# Patient Record
Sex: MALE | Race: BLACK OR AFRICAN AMERICAN | NOT HISPANIC OR LATINO | Employment: OTHER | URBAN - METROPOLITAN AREA
[De-identification: names, ages, dates, MRNs, and addresses within clinical notes are randomized per-mention and may not be internally consistent; named-entity substitution may affect disease eponyms.]

---

## 2017-01-07 ENCOUNTER — HOSPITAL ENCOUNTER (OUTPATIENT)
Facility: HOSPITAL | Age: 67
Discharge: HOME HEALTH | End: 2017-01-09
Attending: EMERGENCY MEDICINE | Admitting: HOSPITALIST
Payer: COMMERCIAL
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
## 2017-01-07 DIAGNOSIS — I48.92 ATRIAL FIBRILLATION AND FLUTTER: ICD-10-CM

## 2017-01-07 DIAGNOSIS — M48.061 SPINAL STENOSIS OF LUMBAR REGION: ICD-10-CM

## 2017-01-07 DIAGNOSIS — R53.1 WEAKNESS: ICD-10-CM

## 2017-01-07 DIAGNOSIS — I48.91 ATRIAL FIBRILLATION AND FLUTTER: ICD-10-CM

## 2017-01-07 DIAGNOSIS — E87.5 HYPERKALEMIA, DIMINISHED RENAL EXCRETION: Primary | ICD-10-CM

## 2017-01-07 PROBLEM — M25.512 ACUTE PAIN OF LEFT SHOULDER: Status: ACTIVE | Noted: 2017-01-07

## 2017-01-07 LAB
ALBUMIN SERPL BCP-MCNC: 3.2 G/DL
ALP SERPL-CCNC: 128 U/L
ALT SERPL W/O P-5'-P-CCNC: 12 U/L
ANION GAP SERPL CALC-SCNC: 13 MMOL/L
AST SERPL-CCNC: 21 U/L
BASOPHILS # BLD AUTO: 0.03 K/UL
BASOPHILS NFR BLD: 0.6 %
BILIRUB DIRECT SERPL-MCNC: 0.5 MG/DL
BILIRUB SERPL-MCNC: 1 MG/DL
BUN SERPL-MCNC: 32 MG/DL
BUN SERPL-MCNC: 47 MG/DL (ref 6–30)
CALCIUM SERPL-MCNC: 9.9 MG/DL
CHLORIDE SERPL-SCNC: 100 MMOL/L
CHLORIDE SERPL-SCNC: 100 MMOL/L (ref 95–110)
CO2 SERPL-SCNC: 28 MMOL/L
CREAT SERPL-MCNC: 6.2 MG/DL (ref 0.5–1.4)
CREAT SERPL-MCNC: 7.1 MG/DL
DIFFERENTIAL METHOD: ABNORMAL
EOSINOPHIL # BLD AUTO: 0.2 K/UL
EOSINOPHIL NFR BLD: 3.3 %
ERYTHROCYTE [DISTWIDTH] IN BLOOD BY AUTOMATED COUNT: 19.8 %
EST. GFR  (AFRICAN AMERICAN): 8.4 ML/MIN/1.73 M^2
EST. GFR  (NON AFRICAN AMERICAN): 7.2 ML/MIN/1.73 M^2
FOLATE SERPL-MCNC: 3.9 NG/ML
GLUCOSE SERPL-MCNC: 109 MG/DL
GLUCOSE SERPL-MCNC: 79 MG/DL (ref 70–110)
HCT VFR BLD AUTO: 34 %
HCT VFR BLD CALC: 39 %PCV (ref 36–54)
HGB BLD-MCNC: 10.9 G/DL
LYMPHOCYTES # BLD AUTO: 1.1 K/UL
LYMPHOCYTES NFR BLD: 21.5 %
MCH RBC QN AUTO: 28.9 PG
MCHC RBC AUTO-ENTMCNC: 32.1 %
MCV RBC AUTO: 90 FL
MONOCYTES # BLD AUTO: 0.7 K/UL
MONOCYTES NFR BLD: 13 %
NEUTROPHILS # BLD AUTO: 3.2 K/UL
NEUTROPHILS NFR BLD: 61.4 %
PLATELET # BLD AUTO: 91 K/UL
PMV BLD AUTO: ABNORMAL FL
POC IONIZED CALCIUM: 1.04 MMOL/L (ref 1.06–1.42)
POC TCO2 (MEASURED): 32 MMOL/L (ref 23–29)
POCT GLUCOSE: 104 MG/DL (ref 70–110)
POCT GLUCOSE: 72 MG/DL (ref 70–110)
POTASSIUM BLD-SCNC: 6.9 MMOL/L (ref 3.5–5.1)
POTASSIUM SERPL-SCNC: 5 MMOL/L
PROT SERPL-MCNC: 7.9 G/DL
RBC # BLD AUTO: 3.77 M/UL
SAMPLE: ABNORMAL
SODIUM BLD-SCNC: 138 MMOL/L (ref 136–145)
SODIUM SERPL-SCNC: 141 MMOL/L
T4 FREE SERPL-MCNC: 0.84 NG/DL
TSH SERPL DL<=0.005 MIU/L-ACNC: 4.84 UIU/ML
VIT B12 SERPL-MCNC: 760 PG/ML
WBC # BLD AUTO: 5.16 K/UL

## 2017-01-07 PROCEDURE — 83036 HEMOGLOBIN GLYCOSYLATED A1C: CPT

## 2017-01-07 PROCEDURE — 25000003 PHARM REV CODE 250: Performed by: EMERGENCY MEDICINE

## 2017-01-07 PROCEDURE — 93010 ELECTROCARDIOGRAM REPORT: CPT | Mod: ,,, | Performed by: INTERNAL MEDICINE

## 2017-01-07 PROCEDURE — 99213 OFFICE O/P EST LOW 20 MIN: CPT | Mod: GC,,, | Performed by: INTERNAL MEDICINE

## 2017-01-07 PROCEDURE — 99291 CRITICAL CARE FIRST HOUR: CPT | Mod: 25

## 2017-01-07 PROCEDURE — 85025 COMPLETE CBC W/AUTO DIFF WBC: CPT

## 2017-01-07 PROCEDURE — 84439 ASSAY OF FREE THYROXINE: CPT

## 2017-01-07 PROCEDURE — 96365 THER/PROPH/DIAG IV INF INIT: CPT

## 2017-01-07 PROCEDURE — G0257 UNSCHED DIALYSIS ESRD PT HOS: HCPCS

## 2017-01-07 PROCEDURE — 63600175 PHARM REV CODE 636 W HCPCS: Performed by: EMERGENCY MEDICINE

## 2017-01-07 PROCEDURE — 80076 HEPATIC FUNCTION PANEL: CPT

## 2017-01-07 PROCEDURE — 25000003 PHARM REV CODE 250: Performed by: PHYSICIAN ASSISTANT

## 2017-01-07 PROCEDURE — 99291 CRITICAL CARE FIRST HOUR: CPT | Mod: ,,, | Performed by: EMERGENCY MEDICINE

## 2017-01-07 PROCEDURE — 82746 ASSAY OF FOLIC ACID SERUM: CPT

## 2017-01-07 PROCEDURE — 93010 ELECTROCARDIOGRAM REPORT: CPT | Mod: 76,,, | Performed by: INTERNAL MEDICINE

## 2017-01-07 PROCEDURE — G0378 HOSPITAL OBSERVATION PER HR: HCPCS

## 2017-01-07 PROCEDURE — 80048 BASIC METABOLIC PNL TOTAL CA: CPT

## 2017-01-07 PROCEDURE — 99220 PR INITIAL OBSERVATION CARE,LEVL III: CPT | Mod: ,,, | Performed by: PHYSICIAN ASSISTANT

## 2017-01-07 PROCEDURE — 96375 TX/PRO/DX INJ NEW DRUG ADDON: CPT

## 2017-01-07 PROCEDURE — 82607 VITAMIN B-12: CPT

## 2017-01-07 PROCEDURE — 36415 COLL VENOUS BLD VENIPUNCTURE: CPT

## 2017-01-07 PROCEDURE — 82962 GLUCOSE BLOOD TEST: CPT

## 2017-01-07 PROCEDURE — 84443 ASSAY THYROID STIM HORMONE: CPT

## 2017-01-07 RX ORDER — CYCLOBENZAPRINE HCL 5 MG
5 TABLET ORAL 3 TIMES DAILY PRN
Status: DISCONTINUED | OUTPATIENT
Start: 2017-01-07 | End: 2017-01-09 | Stop reason: HOSPADM

## 2017-01-07 RX ORDER — SODIUM CHLORIDE 9 MG/ML
INJECTION, SOLUTION INTRAVENOUS
Status: DISCONTINUED | OUTPATIENT
Start: 2017-01-07 | End: 2017-01-09 | Stop reason: HOSPADM

## 2017-01-07 RX ORDER — HYDRALAZINE HYDROCHLORIDE 25 MG/1
100 TABLET, FILM COATED ORAL EVERY 8 HOURS
Status: DISCONTINUED | OUTPATIENT
Start: 2017-01-07 | End: 2017-01-09 | Stop reason: HOSPADM

## 2017-01-07 RX ORDER — CLONIDINE HYDROCHLORIDE 0.1 MG/1
0.1 TABLET ORAL 3 TIMES DAILY PRN
Status: DISCONTINUED | OUTPATIENT
Start: 2017-01-07 | End: 2017-01-09 | Stop reason: HOSPADM

## 2017-01-07 RX ORDER — SODIUM CHLORIDE 0.9 % (FLUSH) 0.9 %
3 SYRINGE (ML) INJECTION EVERY 8 HOURS
Status: DISCONTINUED | OUTPATIENT
Start: 2017-01-07 | End: 2017-01-09 | Stop reason: HOSPADM

## 2017-01-07 RX ORDER — LIDOCAINE 50 MG/G
2 PATCH TOPICAL
Status: DISCONTINUED | OUTPATIENT
Start: 2017-01-07 | End: 2017-01-09 | Stop reason: HOSPADM

## 2017-01-07 RX ORDER — OXYCODONE HYDROCHLORIDE 5 MG/1
5 TABLET ORAL EVERY 4 HOURS PRN
Status: DISCONTINUED | OUTPATIENT
Start: 2017-01-07 | End: 2017-01-09 | Stop reason: HOSPADM

## 2017-01-07 RX ORDER — IBUPROFEN 200 MG
16 TABLET ORAL
Status: DISCONTINUED | OUTPATIENT
Start: 2017-01-07 | End: 2017-01-09 | Stop reason: HOSPADM

## 2017-01-07 RX ORDER — RAMELTEON 8 MG/1
8 TABLET ORAL NIGHTLY PRN
Status: DISCONTINUED | OUTPATIENT
Start: 2017-01-07 | End: 2017-01-09 | Stop reason: HOSPADM

## 2017-01-07 RX ORDER — GLUCAGON 1 MG
1 KIT INJECTION
Status: DISCONTINUED | OUTPATIENT
Start: 2017-01-07 | End: 2017-01-09 | Stop reason: HOSPADM

## 2017-01-07 RX ORDER — INSULIN ASPART 100 [IU]/ML
0-5 INJECTION, SOLUTION INTRAVENOUS; SUBCUTANEOUS
Status: DISCONTINUED | OUTPATIENT
Start: 2017-01-07 | End: 2017-01-09 | Stop reason: HOSPADM

## 2017-01-07 RX ORDER — ACETAMINOPHEN 325 MG/1
650 TABLET ORAL EVERY 4 HOURS PRN
Status: DISCONTINUED | OUTPATIENT
Start: 2017-01-07 | End: 2017-01-09 | Stop reason: HOSPADM

## 2017-01-07 RX ORDER — GABAPENTIN 300 MG/1
300 CAPSULE ORAL 3 TIMES DAILY
Status: DISCONTINUED | OUTPATIENT
Start: 2017-01-07 | End: 2017-01-09 | Stop reason: HOSPADM

## 2017-01-07 RX ORDER — CLOPIDOGREL BISULFATE 75 MG/1
75 TABLET ORAL DAILY
Status: DISCONTINUED | OUTPATIENT
Start: 2017-01-07 | End: 2017-01-09 | Stop reason: HOSPADM

## 2017-01-07 RX ORDER — ACETAMINOPHEN 325 MG/1
650 TABLET ORAL EVERY 8 HOURS PRN
Status: DISCONTINUED | OUTPATIENT
Start: 2017-01-07 | End: 2017-01-09 | Stop reason: HOSPADM

## 2017-01-07 RX ORDER — FUROSEMIDE 40 MG/1
80 TABLET ORAL DAILY
Status: DISCONTINUED | OUTPATIENT
Start: 2017-01-08 | End: 2017-01-09 | Stop reason: HOSPADM

## 2017-01-07 RX ORDER — ATORVASTATIN CALCIUM 20 MG/1
40 TABLET, FILM COATED ORAL NIGHTLY
Status: DISCONTINUED | OUTPATIENT
Start: 2017-01-07 | End: 2017-01-09 | Stop reason: HOSPADM

## 2017-01-07 RX ORDER — OXYCODONE HYDROCHLORIDE 5 MG/1
10 TABLET ORAL EVERY 4 HOURS PRN
Status: DISCONTINUED | OUTPATIENT
Start: 2017-01-07 | End: 2017-01-09 | Stop reason: HOSPADM

## 2017-01-07 RX ORDER — IBUPROFEN 200 MG
24 TABLET ORAL
Status: DISCONTINUED | OUTPATIENT
Start: 2017-01-07 | End: 2017-01-09 | Stop reason: HOSPADM

## 2017-01-07 RX ORDER — ONDANSETRON 2 MG/ML
4 INJECTION INTRAMUSCULAR; INTRAVENOUS EVERY 12 HOURS PRN
Status: DISCONTINUED | OUTPATIENT
Start: 2017-01-07 | End: 2017-01-09 | Stop reason: HOSPADM

## 2017-01-07 RX ORDER — ASPIRIN 81 MG/1
81 TABLET ORAL DAILY
Status: DISCONTINUED | OUTPATIENT
Start: 2017-01-07 | End: 2017-01-09 | Stop reason: HOSPADM

## 2017-01-07 RX ORDER — LISINOPRIL 20 MG/1
40 TABLET ORAL DAILY
Status: DISCONTINUED | OUTPATIENT
Start: 2017-01-07 | End: 2017-01-09 | Stop reason: HOSPADM

## 2017-01-07 RX ORDER — AMLODIPINE BESYLATE 10 MG/1
10 TABLET ORAL DAILY
Status: DISCONTINUED | OUTPATIENT
Start: 2017-01-07 | End: 2017-01-09 | Stop reason: HOSPADM

## 2017-01-07 RX ORDER — AMOXICILLIN 250 MG
1 CAPSULE ORAL 2 TIMES DAILY
Status: DISCONTINUED | OUTPATIENT
Start: 2017-01-07 | End: 2017-01-09 | Stop reason: HOSPADM

## 2017-01-07 RX ORDER — SODIUM CHLORIDE 9 MG/ML
INJECTION, SOLUTION INTRAVENOUS ONCE
Status: DISCONTINUED | OUTPATIENT
Start: 2017-01-07 | End: 2017-01-09 | Stop reason: HOSPADM

## 2017-01-07 RX ORDER — ONDANSETRON 8 MG/1
8 TABLET, ORALLY DISINTEGRATING ORAL EVERY 8 HOURS PRN
Status: DISCONTINUED | OUTPATIENT
Start: 2017-01-07 | End: 2017-01-09 | Stop reason: HOSPADM

## 2017-01-07 RX ORDER — SEVELAMER CARBONATE 800 MG/1
800 TABLET, FILM COATED ORAL
Status: DISCONTINUED | OUTPATIENT
Start: 2017-01-07 | End: 2017-01-09 | Stop reason: HOSPADM

## 2017-01-07 RX ORDER — PANTOPRAZOLE SODIUM 40 MG/1
40 TABLET, DELAYED RELEASE ORAL DAILY
Status: DISCONTINUED | OUTPATIENT
Start: 2017-01-08 | End: 2017-01-09 | Stop reason: HOSPADM

## 2017-01-07 RX ADMIN — ASPIRIN 81 MG: 81 TABLET, COATED ORAL at 06:01

## 2017-01-07 RX ADMIN — DEXTROSE MONOHYDRATE 25 G: 25 INJECTION, SOLUTION INTRAVENOUS at 03:01

## 2017-01-07 RX ADMIN — INSULIN HUMAN 10 UNITS: 100 INJECTION, SOLUTION PARENTERAL at 03:01

## 2017-01-07 RX ADMIN — CLOPIDOGREL 75 MG: 75 TABLET, FILM COATED ORAL at 06:01

## 2017-01-07 RX ADMIN — AMLODIPINE BESYLATE 10 MG: 10 TABLET ORAL at 06:01

## 2017-01-07 RX ADMIN — LIDOCAINE 2 PATCH: 50 PATCH TOPICAL at 06:01

## 2017-01-07 RX ADMIN — OXYCODONE HYDROCHLORIDE 10 MG: 5 TABLET ORAL at 06:01

## 2017-01-07 RX ADMIN — CALCIUM GLUCONATE 1 G: 94 INJECTION, SOLUTION INTRAVENOUS at 03:01

## 2017-01-07 RX ADMIN — SEVELAMER CARBONATE 800 MG: 800 TABLET, FILM COATED ORAL at 06:01

## 2017-01-07 RX ADMIN — GABAPENTIN 300 MG: 300 CAPSULE ORAL at 09:01

## 2017-01-07 RX ADMIN — LISINOPRIL 40 MG: 20 TABLET ORAL at 06:01

## 2017-01-07 RX ADMIN — STANDARDIZED SENNA CONCENTRATE AND DOCUSATE SODIUM 1 TABLET: 8.6; 5 TABLET, FILM COATED ORAL at 09:01

## 2017-01-07 RX ADMIN — ATORVASTATIN CALCIUM 40 MG: 20 TABLET, FILM COATED ORAL at 09:01

## 2017-01-07 NOTE — H&P
"[facility] Medicine  History & Physical    Patient Name: Wilder Carbajal  MRN: [id number]  Admission Date: 1/7/2017  Attending Physician: Bahman Matthews MD   Primary Care Provider: Akhil Adorno MD    [facility] Medicine Team: [facility] Waqas Aviles PA-C     Patient information was obtained from patient, past medical records and ER records.     Subjective:     Principal Problem:<principal problem not specified>    Chief Complaint:  Left shoulder pain, right hip and knee pain after a fall    HPI: 66M with PMHx of lumbar spinal stenosis, ESRD on HD TThS, DM, HTN presents for evaluation for left shoulder pain and increased back pain s/p a mechanical fall yesterday and today. Patient fell yesterday from a seated on his walker, he bent over to pick something up and did not lock his walker and it slipped away from him and he fell "straight on my ass" and hit his left shoulder on the way down. He did not hit his head or have LOC. After this fall he was increasingly sore and last night was unable to move out of the bed and had to slide out of the bed and on to the ground to use the bathroom. (He did not want to mess the bed, he again did not hit his head or have LOC). He was unfortunately unable to get back up after this embarrassing event and presented to the ED for evaluation. He subsequently missed his HD appointment for today and was found to have elevated potassium at 6.9, but re-draw showed it at 5.0. Mr. Carbajal endorses increasing weakness since last year. He denies any significant changes to his symptoms. His main concern today is pain control of his left shoulder and back.    In ED, CBC showing stable and chronic anemia, renal function panel consistent with ESRD w/o significant electrolyte abnormalities (possibility that intially elevated POC potassium of 6.9 was falsely elevated as repeat potassium of 5.0 was noted on laboratory BMP). Radiologic exams of R femur, R knee, " pelvis, L shoulder only showing DJD on all films. Patient initiated on hyperkalemia protocol and given insulin, calcium gluconate. Nephrology consulted for HD.      Past Medical History   Diagnosis Date    Arthritis     Asthma     Blood clotting tendency     Blood transfusion     Cataract     Chronic kidney disease requiring chronic dialysis     Diabetes mellitus     Diabetic retinopathy     Fever blister     Hyperlipidemia     Hypertension     Infertility     Joint pain     Retinal detachment     Thyroid disease     Weakness 1/7/2017       Past Surgical History   Procedure Laterality Date    Cataract extraction      Retinal detachment surgery         Review of patient's allergies indicates:  No Known Allergies    No current facility-administered medications on file prior to encounter.      Current Outpatient Prescriptions on File Prior to Encounter   Medication Sig    atorvastatin (LIPITOR) 40 MG tablet Take 40 mg by mouth every evening.    clopidogrel (PLAVIX) 75 mg tablet Take 75 mg by mouth once daily.    amlodipine (NORVASC) 10 MG tablet Take 1 tablet (10 mg total) by mouth once daily.    aspirin (ECOTRIN) 81 MG EC tablet Take 81 mg by mouth once daily.     carbamide peroxide (DEBROX) 6.5 % otic solution Place 5 drops in ear(s) 3 (three) times daily.    cyclobenzaprine (FLEXERIL) 5 MG tablet Take 5 mg by mouth nightly as needed (for jaw pain).    diclofenac sodium (VOLTAREN) 1 % Gel Apply 2 g topically 4 (four) times daily as needed (for pain).    ergocalciferol (ERGOCALCIFEROL) 50,000 unit Cap Take 50,000 Units by mouth every 14 (fourteen) days. 1 cap PO weekly x12 wks then twice per month    famotidine-calcium carbonate-magnesium hydroxide (PEPCID COMPLETE) chewable tablet Take 1 tablet by mouth every 12 (twelve) hours as needed (for acid reflux or gastric pain).    furosemide (LASIX) 80 MG tablet Take 80 mg by mouth once daily.    gabapentin (NEURONTIN) 300 mg tablet Take 300 mg  by mouth 3 (three) times daily.      hydrALAZINE (APRESOLINE) 100 MG tablet Take 1 tablet (100 mg total) by mouth every 8 (eight) hours.    hydrocodone-acetaminophen 5-325mg (NORCO) 5-325 mg per tablet Take 1 tablet by mouth every 8 (eight) hours as needed (for severe pain).     ketotifen (ZADITOR) 0.025 % (0.035 %) ophthalmic solution Apply 1 drop to eye every 8 (eight) hours.    lisinopril (PRINIVIL,ZESTRIL) 40 MG tablet Take 40 mg by mouth once daily.    podofilox (CONDYLOX) 0.5 % external solution Apply a small amount to lesions twice daily for three days, then hold for four days and repeat regimen    sevelamer carbonate (RENVELA) 800 mg Tab Take two tablets by mouth four times daily with meals and snacks     Family History     Problem Relation (Age of Onset)    Heart attack Mother        Social History Main Topics    Smoking status: Never Smoker    Smokeless tobacco: Never Used    Alcohol use Yes      Comment: 1 pint bourbon    Drug use: No    Sexual activity: Not on file     Review of Systems   Constitutional: Negative for activity change, appetite change, diaphoresis, fatigue and fever.   HENT: Negative for congestion, sore throat and trouble swallowing.    Eyes: Negative for pain and visual disturbance.   Respiratory: Negative for apnea, cough, chest tightness, shortness of breath and wheezing.    Cardiovascular: Negative for chest pain, palpitations and leg swelling.   Gastrointestinal: Negative for abdominal pain, blood in stool, constipation, diarrhea, nausea and vomiting.   Endocrine: Negative for cold intolerance and heat intolerance.   Genitourinary: Positive for decreased urine volume (ESRD). Negative for dysuria and urgency.   Musculoskeletal: Positive for arthralgias, back pain and gait problem (walker at baseline). Negative for myalgias.   Skin: Negative for pallor and rash.   Neurological: Positive for weakness (chronic, increasing) and numbness (BL hands, legs R>L). Negative for  dizziness, seizures, facial asymmetry, speech difficulty and headaches.   Hematological: Negative for adenopathy. Does not bruise/bleed easily.   Psychiatric/Behavioral: Negative for agitation, confusion, hallucinations and self-injury. The patient is not nervous/anxious.      Objective:     Vital Signs (Most Recent):  Temp: 98.3 °F (36.8 °C) (01/07/17 1010)  Pulse: 60 (01/07/17 1548)  Resp: 20 (01/07/17 1548)  BP: (!) 202/101 (01/07/17 1535)  SpO2: 95 % (01/07/17 1548) Vital Signs (24h Range):  Temp:  [98.3 °F (36.8 °C)] 98.3 °F (36.8 °C)  Pulse:  [50-60] 60  Resp:  [10-20] 20  BP: (190-220)/() 202/101     Weight: 117 kg (258 lb)  Body mass index is 37.02 kg/(m^2).    Physical Exam   Constitutional: He is oriented to person, place, and time. He appears well-developed and well-nourished.  Non-toxic appearance. No distress.   HENT:   Head: Normocephalic and atraumatic.   Right Ear: External ear normal.   Left Ear: External ear normal.   Nose: Nose normal.   Mouth/Throat: Uvula is midline and oropharynx is clear and moist. Abnormal dentition.   Eyes: Conjunctivae and lids are normal. Pupils are equal, round, and reactive to light.   Neck: Normal range of motion, full passive range of motion without pain and phonation normal.   Cardiovascular: Normal rate, regular rhythm and intact distal pulses.    Murmur heard.  Pulmonary/Chest: Effort normal and breath sounds normal. He has no wheezes.   Abdominal: Soft. Normal appearance and bowel sounds are normal. He exhibits no distension and no mass. There is no tenderness. There is no guarding.   Musculoskeletal: He exhibits edema (2+ pitting to above knee). He exhibits no deformity.        Right shoulder: Normal.        Left shoulder: He exhibits decreased range of motion and pain.        Right elbow: Normal.       Right wrist: Normal.        Cervical back: He exhibits pain. He exhibits no bony tenderness.        Lumbar back: He exhibits tenderness.   Some TTP to left  rhomboid/posterior L chest wall. TTP of the Right hip, femur and knee with no gross deformity.  Pt does also have chronic pain    Lymphadenopathy:     He has no cervical adenopathy.   Neurological: He is alert and oriented to person, place, and time. No cranial nerve deficit.   Skin: Skin is warm and dry.   Psychiatric: He has a normal mood and affect. His speech is normal and behavior is normal. Judgment and thought content normal. Cognition and memory are normal.   Nursing note and vitals reviewed.       Significant Labs:   CBC:   Recent Labs  Lab 01/07/17  1318 01/07/17  1511   WBC  --  5.16   HGB  --  10.9*   HCT 39 34.0*   PLT  --  91*     CMP:   Recent Labs  Lab 01/07/17  1556      K 5.0      CO2 28      BUN 32*   CREATININE 7.1*   CALCIUM 9.9   ANIONGAP 13   EGFRNONAA 7.2*     All pertinent labs within the past 24 hours have been reviewed.    Significant Imaging: I have reviewed all pertinent imaging results/findings within the past 24 hours.    Assessment/Plan:     Weakness  - chronic in nature, progressing per patient. Pending TSH, B12, folate  - PT/OT consult  - likely 2/2 deconditioning in setting of lumbar stenosis. See below for details    ESRD (end stage renal disease) on dialysis  - nephology consulted, to provide HD today as patient missed session while being in ED  - while in ED potassium shift initiated, repeat K+ at 5.0  - monitor on tele    HTN (hypertension)  - continue norvasc, hydralazine, lisinopril  - clonidine PRN    DM (diabetes mellitus) type II controlled with renal manifestation  - diet controlled, hyper/hypo glycemic protocols in place    Spinal stenosis of lumbar region  - followed with pain management, last note from 06/2015, pain well controlled with percocet, recommended outpatient PT  - MRI done at that time  - PT/OT  - Pain control with tylenol, percocet 5, 10, lidoderm  - historically symptoms worse R>L, complains of R>L pain today, radiologic exam negative  for fractures, showing only DJD  - pending lumbar films    Acute pain of left shoulder  - PT/OT  - radiologic exam with no fractures, showing only DJD    VTE Risk Mitigation         Ordered     Medium Risk of VTE  Once      01/07/17 1702     Place sequential compression device  Until discontinued      01/07/17 1406     Place RODRI hose  Until discontinued      01/07/17 1406        Waqas Aviles PA-C  Department of Hospital Medicine   [facility]

## 2017-01-07 NOTE — ASSESSMENT & PLAN NOTE
- chronic in nature, progressing per patient. Pending TSH, B12, folate  - PT/OT consult  - likely 2/2 deconditioning in setting of lumbar stenosis. See below for details

## 2017-01-07 NOTE — ED PROVIDER NOTES
"Encounter Date: 1/7/2017    SCRIBE #1 NOTE: I, Juana Schultz, am scribing for, and in the presence of,  Dr. Soriano. I have scribed the following portions of the note - the Resident attestation.       History     Chief Complaint   Patient presents with    Fall     Slid out of bed this morning, with left shoulder and right hip pain.  Reports right hip pain as chronic.  Left should pain new.     Review of patient's allergies indicates:  No Known Allergies  HPI Comments: 67 yo male presents for eval left shoulder pain and right hip pain.  Yesterday pt suffered mechanical fall from standing.  He was leaning over when he fell forward striking the left shoulder.  Denies any head injury, LOC or pre-syncopal symptoms.  This morning as pt was "scooting off the bed" when he fell off the bed striking his right hip.  Denying any midline back pain or C-spine pain.  Again no LOC or head trauma.      The history is provided by the patient.     Past Medical History   Diagnosis Date    Arthritis     Asthma     Blood clotting tendency     Blood transfusion     Cataract     Chronic kidney disease requiring chronic dialysis     Diabetes mellitus     Diabetic retinopathy     Fever blister     Hyperlipidemia     Hypertension     Infertility     Joint pain     Retinal detachment     Thyroid disease      Past Medical History Pertinent Negatives   Diagnosis Date Noted    Amblyopia 9/4/2012    Glaucoma 9/4/2012    Macular degeneration 9/4/2012    Strabismus 9/4/2012    Transfusion reaction 12/11/2013    Uveitis 9/4/2012     Past Surgical History   Procedure Laterality Date    Cataract extraction      Retinal detachment surgery       Family History   Problem Relation Age of Onset    Heart attack Mother     Cancer Neg Hx      Social History   Substance Use Topics    Smoking status: Never Smoker    Smokeless tobacco: Never Used    Alcohol use Yes      Comment: 1 mary berger     Review of Systems   Constitutional: " Negative for chills and fever.   HENT: Negative for sore throat and voice change.    Eyes: Negative for pain and visual disturbance.   Respiratory: Negative for cough, shortness of breath and wheezing.    Cardiovascular: Positive for leg swelling (chronic; unchanged). Negative for chest pain and palpitations.   Gastrointestinal: Negative for abdominal pain, nausea and vomiting.   Genitourinary: Negative for discharge and flank pain.   Musculoskeletal: Positive for arthralgias. Negative for back pain, joint swelling, myalgias and neck pain.   Skin: Negative for rash and wound.   Neurological: Negative for dizziness, syncope, facial asymmetry, speech difficulty, weakness, light-headedness and headaches.       Physical Exam   Initial Vitals   BP Pulse Resp Temp SpO2   01/07/17 1005 01/07/17 1005 01/07/17 1005 01/07/17 1010 01/07/17 1005   190/106 56 16 98.3 °F (36.8 °C) 99 %     Physical Exam    Nursing note and vitals reviewed.  Constitutional: He appears well-developed and well-nourished. He is not diaphoretic. No distress.   HENT:   Head: Normocephalic and atraumatic.   Right Ear: External ear normal.   Left Ear: External ear normal.   Nose: Nose normal.   Eyes: Conjunctivae and EOM are normal. Pupils are equal, round, and reactive to light. No scleral icterus.   Neck: Normal range of motion. Neck supple.   Cardiovascular: Normal rate, regular rhythm and normal heart sounds.   No murmur heard.  Pulmonary/Chest: Breath sounds normal. No respiratory distress. He has no wheezes. He has no rhonchi.   Abdominal: Soft. Bowel sounds are normal. There is no tenderness.   Musculoskeletal: He exhibits edema (bilateral 3+ pitting edema that is unchanged).   Left shoulder- No gross deformity.  +decreased ROM and TTP posteriorly.  No decreased sensation and 2+radial pulse.    TTP of the Right hip, femur and knee with no gross deformity.  Pt does also have chronic pain   Lymphadenopathy:     He has no cervical adenopathy.    Neurological: He is alert and oriented to person, place, and time.         ED Course   Procedures  Labs Reviewed   CBC W/ AUTO DIFFERENTIAL - Abnormal; Notable for the following:        Result Value    RBC 3.77 (*)     Hemoglobin 10.9 (*)     Hematocrit 34.0 (*)     RDW 19.8 (*)     All other components within normal limits   ISTAT PROCEDURE - Abnormal; Notable for the following:     POC BUN 47 (*)     POC Creatinine 6.2 (*)     POC Potassium 6.9 (*)     POC TCO2 (MEASURED) 32 (*)     POC Ionized Calcium 1.04 (*)     All other components within normal limits   BASIC METABOLIC PANEL   POCT GLUCOSE   ISTAT CHEM8   POCT GLUCOSE MONITORING CONTINUOUS             Medical Decision Making:   History:   Old Medical Records: I decided to obtain old medical records.       APC / Resident Notes:   [facility]    DDx includes but is not limited to: fx, dislocation, strain/sprain, syncope, acs, hyperkalemia, dysrhythmia   A/P:  Pt is a 67 yo male with extremity pain after fall.  Signs and symptoms consistent with possible fx.  Will perform imaging.  Pt's denies any symptoms of possible cardiac etiology and demonstrates no focal deficits to suggest central etiology.  Dispo pending  labs/imaging and reassessment.  Case Discussed with Dr. ADELITA RAINES  01/07/2017 11:22 AM    UPDATE  XR's of the shoulder, pelvis, femur and knee are negative for acute fx/dislocation.   Pt has missed dialysis today and will not be able to go until Tuesday. will order labs and EKG    UPDATE  Elevated K with slow flutter on EKG  Will consult renal for dialysis.    Yahaira RAINES  01/07/2017 1:33 PM    UPDATE   Pt admitted for dialysis  Yahaira RAINES  01/07/2017 2:07 PM             Scribe Attestation:   Scribe #1: I performed the above scribed service and the documentation accurately describes the services I performed. I attest to the accuracy of the note.    Attending Attestation:   Physician Attestation Statement for  Resident:  As the supervising MD   Physician Attestation Statement: I have personally seen and examined this patient.   I agree with the above history. -: Pt presents after a fall yesterday and concerned about left arm. On exam pt is resting comfortably but some pain on ROM of left arm. X-Ray shows no fracture or dislocation. Pt was due for dialysis today but missed it. Will check potassium and EKG. Does not appear to be fluid overloaded.    As the supervising MD I agree with the above PE.    As the supervising MD I agree with the above treatment, course, plan, and disposition.        Attending Critical Care:   Critical Care Times:   Direct Patient Care (initial evaluation, reassessments, and time considering the case)................................................................24 minutes.   Additional History from reviewing old medical records or taking additional history from the family, EMS, PCP, etc.......................3 minutes.   Ordering, Reviewing, and Interpreting Diagnostic Studies...............................................................................................................3 minutes.   Documentation..................................................................................................................................................................................3 minutes.   Consultation with other Physicians. .................................................................................................................................................3 minutes.   ==============================================================  · Total Critical Care Time - exclusive of procedural time: 36 minutes.  ==============================================================    Physician Attestation for Scribe:  Physician Attestation Statement for Scribe #1: I, Dr. Soriano, reviewed documentation, as scribed by Juana Schultz in my presence, and it is both accurate and complete.                  ED Course     Clinical Impression:   The primary encounter diagnosis was Hyperkalemia, diminished renal excretion. Diagnoses of Fall and Atrial fibrillation and flutter were also pertinent to this visit.          Yahaira Correia MD  Resident  01/07/17 4957       Johnathan Soriano MD  01/07/17 2065

## 2017-01-07 NOTE — NURSING
Patient arrived to OBS 02 from the ED. Patient is AAOX4. Respirations are even and unlabored. Blood pressure elevated. Patient placed on cardiac monitoring. Patient oriented to the room and call light. Bed locked and lowest position. Call light within reach.

## 2017-01-07 NOTE — ASSESSMENT & PLAN NOTE
- nephology consulted, to provide HD today as patient missed session while being in ED  - while in ED potassium shift initiated, repeat K+ at 5.0  - monitor on tele

## 2017-01-07 NOTE — ASSESSMENT & PLAN NOTE
- followed with pain management, last note from 06/2015, pain well controlled with percocet, recommended outpatient PT  - MRI done at that time  - PT/OT  - Pain control with tylenol, percocet 5, 10, lidoderm  - historically symptoms worse R>L, complains of R>L pain today, radiologic exam negative for fractures, showing only DJD  - pending lumbar films

## 2017-01-07 NOTE — ED TRIAGE NOTES
"Patient received with complaint of falls.l  States in the last two days he fell twice.  Patient denies any new neck or back pain.  Today from his bed, sliding off the side and landing on his left side.  States chronic back pain and right hip pain increased since fall.  Patient does report new onset left arm pain from fall he states occurred yesterday, and then repeat injury this morning with second fall.     No LDA's in place on arrival to department.    Family not present.    Pain:  Rated     Psychosocial:  Patient is calm and cooperative.  Patients insight and judgement are appropriate to situation.  Appears clean, well maintained, with clothing appropriate to environment.  No evidence of delusions, hallucinations, or psychosis.    Neuro:  Eyes open spontaneously.  Awake, alert, oriented x 4.  Speech clear and appropriate.  Tolerating saliva secretions well.  Able to follow commands, demonstrating ability to actively and appropriately communicate within context of current conversation.  Symmetrical facial muscles.  Moving all extremities well with no noted weakness.  Adequate muscle tone present.    Movement is purposeful.       Airway:  Bilateral chest rise and fall.  RR regular and non-labored.  No crepitus or subcutaneous emphysema noted on palpation.      Circulatory:  Skin warm, dry, and pink.  Radial pulses strong and regular.  Capillary refill/skin blanching less than 3 seconds to distal of 4 extremities.    Abdomen:  Abdomen soft and non-distended.       Urinary:  Intermittent void "a little bit every now and then."    Extremities:  No redness, heat, deformity.  See above.  Skin scaled and dry to lower legs.    Skin:  Intact with no bruising/discolorations noted.        "

## 2017-01-07 NOTE — SUBJECTIVE & OBJECTIVE
Past Medical History   Diagnosis Date    Arthritis     Asthma     Blood clotting tendency     Blood transfusion     Cataract     Chronic kidney disease requiring chronic dialysis     Diabetes mellitus     Diabetic retinopathy     Fever blister     Hyperlipidemia     Hypertension     Infertility     Joint pain     Retinal detachment     Thyroid disease     Weakness 1/7/2017       Past Surgical History   Procedure Laterality Date    Cataract extraction      Retinal detachment surgery         Review of patient's allergies indicates:  No Known Allergies    No current facility-administered medications on file prior to encounter.      Current Outpatient Prescriptions on File Prior to Encounter   Medication Sig    atorvastatin (LIPITOR) 40 MG tablet Take 40 mg by mouth every evening.    clopidogrel (PLAVIX) 75 mg tablet Take 75 mg by mouth once daily.    amlodipine (NORVASC) 10 MG tablet Take 1 tablet (10 mg total) by mouth once daily.    aspirin (ECOTRIN) 81 MG EC tablet Take 81 mg by mouth once daily.     carbamide peroxide (DEBROX) 6.5 % otic solution Place 5 drops in ear(s) 3 (three) times daily.    cyclobenzaprine (FLEXERIL) 5 MG tablet Take 5 mg by mouth nightly as needed (for jaw pain).    diclofenac sodium (VOLTAREN) 1 % Gel Apply 2 g topically 4 (four) times daily as needed (for pain).    ergocalciferol (ERGOCALCIFEROL) 50,000 unit Cap Take 50,000 Units by mouth every 14 (fourteen) days. 1 cap PO weekly x12 wks then twice per month    famotidine-calcium carbonate-magnesium hydroxide (PEPCID COMPLETE) chewable tablet Take 1 tablet by mouth every 12 (twelve) hours as needed (for acid reflux or gastric pain).    furosemide (LASIX) 80 MG tablet Take 80 mg by mouth once daily.    gabapentin (NEURONTIN) 300 mg tablet Take 300 mg by mouth 3 (three) times daily.      hydrALAZINE (APRESOLINE) 100 MG tablet Take 1 tablet (100 mg total) by mouth every 8 (eight) hours.     hydrocodone-acetaminophen 5-325mg (NORCO) 5-325 mg per tablet Take 1 tablet by mouth every 8 (eight) hours as needed (for severe pain).     ketotifen (ZADITOR) 0.025 % (0.035 %) ophthalmic solution Apply 1 drop to eye every 8 (eight) hours.    lisinopril (PRINIVIL,ZESTRIL) 40 MG tablet Take 40 mg by mouth once daily.    podofilox (CONDYLOX) 0.5 % external solution Apply a small amount to lesions twice daily for three days, then hold for four days and repeat regimen    sevelamer carbonate (RENVELA) 800 mg Tab Take two tablets by mouth four times daily with meals and snacks     Family History     Problem Relation (Age of Onset)    Heart attack Mother        Social History Main Topics    Smoking status: Never Smoker    Smokeless tobacco: Never Used    Alcohol use Yes      Comment: 1 pint bourbon    Drug use: No    Sexual activity: Not on file     Review of Systems   Constitutional: Negative for activity change, appetite change, diaphoresis, fatigue and fever.   HENT: Negative for congestion, sore throat and trouble swallowing.    Eyes: Negative for pain and visual disturbance.   Respiratory: Negative for apnea, cough, chest tightness, shortness of breath and wheezing.    Cardiovascular: Negative for chest pain, palpitations and leg swelling.   Gastrointestinal: Negative for abdominal pain, blood in stool, constipation, diarrhea, nausea and vomiting.   Endocrine: Negative for cold intolerance and heat intolerance.   Genitourinary: Positive for decreased urine volume (ESRD). Negative for dysuria and urgency.   Musculoskeletal: Positive for arthralgias, back pain and gait problem (walker at baseline). Negative for myalgias.   Skin: Negative for pallor and rash.   Neurological: Positive for weakness (chronic, increasing) and numbness (BL hands, legs R>L). Negative for dizziness, seizures, facial asymmetry, speech difficulty and headaches.   Hematological: Negative for adenopathy. Does not bruise/bleed easily.    Psychiatric/Behavioral: Negative for agitation, confusion, hallucinations and self-injury. The patient is not nervous/anxious.      Objective:     Vital Signs (Most Recent):  Temp: 98.3 °F (36.8 °C) (01/07/17 1010)  Pulse: 60 (01/07/17 1548)  Resp: 20 (01/07/17 1548)  BP: (!) 202/101 (01/07/17 1535)  SpO2: 95 % (01/07/17 1548) Vital Signs (24h Range):  Temp:  [98.3 °F (36.8 °C)] 98.3 °F (36.8 °C)  Pulse:  [50-60] 60  Resp:  [10-20] 20  BP: (190-220)/() 202/101     Weight: 117 kg (258 lb)  Body mass index is 37.02 kg/(m^2).    Physical Exam   Constitutional: He is oriented to person, place, and time. He appears well-developed and well-nourished.  Non-toxic appearance. No distress.   HENT:   Head: Normocephalic and atraumatic.   Right Ear: External ear normal.   Left Ear: External ear normal.   Nose: Nose normal.   Mouth/Throat: Uvula is midline and oropharynx is clear and moist. Abnormal dentition.   Eyes: Conjunctivae and lids are normal. Pupils are equal, round, and reactive to light.   Neck: Normal range of motion, full passive range of motion without pain and phonation normal.   Cardiovascular: Normal rate, regular rhythm and intact distal pulses.    Murmur heard.  Pulmonary/Chest: Effort normal and breath sounds normal. He has no wheezes.   Abdominal: Soft. Normal appearance and bowel sounds are normal. He exhibits no distension and no mass. There is no tenderness. There is no guarding.   Musculoskeletal: He exhibits edema (2+ pitting to above knee). He exhibits no deformity.        Right shoulder: Normal.        Left shoulder: He exhibits decreased range of motion and pain.        Right elbow: Normal.       Right wrist: Normal.        Cervical back: He exhibits pain. He exhibits no bony tenderness.        Lumbar back: He exhibits tenderness.   Some TTP to left rhomboid/posterior L chest wall. TTP of the Right hip, femur and knee with no gross deformity.  Pt does also have chronic pain     Lymphadenopathy:     He has no cervical adenopathy.   Neurological: He is alert and oriented to person, place, and time. No cranial nerve deficit.   Skin: Skin is warm and dry.   Psychiatric: He has a normal mood and affect. His speech is normal and behavior is normal. Judgment and thought content normal. Cognition and memory are normal.   Nursing note and vitals reviewed.       Significant Labs:   CBC:   Recent Labs  Lab 01/07/17  1318 01/07/17  1511   WBC  --  5.16   HGB  --  10.9*   HCT 39 34.0*   PLT  --  91*     CMP:   Recent Labs  Lab 01/07/17  1556      K 5.0      CO2 28      BUN 32*   CREATININE 7.1*   CALCIUM 9.9   ANIONGAP 13   EGFRNONAA 7.2*     All pertinent labs within the past 24 hours have been reviewed.    Significant Imaging: I have reviewed all pertinent imaging results/findings within the past 24 hours.

## 2017-01-07 NOTE — IP AVS SNAPSHOT
[address]  [place] [address]  Phone: [phone]           I have received a copy of my After Visit Summary and discharge instructions from [facility].    INSTRUCTIONS RECEIVED AND UNDERSTOOD BY:                     Patient/Patient Representative: ________________________________________________________________     Date/Time: ________________________________________________________________                     Instructions Given By: ________________________________________________________________     Date/Time: ________________________________________________________________

## 2017-01-07 NOTE — IP AVS SNAPSHOT
[facility]  [address]  [facility] [address]  Phone: [phone]           Patient Discharge Instructions     Our goal is to set you up for success. This packet includes information on your condition, medications, and your home care. It will help you to care for yourself so you don't get sicker and need to go back to the hospital.     Please ask your nurse if you have any questions.        There are many details to remember when preparing to leave the hospital. Here is what you will need to do:    1. Take your medicine. If you are prescribed medications, review your Medication List in the following pages. You may have new medications to  at the pharmacy and others that you'll need to stop taking. Review the instructions for how and when to take your medications. Talk with your doctor or nurses if you are unsure of what to do.     2. Go to your follow-up appointments. Specific follow-up information is listed in the following pages. Your may be contacted by a transition nurse or clinical provider about future appointments. Be sure we have all of the phone numbers to reach you, if needed. Please contact your provider's office if you are unable to make an appointment.     3. Watch for warning signs. Your doctor or nurse will give you detailed warning signs to watch for and when to call for assistance. These instructions may also include educational information about your condition. If you experience any of warning signs to your health, call your doctor.               [facility]  Unless otherwise directed by your provider, please contact [facility], our nurse care line that is available for 24/7 assistance.     [phone] (toll-free)    Registered nurses in the [facility] provide clinical advisement, health education, appointment booking, and other advisory services.                    ** Verify the list of medication(s) below is accurate and up  to date. Carry this with you in case of emergency. If your medications have changed, please notify your healthcare provider.             Medication List      START taking these medications        Additional Info                      oxycodone-acetaminophen 5-325 mg per tablet   Commonly known as:  PERCOCET   Quantity:  45 tablet   Refills:  0   Dose:  1 tablet    Instructions:  Take 1 tablet by mouth every 4 (four) hours as needed for Pain.     Begin Date    AM    Noon    PM    Bedtime         CONTINUE taking these medications        Additional Info                      amlodipine 10 MG tablet   Commonly known as:  NORVASC   Quantity:  30 tablet   Refills:  3   Dose:  10 mg    Last time this was given:  10 mg on 1/9/2017  8:19 AM   Instructions:  Take 1 tablet (10 mg total) by mouth once daily.     Begin Date    AM    Noon    PM    Bedtime       aspirin 81 MG EC tablet   Commonly known as:  ECOTRIN   Refills:  0   Dose:  81 mg    Last time this was given:  81 mg on 1/9/2017  8:19 AM   Instructions:  Take 81 mg by mouth once daily.     Begin Date    AM    Noon    PM    Bedtime       atorvastatin 40 MG tablet   Commonly known as:  LIPITOR   Refills:  0   Dose:  40 mg    Last time this was given:  40 mg on 1/8/2017  9:38 PM   Instructions:  Take 40 mg by mouth every evening.     Begin Date    AM    Noon    PM    Bedtime       carbamide peroxide 6.5 % otic solution   Commonly known as:  DEBROX   Refills:  0   Dose:  5 drop    Instructions:  Place 5 drops in ear(s) 3 (three) times daily.     Begin Date    AM    Noon    PM    Bedtime       clopidogrel 75 mg tablet   Commonly known as:  PLAVIX   Refills:  0   Dose:  75 mg    Last time this was given:  75 mg on 1/9/2017  8:19 AM   Instructions:  Take 75 mg by mouth once daily.     Begin Date    AM    Noon    PM    Bedtime       cyclobenzaprine 5 MG tablet   Commonly known as:  FLEXERIL   Refills:  0   Dose:  5 mg    Instructions:  Take 5 mg by mouth nightly as needed (for  jaw pain).     Begin Date    AM    Noon    PM    Bedtime       ergocalciferol 50,000 unit Cap   Commonly known as:  ERGOCALCIFEROL   Refills:  0   Dose:  28493 Units    Instructions:  Take 50,000 Units by mouth every 14 (fourteen) days. 1 cap PO weekly x12 wks then twice per month     Begin Date    AM    Noon    PM    Bedtime       furosemide 80 MG tablet   Commonly known as:  LASIX   Refills:  0   Dose:  80 mg    Last time this was given:  80 mg on 1/9/2017  8:18 AM   Instructions:  Take 80 mg by mouth once daily.     Begin Date    AM    Noon    PM    Bedtime       gabapentin 300 mg tablet   Commonly known as:  NEURONTIN   Refills:  0   Dose:  300 mg    Instructions:  Take 300 mg by mouth 3 (three) times daily.     Begin Date    AM    Noon    PM    Bedtime       hydrALAZINE 100 MG tablet   Commonly known as:  APRESOLINE   Quantity:  90 tablet   Refills:  3   Dose:  100 mg    Last time this was given:  100 mg on 1/9/2017  5:18 AM   Instructions:  Take 1 tablet (100 mg total) by mouth every 8 (eight) hours.     Begin Date    AM    Noon    PM    Bedtime       ketotifen 0.025 % (0.035 %) ophthalmic solution   Commonly known as:  ZADITOR   Refills:  0   Dose:  1 drop    Instructions:  Apply 1 drop to eye every 8 (eight) hours.     Begin Date    AM    Noon    PM    Bedtime       lisinopril 40 MG tablet   Commonly known as:  PRINIVIL,ZESTRIL   Refills:  0   Dose:  40 mg    Last time this was given:  40 mg on 1/9/2017  8:18 AM   Instructions:  Take 40 mg by mouth once daily.     Begin Date    AM    Noon    PM    Bedtime       PEPCID COMPLETE -165 mg   Refills:  0   Dose:  1 tablet   Generic drug:  famotidine-calcium carbonate-magnesium hydroxide    Instructions:  Take 1 tablet by mouth every 12 (twelve) hours as needed (for acid reflux or gastric pain).     Begin Date    AM    Noon    PM    Bedtime       podofilox 0.5 % external solution   Commonly known as:  CONDYLOX   Refills:  0    Instructions:  Apply a small  amount to lesions twice daily for three days, then hold for four days and repeat regimen     Begin Date    AM    Noon    PM    Bedtime       sevelamer carbonate 800 mg Tab   Commonly known as:  RENVELA   Refills:  0    Last time this was given:  800 mg on 1/9/2017  8:18 AM   Instructions:  Take two tablets by mouth four times daily with meals and snacks     Begin Date    AM    Noon    PM    Bedtime       VOLTAREN 1 % Gel   Refills:  0   Dose:  2 g   Generic drug:  diclofenac sodium    Instructions:  Apply 2 g topically 4 (four) times daily as needed (for pain).     Begin Date    AM    Noon    PM    Bedtime         STOP taking these medications     hydrocodone-acetaminophen 5-325mg 5-325 mg per tablet   Commonly known as:  NORCO            Where to Get Your Medications      You can get these medications from any pharmacy     Bring a paper prescription for each of these medications     oxycodone-acetaminophen 5-325 mg per tablet                  Please bring to all follow up appointments:    1. A copy of your discharge instructions.  2. All medicines you are currently taking in their original bottles.  3. Identification and insurance card.    Please arrive 15 minutes ahead of scheduled appointment time.    Please call 24 hours in advance if you must reschedule your appointment and/or time.        Your Scheduled Appointments     Jan 11, 2017  1:00 PM CST   Color Flow Hemodialysis AC with VASCULAR, LAB   [facility] ([facility] )    [address]  [place] [address]   [phone]            Jan 11, 2017  1:45 PM CST   Established Patient Visit with MD Truman Patel jayashree - Vascular Surgery ([facility] )    [address]  [place] [address]   [phone]            Jan 30, 2017 10:30 AM CST   Established Patient Visit with JESSICA Ortega - Optometry ([facility] )    [address]  [place] [address]   [phone]            Mar 06,  "2017 10:40 AM CST   New Patient with Anselmo Terrazas MD   [facility]    [address]  [facility] [address] [phone]              Follow-up Information     Follow up with Akhil Adorno MD In 2 weeks.    Specialty:  Family Medicine    Contact information:    [address]  [facility] [address]  [phone]          Follow up with [facility].    Specialty:  Physical Medicine and Rehabilitation    Why:  They will call you to schedule follow up    Contact information:    [address]  [facility] [address] [phone]    Additional information:    [facility] - 5th Floor      Referrals     Future Orders    Ambulatory Referral to Physical Medicine Rehab         Discharge Instructions     Future Orders    Activity as tolerated     Call MD for:  difficulty breathing or increased cough     Diet Diabetic 2000 Calories     Diet renal         Primary Diagnosis     Your primary diagnosis was:  Weakness      Admission Information     Date & Time Provider Department CSN    1/7/2017 10:51 AM Bahman Matthews MD [facility] [id number]      Care Providers     Provider Role Specialty Primary office phone    Bahmna Matthews MD Attending Provider Hospitalist [phone]    Gulshan Carvajal MD Team Attending  Hospitalist [phone]    Bahman Matthews MD Team Attending  Hospitalist [phone]    Travis Souza MD Consulting Physician  Nephrology [phone]      Your Vitals Were     BP Pulse Temp Resp Height Weight    142/82 (BP Location: Right arm, Patient Position: Lying, BP Method: Automatic) 69 97.4 °F (36.3 °C) (Oral) 18 5' 9" (1.753 m) 120.1 kg (264 lb 12.4 oz)    SpO2 BMI             92% 39.1 kg/m2         Recent Lab Values        5/8/2012 5/18/2012 12/11/2013 11/6/2014 8/14/2015 8/4/2016 1/7/2017         5:07 AM  5:25 AM 11:11 AM  4:03 PM  3:58 PM  4:39 AM  8:51 PM     A1C 5.1 5.1 6.9 (H) 5.7 5.5 5.6 5.7     Comment for " A1C at  4:39 AM on 8/4/2016:  According to ADA guidelines, hemoglobin A1C <7.0% represents  optimal control in non-pregnant diabetic patients.  Different  metrics may apply to specific populations.   Standards of Medical Care in Diabetes - 2016.  For the purpose of screening for the presence of diabetes:  <5.7%     Consistent with the absence of diabetes  5.7-6.4%  Consistent with increasing risk for diabetes   (prediabetes)  >or=6.5%  Consistent with diabetes  Currently no consensus exists for use of hemoglobin A1C  for diagnosis of diabetes for children.      Comment for A1C at  8:51 PM on 1/7/2017:  According to ADA guidelines, hemoglobin A1C <7.0% represents  optimal control in non-pregnant diabetic patients.  Different  metrics may apply to specific populations.   Standards of Medical Care in Diabetes - 2016.  For the purpose of screening for the presence of diabetes:  <5.7%     Consistent with the absence of diabetes  5.7-6.4%  Consistent with increasing risk for diabetes   (prediabetes)  >or=6.5%  Consistent with diabetes  Currently no consensus exists for use of hemoglobin A1C  for diagnosis of diabetes for children.        Allergies as of 1/9/2017     No Known Allergies      Advance Directives     An advance directive is a document which, in the event you are no longer able to make decisions for yourself, tells your healthcare team what kind of treatment you do or do not want to receive, or who you would like to make those decisions for you.  If you do not currently have an advance directive, [facility] encourages you to create one.  For more information call:  [phone]), [phone] ([phone]),  or log on to [email or web address].        Language Assistance Services     ATTENTION: Language assistance services are available, free of charge. Please call [phone].      ATENCIÓN: Si habla español, tiene a diaz disposición servicios gratuitos de asistencia lingüística. Llame al  [phone].     [facility] Ý: N?u b?n nói Ti?ng Vi?t, có các d?ch v? h? tr? ngôn ng? mi?n phí dành cho b?n. G?i s? [phone].        Chronic Kindey Disease Education             Diabetes Discharge Instructions                                   [facility] Sign-Up     Activating your [facility] account is as easy as 1-2-3!     1) Visit [email or web address], select Sign Up Now, enter this activation code and your date of birth, then select Next.  [email or web address]  Expires: 2/23/2017 10:26 AM      2) Create a username and password to use when you visit [facility] in the future and select a security question in case you lose your password and select Next.    3) Enter your e-mail address and click Sign Up!    Additional Information  If you have questions, please e-mail [email or web address] or call [phone] to talk to our [facility] staff. Remember, [facility] is NOT to be used for urgent needs. For medical emergencies, dial 911.          [facility] complies with applicable Federal civil rights laws and does not discriminate on the basis of race, color, national origin, age, disability, or sex.

## 2017-01-07 NOTE — ED NOTES
OBS refused pt, states he needs to be dialyzed. Dialysis not done in ER. Charge nurse, Estephania, and house supervisor notified.

## 2017-01-07 NOTE — ED NOTES
EMS states that there was bowel incontinence after incident, though patient lay on the ground for three hours prior to calling for help.

## 2017-01-08 LAB
ALBUMIN SERPL BCP-MCNC: 2.8 G/DL
ANION GAP SERPL CALC-SCNC: 8 MMOL/L
BUN SERPL-MCNC: 19 MG/DL
CALCIUM SERPL-MCNC: 9.2 MG/DL
CHLORIDE SERPL-SCNC: 100 MMOL/L
CO2 SERPL-SCNC: 31 MMOL/L
CREAT SERPL-MCNC: 4.8 MG/DL
EST. GFR  (AFRICAN AMERICAN): 13.5 ML/MIN/1.73 M^2
EST. GFR  (NON AFRICAN AMERICAN): 11.6 ML/MIN/1.73 M^2
GLUCOSE SERPL-MCNC: 102 MG/DL
PHOSPHATE SERPL-MCNC: 5 MG/DL
POCT GLUCOSE: 102 MG/DL (ref 70–110)
POCT GLUCOSE: 105 MG/DL (ref 70–110)
POCT GLUCOSE: 123 MG/DL (ref 70–110)
POCT GLUCOSE: 86 MG/DL (ref 70–110)
POCT GLUCOSE: 87 MG/DL (ref 70–110)
POTASSIUM SERPL-SCNC: 4.3 MMOL/L
SODIUM SERPL-SCNC: 139 MMOL/L

## 2017-01-08 PROCEDURE — 82962 GLUCOSE BLOOD TEST: CPT

## 2017-01-08 PROCEDURE — G0378 HOSPITAL OBSERVATION PER HR: HCPCS

## 2017-01-08 PROCEDURE — 36415 COLL VENOUS BLD VENIPUNCTURE: CPT

## 2017-01-08 PROCEDURE — 80069 RENAL FUNCTION PANEL: CPT

## 2017-01-08 PROCEDURE — 25000003 PHARM REV CODE 250: Performed by: PHYSICIAN ASSISTANT

## 2017-01-08 PROCEDURE — 97162 PT EVAL MOD COMPLEX 30 MIN: CPT

## 2017-01-08 PROCEDURE — 97161 PT EVAL LOW COMPLEX 20 MIN: CPT

## 2017-01-08 PROCEDURE — G8978 MOBILITY CURRENT STATUS: HCPCS | Mod: CK

## 2017-01-08 PROCEDURE — 99225 PR SUBSEQUENT OBSERVATION CARE,LEVEL II: CPT | Mod: ,,, | Performed by: PHYSICIAN ASSISTANT

## 2017-01-08 PROCEDURE — G8979 MOBILITY GOAL STATUS: HCPCS | Mod: CI

## 2017-01-08 RX ORDER — OXYCODONE AND ACETAMINOPHEN 5; 325 MG/1; MG/1
1 TABLET ORAL EVERY 4 HOURS PRN
Qty: 45 TABLET | Refills: 0 | Status: ON HOLD | OUTPATIENT
Start: 2017-01-08 | End: 2017-03-31

## 2017-01-08 RX ADMIN — SEVELAMER CARBONATE 800 MG: 800 TABLET, FILM COATED ORAL at 05:01

## 2017-01-08 RX ADMIN — STANDARDIZED SENNA CONCENTRATE AND DOCUSATE SODIUM 1 TABLET: 8.6; 5 TABLET, FILM COATED ORAL at 09:01

## 2017-01-08 RX ADMIN — GABAPENTIN 300 MG: 300 CAPSULE ORAL at 06:01

## 2017-01-08 RX ADMIN — SEVELAMER CARBONATE 800 MG: 800 TABLET, FILM COATED ORAL at 12:01

## 2017-01-08 RX ADMIN — GABAPENTIN 300 MG: 300 CAPSULE ORAL at 02:01

## 2017-01-08 RX ADMIN — HYDRALAZINE HYDROCHLORIDE 100 MG: 25 TABLET, FILM COATED ORAL at 02:01

## 2017-01-08 RX ADMIN — OXYCODONE HYDROCHLORIDE 10 MG: 5 TABLET ORAL at 05:01

## 2017-01-08 RX ADMIN — LISINOPRIL 40 MG: 20 TABLET ORAL at 08:01

## 2017-01-08 RX ADMIN — FUROSEMIDE 80 MG: 40 TABLET ORAL at 08:01

## 2017-01-08 RX ADMIN — OXYCODONE HYDROCHLORIDE 10 MG: 5 TABLET ORAL at 06:01

## 2017-01-08 RX ADMIN — LIDOCAINE 2 PATCH: 50 PATCH TOPICAL at 05:01

## 2017-01-08 RX ADMIN — SEVELAMER CARBONATE 800 MG: 800 TABLET, FILM COATED ORAL at 07:01

## 2017-01-08 RX ADMIN — AMLODIPINE BESYLATE 10 MG: 10 TABLET ORAL at 08:01

## 2017-01-08 RX ADMIN — GABAPENTIN 300 MG: 300 CAPSULE ORAL at 09:01

## 2017-01-08 RX ADMIN — ATORVASTATIN CALCIUM 40 MG: 20 TABLET, FILM COATED ORAL at 09:01

## 2017-01-08 RX ADMIN — PANTOPRAZOLE SODIUM 40 MG: 40 TABLET, DELAYED RELEASE ORAL at 08:01

## 2017-01-08 RX ADMIN — Medication 3 ML: at 10:01

## 2017-01-08 RX ADMIN — OXYCODONE HYDROCHLORIDE 10 MG: 5 TABLET ORAL at 01:01

## 2017-01-08 RX ADMIN — HYDRALAZINE HYDROCHLORIDE 100 MG: 25 TABLET, FILM COATED ORAL at 06:01

## 2017-01-08 RX ADMIN — HYDRALAZINE HYDROCHLORIDE 100 MG: 25 TABLET, FILM COATED ORAL at 09:01

## 2017-01-08 RX ADMIN — CLOPIDOGREL 75 MG: 75 TABLET, FILM COATED ORAL at 08:01

## 2017-01-08 RX ADMIN — Medication 3 ML: at 02:01

## 2017-01-08 RX ADMIN — ASPIRIN 81 MG: 81 TABLET, COATED ORAL at 08:01

## 2017-01-08 NOTE — CONSULTS
"Consult received for "renal."    Pt ordered appropriate diet order; can remove Cardiac restriction as Cardiac, ADA, Renal diet redundant and will limit diet choices beyond need while in house.  Order Novasource Renal BID if po intake declines. Pt currently consuming 75% of meals.   "

## 2017-01-08 NOTE — PLAN OF CARE
Problem: Physical Therapy Goal  Goal: Physical Therapy Goal  Goals to be met by: 1/15/17     Patient will increase functional independence with mobility by performin. Sit to stand transfer with Stand-by Assistance using RW  2. Bed to chair transfer with Stand-by Assistance using Rolling Walker  3. Gait x 100 feet with Stand-by Assistance using Rolling Walker.   4. Ascend/descend 4 stair with bilateral Handrails Contact Guard Assistance.   5. Pt will be (I) with HEP x20-30 reps to improve muscular endurance  Outcome: Ongoing (interventions implemented as appropriate)  PT eval complete; POC initiated. Pt tolerated session well and ambulated 65' x1 trial using RW. Pt with significant pain in (L) shoulder with decreased ROM 2/2 fall. Pt appropriate for 1-2 more sessions of PT to ensure safety with ambulation and stairs as patient does not have good support system at home. Pt will benefit from HHPT upon d/c.

## 2017-01-08 NOTE — PROGRESS NOTES
Tolerated 3.5 hrs hemodialysis well. Removed 3 liters during tx . Post tx pt with no complaints. Needles at graft site removed and slight pressure applied to sites. Bleeding stopped and bandages applied x 2. Good thrill and bruit at site. Left in care of floor nurse post giving Yolanda rn report per handoff guidelines

## 2017-01-08 NOTE — ASSESSMENT & PLAN NOTE
- chronic in nature, progressing per patient. TSH mildly elevated at 4.838 with Free T4 WNL at 0.84, B12 WNL, folate WNL  - PT/OT: home with home health  - likely 2/2 deconditioning in setting of lumbar stenosis. See below for details  - PMR referral at discharge

## 2017-01-08 NOTE — PLAN OF CARE
Problem: Patient Care Overview  Goal: Plan of Care Review  Outcome: Ongoing (interventions implemented as appropriate)  Plan of care reviewed with patient. VSS. Accuchecks monitored q4h. Plan for patient is discharge tomorrow after working more with physical therapy. See chart for complete assessments and I/Os. All questions answered. Will continue to monitor.

## 2017-01-08 NOTE — PLAN OF CARE
[facility]    HOME HEALTH ORDERS  FACE TO FACE ENCOUNTER    Patient Name: Wilder Carbajal  YOB: 1950    PCP: Akhil Adorno MD   PCP Address: [address] [place] [id number]  PCP Phone Number: [phone]  PCP Fax: [phone]    Encounter Date: 01/08/2017    Admit to Home Health    Diagnoses:  Active Hospital Problems    Diagnosis  POA    *Weakness [R53.1]  Yes     Priority: 1 - High    ESRD (end stage renal disease) on dialysis [N18.6, Z99.2]  Not Applicable     Priority: 2     Acute pain of left shoulder [M25.512]  Yes    Spinal stenosis of lumbar region [M48.06]  Yes    DM (diabetes mellitus) type II controlled with renal manifestation [E11.29]  Yes    HTN (hypertension) [I10]  Yes      Resolved Hospital Problems    Diagnosis Date Resolved POA   No resolved problems to display.       Future Appointments  Date Time Provider Department Center   1/11/2017 1:00 PM VASCULAR, LAB [facility] VASCLAB [facility]   1/11/2017 1:45 PM Liz Zhou MD [facility] VASCSUR [facility]   1/30/2017 10:30 AM Shea Munoz OD [facility] OPTOMTY [facility]   3/6/2017 10:40 AM Anselmo Terrazas MD [facility] SLEEP [facility]     Follow-up Information     Follow up with Akhil Adorno MD In 2 weeks.    Specialty:  Family Medicine    Contact information:    [address] [place] [id number]  [phone]              I have seen and examined this patient face to face today. My clinical findings that support the need for the home health skilled services and home bound status are the following:  Weakness/numbness causing balance and gait disturbance due to Weakness/Debility and Anemia making it taxing to leave home.  Requiring assistive device to leave home due to unsteady gait caused by  Weakness/Debility and Anemia.  Medical restrictions requiring assistance of another human to leave home due to  Dyspnea on exertion (SOB), Fluid/volume overload, Unstable ambulation and Decreased range of motions in  extremities.    Allergies:Review of patient's allergies indicates:  No Known Allergies    Diet: diabetic diet: 2000 calorie and renal diet    Activities: activity as tolerated    Nursing:   SN to complete comprehensive assessment including routine vital signs. Instruct on disease process and s/s of complications to report to MD. Review/verify medication list sent home with the patient at time of discharge  and instruct patient/caregiver as needed. Frequency may be adjusted depending on start of care date.    Notify MD if SBP > 160 or < 90; DBP > 90 or < 50; HR > 120 or < 50; Temp > 101; Other:         CONSULTS:    Physical Therapy to evaluate and treat. Evaluate for home safety and equipment needs; Establish/upgrade home exercise program. Perform / instruct on therapeutic exercises, gait training, transfer training, and Range of Motion.  Occupational Therapy to evaluate and treat. Evaluate home environment for safety and equipment needs. Perform/Instruct on transfers, ADL training, ROM, and therapeutic exercises.  Aide to provide assistance with personal care, ADLs, and vital signs.    MISCELLANEOUS CARE:  Diabetic Care:   SN to perform and educate Diabetic management with blood glucose monitoring:, Fingerstick blood sugar a.m. and p.m. and Report CBG < 60 or > 350 to physician.    WOUND CARE ORDERS  n/a      Medications: Review discharge medications with patient and family and provide education.      Current Discharge Medication List      START taking these medications    Details   oxycodone-acetaminophen (PERCOCET) 5-325 mg per tablet Take 1 tablet by mouth every 4 (four) hours as needed for Pain.  Qty: 45 tablet, Refills: 0         CONTINUE these medications which have NOT CHANGED    Details   atorvastatin (LIPITOR) 40 MG tablet Take 40 mg by mouth every evening.      clopidogrel (PLAVIX) 75 mg tablet Take 75 mg by mouth once daily.      amlodipine (NORVASC) 10 MG tablet Take 1 tablet (10 mg total) by mouth once  daily.  Qty: 30 tablet, Refills: 3      aspirin (ECOTRIN) 81 MG EC tablet Take 81 mg by mouth once daily.       carbamide peroxide (DEBROX) 6.5 % otic solution Place 5 drops in ear(s) 3 (three) times daily.      cyclobenzaprine (FLEXERIL) 5 MG tablet Take 5 mg by mouth nightly as needed (for jaw pain).      diclofenac sodium (VOLTAREN) 1 % Gel Apply 2 g topically 4 (four) times daily as needed (for pain).      ergocalciferol (ERGOCALCIFEROL) 50,000 unit Cap Take 50,000 Units by mouth every 14 (fourteen) days. 1 cap PO weekly x12 wks then twice per month      famotidine-calcium carbonate-magnesium hydroxide (PEPCID COMPLETE) chewable tablet Take 1 tablet by mouth every 12 (twelve) hours as needed (for acid reflux or gastric pain).      furosemide (LASIX) 80 MG tablet Take 80 mg by mouth once daily.      gabapentin (NEURONTIN) 300 mg tablet Take 300 mg by mouth 3 (three) times daily.        hydrALAZINE (APRESOLINE) 100 MG tablet Take 1 tablet (100 mg total) by mouth every 8 (eight) hours.  Qty: 90 tablet, Refills: 3      ketotifen (ZADITOR) 0.025 % (0.035 %) ophthalmic solution Apply 1 drop to eye every 8 (eight) hours.      lisinopril (PRINIVIL,ZESTRIL) 40 MG tablet Take 40 mg by mouth once daily.      podofilox (CONDYLOX) 0.5 % external solution Apply a small amount to lesions twice daily for three days, then hold for four days and repeat regimen      sevelamer carbonate (RENVELA) 800 mg Tab Take two tablets by mouth four times daily with meals and snacks         STOP taking these medications       hydrocodone-acetaminophen 5-325mg (NORCO) 5-325 mg per tablet Comments:   Reason for Stopping:               I certify that this patient is confined to his home and needs intermittent skilled nursing care, physical therapy and occupational therapy.

## 2017-01-08 NOTE — SUBJECTIVE & OBJECTIVE
Interval History: Pain improved with PRN, worked with PT/OT, they felt patient would benefit from additional in-house therapy sessions 2/2 decreased ROM and no support at home. Patient was ready for discharge this morning, will re-assess tomorrow morning. Home with home health tomorrow. Will discuss with patient that he will need to arrange for family to help him home tomorrow.    Review of Systems   Constitutional: Negative for chills and fever.   Respiratory: Negative for cough and shortness of breath.    Cardiovascular: Negative for chest pain, palpitations and leg swelling.   Gastrointestinal: Negative for abdominal pain and constipation.   Musculoskeletal: Positive for arthralgias (improving shoulder) and back pain.   Neurological: Positive for weakness.     Objective:     Vital Signs (Most Recent):  Temp: 98.2 °F (36.8 °C) (01/08/17 1200)  Pulse: (!) 56 (01/08/17 1300)  Resp: 18 (01/08/17 1200)  BP: (!) 161/78 (01/08/17 1240)  SpO2: (!) 94 % (01/08/17 1200) Vital Signs (24h Range):  Temp:  [97.8 °F (36.6 °C)-98.4 °F (36.9 °C)] 98.2 °F (36.8 °C)  Pulse:  [47-71] 56  Resp:  [15-20] 18  BP: (126-212)/() 161/78     Weight: 120.1 kg (264 lb 12.4 oz)  Body mass index is 39.1 kg/(m^2).    Intake/Output Summary (Last 24 hours) at 01/08/17 1407  Last data filed at 01/08/17 1200   Gross per 24 hour   Intake              860 ml   Output             3500 ml   Net            -2640 ml      Physical Exam   Constitutional: He is oriented to person, place, and time.   Cardiovascular: Normal rate and regular rhythm.    Murmur heard.  Pulmonary/Chest: Effort normal and breath sounds normal. No respiratory distress. He has no wheezes.   Abdominal: Soft. Bowel sounds are normal. He exhibits no distension. There is no tenderness.   Musculoskeletal: He exhibits no edema, tenderness or deformity.   L shoulder moving better, mild pain with AROM   Neurological: He is alert and oriented to person, place, and time. No cranial nerve  deficit.   Nursing note and vitals reviewed.      Significant Labs:   CBC:   Recent Labs  Lab 01/07/17  1318 01/07/17  1511   WBC  --  5.16   HGB  --  10.9*   HCT 39 34.0*   PLT  --  91*     CMP:   Recent Labs  Lab 01/07/17  1556 01/08/17  0603    139   K 5.0 4.3    100   CO2 28 31*    102   BUN 32* 19   CREATININE 7.1* 4.8*   CALCIUM 9.9 9.2   PROT 7.9  --    ALBUMIN 3.2* 2.8*   BILITOT 1.0  --    ALKPHOS 128  --    AST 21  --    ALT 12  --    ANIONGAP 13 8   EGFRNONAA 7.2* 11.6*     All pertinent labs within the past 24 hours have been reviewed.    Significant Imaging: I have reviewed all pertinent imaging results/findings within the past 24 hours.

## 2017-01-08 NOTE — CONSULTS
[facility]  Consult Note Nephrology    Consult Requested By: Bahman Matthews MD  Reason for Consult: ESRD on iHD    SUBJECTIVE:     History of Present Illness:  Wilder Carbajal is a 66 y.o. male, who presents with a a PMHx relevant for lumbar spinal stenosis, ESRD on HD TTS, DMT2, and HTN presents for evaluation for left shoulder pain and increased back pain s/p a mechanical fall yesterday and today.He denies LOC.  He missed his HD appointment for today and was found to have elevated potassium at 6.9, but re-draw showed it at 5.0. Mr. Carbajal endorses increasing weakness specially in last few days.     Past Medical History   Diagnosis Date    Acute pain of left shoulder 1/7/2017    Arthritis     Asthma     Blood clotting tendency     Blood transfusion     Cataract     Chronic kidney disease requiring chronic dialysis     Diabetes mellitus     Diabetic retinopathy     Fever blister     Hyperlipidemia     Hypertension     Infertility     Joint pain     Retinal detachment     Thyroid disease     Weakness 1/7/2017     Past Surgical History   Procedure Laterality Date    Cataract extraction      Retinal detachment surgery       Family History   Problem Relation Age of Onset    Heart attack Mother     Cancer Neg Hx      Social History   Substance Use Topics    Smoking status: Never Smoker    Smokeless tobacco: Never Used    Alcohol use Yes      Comment: 1 mary berger     Review of patient's allergies indicates:  No Known Allergies    Current Facility-Administered Medications   Medication Dose Route Frequency Provider Last Rate Last Dose    0.9%  NaCl infusion   Intravenous PRN Ken Branham MD        0.9%  NaCl infusion   Intravenous Once Ken Branham MD        acetaminophen tablet 650 mg  650 mg Oral Q4H PRN Waqas Aviles PA-C        acetaminophen tablet 650 mg  650 mg Oral Q8H PRN Waqas Aviles PA-C        amlodipine tablet 10 mg  10 mg Oral Daily Waqas  BRENDEN Aviles   10 mg at 01/07/17 1804    aspirin EC tablet 81 mg  81 mg Oral Daily Waqas Aviles PA-C   81 mg at 01/07/17 1803    atorvastatin tablet 40 mg  40 mg Oral QHS Waqas Aviles PA-C   40 mg at 01/07/17 2109    calcium gluconate 1g in dextrose 5% 100mL (ready to mix system)  1 g Intravenous Q10 Min PRN Yahaira Correia MD        cloNIDine tablet 0.1 mg  0.1 mg Oral TID PRN Waqas Aviles PA-C        clopidogrel tablet 75 mg  75 mg Oral Daily Waqas Aviles PA-C   75 mg at 01/07/17 1803    cyclobenzaprine tablet 5 mg  5 mg Oral TID PRN Waqas Aviles PA-C        dextrose 50% injection 12.5 g  12.5 g Intravenous PRN Waqas Aviles PA-C        dextrose 50% injection 25 g  25 g Intravenous PRN Yahaira Correia MD        dextrose 50% injection 25 g  25 g Intravenous PRN Waqas Aviles PA-C        [START ON 1/8/2017] furosemide tablet 80 mg  80 mg Oral Daily Waqas Aviles PA-C        gabapentin capsule 300 mg  300 mg Oral TID Waqas Aviles PA-C   300 mg at 01/07/17 2109    glucagon (human recombinant) injection 1 mg  1 mg Intramuscular PRN Waqas Aviles PA-C        glucose chewable tablet 16 g  16 g Oral PRN Waqas Aviles PA-C        glucose chewable tablet 24 g  24 g Oral PRN Waqas Aviles PA-C        hydrALAZINE tablet 100 mg  100 mg Oral Q8H Waqas Aviles PA-C   Stopped at 01/07/17 2110    insulin aspart pen 0-5 Units  0-5 Units Subcutaneous QID (AC + HS) PRN Waqas Aviles PA-C        lidocaine 5 % patch 2 patch  2 patch Transdermal Q24H Waqas Aviles PA-C   2 patch at 01/07/17 1804    lisinopril tablet 40 mg  40 mg Oral Daily Waqas Aviles PA-C   40 mg at 01/07/17 1803    ondansetron disintegrating tablet 8 mg  8 mg Oral Q8H PRN Waqas Aviles PA-C        ondansetron injection 4 mg  4 mg Intravenous Q12H PRN Waqas Aviles PA-C        oxycodone immediate release tablet 10 mg  10 mg Oral Q4H PRN Waqas Aviles PA-C   10 mg at 01/07/17 1804    oxycodone immediate release tablet  5 mg  5 mg Oral Q4H PRN Waqas Aviles PA-C        [START ON 1/8/2017] pantoprazole EC tablet 40 mg  40 mg Oral Daily Waqas Aviles PA-C        ramelteon tablet 8 mg  8 mg Oral Nightly PRN Waqas Aviles PA-C        senna-docusate 8.6-50 mg per tablet 1 tablet  1 tablet Oral BID Waqas Aviles PA-C   1 tablet at 01/07/17 2109    sevelamer carbonate tablet 800 mg  800 mg Oral TID WM Waqas Aviles PA-C   800 mg at 01/07/17 1803    sodium chloride 0.9% flush 3 mL  3 mL Intravenous Q8H Waqas Aviles PA-C           No Known Allergies     Review of Systems:  Constitutional: Negative for chills, diaphoresis, fever and weight loss.   HENT: Negative for nosebleeds and tinnitus.    Eyes: Negative for blurred vision, double vision and photophobia.   Respiratory: Negative for cough and shortness of breath.    Cardiovascular: . Negative for chest pain, palpitations, swelling orthopnea and PND.   Gastrointestinal: Negative for abdominal pain, diarrhea, constipation nausea and vomiting.   Genitourinary: Negative for dysuria, flank pain, frequency, hematuria and urgency.   Musculoskeletal: Negative for back pain, falls, joint pain, myalgias and neck pain.   Skin: Negative.    Neurological: Negative for dizziness, tingling, tremors, sensory change, speech change, focal weakness, seizures, loss of consciousness, weakness and headaches.   Endo/Heme/Allergies: Negative for environmental allergies and polydipsia. Does not bruise/bleed easily.   Psychiatric/Behavioral: Negative for depression, hallucinations, memory loss, substance abuse and suicidal ideas. The patient is not nervous/anxious and does not have insomnia.        OBJECTIVE:     Vital Signs (Most Recent)  Temp: 98 °F (36.7 °C) (01/07/17 2050)  Pulse: (!) 56 (01/07/17 2100)  Resp: 18 (01/07/17 2050)  BP: (!) 180/82 (01/07/17 2050)  SpO2: 96 % (01/07/17 2050)    Vital Signs Range (Last 24H):  Temp:  [98 °F (36.7 °C)-98.4 °F (36.9 °C)]   Pulse:  [50-71]   Resp:  [10-20]    BP: (176-220)/()   SpO2:  [94 %-99 %]     No intake or output data in the 24 hours ending 01/07/17 2203    Physical Exam:  Constitutional:  well-developed and well-nourished. No distress. On mech ventilation  HENT:   Head: Normocephalic and atraumatic.   Neck: Normal range of motion. Neck supple.   Cardiovascular: Normal rate, regular rhythm, normal heart sounds and intact distal pulses.  Exam reveals no gallop and no friction rub.    No murmur heard.  Pulmonary/Chest: Effort normal and breath sounds normal. No respiratory distress. no wheezes, no rales, no tenderness.   Abdominal: Soft. Bowel sounds are normal, no distension. There is no tenderness. There is no rebound and no guarding.   Musculoskeletal: Normal range of motion. No edema or deformity.   Neurological: awake alert and O x3 no FND noted.   Skin: Skin is warm and dry. No rash noted. She is not diaphoretic. No erythema. No pallor.       Laboratory:  CBC:   Recent Labs  Lab 01/07/17  1511   WBC 5.16   RBC 3.77*   HGB 10.9*   HCT 34.0*   PLT 91*   MCV 90   MCH 28.9   MCHC 32.1     BMP:   Recent Labs  Lab 01/07/17  1556         CO2 28   BUN 32*   CREATININE 7.1*   CALCIUM 9.9     CMP:   Recent Labs  Lab 01/07/17  1556      CALCIUM 9.9   ALBUMIN 3.2*   PROT 7.9      K 5.0   CO2 28      BUN 32*   CREATININE 7.1*   ALKPHOS 128   ALT 12   AST 21   BILITOT 1.0     Coagulation: No results for input(s): INR, APTT in the last 168 hours.    Invalid input(s): PT  Cardiac Markers: No results for input(s): CKMB, TROPONINT, MYOGLOBIN in the last 168 hours.    Diagnostic Results:  Labs: Reviewed  ECG: Reviewed  CT: Reviewed    ASSESSMENT/PLAN:     Active Hospital Problems    Diagnosis  POA    Weakness [R53.1]  Yes    Acute pain of left shoulder [M25.512]  Yes    Spinal stenosis of lumbar region [M48.06]  Yes    ESRD (end stage renal disease) on dialysis [N18.6, Z99.2]  Not Applicable    DM (diabetes mellitus) type II  controlled with renal manifestation [E11.29]  Yes    HTN (hypertension) [I10]  Yes      Resolved Hospital Problems    Diagnosis Date Resolved POA   No resolved problems to display.     Wilder Carbajal is a 66 y.o. male, who presents with ESRD on HD TTS, DMT2, HTN and musculoskeletal pain after fall missing his HD treatment today for which Nephrology is consulted    ESRD on IHD TTS   Out patient HD Center - [facility]  - Will provide dialysis x 3 hrs for metabolic clearance and volume in setting of today is his HD day.  - Seen and examined today. Denied headaches, chest pain, abdominal pain, or muscle cramps   - Target ultrafiltration 2-3 lts as tolerated  - Check orthostatic VS post HD treatment  - Dialysate adjusted to current labs      Access: LFA AVF     Anemia of Chronic Kidney Disease   - Will not recommend LISSETTE at this time but will review care plan from chronic HD if he is on LISSETTE will start EPO while here  - FE deficient per last Fe panel in 8/2016     Mineral Bone Disease in CKD   - Already on renal diet   - Monitor Phos daily  - Already on binders as outpatient, will restart Renvela 1600 mg PO QID.  - Resume Ergocalciferol [id number] ut weekly    Ken Branham MD  Nephrology Fellow PGY4  [phone]

## 2017-01-08 NOTE — ASSESSMENT & PLAN NOTE
- followed with pain management, last note from 06/2015, pain well controlled with percocet, recommended outpatient PT, MRI done at that time showing stenosis  - PT/OT: home with home health  - Pain control with tylenol, percocet 5  - historically symptoms worse R>L, complains of R>L pain today, radiologic exam negative for fractures, showing only DJD  - Radiologic lumbar films not showing any acute fractures or changes

## 2017-01-08 NOTE — PROGRESS NOTES
Acute hemodialysis initiated in OBS 2 per md orders via left forearm  graft using 15 gauge needles x 2 without difficulty. Pt with no complaints

## 2017-01-08 NOTE — PROGRESS NOTES
"[facility] Hospital Medicine  Progress Note    Patient Name: Wilder Carbajal  MRN: [id number]  Patient Class: OP- Observation   Admission Date: 1/7/2017  Length of Stay: 0 days  Expected Discharge Date: 1/9/2017  Attending Physician: Bahman Matthews MD  Primary Care Provider: Akhil Adorno MD    [facility] Medicine Team: [facility] [place] Waqas Aviles PA-C    Subjective:     Principal Problem:Weakness    HPI:  66M with PMHx of lumbar spinal stenosis, ESRD on HD TThS, DM, HTN presents for evaluation for left shoulder pain and increased back pain s/p a mechanical fall yesterday and today. Patient fell yesterday from a seated on his walker, he bent over to pick something up and did not lock his walker and it slipped away from him and he fell "straight on my ass" and hit his left shoulder on the way down. He did not hit his head or have LOC. After this fall he was increasingly sore and last night was unable to move out of the bed and had to slide out of the bed and on to the ground to use the bathroom. (He did not want to mess the bed, he again did not hit his head or have LOC). He was unfortunately unable to get back up after this embarrassing event and presented to the ED for evaluation. He subsequently missed his HD appointment for today and was found to have elevated potassium at 6.9, but re-draw showed it at 5.0. Mr. Carbajal endorses increasing weakness since last year. He denies any significant changes to his symptoms. His main concern today is pain control of his left shoulder and back.    In ED, CBC showing stable and chronic anemia, renal function panel consistent with ESRD w/o significant electrolyte abnormalities (possibility that intially elevated POC potassium of 6.9 was falsely elevated as repeat potassium of 5.0 was noted on laboratory BMP). Radiologic exams of R femur, R knee, pelvis, L shoulder only showing DJD on all films. Patient initiated on hyperkalemia protocol and given insulin, " calcium gluconate. Nephrology consulted for HD.      Hospital Course:  Patient placed into observation for evaluation of left hip pain and HD. Nephrology consulted and provided dialysis overnight without complication. Patient feeling much better next morning, with left shoulder pain improved and increased ROM. Will send home with home health and PRN pain medications. Additionally will send patient to have follow up with PMR as patient has not had follow up since 06/2015. Patient to resume [facility] HD schedule.     Interval History: Pain improved with PRN, worked with PT/OT, they felt patient would benefit from additional in-house therapy sessions 2/2 decreased ROM and no support at home. Patient was ready for discharge this morning, will re-assess tomorrow morning. Home with home health tomorrow. Will discuss with patient that he will need to arrange for family to help him home tomorrow.    Review of Systems   Constitutional: Negative for chills and fever.   Respiratory: Negative for cough and shortness of breath.    Cardiovascular: Negative for chest pain, palpitations and leg swelling.   Gastrointestinal: Negative for abdominal pain and constipation.   Musculoskeletal: Positive for arthralgias (improving shoulder) and back pain.   Neurological: Positive for weakness.     Objective:     Vital Signs (Most Recent):  Temp: 98.2 °F (36.8 °C) (01/08/17 1200)  Pulse: (!) 56 (01/08/17 1300)  Resp: 18 (01/08/17 1200)  BP: (!) 161/78 (01/08/17 1240)  SpO2: (!) 94 % (01/08/17 1200) Vital Signs (24h Range):  Temp:  [97.8 °F (36.6 °C)-98.4 °F (36.9 °C)] 98.2 °F (36.8 °C)  Pulse:  [47-71] 56  Resp:  [15-20] 18  BP: (126-212)/() 161/78     Weight: 120.1 kg (264 lb 12.4 oz)  Body mass index is 39.1 kg/(m^2).    Intake/Output Summary (Last 24 hours) at 01/08/17 1407  Last data filed at 01/08/17 1200   Gross per 24 hour   Intake              860 ml   Output             3500 ml   Net            -2640 ml      Physical Exam    Constitutional: He is oriented to person, place, and time.   Cardiovascular: Normal rate and regular rhythm.    Murmur heard.  Pulmonary/Chest: Effort normal and breath sounds normal. No respiratory distress. He has no wheezes.   Abdominal: Soft. Bowel sounds are normal. He exhibits no distension. There is no tenderness.   Musculoskeletal: He exhibits no edema, tenderness or deformity.   L shoulder moving better, mild pain with AROM   Neurological: He is alert and oriented to person, place, and time. No cranial nerve deficit.   Nursing note and vitals reviewed.      Significant Labs:   CBC:   Recent Labs  Lab 01/07/17  1318 01/07/17  1511   WBC  --  5.16   HGB  --  10.9*   HCT 39 34.0*   PLT  --  91*     CMP:   Recent Labs  Lab 01/07/17  1556 01/08/17  0603    139   K 5.0 4.3    100   CO2 28 31*    102   BUN 32* 19   CREATININE 7.1* 4.8*   CALCIUM 9.9 9.2   PROT 7.9  --    ALBUMIN 3.2* 2.8*   BILITOT 1.0  --    ALKPHOS 128  --    AST 21  --    ALT 12  --    ANIONGAP 13 8   EGFRNONAA 7.2* 11.6*     All pertinent labs within the past 24 hours have been reviewed.    Significant Imaging: I have reviewed all pertinent imaging results/findings within the past 24 hours.    Assessment/Plan:      * Weakness  - chronic in nature, progressing per patient. TSH mildly elevated at 4.838 with Free T4 WNL at 0.84, B12 WNL, folate WNL  - PT/OT: home with home health  - likely 2/2 deconditioning in setting of lumbar stenosis. See below for details  - PMR referral at discharge    ESRD (end stage renal disease) on dialysis  - nephology consulted and provided HD as necessary  - while in ED potassium shift initiated, repeat K+ at 5.0, K+ on discharge of 4.3  - no events on tele    HTN (hypertension)  - continue norvasc, hydralazine, lisinopril  - BP overall controlled    DM (diabetes mellitus) type II controlled with renal manifestation  - diet controlled, hyper/hypo glycemic protocols in place    Spinal stenosis of  lumbar region  - followed with pain management, last note from 06/2015, pain well controlled with percocet, recommended outpatient PT, MRI done at that time showing stenosis  - PT/OT: home with home health  - Pain control with tylenol, percocet 5  - historically symptoms worse R>L, complains of R>L pain today, radiologic exam negative for fractures, showing only DJD  - Radiologic lumbar films not showing any acute fractures or changes    Acute pain of left shoulder  - PT/OT: home with home health  - radiologic exam with no fractures, showing only DJD  - improved ROM and pain adequately controlled with PRN    VTE Risk Mitigation         Ordered     Medium Risk of VTE  Once      01/07/17 1702     Place sequential compression device  Until discontinued      01/07/17 1406     Place RODRI hose  Until discontinued      01/07/17 1406          Waqas Aviles PA-C  Department of Hospital Medicine   [facility]

## 2017-01-08 NOTE — ASSESSMENT & PLAN NOTE
- nephology consulted and provided HD as necessary  - while in ED potassium shift initiated, repeat K+ at 5.0, K+ on discharge of 4.3  - no events on tele

## 2017-01-08 NOTE — PT/OT/SLP EVAL
Physical Therapy  Evaluation    Wilder Carbajal   MRN: [id number]   Admitting Diagnosis: Weakness    PT Received On: 17  PT Start Time: 1315     PT Stop Time: 1338    PT Total Time (min): 23 min       Billable Minutes:  Evaluation 23    Diagnosis: Weakness      Past Medical History   Diagnosis Date    Acute pain of left shoulder 2017    Arthritis     Asthma     Blood clotting tendency     Blood transfusion     Cataract     Chronic kidney disease requiring chronic dialysis     Diabetes mellitus     Diabetic retinopathy     Fever blister     Hyperlipidemia     Hypertension     Infertility     Joint pain     Retinal detachment     Thyroid disease     Weakness 2017      Past Surgical History   Procedure Laterality Date    Cataract extraction      Retinal detachment surgery         Referring physician: Byron  Date referred to PT: 2017      General Precautions: Standard, fall  Orthopedic Precautions: N/A   Braces: N/A       Do you have any cultural, spiritual, [employer] conflicts, given your current situation?: none stated    Patient History:  Lives With: child(robert), adult (pt's son can provide limited support)  Living Arrangements: house  Home Accessibility: stairs to enter home  Number of Stairs to Enter Home: 4  Stair Railings at Home: outside, present at both sides  Transportation Available: family or friend will provide  Living Environment Comment: Pt reports he lives with his son but his son works nights and sleeps most of the day.  Equipment Currently Used at Home: rollator  DME owned (not currently used):     Previous Level of Function:  Ambulation Skills: needs device  Transfer Skills: needs device  ADL Skills: independent  Work/Leisure Activity: independent    Subjective:  Communicated with nsg prior to session.    Chief Complaint: decreased functional mobility, pain in (L) UE and (R) hip  Patient goals: return home to OF    Pain Ratin/10   Location - Side: Left      Location: shoulder ((R) hip)  Pain Addressed: Pre-medicate for activity, Reposition, Cessation of Activity  Pain Rating Post-Intervention: 8/10    Objective:         Cognitive Exam:  Oriented to: Person, Place, Time and Situation    Follows Commands/attention: Follows multistep  commands  Communication: clear/fluent  Safety awareness/insight to disability: intact    Physical Exam:  Postural examination/scapula alignment: Rounded shoulder and Head forward    Skin integrity: Visible skin intact  Edema: None noted     Sensation:   Intact    Lower Extremity Range of Motion:  Right Lower Extremity: WNL  Left Lower Extremity: WNL    Lower Extremity Strength:  Right Lower Extremity: Deficits: 4/5 grossly  Left Lower Extremity: Deficits: 4/5 grossly    Functional Mobility:  Bed Mobility:  Scooting/Bridging: Stand by Assistance  Supine to Sit: Stand by Assistance    Transfers:  Sit <> Stand Assistance: Contact Guard Assistance x2 trials from EOB  Sit <> Stand Assistive Device: Rolling Walker  Bed <> Chair Technique: Stand Pivot  Bed <> Chair Assistance: Contact Guard Assistance  Bed <> Chair Assistive Device: Rolling Walker    Gait:   Gait Distance: 65' x1 trial  Assistance 1: Contact Guard Assistance using RW    -Pt given cueing to maintain upright position during ambulation.     Stairs:  Pt ascended/descend 2 stair(s) with No Assistive Device with bilateral rails with Minimal Assistance.     -Pt with increased time to perform stairs 2/2 pain. Steps limited 2/2 fatigue     Balance:   Static Sit: FAIR+: Able to take MINIMAL challenges from all directions  Dynamic Sit: FAIR+: Maintains balance through MINIMAL excursions of active trunk motion  Static Stand: FAIR+: Takes MINIMAL challenges from all directions  Dynamic stand: FAIR: Needs CONTACT GUARD during gait    Therapeutic Activities and Exercises:  -Pt educated on:  A. PT POC and importance of OOB activity to improve functional outcomes  B. Safety when performing  ambulation using DME  C. Proper stair negotiation technique    -Pt white board updated    AM-PAC 6 CLICK MOBILITY  How much help from another person does this patient currently need?   1 = Unable, Total/Dependent Assistance  2 = A lot, Maximum/Moderate Assistance  3 = A little, Minimum/Contact Guard/Supervision  4 = None, Modified [place]/Independent    Turning over in bed (including adjusting bedclothes, sheets and blankets)?: 4  Sitting down on and standing up from a chair with arms (e.g., wheelchair, bedside commode, etc.): 3  Moving from lying on back to sitting on the side of the bed?: 4  Moving to and from a bed to a chair (including a wheelchair)?: 3  Need to walk in hospital room?: 3  Climbing 3-5 steps with a railing?: 2  Total Score: 19     AM-PAC Raw Score CMS G-Code Modifier Level of Impairment Assistance   6 % Total / Unable   7 - 9 CM 80 - 100% Maximal Assist   10 - 14 CL 60 - 80% Moderate Assist   15 - 19 CK 40 - 60% Moderate Assist   20 - 22 CJ 20 - 40% Minimal Assist   23 CI 1-20% SBA / CGA   24 CH 0% Independent/ Mod I     Patient left up in chair with all lines intact, call button in reach and nsg notified.    Assessment:   Wilder Carbajal is a 66 y.o. male with a medical diagnosis of Weakness and presents with impaired functional mobility, pain, and (B) LE weakness. Pt tolerated session well and ambulated 65' x1 trial using RW. Pt with significant pain in (L) shoulder with decreased ROM 2/2 fall. Pt appropriate for 1-2 more sessions of PT to ensure safety with ambulation and stairs as patient does not have good support system at home. Pt will benefit from HHPT upon d/c.    Rehab identified problem list/impairments: Rehab identified problem list/impairments: impaired endurance, impaired self care skills, impaired functional mobilty, impaired balance, decreased upper extremity function, decreased ROM, pain, impaired muscle length, impaired joint extensibility, decreased  coordination    Rehab potential is good.    Activity tolerance: Good    Discharge recommendations: Discharge Facility/Level Of Care Needs: home health PT     Barriers to discharge: Barriers to Discharge: Decreased caregiver support, Inaccessible home environment    Equipment recommendations: Equipment Needed After Discharge: walker, rolling     GOALS:   Physical Therapy Goals        Problem: Physical Therapy Goal    Goal Priority Disciplines Outcome Goal Variances Interventions   Physical Therapy Goal     PT/OT, PT Ongoing (interventions implemented as appropriate)     Description:  Goals to be met by: 1/15/17     Patient will increase functional independence with mobility by performin. Sit to stand transfer with Stand-by Assistance using RW  2. Bed to chair transfer with Stand-by Assistance using Rolling Walker  3. Gait  x 100 feet with Stand-by Assistance using Rolling Walker.   4. Ascend/descend 4 stair with bilateral Handrails Contact Guard Assistance.   5. Pt will be (I) with HEP x20-30 reps to improve muscular endurance                PLAN:    Patient to be seen 4 x/week to address the above listed problems via gait training, therapeutic exercises, therapeutic activities  Plan of Care expires: 17  Plan of Care reviewed with: patient          iRgoberto Arias, PT  2017

## 2017-01-08 NOTE — DISCHARGE SUMMARY
"[facility] Hospital Medicine  Discharge Summary      Patient Name: Wilder Carbajal  MRN: [id number]  Admission Date: 1/7/2017  Hospital Length of Stay: 0 days  Discharge Date and Time: 1/8/2017 12:29 PM  Attending Physician: Bahman Matthews MD   Discharging Provider: Waqas Aviles PA-C  Primary Care Provider: Akhil Adorno MD  [place] [facility] [place] [facility] [place] Waqas Aviles PA-C    HPI:   66M with PMHx of lumbar spinal stenosis, ESRD on HD TThS, DM, HTN presents for evaluation for left shoulder pain and increased back pain s/p a mechanical fall yesterday and today. Patient fell yesterday from a seated on his walker, he bent over to pick something up and did not lock his walker and it slipped away from him and he fell "straight on my ass" and hit his left shoulder on the way down. He did not hit his head or have LOC. After this fall he was increasingly sore and last night was unable to move out of the bed and had to slide out of the bed and on to the ground to use the bathroom. (He did not want to mess the bed, he again did not hit his head or have LOC). He was unfortunately unable to get back up after this embarrassing event and presented to the ED for evaluation. He subsequently missed his HD appointment for today and was found to have elevated potassium at 6.9, but re-draw showed it at 5.0. Mr. Carbajal endorses increasing weakness since last year. He denies any significant changes to his symptoms. His main concern today is pain control of his left shoulder and back.    In ED, CBC showing stable and chronic anemia, renal function panel consistent with ESRD w/o significant electrolyte abnormalities (possibility that intially elevated POC potassium of 6.9 was falsely elevated as repeat potassium of 5.0 was noted on laboratory BMP). Radiologic exams of R femur, R knee, pelvis, L shoulder only showing DJD on all films. Patient initiated on hyperkalemia protocol and given insulin, calcium " gluconate. Nephrology consulted for HD.      * No surgery found *      Indwelling Lines/Drains at time of discharge:   Lines/Drains/Airways     Drain                 Hemodialysis AV Graft 06/17/14 0915  936 days              Hospital Course:   Patient placed into observation for evaluation of left hip pain and HD. Nephrology consulted and provided dialysis overnight without complication. Patient feeling much better next morning, with left shoulder pain improved and increased ROM. Will send home with home health and PRN pain medications. Additionally will send patient to have follow up with PMR as patient has not had follow up since 06/2015. Patient to resume [facility] HD schedule.      Consults:   Consults         Status Ordering Provider     Inpatient consult to Nephrology  Once     Provider:  (Not yet assigned)    Completed HALIMA MURILLO consult to dietary  Once     Provider:  (Not yet assigned)    Completed AB LOPEZ          Significant Diagnostic Studies: Labs:   BMP:   Recent Labs  Lab 01/07/17  1556 01/08/17  0603    102    139   K 5.0 4.3    100   CO2 28 31*   BUN 32* 19   CREATININE 7.1* 4.8*   CALCIUM 9.9 9.2   , CBC   Recent Labs  Lab 01/07/17  1511   WBC 5.16   HGB 10.9*   HCT 34.0*   PLT 91*    and All labs within the past 24 hours have been reviewed  Radiology: X-Ray: CXR: X-Ray Chest 1 View (CXR): No results found for this visit on 01/07/17.    Pending Diagnostic Studies:     Procedure Component Value Units Date/Time    Hemoglobin A1c if not done in past 6 weeks [[id number]] Collected:  01/07/17 2051    Order Status:  Sent Lab Status:  In process Updated:  01/07/17 2053    Specimen:  Blood from Blood     Narrative:       Collection has been rescheduled by TAM2 at 1/7/2017 19:26 Reason: pt   in chest xray per ZENON Ocampo   Collection has been rescheduled by TAM2 at 1/7/2017 20:13 Reason:   Patient unavailable    Renal function panel [[id number]]     Order Status:  Sent Lab  Status:  No result     Specimen:  Blood from Blood         Final Active Diagnoses:    Diagnosis Date Noted POA    PRINCIPAL PROBLEM:  Weakness [R53.1] 01/07/2017 Yes    ESRD (end stage renal disease) on dialysis [N18.6, Z99.2] 08/15/2015 Not Applicable    Acute pain of left shoulder [M25.512] 01/07/2017 Yes    Spinal stenosis of lumbar region [M48.06] 08/26/2015 Yes    DM (diabetes mellitus) type II controlled with renal manifestation [E11.29] 08/15/2015 Yes    HTN (hypertension) [I10] 12/21/2013 Yes      Problems Resolved During this Admission:    Diagnosis Date Noted Date Resolved POA      * Weakness  - chronic in nature, progressing per patient. TSH mildly elevated at 4.838 with Free T4 WNL at 0.84, B12 WNL, folate WNL  - PT/OT: home with home health  - likely 2/2 deconditioning in setting of lumbar stenosis. See below for details  - PMR referral at discharge    ESRD (end stage renal disease) on dialysis  - nephology consulted and provided HD as necessary  - while in ED potassium shift initiated, repeat K+ at 5.0, K+ on discharge of 4.3  - no events on tele    HTN (hypertension)  - continue norvasc, hydralazine, lisinopril  - BP overall controlled    DM (diabetes mellitus) type II controlled with renal manifestation  - diet controlled, hyper/hypo glycemic protocols in place    Spinal stenosis of lumbar region  - followed with pain management, last note from 06/2015, pain well controlled with percocet, recommended outpatient PT, MRI done at that time showing stenosis  - PT/OT: home with home health  - Pain control with tylenol, percocet 5  - historically symptoms worse R>L, complains of R>L pain today, radiologic exam negative for fractures, showing only DJD  - Radiologic lumbar films not showing any acute fractures or changes    Acute pain of left shoulder  - PT/OT: home with home health  - radiologic exam with no fractures, showing only DJD  - improved ROM and pain adequately controlled with  PRN      Discharged Condition: fair    Disposition:     Follow Up:  Follow-up Information     Follow up with Akhil Adorno MD In 2 weeks.    Specialty:  Family Medicine    Contact information:    [address] [facility] [id number] [phone]          Patient Instructions:     Ambulatory Referral to Physical Medicine Rehab   Referral Priority: Routine Referral Type: Rehabilitation   Referral Reason: Specialty Services Required    Requested Specialty: Physical Medicine and Rehabilitation    Number of Visits Requested: 1      Diet renal     Diet Diabetic 2000 Calories     Activity as tolerated     Call MD for:  difficulty breathing or increased cough       Medications:  Reconciled Home Medications:   Current Discharge Medication List      START taking these medications    Details   oxycodone-acetaminophen (PERCOCET) 5-325 mg per tablet Take 1 tablet by mouth every 4 (four) hours as needed for Pain.  Qty: 45 tablet, Refills: 0         CONTINUE these medications which have NOT CHANGED    Details   atorvastatin (LIPITOR) 40 MG tablet Take 40 mg by mouth every evening.      clopidogrel (PLAVIX) 75 mg tablet Take 75 mg by mouth once daily.      amlodipine (NORVASC) 10 MG tablet Take 1 tablet (10 mg total) by mouth once daily.  Qty: 30 tablet, Refills: 3      aspirin (ECOTRIN) 81 MG EC tablet Take 81 mg by mouth once daily.       carbamide peroxide (DEBROX) 6.5 % otic solution Place 5 drops in ear(s) 3 (three) times daily.      cyclobenzaprine (FLEXERIL) 5 MG tablet Take 5 mg by mouth nightly as needed (for jaw pain).      diclofenac sodium (VOLTAREN) 1 % Gel Apply 2 g topically 4 (four) times daily as needed (for pain).      ergocalciferol (ERGOCALCIFEROL) 50,000 unit Cap Take 50,000 Units by mouth every 14 (fourteen) days. 1 cap PO weekly x12 wks then twice per month      famotidine-calcium carbonate-magnesium hydroxide (PEPCID COMPLETE) chewable tablet Take 1 tablet by mouth every 12 (twelve) hours as needed  (for acid reflux or gastric pain).      furosemide (LASIX) 80 MG tablet Take 80 mg by mouth once daily.      gabapentin (NEURONTIN) 300 mg tablet Take 300 mg by mouth 3 (three) times daily.        hydrALAZINE (APRESOLINE) 100 MG tablet Take 1 tablet (100 mg total) by mouth every 8 (eight) hours.  Qty: 90 tablet, Refills: 3      ketotifen (ZADITOR) 0.025 % (0.035 %) ophthalmic solution Apply 1 drop to eye every 8 (eight) hours.      lisinopril (PRINIVIL,ZESTRIL) 40 MG tablet Take 40 mg by mouth once daily.      podofilox (CONDYLOX) 0.5 % external solution Apply a small amount to lesions twice daily for three days, then hold for four days and repeat regimen      sevelamer carbonate (RENVELA) 800 mg Tab Take two tablets by mouth four times daily with meals and snacks         STOP taking these medications       hydrocodone-acetaminophen 5-325mg (NORCO) 5-325 mg per tablet Comments:   Reason for Stopping:             Time spent on the discharge of patient: 35 minutes    Waqas Aviles PA-C  Department of Hospital Medicine  [facility]

## 2017-01-08 NOTE — ASSESSMENT & PLAN NOTE
- PT/OT: home with home health  - radiologic exam with no fractures, showing only DJD  - improved ROM and pain adequately controlled with PRN

## 2017-01-09 VITALS
HEART RATE: 69 BPM | DIASTOLIC BLOOD PRESSURE: 82 MMHG | TEMPERATURE: 97 F | WEIGHT: 264.75 LBS | BODY MASS INDEX: 39.21 KG/M2 | RESPIRATION RATE: 18 BRPM | OXYGEN SATURATION: 92 % | HEIGHT: 69 IN | SYSTOLIC BLOOD PRESSURE: 142 MMHG

## 2017-01-09 LAB
ALBUMIN SERPL BCP-MCNC: 2.7 G/DL
ALBUMIN SERPL BCP-MCNC: 3.1 G/DL
ANION GAP SERPL CALC-SCNC: 10 MMOL/L
ANION GAP SERPL CALC-SCNC: 9 MMOL/L
BUN SERPL-MCNC: 28 MG/DL
BUN SERPL-MCNC: 28 MG/DL
CALCIUM SERPL-MCNC: 9.4 MG/DL
CALCIUM SERPL-MCNC: 9.6 MG/DL
CHLORIDE SERPL-SCNC: 98 MMOL/L
CHLORIDE SERPL-SCNC: 99 MMOL/L
CO2 SERPL-SCNC: 27 MMOL/L
CO2 SERPL-SCNC: 31 MMOL/L
CREAT SERPL-MCNC: 6 MG/DL
CREAT SERPL-MCNC: 6.4 MG/DL
EST. GFR  (AFRICAN AMERICAN): 10.3 ML/MIN/1.73 M^2
EST. GFR  (AFRICAN AMERICAN): 9.5 ML/MIN/1.73 M^2
EST. GFR  (NON AFRICAN AMERICAN): 8.2 ML/MIN/1.73 M^2
EST. GFR  (NON AFRICAN AMERICAN): 8.9 ML/MIN/1.73 M^2
ESTIMATED AVG GLUCOSE: 117 MG/DL
GLUCOSE SERPL-MCNC: 81 MG/DL
GLUCOSE SERPL-MCNC: 97 MG/DL
HBA1C MFR BLD HPLC: 5.7 %
PHOSPHATE SERPL-MCNC: 5.6 MG/DL
PHOSPHATE SERPL-MCNC: 5.8 MG/DL
POCT GLUCOSE: 118 MG/DL (ref 70–110)
POCT GLUCOSE: 125 MG/DL (ref 70–110)
POTASSIUM SERPL-SCNC: 4.8 MMOL/L
POTASSIUM SERPL-SCNC: 5.4 MMOL/L
SODIUM SERPL-SCNC: 136 MMOL/L
SODIUM SERPL-SCNC: 138 MMOL/L

## 2017-01-09 PROCEDURE — G8979 MOBILITY GOAL STATUS: HCPCS | Mod: CI

## 2017-01-09 PROCEDURE — G8980 MOBILITY D/C STATUS: HCPCS | Mod: CK

## 2017-01-09 PROCEDURE — 80069 RENAL FUNCTION PANEL: CPT

## 2017-01-09 PROCEDURE — 36415 COLL VENOUS BLD VENIPUNCTURE: CPT

## 2017-01-09 PROCEDURE — G0378 HOSPITAL OBSERVATION PER HR: HCPCS

## 2017-01-09 PROCEDURE — 97116 GAIT TRAINING THERAPY: CPT

## 2017-01-09 PROCEDURE — 99217 PR OBSERVATION CARE DISCHARGE: CPT | Mod: ,,, | Performed by: PHYSICIAN ASSISTANT

## 2017-01-09 PROCEDURE — 25000003 PHARM REV CODE 250: Performed by: PHYSICIAN ASSISTANT

## 2017-01-09 PROCEDURE — 80069 RENAL FUNCTION PANEL: CPT | Mod: 91

## 2017-01-09 RX ADMIN — PANTOPRAZOLE SODIUM 40 MG: 40 TABLET, DELAYED RELEASE ORAL at 08:01

## 2017-01-09 RX ADMIN — FUROSEMIDE 80 MG: 40 TABLET ORAL at 08:01

## 2017-01-09 RX ADMIN — CLOPIDOGREL 75 MG: 75 TABLET, FILM COATED ORAL at 08:01

## 2017-01-09 RX ADMIN — Medication 3 ML: at 06:01

## 2017-01-09 RX ADMIN — GABAPENTIN 300 MG: 300 CAPSULE ORAL at 05:01

## 2017-01-09 RX ADMIN — SEVELAMER CARBONATE 800 MG: 800 TABLET, FILM COATED ORAL at 08:01

## 2017-01-09 RX ADMIN — LISINOPRIL 40 MG: 20 TABLET ORAL at 08:01

## 2017-01-09 RX ADMIN — ASPIRIN 81 MG: 81 TABLET, COATED ORAL at 08:01

## 2017-01-09 RX ADMIN — OXYCODONE HYDROCHLORIDE 5 MG: 5 TABLET ORAL at 11:01

## 2017-01-09 RX ADMIN — HYDRALAZINE HYDROCHLORIDE 100 MG: 25 TABLET, FILM COATED ORAL at 05:01

## 2017-01-09 RX ADMIN — AMLODIPINE BESYLATE 10 MG: 10 TABLET ORAL at 08:01

## 2017-01-09 NOTE — PT/OT/SLP PROGRESS
Physical Therapy  Treatment    Wilder Carbajal   MRN: [id number]   Admitting Diagnosis: Weakness    PT Received On: 17  PT Start Time: 912     PT Stop Time: 935    PT Total Time (min): 23 min       Billable Minutes:  Gait [id number]    Treatment Type: Treatment  PT/PTA: PT     PTA Visit Number: 0       General Precautions: Standard, fall  Orthopedic Precautions: N/A     Do you have any cultural, spiritual, Advent conflicts, given your current situation?: none stated    Subjective:  Communicated with RN prior to session.  Pain Ratin/10        Location:  (Left shoulder and Right hip)     Pain Rating Post-Intervention: 10/10    Objective:   Patient found with: telemetry    Functional Mobility:  Bed Mobility: not performed    Transfers:  Sit <> Stand Assistance: Contact Guard Assistance  Sit <> Stand Assistive Device: Rolling Walker    Gait:   Gait Distance: ~125 ft, SOB reported on return to room  Assistance 1: Contact Guard Assistance, Stand by Assistance  Gait Assistive Device: Rolling walker  Gait Pattern: 3-point gait  Gait Deviation(s): decreased noel, decreased step length, forward lean    Stairs:  Pt ascended/descend 3 stair(s) with No Assistive Device with bilateral with Minimal Assistance.     Balance:     Dynamic stand: FAIR: Needs CONTACT GUARD during gait       AM-PAC 6 CLICK MOBILITY  How much help from another person does this patient currently need?   1 = Unable, Total/Dependent Assistance  2 = A lot, Maximum/Moderate Assistance  3 = A little, Minimum/Contact Guard/Supervision  4 = None, Modified [place]/Independent    Turning over in bed (including adjusting bedclothes, sheets and blankets)?: 4  Sitting down on and standing up from a chair with arms (e.g., wheelchair, bedside commode, etc.): 3  Moving from lying on back to sitting on the side of the bed?: 3  Moving to and from a bed to a chair (including a wheelchair)?: 3  Need to walk in hospital room?: 3  Climbing 3-5 steps with  a railing?: 3  Total Score: 19    AM-PAC Raw Score CMS G-Code Modifier Level of Impairment Assistance   6 % Total / Unable   7 - 9 CM 80 - 100% Maximal Assist   10 - 14 CL 60 - 80% Moderate Assist   15 - 19 CK 40 - 60% Moderate Assist   20 - 22 CJ 20 - 40% Minimal Assist   23 CI 1-20% SBA / CGA   24 CH 0% Independent/ Mod I     Patient left up in chair with all lines intact and call button in reach.    Assessment:  Wilder Carbajal is a 66 y.o. male with a medical diagnosis of Weakness and presents with below deficits. Pt tolerated treatment well this date and continues to benefit from skilled PT in order to maximize safety and independence.     Rehab identified problem list/impairments: Rehab identified problem list/impairments: weakness, gait instability, decreased upper extremity function, decreased lower extremity function, impaired balance, impaired endurance, pain, impaired functional mobilty, impaired joint extensibility    Rehab potential is good.    Activity tolerance: Good    Discharge recommendations: Discharge Facility/Level Of Care Needs: home health PT, home health OT     Barriers to discharge: Barriers to Discharge: Decreased caregiver support, Inaccessible home environment      GOALS:   Physical Therapy Goals        Problem: Physical Therapy Goal    Goal Priority Disciplines Outcome Goal Variances Interventions   Physical Therapy Goal     PT/OT, PT Ongoing (interventions implemented as appropriate)     Description:  Goals to be met by: 1/15/17     Patient will increase functional independence with mobility by performin. Sit to stand transfer with Stand-by Assistance using RW  2. Bed to chair transfer with Stand-by Assistance using Rolling Walker  3. Gait  x 100 feet with Stand-by Assistance using Rolling Walker.   4. Ascend/descend 4 stair with bilateral Handrails Contact Guard Assistance.   5. Pt will be (I) with HEP x20-30 reps to improve muscular endurance                PLAN:     Patient to be seen 4 x/week  to address the above listed problems via gait training, therapeutic activities, therapeutic exercises  Plan of Care expires: 02/08/17  Plan of Care reviewed with: patient         Clementina Carrasquillo, PT  01/09/2017

## 2017-01-09 NOTE — PLAN OF CARE
Problem: Physical Therapy Goal  Goal: Physical Therapy Goal  Goals to be met by: 1/15/17     Patient will increase functional independence with mobility by performin. Sit to stand transfer with Stand-by Assistance using RW  2. Bed to chair transfer with Stand-by Assistance using Rolling Walker  3. Gait x 100 feet with Stand-by Assistance using Rolling Walker.   4. Ascend/descend 4 stair with bilateral Handrails Contact Guard Assistance.   5. Pt will be (I) with HEP x20-30 reps to improve muscular endurance   Continue with POC

## 2017-01-09 NOTE — PLAN OF CARE
Problem: Patient Care Overview  Goal: Plan of Care Review  Outcome: Outcome(s) achieved Date Met:  01/09/17  POC reviewed with pt. Safety precautions maintained. Bed in low position, wheels locked. Side rails up x 2. Call light within reach. Pt free of falls. Bg monitored, achs.  No pressure ulcer noted. Pt ambulated in hallways with PT/OT.

## 2017-01-09 NOTE — PLAN OF CARE
DERIK faxed pt's HH orders and H&P to [facility] (f:[phone], p:[phone])     Home health services will be set up by [place]

## 2017-01-09 NOTE — PLAN OF CARE
Problem: Patient Care Overview  Goal: Plan of Care Review  Outcome: Ongoing (interventions implemented as appropriate)  poc updated. Diabetic teaching provided. Pt free from falls. Safety maintained. No distress noted.

## 2017-01-09 NOTE — NURSING
Pt dc per MD orders. Dc and prescription instructions given. Pt verbalizes understanding. PIV removed, catheter tip intact. VSS. No sign of distress noted. Pt waiting on  for transport.

## 2017-01-10 NOTE — PT/OT/SLP DISCHARGE
/Physical Therapy Discharge Summary    Wilder Carbajal  MRN: [id number]   Weakness   Patient Discharged from acute Physical Therapy on 2016.  Please refer to prior PT noted date on 2016 for functional status.     Assessment:   Patient has not met goals.  GOALS:   Physical Therapy Goals        Problem: Physical Therapy Goal    Goal Priority Disciplines Outcome Goal Variances Interventions   Physical Therapy Goal     PT/OT, PT Ongoing (interventions implemented as appropriate)     Description:  Goals to be met by: 1/15/17     Patient will increase functional independence with mobility by performin. Sit to stand transfer with Stand-by Assistance using RW  2. Bed to chair transfer with Stand-by Assistance using Rolling Walker  3. Gait  x 100 feet with Stand-by Assistance using Rolling Walker.   4. Ascend/descend 4 stair with bilateral Handrails Contact Guard Assistance.   5. Pt will be (I) with HEP x20-30 reps to improve muscular endurance              Reasons for Discontinuation of Therapy Services  Transfer to alternate level of care.      Plan:  Patient Discharged to: Home with Home Health Service.  Clementina Carrasquillo, PT  1/10/2017

## 2017-01-18 ENCOUNTER — OFFICE VISIT (OUTPATIENT)
Dept: VASCULAR SURGERY | Facility: CLINIC | Age: 67
End: 2017-01-18
Payer: COMMERCIAL

## 2017-01-18 ENCOUNTER — HOSPITAL ENCOUNTER (OUTPATIENT)
Dept: VASCULAR SURGERY | Facility: CLINIC | Age: 67
Discharge: HOME | End: 2017-01-18
Attending: SURGERY
Payer: COMMERCIAL

## 2017-01-18 VITALS
SYSTOLIC BLOOD PRESSURE: 177 MMHG | BODY MASS INDEX: 38.8 KG/M2 | HEART RATE: 72 BPM | WEIGHT: 262 LBS | HEIGHT: 69 IN | TEMPERATURE: 98 F | DIASTOLIC BLOOD PRESSURE: 84 MMHG

## 2017-01-18 DIAGNOSIS — I87.1 VEIN STENOSIS: Primary | ICD-10-CM

## 2017-01-18 DIAGNOSIS — I87.1 VEIN STENOSIS: ICD-10-CM

## 2017-01-18 DIAGNOSIS — Z99.2 ESRD (END STAGE RENAL DISEASE) ON DIALYSIS: ICD-10-CM

## 2017-01-18 DIAGNOSIS — N18.6 ESRD (END STAGE RENAL DISEASE) ON DIALYSIS: ICD-10-CM

## 2017-01-18 DIAGNOSIS — N18.6 END STAGE RENAL DISEASE: ICD-10-CM

## 2017-01-18 DIAGNOSIS — Z99.2 DEPENDENCE ON HEMODIALYSIS: ICD-10-CM

## 2017-01-18 PROCEDURE — 99999 PR PBB SHADOW E&M-EST. PATIENT-LVL IV: CPT | Mod: PBBFAC,,, | Performed by: SURGERY

## 2017-01-18 PROCEDURE — 99213 OFFICE O/P EST LOW 20 MIN: CPT | Mod: S$GLB,,, | Performed by: SURGERY

## 2017-01-18 PROCEDURE — 93990 DOPPLER FLOW TESTING: CPT | Mod: S$GLB,,, | Performed by: SURGERY

## 2017-01-18 NOTE — PROGRESS NOTES
Patient ID: Wilder Carbajal is a 66 y.o. male.    I. HISTORY     Chief Complaint: Follow-up      HPI Wilder Carbajal is a 66 y.o. male  with h/o left RC AVF placed in [place], [place] for ESRD 2/2 DM and HTN.  Pt has been on HD since 2007 and dialyzes onn M/W/F schedule.  He states that he continues to have significant bleeding after dialysis requiring pressure dressing to be in place for 24 hours. He is getting accessed without difficulty and has no other dialysis related complications (high venous pressures, low flow, hypotension etc).  Of note, pt does endorse some bilateral hand numbness and weakness, but nothing of major concern and no progression from baseline.  He also has complaints of b/l LE swelling with skin discoloration of the R>L lower leg.    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    Here for follow up [facility]    Review of Systems   Constitution: Negative.   HENT: Negative.    Eyes: Negative.    Cardiovascular: Negative.    Respiratory: Negative.    Endocrine: Negative.    Hematologic/Lymphatic: Negative.    Skin: Negative.    Musculoskeletal: Negative.    Gastrointestinal: Negative.    Genitourinary: Negative.    Neurological: Positive for numbness.        Bilateral hands   Psychiatric/Behavioral: Negative.    Allergic/Immunologic: Negative.        II. PHYSICAL EXAM     Physical Exam   Constitutional: He is oriented to person, place, and time. He appears well-developed and well-nourished. No distress.   HENT:   Head: Normocephalic and atraumatic.   Eyes: Conjunctivae and EOM are normal.   Neck: Neck supple. No JVD present.   Cardiovascular: Normal rate.    Pulmonary/Chest: Effort normal. No respiratory distress.   Abdominal: Soft.   Musculoskeletal: Normal range of motion. He exhibits no tenderness.   B/l LE 2+pitting edema and hyperpigmentation R>L   Neurological: He is alert and oriented to person, place, and time.   Skin: Skin is warm  and dry. There is erythema.   Psychiatric: He has a normal mood and affect.      Pulsatile thrill in left FA AVG.    III. ASSESSMENT & PLAN (MEDICAL DECISION MAKING)     1. Vein stenosis    2. ESRD (end stage renal disease) on dialysis        Imaging Results:   Dialysis access Duplex:   Distal graft  cm/s (previously 279cm/s) --> 451 cm/s (previously 556cm/s) at venous anastomosis.  Volume Flow 2063 ml/min (previously 1540 ml/min)    Assessment/Diagnosis and Plan:  Wilder Carbajal is a 66 y.o. male with a left RC AVF with PTFE interposition.   Continues to have excessive bleeding after needle access and graft remains pulsatile  Fistulogram on 12/21/16 revealed no significant areas of stenosis requiring intervention    -Knee high compression stockings for edema of b/l LE  -RTC 3-4 months with Duplex US of hemodialysis access      Vascular Surgery Staff  I have seen and examined the patient and reviewed the residents note. I agree with their assessment and plan.    Recommended needle access in upper arm abovee graft interposition but he refused.  Compression stockings for venous stasis   RTC 4months with exam and duplex as needed      Liz Zhou MD FACS [facility]  Vascular & Endovascular Surgery

## 2017-01-30 ENCOUNTER — OFFICE VISIT (OUTPATIENT)
Dept: OPTOMETRY | Facility: CLINIC | Age: 67
End: 2017-01-30
Payer: COMMERCIAL

## 2017-01-30 DIAGNOSIS — Z96.1 PSEUDOPHAKIA OF BOTH EYES: ICD-10-CM

## 2017-01-30 DIAGNOSIS — E11.3533 BOTH EYES AFFECTED BY PROLIFERATIVE DIABETIC RETINOPATHY WITH TRACTION RETINAL DETACHMENTS NOT INVOLVING MACULAE, ASSOCIATED WITH TYPE 2 DIABETES MELLITUS: Primary | ICD-10-CM

## 2017-01-30 PROCEDURE — 92014 COMPRE OPH EXAM EST PT 1/>: CPT | Mod: S$GLB,,, | Performed by: OPTOMETRIST

## 2017-01-30 PROCEDURE — 99999 PR PBB SHADOW E&M-EST. PATIENT-LVL III: CPT | Mod: PBBFAC,,, | Performed by: OPTOMETRIST

## 2017-01-30 NOTE — PROGRESS NOTES
HPI     Last eye exam was 11/3/15 with Dr. Seals.  PT STATES: BLURRED VISION OS FOR DISTANCE AND READING  PATIENT DENIES FLASHES, FLOATERS, PAIN OR DIPLOPIA   Hemoglobin A1C       Date                     Value               Ref Range             Status                01/07/2017               5.7                 4.5 - 6.2 %           Final                  08/04/2016               5.6                 4.5 - 6.2 %           Final                 08/14/2015               5.5                 4.5 - 6.2 %           Final            ----------         Last edited by Rachele Hanna, PCT on 1/30/2017 10:57 AM.     ROS     Negative for: Constitutional, Gastrointestinal, Neurological, Skin,   Genitourinary, Musculoskeletal, HENT, Endocrine, Cardiovascular, Eyes,   Respiratory, Psychiatric, Allergic/Imm, Heme/Lymph    Last edited by Shea Munoz, OD on 1/30/2017  1:22 PM. (History)        Assessment /Plan     For exam results, see Encounter Report.    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  PDR appears to be stable.  Will refer back to retina to reestablish care.  2.  Refer to Dr. Cavazos for low vision consult.

## 2017-02-03 DIAGNOSIS — R06.00 DYSPNEA, UNSPECIFIED TYPE: Primary | ICD-10-CM

## 2017-02-13 ENCOUNTER — OFFICE VISIT (OUTPATIENT)
Dept: OPHTHALMOLOGY | Facility: CLINIC | Age: 67
End: 2017-02-13
Payer: COMMERCIAL

## 2017-02-13 DIAGNOSIS — H35.033 HYPERTENSIVE RETINOPATHY OF BOTH EYES: ICD-10-CM

## 2017-02-13 DIAGNOSIS — E11.3593 PROLIFERATIVE DIABETIC RETINOPATHY OF BOTH EYES WITHOUT MACULAR EDEMA ASSOCIATED WITH TYPE 2 DIABETES MELLITUS: Primary | ICD-10-CM

## 2017-02-13 DIAGNOSIS — H33.43 TRACTION DETACHMENT OF BOTH RETINAS: ICD-10-CM

## 2017-02-13 PROCEDURE — 99999 PR PBB SHADOW E&M-EST. PATIENT-LVL III: CPT | Mod: PBBFAC,,, | Performed by: OPHTHALMOLOGY

## 2017-02-13 PROCEDURE — 92134 CPTRZ OPH DX IMG PST SGM RTA: CPT | Mod: S$GLB,,, | Performed by: OPHTHALMOLOGY

## 2017-02-13 PROCEDURE — 92014 COMPRE OPH EXAM EST PT 1/>: CPT | Mod: S$GLB,,, | Performed by: OPHTHALMOLOGY

## 2017-02-13 PROCEDURE — 92226 PR SPECIAL EYE EXAM, SUBSEQUENT: CPT | Mod: RT,S$GLB,, | Performed by: OPHTHALMOLOGY

## 2017-02-13 NOTE — PROGRESS NOTES
OCT - OD much improved  OS - stable    A/P    1. PDR OU p PRP     2. TRD OU OD>OS   H/o Table top RD OD with foveal extension s/p PPV/MP OD 3/13      3. Pseudophakia OU    4. HTN ret     5. H/o DME ou post focal OU         12  months OCT    Continue Low Vision/[facility]

## 2017-02-13 NOTE — MR AVS SNAPSHOT
[facility] - Ophthalmology  [address]  [facility] [address]  Phone: [phone]  Fax: [phone]                  Wilder Carbajal   2017 1:20 PM   Office Visit    Description:  Male : 1950   Provider:  MUKUL Gutierrez MD   Department:  [facility]           Reason for Visit     Diabetic Eye Exam           Diagnoses this Visit        Comments    Proliferative diabetic retinopathy of both eyes without macular edema associated with type 2 diabetes mellitus    -  Primary     Traction detachment of both retinas         Hypertensive retinopathy of both eyes                To Do List           Future Appointments        Provider Department Dept Phone    3/6/2017 10:40 AM Anselmo Terrazas MD [facility] - Sleep Clinic [phone]    3/13/2017 10:30 AM Bradley Cavazos OD [place] - Optometry [phone]    2017 1:00 PM VASCULAR, LAB [facility] - Vascular Laboratory [phone]    2017 1:30 PM Liz Zhou MD [facility] - Vascular Surgery [phone]      Goals (5 Years of Data)     None      Follow-Up and Disposition     Return in about 1 year (around 2018).      [facility]     [facility] On Call Nurse Care Line -  Assistance  Registered nurses in the [facility] On Call Center provide clinical advisement, health education, appointment booking, and other advisory services.  Call for this free service at [phone].             Medications           Message regarding Medications     Verify the changes and/or additions to your medication regime listed below are the same as discussed with your clinician today.  If any of these changes or additions are incorrect, please notify your healthcare provider.             Verify that the below list of medications is an accurate representation of the medications you are currently taking.  If none reported, the list may be blank. If incorrect, please contact your healthcare provider. Carry this list with you in  case of emergency.           Current Medications     amlodipine (NORVASC) 10 MG tablet Take 1 tablet (10 mg total) by mouth once daily.    aspirin (ECOTRIN) 81 MG EC tablet Take 81 mg by mouth once daily.     atorvastatin (LIPITOR) 40 MG tablet Take 40 mg by mouth every evening.    carbamide peroxide (DEBROX) 6.5 % otic solution Place 5 drops in ear(s) 3 (three) times daily.    clopidogrel (PLAVIX) 75 mg tablet Take 75 mg by mouth once daily.    cyclobenzaprine (FLEXERIL) 5 MG tablet Take 5 mg by mouth nightly as needed (for jaw pain).    diclofenac sodium (VOLTAREN) 1 % Gel Apply 2 g topically 4 (four) times daily as needed (for pain).    ergocalciferol (ERGOCALCIFEROL) 50,000 unit Cap Take 50,000 Units by mouth every 14 (fourteen) days. 1 cap PO weekly x12 wks then twice per month    famotidine-calcium carbonate-magnesium hydroxide (PEPCID COMPLETE) chewable tablet Take 1 tablet by mouth every 12 (twelve) hours as needed (for acid reflux or gastric pain).    furosemide (LASIX) 80 MG tablet Take 80 mg by mouth once daily.    gabapentin (NEURONTIN) 300 mg tablet Take 300 mg by mouth 3 (three) times daily.      hydrALAZINE (APRESOLINE) 100 MG tablet Take 1 tablet (100 mg total) by mouth every 8 (eight) hours.    ketotifen (ZADITOR) 0.025 % (0.035 %) ophthalmic solution Apply 1 drop to eye every 8 (eight) hours.    lisinopril (PRINIVIL,ZESTRIL) 40 MG tablet Take 40 mg by mouth once daily.    oxycodone-acetaminophen (PERCOCET) 5-325 mg per tablet Take 1 tablet by mouth every 4 (four) hours as needed for Pain.    podofilox (CONDYLOX) 0.5 % external solution Apply a small amount to lesions twice daily for three days, then hold for four days and repeat regimen    sevelamer carbonate (RENVELA) 800 mg Tab Take two tablets by mouth four times daily with meals and snacks           Clinical Reference Information           Allergies as of 2/13/2017     No Known Allergies      Immunizations Administered on Date of Encounter -  2/13/2017     None      Orders Placed During Today's Visit      Normal Orders This Visit    Posterior Segment OCT Retina-Both eyes     Future Labs/Procedures Expected by Expires    Posterior Segment OCT Retina-Both eyes  As directed 2/13/2018      Maintenance Dialysis History     Start End Type Comments Center    1[facility]8/1888  [facility]            Current Dialysis Center Information     [facility] [address] Phone #:  [phone]    Contact:  N/A MADELINE Aaron  [address] Fax #:  [phone]            [facility] Sign-Up     Activating your [facility] account is as easy as 1-2-3!     1) Visit [email or web address], select Sign Up Now, enter this activation code and your date of birth, then select Next.  [email or web address]  Expires: 2/23/2017 10:26 AM      2) Create a username and password to use when you visit [facility] in the future and select a security question in case you lose your password and select Next.    3) Enter your e-mail address and click Sign Up!    Additional Information  If you have questions, please e-mail [email or web address] or call [phone] to talk to our [facility] staff. Remember, [facility] is NOT to be used for urgent needs. For medical emergencies, dial 911.         Language Assistance Services     ATTENTION: Language assistance services are available, free of charge. Please call [phone].      ATENCIÓN: Si habla español, tiene a diaz disposición servicios gratuitos de asistencia lingüística. Llame al [phone].     CHÚ Ý: N?u b?n nói Ti?ng Vi?t, có các d?ch v? h? tr? ngôn ng? mi?n phí dành cho b?n. G?i s? [phone].         [facility] complies with applicable Federal civil rights laws and does not discriminate on the basis of race, color, national origin, age, disability, or sex.

## 2017-02-20 ENCOUNTER — OFFICE VISIT (OUTPATIENT)
Dept: CARDIOLOGY | Facility: CLINIC | Age: 67
End: 2017-02-20
Payer: COMMERCIAL

## 2017-02-20 VITALS
HEART RATE: 54 BPM | DIASTOLIC BLOOD PRESSURE: 81 MMHG | SYSTOLIC BLOOD PRESSURE: 169 MMHG | BODY MASS INDEX: 38.49 KG/M2 | OXYGEN SATURATION: 92 % | HEIGHT: 68 IN | WEIGHT: 254 LBS

## 2017-02-20 DIAGNOSIS — R07.9 CHEST PAIN, UNSPECIFIED TYPE: Primary | ICD-10-CM

## 2017-02-20 PROCEDURE — 99215 OFFICE O/P EST HI 40 MIN: CPT | Mod: S$GLB,,, | Performed by: INTERNAL MEDICINE

## 2017-02-20 PROCEDURE — 99999 PR PBB SHADOW E&M-EST. PATIENT-LVL III: CPT | Mod: PBBFAC,GC,, | Performed by: INTERNAL MEDICINE

## 2017-02-20 NOTE — PROGRESS NOTES
I have personally taken the history and examined this patient and agree with the fellow's note as stated above. States he has CHAMBERLAIN still associated with left parasternal chest discomfort. Concur with recommendations.

## 2017-02-20 NOTE — MR AVS SNAPSHOT
[facility]  [address]  [facility] [address]  Phone: [phone]                  Wilder Carbajal   2017 10:00 AM   Office Visit    Description:  Male : 1950   Provider:  Armand Lind MD   Department:  [facility]           Reason for Visit     Coronary Artery Disease     Shortness of Breath           Diagnoses this Visit        Comments    Chest pain, unspecified type    -  Primary            To Do List           Future Appointments        Provider Department Dept Phone    3/6/2017 10:40 AM Anselmo Terrazas MD [facility] - Sleep Clinic [phone]    3/8/2017 10:40 AM Cristel Orourke MD [facility]-Physical Med & Rehab [phone]    3/13/2017 10:30 AM Bradley Cavazos OD [place] - Optometry [phone]    3/15/2017 10:40 AM Paulina Ace MD [place] - General Surgery [phone]    2017 1:00 PM VASCULAR, LAB [facility] - Vascular Laboratory [phone]      Goals (5 Years of Data)     None      [facility]     [facility] On Call Nurse Care Line -  Assistance  Registered nurses in the [facility] Center provide clinical advisement, health education, appointment booking, and other advisory services.  Call for this free service at [phone].             Medications           Message regarding Medications     Verify the changes and/or additions to your medication regime listed below are the same as discussed with your clinician today.  If any of these changes or additions are incorrect, please notify your healthcare provider.        STOP taking these medications     clopidogrel (PLAVIX) 75 mg tablet Take 75 mg by mouth once daily.    famotidine-calcium carbonate-magnesium hydroxide (PEPCID COMPLETE) chewable tablet Take 1 tablet by mouth every 12 (twelve) hours as needed (for acid reflux or gastric pain).    furosemide (LASIX) 80 MG tablet Take 80 mg by mouth once daily.    cyclobenzaprine (FLEXERIL) 5 MG tablet Take 5 mg by mouth nightly  "as needed (for jaw pain).           Verify that the below list of medications is an accurate representation of the medications you are currently taking.  If none reported, the list may be blank. If incorrect, please contact your healthcare provider. Carry this list with you in case of emergency.           Current Medications     amlodipine (NORVASC) 10 MG tablet Take 1 tablet (10 mg total) by mouth once daily.    aspirin (ECOTRIN) 81 MG EC tablet Take 81 mg by mouth once daily.     atorvastatin (LIPITOR) 40 MG tablet Take 40 mg by mouth every evening.    carbamide peroxide (DEBROX) 6.5 % otic solution Place 5 drops in ear(s) 3 (three) times daily.    diclofenac sodium (VOLTAREN) 1 % Gel Apply 2 g topically 4 (four) times daily as needed (for pain).    gabapentin (NEURONTIN) 300 mg tablet Take 300 mg by mouth 3 (three) times daily.      ketotifen (ZADITOR) 0.025 % (0.035 %) ophthalmic solution Apply 1 drop to eye every 8 (eight) hours.    lisinopril (PRINIVIL,ZESTRIL) 40 MG tablet Take 40 mg by mouth once daily.    oxycodone-acetaminophen (PERCOCET) 5-325 mg per tablet Take 1 tablet by mouth every 4 (four) hours as needed for Pain.    podofilox (CONDYLOX) 0.5 % external solution Apply a small amount to lesions twice daily for three days, then hold for four days and repeat regimen    sevelamer carbonate (RENVELA) 800 mg Tab Take two tablets by mouth four times daily with meals and snacks    ergocalciferol (ERGOCALCIFEROL) 50,000 unit Cap Take 50,000 Units by mouth every 14 (fourteen) days. 1 cap PO weekly x12 wks then twice per month    hydrALAZINE (APRESOLINE) 100 MG tablet Take 1 tablet (100 mg total) by mouth every 8 (eight) hours.           Clinical Reference Information           Your Vitals Were     BP Pulse Height Weight SpO2 BMI    169/81 (BP Location: Right arm, Patient Position: Sitting, BP Method: Automatic) 54 5' 8" (1.727 m) 115.2 kg (254 lb) 92% 38.62 kg/m2      Blood Pressure          Most Recent Value "    Right Arm BP - Sitting  169/81    Reason for not completing BP on both arms  central line    BP  (!)  169/81      Allergies as of 2/20/2017     No Known Allergies      Immunizations Administered on Date of Encounter - 2/20/2017     None      Maintenance Dialysis History     Start End Type Comments Center    1/8/1888  [place]  [facility] [place]            Current Dialysis Center Information     [facility] [address] Phone #:  [phone]    Contact:  N/A MADELINE Aaron  [address] Fax #:  [phone]            [facility] Sign-Up     Activating your [facility] account is as easy as 1-2-3!     1) Visit [email or web address], select Sign Up Now, enter this activation code and your date of birth, then select Next.  [email or web address]  Expires: 2/23/2017 10:26 AM      2) Create a username and password to use when you visit [facility] in the future and select a security question in case you lose your password and select Next.    3) Enter your e-mail address and click Sign Up!    Additional Information  If you have questions, please e-mail [email or web address] or call [phone] to talk to our [facility] staff. Remember, [facility] is NOT to be used for urgent needs. For medical emergencies, dial 911.         Language Assistance Services     ATTENTION: Language assistance services are available, free of charge. Please call [phone].      ATENCIÓN: Si habla español, tiene a diaz disposición servicios gratuitos de asistencia lingüística. Llame al [phone].     [facility] Ý: N?u b?n nói Ti?ng Vi?t, có các d?ch v? h? tr? ngôn ng? mi?n phí dành cho b?n. G?i s? [phone].         [facility] complies with applicable Federal civil rights laws and does not discriminate on the basis of race, color, national origin, age, disability, or sex.

## 2017-02-20 NOTE — PROGRESS NOTES
Subjective:    Patient ID:  Wilder Carbajal is a 66 y.o. male who presents for evaluation of Coronary Artery Disease and Shortness of Breath      HPI    This is a 65 y/o man with PMHx of ESRD on TTS HD, DMII, paFib, and HTN who presents to clinic for [facility] clinic for evaluation for Heart Disease.     On assessment today, the patient states that last month he was having some chest pains, but more recently the pains have gone away and he is having issues with SOB. Patient is not active in general, but uses a walker. Can walk through the house and sometimes up to 50 feet. This is associated with some SOB, but not severe symptoms.     He states that he was referred here for episodes of shortness of breath that happened daily for a period of 2 weeks. These episodes came on with exerting himself while trying to clean his house. Got short of breath with some pacing heart rate. He stopped and the symptoms resolved. Since that time, he stopped pushing himself as hard while cleaning. 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 Now does one room, relaxes, then finishes the next two.     Over one month ago, he also endorsed left sided chest pains that came on with exertion. Pain never radiated, but would also resolve with rest.    Denies history of MI. No known family history of heart disease.    Patient has one prior admission to [facility] for pericarditis at which time he was treated with colchicine with some improvement.    Past Medical History   Diagnosis Date    Acute pain of left shoulder 1/7/2017    Arthritis     Asthma     Benign neoplasm of eyelid      RUL    Blood clotting tendency     Blood transfusion     Cataract     Chronic kidney disease requiring chronic dialysis     Diabetes mellitus     Diabetic retinopathy     Fever blister     Hyperlipidemia     Hypertension     Infertility     Joint pain     Retinal detachment     Thyroid disease     Weakness 1/7/2017       Past Surgical History   Procedure  "Laterality Date    Cataract extraction      Retinal detachment surgery         Family History   Problem Relation Age of Onset    Heart attack Mother     Cancer Neg Hx        Social History     Social History    Marital status:      Spouse name: N/A    Number of children: N/A    Years of education: N/A     Social History Main Topics    Smoking status: Never Smoker    Smokeless tobacco: Never Used    Alcohol use Yes      Comment: 1 pint bourbon    Drug use: No    Sexual activity: Not on file     Other Topics Concern    Not on file     Social History Narrative       Review of patient's allergies indicates:  No Known Allergies    Review of Systems   Constitution: Negative for decreased appetite, fever, weakness and malaise/fatigue.   Cardiovascular: Negative for chest pain, dyspnea on exertion, irregular heartbeat and leg swelling.   Respiratory: Negative for cough, hemoptysis, shortness of breath and wheezing.    Endocrine: Negative for cold intolerance and heat intolerance.   Musculoskeletal: Negative for muscle weakness and myalgias.   Gastrointestinal: Negative for abdominal pain, constipation and diarrhea.   Genitourinary: Negative for bladder incontinence.   Psychiatric/Behavioral: Negative for depression.        Objective:  Vitals:    02/20/17 1023   BP: (!) 169/81   BP Location: Right arm   Patient Position: Sitting   BP Method: Automatic   Pulse: (!) 54   SpO2: (!) 92%   Weight: 115.2 kg (254 lb)   Height: 5' 8" (1.727 m)       Physical Exam   Constitutional: He is oriented to person, place, and time. He appears well-developed and well-nourished.   HENT:   Head: Normocephalic and atraumatic.   Neck: Normal range of motion. Neck supple. No JVD present.   Cardiovascular: Normal rate and regular rhythm.  Exam reveals no gallop and no friction rub.    Murmur (AS murmur) heard.  Pulmonary/Chest: Effort normal. No respiratory distress. He has rales (bilateral bases).   Abdominal: Soft. Bowel " sounds are normal. There is no tenderness. There is no rebound and no guarding.   Musculoskeletal: Normal range of motion.   Neurological: He is alert and oriented to person, place, and time.     Current Outpatient Prescriptions on File Prior to Visit   Medication Sig    amlodipine (NORVASC) 10 MG tablet Take 1 tablet (10 mg total) by mouth once daily.    aspirin (ECOTRIN) 81 MG EC tablet Take 81 mg by mouth once daily.     atorvastatin (LIPITOR) 40 MG tablet Take 40 mg by mouth every evening.    carbamide peroxide (DEBROX) 6.5 % otic solution Place 5 drops in ear(s) 3 (three) times daily.    diclofenac sodium (VOLTAREN) 1 % Gel Apply 2 g topically 4 (four) times daily as needed (for pain).    gabapentin (NEURONTIN) 300 mg tablet Take 300 mg by mouth 3 (three) times daily.      ketotifen (ZADITOR) 0.025 % (0.035 %) ophthalmic solution Apply 1 drop to eye every 8 (eight) hours.    lisinopril (PRINIVIL,ZESTRIL) 40 MG tablet Take 40 mg by mouth once daily.    oxycodone-acetaminophen (PERCOCET) 5-325 mg per tablet Take 1 tablet by mouth every 4 (four) hours as needed for Pain.    podofilox (CONDYLOX) 0.5 % external solution Apply a small amount to lesions twice daily for three days, then hold for four days and repeat regimen    sevelamer carbonate (RENVELA) 800 mg Tab Take two tablets by mouth four times daily with meals and snacks    ergocalciferol (ERGOCALCIFEROL) 50,000 unit Cap Take 50,000 Units by mouth every 14 (fourteen) days. 1 cap PO weekly x12 wks then twice per month    hydrALAZINE (APRESOLINE) 100 MG tablet Take 1 tablet (100 mg total) by mouth every 8 (eight) hours.    [DISCONTINUED] clopidogrel (PLAVIX) 75 mg tablet Take 75 mg by mouth once daily.    [DISCONTINUED] cyclobenzaprine (FLEXERIL) 5 MG tablet Take 5 mg by mouth nightly as needed (for jaw pain).    [DISCONTINUED] famotidine-calcium carbonate-magnesium hydroxide (PEPCID COMPLETE) chewable tablet Take 1 tablet by mouth every 12  (twelve) hours as needed (for acid reflux or gastric pain).    [DISCONTINUED] furosemide (LASIX) 80 MG tablet Take 80 mg by mouth once daily.     No current facility-administered medications on file prior to visit.      Echo 8/15/15  CONCLUSIONS     1 - Mildly depressed left ventricular systolic function (EF 45-50%) with wall motion in the RCA distribution..     2 - Moderate left atrial enlargement.     3 - Left ventricular diastolic dysfunction.     4 - Normal RV size with mildly to moderately depressed right ventricular systolic function .     5 - Moderate aortic stenosis, MARANDA = 1.26 cm2, peak velocity = 2.7 m/s, mean gradient = 13.0 mmHg.     6 - 2+ MR with mild stenosis.     7 - Increased central venous pressure.       Assessment and Plan:   #CP and SOB - Symptoms with typical features. Echo in 2015 with RCA WMA, EF 40-45% with RCA distribution of hyopkinesis  - Repeat Transthoracic echocardiogram  - Recommend nuclear stress test, regadenoson  - CBC to ensure patient is not anemic    #HTN -  systolic today  - Recommend Nephrology assessment of hypertension    #HLD - No lipid panel available for review  - Lipid panel with consideration of increasing atorvastatin to 80mg PO Daily pending results    #paFib - aFib today, rate controlled. Previously on blood thinners, but discontinued based on bleeding issues during dialysis  - Patient with XKESG1Sond > 2, would recommend resuming blood thinner if amenable to Nephrology  - Coumadin would be best in the setting of ESRD    Patient discussed and examined with attending physician, Dr. Viveros.    Signed:    Armand Lind MD  Cardiology Fellow, PGY-5  Pager: [phone]  2/20/2017 10:49 AM

## 2017-03-06 ENCOUNTER — OFFICE VISIT (OUTPATIENT)
Dept: SLEEP MEDICINE | Facility: CLINIC | Age: 67
End: 2017-03-06
Payer: COMMERCIAL

## 2017-03-06 VITALS
SYSTOLIC BLOOD PRESSURE: 153 MMHG | WEIGHT: 252 LBS | DIASTOLIC BLOOD PRESSURE: 80 MMHG | BODY MASS INDEX: 38.19 KG/M2 | HEIGHT: 68 IN | HEART RATE: 63 BPM

## 2017-03-06 DIAGNOSIS — G47.30 SLEEP APNEA, UNSPECIFIED TYPE: Primary | ICD-10-CM

## 2017-03-06 PROCEDURE — 99999 PR PBB SHADOW E&M-EST. PATIENT-LVL III: CPT | Mod: PBBFAC,,, | Performed by: PSYCHIATRY & NEUROLOGY

## 2017-03-06 PROCEDURE — 99203 OFFICE O/P NEW LOW 30 MIN: CPT | Mod: S$GLB,,, | Performed by: PSYCHIATRY & NEUROLOGY

## 2017-03-06 NOTE — LETTER
March 6, 2017      Akhil Adorno MD  [address] [facility] [address]           [facility]  [address] [facility] [address] [place] [address]  Phone: [phone]          Patient: Wilder Carbajal   MR Number: [id number]   YOB: 1950   Date of Visit: 3/6/2017       Dear Dr. Akhil Adorno:    Thank you for referring Wilder Carbajal to me for evaluation. Attached you will find relevant portions of my assessment and plan of care.    If you have questions, please do not hesitate to call me. I look forward to following iWlder Carbajal along with you.    Sincerely,    Anselmo Terrazas MD    Enclosure  CC:  No Recipients    If you would like to receive this communication electronically, please contact [email or web address] or [phone] to request more information on [employer] Link access.    For providers and/or their staff who would like to refer a patient to [facility], please contact us through our one-stop-shop provider referral line, [facility] , at [phone].    If you feel you have received this communication in error or would no longer like to receive these types of communications, please e-mail [email or web address]

## 2017-03-06 NOTE — PATIENT INSTRUCTIONS
Your sleep study will be ordered through your PACE doctor    Ana Ngo will contact you to schedule your sleep study. Their number is [phone] (ext 1). The [facility] is located on 7th floor of the [facility].    We will call you when the sleep study results are ready - if you have not heard from us by 2 weeks from the date of the study, please call [phone] (ext 2).    You are advised to abstain from driving should you feel sleepy or drowsy.

## 2017-03-06 NOTE — MR AVS SNAPSHOT
[facility]  [address]  [facility] [address]  Phone: [phone]                  Wilder Carbajal   3/6/2017 10:40 AM   Office Visit    Description:  Male : 1950   Provider:  Anselmo Terrazas MD   Department:  [facility]           Reason for Visit     Sleep Apnea     Snoring     Insomnia           Diagnoses this Visit        Comments    Sleep apnea, unspecified type    -  Primary            To Do List           Future Appointments        Provider Department Dept Phone    3/8/2017 10:40 AM Cristel Orourke MD [facility]-Physical Med & Rehab [phone]    3/13/2017 10:30 AM Bradley Cavazos OD [place] - Optometry [phone]    3/15/2017 10:40 AM Paulina Ace MD [facility] General Surgery [phone]    2017 1:00 PM VASCULAR, LAB [facility] [phone]    2017 1:30 PM Liz Zhou MD [facility] Vascular Surgery [phone]      Goals (5 Years of Data)     None      Follow-Up and Disposition     Return after sleep study.      [facility]     [facility] On Call Nurse [facility] Line -  Assistance  Registered nurses in the [facility] Call Center provide clinical advisement, health education, appointment booking, and other advisory services.  Call for this free service at [phone].             Medications           Message regarding Medications     Verify the changes and/or additions to your medication regime listed below are the same as discussed with your clinician today.  If any of these changes or additions are incorrect, please notify your healthcare provider.             Verify that the below list of medications is an accurate representation of the medications you are currently taking.  If none reported, the list may be blank. If incorrect, please contact your healthcare provider. Carry this list with you in case of emergency.           Current Medications     aspirin (ECOTRIN) 81 MG EC tablet Take 81 mg by  "mouth once daily.     atorvastatin (LIPITOR) 40 MG tablet Take 40 mg by mouth every evening.    carbamide peroxide (DEBROX) 6.5 % otic solution Place 5 drops in ear(s) 3 (three) times daily.    diclofenac sodium (VOLTAREN) 1 % Gel Apply 2 g topically 4 (four) times daily as needed (for pain).    ergocalciferol (ERGOCALCIFEROL) 50,000 unit Cap Take 50,000 Units by mouth every 14 (fourteen) days. 1 cap PO weekly x12 wks then twice per month    gabapentin (NEURONTIN) 300 mg tablet Take 300 mg by mouth 3 (three) times daily.      ketotifen (ZADITOR) 0.025 % (0.035 %) ophthalmic solution Apply 1 drop to eye every 8 (eight) hours.    lisinopril (PRINIVIL,ZESTRIL) 40 MG tablet Take 40 mg by mouth once daily.    oxycodone-acetaminophen (PERCOCET) 5-325 mg per tablet Take 1 tablet by mouth every 4 (four) hours as needed for Pain.    podofilox (CONDYLOX) 0.5 % external solution Apply a small amount to lesions twice daily for three days, then hold for four days and repeat regimen    sevelamer carbonate (RENVELA) 800 mg Tab Take two tablets by mouth four times daily with meals and snacks    amlodipine (NORVASC) 10 MG tablet Take 1 tablet (10 mg total) by mouth once daily.    hydrALAZINE (APRESOLINE) 100 MG tablet Take 1 tablet (100 mg total) by mouth every 8 (eight) hours.           Clinical Reference Information           Your Vitals Were     BP Pulse Height Weight BMI    153/80 63 5' 8" (1.727 m) 114.3 kg (252 lb) 38.32 kg/m2      Blood Pressure          Most Recent Value    BP  (!)  153/80      Allergies as of 3/6/2017     No Known Allergies      Immunizations Administered on Date of Encounter - 3/6/2017     None      Maintenance Dialysis History     Start End Type Comments Center    1/8/1888  [facility] - [facility]            Current Dialysis Center Information     [facility] [address] Phone #:  [phone]    Contact:  N/A MADELINE Aaron  [address] Fax #:  [phone]            [facility] " Sign-Up     Activating your [facility] account is as easy as 1-2-3!     1) Visit [email or web address], select Sign Up Now, enter this activation code and your date of birth, then select Next.  [email or web address]  Expires: 4/20/2017 11:34 AM      2) Create a username and password to use when you visit [facility] in the future and select a security question in case you lose your password and select Next.    3) Enter your e-mail address and click Sign Up!    Additional Information  If you have questions, please e-mail [email or web address] or call [phone] to talk to our [facility] staff. Remember, [facility] is NOT to be used for urgent needs. For medical emergencies, dial 911.         Instructions    Your sleep study will be ordered through your PACE doctor    Ana Ngo will contact you to schedule your sleep study. Their number is [phone] (ext 1). The [facility] is located on 7th floor of the [facility].    We will call you when the sleep study results are ready - if you have not heard from us by 2 weeks from the date of the study, please call [phone] (ext 2).    You are advised to abstain from driving should you feel sleepy or drowsy.         Language Assistance Services     ATTENTION: Language assistance services are available, free of charge. Please call [phone].      ATENCIÓN: Si habla español, tiene a diaz disposición servicios gratuitos de asistencia lingüística. Llame al [phone].     [facility] Ý: N?u b?n nói Ti?ng Vi?t, có các d?ch v? h? tr? ngôn ng? mi?n phí dành cho b?n. G?i s? [phone].         [facility] complies with applicable Federal civil rights laws and does not discriminate on the basis of race, color, national origin, age, disability, or sex.

## 2017-03-06 NOTE — PROGRESS NOTES
"This 66 y.o. male patient presents for the evaluation of possible obstructive sleep apnea.    Snoring,   Gasping for air at night  Excessively tired, not refreshed, poor sleep  He reports ongoing problems for years.    ESS = 24    Difficulty breathing through nose at times.    Chronic dialysis.    Asthma, cardiac arrhythmias, heart block, pain medication use, generalized weakness, difficulty applying home sleep equipment    SLEEP ROUTINE:   Bed partner: none   Time to bed: 9-10 pm   Sleep onset latency: 30 mins   Disruptions or awakenings: 2 times   Wakeup time: 7 am   Perceived sleep quality: not refreshed, poor sleep   Daytime naps: one nap    Past Medical History:   Diagnosis Date    Acute pain of left shoulder 1/7/2017    Arthritis     Asthma     Benign neoplasm of eyelid     RUL    Blood clotting tendency     Blood transfusion     Cataract     Chronic kidney disease requiring chronic dialysis     Diabetes mellitus     Diabetic retinopathy     Fever blister     Hyperlipidemia     Hypertension     Infertility     Joint pain     Retinal detachment     Thyroid disease     Weakness 1/7/2017       Past Surgical History:   Procedure Laterality Date    CATARACT EXTRACTION      RETINAL DETACHMENT SURGERY         Family History   Problem Relation Age of Onset    Heart attack Mother     Cancer Neg Hx        Social History   Substance Use Topics    Smoking status: Never Smoker    Smokeless tobacco: Never Used    Alcohol use Yes      Comment: 1 mary berger       ALLERGIES: Reviewed in EPIC    CURRENT MEDICATIONS: Reviewed in EPIC    REVIEW OF SYSTEMS:  Sleep related symptoms as per HPI;    Denies weight gain;    Denies sinus problems;    Denies dyspnea;    Sometimes palpitations;    Denies acid reflux;    Denies polyuria;    Denies headaches;    Positivemood disturbance;    Denies anemia;    Otherwise, a balance of systems reviewed is negative.    PHYSICAL EXAM:  BP (!) 153/80  Pulse 63  Ht 5' 8" " (1.727 m)  Wt 114.3 kg (252 lb) Comment: self reported  BMI 38.32 kg/m2  GENERAL: Well groomed; Normally developed; Overweight, seated in wheelchair; Reports sinus illness; feels ill  SKIN: On face and neck: No abrasions, no rashes, no lesions.     No subcutaneous nodules are palpable.  RESPIRATORY: Chest is clear to auscultation.     Normal chest expansion and non-labored breathing at rest.  CARDIOVASCULAR: Normal S1, S2.  No murmurs, gallops or rubs.   No carotid bruits bilaterally.  EXTREMITIES: No clubbing. No cyanosis. Edema is absent.   NEURO: Oriented to time, place and person.    Normal attention span and concentration.  Station normal. Gait normal.  PSYCH: Affect is full. Mood is normal.    I spent greater than 15 minutes during this 30 minute visit in counseling the patient regarding sleep study results, MIRIAM etiology, ramifications, and treatment options.      ASSESSMENT:    1. Sleep Apnea, unspecified. The patient symptomatically has snoring, gasping for air at night, and excessive daytime sleepiness with exam findings of a crowded oral airway with medical co-morbidities of hypertension and obesity. This warrants further investigation for possible obstructive sleep apnea.    2. He is on chronic dialysis. High risk for periodic limb movements of sleep.         PLAN:    Recommended testing: Polysomnogram, in-lab due to medical complexity of (asthma, cardiac arrhythmias, heart block, pain medication use, generalized weakness, difficulty applying home sleep equipment). The nature of this procedure and its indication was discussed with the patient.    Testing to be order by PACE doctor    Education: During our discussion today, we talked about the etiology of obstructive sleep apnea as well as the potential ramifications of untreated sleep apnea, which could include daytime sleepiness, hypertension, heart disease and/or stroke.      Precautions: The patient was advised to abstain from driving should they  feel sleepy or drowsy. (Not driving, has transport)    Follow up: I will see the patient back after the sleep study has been completed. At this time, we can review the data and discuss potential treatment options. MD/NP

## 2017-03-13 ENCOUNTER — INITIAL CONSULT (OUTPATIENT)
Dept: OPTOMETRY | Facility: CLINIC | Age: 67
End: 2017-03-13
Payer: COMMERCIAL

## 2017-03-13 DIAGNOSIS — H54.3 LOW VISION, BOTH EYES: Primary | ICD-10-CM

## 2017-03-13 PROCEDURE — 99499 UNLISTED E&M SERVICE: CPT | Mod: S$GLB,,, | Performed by: OPTOMETRIST

## 2017-03-13 PROCEDURE — 99999 PR PBB SHADOW E&M-EST. PATIENT-LVL II: CPT | Mod: PBBFAC,,, | Performed by: OPTOMETRIST

## 2017-03-13 PROCEDURE — 92015 DETERMINE REFRACTIVE STATE: CPT | Mod: S$GLB,,, | Performed by: OPTOMETRIST

## 2017-03-13 NOTE — PROGRESS NOTES
HPI      Referral: MUKUL Gutierrez MD     Blur ou at dist, x mos, no assoc pain or red, no relief over time,   constant  Pt complaints of blurry at distance. Tearing OU. No pain    Gtts: Zaditor     1. PDR OU   -S/p PRP   2. 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 Pseudophakia OU   4. HTN ret   5. H/o DME ou   post focal OU            Last edited by Bradley Cavazos, OD on 3/13/2017 11:08 AM.     ROS     Negative for: Constitutional, Gastrointestinal, Neurological, Skin,   Genitourinary, Musculoskeletal, HENT, Endocrine, Cardiovascular, Eyes,   Respiratory, Psychiatric, Allergic/Imm, Heme/Lymph    Last edited by Bradley Cavazos, OD on 3/13/2017 11:08 AM. (History)        Assessment /Plan     For exam results, see Encounter Report.    Low vision, both eyes      1. Unable to improve dist vision, near Spec Rx given. Different lens options discussed with patient. RTC 1 year refraction,.

## 2017-03-13 NOTE — LETTER
March 13, 2017      Shea Munoz, OD  [address]  [facility] [address]           [place] [facility]  [address] [facility]  [place] [address]  Phone: [phone]  Fax: [phone]          Patient: Wilder Carbajal   MR Number: [id number]   YOB: 1950   Date of Visit: 3/13/2017       Dear Dr. Shea Munoz:    Thank you for referring Wilder Carbajal to me for evaluation. Attached you will find relevant portions of my assessment and plan of care.    If you have questions, please do not hesitate to call me. I look forward to following Wilder Carbajal along with you.    Sincerely,    Bradley Cavazos, OD    Enclosure  CC:  No Recipients    If you would like to receive this communication electronically, please contact [email or web address] or [phone] to request more information on [employer] Link access.    For providers and/or their staff who would like to refer a patient to [facility], please contact us through our one-stop-shop provider referral line, [facility] , at [phone].    If you feel you have received this communication in error or would no longer like to receive these types of communications, please e-mail [email or web address]

## 2017-03-14 ENCOUNTER — TELEPHONE (OUTPATIENT)
Dept: SURGERY | Facility: CLINIC | Age: 67
End: 2017-03-14

## 2017-03-14 NOTE — TELEPHONE ENCOUNTER
03/14/17 1718  Contacted the pt regarding appointment on 03/15/17. No answer. Called both numbers. Unable to leave voicemail due to no answering service set up, and phone ringing until it went to a busy signal. Will attempt to contact again at a later time.

## 2017-03-15 ENCOUNTER — TELEPHONE (OUTPATIENT)
Dept: SURGERY | Facility: CLINIC | Age: 67
End: 2017-03-15

## 2017-03-15 NOTE — TELEPHONE ENCOUNTER
03/15/17 0851  Offered pt appt time with Dr. Holm on Friday due to Dr. Ace being out on maternity leave. Pt requested that I call [facility], and make him an appt with his insurance company. I informed pt that we don't handle insurance, but that he would need to get in touch with [facility]. Pt states he was pulling up to [facility]ACE, and would talk to them this morning. Unable to schedule the appt due to system block. Contacted Kamilla Adkins to assist in the matter.

## 2017-03-27 ENCOUNTER — OFFICE VISIT (OUTPATIENT)
Dept: SURGERY | Facility: CLINIC | Age: 67
End: 2017-03-27
Payer: COMMERCIAL

## 2017-03-27 VITALS
DIASTOLIC BLOOD PRESSURE: 81 MMHG | TEMPERATURE: 98 F | SYSTOLIC BLOOD PRESSURE: 163 MMHG | HEART RATE: 64 BPM | HEIGHT: 68 IN

## 2017-03-27 DIAGNOSIS — N18.6 ESRD (END STAGE RENAL DISEASE) ON DIALYSIS: ICD-10-CM

## 2017-03-27 DIAGNOSIS — E66.01 SEVERE OBESITY (BMI 35.0-35.9 WITH COMORBIDITY): ICD-10-CM

## 2017-03-27 DIAGNOSIS — L98.9 SKIN LESION: Primary | ICD-10-CM

## 2017-03-27 DIAGNOSIS — Z99.2 ESRD (END STAGE RENAL DISEASE) ON DIALYSIS: ICD-10-CM

## 2017-03-27 PROCEDURE — 99214 OFFICE O/P EST MOD 30 MIN: CPT | Mod: 57,S$GLB,, | Performed by: SURGERY

## 2017-03-27 PROCEDURE — 99999 PR PBB SHADOW E&M-EST. PATIENT-LVL III: CPT | Mod: PBBFAC,,, | Performed by: SURGERY

## 2017-03-27 NOTE — PROGRESS NOTES
Subjective:      Patient ID: Wilder Carbajal is a 66 y.o. male.    Chief Complaint: No chief complaint on file.  Patient presents for evaluation and treatment of a skin lesion/cyst that he noted in the superior left buttock region sometime around September 2016.  He states that the lesion increases and decreases in size.  It is uncomfortable when he sits.  It has drained in the past, but not presently.  He is currently nontender.  He has no other complaints.  He is a dialysis patient, taking dialysis Tuesday, Thursday and Saturday.  Patient participates through the [facility] program.  He presents today with a caregiver.    Past Medical History:   Diagnosis Date    Acute pain of left shoulder 1/7/2017    Arthritis     Asthma     Benign neoplasm of eyelid     RUL    Blood clotting tendency     Blood transfusion     Cataract     Chronic kidney disease requiring chronic dialysis     Diabetes mellitus     Diabetic retinopathy     Fever blister     Hyperlipidemia     Hypertension     Infertility     Joint pain     Retinal detachment     Thyroid disease     Weakness 1/7/2017     Past Surgical History:   Procedure Laterality Date    CATARACT EXTRACTION      RETINAL DETACHMENT SURGERY       Family History   Problem Relation Age of Onset    Heart attack Mother     No Known Problems Father     No Known Problems Sister     No Known Problems Brother     No Known Problems Maternal Aunt     No Known Problems Maternal Uncle     No Known Problems Paternal Aunt     No Known Problems Paternal Uncle     No Known Problems Maternal Grandmother     No Known Problems Maternal Grandfather     No Known Problems Paternal Grandmother     No Known Problems Paternal Grandfather     Cancer Neg Hx     Amblyopia Neg Hx     Blindness Neg Hx     Cataracts Neg Hx     Diabetes Neg Hx     Glaucoma Neg Hx     Hypertension Neg Hx     Macular degeneration Neg Hx     Retinal detachment Neg Hx     Strabismus Neg Hx      Stroke Neg Hx     Thyroid disease Neg Hx      Social History     Social History    Marital status:      Spouse name: N/A    Number of children: N/A    Years of education: N/A     Social History Main Topics    Smoking status: Never Smoker    Smokeless tobacco: Never Used    Alcohol use Yes      Comment: 1 pint bourbon    Drug use: No    Sexual activity: Not Asked     Other Topics Concern    None     Social History Narrative       Current Outpatient Prescriptions   Medication Sig Dispense Refill    aspirin (ECOTRIN) 81 MG EC tablet Take 81 mg by mouth once daily.       atorvastatin (LIPITOR) 40 MG tablet Take 40 mg by mouth every evening.      carbamide peroxide (DEBROX) 6.5 % otic solution Place 5 drops in ear(s) 3 (three) times daily.      diclofenac sodium (VOLTAREN) 1 % Gel Apply 2 g topically 4 (four) times daily as needed (for pain).      ergocalciferol (ERGOCALCIFEROL) 50,000 unit Cap Take 50,000 Units by mouth every 14 (fourteen) days. 1 cap PO weekly x12 wks then twice per month      gabapentin (NEURONTIN) 300 mg tablet Take 300 mg by mouth 3 (three) times daily.        ketotifen (ZADITOR) 0.025 % (0.035 %) ophthalmic solution Apply 1 drop to eye every 8 (eight) hours.      lisinopril (PRINIVIL,ZESTRIL) 40 MG tablet Take 40 mg by mouth once daily.      oxycodone-acetaminophen (PERCOCET) 5-325 mg per tablet Take 1 tablet by mouth every 4 (four) hours as needed for Pain. 45 tablet 0    podofilox (CONDYLOX) 0.5 % external solution Apply a small amount to lesions twice daily for three days, then hold for four days and repeat regimen      sevelamer carbonate (RENVELA) 800 mg Tab Take two tablets by mouth four times daily with meals and snacks      amlodipine (NORVASC) 10 MG tablet Take 1 tablet (10 mg total) by mouth once daily. 30 tablet 3    hydrALAZINE (APRESOLINE) 100 MG tablet Take 1 tablet (100 mg total) by mouth every 8 (eight) hours. 90 tablet 3     No current  "facility-administered medications for this visit.      Review of patient's allergies indicates:  No Known Allergies    ROS:  All systems were reviewed and are negative, except that mentioned in the HPI.    Objective:     Vitals:    03/27/17 1005   BP: (!) 163/81   BP Location: Right arm   Patient Position: Sitting   Pulse: 64   Temp: 97.8 °F (36.6 °C)   TempSrc: Oral   Height: 5' 8" (1.727 m)     Physical Exam   Constitutional: He is oriented to person, place, and time. He appears well-developed and well-nourished. No distress.   HENT:   Head: Normocephalic and atraumatic.   Eyes: Conjunctivae and EOM are normal. Pupils are equal, round, and reactive to light. No scleral icterus.   Neck: Normal range of motion. Neck supple. No JVD present.   Cardiovascular: Normal rate and regular rhythm.    Pulmonary/Chest: Effort normal and breath sounds normal. No respiratory distress.   Abdominal: Soft. He exhibits no distension.   Musculoskeletal: Normal range of motion. He exhibits no edema or tenderness.   Left upper extremity aVF with good thrill and bruit   Neurological: He is alert and oriented to person, place, and time. No cranial nerve deficit.   Skin: Skin is warm and dry. He is not diaphoretic.   3 cm x 3 cm firm semimobile soft tissue mass, appears to be a sebaceous cyst, chronic overlying skin changes noted (hyperpigmented), nonfluctuant, no erythema, nontender, nondraining   Psychiatric: He has a normal mood and affect.       Lab Review: CBC:   Lab Results   Component Value Date    WBC 5.16 01/07/2017    RBC 3.77 (L) 01/07/2017    HGB 10.9 (L) 01/07/2017    HCT 34.0 (L) 01/07/2017    HCT 39 01/07/2017    PLT 91 (L) 01/07/2017     BMP:   Lab Results   Component Value Date    GLU 97 01/09/2017     01/09/2017    K 4.8 01/09/2017    CL 98 01/09/2017    CO2 31 (H) 01/09/2017    BUN 28 (H) 01/09/2017    CREATININE 6.4 (H) 01/09/2017    CALCIUM 9.6 01/09/2017     Lab Results   Component Value Date    ALT 12 " 01/07/2017    AST 21 01/07/2017    ALKPHOS 128 01/07/2017    BILITOT 1.0 01/07/2017       Diagnostics Review:  Vascular ultrasound January 2017 reveal a patent left forearm radiocephalic AV graft    Echo August 2015:  EF 55 - 65 45 55R     Mitral Valve Regurgitation  MILD TO MODERATE     Diastolic Dysfunction  Yes (A)     Aortic Valve Stenosis  MODERATE (A)     Mitral Valve Mobility  MILDLY RESTRICTED     Mitral Valve Stenosis  MILD (A)          Assessment:     1. Skin lesion    2. ESRD (end stage renal disease) on dialysis    3. Severe obesity (BMI 35.0-35.9 with comorbidity)      Plan:   The pathology of the patient's disease was discussed.  Elective excision was recommended. The surgical procedure, risks, postop recovery and consent were discussed. All questions were answered and the consent was signed. Signs and symptoms of worsening disease was discussed.  The patient will call or go to ER should those symptoms develop.  The procedure is planned for this Friday, March 31, 2017.  Patient will be requested to hold his aspirin if ok with his other physicians.

## 2017-03-27 NOTE — LETTER
March 27, 2017      Akhil Adorno MD  [address] [facility] [address]           [address]  4th Floor [facility] [address]  Phone: [phone]          Patient: Wilder Carbajal   MR Number: [id number]   YOB: 1950   Date of Visit: 3/27/2017       Dear Dr. Akhil Adorno:    Thank you for referring Wilder Carbajal to me for evaluation. Attached you will find relevant portions of my assessment and plan of care.    If you have questions, please do not hesitate to call me. I look forward to following Wilder Carbajal along with you.    Sincerely,    Laure Holm, DO    Enclosure  CC:  No Recipients    If you would like to receive this communication electronically, please contact [email or web address] or [phone] to request more information on [employer] Link access.    For providers and/or their staff who would like to refer a patient to [facility], please contact us through our one-stop-shop provider referral line, Isela Alford, at [phone].    If you feel you have received this communication in error or would no longer like to receive these types of communications, please e-mail [email or web address]

## 2017-03-27 NOTE — MR AVS SNAPSHOT
[facility] Surgery  [address] [place] [address]  4th Floor [address]  [place] [address]  Phone: [phone]                  Wilder Carbajal   3/27/2017 9:40 AM   Office Visit    Description:  Male : 1950   Provider:  Laure Holm DO   Department:  [facility] Surgery                To Do List           Future Appointments        Provider Department Dept Phone    2017 1:00 PM VASCULAR, LAB [facility] Laboratory [phone]    2017 1:30 PM Liz Zhou MD [facility] - Vascular Surgery [phone]    2017 10:00 AM Ambar Schmidt MD [facility] - Neurology [phone]      Goals (5 Years of Data)     None      [facility]     [facility] On Call Nurse Care Line -  Assistance  Registered nurses in the [facility] On Call Center provide clinical advisement, health education, appointment booking, and other advisory services.  Call for this free service at [phone].             Medications           Message regarding Medications     Verify the changes and/or additions to your medication regime listed below are the same as discussed with your clinician today.  If any of these changes or additions are incorrect, please notify your healthcare provider.             Verify that the below list of medications is an accurate representation of the medications you are currently taking.  If none reported, the list may be blank. If incorrect, please contact your healthcare provider. Carry this list with you in case of emergency.           Current Medications     amlodipine (NORVASC) 10 MG tablet Take 1 tablet (10 mg total) by mouth once daily.    aspirin (ECOTRIN) 81 MG EC tablet Take 81 mg by mouth once daily.     atorvastatin (LIPITOR) 40 MG tablet Take 40 mg by mouth every evening.    carbamide peroxide (DEBROX) 6.5 % otic solution Place 5 drops in ear(s) 3 (three) times daily.    diclofenac sodium (VOLTAREN) 1 % Gel Apply 2 g topically 4 (four) times daily as needed (for  "pain).    ergocalciferol (ERGOCALCIFEROL) 50,000 unit Cap Take 50,000 Units by mouth every 14 (fourteen) days. 1 cap PO weekly x12 wks then twice per month    gabapentin (NEURONTIN) 300 mg tablet Take 300 mg by mouth 3 (three) times daily.      hydrALAZINE (APRESOLINE) 100 MG tablet Take 1 tablet (100 mg total) by mouth every 8 (eight) hours.    ketotifen (ZADITOR) 0.025 % (0.035 %) ophthalmic solution Apply 1 drop to eye every 8 (eight) hours.    lisinopril (PRINIVIL,ZESTRIL) 40 MG tablet Take 40 mg by mouth once daily.    oxycodone-acetaminophen (PERCOCET) 5-325 mg per tablet Take 1 tablet by mouth every 4 (four) hours as needed for Pain.    podofilox (CONDYLOX) 0.5 % external solution Apply a small amount to lesions twice daily for three days, then hold for four days and repeat regimen    sevelamer carbonate (RENVELA) 800 mg Tab Take two tablets by mouth four times daily with meals and snacks           Clinical Reference Information           Your Vitals Were     BP Pulse Temp Height          163/81 (BP Location: Right arm, Patient Position: Sitting) 64 97.8 °F (36.6 °C) (Oral) 5' 8" (1.727 m)        Blood Pressure          Most Recent Value    BP  (!)  163/81      Allergies as of 3/27/2017     No Known Allergies      Immunizations Administered on Date of Encounter - 3/27/2017     None      Maintenance Dialysis History     Start End Type Comments Center    1/8/1888  [place]  [facility]            Current Dialysis Center Information     [facility] [address] Phone #:  [phone]    Contact:  N/A MADELINE [place]  [address] Fax #:  [phone]            [facility] Sign-Up     Activating your [facility] account is as easy as 1-2-3!     1) Visit [email or web address], select Sign Up Now, enter this activation code and your date of birth, then select Next.  [email or web address]  Expires: 4/20/2017 12:34 PM      2) Create a username and password to use when you visit [facility] in the future and " select a security question in case you lose your password and select Next.    3) Enter your e-mail address and click Sign Up!    Additional Information  If you have questions, please e-mail [email or web address] or call [phone] to talk to our [facility] staff. Remember, [facility] is NOT to be used for urgent needs. For medical emergencies, dial 911.         Language Assistance Services     ATTENTION: Language assistance services are available, free of charge. Please call [phone].      ATENCIÓN: Si habla español, tiene a diaz disposición servicios gratuitos de asistencia lingüística. Llame al [phone].     CHÚ Ý: N?u b?n nói Ti?ng Vi?t, có các d?ch v? h? tr? ngôn ng? mi?n phí dành cho b?n. G?i s? [phone].         [facility] complies with applicable Federal civil rights laws and does not discriminate on the basis of race, color, national origin, age, disability, or sex.

## 2017-03-28 ENCOUNTER — TELEPHONE (OUTPATIENT)
Dept: SURGERY | Facility: CLINIC | Age: 67
End: 2017-03-28

## 2017-03-28 NOTE — TELEPHONE ENCOUNTER
----- Message from Laure Holm DO sent at 3/27/2017  5:04 PM CDT -----  Please ask patient/[facility] to hold aspirin prior to procedure.  Surgery planned for this Friday.  Thanks  3-28-17 Per Brigette Lee with [facility] notified to hold patients ASA. She voiced understanding.

## 2017-03-29 ENCOUNTER — TELEPHONE (OUTPATIENT)
Dept: PHYSICAL MEDICINE AND REHAB | Facility: CLINIC | Age: 67
End: 2017-03-29

## 2017-03-29 ENCOUNTER — TELEPHONE (OUTPATIENT)
Dept: SURGERY | Facility: CLINIC | Age: 67
End: 2017-03-29

## 2017-03-29 NOTE — TELEPHONE ENCOUNTER
----- Message from Tania Chan sent at 3/29/2017 12:34 PM CDT -----  Jessenia Brown, Nurse with [facility], called.    No. [phone]   Jessenia has questions about the procedure on Friday, 3/31/17.     3-30-17   1300- returned call, no answer, left message to call clinic.

## 2017-03-31 ENCOUNTER — ANESTHESIA (OUTPATIENT)
Dept: SURGERY | Facility: HOSPITAL | Age: 67
End: 2017-03-31
Payer: COMMERCIAL

## 2017-03-31 ENCOUNTER — HOSPITAL ENCOUNTER (OUTPATIENT)
Facility: HOSPITAL | Age: 67
Discharge: HOME | End: 2017-03-31
Attending: SURGERY | Admitting: SURGERY
Payer: COMMERCIAL

## 2017-03-31 ENCOUNTER — SURGERY (OUTPATIENT)
Age: 67
End: 2017-03-31

## 2017-03-31 ENCOUNTER — ANESTHESIA EVENT (OUTPATIENT)
Dept: SURGERY | Facility: HOSPITAL | Age: 67
End: 2017-03-31
Payer: COMMERCIAL

## 2017-03-31 VITALS
RESPIRATION RATE: 18 BRPM | HEART RATE: 68 BPM | HEIGHT: 68 IN | SYSTOLIC BLOOD PRESSURE: 155 MMHG | BODY MASS INDEX: 38.19 KG/M2 | DIASTOLIC BLOOD PRESSURE: 69 MMHG | WEIGHT: 252 LBS | TEMPERATURE: 98 F | OXYGEN SATURATION: 94 %

## 2017-03-31 DIAGNOSIS — L98.9 SKIN LESION: ICD-10-CM

## 2017-03-31 LAB
ANION GAP SERPL CALC-SCNC: 12 MMOL/L
BUN SERPL-MCNC: 16 MG/DL
CALCIUM SERPL-MCNC: 10.1 MG/DL
CHLORIDE SERPL-SCNC: 97 MMOL/L
CO2 SERPL-SCNC: 32 MMOL/L
CREAT SERPL-MCNC: 4.7 MG/DL
EST. GFR  (AFRICAN AMERICAN): 14 ML/MIN/1.73 M^2
EST. GFR  (NON AFRICAN AMERICAN): 12 ML/MIN/1.73 M^2
GLUCOSE SERPL-MCNC: 74 MG/DL
POTASSIUM SERPL-SCNC: 4.8 MMOL/L
SODIUM SERPL-SCNC: 141 MMOL/L

## 2017-03-31 PROCEDURE — 36000707: Performed by: SURGERY

## 2017-03-31 PROCEDURE — 11406 EXC TR-EXT B9+MARG >4.0 CM: CPT | Mod: ,,, | Performed by: SURGERY

## 2017-03-31 PROCEDURE — 80048 BASIC METABOLIC PNL TOTAL CA: CPT

## 2017-03-31 PROCEDURE — 37000008 HC ANESTHESIA 1ST 15 MINUTES: Performed by: SURGERY

## 2017-03-31 PROCEDURE — 36415 COLL VENOUS BLD VENIPUNCTURE: CPT

## 2017-03-31 PROCEDURE — 71000015 HC POSTOP RECOV 1ST HR: Performed by: SURGERY

## 2017-03-31 PROCEDURE — 12032 INTMD RPR S/A/T/EXT 2.6-7.5: CPT | Mod: 51,,, | Performed by: SURGERY

## 2017-03-31 PROCEDURE — 37000009 HC ANESTHESIA EA ADD 15 MINS: Performed by: SURGERY

## 2017-03-31 PROCEDURE — 88305 TISSUE EXAM BY PATHOLOGIST: CPT | Mod: 26,,, | Performed by: PATHOLOGY

## 2017-03-31 PROCEDURE — 71000016 HC POSTOP RECOV ADDL HR: Performed by: SURGERY

## 2017-03-31 PROCEDURE — 63600175 PHARM REV CODE 636 W HCPCS: Performed by: NURSE ANESTHETIST, CERTIFIED REGISTERED

## 2017-03-31 PROCEDURE — 25000003 PHARM REV CODE 250: Performed by: NURSE ANESTHETIST, CERTIFIED REGISTERED

## 2017-03-31 PROCEDURE — 36000706: Performed by: SURGERY

## 2017-03-31 PROCEDURE — 63600175 PHARM REV CODE 636 W HCPCS: Performed by: SURGERY

## 2017-03-31 PROCEDURE — 25000003 PHARM REV CODE 250: Performed by: SURGERY

## 2017-03-31 PROCEDURE — 93005 ELECTROCARDIOGRAM TRACING: CPT

## 2017-03-31 PROCEDURE — 93010 ELECTROCARDIOGRAM REPORT: CPT | Mod: ,,, | Performed by: INTERNAL MEDICINE

## 2017-03-31 PROCEDURE — 88305 TISSUE EXAM BY PATHOLOGIST: CPT | Performed by: PATHOLOGY

## 2017-03-31 RX ORDER — HYDROCODONE BITARTRATE AND ACETAMINOPHEN 5; 325 MG/1; MG/1
TABLET ORAL
Qty: 30 TABLET | Refills: 0 | Status: SHIPPED | OUTPATIENT
Start: 2017-03-31 | End: 2017-03-31

## 2017-03-31 RX ORDER — OXYCODONE AND ACETAMINOPHEN 5; 325 MG/1; MG/1
1 TABLET ORAL EVERY 4 HOURS PRN
Qty: 40 TABLET | Refills: 0 | Status: SHIPPED | OUTPATIENT
Start: 2017-03-31 | End: 2017-03-31 | Stop reason: HOSPADM

## 2017-03-31 RX ORDER — CEFAZOLIN SODIUM 2 G/50ML
2 SOLUTION INTRAVENOUS
Status: COMPLETED | OUTPATIENT
Start: 2017-03-31 | End: 2017-03-31

## 2017-03-31 RX ORDER — PROPOFOL 10 MG/ML
VIAL (ML) INTRAVENOUS CONTINUOUS PRN
Status: DISCONTINUED | OUTPATIENT
Start: 2017-03-31 | End: 2017-03-31

## 2017-03-31 RX ORDER — SODIUM CHLORIDE 9 MG/ML
INJECTION, SOLUTION INTRAVENOUS CONTINUOUS PRN
Status: DISCONTINUED | OUTPATIENT
Start: 2017-03-31 | End: 2017-03-31

## 2017-03-31 RX ORDER — LIDOCAINE HCL/PF 100 MG/5ML
SYRINGE (ML) INTRAVENOUS
Status: DISCONTINUED | OUTPATIENT
Start: 2017-03-31 | End: 2017-03-31

## 2017-03-31 RX ORDER — LIDOCAINE HYDROCHLORIDE 10 MG/ML
INJECTION, SOLUTION EPIDURAL; INFILTRATION; INTRACAUDAL; PERINEURAL
Status: DISCONTINUED | OUTPATIENT
Start: 2017-03-31 | End: 2017-03-31 | Stop reason: HOSPADM

## 2017-03-31 RX ORDER — FENTANYL CITRATE 50 UG/ML
INJECTION, SOLUTION INTRAMUSCULAR; INTRAVENOUS
Status: DISCONTINUED | OUTPATIENT
Start: 2017-03-31 | End: 2017-03-31

## 2017-03-31 RX ORDER — MIDAZOLAM HYDROCHLORIDE 1 MG/ML
INJECTION, SOLUTION INTRAMUSCULAR; INTRAVENOUS
Status: DISCONTINUED | OUTPATIENT
Start: 2017-03-31 | End: 2017-03-31

## 2017-03-31 RX ORDER — HYDROCODONE BITARTRATE AND ACETAMINOPHEN 5; 325 MG/1; MG/1
TABLET ORAL
Qty: 40 TABLET | Refills: 0 | Status: ON HOLD | OUTPATIENT
Start: 2017-03-31 | End: 2017-04-24 | Stop reason: HOSPADM

## 2017-03-31 RX ADMIN — FENTANYL CITRATE 25 MCG: 50 INJECTION, SOLUTION INTRAMUSCULAR; INTRAVENOUS at 11:03

## 2017-03-31 RX ADMIN — SODIUM CHLORIDE: 0.9 INJECTION, SOLUTION INTRAVENOUS at 10:03

## 2017-03-31 RX ADMIN — LIDOCAINE HYDROCHLORIDE 50 MG: 20 INJECTION, SOLUTION INTRAVENOUS at 11:03

## 2017-03-31 RX ADMIN — PROPOFOL 50 MCG/KG/MIN: 10 INJECTION, EMULSION INTRAVENOUS at 11:03

## 2017-03-31 RX ADMIN — CEFAZOLIN SODIUM 2 G: 2 SOLUTION INTRAVENOUS at 11:03

## 2017-03-31 RX ADMIN — LIDOCAINE HYDROCHLORIDE 20 ML: 10 INJECTION, SOLUTION EPIDURAL; INFILTRATION; INTRACAUDAL; PERINEURAL at 11:03

## 2017-03-31 RX ADMIN — MIDAZOLAM 2 MG: 1 INJECTION INTRAMUSCULAR; INTRAVENOUS at 11:03

## 2017-03-31 NOTE — OP NOTE
[facility]  Surgery Department  Operative Note    SUMMARY     Date of Procedure: 3/31/2017     Procedure: Procedure(s) (LRB):  EXCISION-LESION LEFT SUPERIOR BUTTOCKS (Left)     Surgeon(s) and Role:     * Laure Holm DO - Primary    Assisting Surgeon: None    Pre-Operative Diagnosis: Skin lesion [L98.9]    Post-Operative Diagnosis: Post-Op Diagnosis Codes:     * Skin lesion [L98.9]    Anesthesia: Local MAC    Technical Procedures Used:   The patient was seen in the Holding Room. The risks, benefits, complications, treatment options, and expected outcomes were discussed with the patient. The patient complained of a painful cyst of the left superior buttock that has increased in size and requested excision of this lesion. The possibilities of reaction to medication, persistent pain, numbness, pulmonary aspiration, bleeding, infection, the need for additional procedures, failure to diagnose a condition, and creating a complication requiring transfusion or operation were discussed with the patient. The patient concurred with the proposed plan, giving informed consent.  The site of surgery properly noted/marked. The patient was taken to Operating Room, identified and the procedure verified. A Time Out was held and the above information confirmed.    The patient was placed in the left lateral decubitus position and all pressure points were padded.  Bilateral sequential compression devices were placed on the lower extremities. The region was prepped and draped in the standard fashion. Lidocaine 1% with epinephrine was used to anesthetize the skin overlying  the previously marked lesion.  A total of 15mL was used throughout the case.      A transverse elliptical incision was created over the marked region.  Dissection was carried down through the subcutaneous fat. The dissection was carried down to the subcutaneous fascia and the lesion was completely excised and submitted fresh to pathology. The  lesion measured 5.3cm wide x 2.6cm long x 1.9cm deep. Subcutaneous tissue flaps were created with electrocautery, each flap with 1-2cm undermining. Hemostasis was achieved with cautery.  The wound was copiously irrigated. Closure was performed in layers, using 3-0 vicryl to close the subcutaneous and deep dermal tissues.  A running 3-0 Nylon was used to close the skin.  The wound was cleaned and dried and Dermabond was applied.  At the end of the operation all sponge, instrument and needle counts were correct.    Description of the Findings of the Procedure:  The lesion appeared to encompass several cysts and were excised en bloc    Significant Surgical Tasks Conducted by the Assistant(s), if Applicable: none    Complications: No    Estimated Blood Loss (EBL): * <25mL           Implants: * No implants in log *    Specimens:   Specimen (12h ago through future)    Start     Ordered    03/31/17 1143  Specimen to Pathology - Surgery  Once     Comments:  Left buttocks skin mass-perm    03/31/17 1142                  Condition: Good    Disposition: Same Day Surgery Suite - hemodynamically stable    Attestation: I was present and scrubbed for the entire procedure.

## 2017-03-31 NOTE — ANESTHESIA POSTPROCEDURE EVALUATION
"Anesthesia Post Evaluation    Patient: Wilder Carbajal    Procedure(s) Performed: Procedure(s) (LRB):  EXCISION-LESION LEFT SUPERIOR BUTTOCKS (Left)    Final Anesthesia Type: MAC  Patient location during evaluation: OPS  Patient participation: Yes- Able to Participate  Level of consciousness: awake and alert  Post-procedure vital signs: reviewed and stable  Pain management: adequate  Airway patency: patent  PONV status at discharge: No PONV  Anesthetic complications: no      Cardiovascular status: blood pressure returned to baseline and hemodynamically stable  Respiratory status: unassisted, spontaneous ventilation and room air  Hydration status: euvolemic  Follow-up not needed.        Visit Vitals    BP (!) 164/81 (BP Location: Right arm, Patient Position: Lying, BP Method: Automatic)    Pulse (!) 58    Temp 36.5 °C (97.7 °F) (Oral)    Resp 18    Ht 5' 8" (1.727 m)    Wt 114.3 kg (252 lb)    SpO2 (!) 94%    BMI 38.32 kg/m2       Pain/Ritesh Score: Pain Assessment Performed: Yes (3/31/2017 12:10 PM)  Presence of Pain: denies (3/31/2017 12:10 PM)  Ritesh Score: 9 (3/31/2017 12:10 PM)      "

## 2017-03-31 NOTE — DISCHARGE SUMMARY
[facility]  Discharge Summary  General Surgery      Admit Date: 3/31/2017    Discharge Date and Time: 3/31/2017  1:35 PM    Attending Physician: Laure Holm     Discharge Provider: Laure Holm    Reason for Admission: outpatient surgery    Procedures Performed: Procedure(s) (LRB):  EXCISION-LESION LEFT SUPERIOR BUTTOCKS (Left)    Hospital Course (synopsis of major diagnoses, care, treatment, and services provided during the course of the hospital stay): Patient presented for outpatient surgery, tolerated it well and was discharged home.     Consults: none    Significant Diagnostic Studies:   Specimen (12h ago through future)    Start     Ordered    03/31/17 1143  Specimen to Pathology - Surgery  Once     Comments:  Left buttocks skin mass-perm    03/31/17 1142          Final Diagnoses:   Principal Problem: Skin lesion   Secondary Diagnoses:   Active Hospital Problems    Diagnosis  POA   No active problems to display.      Resolved Hospital Problems    Diagnosis Date Resolved POA    *Skin lesion [L98.9] 03/31/2017 Yes       Discharged Condition: good    Disposition: Home or Self Care    Follow Up/Patient Instructions: see discharge instructions    Medications:  Reconciled Home Medications:   Discharge Medication List as of 3/31/2017 12:25 PM      START taking these medications    Details   hydrocodone-acetaminophen 5-325mg (NORCO) 5-325 mg per tablet Take 1-2 tablets PO q4-6hours PRN pain, Normal         CONTINUE these medications which have CHANGED    Details   oxycodone-acetaminophen (PERCOCET) 5-325 mg per tablet Take 1 tablet by mouth every 4 (four) hours as needed for Pain., Starting 3/31/2017, Until Discontinued, Print         CONTINUE these medications which have NOT CHANGED    Details   aspirin (ECOTRIN) 81 MG EC tablet Take 81 mg by mouth once daily. , Until Discontinued, Historical Med      atorvastatin (LIPITOR) 40 MG tablet Take 40 mg by mouth every evening., Until  Discontinued, Historical Med      carbamide peroxide (DEBROX) 6.5 % otic solution Place 5 drops in ear(s) 3 (three) times daily., Until Discontinued, Historical Med      diclofenac sodium (VOLTAREN) 1 % Gel Apply 2 g topically 4 (four) times daily as needed (for pain)., Until Discontinued, Historical Med      ergocalciferol (ERGOCALCIFEROL) 50,000 unit Cap Take 50,000 Units by mouth every 14 (fourteen) days. 1 cap PO weekly x12 wks then twice per month, Until Discontinued, Historical Med      gabapentin (NEURONTIN) 300 mg tablet Take 300 mg by mouth 3 (three) times daily.  , Until Discontinued, Historical Med      ketotifen (ZADITOR) 0.025 % (0.035 %) ophthalmic solution Apply 1 drop to eye every 8 (eight) hours., Until Discontinued, Historical Med      lisinopril (PRINIVIL,ZESTRIL) 40 MG tablet Take 40 mg by mouth once daily., Until Discontinued, Historical Med      podofilox (CONDYLOX) 0.5 % external solution Apply a small amount to lesions twice daily for three days, then hold for four days and repeat regimen, Until Discontinued, Historical Med      sevelamer carbonate (RENVELA) 800 mg Tab Take two tablets by mouth four times daily with meals and snacks, Until Discontinued, Historical Med      amlodipine (NORVASC) 10 MG tablet Take 1 tablet (10 mg total) by mouth once daily., Starting 8/17/2015, Until Mon 2/20/17, Print      hydrALAZINE (APRESOLINE) 100 MG tablet Take 1 tablet (100 mg total) by mouth every 8 (eight) hours., Starting 8/17/2015, Until Tue 8/16/16, Print           No discharge procedures on file.  Follow-up Information     Follow up with Laure Holm DO.    Specialties:  General Surgery, Surgery    Contact information:    [address]  [address]  [place] [address] [phone]

## 2017-03-31 NOTE — TRANSFER OF CARE
"Anesthesia Transfer of Care Note    Patient: Wilder Carbajal    Procedure(s) Performed: Procedure(s) (LRB):  EXCISION-LESION LEFT SUPERIOR BUTTOCKS (Left)    Patient location: PACU    Anesthesia Type: general    Transport from OR: Transported from OR on 6-10 L/min O2 by face mask with adequate spontaneous ventilation    Post pain: adequate analgesia    Post assessment: no apparent anesthetic complications and tolerated procedure well    Post vital signs: stable    Level of consciousness: awake    Nausea/Vomiting: no nausea/vomiting    Complications: none          Last vitals:   Visit Vitals    BP (!) 164/81 (BP Location: Right arm, Patient Position: Lying, BP Method: Automatic)    Pulse (!) 58    Temp 36.5 °C (97.7 °F) (Oral)    Resp 18    Ht 5' 8" (1.727 m)    Wt 114.3 kg (252 lb)    SpO2 (!) 94%    BMI 38.32 kg/m2     "

## 2017-03-31 NOTE — ANESTHESIA PREPROCEDURE EVALUATION
"                                                                                                             03/31/2017  Wilder Carbajal is a 66 y.o., male with ESRD, mod AS, HTN, heart block and asthma for removal of left buttock mass.     Last HD on 3/30/2017.     [facility] Anesthesia Evaluation    I have reviewed the Patient Summary Reports.    I have reviewed the Nursing Notes.   I have reviewed the Medications.     Review of Systems  Anesthesia Hx:  No problems with previous Anesthesia  History of prior surgery of interest to airway management or planning:  Denies Personal Hx of Anesthesia complications.   Cardiovascular:   Hypertension, well controlled Dysrhythmias atrial fibrillation Angina hyperlipidemia "complete heart block" noted in problem list  Mod AS, bicuspid valve noted   Pulmonary:   Asthma    Renal/:   Chronic Renal Disease, ESRD    Hepatic/GI:   GERD    Musculoskeletal:   Arthritis   Spine Disorders: lumbar    Endocrine:   Diabetes Hypothyroidism        Physical Exam  General:  Obesity    Airway/Jaw/Neck:  Airway Findings: Mouth Opening: Normal Tongue: Normal  General Airway Assessment: Adult  Mallampati: III  Improves to II with phonation.  TM Distance: Normal, at least 6 cm  Jaw/Neck Findings:  Neck ROM: Normal ROM      Dental:  Dental Findings: Periodontal disease, Severe    Chest/Lungs:  Chest/Lungs Findings: Normal Respiratory Rate, Clear to auscultation     Heart/Vascular:  Heart Findings: Rate: Bradycardia  Rhythm: Regular Rhythm  Heart Murmur  Systolic  Systolic Heart Murmur Description: R Upper Sternal Border  Systolic Heart Murmur Grade: Grade II  Vascular Findings:  Dialysis Access: Left Forearm Graft        Mental Status:  Mental Status Findings:  Alert and Oriented       EKG 3/31/2017:  Junctional rhythm (complete heart block)  HR 61    TTE 8/15/2015:  CONCLUSIONS     1 - Mildly depressed left ventricular systolic function (EF 45-50%) with wall motion in the RCA distribution..     2 - " Moderate left atrial enlargement.     3 - Left ventricular diastolic dysfunction.     4 - Normal RV size with mildly to moderately depressed right ventricular systolic function .     5 - Moderate aortic stenosis, MARANDA = 1.26 cm2, peak velocity = 2.7 m/s, mean gradient = 13.0 mmHg.     6 - 2+ MR with mild stenosis.     7 - Increased central venous pressure.       Chemistry        Component Value Date/Time     03/31/2017 1005    K 4.8 03/31/2017 1005    CL 97 03/31/2017 1005    CO2 32 (H) 03/31/2017 1005    BUN 16 03/31/2017 1005    CREATININE 4.7 (H) 03/31/2017 1005    GLU 74 03/31/2017 1005        Component Value Date/Time    CALCIUM 10.1 03/31/2017 1005    ALKPHOS 128 01/07/2017 1556    AST 21 01/07/2017 1556    ALT 12 01/07/2017 1556    BILITOT 1.0 01/07/2017 1556              Anesthesia Plan  Type of Anesthesia, risks & benefits discussed:  Anesthesia Type:  MAC  Patient's Preference:   Intra-op Monitoring Plan:   Intra-op Monitoring Plan Comments:   Post Op Pain Control Plan:   Post Op Pain Control Plan Comments:   Induction:   IV  Beta Blocker:  Patient is not currently on a Beta-Blocker (No further documentation required).       Informed Consent: Patient understands risks and agrees with Anesthesia plan.  Questions answered. Anesthesia consent signed with patient.  ASA Score: 4     Day of Surgery Review of History & Physical:    H&P update referred to the surgeon.     Anesthesia Plan Notes: NPO confirmed.   No history of anesthesia problems.         Ready For Surgery From Anesthesia Perspective.

## 2017-03-31 NOTE — PLAN OF CARE
Criteria met for discharge,IV removed, instructions explained,copy given, RX delivered to bedside prior to discharge. Denies c/o pain,tolerating po well. Discharged home with [facility] services per w/c in good condition.

## 2017-03-31 NOTE — INTERVAL H&P NOTE
The patient has been examined and the H&P has been reviewed:    I concur with the findings and no changes have occurred since H&P was written.    Anesthesia/Surgery risks, benefits and alternative options discussed and understood by patient/family.          Active Hospital Problems    Diagnosis  POA    Skin lesion [L98.9]  Yes      Resolved Hospital Problems    Diagnosis Date Resolved POA   No resolved problems to display.

## 2017-03-31 NOTE — IP AVS SNAPSHOT
\[facility]\""  [address]  [place] [address]  Phone: [phone]           Patient Discharge Instructions   Our goal is to set you up for success. This packet includes information on your condition, medications, and your home care.  It will help you care for yourself to prevent having to return to the hospital.     Please ask your nurse if you have any questions.      There are many details to remember when preparing to leave the hospital. Here is what you will need to do:    1. Take your medicine. If you are prescribed medications, review your Medication List on the following pages. You may have new medications to  at the pharmacy and others that you'll need to stop taking. Review the instructions for how and when to take your medications. Talk with your doctor or nurses if you are unsure of what to do.     2. Go to your follow-up appointments. Specific follow-up information is listed in the following pages. Your may be contacted by a nurse or clinical provider about future appointments. Be sure we have all of the phone numbers to reach you. Please contact your provider's office if you are unable to make an appointment.     3. Watch for warning signs. Your doctor or nurse will give you detailed warning signs to watch for and when to call for assistance. These instructions may also include educational information about your condition. If you experience any of warning signs to your health, call your doctor.               ** Verify the list of medication(s) below is accurate and up to date. Carry this with you in case of emergency. If your medications have changed, please notify your healthcare provider.             Medication List      START taking these medications        Additional Info                      hydrocodone-acetaminophen 5-325mg 5-325 mg per tablet   Commonly known as:  NORCO   Quantity:  30 tablet   Refills:  0    Instructions:  Take 1-2 tablets PO q4-6hours PRN pain      Begin Date    AM    Noon    PM    Bedtime         CONTINUE taking these medications        Additional Info                      oxycodone-acetaminophen 5-325 mg per tablet   Commonly known as:  PERCOCET   Quantity:  40 tablet   Refills:  0   Dose:  1 tablet    Instructions:  Take 1 tablet by mouth every 4 (four) hours as needed for Pain.     Begin Date    AM    Noon    PM    Bedtime         ASK your doctor about these medications        Additional Info                      amlodipine 10 MG tablet   Commonly known as:  NORVASC   Quantity:  30 tablet   Refills:  3   Dose:  10 mg    Instructions:  Take 1 tablet (10 mg total) by mouth once daily.     Begin Date    AM    Noon    PM    Bedtime       aspirin 81 MG EC tablet   Commonly known as:  ECOTRIN   Refills:  0   Dose:  81 mg    Instructions:  Take 81 mg by mouth once daily.     Begin Date    AM    Noon    PM    Bedtime       atorvastatin 40 MG tablet   Commonly known as:  LIPITOR   Refills:  0   Dose:  40 mg    Instructions:  Take 40 mg by mouth every evening.     Begin Date    AM    Noon    PM    Bedtime       carbamide peroxide 6.5 % otic solution   Commonly known as:  DEBROX   Refills:  0   Dose:  5 drop    Instructions:  Place 5 drops in ear(s) 3 (three) times daily.     Begin Date    AM    Noon    PM    Bedtime       ergocalciferol 50,000 unit Cap   Commonly known as:  ERGOCALCIFEROL   Refills:  0   Dose:  59625 Units    Instructions:  Take 50,000 Units by mouth every 14 (fourteen) days. 1 cap PO weekly x12 wks then twice per month     Begin Date    AM    Noon    PM    Bedtime       gabapentin 300 mg tablet   Commonly known as:  NEURONTIN   Refills:  0   Dose:  300 mg    Instructions:  Take 300 mg by mouth 3 (three) times daily.     Begin Date    AM    Noon    PM    Bedtime       hydrALAZINE 100 MG tablet   Commonly known as:  APRESOLINE   Quantity:  90 tablet   Refills:  3   Dose:  100 mg    Instructions:  Take 1 tablet (100 mg total) by mouth every 8 (eight)  hours.     Begin Date    AM    Noon    PM    Bedtime       ketotifen 0.025 % (0.035 %) ophthalmic solution   Commonly known as:  ZADITOR   Refills:  0   Dose:  1 drop    Instructions:  Apply 1 drop to eye every 8 (eight) hours.     Begin Date    AM    Noon    PM    Bedtime       lisinopril 40 MG tablet   Commonly known as:  PRINIVIL,ZESTRIL   Refills:  0   Dose:  40 mg    Instructions:  Take 40 mg by mouth once daily.     Begin Date    AM    Noon    PM    Bedtime       podofilox 0.5 % external solution   Commonly known as:  CONDYLOX   Refills:  0    Instructions:  Apply a small amount to lesions twice daily for three days, then hold for four days and repeat regimen     Begin Date    AM    Noon    PM    Bedtime       sevelamer carbonate 800 mg Tab   Commonly known as:  RENVELA   Refills:  0    Instructions:  Take two tablets by mouth four times daily with meals and snacks     Begin Date    AM    Noon    PM    Bedtime       VOLTAREN 1 % Gel   Refills:  0   Dose:  2 g   Generic drug:  diclofenac sodium    Instructions:  Apply 2 g topically 4 (four) times daily as needed (for pain).     Begin Date    AM    Noon    PM    Bedtime            Where to Get Your Medications      These medications were sent to [facility] - [address]  [address], [place] [address]     Phone:  [phone]     hydrocodone-acetaminophen 5-325mg 5-325 mg per tablet         You can get these medications from any pharmacy     Bring a paper prescription for each of these medications     oxycodone-acetaminophen 5-325 mg per tablet                  Please bring to all follow up appointments:    1. A copy of your discharge instructions.  2. All medicines you are currently taking in their original bottles.  3. Identification and insurance card.    Please arrive 15 minutes ahead of scheduled appointment time.    Please call 24 hours in advance if you must reschedule your appointment  and/or time.        Your Scheduled Appointments     Apr 17, 2017 10:00 AM CDT   [facility] OP with DO [place] [facility]    [address]  [address]  [place] [id number] [phone]            May 17, 2017  1:00 PM CDT   [facility] with VASCULAR, LAB   Truman jayashree - Vascular Laboratory ([facility] )    [address]  [place] [address] [phone]            May 17, 2017  1:30 PM CDT   Established Patient Visit with MD Truman Patel - Vascular Surgery ([facility] )    [address]  [place] [address] [phone]            May 31, 2017 10:00 AM CDT   New Patient with MD Truman Lott - Neurology ([facility] )    [address]  [place] [address] [phone]              Follow-up Information     Follow up with Laure Holm DO.    Specialties:  General Surgery, Surgery    Contact information:    [address]  [address]  [place] [address]  [phone]            Discharge Instructions       Incision Care  Remember: Follow-up visits allow your doctor to make sure your incision is healing well. Be sure to keep your appointments.   Sutures (stitches), surgical staples, adhesive tapes,or surgical glue may be used to close incisions. They also help stop bleeding and speed healing. Instead of sutures, your wound may have been closed with special strips of tape called Steri-Strips. Treat these the same way you would sutures. To help your incision heal, follow the tips on this handout.    Keeping Your Incision Clean and Dry  · Avoid doing things that could cause dirt or sweat to get on your incision.  · Dont pick at scabs. They help protect the wound.  · To keep the wound dry when around water, cover it with a plastic bag or plastic wrap. You could also use rubber gloves to protect sutures on a hand.  · If sutures get damp, pat them  dry.    Changing Your Dressing  Leave the dressing (bandage) in place until 48 hours (2 days) after surgery. Change it only as directed, using clean hands.  · After the first 48 hours the incision wound will have closed. At this point, you may leave the incision uncovered and open to the air. However, if your incision is in the axilla or groin (or another area that gets damp/moist), apply gauze to this region to keep your incision dry.  Change the gauze daily and as needed.   · Cover your incision if your clothing is rubbing it or causing irritation.  · Change your dressing if it gets wet or soiled.  · Leave the Steri-Strips (surgical tape) in place, these will fall off on their own.    Call Your Health Care Provider If You Notice Any of These Signs:  · The wound opens spontaneously  · Increased soreness, pain, or tenderness after 24 hours  · A red streak, increased redness, or puffiness near the wound  · White, yellowish, or bad-smelling discharge from the wound  · Bleeding that cant be stopped by applying pressure  · Steri-Strips fall off or stitches dissolve before the wound heals  · Fever above 101.0°F (38.3°C)   © 3900-0817 [employer]. [address] [place] [address], [place] [address]. All rights reserved. This information is not intended as a substitute for professional medical care. Always follow your healthcare professional's instructions.      CONSTIPATION  Narcotic pain medication may cause constipation. Take over-the-counter stool softeners as directed (Colace, Senna, Dulcolax, etc.)    Pain Control  You make alternate ibuprofen with your narcotic pain medication for additional pain relief. Do not take extra Tylenol with your narcotic presciption.              Primary Diagnosis     Your primary diagnosis was:  Skin Lesion      Admission Information     Date & Time Provider Department CSN    3/31/2017  9:40 AM Laure Holm DO [facility] [id number]      Care Providers      "Provider Role Specialty Primary office phone    Laure Holm DO Attending Provider General Surgery [phone]    Laure Holm DO Surgeon  General Surgery [phone]      Your Vitals Were     BP Pulse Temp Resp Height Weight    164/81 (BP Location: Right arm, Patient Position: Lying, BP Method: Automatic) 58 97.7 °F (36.5 °C) (Oral) 18 5' 8" (1.727 m) 114.3 kg (252 lb)    SpO2 BMI             94% 38.32 kg/m2         Recent Lab Values        5/8/2012 5/18/2012 12/11/2013 11/6/2014 8/14/2015 8/4/2016 1/7/2017         5:07 AM  5:25 AM 11:11 AM  4:03 PM  3:58 PM  4:39 AM  8:51 PM     A1C 5.1 5.1 6.9 (H) 5.7 5.5 5.6 5.7     Comment for A1C at  4:39 AM on 8/4/2016:  According to ADA guidelines, hemoglobin A1C <7.0% represents  optimal control in non-pregnant diabetic patients.  Different  metrics may apply to specific populations.   Standards of Medical Care in Diabetes - 2016.  For the purpose of screening for the presence of diabetes:  <5.7%     Consistent with the absence of diabetes  5.7-6.4%  Consistent with increasing risk for diabetes   (prediabetes)  >or=6.5%  Consistent with diabetes  Currently no consensus exists for use of hemoglobin A1C  for diagnosis of diabetes for children.      Comment for A1C at  8:51 PM on 1/7/2017:  According to ADA guidelines, hemoglobin A1C <7.0% represents  optimal control in non-pregnant diabetic patients.  Different  metrics may apply to specific populations.   Standards of Medical Care in Diabetes - 2016.  For the purpose of screening for the presence of diabetes:  <5.7%     Consistent with the absence of diabetes  5.7-6.4%  Consistent with increasing risk for diabetes   (prediabetes)  >or=6.5%  Consistent with diabetes  Currently no consensus exists for use of hemoglobin A1C  for diagnosis of diabetes for children.        Pending Labs     Order Current Status    Specimen to Pathology - Surgery Collected (03/31/17 1142)      Allergies as of 3/31/2017     No " Known Allergies      [facility]     [facility] Nurse Care Line - 24/7 Assistance  Unless otherwise directed by your provider, please contact [facility], our nurse care line that is available for 24/7 assistance.     Registered nurses in the [facility] provide clinical advisement, health education, appointment booking, and other advisory services.  Call for this free service at [phone].        Advance Directives     An advance directive is a document which, in the event you are no longer able to make decisions for yourself, tells your healthcare team what kind of treatment you do or do not want to receive, or who you would like to make those decisions for you.  If you do not currently have an advance directive, [facility] encourages you to create one.  For more information call:  [phone]), [phone] ([phone]),  or log on to [email or web address].        Language Assistance Services     ATTENTION: Language assistance services are available, free of charge. Please call [phone].      ATENCIÓN: Si habla español, tiene a diaz disposición servicios gratuitos de asistencia lingüística. Llame al [phone].     CHÚ Ý: N?u b?n nói Ti?ng Vi?t, có các d?ch v? h? tr? ngôn ng? mi?n phí dành cho b?n. G?i s? [phone].        Chronic Kindey Disease Education             Diabetes Discharge Instructions                                   [facility] Sign-Up     Activating your [facility] account is as easy as 1-2-3!     1) Visit [email or web address], select Sign Up Now, enter this activation code and your date of birth, then select Next.  [email or web address]  Expires: 4/20/2017 12:34 PM      2) Create a username and password to use when you visit [facility] in the future and select a security question in case you lose your password and select Next.    3) Enter your e-mail address and click Sign Up!    Additional Information  If you have questions, please e-mail  [email or web address] or call [phone] to talk to our [facility] staff. Remember, [facility] is NOT to be used for urgent needs. For medical emergencies, dial 911.          [facility] complies with applicable Federal civil rights laws and does not discriminate on the basis of race, color, national origin, age, disability, or sex.

## 2017-03-31 NOTE — H&P (VIEW-ONLY)
Subjective:      Patient ID: Wilder Carbajal is a 66 y.o. male.    Chief Complaint: No chief complaint on file.  Patient presents for evaluation and treatment of a skin lesion/cyst that he noted in the superior left buttock region sometime around September 2016.  He states that the lesion increases and decreases in size.  It is uncomfortable when he sits.  It has drained in the past, but not presently.  He is currently nontender.  He has no other complaints.  He is a dialysis patient, taking dialysis Tuesday, Thursday and Saturday.  Patient participates through the [employer] program.  He presents today with a caregiver.    Past Medical History:   Diagnosis Date    Acute pain of left shoulder 1/7/2017    Arthritis     Asthma     Benign neoplasm of eyelid     RUL    Blood clotting tendency     Blood transfusion     Cataract     Chronic kidney disease requiring chronic dialysis     Diabetes mellitus     Diabetic retinopathy     Fever blister     Hyperlipidemia     Hypertension     Infertility     Joint pain     Retinal detachment     Thyroid disease     Weakness 1/7/2017     Past Surgical History:   Procedure Laterality Date    CATARACT EXTRACTION      RETINAL DETACHMENT SURGERY       Family History   Problem Relation Age of Onset    Heart attack Mother     No Known Problems Father     No Known Problems Sister     No Known Problems Brother     No Known Problems Maternal Aunt     No Known Problems Maternal Uncle     No Known Problems Paternal Aunt     No Known Problems Paternal Uncle     No Known Problems Maternal Grandmother     No Known Problems Maternal Grandfather     No Known Problems Paternal Grandmother     No Known Problems Paternal Grandfather     Cancer Neg Hx     Amblyopia Neg Hx     Blindness Neg Hx     Cataracts Neg Hx     Diabetes Neg Hx     Glaucoma Neg Hx     Hypertension Neg Hx     Macular degeneration Neg Hx     Retinal detachment Neg Hx     Strabismus Neg Hx      Stroke Neg Hx     Thyroid disease Neg Hx      Social History     Social History    Marital status:      Spouse name: N/A    Number of children: N/A    Years of education: N/A     Social History Main Topics    Smoking status: Never Smoker    Smokeless tobacco: Never Used    Alcohol use Yes      Comment: 1 pint bourbon    Drug use: No    Sexual activity: Not Asked     Other Topics Concern    None     Social History Narrative       Current Outpatient Prescriptions   Medication Sig Dispense Refill    aspirin (ECOTRIN) 81 MG EC tablet Take 81 mg by mouth once daily.       atorvastatin (LIPITOR) 40 MG tablet Take 40 mg by mouth every evening.      carbamide peroxide (DEBROX) 6.5 % otic solution Place 5 drops in ear(s) 3 (three) times daily.      diclofenac sodium (VOLTAREN) 1 % Gel Apply 2 g topically 4 (four) times daily as needed (for pain).      ergocalciferol (ERGOCALCIFEROL) 50,000 unit Cap Take 50,000 Units by mouth every 14 (fourteen) days. 1 cap PO weekly x12 wks then twice per month      gabapentin (NEURONTIN) 300 mg tablet Take 300 mg by mouth 3 (three) times daily.        ketotifen (ZADITOR) 0.025 % (0.035 %) ophthalmic solution Apply 1 drop to eye every 8 (eight) hours.      lisinopril (PRINIVIL,ZESTRIL) 40 MG tablet Take 40 mg by mouth once daily.      oxycodone-acetaminophen (PERCOCET) 5-325 mg per tablet Take 1 tablet by mouth every 4 (four) hours as needed for Pain. 45 tablet 0    podofilox (CONDYLOX) 0.5 % external solution Apply a small amount to lesions twice daily for three days, then hold for four days and repeat regimen      sevelamer carbonate (RENVELA) 800 mg Tab Take two tablets by mouth four times daily with meals and snacks      amlodipine (NORVASC) 10 MG tablet Take 1 tablet (10 mg total) by mouth once daily. 30 tablet 3    hydrALAZINE (APRESOLINE) 100 MG tablet Take 1 tablet (100 mg total) by mouth every 8 (eight) hours. 90 tablet 3     No current  "facility-administered medications for this visit.      Review of patient's allergies indicates:  No Known Allergies    ROS:  All systems were reviewed and are negative, except that mentioned in the HPI.    Objective:     Vitals:    03/27/17 1005   BP: (!) 163/81   BP Location: Right arm   Patient Position: Sitting   Pulse: 64   Temp: 97.8 °F (36.6 °C)   TempSrc: Oral   Height: 5' 8" (1.727 m)     Physical Exam   Constitutional: He is oriented to person, place, and time. He appears well-developed and well-nourished. No distress.   HENT:   Head: Normocephalic and atraumatic.   Eyes: Conjunctivae and EOM are normal. Pupils are equal, round, and reactive to light. No scleral icterus.   Neck: Normal range of motion. Neck supple. No JVD present.   Cardiovascular: Normal rate and regular rhythm.    Pulmonary/Chest: Effort normal and breath sounds normal. No respiratory distress.   Abdominal: Soft. He exhibits no distension.   Musculoskeletal: Normal range of motion. He exhibits no edema or tenderness.   Left upper extremity aVF with good thrill and bruit   Neurological: He is alert and oriented to person, place, and time. No cranial nerve deficit.   Skin: Skin is warm and dry. He is not diaphoretic.   3 cm x 3 cm firm semimobile soft tissue mass, appears to be a sebaceous cyst, chronic overlying skin changes noted (hyperpigmented), nonfluctuant, no erythema, nontender, nondraining   Psychiatric: He has a normal mood and affect.       Lab Review: CBC:   Lab Results   Component Value Date    WBC 5.16 01/07/2017    RBC 3.77 (L) 01/07/2017    HGB 10.9 (L) 01/07/2017    HCT 34.0 (L) 01/07/2017    HCT 39 01/07/2017    PLT 91 (L) 01/07/2017     BMP:   Lab Results   Component Value Date    GLU 97 01/09/2017     01/09/2017    K 4.8 01/09/2017    CL 98 01/09/2017    CO2 31 (H) 01/09/2017    BUN 28 (H) 01/09/2017    CREATININE 6.4 (H) 01/09/2017    CALCIUM 9.6 01/09/2017     Lab Results   Component Value Date    ALT 12 " 01/07/2017    AST 21 01/07/2017    ALKPHOS 128 01/07/2017    BILITOT 1.0 01/07/2017       Diagnostics Review:  Vascular ultrasound January 2017 reveal a patent left forearm radiocephalic AV graft    Echo August 2015:  EF 55 - 65 45 55R     Mitral Valve Regurgitation  MILD TO MODERATE     Diastolic Dysfunction  Yes (A)     Aortic Valve Stenosis  MODERATE (A)     Mitral Valve Mobility  MILDLY RESTRICTED     Mitral Valve Stenosis  MILD (A)          Assessment:     1. Skin lesion    2. ESRD (end stage renal disease) on dialysis    3. Severe obesity (BMI 35.0-35.9 with comorbidity)      Plan:   The pathology of the patient's disease was discussed.  Elective excision was recommended. The surgical procedure, risks, postop recovery and consent were discussed. All questions were answered and the consent was signed. Signs and symptoms of worsening disease was discussed.  The patient will call or go to ER should those symptoms develop.  The procedure is planned for this Friday, March 31, 2017.  Patient will be requested to hold his aspirin if ok with his other physicians.

## 2017-03-31 NOTE — DISCHARGE INSTRUCTIONS
Incision Care  Remember: Follow-up visits allow your doctor to make sure your incision is healing well. Be sure to keep your appointments.   Sutures (stitches), surgical staples, adhesive tapes,or surgical glue may be used to close incisions. They also help stop bleeding and speed healing. Instead of sutures, your wound may have been closed with special strips of tape called Steri-Strips. Treat these the same way you would sutures. To help your incision heal, follow the tips on this handout.    Keeping Your Incision Clean and Dry  · Avoid doing things that could cause dirt or sweat to get on your incision.  · Dont pick at scabs. They help protect the wound.  · To keep the wound dry when around water, cover it with a plastic bag or plastic wrap. You could also use rubber gloves to protect sutures on a hand.  · If sutures get damp, pat them dry.    Changing Your Dressing  Leave the dressing (bandage) in place until 48 hours (2 days) after surgery. Change it only as directed, using clean hands.  · After the first 48 hours the incision wound will have closed. At this point, you may leave the incision uncovered and open to the air. However, if your incision is in the axilla or groin (or another area that gets damp/moist), apply gauze to this region to keep your incision dry.  Change the gauze daily and as needed.   · Cover your incision if your clothing is rubbing it or causing irritation.  · Change your dressing if it gets wet or soiled.  · Leave the Steri-Strips (surgical tape) in place, these will fall off on their own.    Call Your Health Care Provider If You Notice Any of These Signs:  · The wound opens spontaneously  · Increased soreness, pain, or tenderness after 24 hours  · A red streak, increased redness, or puffiness near the wound  · White, yellowish, or bad-smelling discharge from the wound  · Bleeding that cant be stopped by applying pressure  · Steri-Strips fall off or stitches dissolve before the wound  heals  · Fever above 101.0°F (38.3°C)   © 1437-4233 [employer]. [address] [place] [address], [place] [address]. All rights reserved. This information is not intended as a substitute for professional medical care. Always follow your healthcare professional's instructions.      CONSTIPATION  Narcotic pain medication may cause constipation. Take over-the-counter stool softeners as directed (Colace, Senna, Dulcolax, etc.)    Pain Control  You make alternate ibuprofen with your narcotic pain medication for additional pain relief. Do not take extra Tylenol with your narcotic presciption.

## 2017-04-10 ENCOUNTER — HOSPITAL ENCOUNTER (INPATIENT)
Facility: HOSPITAL | Age: 67
LOS: 2 days | Discharge: HOME | DRG: 291 | End: 2017-04-12
Attending: EMERGENCY MEDICINE | Admitting: EMERGENCY MEDICINE
Payer: COMMERCIAL

## 2017-04-10 DIAGNOSIS — N18.6 ESRD (END STAGE RENAL DISEASE) ON DIALYSIS: ICD-10-CM

## 2017-04-10 DIAGNOSIS — Z99.2 ESRD (END STAGE RENAL DISEASE) ON DIALYSIS: ICD-10-CM

## 2017-04-10 DIAGNOSIS — I48.0 PAROXYSMAL ATRIAL FIBRILLATION: ICD-10-CM

## 2017-04-10 DIAGNOSIS — L05.91 SACROCOCCYGEAL PILONIDAL CYST: ICD-10-CM

## 2017-04-10 DIAGNOSIS — I10 ESSENTIAL HYPERTENSION: ICD-10-CM

## 2017-04-10 DIAGNOSIS — N18.9 CHRONIC KIDNEY DISEASE-MINERAL AND BONE DISORDER: ICD-10-CM

## 2017-04-10 DIAGNOSIS — I50.33 ACUTE ON CHRONIC DIASTOLIC HEART FAILURE: ICD-10-CM

## 2017-04-10 DIAGNOSIS — E83.9 CHRONIC KIDNEY DISEASE-MINERAL AND BONE DISORDER: ICD-10-CM

## 2017-04-10 DIAGNOSIS — E87.5 HYPERKALEMIA: Primary | ICD-10-CM

## 2017-04-10 DIAGNOSIS — R06.02 SHORTNESS OF BREATH: ICD-10-CM

## 2017-04-10 DIAGNOSIS — E11.22 DIABETES MELLITUS WITH ESRD (END-STAGE RENAL DISEASE): ICD-10-CM

## 2017-04-10 DIAGNOSIS — M89.9 CHRONIC KIDNEY DISEASE-MINERAL AND BONE DISORDER: ICD-10-CM

## 2017-04-10 DIAGNOSIS — N18.6 DIABETES MELLITUS WITH ESRD (END-STAGE RENAL DISEASE): ICD-10-CM

## 2017-04-10 DIAGNOSIS — E87.5 HYPERKALEMIA, DIMINISHED RENAL EXCRETION: ICD-10-CM

## 2017-04-10 DIAGNOSIS — R60.0 BILATERAL EDEMA OF LOWER EXTREMITY: ICD-10-CM

## 2017-04-10 LAB
ALBUMIN SERPL BCP-MCNC: 3.1 G/DL
ALBUMIN SERPL BCP-MCNC: 3.3 G/DL
ALP SERPL-CCNC: 118 U/L
ALP SERPL-CCNC: 133 U/L
ALT SERPL W/O P-5'-P-CCNC: <5 U/L
ALT SERPL W/O P-5'-P-CCNC: <5 U/L
ANION GAP SERPL CALC-SCNC: 16 MMOL/L
ANION GAP SERPL CALC-SCNC: 17 MMOL/L
AST SERPL-CCNC: 19 U/L
AST SERPL-CCNC: 23 U/L
BASOPHILS # BLD AUTO: 0.02 K/UL
BASOPHILS NFR BLD: 0.4 %
BILIRUB SERPL-MCNC: 0.9 MG/DL
BILIRUB SERPL-MCNC: 1 MG/DL
BNP SERPL-MCNC: 4684 PG/ML
BUN SERPL-MCNC: 50 MG/DL
BUN SERPL-MCNC: 52 MG/DL
CALCIUM SERPL-MCNC: 10.6 MG/DL
CALCIUM SERPL-MCNC: 9.6 MG/DL
CHLORIDE SERPL-SCNC: 97 MMOL/L
CHLORIDE SERPL-SCNC: 97 MMOL/L
CO2 SERPL-SCNC: 22 MMOL/L
CO2 SERPL-SCNC: 24 MMOL/L
CREAT SERPL-MCNC: 9.1 MG/DL
CREAT SERPL-MCNC: 9.2 MG/DL
CRP SERPL-MCNC: 11.7 MG/L
DIFFERENTIAL METHOD: ABNORMAL
EOSINOPHIL # BLD AUTO: 0.1 K/UL
EOSINOPHIL NFR BLD: 2.4 %
ERYTHROCYTE [DISTWIDTH] IN BLOOD BY AUTOMATED COUNT: 18.1 %
ERYTHROCYTE [SEDIMENTATION RATE] IN BLOOD BY WESTERGREN METHOD: 30 MM/HR
EST. GFR  (AFRICAN AMERICAN): 6.1 ML/MIN/1.73 M^2
EST. GFR  (AFRICAN AMERICAN): 6.2 ML/MIN/1.73 M^2
EST. GFR  (NON AFRICAN AMERICAN): 5.3 ML/MIN/1.73 M^2
EST. GFR  (NON AFRICAN AMERICAN): 5.4 ML/MIN/1.73 M^2
GLUCOSE SERPL-MCNC: 40 MG/DL
GLUCOSE SERPL-MCNC: 75 MG/DL
HCT VFR BLD AUTO: 32.2 %
HGB BLD-MCNC: 10.7 G/DL
INR PPP: 1.2
LACTATE SERPL-SCNC: 1.6 MMOL/L
LIPASE SERPL-CCNC: 16 U/L
LYMPHOCYTES # BLD AUTO: 1.1 K/UL
LYMPHOCYTES NFR BLD: 20.2 %
MAGNESIUM SERPL-MCNC: 3.1 MG/DL
MCH RBC QN AUTO: 30.5 PG
MCHC RBC AUTO-ENTMCNC: 33.2 %
MCV RBC AUTO: 92 FL
MONOCYTES # BLD AUTO: 0.7 K/UL
MONOCYTES NFR BLD: 12.9 %
NEUTROPHILS # BLD AUTO: 3.5 K/UL
NEUTROPHILS NFR BLD: 64.1 %
PLATELET # BLD AUTO: 78 K/UL
PMV BLD AUTO: 12 FL
POCT GLUCOSE: 107 MG/DL (ref 70–110)
POCT GLUCOSE: 143 MG/DL (ref 70–110)
POCT GLUCOSE: 82 MG/DL (ref 70–110)
POCT GLUCOSE: 87 MG/DL (ref 70–110)
POTASSIUM SERPL-SCNC: 5.9 MMOL/L
POTASSIUM SERPL-SCNC: 7.3 MMOL/L
PROT SERPL-MCNC: 8 G/DL
PROT SERPL-MCNC: 8.4 G/DL
PROTHROMBIN TIME: 12.7 SEC
RBC # BLD AUTO: 3.51 M/UL
SODIUM SERPL-SCNC: 135 MMOL/L
SODIUM SERPL-SCNC: 138 MMOL/L
TROPONIN I SERPL DL<=0.01 NG/ML-MCNC: 0.12 NG/ML
WBC # BLD AUTO: 5.5 K/UL

## 2017-04-10 PROCEDURE — 63600175 PHARM REV CODE 636 W HCPCS: Performed by: EMERGENCY MEDICINE

## 2017-04-10 PROCEDURE — 96374 THER/PROPH/DIAG INJ IV PUSH: CPT

## 2017-04-10 PROCEDURE — 93005 ELECTROCARDIOGRAM TRACING: CPT

## 2017-04-10 PROCEDURE — 83735 ASSAY OF MAGNESIUM: CPT

## 2017-04-10 PROCEDURE — 86140 C-REACTIVE PROTEIN: CPT

## 2017-04-10 PROCEDURE — 83605 ASSAY OF LACTIC ACID: CPT

## 2017-04-10 PROCEDURE — 84484 ASSAY OF TROPONIN QUANT: CPT

## 2017-04-10 PROCEDURE — 25000242 PHARM REV CODE 250 ALT 637 W/ HCPCS: Performed by: EMERGENCY MEDICINE

## 2017-04-10 PROCEDURE — 83690 ASSAY OF LIPASE: CPT

## 2017-04-10 PROCEDURE — 83880 ASSAY OF NATRIURETIC PEPTIDE: CPT

## 2017-04-10 PROCEDURE — 94640 AIRWAY INHALATION TREATMENT: CPT

## 2017-04-10 PROCEDURE — 85651 RBC SED RATE NONAUTOMATED: CPT

## 2017-04-10 PROCEDURE — 80100014 HC HEMODIALYSIS 1:1

## 2017-04-10 PROCEDURE — 96375 TX/PRO/DX INJ NEW DRUG ADDON: CPT

## 2017-04-10 PROCEDURE — 94761 N-INVAS EAR/PLS OXIMETRY MLT: CPT

## 2017-04-10 PROCEDURE — 20600001 HC STEP DOWN PRIVATE ROOM

## 2017-04-10 PROCEDURE — 99291 CRITICAL CARE FIRST HOUR: CPT | Mod: 25

## 2017-04-10 PROCEDURE — 99291 CRITICAL CARE FIRST HOUR: CPT | Mod: ,,, | Performed by: EMERGENCY MEDICINE

## 2017-04-10 PROCEDURE — 25000003 PHARM REV CODE 250: Performed by: HOSPITALIST

## 2017-04-10 PROCEDURE — 5A1D60Z PERFORMANCE OF URINARY FILTRATION, MULTIPLE: ICD-10-PCS | Performed by: INTERNAL MEDICINE

## 2017-04-10 PROCEDURE — 27000221 HC OXYGEN, UP TO 24 HOURS

## 2017-04-10 PROCEDURE — 85610 PROTHROMBIN TIME: CPT

## 2017-04-10 PROCEDURE — 87040 BLOOD CULTURE FOR BACTERIA: CPT

## 2017-04-10 PROCEDURE — 93010 ELECTROCARDIOGRAM REPORT: CPT | Mod: ,,, | Performed by: INTERNAL MEDICINE

## 2017-04-10 PROCEDURE — 85025 COMPLETE CBC W/AUTO DIFF WBC: CPT

## 2017-04-10 PROCEDURE — 80053 COMPREHEN METABOLIC PANEL: CPT

## 2017-04-10 PROCEDURE — 25000003 PHARM REV CODE 250: Performed by: EMERGENCY MEDICINE

## 2017-04-10 RX ORDER — GABAPENTIN 300 MG/1
300 CAPSULE ORAL 3 TIMES DAILY
Status: DISCONTINUED | OUTPATIENT
Start: 2017-04-10 | End: 2017-04-12 | Stop reason: HOSPADM

## 2017-04-10 RX ORDER — HYDRALAZINE HYDROCHLORIDE 25 MG/1
100 TABLET, FILM COATED ORAL EVERY 8 HOURS
Status: DISCONTINUED | OUTPATIENT
Start: 2017-04-10 | End: 2017-04-12 | Stop reason: HOSPADM

## 2017-04-10 RX ORDER — INDOMETHACIN 25 MG/1
50 CAPSULE ORAL
Status: COMPLETED | OUTPATIENT
Start: 2017-04-10 | End: 2017-04-10

## 2017-04-10 RX ORDER — SEVELAMER CARBONATE 800 MG/1
800 TABLET, FILM COATED ORAL
Status: DISCONTINUED | OUTPATIENT
Start: 2017-04-11 | End: 2017-04-12 | Stop reason: HOSPADM

## 2017-04-10 RX ORDER — AMLODIPINE BESYLATE 10 MG/1
10 TABLET ORAL DAILY
Status: DISCONTINUED | OUTPATIENT
Start: 2017-04-11 | End: 2017-04-12 | Stop reason: HOSPADM

## 2017-04-10 RX ORDER — ALBUTEROL SULFATE 0.83 MG/ML
15 SOLUTION RESPIRATORY (INHALATION)
Status: COMPLETED | OUTPATIENT
Start: 2017-04-10 | End: 2017-04-10

## 2017-04-10 RX ORDER — LISINOPRIL 20 MG/1
40 TABLET ORAL DAILY
Status: DISCONTINUED | OUTPATIENT
Start: 2017-04-11 | End: 2017-04-11

## 2017-04-10 RX ORDER — ATORVASTATIN CALCIUM 20 MG/1
40 TABLET, FILM COATED ORAL NIGHTLY
Status: DISCONTINUED | OUTPATIENT
Start: 2017-04-10 | End: 2017-04-12 | Stop reason: HOSPADM

## 2017-04-10 RX ORDER — ACETAMINOPHEN 325 MG/1
650 TABLET ORAL EVERY 8 HOURS PRN
Status: DISCONTINUED | OUTPATIENT
Start: 2017-04-10 | End: 2017-04-12 | Stop reason: HOSPADM

## 2017-04-10 RX ORDER — ASPIRIN 81 MG/1
81 TABLET ORAL DAILY
Status: DISCONTINUED | OUTPATIENT
Start: 2017-04-11 | End: 2017-04-12 | Stop reason: HOSPADM

## 2017-04-10 RX ORDER — SODIUM CHLORIDE 0.9 % (FLUSH) 0.9 %
3 SYRINGE (ML) INJECTION EVERY 8 HOURS
Status: DISCONTINUED | OUTPATIENT
Start: 2017-04-10 | End: 2017-04-12 | Stop reason: HOSPADM

## 2017-04-10 RX ORDER — SODIUM CHLORIDE 9 MG/ML
INJECTION, SOLUTION INTRAVENOUS
Status: DISCONTINUED | OUTPATIENT
Start: 2017-04-10 | End: 2017-04-12

## 2017-04-10 RX ORDER — SODIUM CHLORIDE 9 MG/ML
INJECTION, SOLUTION INTRAVENOUS ONCE
Status: COMPLETED | OUTPATIENT
Start: 2017-04-10 | End: 2017-04-11

## 2017-04-10 RX ORDER — CALCIUM CHLORIDE INJECTION 100 MG/ML
1 INJECTION, SOLUTION INTRAVENOUS
Status: COMPLETED | OUTPATIENT
Start: 2017-04-10 | End: 2017-04-10

## 2017-04-10 RX ORDER — DEXTROSE 50 % IN WATER (D50W) INTRAVENOUS SYRINGE
25
Status: COMPLETED | OUTPATIENT
Start: 2017-04-10 | End: 2017-04-10

## 2017-04-10 RX ORDER — HEPARIN SODIUM 5000 [USP'U]/ML
5000 INJECTION, SOLUTION INTRAVENOUS; SUBCUTANEOUS EVERY 8 HOURS
Status: DISCONTINUED | OUTPATIENT
Start: 2017-04-10 | End: 2017-04-12 | Stop reason: HOSPADM

## 2017-04-10 RX ADMIN — INSULIN HUMAN 10 UNITS: 100 INJECTION, SOLUTION PARENTERAL at 05:04

## 2017-04-10 RX ADMIN — CALCIUM CHLORIDE 1 G: 100 INJECTION, SOLUTION INTRAVENOUS; INTRAVENTRICULAR at 05:04

## 2017-04-10 RX ADMIN — HEPARIN SODIUM 5000 UNITS: 5000 INJECTION, SOLUTION INTRAVENOUS; SUBCUTANEOUS at 09:04

## 2017-04-10 RX ADMIN — ALBUTEROL SULFATE 15 MG: 2.5 SOLUTION RESPIRATORY (INHALATION) at 04:04

## 2017-04-10 RX ADMIN — SODIUM BICARBONATE 50 MEQ: 84 INJECTION, SOLUTION INTRAVENOUS at 05:04

## 2017-04-10 RX ADMIN — SODIUM POLYSTYRENE SULFONATE 30 G: 15 SUSPENSION ORAL; RECTAL at 05:04

## 2017-04-10 RX ADMIN — ATORVASTATIN CALCIUM 40 MG: 20 TABLET, FILM COATED ORAL at 09:04

## 2017-04-10 RX ADMIN — DEXTROSE MONOHYDRATE 25 G: 25 INJECTION, SOLUTION INTRAVENOUS at 05:04

## 2017-04-10 RX ADMIN — Medication 3 ML: at 09:04

## 2017-04-10 NOTE — MEDICAL/APP STUDENT
"[facility] Hospital Medicine  History & Physical    Patient Name: Wilder Carbajal  MRN: [id number]  Admission Date: 04/10/2017      PCP:     Akhil Adorno MD    CC:     Chief Complaint   Patient presents with    Missed dialysis     patient states that his last treatment was on Tuesday.        HISTORY OF PRESENT ILLNESS:     Wilder Carbajal is a 66 y.o. male that has a past medical history significant of DM, Diabetic retinopathy, Asthma, ESRD TTS, thyroid disease, HTN, HLD. Patient was seen for a boil on the superior buttocks by ID on 3/31/17. Additionally, patient was last dialyzed on Tuesday but only completed two of the four hours of dialysis as he could not tolerate sitting for the whole duration of the treatment. Patient reports that at 1 am Sunday morning  he was awakened by sharp 8/10 lower back pain that was constant, that was not aggravated or alleviated by anything. He reports that he tried to "slide off" the end of the bed where he fell to his knees then onto his back, where he reported to call thefire department to assist him in getting up and bringing him into hospital. Patient denies any head trauma or LOC. Patient denies fevers/chills/vomiting/changes in vision. Patient endorses chills//SOB/nausea/worsening lower abdominal pain/lower extremity edema/shaking.    All other review of systems were unremarkable.      Hospital medicine has been asked to admit for further evaluation and treatment.       REVIEW OF SYSTEMS:     -- Constitutional: No fever. +chills  -- Eyes: No visual changes, diplopia, pain, tearing, blind spots, or discharge.   -- Ears, nose, mouth, throat, and face: No congestion, sore throat, epistaxis, d/c, bleeding gums, neck stiffness masses, or dental issues.  -- Respiratory: No  hemoptysis, stridor, wheezing, or night sweats. +non-productive cough/SOB  -- Cardiovascular: No chest pain, CHAMBERLAIN, syncope, PND, edema, cyanosis, or palpitations.   -- Gastrointestinal: No " vomiting, abdominal pain, dysphagia, hematemesis, melena, dyspepsia, or change in bowel habits. +lower abdominal pain/nausea  -- Genitourinary: No hematuria, frequency, urgency, nocturia, polyuria, stones, or incontinence. +dysuria  -- Integument/breast: No rash, pruritis, pigmentation changes, dryness, or changes in hair  -- Hematologic/lymphatic: No easy bruising or lymphadenopathy.   -- Musculoskeletal: No acute arthralgias, acute myalgias, joint swelling, acute limitations of ROM, or acute muscular weakness.  -- Neurological: No seizures, headaches, incoordination, paraesthesias, ataxia, vertigo, or tremors. +numbness and tingling in hands/feet  -- Behavioral/Psych: No auditory or visual hallucinations, depression, or suicidal/homicidal ideations.  -- Endocrine: No heat or cold intolerance, polydipsia, or unintentional weight gain / loss. +polydipsia  -- Allergy/Immunologic: No recurrent infections or adverse reaction to food, insects, or difficulty breathing.    Pain Scale  7/10 on admission  Currently comfortable    PAST MEDICAL / SURGICAL HISTORY:     Past Medical History:   Diagnosis Date    Acute pain of left shoulder 1/7/2017    Arthritis     Asthma     Benign neoplasm of eyelid     RUL    Blood clotting tendency     Blood transfusion     Cataract     Chronic kidney disease requiring chronic dialysis     Diabetes mellitus     Diabetic retinopathy     Fever blister     Hyperlipidemia     Hypertension     Infertility     Joint pain     Retinal detachment     Thyroid disease     Weakness 1/7/2017     Past Surgical History:   Procedure Laterality Date    CATARACT EXTRACTION      RETINAL DETACHMENT SURGERY     *Boil Drainage on 3.31.17      FAMILY HISTORY:     Family History   Problem Relation Age of Onset    Heart attack Mother     No Known Problems Father     No Known Problems Sister     No Known Problems Brother     No Known Problems Maternal Aunt     No Known Problems Maternal  Uncle     No Known Problems Paternal Aunt     No Known Problems Paternal Uncle     No Known Problems Maternal Grandmother     No Known Problems Maternal Grandfather     No Known Problems Paternal Grandmother     No Known Problems Paternal Grandfather     Cancer Neg Hx     Amblyopia Neg Hx     Blindness Neg Hx     Cataracts Neg Hx     Diabetes Neg Hx     Glaucoma Neg Hx     Hypertension Neg Hx     Macular degeneration Neg Hx     Retinal detachment Neg Hx     Strabismus Neg Hx     Stroke Neg Hx     Thyroid disease Neg Hx          SOCIAL HISTORY:     Social History   Substance Use Topics    Smoking status: Never Smoker    Smokeless tobacco: Never Used    Alcohol use Yes      Comment: 1 pint bourbon     Social History     Social History    Marital status:      Spouse name: N/A    Number of children: N/A    Years of education: N/A     Social History Main Topics    Smoking status: Never Smoker    Smokeless tobacco: Never Used    Alcohol use Yes      Comment: 1 pint bourbon    Drug use: No    Sexual activity: Not on file     Other Topics Concern    Not on file     Social History Narrative         ALLERGIES:       Review of patient's allergies indicates:  No Known Allergies      HEALTH SCREENING:     -- Prevnar 13 pneumonia vaccine =no evidence of previous vaccination found in the medical record  -- Influenza vaccine =no evidence of current flu vaccination found in the medical record for this flu season      HOME MEDICATIONS:     Prior to Admission medications    Medication Sig Start Date End Date Taking? Authorizing Provider   amlodipine (NORVASC) 10 MG tablet Take 1 tablet (10 mg total) by mouth once daily. 8/17/15 2/20/17  Asif Tirado MD   aspirin (ECOTRIN) 81 MG EC tablet Take 81 mg by mouth once daily.     [facility]   atorvastatin (LIPITOR) 40 MG tablet Take 40 mg by mouth every evening.    Historical Provider, MD   carbamide peroxide (DEBROX) 6.5 % otic  "solution Place 5 drops in ear(s) 3 (three) times daily.    Historical Provider, MD   diclofenac sodium (VOLTAREN) 1 % Gel Apply 2 g topically 4 (four) times daily as needed (for pain).    Historical Provider, MD   ergocalciferol (ERGOCALCIFEROL) 50,000 unit Cap Take 50,000 Units by mouth every 14 (fourteen) days. 1 cap PO weekly x12 wks then twice per month    [facility]   gabapentin (NEURONTIN) 300 mg tablet Take 300 mg by mouth 3 (three) times daily.      Historical Provider, MD   hydrALAZINE (APRESOLINE) 100 MG tablet Take 1 tablet (100 mg total) by mouth every 8 (eight) hours. 8/17/15 8/16/16  Asif Tirado MD   hydrocodone-acetaminophen 5-325mg (NORCO) 5-325 mg per tablet Take 1-2 tablets PO q4-6hours PRN pain 3/31/17   Laure Holm DO   ketotifen (ZADITOR) 0.025 % (0.035 %) ophthalmic solution Apply 1 drop to eye every 8 (eight) hours.    Historical Provider, MD   lisinopril (PRINIVIL,ZESTRIL) 40 MG tablet Take 40 mg by mouth once daily.    Historical Provider, MD   podofilox (CONDYLOX) 0.5 % external solution Apply a small amount to lesions twice daily for three days, then hold for four days and repeat regimen    Historical Provider, MD   sevelamer carbonate (RENVELA) 800 mg Tab Take two tablets by mouth four times daily with meals and snacks    Historical Provider, MD         HOSPITAL MEDICATIONS:     Scheduled Meds:  Continuous Infusions:  PRN Meds:.      PHYSICAL EXAM:     Wt Readings from Last 1 Encounters:   04/10/17 1339 114.8 kg (253 lb)     Body mass index is 37.36 kg/(m^2).  Vitals:    04/10/17 1339 04/10/17 1658 04/10/17 1758   BP: (!) 185/82  (!) 207/93   Pulse: 64 68 71   Resp: 18 (!) 21 16   Temp: 98.4 °F (36.9 °C)     TempSrc: Oral     SpO2: 95% 98% (!) 90%   Weight: 114.8 kg (253 lb)     Height: 5' 9" (1.753 m)        VITALS ON ADMISSION:  T- 98.4  P-64  RR-18  BP- 185/82  SPO2-95% on room air    VITALS TODAY (4/11/17)  T  P  RR  BP  SP02    -- General " appearance: well developed. appears anxious, obese  -- Head: normocephalic, atraumatic   -- Eyes: conjunctivae clear. Extraocular muscles intact. Right eye shows increased opacity  -- Nose: Nares normal. Septum midline.   -- Mouth/Throat: lips, mucosa, and tongue normal. no throat erythema.   -- Neck: supple,symmetrical, trachea midline,  and thyroid not grossly enlarged, appears symmetric  -- Lungs: no chest tenderness, bilateral chest rise and fall, diffuse bibasilar crackles. normal respiratory effort. No use of accessory muscles.   -- Heart: regular rate and irregularly irregular rhythm rhythm. S1, S2 normal. S3 heard, no additional sounds. JVD  -- Abdomen: soft, non-tender, lower abdominal distension, bowel sounds normal; no masses  -- Extremities: 3+ pitting edema on LE. ecchymoses over the UE. LUE has an AV fistula with palpable thrill.   -- Pulses: 1+ and symmetric LE, 2+ UE  -- Skin: Left gluteal fold wound packaged with dressing, no purulent discharge or erythema around the area. color normal, texture normal, turgor normal. No rashes or lesions.   -- Neurologic: 5/5 strength throughout. Sensation intact throughout sparing the dorsal and pedal aspects of his feet. CN grossly intact.     LABORATORY STUDIES:                                     Recent Results (from the past 36 hour(s))   Comprehensive metabolic panel    Collection Time: 04/10/17  2:47 PM   Result Value Ref Range    Sodium 135 (L) 136 - 145 mmol/L    Potassium 7.3 (HH) 3.5 - 5.1 mmol/L    Chloride 97 95 - 110 mmol/L    CO2 22 (L) 23 - 29 mmol/L    Glucose 75 70 - 110 mg/dL    BUN, Bld 50 (H) 8 - 23 mg/dL    Creatinine 9.1 (H) 0.5 - 1.4 mg/dL    Calcium 9.6 8.7 - 10.5 mg/dL    Total Protein 8.0 6.0 - 8.4 g/dL    Albumin 3.1 (L) 3.5 - 5.2 g/dL    Total Bilirubin 0.9 0.1 - 1.0 mg/dL    Alkaline Phosphatase 118 55 - 135 U/L    AST 23 10 - 40 U/L    ALT <5 (L) 10 - 44 U/L    Anion Gap 16 8 - 16 mmol/L    eGFR if African American 6.2 (A) >60  mL/min/1.73 m^2    eGFR if non African American 5.4 (A) >60 mL/min/1.73 m^2   CBC auto differential    Collection Time: 04/10/17  2:47 PM   Result Value Ref Range    WBC 5.50 3.90 - 12.70 K/uL    RBC 3.51 (L) 4.60 - 6.20 M/uL    Hemoglobin 10.7 (L) 14.0 - 18.0 g/dL    Hematocrit 32.2 (L) 40.0 - 54.0 %    MCV 92 82 - 98 fL    MCH 30.5 27.0 - 31.0 pg    MCHC 33.2 32.0 - 36.0 %    RDW 18.1 (H) 11.5 - 14.5 %    Platelets 78 (L) 150 - 350 K/uL    MPV 12.0 9.2 - 12.9 fL    Gran # 3.5 1.8 - 7.7 K/uL    Lymph # 1.1 1.0 - 4.8 K/uL    Mono # 0.7 0.3 - 1.0 K/uL    Eos # 0.1 0.0 - 0.5 K/uL    Baso # 0.02 0.00 - 0.20 K/uL    Gran% 64.1 38.0 - 73.0 %    Lymph% 20.2 18.0 - 48.0 %    Mono% 12.9 4.0 - 15.0 %    Eosinophil% 2.4 0.0 - 8.0 %    Basophil% 0.4 0.0 - 1.9 %    Differential Method Automated    Brain natriuretic peptide    Collection Time: 04/10/17  2:47 PM   Result Value Ref Range    BNP 4684 (H) 0 - 99 pg/mL   Magnesium    Collection Time: 04/10/17  2:47 PM   Result Value Ref Range    Magnesium 3.1 (H) 1.6 - 2.6 mg/dL   Protime-INR    Collection Time: 04/10/17  2:47 PM   Result Value Ref Range    Prothrombin Time 12.7 (H) 9.0 - 12.5 sec    INR 1.2 0.8 - 1.2   Troponin I    Collection Time: 04/10/17  2:47 PM   Result Value Ref Range    Troponin I 0.116 (H) 0.000 - 0.026 ng/mL   Lipase    Collection Time: 04/10/17  2:47 PM   Result Value Ref Range    Lipase 16 4 - 60 U/L   Lactic acid, plasma    Collection Time: 04/10/17  2:47 PM   Result Value Ref Range    Lactate (Lactic Acid) 1.6 0.5 - 2.2 mmol/L   Sedimentation rate, manual    Collection Time: 04/10/17  2:47 PM   Result Value Ref Range    Sed Rate 30 (H) 0 - 10 mm/Hr   C-reactive protein    Collection Time: 04/10/17  2:47 PM   Result Value Ref Range    CRP 11.7 (H) 0.0 - 8.2 mg/L       Lab Results   Component Value Date    INR 1.2 04/10/2017    INR 1.1 08/03/2016    INR 1.2 03/21/2016     Lab Results   Component Value Date    HGBA1C 5.7 01/07/2017     No results for  input(s): POCTGLUCOSE in the last 72 hours.        MICROBIOLOGY DATA:     Urine Culture, Routine   Date Value Ref Range Status   08/03/2016 No growth  Final       Microbiology x 7d:   Microbiology Results (last 7 days)     Procedure Component Value Units Date/Time    Blood culture #1 **CANNOT BE ORDERED STAT** [[id number]] Collected:  04/10/17 1446    Order Status:  Sent Specimen:  Blood from Peripheral, Hand, Right Updated:  04/10/17 1527            IMAGING:   CXR: The mediastinal structures are midline.  The cardiac silhouette is enlarged and stable.  There is atherosclerotic calcification of the aortic arch.  Opacity at the right lung base consistent with pleural fluid with atelectasis versus consolidation.  No osseous abnormalities are identified.          Imaging Results         X-Ray Chest 1 View (Final result) Result time:  04/10/17 17:28:27    Final result by Sabi Villalta MD (04/10/17 17:28:27)    Impression:      1.  Right pleural fluid with adjacent atelectasis versus consolidation.  2.  Stable enlarged cardiac silhouette.      Electronically signed by: SABI VILLALTA MD  Date:     04/10/17  Time:    17:28     Narrative:    EXAM:  AP CHEST RADIOGRAPH.     CLINICAL INDICATION:  Shortness of breath.    TECHNIQUE: Single AP view of the chest was obtained.     COMPARISON: Prior from 8/3/16    FINDINGS: The mediastinal structures are midline.  The cardiac silhouette is enlarged and stable.  There is atherosclerotic calcification of the aortic arch.  Opacity at the right lung base consistent with pleural fluid with atelectasis versus consolidation.  No osseous abnormalities are identified.                CONSULTS:     IP CONSULT TO NEPHROLOGY       ASSESSMENT & PLAN:     1) Hyperkalemia,  -Patient missed Thursday and Saturday HD appoints this week  - (7.3) Elevated potassium in the ED  -clinical presentation of muscle weakness and abdominal discomfort    Plan:  Calcium gluconate/chloride - reduce risk  of ventricular fibrillation   Insulin w/ glucose- allows for the uptake of glucose into the cell which brings potassium intracellularly as well  -Albuterol- ( b2 agonist)- promotes reuptake of potassium  -Binding resins (kayexelate, sodium polystyrene): promote exchange of potassium for sodium in GI system  -follow-up with nephrology  -monitor potassium levels  -EKG to monitor for abnormal cardiac dysrhythmias    HD noncompliance:  -ESRD, HD TTS  -last appointment he attended was on Tuesday 4th, where he only reports that he completed 2 hrs of dialysis before leaving    A.fib  *follow-up with cardiology about anti-coagulation  *start patient on anticoagulation while in [place]    HTN  *continue home meds  Amlodipine  Hydralazine  Lisinopril      DM2  Well-controlled on diet    Left Gluteal fold pain  * abscess on 3/31/17  * no current intervention as area is stable and no signs of complication or infection      Active Hospital Problems    Diagnosis  POA    Hyperkalemia [E87.5]  Yes      Resolved Hospital Problems    Diagnosis Date Resolved POA   No resolved problems to display.                  VTE Risk Mitigation     None            Adult PRN medications available   DVT prophylaxis given       DISPOSITION:     Will admit to the Hospital Medicine service for further evaluation and treatment.    Chart reviewed and updated where applicable.          ===============================================================

## 2017-04-10 NOTE — ED PROVIDER NOTES
Encounter Date: 4/10/2017    SCRIBE #1 NOTE: I, Jason Crockett, am scribing for, and in the presence of,  Dr. Omalley. I have scribed the entire note.       History     Chief Complaint   Patient presents with    Missed dialysis     patient states that his last treatment was on Tuesday.      Review of patient's allergies indicates:  No Known Allergies  HPI Comments: Time patient was seen by the provider: 2:13 PM      The patient is a 66 y.o. male with hx of: DM, HTN, CKD, HLD, asthma, and thyroid disease that presents to the ED with a complaint of worsening chronic leg swelling, which has persisted for the last several days. Pt states he has not been to dialysis for 1 week secondary to gluteal pain status post abscess I&D. He notes an associated shortness of breath and abdominal distension. Shortness of breath is constant, worse with exertion. Not associated with chest pain. Lower extremity swelling is severe, he's noted that there is now redness and blistering.     The history is provided by the patient.     Past Medical History:   Diagnosis Date    Acute pain of left shoulder 1/7/2017    Arthritis     Asthma     Benign neoplasm of eyelid     RUL    Blood clotting tendency     Blood transfusion     Cataract     Chronic kidney disease requiring chronic dialysis     Diabetes mellitus     Diabetic retinopathy     Fever blister     Hyperlipidemia     Hypertension     Infertility     Joint pain     Retinal detachment     Thyroid disease     Weakness 1/7/2017     Past Surgical History:   Procedure Laterality Date    CATARACT EXTRACTION      RETINAL DETACHMENT SURGERY       Family History   Problem Relation Age of Onset    Heart attack Mother     No Known Problems Father     No Known Problems Sister     No Known Problems Brother     No Known Problems Maternal Aunt     No Known Problems Maternal Uncle     No Known Problems Paternal Aunt     No Known Problems Paternal Uncle     No Known Problems  Maternal Grandmother     No Known Problems Maternal Grandfather     No Known Problems Paternal Grandmother     No Known Problems Paternal Grandfather     Cancer Neg Hx     Amblyopia Neg Hx     Blindness Neg Hx     Cataracts Neg Hx     Diabetes Neg Hx     Glaucoma Neg Hx     Hypertension Neg Hx     Macular degeneration Neg Hx     Retinal detachment Neg Hx     Strabismus Neg Hx     Stroke Neg Hx     Thyroid disease Neg Hx      Social History   Substance Use Topics    Smoking status: Never Smoker    Smokeless tobacco: Never Used    Alcohol use Yes      Comment: 1 pint bourbon     Review of Systems   Constitutional: Negative for fever.   HENT: Negative for sore throat.    Respiratory: Positive for shortness of breath.    Cardiovascular: Positive for leg swelling. Negative for chest pain.   Gastrointestinal: Positive for abdominal distention. Negative for nausea.   Genitourinary: Negative for dysuria.   Musculoskeletal: Negative for back pain.   Skin: Positive for color change. Negative for rash.   Neurological: Negative for weakness.   Hematological: Does not bruise/bleed easily.       Physical Exam   Initial Vitals   BP Pulse Resp Temp SpO2   04/10/17 1339 04/10/17 1339 04/10/17 1339 04/10/17 1339 04/10/17 1339   185/82 64 18 98.4 °F (36.9 °C) 95 %     Physical Exam    Nursing note and vitals reviewed.  Constitutional: He appears well-developed and well-nourished. No distress.   HENT:   Head: Normocephalic and atraumatic.   Mouth/Throat: Oropharynx is clear and moist.   Eyes: Conjunctivae and EOM are normal. Pupils are equal, round, and reactive to light.   Neck: Trachea normal and normal range of motion. Neck supple.   Cardiovascular: Normal rate, regular rhythm, normal heart sounds and normal pulses.   Pulmonary/Chest:   Crackles in bilateral bases   Abdominal: Soft. Normal appearance and bowel sounds are normal. There is no tenderness.   Obese. Slightly distended     Genitourinary:   Genitourinary  Comments: Left gluteal fold with packed abscess, no erythema, tenderness, or drainage.    Musculoskeletal: Normal range of motion.   3+ edema up to the bilateral thighs, knees to toes with tense edema skin erythema and blistering. Clear fluid fields. LUE with AV fistula with palpable thrill    Neurological: He is alert and oriented to person, place, and time.   Skin: Skin is warm and dry.         ED Course   Procedures  Labs Reviewed   COMPREHENSIVE METABOLIC PANEL - Abnormal; Notable for the following:        Result Value    Sodium 135 (*)     Potassium 7.3 (*)     CO2 22 (*)     BUN, Bld 50 (*)     Creatinine 9.1 (*)     Albumin 3.1 (*)     ALT <5 (*)     eGFR if  6.2 (*)     eGFR if non  5.4 (*)     All other components within normal limits   CBC W/ AUTO DIFFERENTIAL - Abnormal; Notable for the following:     RBC 3.51 (*)     Hemoglobin 10.7 (*)     Hematocrit 32.2 (*)     RDW 18.1 (*)     Platelets 78 (*)     All other components within normal limits   B-TYPE NATRIURETIC PEPTIDE - Abnormal; Notable for the following:     BNP 4684 (*)     All other components within normal limits   MAGNESIUM - Abnormal; Notable for the following:     Magnesium 3.1 (*)     All other components within normal limits   PROTIME-INR - Abnormal; Notable for the following:     Prothrombin Time 12.7 (*)     All other components within normal limits   TROPONIN I - Abnormal; Notable for the following:     Troponin I 0.116 (*)     All other components within normal limits   SEDIMENTATION RATE, MANUAL - Abnormal; Notable for the following:     Sed Rate 30 (*)     All other components within normal limits   C-REACTIVE PROTEIN - Abnormal; Notable for the following:     CRP 11.7 (*)     All other components within normal limits   CULTURE, BLOOD   LIPASE   LACTIC ACID, PLASMA   ISTAT CHEM8   POCT GLUCOSE MONITORING CONTINUOUS     EKG Readings: (Independently Interpreted)   Bradycardia rate of 57, non specific ST  changes, no peaked T waves       X-Rays:   Independently Interpreted Readings:   Chest X-Ray: Cardiomegaly with bilateral pulmonary edema. Consolidation vs effusion right lower lobe     Medical Decision Making:   History:   Old Medical Records: I decided to obtain old medical records.  Initial Assessment:   Emergent evaluation of shortness of breath and lower extremity swelling. Pt missed dialysis for a week. Initial concerns include volume overload, electrolyte abnormality, lower extremity cellulitis, and heart failure. Initial plan is for labs, chest imaging. Will evaluate for need for emergent dialysis. Lower extremity appearance likely secondary to volume overload, less likely cellulitis.   Independently Interpreted Test(s):   I have ordered and independently interpreted X-rays - see prior notes.  I have ordered and independently interpreted EKG Reading(s) - see prior notes  Clinical Tests:   Lab Tests: Ordered and Reviewed  Radiological Study: Ordered and Reviewed  Medical Tests: Ordered and Reviewed  Other:   I have discussed this case with another health care provider.       <> Summary of the Discussion: Nephrology             Scribe Attestation:   Scribe #1: I performed the above scribed service and the documentation accurately describes the services I performed. I attest to the accuracy of the note.    Attending Attestation:         Attending Critical Care:   Critical Care Times:   Direct Patient Care (initial evaluation, reassessments, and time considering the case)................................................................25 minutes.   Additional History from reviewing old medical records or taking additional history from the family, EMS, PCP, etc.......................5 minutes.   Ordering, Reviewing, and Interpreting Diagnostic Studies...............................................................................................................5 minutes.    Documentation..................................................................................................................................................................................5 minutes.   Consultation with other Physicians. .................................................................................................................................................5 minutes.   ==============================================================  · Total Critical Care Time - exclusive of procedural time: 45 minutes.  ==============================================================  Critical care was necessary to treat or prevent imminent or life-threatening deterioration of the following conditions: renal failure, metabolic crisis and congestive heart failure.     Physician Attestation for Scribe:  Physician Attestation Statement for Scribe #1: I, Dr. Omalley, reviewed documentation, as scribed by Jason Crockett in my presence, and it is both accurate and complete.         Attending ED Notes:   4:21 PM  Pt's potassium is 7.3. Hyperkalemia therapy has been ordered. He also has a BNP greater than 4000    I discussed with nephrology who concurs with need for emergent dialysis. Pt will be admitted to medicine           ED Course     Clinical Impression:   The primary encounter diagnosis was Hyperkalemia. Diagnoses of Shortness of breath, ESRD (end stage renal disease) on dialysis, and Bilateral edema of lower extremity were also pertinent to this visit.    Disposition:   Disposition: Admitted  Condition: Serious       Hilari Ale Omalley MD  04/10/17 9974

## 2017-04-10 NOTE — ED NOTES
"State's " he hasn't had dialysis since Tuesday.( 6 days ago) " When asked why 'state's " he had surgery ( boil at buttocks" The patient arrived by [employer].AAO .   "

## 2017-04-10 NOTE — CONSULTS
[facility]  Consult Note Nephrology    Consult Requested By: Trish Omalley MD  Reason for Consult: ESRD on iHD    SUBJECTIVE:     History of Present Illness:  Wilder Carbajal is a 66 y.o. male with PMHx relevant for DMT2, HTN, HLD, asthma, ESRD on iHD TThS and thyroid disease presenting with weakness, dizziness and worsening lower extremity edema after having missed HD for lat 7 days. Pt reports he had a boil to superior buttocks s/p I&D on 3/31/17 with consequent pain, especially when sitting. He states that sitting upright while on HD was to painful, so he missed HD. He went to [place] for wound care and was feeling well so he asked to be brought to ED. He denies cp, palpitations, fever or chills, nausea, vomiting, diarrhea, abdominal pain, HA, changes in vision. Reports SOB on exertion, worsening abdominal distention and lower extremity edema, shaking and sweats. He dialyzes at [facility] under the care of Dr. Solares. On TThS schedule. He has HD x 4.25 hrs via LFA AVG. His kidney failure is secondary to DMT2.    Past Medical History:   Diagnosis Date    Acute pain of left shoulder 1/7/2017    Arthritis     Asthma     Benign neoplasm of eyelid     RUL    Blood clotting tendency     Blood transfusion     Cataract     Chronic kidney disease requiring chronic dialysis     Diabetes mellitus     Diabetic retinopathy     Fever blister     Hyperlipidemia     Hypertension     Infertility     Joint pain     Retinal detachment     Thyroid disease     Weakness 1/7/2017     Past Surgical History:   Procedure Laterality Date    CATARACT EXTRACTION      RETINAL DETACHMENT SURGERY       Family History   Problem Relation Age of Onset    Heart attack Mother     No Known Problems Father     No Known Problems Sister     No Known Problems Brother     No Known Problems Maternal Aunt     No Known Problems Maternal Uncle     No Known Problems Paternal Aunt     No Known Problems  Paternal Uncle     No Known Problems Maternal Grandmother     No Known Problems Maternal Grandfather     No Known Problems Paternal Grandmother     No Known Problems Paternal Grandfather     Cancer Neg Hx     Amblyopia Neg Hx     Blindness Neg Hx     Cataracts Neg Hx     Diabetes Neg Hx     Glaucoma Neg Hx     Hypertension Neg Hx     Macular degeneration Neg Hx     Retinal detachment Neg Hx     Strabismus Neg Hx     Stroke Neg Hx     Thyroid disease Neg Hx      Social History   Substance Use Topics    Smoking status: Never Smoker    Smokeless tobacco: Never Used    Alcohol use Yes      Comment: 1 pint jeremyon     Review of patient's allergies indicates:  No Known Allergies    Current Facility-Administered Medications   Medication Dose Route Frequency Provider Last Rate Last Dose    calcium chloride 100 mg/mL (10 %) injection 1 g  1 g Intravenous ED 1 Time Trish Omalley MD        sodium polystyrene 15 gram/60 mL suspension 30 g  30 g Oral ED 1 Time Trish Omalley MD         Current Outpatient Prescriptions   Medication Sig Dispense Refill    amlodipine (NORVASC) 10 MG tablet Take 1 tablet (10 mg total) by mouth once daily. 30 tablet 3    aspirin (ECOTRIN) 81 MG EC tablet Take 81 mg by mouth once daily.       atorvastatin (LIPITOR) 40 MG tablet Take 40 mg by mouth every evening.      carbamide peroxide (DEBROX) 6.5 % otic solution Place 5 drops in ear(s) 3 (three) times daily.      diclofenac sodium (VOLTAREN) 1 % Gel Apply 2 g topically 4 (four) times daily as needed (for pain).      ergocalciferol (ERGOCALCIFEROL) 50,000 unit Cap Take 50,000 Units by mouth every 14 (fourteen) days. 1 cap PO weekly x12 wks then twice per month      gabapentin (NEURONTIN) 300 mg tablet Take 300 mg by mouth 3 (three) times daily.        hydrALAZINE (APRESOLINE) 100 MG tablet Take 1 tablet (100 mg total) by mouth every 8 (eight) hours. 90 tablet 3    hydrocodone-acetaminophen 5-325mg (NORCO)  5-325 mg per tablet Take 1-2 tablets PO q4-6hours PRN pain 40 tablet 0    ketotifen (ZADITOR) 0.025 % (0.035 %) ophthalmic solution Apply 1 drop to eye every 8 (eight) hours.      lisinopril (PRINIVIL,ZESTRIL) 40 MG tablet Take 40 mg by mouth once daily.      podofilox (CONDYLOX) 0.5 % external solution Apply a small amount to lesions twice daily for three days, then hold for four days and repeat regimen      sevelamer carbonate (RENVELA) 800 mg Tab Take two tablets by mouth four times daily with meals and snacks         No Known Allergies     Review of Systems:  Constitutional: no fever or chills  Respiratory: no cough positive shortness of breath, positive edema.  Cardiovascular: no chest pain or palpitations  Gastrointestinal: no nausea or vomiting, no abdominal pain or change in bowel habits  Hematologic/Lymphatic: no easy bruising or lymphadenopathy  Musculoskeletal: no arthralgias or myalgias  Neurological: no seizures or tremors          OBJECTIVE:     Vital Signs (Most Recent)  Temp: 98.4 °F (36.9 °C) (04/10/17 1339)  Pulse: 68 (04/10/17 1658)  Resp: (!) 21 (04/10/17 1658)  BP: (!) 185/82 (04/10/17 1339)  SpO2: 98 % (04/10/17 1658)    Vital Signs Range (Last 24H):  Temp:  [98.4 °F (36.9 °C)]   Pulse:  [64-68]   Resp:  [18-21]   BP: (185)/(82)   SpO2:  [95 %-98 %]     No intake or output data in the 24 hours ending 04/10/17 1726    Physical Exam:  General: Well developed, well nourished in NAD  HEENT: Conjunctiva clear; Oropharynx clear  Neck: No JVD noted, Supple  CV- Normal S1, S2 with no murmurs,gallops,rubs  Resp- Lungs decreased BS Bilaterally, Rales bilaterally at bases no wheezing. Unlabored  Abdomen- NTND, BS normoactive x4 quads, soft  Extrem- No cyanosis, clubbing, edema.  Skin- No rashes, lesions, ulcers  Neuro: awake, Oriented x3, no FND      Laboratory:  CBC:   Recent Labs  Lab 04/10/17  1447   WBC 5.50   RBC 3.51*   HGB 10.7*   HCT 32.2*   PLT 78*   MCV 92   MCH 30.5   MCHC 33.2     BMP:    Recent Labs  Lab 04/10/17  1447 04/10/17  1822   GLU 75 40*   CL 97 97   CO2 22* 24   BUN 50* 52*   CREATININE 9.1* 9.2*   CALCIUM 9.6 10.6*   MG 3.1*  --      CMP:   Recent Labs  Lab 04/10/17  1822   GLU 40*   CALCIUM 10.6*   ALBUMIN 3.3*   PROT 8.4      K 5.9*   CO2 24   CL 97   BUN 52*   CREATININE 9.2*   ALKPHOS 133   ALT <5*   AST 19   BILITOT 1.0     PTH: No results for input(s): PTH in the last 168 hours.  Coagulation:   Recent Labs  Lab 04/10/17  1447   INR 1.2     Cardiac Markers: No results for input(s): CKMB, TROPONINT, MYOGLOBIN in the last 168 hours.    Diagnostic Results:  Labs: Reviewed  ECG: Reviewed  X-Ray: Reviewed    ASSESSMENT/PLAN:     Active Hospital Problems    Diagnosis  POA    Hyperkalemia [E87.5]  Yes      Resolved Hospital Problems    Diagnosis Date Resolved POA   No resolved problems to display.       Wilder Carbajal is a 66 y.o. male with PMHx relevant for DMT2, HTN, HLD, asthma, ESRD on iHD TThS and thyroid disease, hypervolemia, hyperkalemia and missing HD, Nephrology consulted for ESRD management.    ESRD on IHD TTS   [facility] Dr. Chavarria  On HD for: ~ 10 years  Duration of outpatient dialysis session -  4.25 hrs  EDW - 253 lbs.  Residual Petty Function -  minimal  - Will provide dialysis for metabolic clearance and volume management x 3 hrs tonight.   -   - Target ultrafiltration 3 lts as tolerated keep MAP > 65  - Dialysate adjusted to current labs   Aceess:LFA-AVG good thrill and bruit    Active problem in hospital  - Hypervolemia  - Hyperkalemia   - Malignant HTN.    Anemia of Chronic Kidney Disease   - Will resume LISSETTE with HD   - Will request iron studies to assess further needs of supplementation     Mineral Bone Disease in CKD   - Renal Function Panel Daily for electrolytes monitoring  - Phos will get labs  - Already on binders as outpatient Please continue Sevelamer 1600 mg AC and with snacks.  - CoCa 11.2 low Ca bath    Nutrition    - Renal Diet    HTN   - Uncontrolled BP, will continue to monitor. Goal for BP is <130 mmHg SBP and BDP <80 mmHg.   - Resume home regiment.    Case discuss with Staff further recs with attestation.    Ken Branham MD  Nephrology Fellow PGY4  [phone]

## 2017-04-10 NOTE — CONSULTS
Consult received. Patient seen examined and chart reviewed. Please see full consult note with recs to follow. Case discussed with primary team and Staff.   Ken Branham MD  Nephrology Fellow PGY4  [phone]

## 2017-04-10 NOTE — ED NOTES
O2 per nasal cannula at 3l/min. Sitting on the side of bed.Preferres to sit up than lye down.Incision and drainage done to coccyx area on Friday( 4 days ago).Wound cleaned with cloraprep and drsg applied,.

## 2017-04-10 NOTE — IP AVS SNAPSHOT
[facility]  [address]  [facility] [address]  Phone: [phone]           Patient Discharge Instructions   Our goal is to set you up for success. This packet includes information on your condition, medications, and your home care.  It will help you care for yourself to prevent having to return to the hospital.     Please ask your nurse if you have any questions.      There are many details to remember when preparing to leave the hospital. Here is what you will need to do:    1. Take your medicine. If you are prescribed medications, review your Medication List on the following pages. You may have new medications to  at the pharmacy and others that you'll need to stop taking. Review the instructions for how and when to take your medications. Talk with your doctor or nurses if you are unsure of what to do.     2. Go to your follow-up appointments. Specific follow-up information is listed in the following pages. Your may be contacted by a nurse or clinical provider about future appointments. Be sure we have all of the phone numbers to reach you. Please contact your provider's office if you are unable to make an appointment.     3. Watch for warning signs. Your doctor or nurse will give you detailed warning signs to watch for and when to call for assistance. These instructions may also include educational information about your condition. If you experience any of warning signs to your health, call your doctor.           [facility]  Unless otherwise directed by your provider, please   contact [facility], our nurse care line   that is available for 24/7 assistance.     [phone] (toll-free)     Registered nurses in the [facility]   provide: appointment scheduling, clinical advisement, health education, and other advisory services.                  ** Verify the list of medication(s) below is accurate and up to date. Carry this with you in case of  emergency. If your medications have changed, please notify your healthcare provider.             Medication List      START taking these medications        Additional Info                      losartan 50 MG tablet   Commonly known as:  COZAAR   Quantity:  30 tablet   Refills:  3   Dose:  50 mg    Last time this was given:  50 mg on 4/12/2017  8:34 AM   Instructions:  Take 1 tablet (50 mg total) by mouth once daily.     Begin Date    AM    Noon    PM    Bedtime         CHANGE how you take these medications        Additional Info                      hydrocodone-acetaminophen 5-325mg 5-325 mg per tablet   Commonly known as:  NORCO   Quantity:  40 tablet   Refills:  0   What changed:    - how much to take  - how to take this  - when to take this  - reasons to take this  - additional instructions    Instructions:  Take 1-2 tablets PO q4-6hours PRN pain     Begin Date    AM    Noon    PM    Bedtime         CONTINUE taking these medications        Additional Info                      aspirin 81 MG EC tablet   Commonly known as:  ECOTRIN   Refills:  0   Dose:  81 mg    Last time this was given:  81 mg on 4/12/2017  8:34 AM   Instructions:  Take 81 mg by mouth once daily.     Begin Date    AM    Noon    PM    Bedtime       atorvastatin 20 MG tablet   Commonly known as:  LIPITOR   Refills:  0   Dose:  20 mg    Last time this was given:  40 mg on 4/11/2017  9:12 PM   Instructions:  Take 20 mg by mouth once daily.     Begin Date    AM    Noon    PM    Bedtime       ATROVENT HFA 17 mcg/actuation inhaler   Refills:  0   Dose:  2 puff   Generic drug:  ipratropium    Instructions:  Inhale 2 puffs into the lungs 4 (four) times daily. Do not exceed 12 puffs per day     Begin Date    AM    Noon    PM    Bedtime       cetirizine 10 MG tablet   Commonly known as:  ZYRTEC   Refills:  0   Dose:  10 mg    Instructions:  Take 10 mg by mouth once daily.     Begin Date    AM    Noon    PM    Bedtime       cinacalcet 30 MG Tab   Commonly  known as:  SENSIPAR   Refills:  0   Dose:  30 mg    Instructions:  Take 30 mg by mouth every evening.     Begin Date    AM    Noon    PM    Bedtime       ergocalciferol 50,000 unit Cap   Commonly known as:  ERGOCALCIFEROL   Refills:  0   Dose:  22745 Units    Instructions:  Take 50,000 Units by mouth every 14 (fourteen) days. 1 cap PO weekly x12 wks then twice per month     Begin Date    AM    Noon    PM    Bedtime       ketotifen 0.025 % (0.035 %) ophthalmic solution   Commonly known as:  ZADITOR   Refills:  0   Dose:  1 drop    Instructions:  Apply 1 drop to eye every 8 (eight) hours.     Begin Date    AM    Noon    PM    Bedtime       loperamide 2 mg capsule   Commonly known as:  IMODIUM   Refills:  0    Instructions:  Take 1 capsule by mouth before and after each stool     Begin Date    AM    Noon    PM    Bedtime       mometasone 50 mcg/actuation nasal spray   Commonly known as:  NASONEX   Refills:  0   Dose:  2 spray    Instructions:  2 sprays by Nasal route once daily.     Begin Date    AM    Noon    PM    Bedtime       podofilox 0.5 % external solution   Commonly known as:  CONDYLOX   Refills:  0    Instructions:  Apply a small amount to lesions twice daily for three days, then hold for four days and repeat regimen     Begin Date    AM    Noon    PM    Bedtime       sevelamer carbonate 800 mg Tab   Commonly known as:  RENVELA   Refills:  0    Last time this was given:  800 mg on 4/12/2017  8:34 AM   Instructions:  Take two tablets by mouth four times daily with meals and snacks     Begin Date    AM    Noon    PM    Bedtime       TUSSIN CF ORAL   Refills:  0   Dose:  10 mL    Instructions:  Take 10 mLs by mouth every 6 (six) hours as needed (for cough).     Begin Date    AM    Noon    PM    Bedtime       VOLTAREN 1 % Gel   Refills:  0   Dose:  2 g   Generic drug:  diclofenac sodium    Instructions:  Apply 2 g topically 4 (four) times daily as needed (for pain).     Begin Date    AM    Noon    PM    Bedtime          STOP taking these medications     furosemide 80 MG tablet   Commonly known as:  LASIX       hydrALAZINE 50 MG tablet   Commonly known as:  APRESOLINE            Where to Get Your Medications      These medications were sent to [facility]  [address] [address]     Phone:  [phone]     losartan 50 MG tablet                  Please bring to all follow up appointments:    1. A copy of your discharge instructions.  2. All medicines you are currently taking in their original bottles.  3. Identification and insurance card.    Please arrive 15 minutes ahead of scheduled appointment time.    Please call 24 hours in advance if you must reschedule your appointment and/or time.        Your Scheduled Appointments     Apr 17, 2017 10:00 AM CDT   Post OP with DO Ashley aPulino - General Surgery ([facility])    [address] [place] [address]  4th Floor [facility] [address] [phone]            May 17, 2017  1:00 PM CDT   [facility] with VASCULAR, LAB   Truman jayashree - Vascular Laboratory ([facility] )    [address]  [place] [address] [phone]            May 17, 2017  1:30 PM CDT   Established Patient Visit with MD Truman Patel jayashree - Vascular Surgery ([facility] )    [address]  [place] [address] [phone]            May 31, 2017 10:00 AM CDT   New Patient with MD Truman Lott [facility] - Neurology ([facility] )    [address]  [place] [address] [phone]              Follow-up Information     Follow up with [facility].    Specialty:  Physical Therapy    Why:  home health    Contact information:    [address] [facility] [address] [phone]          Discharge Instructions     Future Orders    Activity as tolerated     Call MD for:  difficulty breathing or increased cough   "   Call MD for:  increased confusion or weakness     Call MD for:  persistent dizziness, light-headedness, or visual disturbances     Call MD for:  persistent nausea and vomiting or diarrhea     Call MD for:  redness, tenderness, or signs of infection (pain, swelling, redness, odor or green/yellow discharge around incision site)     Call MD for:  severe persistent headache     Call MD for:  severe uncontrolled pain     Call MD for:  temperature >100.4     Diet Cardiac     Diet renal         Primary Diagnosis     Your primary diagnosis was:  High Potassium Levels      Admission Information     Date & Time Provider Department CSN    4/10/2017  1:57 PM Ashlee Jang MD [facility] [id number]      Care Providers     Provider Role Specialty Primary office phone    Ashlee Jang MD Attending Provider Hospitalist [phone]    Travis Souza MD Consulting Physician  Nephrology [phone]    Akhil Vazquez MD Team Attending  Hospitalist [phone]      Your Vitals Were     BP Pulse Temp Resp Height Weight    142/71 (BP Location: Right arm, Patient Position: Sitting, BP Method: Automatic) 69 97.7 °F (36.5 °C) (Oral) 18 5' 9" (1.753 m) 112.9 kg (248 lb 14.4 oz)    SpO2 BMI             99% 36.76 kg/m2         Recent Lab Values        5/8/2012 5/18/2012 12/11/2013 11/6/2014 8/14/2015 8/4/2016 1/7/2017         5:07 AM  5:25 AM 11:11 AM  4:03 PM  3:58 PM  4:39 AM  8:51 PM     A1C 5.1 5.1 6.9 (H) 5.7 5.5 5.6 5.7     Comment for A1C at  4:39 AM on 8/4/2016:  According to ADA guidelines, hemoglobin A1C <7.0% represents  optimal control in non-pregnant diabetic patients.  Different  metrics may apply to specific populations.   Standards of Medical Care in Diabetes - 2016.  For the purpose of screening for the presence of diabetes:  <5.7%     Consistent with the absence of diabetes  5.7-6.4%  Consistent with increasing risk for diabetes   (prediabetes)  >or=6.5%  Consistent with " diabetes  Currently no consensus exists for use of hemoglobin A1C  for diagnosis of diabetes for children.      Comment for A1C at  8:51 PM on 1/7/2017:  According to ADA guidelines, hemoglobin A1C <7.0% represents  optimal control in non-pregnant diabetic patients.  Different  metrics may apply to specific populations.   Standards of Medical Care in Diabetes - 2016.  For the purpose of screening for the presence of diabetes:  <5.7%     Consistent with the absence of diabetes  5.7-6.4%  Consistent with increasing risk for diabetes   (prediabetes)  >or=6.5%  Consistent with diabetes  Currently no consensus exists for use of hemoglobin A1C  for diagnosis of diabetes for children.        Pending Labs     Order Current Status    Blood culture #1 **CANNOT BE ORDERED STAT** Preliminary result      Allergies as of 4/12/2017     No Known Allergies      Advance Directives     An advance directive is a document which, in the event you are no longer able to make decisions for yourself, tells your healthcare team what kind of treatment you do or do not want to receive, or who you would like to make those decisions for you.  If you do not currently have an advance directive, [facility] encourages you to create one.  For more information call:  [phone]), [phone] ([phone]),  or log on to [email or web address].        Language Assistance Services     ATTENTION: Language assistance services are available, free of charge. Please call [phone].      ATENCIÓN: Si habla español, tiene a diaz disposición servicios gratuitos de asistencia lingüística. Llame al [phone].     CHÚ Ý: N?u b?n nói Ti?ng Vi?t, có các d?ch v? h? tr? ngôn ng? mi?n phí dành cho b?n. G?i s? [phone].        Heart Failure Education       Heart Failure: Being Active  You have a condition called heart failure. Being active doesnt mean that you have to wear yourself out. Even a little movement each day helps to strengthen  your heart. If you cant get out to exercise, you can do simple stretching and strengthening exercises at home. These are good ways to keep you well-conditioned and prevent you and your heart from becoming excessively weak.    Ideas to get you started  · Add a little movement to things you do now. Walk to mail letters. Park your car at the far end of the parking lot and walk to the store. Walk up a flight of stairs instead of taking the elevator.  · Choose activities you enjoy. You might walk, swim, or ride an exercise bike. Things like gardening and washing the car count, too. Other possibilities include: washing dishes, walking the dog, walking around the mall, and doing aerobic activities with friends.  · Join a group exercise program at a [facility] or [facility], a senior center, or a community center. Or look into a hospital cardiac rehabilitation program. Ask your doctor if you qualify.  Tips to keep you going  · Get up and get dressed each day. Go to a coffee shop and read a newspaper or go somewhere that you'll be in the presence of other active people. Youll feel more like being active.  · Make a plan. Choose one or more activities that you enjoy and that you can easily do. Then plan to do at least one each day. You might write your plan on a calendar.  · Go with a friend or a group if you like company. This can help you feel supported and stay motivated, too.  · Plan social events that you enjoy. This will keep you mentally engaged as well as physically motivated to do things you find pleasure in.  For your safety  · Talk with your healthcare provider before starting an exercise program.  · Exercise indoors when its too hot or too cold outside, or when the air quality is poor. Try walking at a shopping mall.  · Wear socks and sturdy shoes to maintain your balance and prevent falls.  · Start slowly. Do a few minutes several times a day at first. Increase your time and speed little by little.  · Stop and rest  whenever you feel tired or get short of breath.  · Dont push yourself on days when you dont feel well.  Date Last Reviewed: 3/20/2016  © 1517-7228 [employer]. [address] [place] [address], [place] [address]. All rights reserved. This information is not intended as a substitute for professional medical care. Always follow your healthcare professional's instructions.              Heart Failure: Evaluating Your Heart  You have a condition called heart failure. To evaluate your condition, your doctor will examine you, ask questions, and do some tests. Along with looking for signs of heart failure, the doctor looks for any other health problems that may have led to heart failure. The results of your evaluation will help your doctor form a treatment plan.  Health history and physical exam  Your visit will start with a health history. Tell the doctor about any symptoms youve noticed and about all medicines you take. Then youll have a physical exam. This includes listening to your heartbeat and breathing. Youll also be checked for swelling (edema) in your legs and neck. When you have fluid buildup or fluid in the lungs, it may be called congestive heart failure.  Diagnosing heart failure     During an echocardiogram, sound waves bounce off the heart. These are converted into a picture on the screen.   The following may be done to help your doctor form a diagnosis:  · X-rays show the size and shape of your heart. These pictures can also show fluid in your lungs.  · An electrocardiogram (ECG or EKG) shows the pattern of your heartbeat. Small pads (electrodes) are placed on your chest, arms, and legs. Wires connect the pads to the ECG machine, which records your hearts electrical signals. This can give the doctor information about heart function.  · An echocardiogram uses ultrasound waves to show the structure and movement of your heart muscle. This shows how well the heart pumps. It also shows the thickness of  the heart walls, and if the heart is enlarged. It is one of the most useful, non-invasive tests as it provides information about the heart's general function. This helps your doctor make treatment decisions.  · Lab tests evaluate small amounts of blood or urine for signs of problems. A BNP lab test can help diagnose and evaluate heart failure. BNP stands for B-type natriuretic peptide. The ventricles secrete more BNP when heart failure worsens. Lab tests can also provide information about metabolic dysfunction or heart dysfunction.  Your treatment plan  Based on the results of your evaluation and tests, your doctor will develop a treatment plan. This plan is designed to relieve some of your heart failure symptoms and help make you more comfortable. Your treatment plan may include:  · Medicine to help your heart work better and improve your quality of life  · Changes in what you eat and drink to help prevent fluid from backing up in your body  · Daily monitoring of your weight and heart failure symptoms to see how well your treatment plan is working  · Exercise to help you stay healthy  · Help with quitting smoking  · Emotional and psychological support to help adjust to the changes  · Referrals to other specialists to make sure you are being treated comprehensively  Date Last Reviewed: 3/21/2016  © 8939-5240 [employer]. [address] [place] [address], [place] [address]. All rights reserved. This information is not intended as a substitute for professional medical care. Always follow your healthcare professional's instructions.              Heart Failure: Making Changes to Your Diet  You have a condition called heart failure. When you have heart failure, excess fluid is more likely to build up in your body because your heart isn't working well. This makes the heart work harder to pump blood. Fluid buildup causes symptoms such as shortness of breath and swelling (edema). This is often referred to as congestive  heart failure or CHF. Controlling the amount of salt (sodium) you eat may help stop fluid from building up. Your doctor may also tell you to reduce the amount of fluid you drink.  Reading food labels    Your healthcare provider will tell you how much sodium you can eat each day. Read food labels to keep track. Keep in mind that certain foods are high in salt. These include canned, frozen, and processed foods. Check the amount of sodium in each serving. Watch out for high-sodium ingredients. These include MSG (monosodium glutamate), baking soda, and sodium phosphate.   Eating less salt  Give yourself time to get used to eating less salt. It may take a little while. Here are some tips to help:  · Take the saltshaker off the table. Replace it with salt-free herb mixes and spices.  · Eat fresh or plain frozen vegetables. These have much less salt than canned vegetables.  · Choose low-sodium snacks like sodium-free pretzels, crackers, or air-popped popcorn.  · Dont add salt to your food when youre cooking. Instead, season your foods with pepper, lemon, garlic, or onion.  · When you eat out, ask that your food be cooked without added salt.  · Avoid eating fried foods as these often have a great deal of salt.  If youre told to limit fluids  You may need to limit how much fluid you have to help prevent swelling. This includes anything that is liquid at room temperature, such as ice cream and soup. If your doctor tells you to limit fluid, try these tips:  · Measure drinks in a measuring cup before you drink them. This will help you meet daily goals.  · Chill drinks to make them more refreshing.  · Suck on frozen lemon wedges to quench thirst.  · Only drink when youre thirsty.  · Chew sugarless gum or suck on hard candy to keep your mouth moist.  · Weigh yourself daily to know if your body's fluid content is rising.  My sodium goal  Your healthcare provider may give you a sodium goal to meet each day. This includes sodium  found in food as well as salt that you add. My goal is to eat no more than ___________ mg of sodium per day.     When to call your doctor  Call your doctor right away if you have any symptoms of worsening heart failure. These can include:  · Sudden weight gain  · Increased swelling of your legs or ankles  · Trouble breathing when youre resting or at night  · Increase in the number of pillows you have to sleep on  · Chest pain, pressure, discomfort, or pain in the jaw, neck, or back   Date Last Reviewed: 3/21/2016  © 5237-5889 [employer]. [address] [place] [address]. All rights reserved. This information is not intended as a substitute for professional medical care. Always follow your healthcare professional's instructions.              Heart Failure: Medicines to Help Your Heart    You have a condition called heart failure (also known as congestive heart failure, or CHF). Your doctor will likely prescribe medicines for heart failure and any underlying health problems you have. Most heart failure patients take one or more types of medicinen. Your healthcare provider will work to find the combination of medicines that works best for you.  Heart failure medicines  Here are the most common heart failure medicines:  · ACE inhibitors lower blood pressure and decrease strain on the heart. This makes it easier for the heart to pump. Angiotensin receptor blockers have similar effects. These are prescribed for some patients instead of ACE inhibitors.  · Beta-blockers relieve stress on the heart. They also improve symptoms. They may also improve the heart's pumping action over time.  · Diuretics (also called water pills) help rid your body of excess water. This can help rid your body of swelling (edema). Having less fluid to pump means your heart doesnt have to work as hard. Some diuretics make your body lose a mineral called potassium. Your doctor will tell you if you need to take supplements  or eat more foods high in potassium.  · Digoxin helps your heart pump with more strength. This helps your heart pump more blood with each beat. So, more oxygen-rich blood travels to the rest of the body.  · Aldosterone antagonists help alter hormones and decrease strain on the heart.  · Hydralazine and nitrates are two separate medicines used together to treat heart failure. They may come in one combination pill. They lower blood pressure and decrease how hard the heart has to pump.  Medicines for related conditions  Controlling other heart problems helps keep heart failure under control, too. Depending on other heart problems you have, medicines may be prescribed to:  · Lower blood pressure (antihypertensives).  · Lower cholesterol levels (statins).  · Prevent blood clots (anticoagulants or aspirin).  · Keep the heartbeat steady (antiarrhythmics).  Date Last Reviewed: 3/5/2016  © 4485-8924 [employer]. [address] [place] [address]. All rights reserved. This information is not intended as a substitute for professional medical care. Always follow your healthcare professional's instructions.              Heart Failure: Procedures That May Help    The heart is a muscle that pumps oxygen-rich blood to all parts of the body. When you have heart failure, the heart is not able to pump as well as it should. Blood and fluid may back up into the lungs (congestive heart failure), and some parts of the body dont get enough oxygen-rich blood to work normally. These problems lead to the symptoms of heart failure.     Certain procedures may help the heart pump better in some cases of heart failure. Some procedures are done to treat health problems that may have caused the heart failure such as coronary artery disease or heart rhythm problems. For more serious heart failure, other options are available.  Treating artery and valve problems  If you have coronary artery disease or valve disease,  procedures may be done to improve blood flow. This helps the heart pump better, which can improve heart failure symptoms. First, your doctor may do a cardiac catheterization to help detect clogged blood vessels or valve damage. During this procedure, a  thin tube (catheter) in inserted into a blood vessel and guided to the heart. There a dye is injected and a special type of X-ray (angiogram) is taken of the blood vessels. Procedures to open a blocked artery or fix damaged valves can also be done using catheterization.  · Angioplasty uses a balloon-tipped instrument at the end of the catheter. The balloon is inflated to widen the narrowed artery. In many cases, a stent is expanded to further support the narrowed artery. A stent is a metal mesh tube.  · Valve surgery repairs or replacement of faulty valves can also be done during catheterization so blood can flow properly through the chambers of the heart.  Bypass surgery is another option to help treat blocked arteries. It uses a healthy blood vessel from elsewhere in the body. The healthy blood vessel is attached above and below the blocked area so that blood can flow around the blocked artery.  Treating heart rhythm problems  A device may be placed in the chest to help a weak heart maintain a healthy, heartbeat so the heart can pump more effectively:  · Pacemaker. A pacemaker is an implanted device that regulates your heartbeat electronically. It monitors your heart's rhythm and generates a painless electric impulse that helps the heart beat in a regular rhythm. A pacemaker is programmed to meet your specific heart rhythm needs.  · Biventricular pacing/cardiac resynchronization therapy. A type of pacemaker that paces both pumping chambers of the heart at the same time to coordinate contractions and to improve the heart's function. Some people with heart failure are candidates for this therapy.  · Implantable cardioverter defibrillator. A device similar to a  pacemaker that senses when the heart is beating too fast and delivers an electrical shock to convert the fast rhythm to a normal rhythm. This can be a life saving device.  In severe cases  In more serious cases of heart failure when other treatments no longer work, other options may include:  · Ventricular assist devices (VADs). These are mechanical devices used to take over the pumping function for one or both of the heart's ventricles, or pumping chambers. A VAD may be necessary when heart failure progresses to the point that medicines and other treatments no longer help. In some cases, a VAD may be used as a bridge to transplant.  · Heart transplant. This is replacing the diseased heart with a healthy one from a donor. This is an option for a few people who are very sick. A heart transplant is very serious and not an option for all patients. Your doctor can tell you more.  Date Last Reviewed: 3/20/2016  © 0481-1285 [employer]. [address] [place] [address]. All rights reserved. This information is not intended as a substitute for professional medical care. Always follow your healthcare professional's instructions.              Heart Failure: Tracking Your Weight  You have a condition called heart failure. When you have heart failure, a sudden weight gain or a steady rise in weight is a warning sign that your body is retaining too much water and salt. This could mean your heart failure is getting worse. If left untreated, it can cause problems for your lungs and result in shortness of breath. Weighing yourself each day is the best way to know if youre retaining water. If your weight goes up quickly, call your doctor. You will be given instructions on how to get rid of the excess water. You will likely need medicines and to avoid salt. This will help your heart work better.  Call your doctor if you gain more than 2 pounds in 1 day, more than 5 pounds in 1 week, or whatever weight gain  you were told to report by your doctor. This is often a sign of worsening heart failure and needs to be evaluated and treated. Your doctor will tell you what to do next.   Tips for weighing yourself    · Weigh yourself at the same time each morning, wearing the same clothes. Weigh yourself after urinating and before eating.  · Use the same scale each day. Make sure the numbers are easy to read. Put the scale on a flat, hard surface -- not on a rug or carpet.  · Do not stop weighing yourself. If you forget one day, weigh again the next morning.  How to use your weight chart  · Keep your weight chart near the scale. Write your weight on the chart as soon as you get off the scale.  · Fill in the month and the start date on the chart. Then write down your weight each day. Your chart will look like this:    · If you miss a day, leave the space blank. Weigh yourself the next day and write your weight in the next space.  · Take your weight chart with you when you go to see your doctor.  Date Last Reviewed: 3/20/2016  © 0564-3669 [employer]. [address] [place] [address]. All rights reserved. This information is not intended as a substitute for professional medical care. Always follow your healthcare professional's instructions.              Heart Failure: Warning Signs of a Flare-Up  You have a condition called heart failure. Once you have heart failure, flare-ups can happen. Below are signs that can mean your heart failure is getting worse. If you notice any of these warning signs, call your healthcare provider.  Swelling    · Your feet, ankles, or lower legs get puffier.  · You notice skin changes on your lower legs.  · Your shoes feel too tight.  · Your clothes are tighter in the waist.  · You have trouble getting rings on or off your fingers.  Shortness of breath  · You have to breathe harder even when youre doing your normal activities or when youre resting.  · You are short of breath  walking up stairs or even short distances.  · You wake up at night short of breath or coughing.  · You need to use more pillows or sit up to sleep.  · You wake up tired or restless.  Other warning signs  · You feel weaker, dizzy, or more tired.  · You have chest pain or changes in your heartbeat.  · You have a cough that wont go away.  · You cant remember things or dont feel like eating.  Tracking your weight  Gaining weight is often the first warning sign that heart failure is getting worse. Gaining even a few pounds can be a sign that your body is retaining excess water and salt. Weighing yourself each day in the morning after you urinate and before you eat, is the best way to know if you're retaining water. Get a scale that is easy to read and make sure you wear the same clothes and use the same scale every time you weigh. Your healthcare provider will show you how to track your weight. Call your doctor if you gain more than 2 pounds in 1 day, 5 pounds in 1 week, or whatever weight gain you were told to report by your doctor. This is often a sign of worsening heart failure and needs to be evaluated and treated before it compromises your breathing. Your doctor will tell you what to do next.    Date Last Reviewed: 3/15/2016  © 6338-0015 [employer]. [address] [place] [address], [place] [address]. All rights reserved. This information is not intended as a substitute for professional medical care. Always follow your healthcare professional's instructions.              Chronic Kindey Disease Education             Diabetes Discharge Instructions                                   [facility] Sign-Up     Activating your [facility] account is as easy as 1-2-3!     1) Visit [email or web address], select Sign Up Now, enter this activation code and your date of birth, then select Next.  [email or web address]  Expires: 4/20/2017 12:34 PM      2) Create a username and password to use when you visit [facility] in the future and  select a security question in case you lose your password and select Next.    3) Enter your e-mail address and click Sign Up!    Additional Information  If you have questions, please e-mail [email or web address] or call [phone] to talk to our [facility] staff. Remember, [facility] is NOT to be used for urgent needs. For medical emergencies, dial 911.          [facility] complies with applicable Federal civil rights laws and does not discriminate on the basis of race, color, national origin, age, disability, or sex.

## 2017-04-11 PROBLEM — I48.0 PAROXYSMAL ATRIAL FIBRILLATION: Status: ACTIVE | Noted: 2017-04-11

## 2017-04-11 PROBLEM — I50.33 ACUTE ON CHRONIC DIASTOLIC HEART FAILURE: Status: ACTIVE | Noted: 2017-04-11

## 2017-04-11 LAB
ALBUMIN SERPL BCP-MCNC: 3 G/DL
ALP SERPL-CCNC: 122 U/L
ALT SERPL W/O P-5'-P-CCNC: <5 U/L
ANION GAP SERPL CALC-SCNC: 14 MMOL/L
AST SERPL-CCNC: 19 U/L
BASOPHILS # BLD AUTO: 0.02 K/UL
BASOPHILS NFR BLD: 0.4 %
BILIRUB SERPL-MCNC: 1.1 MG/DL
BUN SERPL-MCNC: 31 MG/DL
BUN SERPL-MCNC: 68 MG/DL (ref 6–30)
CALCIUM SERPL-MCNC: 9.7 MG/DL
CHLORIDE SERPL-SCNC: 107 MMOL/L (ref 95–110)
CHLORIDE SERPL-SCNC: 98 MMOL/L
CO2 SERPL-SCNC: 27 MMOL/L
CREAT SERPL-MCNC: 6.7 MG/DL
CREAT SERPL-MCNC: 8.2 MG/DL (ref 0.5–1.4)
DIFFERENTIAL METHOD: ABNORMAL
EOSINOPHIL # BLD AUTO: 0.1 K/UL
EOSINOPHIL NFR BLD: 1.7 %
ERYTHROCYTE [DISTWIDTH] IN BLOOD BY AUTOMATED COUNT: 18.1 %
EST. GFR  (AFRICAN AMERICAN): 9 ML/MIN/1.73 M^2
EST. GFR  (NON AFRICAN AMERICAN): 7.8 ML/MIN/1.73 M^2
GLUCOSE SERPL-MCNC: 62 MG/DL (ref 70–110)
GLUCOSE SERPL-MCNC: 84 MG/DL
HCT VFR BLD AUTO: 31.9 %
HCT VFR BLD CALC: 34 %PCV (ref 36–54)
HGB BLD-MCNC: 10.8 G/DL
INR PPP: 1.2
LYMPHOCYTES # BLD AUTO: 1 K/UL
LYMPHOCYTES NFR BLD: 19.8 %
MAGNESIUM SERPL-MCNC: 2.4 MG/DL
MCH RBC QN AUTO: 30 PG
MCHC RBC AUTO-ENTMCNC: 33.9 %
MCV RBC AUTO: 89 FL
MONOCYTES # BLD AUTO: 0.7 K/UL
MONOCYTES NFR BLD: 13.6 %
NEUTROPHILS # BLD AUTO: 3.3 K/UL
NEUTROPHILS NFR BLD: 64.3 %
PHOSPHATE SERPL-MCNC: 5.3 MG/DL
PLATELET # BLD AUTO: 69 K/UL
PMV BLD AUTO: 10.2 FL
POC IONIZED CALCIUM: 0.79 MMOL/L (ref 1.06–1.42)
POC TCO2 (MEASURED): 23 MMOL/L (ref 23–29)
POCT GLUCOSE: 104 MG/DL (ref 70–110)
POCT GLUCOSE: 108 MG/DL (ref 70–110)
POCT GLUCOSE: 137 MG/DL (ref 70–110)
POCT GLUCOSE: 142 MG/DL (ref 70–110)
POCT GLUCOSE: 90 MG/DL (ref 70–110)
POCT GLUCOSE: 91 MG/DL (ref 70–110)
POCT GLUCOSE: 97 MG/DL (ref 70–110)
POTASSIUM BLD-SCNC: >9 MMOL/L (ref 3.5–5.1)
POTASSIUM SERPL-SCNC: 5.2 MMOL/L
PROT SERPL-MCNC: 7.8 G/DL
PROTHROMBIN TIME: 12.4 SEC
RBC # BLD AUTO: 3.6 M/UL
SAMPLE: ABNORMAL
SODIUM BLD-SCNC: 130 MMOL/L (ref 136–145)
SODIUM SERPL-SCNC: 139 MMOL/L
WBC # BLD AUTO: 5.15 K/UL

## 2017-04-11 PROCEDURE — 83735 ASSAY OF MAGNESIUM: CPT

## 2017-04-11 PROCEDURE — 99223 1ST HOSP IP/OBS HIGH 75: CPT | Mod: GC,,, | Performed by: HOSPITALIST

## 2017-04-11 PROCEDURE — 36415 COLL VENOUS BLD VENIPUNCTURE: CPT

## 2017-04-11 PROCEDURE — 85610 PROTHROMBIN TIME: CPT

## 2017-04-11 PROCEDURE — 84100 ASSAY OF PHOSPHORUS: CPT

## 2017-04-11 PROCEDURE — 80100016 HC MAINTENANCE HEMODIALYSIS

## 2017-04-11 PROCEDURE — 20600001 HC STEP DOWN PRIVATE ROOM

## 2017-04-11 PROCEDURE — 80053 COMPREHEN METABOLIC PANEL: CPT

## 2017-04-11 PROCEDURE — 85025 COMPLETE CBC W/AUTO DIFF WBC: CPT

## 2017-04-11 PROCEDURE — 99223 1ST HOSP IP/OBS HIGH 75: CPT | Mod: 25,,, | Performed by: INTERNAL MEDICINE

## 2017-04-11 PROCEDURE — 25000003 PHARM REV CODE 250: Performed by: HOSPITALIST

## 2017-04-11 PROCEDURE — 90935 HEMODIALYSIS ONE EVALUATION: CPT | Mod: ,,, | Performed by: INTERNAL MEDICINE

## 2017-04-11 RX ORDER — FUROSEMIDE 80 MG/1
80 TABLET ORAL DAILY
Status: ON HOLD | COMMUNITY
End: 2017-04-12 | Stop reason: HOSPADM

## 2017-04-11 RX ORDER — LOPERAMIDE HYDROCHLORIDE 2 MG/1
2 CAPSULE ORAL EVERY 4 HOURS PRN
Status: ON HOLD | COMMUNITY
End: 2017-04-24 | Stop reason: HOSPADM

## 2017-04-11 RX ORDER — HYDRALAZINE HYDROCHLORIDE 50 MG/1
50 TABLET, FILM COATED ORAL 3 TIMES DAILY
Status: ON HOLD | COMMUNITY
End: 2017-04-12 | Stop reason: HOSPADM

## 2017-04-11 RX ORDER — ATORVASTATIN CALCIUM 20 MG/1
20 TABLET, FILM COATED ORAL DAILY
COMMUNITY

## 2017-04-11 RX ORDER — CINACALCET 30 MG/1
30 TABLET, FILM COATED ORAL NIGHTLY
COMMUNITY

## 2017-04-11 RX ORDER — MOMETASONE FUROATE 50 UG/1
2 SPRAY, METERED NASAL DAILY
Status: ON HOLD | COMMUNITY
End: 2017-04-24 | Stop reason: HOSPADM

## 2017-04-11 RX ORDER — LOSARTAN POTASSIUM 50 MG/1
50 TABLET ORAL DAILY
Status: DISCONTINUED | OUTPATIENT
Start: 2017-04-11 | End: 2017-04-12 | Stop reason: HOSPADM

## 2017-04-11 RX ORDER — CETIRIZINE HYDROCHLORIDE 10 MG/1
10 TABLET ORAL DAILY
Status: ON HOLD | COMMUNITY
End: 2018-03-05 | Stop reason: HOSPADM

## 2017-04-11 RX ADMIN — HEPARIN SODIUM 5000 UNITS: 5000 INJECTION, SOLUTION INTRAVENOUS; SUBCUTANEOUS at 02:04

## 2017-04-11 RX ADMIN — HYDRALAZINE HYDROCHLORIDE 100 MG: 25 TABLET, FILM COATED ORAL at 09:04

## 2017-04-11 RX ADMIN — SEVELAMER CARBONATE 800 MG: 800 TABLET, FILM COATED ORAL at 05:04

## 2017-04-11 RX ADMIN — Medication 3 ML: at 06:04

## 2017-04-11 RX ADMIN — HEPARIN SODIUM 5000 UNITS: 5000 INJECTION, SOLUTION INTRAVENOUS; SUBCUTANEOUS at 06:04

## 2017-04-11 RX ADMIN — SODIUM CHLORIDE 300 ML: 0.9 INJECTION, SOLUTION INTRAVENOUS at 10:04

## 2017-04-11 RX ADMIN — Medication 3 ML: at 09:04

## 2017-04-11 RX ADMIN — GABAPENTIN 300 MG: 300 CAPSULE ORAL at 02:04

## 2017-04-11 RX ADMIN — HEPARIN SODIUM 5000 UNITS: 5000 INJECTION, SOLUTION INTRAVENOUS; SUBCUTANEOUS at 09:04

## 2017-04-11 RX ADMIN — HYDRALAZINE HYDROCHLORIDE 100 MG: 25 TABLET, FILM COATED ORAL at 02:04

## 2017-04-11 RX ADMIN — HYDRALAZINE HYDROCHLORIDE 100 MG: 25 TABLET, FILM COATED ORAL at 06:04

## 2017-04-11 RX ADMIN — AMLODIPINE BESYLATE 10 MG: 10 TABLET ORAL at 02:04

## 2017-04-11 RX ADMIN — SEVELAMER CARBONATE 800 MG: 800 TABLET, FILM COATED ORAL at 02:04

## 2017-04-11 RX ADMIN — GABAPENTIN 300 MG: 300 CAPSULE ORAL at 06:04

## 2017-04-11 RX ADMIN — GABAPENTIN 300 MG: 300 CAPSULE ORAL at 09:04

## 2017-04-11 RX ADMIN — Medication 3 ML: at 02:04

## 2017-04-11 RX ADMIN — ASPIRIN 81 MG: 81 TABLET, COATED ORAL at 02:04

## 2017-04-11 RX ADMIN — LOSARTAN POTASSIUM 50 MG: 50 TABLET, FILM COATED ORAL at 05:04

## 2017-04-11 RX ADMIN — ACETAMINOPHEN 650 MG: 325 TABLET ORAL at 03:04

## 2017-04-11 RX ADMIN — ATORVASTATIN CALCIUM 40 MG: 20 TABLET, FILM COATED ORAL at 09:04

## 2017-04-11 NOTE — PLAN OF CARE
Problem: Patient Care Overview  Goal: Plan of Care Review  Outcome: Ongoing (interventions implemented as appropriate)  Pt aaox4. Pt VSS at start of shift. Pt received bedside dialysis, with 3L removed. Pt BP was elevated through out treatment. Bp meds was given after treatment.  Pt on bed rest but requested to sit on the side of the bed. Pt then moved to chair. Accu checks maintained and checked as ordered and were WNL.  Wheels locked. Pt remain free from injuries/falls.

## 2017-04-11 NOTE — H&P
"[facility] Hospital Medicine  History & Physical    Patient Name: Wilder Carbajal  MRN: [id number]  Admission Date: 4/10/2017  Attending Physician: Alberto Roblero MD   Primary Care Provider: Akhil Adorno MD    [facility] Medicine Team: [facility] HOSP MED 5 Sasha Martinez MD     Patient information was obtained from patient and ER records.     Subjective:     Principal Problem:<principal problem not specified>    Chief Complaint:   Chief Complaint   Patient presents with    Missed dialysis     patient states that his last treatment was on Tuesday.         HPI: Mr. Carbajal is 66 y.o. male with hx of DM, HTN, CKD, HLD, asthma, ESR on HD TTS and thyroid disease presenting with weakness, dizziness and worsening lower extremity edema after having missed HD for a week. Pt reports he had a boil to superior buttocks s/p I&D on 3/31/17 with consequent pain, especially when sitting. He says he skipped HD because he could not tolerate sitting through dialysis. This morning he fell on his knees when trying to "slide off" the bed. Denies head trauma. He [facility] to [facility] and had dressing to wound changed and then requested to be brought to ED as he wasn't feeling "right" and knew it was probably due to missing HD. He denies cp, palpitations, fever or chills, nausea, vomiting, diarrhea, abdominal pain, HA, changes in vision. Reports SOB on exertion, worsening abdominal distention and lower extremity edema, shaking and sweats.     Pt lives alone and ambulates with a walker. States he has difficulty with carrying out ADLs.       Past Medical History:   Diagnosis Date    Acute pain of left shoulder 1/7/2017    Arthritis     Asthma     Benign neoplasm of eyelid     RUL    Blood clotting tendency     Blood transfusion     Cataract     Chronic kidney disease requiring chronic dialysis     Diabetes mellitus     Diabetic retinopathy     Fever blister     Hyperlipidemia     Hypertension     " Infertility     Joint pain     Retinal detachment     Thyroid disease     Weakness 1/7/2017       Past Surgical History:   Procedure Laterality Date    CATARACT EXTRACTION      RETINAL DETACHMENT SURGERY         Review of patient's allergies indicates:  No Known Allergies    No current facility-administered medications on file prior to encounter.      Current Outpatient Prescriptions on File Prior to Encounter   Medication Sig    amlodipine (NORVASC) 10 MG tablet Take 1 tablet (10 mg total) by mouth once daily.    aspirin (ECOTRIN) 81 MG EC tablet Take 81 mg by mouth once daily.     atorvastatin (LIPITOR) 40 MG tablet Take 40 mg by mouth every evening.    carbamide peroxide (DEBROX) 6.5 % otic solution Place 5 drops in ear(s) 3 (three) times daily.    diclofenac sodium (VOLTAREN) 1 % Gel Apply 2 g topically 4 (four) times daily as needed (for pain).    ergocalciferol (ERGOCALCIFEROL) 50,000 unit Cap Take 50,000 Units by mouth every 14 (fourteen) days. 1 cap PO weekly x12 wks then twice per month    gabapentin (NEURONTIN) 300 mg tablet Take 300 mg by mouth 3 (three) times daily.      hydrALAZINE (APRESOLINE) 100 MG tablet Take 1 tablet (100 mg total) by mouth every 8 (eight) hours.    hydrocodone-acetaminophen 5-325mg (NORCO) 5-325 mg per tablet Take 1-2 tablets PO q4-6hours PRN pain    ketotifen (ZADITOR) 0.025 % (0.035 %) ophthalmic solution Apply 1 drop to eye every 8 (eight) hours.    lisinopril (PRINIVIL,ZESTRIL) 40 MG tablet Take 40 mg by mouth once daily.    podofilox (CONDYLOX) 0.5 % external solution Apply a small amount to lesions twice daily for three days, then hold for four days and repeat regimen    sevelamer carbonate (RENVELA) 800 mg Tab Take two tablets by mouth four times daily with meals and snacks     Family History     Problem Relation (Age of Onset)    Heart attack Mother    No Known Problems Father, Sister, Brother, Maternal Aunt, Maternal Uncle, Paternal Aunt, Paternal  Uncle, Maternal Grandmother, Maternal Grandfather, Paternal Grandmother, Paternal Grandfather        Social History Main Topics    Smoking status: Never Smoker    Smokeless tobacco: Never Used    Alcohol use Yes      Comment: 1 pint bourbon    Drug use: No    Sexual activity: Not on file     Review of Systems   Constitutional: Positive for activity change, diaphoresis and fatigue. Negative for appetite change, chills and fever.   HENT: Negative for congestion, rhinorrhea, sneezing and sore throat.    Eyes: Negative for visual disturbance.   Cardiovascular: Positive for leg swelling. Negative for chest pain and palpitations.   Gastrointestinal: Positive for abdominal distention and constipation. Negative for abdominal pain, anal bleeding, blood in stool, diarrhea, nausea and vomiting.   Endocrine: Negative for polyuria.   Genitourinary: Positive for decreased urine volume and dysuria. Negative for hematuria.   Musculoskeletal: Positive for back pain (2/2 to recent I&D) and gait problem. Negative for neck pain and neck stiffness.   Skin: Positive for wound. Negative for pallor and rash.   Neurological: Positive for dizziness, weakness and light-headedness. Negative for tremors, syncope, speech difficulty and headaches.   Psychiatric/Behavioral: Negative for agitation and confusion.     Objective:     Vital Signs (Most Recent):  Temp: 98 °F (36.7 °C) (04/10/17 1850)  Pulse: 72 (04/10/17 1900)  Resp: 18 (04/10/17 1850)  BP: (!) 178/86 (04/10/17 1850)  SpO2: 100 % (04/10/17 1850) Vital Signs (24h Range):  Temp:  [98 °F (36.7 °C)-98.4 °F (36.9 °C)] 98 °F (36.7 °C)  Pulse:  [64-76] 72  Resp:  [16-21] 18  SpO2:  [90 %-100 %] 100 %  BP: (178-207)/(82-93) 178/86     Weight: 114.8 kg (253 lb)  Body mass index is 37.36 kg/(m^2).    Physical Exam   Constitutional: He is oriented to person, place, and time. He appears well-developed and well-nourished.   HENT:   Head: Normocephalic and atraumatic.   Right Ear: External ear  normal.   Left Ear: External ear normal.   Nose: Nose normal.   Mouth/Throat: Oropharynx is clear and moist.   Eyes: Conjunctivae and EOM are normal. Pupils are equal, round, and reactive to light.   Neck: Normal range of motion. Neck supple.   Cardiovascular: Normal rate.    Irregularly irregular rhythm   Pulmonary/Chest: Effort normal and breath sounds normal. No respiratory distress. He has no wheezes.   Abdominal: Soft. Bowel sounds are normal. He exhibits no distension. There is no tenderness.   Genitourinary:   Genitourinary Comments: Lt gluteal fold wound packed w dressing, no secretion or erythema   Musculoskeletal: Normal range of motion. Edema: 3+ pitting edema lower extremities bilaterally extending up to thighs. skin is tense and ulcerated.   LUE: AV fistula with bruit and palpable thrill present   Neurological: He is alert and oriented to person, place, and time. No cranial nerve deficit. He exhibits normal muscle tone. Coordination normal.   Skin: Skin is warm.   Psychiatric: He has a normal mood and affect. His behavior is normal. Judgment and thought content normal.        Significant Labs: All pertinent labs within the past 24 hours have been reviewed.    Significant Imaging: I have reviewed and interpreted all pertinent imaging results/findings within the past 24 hours.    Assessment/Plan:     DM II (diabetes mellitus, type II), controlled  - well controlled on diet        HTN (hypertension)  -resume home meds  - amlodipine 10mg PO daily  - hydralazine 100mg PO q 8 h  - lisinopril 40mg PO daily      ESRD (end stage renal disease) on dialysis  - ESRD on HD TTS  - has not been to HD since surgery on 3/31/17  - nephrology consulted for HD  - continue sevelamer carbonate 800mg PO TID    Diabetes mellitus with ESRD (end-stage renal disease)  - well controlled on diet      Coccygeal pain, acute  -s/p I&D for lt gluteal fold abscess on 3/31/17  - not in pain currently      Sacrococcygeal pilonidal  cyst  See coccygeal pain      Hyperkalemia, diminished renal excretion  -K 7.3 in ED  - due to missing HD this past week  - EKG: atrial fibrillation. No spiked T waves.  - shifted with albuterol 15mg x 1, dextrose 25g IV, insulin 10 U  - received calcium gluconate 1g IV  - sodium bicarbonate 50mEq  - kayexalate   - nephrology consulted for emergent HD  - await nephro recs  - f/up cmp       Dependence on hemodialysis  - ESRD on HD TTS  - has not been to HD since surgery on 3/31/17  - nephrology consulted for HD      Paroxysmal atrial fibrillation  - EKG- atrial fibrillation with normal rate  - seen in cardiology clinic 2/2/17 by Dr. Viveros  - rate controlled. Previously on blood thinners, but discontinued based on bleeding issues during dialysis  - Patient with [id number] > 2, cards recommended resuming blood thinner if amenable to Nephrology. Coumadin would be best in the setting of ESRD      Acute on chronic diastolic heart failure  -EF 45% 1 yr ago  -BNP >4000  - worsening SOB per pt  - likely 2/2 to hypervolemia in setting of missed HD sessions in ESRD pt  - nephrology consulted for emergent HD      VTE Risk Mitigation         Ordered     heparin (porcine) injection 5,000 Units  Every 8 hours     Route:  Subcutaneous        04/10/17 1822     Medium Risk of VTE  Once      04/10/17 1916     Place RODRI hose  Until discontinued      04/10/17 1916      Case discussed with Dr. Brower. Staff attestation to follow.  Sasha Martinez MD  Department of Hospital Medicine   [facility]

## 2017-04-11 NOTE — ASSESSMENT & PLAN NOTE
-K 7.3 in ED  - due to missing HD this past week  - EKG: atrial fibrillation. No spiked T waves.  - shifted with albuterol 15mg x 1, dextrose 25g IV, insulin 10 U  - received calcium gluconate 1g IV  - sodium bicarbonate 50mEq  - kayexalate   -on telemetry  -s/p HD yesterday  - still hyperkalemic per morning labs  - HD today

## 2017-04-11 NOTE — PROGRESS NOTES
"[facility] Hospital Medicine  Progress Note    Patient Name: Wilder Carbajal  MRN: [id number]  Patient Class: IP- Inpatient   Admission Date: 4/10/2017  Length of Stay: 1 days  Attending Physician: Ashlee Jang MD  Primary Care Provider: Akhil Adorno MD    [facility] Medicine Team: [facility] HOSP MED 5 Sasha Martinez MD    Subjective:     Principal Problem:Hyperkalemia, diminished renal excretion    HPI:  Mr. Carbajal is 66 y.o. male with hx of DM, HTN, CKD, HLD, asthma, ESR on HD TTS and thyroid disease presenting with weakness, dizziness and worsening lower extremity edema after having missed HD for a week. Pt reports he had a boil to superior buttocks s/p I&D on 3/31/17 with consequent pain, especially when sitting. He says he skipped HD because he could not tolerate sitting through dialysis. This morning he fell on his knees when trying to "slide off" the bed. Denies head trauma. He went to [facility] and had dressing to wound changed and then requested to be brought to ED as he wasn't feeling "right" and knew it was probably due to missing HD. He denies cp, palpitations, fever or chills, nausea, vomiting, diarrhea, abdominal pain, HA, changes in vision. Reports SOB on exertion, worsening abdominal distention and lower extremity edema, shaking and sweats.     Pt lives alone and ambulates with a walker. States he has difficulty with carrying out ADLs.       Hospital Course:  4/11/17 Had HD overnight. Repeat HD today.     Interval History: Underwent HD overnight. Still hyperkalemic this morning. Scheduled for HD again today. Denies cp, palpitations, SOB, HA abdominal pain, nausea or vomiting. No further complaints at this time. PT/OT consulted. No further complaints at this time.    Review of Systemsas above  Objective:     Vital Signs (Most Recent):  Temp: 98.1 °F (36.7 °C) (04/11/17 1400)  Pulse: 82 (04/11/17 1500)  Resp: 18 (04/11/17 0745)  BP: (!) 174/81 (04/11/17 1400)  SpO2: 95 % " (04/11/17 1400) Vital Signs (24h Range):  Temp:  [97.5 °F (36.4 °C)-98.6 °F (37 °C)] 98.1 °F (36.7 °C)  Pulse:  [58-82] 82  Resp:  [16-21] 18  SpO2:  [90 %-100 %] 95 %  BP: (136-207)/() 174/81     Weight: 114.8 kg (253 lb)  Body mass index is 37.36 kg/(m^2).    Intake/Output Summary (Last 24 hours) at 04/11/17 1520  Last data filed at 04/11/17 1215   Gross per 24 hour   Intake             1700 ml   Output             8150 ml   Net            -6450 ml      Physical Exam   Constitutional: He is oriented to person, place, and time. He appears well-developed and well-nourished.   HENT:   Head: Normocephalic and atraumatic.   Nose: Nose normal.   Eyes: Conjunctivae and EOM are normal. Pupils are equal, round, and reactive to light.   Neck: Normal range of motion. Neck supple.   Cardiovascular: Normal rate and regular rhythm.    Pulmonary/Chest: Effort normal and breath sounds normal. No respiratory distress. He has no wheezes.   Abdominal: Soft. Bowel sounds are normal.   Genitourinary:   Genitourinary Comments: Lt gluteal fold wound packed w dressing, no secretion or erythema   Musculoskeletal: Normal range of motion. Edema: 3+ pitting edema lower extremities bilaterally extending up to thighs. skin is tense and ulcerated.   LUE: AV fistula with bruit and palpable thrill present   Neurological: He is alert and oriented to person, place, and time. No cranial nerve deficit.   Skin: Skin is warm.   Psychiatric: He has a normal mood and affect. His behavior is normal. Judgment and thought content normal.       Significant Labs: All pertinent labs within the past 24 hours have been reviewed.    Significant Imaging: I have reviewed and interpreted all pertinent imaging results/findings within the past 24 hours.    Assessment/Plan:      * Hyperkalemia, diminished renal excretion  -K 7.3 in ED  - due to missing HD this past week  - EKG: atrial fibrillation. No spiked T waves.  - shifted with albuterol 15mg x 1, dextrose  25g IV, insulin 10 U  - received calcium gluconate 1g IV  - sodium bicarbonate 50mEq  - kayexalate   -on telemetry  -s/p HD yesterday  - still hyperkalemic per morning labs  - HD today      DM II (diabetes mellitus, type II), controlled  - well controlled on diet        HTN (hypertension)  -resume home meds  - amlodipine 10mg PO daily  - hydralazine 100mg PO q 8 h  - lisinopril 40mg PO daily      ESRD (end stage renal disease) on dialysis  - ESRD on HD TTS  - has not been to HD since surgery on 3/31/17  - nephrology consulted for HD  - continue sevelamer carbonate 800mg PO TID    Diabetes mellitus with ESRD (end-stage renal disease)  - well controlled on diet      Coccygeal pain, acute  -s/p I&D for lt gluteal fold abscess on 3/31/17  - not in pain currently  - [facility] yesterday      Sacrococcygeal pilonidal cyst  See coccygeal pain      Paroxysmal atrial fibrillation  - EKG- atrial fibrillation with normal rate  - seen in cardiology clinic 2/2/17 by Dr. Viveros  - rate controlled. Previously on blood thinners, but discontinued based on bleeding issues during dialysis  - Patient with [id number] > 2, cards recommended resuming blood thinner if amenable to Nephrology. Coumadin would be best in the setting of ESRD      Acute on chronic diastolic heart failure  -EF 45% 1 yr ago  -BNP >4000  - worsening SOB per pt  - likely 2/2 to hypervolemia in setting of missed HD sessions in ESRD pt  - nephrology consulted for emergent HD  - s/p 3.5L fluid removal at HD yesterday  - HD scheduled for today      VTE Risk Mitigation         Ordered     heparin (porcine) injection 5,000 Units  Every 8 hours     Route:  Subcutaneous        04/10/17 1822     Medium Risk of VTE  Once      04/10/17 1916     Place RODRI hose  Until discontinued      04/10/17 1916          Sasha Martinez MD  Department of Hospital Medicine   [facility]

## 2017-04-11 NOTE — ASSESSMENT & PLAN NOTE
-EF 45% 1 yr ago  -BNP >4000  - worsening SOB per pt  - likely 2/2 to hypervolemia in setting of missed HD sessions in ESRD pt  - nephrology consulted for emergent HD

## 2017-04-11 NOTE — PLAN OF CARE
CM received call from Sasha CARLISLE, stating possible discharge of pt today with needs of HH for wound care.  CM forwarded needs to SW with pt current with [facility]  CM will continue to follow with discharge needs.

## 2017-04-11 NOTE — PLAN OF CARE
CM called PT dept with order placed for PT/OT this morning.  CM asking for therapy to possibly see pt today with recommendations for discharge.  INOCENTE spoke with Sindy in PT dept stating she would look into it and call me back.  CM received call back from Candie stating department would try their best to see pt today however consult just put in few hours ago.  If pt not able to be seen today, will be on PT schedule in am.  CM will continue to follow.

## 2017-04-11 NOTE — ASSESSMENT & PLAN NOTE
-resume home meds  - amlodipine 10mg PO daily  - hydralazine 100mg PO q 8 h  - lisinopril 40mg PO daily

## 2017-04-11 NOTE — PLAN OF CARE
Problem: Patient Care Overview  Goal: Plan of Care Review  Outcome: Ongoing (interventions implemented as appropriate)  3 hr hd completed, net fluid [id number], target goal achieved, pt tolerated well. Report given to Thelma YARBROUGH.

## 2017-04-11 NOTE — NURSING
Pt received emergency HD, and 2100 hydralazine and gapapentin was held and given at 0200. Next dose is due at 0600. Notified MD and he verbalized that it was ok to admin 0600 dose. Will continue to monitor.

## 2017-04-11 NOTE — PT/OT/SLP PROGRESS
Physical Therapy      Wilder Carbajal  MRN: [id number]    Patient not seen today. Pt refused PT eval despite encouragement. Will follow-up on next scheduled visit.    Flores Boston, SPT     I certify that I was present in the room directing the student in service delivery and guiding them using my skilled judgment. As the co-signing therapist I have reviewed the students documentation and am responsible for the treatment, assessment, and plan.     Danyel Yee, PT  4/11/2017

## 2017-04-11 NOTE — PROGRESS NOTES
3 hr dialysis tx complete. Tolerated tx well. B/P elevated throughout tx. No other problems during tx. Left arm graft worked well, maintaining a 400 BFR without difficulty. Ultrafiltrated 3 liters of fluid. Hemostasis achieved within 5 mins for each stick. Light pr dsg to needle sticks. LAG with good thrill and bruit.

## 2017-04-11 NOTE — NURSING TRANSFER
Nursing Transfer Note      4/10/2017     Transfer From: ED    Transfer via wheelchair    Transfer with O2 and cardiac monitoring    Transported by transport    Medicines sent: no    Chart send with patient: No    Notified: no one    Patient reassessed at: 1850     Upon arrival to floor: cardiac monitor applied, patient oriented to room, call bell in reach and bed in lowest position    No signs of skin breakdown. Patient has mepore dressing to left buttock where I and D done on boil.

## 2017-04-11 NOTE — PLAN OF CARE
04/11/17 1440   Discharge Assessment   Assessment Type Discharge Planning Assessment   Confirmed/corrected address and phone number on facesheet? Yes   Assessment information obtained from? Patient   Expected Length of Stay (days) 2   Communicated expected length of stay with patient/caregiver yes   Prior to hospitilization cognitive status: Alert/Oriented   Prior to hospitalization functional status: Assistive Equipment   Current cognitive status: Alert/Oriented   Current Functional Status: Assistive Equipment;Needs Assistance   Arrived From home or self-care   Lives With child(robert), adult   Able to Return to Prior Arrangements unable to determine at this time (comments)   Is patient able to care for self after discharge? Unable to determine at this time (comments)   How many people do you have in your home that can help with your care after discharge? 1   Who are your caregiver(s) and their phone number(s)? (lives with son, pt unsure of phone number)   Patient's perception of discharge disposition home health   Readmission Within The Last 30 Days current reason for admission unrelated to previous admission  (had surgery 2 weeks ago with abscess)   Patient currently being followed by outpatient case management? No   Patient currently receives home health services? No   Does the patient currently use HME? Yes   Patient currently receives private duty nursing? N/A   Patient currently receives any other outside agency services? No   Equipment Currently Used at Home shower chair;rollator   Do you have any problems affording any of your prescribed medications? No   Is the patient taking medications as prescribed? yes   Do you have any financial concerns preventing you from receiving the healthcare you need? No   Does the patient have transportation to healthcare appointments? Yes   Transportation Available family or friend will provide   On Dialysis? Yes   If yes, what is the name of the dialysis unit? ([facility] on  [address] [address] [facility])   Does the patient receive outpatient dialysis? Yes   Does the patient receive services at the Coumadin Clinic? No   Are there any open cases? No   Discharge Plan A Home;Home with family;Home Health   Discharge Plan B Home;Home with family   Patient/Family In Agreement With Plan yes     Akhil Adorno MD  [address] [facility] / [place] [address]      [facility]  [address]  [address]  [place] [address]  Phone: [phone] Fax: [phone]      Payor: [facility] / Plan: [facility] / Product Type: Medicare Advantage /

## 2017-04-11 NOTE — ASSESSMENT & PLAN NOTE
- EKG- atrial fibrillation with normal rate  - seen in cardiology clinic 2/2/17 by Dr. Viveros  - rate controlled. Previously on blood thinners, but discontinued based on bleeding issues during dialysis  - Patient with [id number] > 2, cards recommended resuming blood thinner if amenable to Nephrology. Coumadin would be best in the setting of ESRD

## 2017-04-11 NOTE — ASSESSMENT & PLAN NOTE
-K 7.3 in ED  - due to missing HD this past week  - EKG: atrial fibrillation. No spiked T waves.  - shifted with albuterol 15mg x 1, dextrose 25g IV, insulin 10 U  - received calcium gluconate 1g IV  - sodium bicarbonate 50mEq  - kayexalate   - nephrology consulted for emergent HD  - await nephro recs  - f/up cmp

## 2017-04-11 NOTE — MEDICAL/APP STUDENT
"[facility] Hospital Medicine  Progress Note    Patient Name: Wilder Carbajal  MRN: [id number]  Patient Class: IP- Inpatient   Admission Date: 4/10/2017  Length of Stay: 1 days  Attending Physician: Ashlee Jang MD  Primary Care Provider: Akhil Adorno MD    [facility] Medicine Team: [facility] HOSP MED 5 Natalia Hermosillo    Subjective:     Principal Problem:Hyperkalemia, diminished renal excretion    HPI: Wilder Carbajal is a 66 y.o. male that has a past medical history significant of DM, Diabetic retinopathy, Asthma, ESRD TTS, thyroid disease, HTN, HLD. Patient was seen for a boil on the superior buttocks by ID on 3/31/17. Additionally, patient was last dialyzed on Tuesday but only completed two of the four hours of dialysis as he could not tolerate sitting for the whole duration of the treatment. Patient reports that at 1 am Sunday morning he was awakened by sharp 8/10 lower back pain that was constant, that was not aggravated or alleviated by anything. He reports that he tried to "slide off" the end of the bed where he fell to his knees then onto his back, where he reported to call thefire department to assist him in getting up and bringing him into hospital. Patient denies any head trauma or LOC. Patient denies fevers/chills/vomiting/changes in vision. Patient endorses chills//SOB/nausea/worsening lower abdominal pain/lower extremity edema/shaking.     All other review of systems were unremarkable.      Interval History: NAEON. Denies chest pain/SOB/Abdominal pain/nausea/vomiting/fever/chills/sweats/ Endorses anxiety/lightheadedness. Patient mentions he's feeling much better and back pain has resolved. Tolerated dialysis well last night.    Review of Systems   Constitutional: Negative for chills and fever.   HENT: Negative.    Respiratory: Negative for cough, chest tightness, shortness of breath and wheezing.    Cardiovascular: Positive for leg swelling (bilateral). Negative for chest pain and " palpitations.   Gastrointestinal: Negative for abdominal distention, abdominal pain, nausea and vomiting.   Endocrine: Positive for polydipsia.   Genitourinary: Negative.    Musculoskeletal: Negative for back pain.   Skin: Negative.    Neurological: Positive for numbness (hands and feet). Negative for dizziness, weakness, light-headedness and headaches.   Hematological: Negative.    Psychiatric/Behavioral: Negative.      Objective:     Vital Signs (Most Recent):  Temp: 98.1 °F (36.7 °C) (04/11/17 1400)  Pulse: 73 (04/11/17 1400)  Resp: 18 (04/11/17 0745)  BP: (!) 174/81 (04/11/17 1400)  SpO2: 95 % (04/11/17 1400) Vital Signs (24h Range):  Temp:  [97.5 °F (36.4 °C)-98.6 °F (37 °C)] 98.1 °F (36.7 °C)  Pulse:  [58-76] 73  Resp:  [16-21] 18  SpO2:  [90 %-100 %] 95 %  BP: (136-207)/() 174/81     Weight: 114.8 kg (253 lb)  Body mass index is 37.36 kg/(m^2).    Intake/Output Summary (Last 24 hours) at 04/11/17 1440  Last data filed at 04/11/17 1215   Gross per 24 hour   Intake             1700 ml   Output             8150 ml   Net            -6450 ml      Physical Exam   Constitutional: He is oriented to person, place, and time. He appears well-developed and well-nourished. No distress.   HENT:   Head: Normocephalic and atraumatic.   Eyes: EOM are normal. Scleral icterus is present.   Neck: Normal range of motion.   Cardiovascular: Normal rate and regular rhythm.    Murmur (2/6 systolic murmur) heard.  Pulmonary/Chest: Effort normal. No respiratory distress. He has no wheezes. He has rales (bibasilar). He exhibits no tenderness.   Abdominal: Soft. Bowel sounds are normal. He exhibits no distension. There is no tenderness.   Musculoskeletal: He exhibits no tenderness.   Neurological: He is alert and oriented to person, place, and time. No cranial nerve deficit.   Skin: He is not diaphoretic.       Significant Labs:   A1C:   Recent Labs  Lab 01/07/17 2051   HGBA1C 5.7     Blood Culture:   Recent Labs  Lab  04/10/17  1446   LABBLOO No Growth to date     BMP:   Recent Labs  Lab 04/11/17  0457   GLU 84      K 5.2*   CL 98   CO2 27   BUN 31*   CREATININE 6.7*   CALCIUM 9.7   MG 2.4     CBC:   Recent Labs  Lab 04/10/17  1447 04/10/17  1645 04/11/17  0457   WBC 5.50  --  5.15   HGB 10.7*  --  10.8*   HCT 32.2* 34* 31.9*   PLT 78*  --  69*     CMP:   Recent Labs  Lab 04/10/17  1447 04/10/17  1822 04/11/17  0457   * 138 139   K 7.3* 5.9* 5.2*   CL 97 97 98   CO2 22* 24 27   GLU 75 40* 84   BUN 50* 52* 31*   CREATININE 9.1* 9.2* 6.7*   CALCIUM 9.6 10.6* 9.7   PROT 8.0 8.4 7.8   ALBUMIN 3.1* 3.3* 3.0*   BILITOT 0.9 1.0 1.1*   ALKPHOS 118 133 122   AST 23 19 19   ALT <5* <5* <5*   ANIONGAP 16 17* 14   EGFRNONAA 5.4* 5.3* 7.8*       Significant Imaging: I have reviewed all pertinent imaging results/findings within the past 24 hours.    Assessment/Plan:     1) Hyperkalemia  -patient missed Thursday and Saturday HD appts. this week  -(7.3) elevated potassium in the ED  -clinical presentation of muscle weakness and abdominal discomfort    Plan  (Standard)  * Calcium chloride- reduce risks of v/fib  * Insulin w/ glucose- allows for uptake of glucose into the cell which brings potassium intracellularly as well  * Albuterol- promotes reuptake of potassium  * Binding resins (kayexelate, sodium polystyrene)- promote exchange of potassium for sodium in GI  * follow up with nephrology  * monitor potassium levels  * EKG to monitor for abnormal cardiac dysrhythmias    2) HD non-compliance  * ESRD, HD TThS  Speak with  to see what the boundaries are to getting to HD ( if it's a transport issue)    3) A fib  * anticoagulated with heparin  *rate control with amilodipine 10mg    4) Left Gluteal fold pain  *abcess drainage on 3/31/17  * no current intervention indicated, as area is stable and no signs of complication or infection    5) DM2  * well controlled, on diet    Disposition: Home health, as patient is unable to  ambulate by himself.      Active Diagnoses:    Diagnosis Date Noted POA    PRINCIPAL PROBLEM:  Hyperkalemia, diminished renal excretion [E87.5] 01/07/2017 Yes    Paroxysmal atrial fibrillation [I48.0] 04/11/2017 Yes    Acute on chronic diastolic heart failure [I50.33] 04/11/2017 Yes    Dependence on hemodialysis [Z99.2]  Not Applicable    Coccygeal pain, acute [M53.3] 08/04/2016 Yes    Sacrococcygeal pilonidal cyst [L05.91] 08/04/2016 Yes    Diabetes mellitus with ESRD (end-stage renal disease) [E11.22, N18.6] 08/15/2015 Yes    ESRD (end stage renal disease) on dialysis [N18.6, Z99.2] 08/15/2015 Not Applicable    HTN (hypertension) [I10] 12/21/2013 Yes      Problems Resolved During this Admission:    Diagnosis Date Noted Date Resolved POA     VTE Risk Mitigation         Ordered     heparin (porcine) injection 5,000 Units  Every 8 hours     Route:  Subcutaneous        04/10/17 1822     Medium Risk of VTE  Once      04/10/17 1916     Place RODRI hose  Until discontinued      04/10/17 1916          Natalia Hermosillo  Department of Hospital Medicine   [facility]

## 2017-04-11 NOTE — ASSESSMENT & PLAN NOTE
-EF 45% 1 yr ago  -BNP >4000  - worsening SOB per pt  - likely 2/2 to hypervolemia in setting of missed HD sessions in ESRD pt  - nephrology consulted for emergent HD  - s/p 3.5L fluid removal at HD yesterday  - HD scheduled for today

## 2017-04-11 NOTE — PLAN OF CARE
[facility]    HOME HEALTH ORDERS  FACE TO FACE ENCOUNTER    Patient Name: Wilder Carbajal  YOB: 1950    PCP: Akhil Adorno MD   PCP Address: [address] [place] [address]  PCP Phone Number: [phone]  PCP Fax: [phone]    Encounter Date: 04/11/2017    Admit to Home Health    Diagnoses:  Active Hospital Problems    Diagnosis  POA    *Hyperkalemia, diminished renal excretion [E87.5]  Yes    Paroxysmal atrial fibrillation [I48.0]  Yes    Acute on chronic diastolic heart failure [I50.33]  Yes    Dependence on hemodialysis [Z99.2]  Not Applicable    Coccygeal pain, acute [M53.3]  Yes    Sacrococcygeal pilonidal cyst [L05.91]  Yes    Diabetes mellitus with ESRD (end-stage renal disease) [E11.22, N18.6]  Yes    ESRD (end stage renal disease) on dialysis [N18.6, Z99.2]  Not Applicable    HTN (hypertension) [I10]  Yes      Resolved Hospital Problems    Diagnosis Date Resolved POA   No resolved problems to display.       Future Appointments  Date Time Provider Department Center   4/17/2017 10:00 AM Laure Holm DO [facility] GENSUR [facility]   5/17/2017 1:00 PM VASCULAR, LAB [facility]   5/17/2017 1:30 PM Liz Zhou MD [facility] VASCSUR [facility]   5/31/2017 10:00 AM Ambar Schmitd MD [facility] NEURO [facility]           I have seen and examined this patient face to face today. My clinical findings that support the need for the home health skilled services and home bound status are the following:  Weakness/numbness causing balance and gait disturbance due to Heart Failure, Weakness/Debility and end stage renal disease making it taxing to leave home.    Allergies:Review of patient's allergies indicates:  No Known Allergies    Diet: cardiac diet and renal diet    Activities: activity as tolerated    Nursing:   SN to complete comprehensive assessment including routine vital signs. Instruct on disease process and s/s of complications to report to MD.  Review/verify medication list sent home with the patient at time of discharge  and instruct patient/caregiver as needed. Frequency may be adjusted depending on start of care date.    Notify MD if SBP > 160 or < 90; DBP > 90 or < 50; HR > 120 or < 50; Temp > 101; Other:         CONSULTS:    Physical Therapy to evaluate and treat. Evaluate for home safety and equipment needs; Establish/upgrade home exercise program. Perform / instruct on therapeutic exercises, gait training, transfer training, and Range of Motion.  Occupational Therapy to evaluate and treat. Evaluate home environment for safety and equipment needs. Perform/Instruct on transfers, ADL training, ROM, and therapeutic exercises.    MISCELLANEOUS CARE:  N/A          Medications: Review discharge medications with patient and family and provide education.      Current Discharge Medication List      CONTINUE these medications which have NOT CHANGED    Details   amlodipine (NORVASC) 10 MG tablet Take 1 tablet (10 mg total) by mouth once daily.  Qty: 30 tablet, Refills: 3      aspirin (ECOTRIN) 81 MG EC tablet Take 81 mg by mouth once daily.       atorvastatin (LIPITOR) 40 MG tablet Take 40 mg by mouth every evening.      carbamide peroxide (DEBROX) 6.5 % otic solution Place 5 drops in ear(s) 3 (three) times daily.      diclofenac sodium (VOLTAREN) 1 % Gel Apply 2 g topically 4 (four) times daily as needed (for pain).      ergocalciferol (ERGOCALCIFEROL) 50,000 unit Cap Take 50,000 Units by mouth every 14 (fourteen) days. 1 cap PO weekly x12 wks then twice per month      gabapentin (NEURONTIN) 300 mg tablet Take 300 mg by mouth 3 (three) times daily.        hydrALAZINE (APRESOLINE) 100 MG tablet Take 1 tablet (100 mg total) by mouth every 8 (eight) hours.  Qty: 90 tablet, Refills: 3      hydrocodone-acetaminophen 5-325mg (NORCO) 5-325 mg per tablet Take 1-2 tablets PO q4-6hours PRN pain  Qty: 40 tablet, Refills: 0      ketotifen (ZADITOR) 0.025 % (0.035 %)  ophthalmic solution Apply 1 drop to eye every 8 (eight) hours.      lisinopril (PRINIVIL,ZESTRIL) 40 MG tablet Take 40 mg by mouth once daily.      podofilox (CONDYLOX) 0.5 % external solution Apply a small amount to lesions twice daily for three days, then hold for four days and repeat regimen      sevelamer carbonate (RENVELA) 800 mg Tab Take two tablets by mouth four times daily with meals and snacks             I certify that this patient is confined to his home and needs physical therapy and occupational therapy.

## 2017-04-11 NOTE — PLAN OF CARE
Patient expected to discharge home today with home health, will fax home health orders to [facility] once received, will follow.

## 2017-04-11 NOTE — SUBJECTIVE & OBJECTIVE
Past Medical History:   Diagnosis Date    Acute pain of left shoulder 1/7/2017    Arthritis     Asthma     Benign neoplasm of eyelid     RUL    Blood clotting tendency     Blood transfusion     Cataract     Chronic kidney disease requiring chronic dialysis     Diabetes mellitus     Diabetic retinopathy     Fever blister     Hyperlipidemia     Hypertension     Infertility     Joint pain     Retinal detachment     Thyroid disease     Weakness 1/7/2017       Past Surgical History:   Procedure Laterality Date    CATARACT EXTRACTION      RETINAL DETACHMENT SURGERY         Review of patient's allergies indicates:  No Known Allergies    No current facility-administered medications on file prior to encounter.      Current Outpatient Prescriptions on File Prior to Encounter   Medication Sig    amlodipine (NORVASC) 10 MG tablet Take 1 tablet (10 mg total) by mouth once daily.    aspirin (ECOTRIN) 81 MG EC tablet Take 81 mg by mouth once daily.     atorvastatin (LIPITOR) 40 MG tablet Take 40 mg by mouth every evening.    carbamide peroxide (DEBROX) 6.5 % otic solution Place 5 drops in ear(s) 3 (three) times daily.    diclofenac sodium (VOLTAREN) 1 % Gel Apply 2 g topically 4 (four) times daily as needed (for pain).    ergocalciferol (ERGOCALCIFEROL) 50,000 unit Cap Take 50,000 Units by mouth every 14 (fourteen) days. 1 cap PO weekly x12 wks then twice per month    gabapentin (NEURONTIN) 300 mg tablet Take 300 mg by mouth 3 (three) times daily.      hydrALAZINE (APRESOLINE) 100 MG tablet Take 1 tablet (100 mg total) by mouth every 8 (eight) hours.    hydrocodone-acetaminophen 5-325mg (NORCO) 5-325 mg per tablet Take 1-2 tablets PO q4-6hours PRN pain    ketotifen (ZADITOR) 0.025 % (0.035 %) ophthalmic solution Apply 1 drop to eye every 8 (eight) hours.    lisinopril (PRINIVIL,ZESTRIL) 40 MG tablet Take 40 mg by mouth once daily.    podofilox (CONDYLOX) 0.5 % external solution Apply a small amount  to lesions twice daily for three days, then hold for four days and repeat regimen    sevelamer carbonate (RENVELA) 800 mg Tab Take two tablets by mouth four times daily with meals and snacks     Family History     Problem Relation (Age of Onset)    Heart attack Mother    No Known Problems Father, Sister, Brother, Maternal Aunt, Maternal Uncle, Paternal Aunt, Paternal Uncle, Maternal Grandmother, Maternal Grandfather, Paternal Grandmother, Paternal Grandfather        Social History Main Topics    Smoking status: Never Smoker    Smokeless tobacco: Never Used    Alcohol use Yes      Comment: 1 pint bourbon    Drug use: No    Sexual activity: Not on file     Review of Systems   Constitutional: Positive for activity change, diaphoresis and fatigue. Negative for appetite change, chills and fever.   HENT: Negative for congestion, rhinorrhea, sneezing and sore throat.    Eyes: Negative for visual disturbance.   Cardiovascular: Positive for leg swelling. Negative for chest pain and palpitations.   Gastrointestinal: Positive for abdominal distention and constipation. Negative for abdominal pain, anal bleeding, blood in stool, diarrhea, nausea and vomiting.   Endocrine: Negative for polyuria.   Genitourinary: Positive for decreased urine volume and dysuria. Negative for hematuria.   Musculoskeletal: Positive for back pain (2/2 to recent I&D) and gait problem. Negative for neck pain and neck stiffness.   Skin: Positive for wound. Negative for pallor and rash.   Neurological: Positive for dizziness, weakness and light-headedness. Negative for tremors, syncope, speech difficulty and headaches.   Psychiatric/Behavioral: Negative for agitation and confusion.     Objective:     Vital Signs (Most Recent):  Temp: 98 °F (36.7 °C) (04/10/17 1850)  Pulse: 72 (04/10/17 1900)  Resp: 18 (04/10/17 1850)  BP: (!) 178/86 (04/10/17 1850)  SpO2: 100 % (04/10/17 1850) Vital Signs (24h Range):  Temp:  [98 °F (36.7 °C)-98.4 °F (36.9 °C)] 98  °F (36.7 °C)  Pulse:  [64-76] 72  Resp:  [16-21] 18  SpO2:  [90 %-100 %] 100 %  BP: (178-207)/(82-93) 178/86     Weight: 114.8 kg (253 lb)  Body mass index is 37.36 kg/(m^2).    Physical Exam   Constitutional: He is oriented to person, place, and time. He appears well-developed and well-nourished.   HENT:   Head: Normocephalic and atraumatic.   Right Ear: External ear normal.   Left Ear: External ear normal.   Nose: Nose normal.   Mouth/Throat: Oropharynx is clear and moist.   Eyes: Conjunctivae and EOM are normal. Pupils are equal, round, and reactive to light.   Neck: Normal range of motion. Neck supple.   Cardiovascular: Normal rate.    Irregularly irregular rhythm   Pulmonary/Chest: Effort normal and breath sounds normal. No respiratory distress. He has no wheezes.   Abdominal: Soft. Bowel sounds are normal. He exhibits no distension. There is no tenderness.   Genitourinary:   Genitourinary Comments: Lt gluteal fold wound packed w dressing, no secretion or erythema   Musculoskeletal: Normal range of motion. Edema: 3+ pitting edema lower extremities bilaterally extending up to thighs. skin is tense and ulcerated.   LUE: AV fistula with bruit and palpable thrill present   Neurological: He is alert and oriented to person, place, and time. No cranial nerve deficit. He exhibits normal muscle tone. Coordination normal.   Skin: Skin is warm.   Psychiatric: He has a normal mood and affect. His behavior is normal. Judgment and thought content normal.        Significant Labs: All pertinent labs within the past 24 hours have been reviewed.    Significant Imaging: I have reviewed and interpreted all pertinent imaging results/findings within the past 24 hours.

## 2017-04-11 NOTE — ASSESSMENT & PLAN NOTE
- ESRD on HD TTS  - has not been to HD since surgery on 3/31/17  - nephrology consulted for HD  - continue sevelamer carbonate 800mg PO TID

## 2017-04-11 NOTE — PLAN OF CARE
Problem: Patient Care Overview  Goal: Plan of Care Review  Outcome: Ongoing (interventions implemented as appropriate)  Patient acknowledges understanding with pain scale. Appropriate pain medications can be dispensed. Non slip footwear in place. Bed lowered. Rails up x 2. Call bell in easy reach. Pt is afebrile. Hand washing per nursing guidelines. Pt tolerated 3 hours of HD and 4 liters removed. Pt denies distress. Pt sitting up in chair majority of day. Adequate appetite noted. Blood glucose monitored and controlled. D/C planning for tomorrow.

## 2017-04-11 NOTE — PLAN OF CARE
CM received call from PTDanyel, stating went to see pt for therapy with pt refusing stating he is too drained from dialysis.  PT will try again tomorrow, 4/12, with pt for therapy.  CM will continue to follow.

## 2017-04-11 NOTE — SUBJECTIVE & OBJECTIVE
Interval History: Underwent HD overnight. Still hyperkalemic this morning. Scheduled for HD again today. Denies cp, palpitations, SOB, HA abdominal pain, nausea or vomiting. No further complaints at this time. PT/OT consulted. No further complaints at this time.    Review of Systemsas above  Objective:     Vital Signs (Most Recent):  Temp: 98.1 °F (36.7 °C) (04/11/17 1400)  Pulse: 82 (04/11/17 1500)  Resp: 18 (04/11/17 0745)  BP: (!) 174/81 (04/11/17 1400)  SpO2: 95 % (04/11/17 1400) Vital Signs (24h Range):  Temp:  [97.5 °F (36.4 °C)-98.6 °F (37 °C)] 98.1 °F (36.7 °C)  Pulse:  [58-82] 82  Resp:  [16-21] 18  SpO2:  [90 %-100 %] 95 %  BP: (136-207)/() 174/81     Weight: 114.8 kg (253 lb)  Body mass index is 37.36 kg/(m^2).    Intake/Output Summary (Last 24 hours) at 04/11/17 1520  Last data filed at 04/11/17 1215   Gross per 24 hour   Intake             1700 ml   Output             8150 ml   Net            -6450 ml      Physical Exam   Constitutional: He is oriented to person, place, and time. He appears well-developed and well-nourished.   HENT:   Head: Normocephalic and atraumatic.   Nose: Nose normal.   Eyes: Conjunctivae and EOM are normal. Pupils are equal, round, and reactive to light.   Neck: Normal range of motion. Neck supple.   Cardiovascular: Normal rate and regular rhythm.    Pulmonary/Chest: Effort normal and breath sounds normal. No respiratory distress. He has no wheezes.   Abdominal: Soft. Bowel sounds are normal.   Genitourinary:   Genitourinary Comments: Lt gluteal fold wound packed w dressing, no secretion or erythema   Musculoskeletal: Normal range of motion. Edema: 3+ pitting edema lower extremities bilaterally extending up to thighs. skin is tense and ulcerated.   LUE: AV fistula with bruit and palpable thrill present   Neurological: He is alert and oriented to person, place, and time. No cranial nerve deficit.   Skin: Skin is warm.   Psychiatric: He has a normal mood and affect. His  behavior is normal. Judgment and thought content normal.       Significant Labs: All pertinent labs within the past 24 hours have been reviewed.    Significant Imaging: I have reviewed and interpreted all pertinent imaging results/findings within the past 24 hours.

## 2017-04-11 NOTE — PROGRESS NOTES
[facility]  Consult Note Nephrology    Admit Date: 4/10/2017  Consult Requested By: Ashlee Jang MD   LOS: 1 day     SUBJECTIVE:     Follow-up For:  ESRD on iHD    Interval History:  NAEON, SOB much improved, oxygenation better this am. Seen in [place] while on HD. Tolerated well eith out complications or complains.    Review of Systems:  Constitutional: no fever or chills  Respiratory: no cough positive shortness of breath  Cardiovascular: no chest pain or palpitations  Gastrointestinal: no nausea or vomiting, no abdominal pain or change in bowel habits  Hematologic/Lymphatic: no easy bruising or lymphadenopathy  Musculoskeletal: no arthralgias or myalgias  Neurological: no seizures or tremors      OBJECTIVE:     Vital Signs (Most Recent)  Temp: 99 °F (37.2 °C) (04/11/17 1645)  Pulse: 69 (04/11/17 1645)  Resp: 18 (04/11/17 1645)  BP: (!) 155/69 (04/11/17 1645)  SpO2: 96 % (04/11/17 1645)    Vital Signs Range (Last 24H):  Temp:  [97.5 °F (36.4 °C)-99 °F (37.2 °C)]   Pulse:  [58-82]   Resp:  [16-20]   BP: (136-207)/()   SpO2:  [90 %-100 %]       Intake/Output Summary (Last 24 hours) at 04/11/17 1721  Last data filed at 04/11/17 1215   Gross per 24 hour   Intake             2200 ml   Output             8150 ml   Net            -5950 ml         Physical Exam:   General: Well developed, well nourished in NAD  HEENT: Conjunctiva clear; Oropharynx clear  Neck: No JVD noted, Supple  CV- Normal S1, S2 with no murmurs,gallops,rubs  Resp- Lungs CTA Bilaterally, Unlabored  Abdomen- NTND, BS normoactive x4 quads, soft  Extrem- No cyanosis, clubbing, edema.  Skin- No rashes, lesions, ulcers  Neuro: awake, Oriented x3, no FND      Laboratory:  CBC:   Recent Labs  Lab 04/11/17  0457   WBC 5.15   RBC 3.60*   HGB 10.8*   HCT 31.9*   PLT 69*   MCV 89   MCH 30.0   MCHC 33.9     BMP:   Recent Labs  Lab 04/11/17  0457   GLU 84   CL 98   CO2 27   BUN 31*   CREATININE 6.7*   CALCIUM 9.7   MG 2.4     CMP:    Recent Labs  Lab 04/11/17  0457   GLU 84   CALCIUM 9.7   ALBUMIN 3.0*   PROT 7.8      K 5.2*   CO2 27   CL 98   BUN 31*   CREATININE 6.7*   ALKPHOS 122   ALT <5*   AST 19   BILITOT 1.1*     PTH: No results for input(s): PTH in the last 168 hours.  Coagulation:   Recent Labs  Lab 04/11/17  0457   INR 1.2     Cardiac Markers: No results for input(s): CKMB, TROPONINT, MYOGLOBIN in the last 168 hours.  ABGs: No results for input(s): PH, PCO2, HCO3, POCSATURATED, BE in the last 168 hours.    Labs reviewed  Diagnostic Results:  X-Ray: Reviewed  US: Reviewed  Echo: Reviewed    ASSESSMENT/PLAN:     Active Hospital Problems    Diagnosis  POA    *Hyperkalemia, diminished renal excretion [E87.5]  Yes    Paroxysmal atrial fibrillation [I48.0]  Yes    Acute on chronic diastolic heart failure [I50.33]  Yes    Dependence on hemodialysis [Z99.2]  Not Applicable    Coccygeal pain, acute [M53.3]  Yes    Sacrococcygeal pilonidal cyst [L05.91]  Yes    Diabetes mellitus with ESRD (end-stage renal disease) [E11.22, N18.6]  Yes    ESRD (end stage renal disease) on dialysis [N18.6, Z99.2]  Not Applicable    HTN (hypertension) [I10]  Yes      Resolved Hospital Problems    Diagnosis Date Resolved POA   No resolved problems to display.     Wilder Carbajal is a 66 y.o. male with PMHx relevant for DMT2, HTN, HLD, asthma, ESRD on iHD TThS and thyroid disease, hypervolemia, hyperkalemia and missing HD, Nephrology consulted for ESRD management.     ESRD on IHD TTS   Out patient HD Center - [facility]/ Dr. Chavarria  On HD for: ~ 10 years  Duration of outpatient dialysis session - 4.25 hrs  EDW - 253 lbs.  Residual Petty Function - minimal  - Will provide dialysis for metabolic clearance and volume management x 3 hrs  - Seen and examined today during hemodialysis; tolerating treatment well without issues. Denied headaches, chest pain, abdominal pain, or muscle cramps    -   - Target ultrafiltration 4 lts as tolerated keep  MAP > 65  - Dialysate adjusted to current labs   Aceess: LFA-AVG good thrill and bruit     Active problem in hospital  - Hypervolemia   - Hyperkalemia   - Malignant HTN.  -      Anemia of Chronic Kidney Disease   - Will resume LISSETTE with HD   - Hg 10.8 at goal  - Will request iron studies to assess further needs of supplementation      Mineral Bone Disease in CKD   - Renal Function Panel Daily for electrolytes monitoring  - Phos will get labs  - Already on binders, Please continue Sevelamer 1600 mg AC and with snacks.  - CoCa 10.5 low Ca bath  - Will check Vit D and PTHi   - Hold Ergocalciferol   - Cont Sensipar for now     Nutrition   - Renal Diet     HTN   - Uncontrolled BP, will continue to monitor. Goal for BP is <130 mmHg SBP and BDP <80 mmHg.   - Hydralazine 50 mg TID  - Start Losartan 50 mg daily  - Stop Lasix  - consider BB next Labetalol or Coreg for HFrEF    Case discuss with Staff further recs with attestation.     Ken Branham MD  Nephrology Fellow PGY4  [phone]

## 2017-04-11 NOTE — PROGRESS NOTES
3 HR maintenance HD treatment initiated via Lt Fore Arm AVG without difficulty, good a/v flows obtained.

## 2017-04-11 NOTE — ASSESSMENT & PLAN NOTE
- EKG- atrial fibrillation with normal rate  - seen in cardiology clinic 2/2/17 by Dr. Viveros  - rate controlled. Previously on blood thinners, but discontinued based on bleeding issues during dialysis  - Patient with [id number] > 2, cards recommended resuming blood thinner if amenable to Nephrology. Coumadin would be best in the setting of ESRD

## 2017-04-11 NOTE — ASSESSMENT & PLAN NOTE
-s/p I&D for lt gluteal fold abscess on 3/31/17  - not in pain currently  - dressing changed at [facility] yesterday

## 2017-04-12 VITALS
RESPIRATION RATE: 18 BRPM | WEIGHT: 248.88 LBS | BODY MASS INDEX: 36.86 KG/M2 | DIASTOLIC BLOOD PRESSURE: 71 MMHG | HEART RATE: 69 BPM | TEMPERATURE: 98 F | SYSTOLIC BLOOD PRESSURE: 142 MMHG | HEIGHT: 69 IN | OXYGEN SATURATION: 99 %

## 2017-04-12 LAB
25(OH)D3+25(OH)D2 SERPL-MCNC: 39 NG/ML
ALBUMIN SERPL BCP-MCNC: 3 G/DL
ALP SERPL-CCNC: 125 U/L
ALT SERPL W/O P-5'-P-CCNC: 5 U/L
ANION GAP SERPL CALC-SCNC: 11 MMOL/L
AST SERPL-CCNC: 22 U/L
BASOPHILS # BLD AUTO: 0.03 K/UL
BASOPHILS NFR BLD: 0.6 %
BILIRUB SERPL-MCNC: 0.9 MG/DL
BUN SERPL-MCNC: 25 MG/DL
CALCIUM SERPL-MCNC: 9.6 MG/DL
CHLORIDE SERPL-SCNC: 102 MMOL/L
CO2 SERPL-SCNC: 24 MMOL/L
CREAT SERPL-MCNC: 5.7 MG/DL
DIFFERENTIAL METHOD: ABNORMAL
EOSINOPHIL # BLD AUTO: 0.3 K/UL
EOSINOPHIL NFR BLD: 5.1 %
ERYTHROCYTE [DISTWIDTH] IN BLOOD BY AUTOMATED COUNT: 18.2 %
EST. GFR  (AFRICAN AMERICAN): 10.9 ML/MIN/1.73 M^2
EST. GFR  (NON AFRICAN AMERICAN): 9.5 ML/MIN/1.73 M^2
GLUCOSE SERPL-MCNC: 70 MG/DL
HCT VFR BLD AUTO: 34.2 %
HGB BLD-MCNC: 10.9 G/DL
LYMPHOCYTES # BLD AUTO: 1.3 K/UL
LYMPHOCYTES NFR BLD: 22.9 %
MAGNESIUM SERPL-MCNC: 2.5 MG/DL
MCH RBC QN AUTO: 29.3 PG
MCHC RBC AUTO-ENTMCNC: 31.9 %
MCV RBC AUTO: 92 FL
MONOCYTES # BLD AUTO: 0.8 K/UL
MONOCYTES NFR BLD: 13.9 %
NEUTROPHILS # BLD AUTO: 3.1 K/UL
NEUTROPHILS NFR BLD: 57.5 %
PHOSPHATE SERPL-MCNC: 4.8 MG/DL
PLATELET # BLD AUTO: 87 K/UL
PLATELET BLD QL SMEAR: ABNORMAL
PMV BLD AUTO: 10.9 FL
POTASSIUM SERPL-SCNC: 5.1 MMOL/L
PROT SERPL-MCNC: 7.5 G/DL
PTH-INTACT SERPL-MCNC: 865 PG/ML
RBC # BLD AUTO: 3.72 M/UL
SODIUM SERPL-SCNC: 137 MMOL/L
WBC # BLD AUTO: 5.45 K/UL

## 2017-04-12 PROCEDURE — 99238 HOSP IP/OBS DSCHRG MGMT 30/<: CPT | Mod: GC,,, | Performed by: HOSPITALIST

## 2017-04-12 PROCEDURE — 25000003 PHARM REV CODE 250: Performed by: HOSPITALIST

## 2017-04-12 PROCEDURE — 82306 VITAMIN D 25 HYDROXY: CPT

## 2017-04-12 PROCEDURE — 83970 ASSAY OF PARATHORMONE: CPT

## 2017-04-12 PROCEDURE — 84100 ASSAY OF PHOSPHORUS: CPT

## 2017-04-12 PROCEDURE — 97162 PT EVAL MOD COMPLEX 30 MIN: CPT

## 2017-04-12 PROCEDURE — 85025 COMPLETE CBC W/AUTO DIFF WBC: CPT

## 2017-04-12 PROCEDURE — 97530 THERAPEUTIC ACTIVITIES: CPT

## 2017-04-12 PROCEDURE — 36415 COLL VENOUS BLD VENIPUNCTURE: CPT

## 2017-04-12 PROCEDURE — 83735 ASSAY OF MAGNESIUM: CPT

## 2017-04-12 PROCEDURE — 80053 COMPREHEN METABOLIC PANEL: CPT

## 2017-04-12 PROCEDURE — 97165 OT EVAL LOW COMPLEX 30 MIN: CPT

## 2017-04-12 RX ORDER — LOSARTAN POTASSIUM 50 MG/1
50 TABLET ORAL DAILY
Qty: 30 TABLET | Refills: 3 | Status: ON HOLD | OUTPATIENT
Start: 2017-04-12 | End: 2018-03-05 | Stop reason: HOSPADM

## 2017-04-12 RX ADMIN — ACETAMINOPHEN 650 MG: 325 TABLET ORAL at 08:04

## 2017-04-12 RX ADMIN — LOSARTAN POTASSIUM 50 MG: 50 TABLET, FILM COATED ORAL at 08:04

## 2017-04-12 RX ADMIN — ASPIRIN 81 MG: 81 TABLET, COATED ORAL at 08:04

## 2017-04-12 RX ADMIN — GABAPENTIN 300 MG: 300 CAPSULE ORAL at 06:04

## 2017-04-12 RX ADMIN — Medication 3 ML: at 06:04

## 2017-04-12 RX ADMIN — HYDRALAZINE HYDROCHLORIDE 100 MG: 25 TABLET, FILM COATED ORAL at 06:04

## 2017-04-12 RX ADMIN — HEPARIN SODIUM 5000 UNITS: 5000 INJECTION, SOLUTION INTRAVENOUS; SUBCUTANEOUS at 06:04

## 2017-04-12 RX ADMIN — AMLODIPINE BESYLATE 10 MG: 10 TABLET ORAL at 08:04

## 2017-04-12 RX ADMIN — SEVELAMER CARBONATE 800 MG: 800 TABLET, FILM COATED ORAL at 08:04

## 2017-04-12 NOTE — PT/OT/SLP EVAL
Physical Therapy  Evaluation    Wilder Carbajal   MRN: [id number]   Admitting Diagnosis: Hyperkalemia, diminished renal excretion    PT Received On: 04/12/17  PT Start Time: 0901   PT returned at 0916  PT Stop Time: 0909   PT stopped at 0937  PT Total Time (min): 8 min   + 21 minutes =29 minutes     Billable Minutes:  Evaluation 20 minutes  and Therapeutic Activity 9 minutes    Diagnosis: Hyperkalemia, diminished renal excretion    Past Medical History:   Diagnosis Date    Acute pain of left shoulder 1/7/2017    Arthritis     Asthma     Benign neoplasm of eyelid     RUL    Blood clotting tendency     Blood transfusion     Cataract     Chronic kidney disease requiring chronic dialysis     Diabetes mellitus     Diabetic retinopathy     Fever blister     Hyperlipidemia     Hypertension     Infertility     Joint pain     Retinal detachment     Thyroid disease     Weakness 1/7/2017      Past Surgical History:   Procedure Laterality Date    CATARACT EXTRACTION      RETINAL DETACHMENT SURGERY         Referring physician: Sasha Martinez MD  Date referred to PT: 4/11/2017    General Precautions: Standard, fall  Orthopedic Precautions: N/A   Braces: N/A       Do you have any cultural, spiritual, Advent conflicts, given your current situation?: None stated     Patient History:  Lives With: child(robert), adult  Living Arrangements: house  Home Accessibility: stairs to enter home  Number of Stairs to Enter Home: 6  Stair Railings at Home: outside, present on right side  Living Environment Comment: Pt reports living with son in [facility] with 4 NEREYDA; no handrail. Bathroom has tub/shower combo with TTB. Pt's son works at night and sleeps mostly during the day.  PTA, pt was mod (I) for functional mobility using rollator and (I) with ADLs (with increased time to complete). Pt attends [employer], [employer], [employer], [employer]. Pt reports 1 recent fall out his bed  Equipment Currently Used at Home: bath bench,  "rollator    Previous Level of Function:  Ambulation Skills: needs device  Transfer Skills: needs device  ADL Skills: independent  Work/Leisure Activity: independent    Subjective:  Communicated with RN prior to session.  Pt agreeable to PT/OT evaluation. Pt states "Can I finish my breakfast?"   Chief Complaint: weakness   Patient goals: return home     Pain Ratin/10   Pain Rating Post-Intervention: 0/10    Objective:   Patient found with: telemetry     Cognitive Exam:  Oriented to: Person, Place, Time and Situation    Follows Commands/attention: Follows two-step commands  Communication: clear/fluent  Safety awareness/insight to disability: impaired    Physical Exam:  Postural examination/scapula alignment: Rounded shoulder and Head forward    Skin integrity: Thin and Dry  Edema: Mild  In BLE    Sensation:   Intact     Lower Extremity Range of Motion:  Right Lower Extremity: WFL  Left Lower Extremity: WFL    Lower Extremity Strength:  Right Lower Extremity: Deficits: grossly 3+/5  Left Lower Extremity: Deficits: grossly 3+/5     Fine motor coordination:  Intact    Gross motor coordination: WFL    Functional Mobility:  Bed Mobility:       Transfers:  Sit <> Stand Assistance: Stand By Assistance  Sit <> Stand Assistive Device: 4 wheeled walker  Bed <> Chair Technique: Stand Pivot  Bed <> Chair Assistance: Stand By Assistance  Bed <> Chair Assistive Device: 4 wheeled walker    Gait:   Gait Distance: 200'   Assistance 1: Stand by Assistance  Gait Assistive Device: Rollator  Gait Pattern: swing-through gait  Gait Deviation(s): decreased noel, increased time in double stance, decreased velocity of limb motion, decreased stride length, decreased step length    Stairs: did not occur secondary to fatigue     Balance:   Static Sit: GOOD+: Takes MAXIMAL challenges from all directions.    Dynamic Sit: GOOD: Maintains balance through MODERATE excursions of active trunk movement  Static Stand: GOOD-: Takes MODERATE " challenges from all directions inconsistently  Dynamic stand: FAIR+: Needs CLOSE SUPERVISION during gait and is able to right self with minor LOB    Therapeutic Activities and Exercises:  Therapeutic activities aimed to increase pt's independence, safety, and efficiency with bed mobility and functional transfers. See above for assistance levels.   · Pt educated on role of PT and POC/goals for therapy as well as safety with mobility. Pt verbalized understanding. Pt expressed no further concerns/questions.   · Stand balance at sink x 6 minutes to complete self tasks skills. Pt required 1 short seated rest break in bathroom before ambulating to BS chair.   · RN notified patient in BS chair   · CM notified of therapy DC recommendations    AM-PAC 6 CLICK MOBILITY  How much help from another person does this patient currently need?   1 = Unable, Total/Dependent Assistance  2 = A lot, Maximum/Moderate Assistance  3 = A little, Minimum/Contact Guard/Supervision  4 = None, Modified [place]/Independent    Turning over in bed (including adjusting bedclothes, sheets and blankets)?: 3  Sitting down on and standing up from a chair with arms (e.g., wheelchair, bedside commode, etc.): 3  Moving from lying on back to sitting on the side of the bed?: 3  Moving to and from a bed to a chair (including a wheelchair)?: 3  Need to walk in hospital room?: 3  Climbing 3-5 steps with a railing?: 3  Total Score: 18     AM-PAC Raw Score CMS G-Code Modifier Level of Impairment Assistance   6 % Total / Unable   7 - 9 CM 80 - 100% Maximal Assist   10 - 14 CL 60 - 80% Moderate Assist   15 - 19 CK 40 - 60% Moderate Assist   20 - 22 CJ 20 - 40% Minimal Assist   23 CI 1-20% SBA / CGA   24 CH 0% Independent/ Mod I     Patient left up in chair with all lines intact, call button in reach and RN notified.    Assessment:   Wilder Carbajal is a 66 y.o. male with a medical diagnosis of Hyperkalemia, diminished renal excretion.Upon initial PT  evaluation, pt presents with decreased endurance, gait instability, weakness, decreased endurance, and impaired functional mobility. Pt completed all functional mobility with SBA. Pt would benefit from skilled PT services to address these deficits and improve return to PLOF. Anticipate d/c to HHPT with supervision from family.     Rehab identified problem list/impairments: Rehab identified problem list/impairments: weakness, impaired endurance, impaired self care skills, impaired functional mobilty, gait instability, impaired balance, decreased lower extremity function    Rehab potential is good.    Activity tolerance: Good    Discharge recommendations: Discharge Facility/Level Of Care Needs: home health PT, home health OT     Barriers to discharge: Barriers to Discharge: Inaccessible home environment    Equipment recommendations: Equipment Needed After Discharge: none     GOALS:   Physical Therapy Goals        Problem: Physical Therapy Goal    Goal Priority Disciplines Outcome Goal Variances Interventions   Physical Therapy Goal     PT/OT, PT Ongoing (interventions implemented as appropriate)     Description:  Goals to be met by: 2017     Patient will increase functional independence with mobility by performin. Supine to sit with Modified [place]  2. Sit to supine with Modified [place]  3. Sit to stand transfer with Modified [place]  4. Bed to chair transfer with Modified [place] using rollator  5. Gait  x 200 feet with Supervision using rollaotr  6. Ascend/descend 4 stair with no Handrails with Minimal Assistance   7. Lower extremity exercise program x 10 reps per handout, with independence                 PLAN:    Patient to be seen 4 x/week to address the above listed problems via gait training, therapeutic activities, therapeutic exercises, neuromuscular re-education  Plan of Care expires: 17  Plan of Care reviewed with: patient    Magdalena Diaz, PT  2017

## 2017-04-12 NOTE — PLAN OF CARE
Patient ready for discharge, sent home health orders to [facility] at [facility], will follow for additional needs.

## 2017-04-12 NOTE — PLAN OF CARE
04/12/17 1232   Final Note   Assessment Type Final Discharge Note   Discharge Disposition Home-Health   Discharge planning education complete? Yes   Hospital Follow Up  Appt(s) scheduled? Yes   Discharge plans and expectations educations in teach back method with documentation complete? Yes   Offered [facility] Bedside Delivery? n/a   Discharge/Hospital Encounter Summary to ([facility]) PCP n/a   Referral to Outpatient Case Management complete? n/a   Referral to / orders for Home Health Complete? Yes   30 day supply of medicines given at discharge, if documented non-compliance / non-adherence? n/a   Any social issues identified prior to discharge? No   Did you assess the readiness or willingness of the family or caregiver to support self management of care? Yes     Future Appointments  Date Time Provider Department Center   4/17/2017 10:00 AM Laure Holm DO [facility]   5/17/2017 1:00 PM VASCULAR, LAB [facility]   5/17/2017 1:30 PM Liz Zhou MD [facility]   5/31/2017 10:00 AM Ambar Schmidt MD [facility] NE[facility]O [facility]

## 2017-04-12 NOTE — PLAN OF CARE
[facility] called [employer] arranging transportation home for pt.  CM dept will pay $31 for transportation.  Fiorella, floor discharge RN, notified of cab will pickup pt in front of hospital main entrance 15-20minutes.

## 2017-04-12 NOTE — PLAN OF CARE
Problem: Occupational Therapy Goal  Goal: Occupational Therapy Goal  Goals to be met by: 4/22/17    Patient will increase functional independence with ADLs by performing:    UE Dressing with Set-up Assistance.  LE Dressing with Stand-by Assistance and Assistive Devices as needed.  Grooming while standing at sink with Modified [place].  Toileting from toilet with Modified [place] for hygiene and clothing management.   Supine to sit with [place].  Toilet transfer to toilet with Modified [place].    MARIA ESTHER Reyes  Outcome: Ongoing (interventions implemented as appropriate)  Eval completed and POC established.     MARIA ESTHER Reyes

## 2017-04-12 NOTE — PLAN OF CARE
CM in to see pt with pt doing exercises in bed stating PT just left and he wants to get stronger.  Pt AAO pleasant informed of possibility of discharge today with HH and in agreement.  CM will continue to follow through discharge with SW notified of need for HH with [facility].

## 2017-04-12 NOTE — PLAN OF CARE
Problem: Fall Risk (Adult)  Intervention: Safety Promotion/Fall Prevention  Pt educated and re instructed to call when needing assistance. Skid socks on, call bell in reach, personal items in reach. Walker at bedside.  Provided pt with bsc and urinal to prevent long travels to . Pt remained free from injury/falls, will continue to monitor.     04/12/17 0552   Safety Interventions   Safety Promotion/Fall Prevention assistive device/personal item within reach;bed alarm set;Fall Risk reviewed with patient/family;in recliner, wheels locked;nonskid shoes/socks when out of bed;side rails raised x 2           Problem: Patient Care Overview  Goal: Plan of Care Review  Outcome: Ongoing (interventions implemented as appropriate)  Pt VSS, AAox4. No acute changes over night. Pt blood sugar checks maintained, and remained WNL. Pt prefer to sit in chair or side of the bed. Pt remained free from injury/falls. Wheels locked, bed in lowest position, chair locked, call bell in reach, frequent checks maintained for pt safety. Will continue to monitor.

## 2017-04-12 NOTE — SUBJECTIVE & OBJECTIVE
Interval History: Had HD yesterday with 4L fluid removal. NAEON. Pt tolerated PT/OT today. Reports feeling well. Denies cp, palpitations, SOB, HA, nausea, vomiting or abdominal pain. Nephrology started pt on losartan and discontinued lasix. Will arrange for discharge. No further complaints at this time.    Review of SystemsAs above  Objective:     Vital Signs (Most Recent):  Temp: 97.7 °F (36.5 °C) (04/12/17 0834)  Pulse: 69 (04/12/17 0834)  Resp: 18 (04/12/17 0834)  BP: (!) 142/71 (04/12/17 0834)  SpO2: 99 % (04/12/17 0834) Vital Signs (24h Range):  Temp:  [97.7 °F (36.5 °C)-99 °F (37.2 °C)] 97.7 °F (36.5 °C)  Pulse:  [63-82] 69  Resp:  [16-18] 18  SpO2:  [92 %-99 %] 99 %  BP: (142-165)/(69-85) 142/71     Weight: 112.9 kg (248 lb 14.4 oz)  Body mass index is 36.76 kg/(m^2).    Intake/Output Summary (Last 24 hours) at 04/12/17 1436  Last data filed at 04/12/17 0900   Gross per 24 hour   Intake              440 ml   Output              100 ml   Net              340 ml      Physical Exam   Constitutional: He is oriented to person, place, and time. He appears well-developed and well-nourished.   HENT:   Head: Normocephalic and atraumatic.   Nose: Nose normal.   Eyes: Conjunctivae and EOM are normal. Pupils are equal, round, and reactive to light.   Neck: Normal range of motion. Neck supple.   Cardiovascular: Normal rate and regular rhythm.    Pulmonary/Chest: Effort normal and breath sounds normal. No respiratory distress. He has no wheezes.   Abdominal: Soft. Bowel sounds are normal.   Genitourinary:   Genitourinary Comments: Lt gluteal fold wound packed w dressing, no secretion or erythema   Musculoskeletal: Normal range of motion. Edema: 3+ pitting edema lower extremities bilaterally extending up to thighs. skin is tense and ulcerated.   LUE: AV fistula with bruit and palpable thrill present   Neurological: He is alert and oriented to person, place, and time. No cranial nerve deficit.   Skin: Skin is warm.    Psychiatric: He has a normal mood and affect. His behavior is normal. Judgment and thought content normal.       Significant Labs: All pertinent labs within the past 24 hours have been reviewed.    Significant Imaging: I have reviewed and interpreted all pertinent imaging results/findings within the past 24 hours.

## 2017-04-12 NOTE — ASSESSMENT & PLAN NOTE
-hypervolemic and hypoxic on initial presentation 2/2 to missing HD  -EF 45% 1 yr ago  -BNP >4000  - nephrology consulted for emergent HD  - s/p approximately 7.5L removal after 2 sessions of HD.  - clinically improved, not requiring oxygen supplementation

## 2017-04-12 NOTE — ASSESSMENT & PLAN NOTE
-s/p I&D for lt gluteal fold abscess on 3/31/17  - not in pain currently  - [facility] on day of [facility]  - will arrange for home health and wound care

## 2017-04-12 NOTE — ASSESSMENT & PLAN NOTE
-K 7.3 in ED  - due to missing HD this past week  - EKG: atrial fibrillation. No spiked T waves.  - shifted with albuterol 15mg x 1, dextrose 25g IV, insulin 10 U  - received calcium gluconate 1g IV  - sodium bicarbonate 50mEq  - kayexalate   -on telemetry  -s/p HD 4/10, 4/11  - Resolved today K=5.1

## 2017-04-12 NOTE — PLAN OF CARE
Problem: Physical Therapy Goal  Goal: Physical Therapy Goal  Goals to be met by: 2017     Patient will increase functional independence with mobility by performin. Supine to sit with Modified [place]  2. Sit to supine with Modified [place]  3. Sit to stand transfer with Modified [place]  4. Bed to chair transfer with Modified [place] using rollator  5. Gait x 200 feet with Supervision using rollaotr  6. Ascend/descend 6 stair with right Handrails Minimal Assistance   7. Lower extremity exercise program x 10 reps per handout, with independence  Outcome: Ongoing (interventions implemented as appropriate)  Upon initial PT evaluation, pt presents with decreased endurance, gait instability, weakness, decreased endurance, and impaired functional mobility. Pt completed all functional mobility with SBA. Pt would benefit from skilled PT services to address these deficits and improve return to PLOF. Anticipate d/c to HHPT with supervision from family.      Magdalena Diaz DPT, PT  2017        ]

## 2017-04-12 NOTE — ASSESSMENT & PLAN NOTE
- EKG- atrial fibrillation with normal rate  - seen in cardiology clinic 2/2/17 by Dr. Viveros  - rate controlled. Previously on blood thinners, but discontinued based on bleeding issues during dialysis  - Patient with [id number] > 2, cards recommended resuming blood thinner if amenable to Nephrology. Coumadin would be best in the setting of ESRD

## 2017-04-12 NOTE — PT/OT/SLP EVAL
Occupational Therapy  Evaluation / Discharge    Wilder Carbajal   MRN: [id number]   Admitting Diagnosis: Hyperkalemia, diminished renal excretion    OT Date of Treatment: 04/12/17   OT Start Time: 0916  OT Stop Time: 0940  OT Total Time (min): 24 min    Billable Minutes:  Evaluation 24    Diagnosis: Hyperkalemia, diminished renal excretion       Past Medical History:   Diagnosis Date    Acute pain of left shoulder 1/7/2017    Arthritis     Asthma     Benign neoplasm of eyelid     RUL    Blood clotting tendency     Blood transfusion     Cataract     Chronic kidney disease requiring chronic dialysis     Diabetes mellitus     Diabetic retinopathy     Fever blister     Hyperlipidemia     Hypertension     Infertility     Joint pain     Retinal detachment     Thyroid disease     Weakness 1/7/2017      Past Surgical History:   Procedure Laterality Date    CATARACT EXTRACTION      RETINAL DETACHMENT SURGERY         Referring physician: Michelle  Date referred to OT: 4/11/17    General Precautions: Standard, fall  Orthopedic Precautions:    Braces:      Do you have any cultural, spiritual, [place]ybeterian conflicts, given your current situation?: no     Patient History:  Living Environment  Lives With: child(robert), adult  Living Arrangements: house  Home Accessibility: stairs to enter home  Home Layout: Able to live on 1st floor  Number of Stairs to Enter Home: 4  Stair Railings at Home: none  Transportation Available: family or friend will provide  Living Environment Comment: Pt lives with adult son who works at night as a . Attends [employer] and has dialysis on T TH Sat. Pt states that he is afraid at night with no one to help him.. Reported 1 fall OOB recently.  Equipment Currently Used at Home: bath bench, rollator    Prior level of function:   Bed Mobility/Transfers: needs device  Grooming: independent  Bathing: independent  Upper Body Dressing: independent  Lower Body Dressing: needs  assist (With increased time.)  Toileting: independent  Home Management Skills: needs assist        Dominant hand: right    Subjective:  Communicated with nurse prior to session.  Pt agreeable to OT.  Chief Complaint: Weakness, dizzy, LE edema  Patient/Family stated goals: To get back to prior level.    Pain Ratin/10     Objective:  Patient found with: peripheral IV, telemetry   Pt sitting EOB eating breakfast.    Cognitive Exam:  Oriented to: Person, Place, Time and Situation  Follows Commands/attention: Follows multistep  commands  Communication: clear/fluent  Memory:  No Deficits noted  Safety awareness/insight to disability: intact  Coping skills/emotional control: Appropriate to situation    Visual/perceptual:  Intact    Physical Exam:  Postural examination/scapula alignment: No postural abnormalities identified  Skin integrity: Visible skin intact  Edema: Moderate RLE    Sensation:   Intact    Upper Extremity Range of Motion:  Right Upper Extremity: WFL  Left Upper Extremity: WFL    Upper Extremity Strength:  Right Upper Extremity: WFL  Left Upper Extremity: WFL   Strength: WFL    Fine motor coordination:   Intact    Gross motor coordination: WFL    Functional Mobility:  Bed Mobility:  Rolling/Turning to Left: Minimum assistance  Supine to Sit: Minimum Assistance    Transfers:  Sit <> Stand Assistance: Stand By Assistance  Sit <> Stand Assistive Device: 4 wheeled walker  Bed <> Chair Technique: Stand Pivot  Bed <> Chair Transfer Assistance: Stand By Assistance  Bed <> Chair Assistive Device: 4 wheeled walker    Functional Ambulation: SBA with rollator around 200 ft.; no LOB    Activities of Daily Living:  Feeding Level of Assistance: Independent  UE Dressing Level of Assistance: Minimum assistance  LE Dressing Level of Assistance: Maximum assistance  Grooming Position: Standing at sink  Grooming Level of Assistance: Stand by assistance         Balance:   Static Sit: NORMAL: No deviations seen in posture  "held statically  Dynamic Sit: NORMAL: No deviations seen in posture held dynamically  Static Stand: GOOD+: Takes MAXIMAL challenges from all directions  Dynamic stand: GOOD: Needs SUPERVISION only during gait and able to self right with moderate     Therapeutic Activities and Exercises:  BUE ROM/MMT; Functional mobility & t/fs    AM-PAC 6 CLICK ADL  How much help from another person does this patient currently need?  1 = Unable, Total/Dependent Assistance  2 = A lot, Maximum/Moderate Assistance  3 = A little, Minimum/Contact Guard/Supervision  4 = None, Modified [place]/Independent    Putting on and taking off regular lower body clothing? : 2  Bathing (including washing, rinsing, drying)?: 3  Toileting, which includes using toilet, bedpan, or urinal? : 3  Putting on and taking off regular upper body clothing?: 3  Taking care of personal grooming such as brushing teeth?: 4  Eating meals?: 4  Total Score: 19    AM-PAC Raw Score CMS "G-Code Modifier Level of Impairment Assistance   6 % Total / Unable   7 - 9 CM 80 - 100% Maximal Assist   10 - 14 CL 60 - 80% Moderate Assist   15 - 19 CK 40 - 60% Moderate Assist   20 - 22 CJ 20 - 40% Minimal Assist   23 CI 1-20% SBA / CGA   24 CH 0% Independent/ Mod I       Patient left up in chair with call button in reach    Assessment:  Wilder Carbajal is a 66 y.o. male with a medical diagnosis of Hyperkalemia, diminished renal excretion and presents with decreased (I) in multiple ADL areas, functional mobility & t/fs as well as decreased overall strength, ROM endurance and balance. Pt would benefit from IP OT to address these deficits and to facilitate improving (I) with daily tasks.    Rehab identified problem list/impairments: Rehab identified problem list/impairments: weakness, impaired self care skills, impaired balance, decreased coordination, impaired endurance, impaired functional mobilty, decreased upper extremity function, gait instability, decreased lower " extremity function    Rehab potential is good.    Activity tolerance: Good    Discharge recommendations: Discharge Facility/Level Of Care Needs: home health PT, home health OT     Barriers to discharge: Barriers to Discharge: Inaccessible home environment, Decreased caregiver support    Equipment recommendations:       GOALS:   Occupational Therapy Goals        Problem: Occupational Therapy Goal    Goal Priority Disciplines Outcome Interventions   Occupational Therapy Goal     OT, PT/OT Ongoing (interventions implemented as appropriate)    Description:  Goals to be met by: 4/22/17    Patient will increase functional independence with ADLs by performing:    UE Dressing with Set-up Assistance.  LE Dressing with Stand-by Assistance and Assistive Devices as needed.  Grooming while standing at sink with Modified [place].  Toileting from toilet with Modified [place] for hygiene and clothing management.   Supine to sit with [place].  Toilet transfer to toilet with Modified [place].    MARIA ESTHER Reyes                  PLAN:  Patient to be seen 4 x/week to address the above listed problems via self-care/home management, therapeutic activities, therapeutic exercises  Plan of Care expires: 05/12/17  Plan of Care reviewed with: patient         MARIA ESTHER Reyes  04/12/2017

## 2017-04-12 NOTE — ASSESSMENT & PLAN NOTE
- ESRD on HD TTS  - has not been to HD since surgery on 3/31/17  - nephrology consulted for HD  - s/p 2 sessions HD with approx. 7.5L fluid removal  - electrolyte derangements resolved  - continue sevelamer carbonate 800mg PO TID

## 2017-04-12 NOTE — PROGRESS NOTES
"[facility] Hospital Medicine  Progress Note    Patient Name: Wilder Carbajal  MRN: [id number]  Patient Class: IP- Inpatient   Admission Date: 4/10/2017  Length of Stay: 2 days  Attending Physician: No att. providers found  Primary Care Provider: Akhil Adorno MD    [facility] Medicine Team: [facility] 5 Sasha Martinez MD    Subjective:     Principal Problem:Hyperkalemia, diminished renal excretion    HPI:  Mr. Carbajal is 66 y.o. male with hx of DM, HTN, CKD, HLD, asthma, ESR on HD TTS and thyroid disease presenting with weakness, dizziness and worsening lower extremity edema after having missed HD for a week. Pt reports he had a boil to superior buttocks s/p I&D on 3/31/17 with consequent pain, especially when sitting. He says he skipped HD because he could not tolerate sitting through dialysis. This morning he fell on his knees when trying to "slide off" the bed. Denies head trauma. [facility] to [facility] and had dressing to wound changed and then requested to be brought to ED as he wasn't feeling "right" and knew it was probably due to missing HD. He denies cp, palpitations, fever or chills, nausea, vomiting, diarrhea, abdominal pain, HA, changes in vision. Reports SOB on exertion, worsening abdominal distention and lower extremity edema, shaking and sweats.     Pt lives alone and ambulates with a walker. States he has difficulty with carrying out ADLs.       Hospital Course:  4/11/17 Had HD overnight. Repeat HD today.   4/12/17 PT/OT rec home health PT/OT. No electrolyte derangements present today. Will plan to discharge home today with Home Health.      Interval History: Had HD yesterday with 4L fluid removal. NAEON. Pt tolerated PT/OT today. Reports feeling well. Denies cp, palpitations, SOB, HA, nausea, vomiting or abdominal pain. Nephrology started pt on losartan and discontinued lasix. Will arrange for discharge. No further complaints at this time.    Review of SystemsAs " above  Objective:     Vital Signs (Most Recent):  Temp: 97.7 °F (36.5 °C) (04/12/17 0834)  Pulse: 69 (04/12/17 0834)  Resp: 18 (04/12/17 0834)  BP: (!) 142/71 (04/12/17 0834)  SpO2: 99 % (04/12/17 0834) Vital Signs (24h Range):  Temp:  [97.7 °F (36.5 °C)-99 °F (37.2 °C)] 97.7 °F (36.5 °C)  Pulse:  [63-82] 69  Resp:  [16-18] 18  SpO2:  [92 %-99 %] 99 %  BP: (142-165)/(69-85) 142/71     Weight: 112.9 kg (248 lb 14.4 oz)  Body mass index is 36.76 kg/(m^2).    Intake/Output Summary (Last 24 hours) at 04/12/17 1436  Last data filed at 04/12/17 0900   Gross per 24 hour   Intake              440 ml   Output              100 ml   Net              340 ml      Physical Exam   Constitutional: He is oriented to person, place, and time. He appears well-developed and well-nourished.   HENT:   Head: Normocephalic and atraumatic.   Nose: Nose normal.   Eyes: Conjunctivae and EOM are normal. Pupils are equal, round, and reactive to light.   Neck: Normal range of motion. Neck supple.   Cardiovascular: Normal rate and regular rhythm.    Pulmonary/Chest: Effort normal and breath sounds normal. No respiratory distress. He has no wheezes.   Abdominal: Soft. Bowel sounds are normal.   Genitourinary:   Genitourinary Comments: Lt gluteal fold wound packed w dressing, no secretion or erythema   Musculoskeletal: Normal range of motion. Edema: 3+ pitting edema lower extremities bilaterally extending up to thighs. skin is tense and ulcerated.   LUE: AV fistula with bruit and palpable thrill present   Neurological: He is alert and oriented to person, place, and time. No cranial nerve deficit.   Skin: Skin is warm.   Psychiatric: He has a normal mood and affect. His behavior is normal. Judgment and thought content normal.       Significant Labs: All pertinent labs within the past 24 hours have been reviewed.    Significant Imaging: I have reviewed and interpreted all pertinent imaging results/findings within the past 24 hours.    Assessment/Plan:       * Hyperkalemia, diminished renal excretion  -K 7.3 in ED  - due to missing HD this past week  - EKG: atrial fibrillation. No spiked T waves.  - shifted with albuterol 15mg x 1, dextrose 25g IV, insulin 10 U  - received calcium gluconate 1g IV  - sodium bicarbonate 50mEq  - kayexalate   -on telemetry  -s/p HD 4/10, 4/11  - Resolved today K=5.1      DM II (diabetes mellitus, type II), controlled  - well controlled on diet        ESRD (end stage renal disease) on dialysis  - ESRD on HD TTS  - has not been to HD since surgery on 3/31/17  - nephrology consulted for HD  - s/p 2 sessions HD with approx. 7.5L fluid removal  - electrolyte derangements resolved  - continue sevelamer carbonate 800mg PO TID    Diabetes mellitus with ESRD (end-stage renal disease)  - well controlled on diet      Coccygeal pain, acute  -s/p I&D for lt gluteal fold abscess on 3/31/17  - not in pain currently  - [facility] on day of arrival  - will arrange for home health and wound care      Sacrococcygeal pilonidal cyst  See coccygeal pain      Paroxysmal atrial fibrillation  - EKG- atrial fibrillation with normal rate  - seen in cardiology clinic 2/2/17 by Dr. Viveros  - rate controlled. Previously on blood thinners, but discontinued based on bleeding issues during dialysis  - Patient with [id number] > 2, cards recommended resuming blood thinner if amenable to Nephrology. Coumadin would be best in the setting of ESRD      Acute on chronic diastolic heart failure  -hypervolemic and hypoxic on initial presentation 2/2 to missing HD  -EF 45% 1 yr ago  -BNP >4000  - nephrology consulted for emergent HD  - s/p approximately 7.5L removal after 2 sessions of HD.  - clinically improved, not requiring oxygen supplementation      VTE Risk Mitigation         Ordered     heparin (porcine) injection 5,000 Units  Every 8 hours     Route:  Subcutaneous        04/10/17 1822     Medium Risk of VTE  Once      04/10/17 1916     Place RODRI hose   Until discontinued      04/10/17 1916          Sasha Martinez MD  Department of Hospital Medicine   [facility]

## 2017-04-13 NOTE — DISCHARGE SUMMARY
"[facility] Hospital Medicine  Discharge Summary      Patient Name: Wilder Carbajal  MRN: [id number]  Admission Date: 4/10/2017  Hospital Length of Stay: 2 days  Discharge Date and Time:  04/12/2017 8:47 PM  Attending Physician: No att. providers found   Discharging Provider: Sasha Martinez MD  Primary Care Provider: Akhil Adorno MD  Hospital Medicine Team: [facility] HOSP MED 5 Sasha Martinez MD    HPI:   Mr. Carbajal is 66 y.o. male with hx of DM, HTN, CKD, HLD, asthma, ESR on HD TTS and thyroid disease presenting with weakness, dizziness and worsening lower extremity edema after having missed HD for a week. Pt reports he had a boil to superior buttocks s/p I&D on 3/31/17 with consequent pain, especially when sitting. He says he skipped HD because he could not tolerate sitting through dialysis. This morning he fell on his knees when trying to "slide off" the bed. Denies head trauma. He went to [facility] and had dressing to wound changed and then requested to be brought to ED as he wasn't feeling "right" and knew it was probably due to missing HD. He denies cp, palpitations, fever or chills, nausea, vomiting, diarrhea, abdominal pain, HA, changes in vision. Reports SOB on exertion, worsening abdominal distention and lower extremity edema, shaking and sweats.     Pt lives alone and ambulates with a walker. States he has difficulty with carrying out ADLs.       * No surgery found *      Indwelling Lines/Drains at time of discharge:   Lines/Drains/Airways     Drain                 Hemodialysis AV Graft 06/17/14 0915  1030 days              Hospital Course:   4/11/17 Had HD overnight. Repeat HD today.   4/12/17 PT/OT rec home health PT/OT. No electrolyte derangements present today. Will plan to discharge home today with Home Health.       Consults:   Consults         Status Ordering Provider     Inpatient consult to Nephrology  Once     Provider:  (Not yet assigned)    Jesus GRACE" JOSE RAUL CARTER          Significant Diagnostic Studies: Labs: All labs within the past 24 hours have been reviewed    Pending Diagnostic Studies:     None        Final Active Diagnoses:    Diagnosis Date Noted POA    PRINCIPAL PROBLEM:  Hyperkalemia, diminished renal excretion [E87.5] 01/07/2017 Yes    Paroxysmal atrial fibrillation [I48.0] 04/11/2017 Yes    Acute on chronic diastolic heart failure [I50.33] 04/11/2017 Yes    Dependence on hemodialysis [Z99.2]  Not Applicable    Coccygeal pain, acute [M53.3] 08/04/2016 Yes    Sacrococcygeal pilonidal cyst [L05.91] 08/04/2016 Yes    Diabetes mellitus with ESRD (end-stage renal disease) [E11.22, N18.6] 08/15/2015 Yes    ESRD (end stage renal disease) on dialysis [N18.6, Z99.2] 08/15/2015 Not Applicable    HTN (hypertension) [I10] 12/21/2013 Yes      Problems Resolved During this Admission:    Diagnosis Date Noted Date Resolved POA      * Hyperkalemia, diminished renal excretion  -K 7.3 in ED  - due to missing HD this past week  - EKG: atrial fibrillation. No spiked T waves.  - shifted with albuterol 15mg x 1, dextrose 25g IV, insulin 10 U  - received calcium gluconate 1g IV  - sodium bicarbonate 50mEq  - kayexalate   -on telemetry  -s/p HD 4/10, 4/11  - Resolved today K=5.1      DM II (diabetes mellitus, type II), controlled  - well controlled on diet        HTN (hypertension)  -losartan 50mg PO daily      ESRD (end stage renal disease) on dialysis  - ESRD on HD TTS  - has not been to HD since surgery on 3/31/17  - nephrology consulted for HD  - s/p 2 sessions HD with approx. 7.5L fluid removal  - electrolyte derangements resolved  - continue sevelamer carbonate 800mg PO TID    Diabetes mellitus with ESRD (end-stage renal disease)  - well controlled on diet      Coccygeal pain, acute  -s/p I&D for lt gluteal fold abscess on 3/31/17  - not in pain currently  - dressing changed at [facility] on day of arrival  - will arrange for home health and wound  care      Sacrococcygeal pilonidal cyst  See coccygeal pain      Paroxysmal atrial fibrillation  - EKG- atrial fibrillation with normal rate  - seen in cardiology clinic 2/2/17 by Dr. Viveros  - rate controlled. Previously on blood thinners, but discontinued based on bleeding issues during dialysis  - Patient with [id number] > 2, cards recommended resuming blood thinner if amenable to Nephrology. Coumadin would be best in the setting of ESRD      Acute on chronic diastolic heart failure  -hypervolemic and hypoxic on initial presentation 2/2 to missing HD  -EF 45% 1 yr ago  -BNP >4000  - nephrology consulted for emergent HD  - s/p approximately 7.5L removal after 2 sessions of HD.  - clinically improved, not requiring oxygen supplementation        Discharged Condition: stable    Disposition: Home or Self Care    Follow Up:  Follow-up Information     Follow up with [facility].    Specialty:  Physical Therapy    Why:  home health    Contact information:    [address] [facility] [address]  [phone]          Patient Instructions:     Diet renal     Diet Cardiac     Activity as tolerated     Call MD for:  temperature >100.4     Call MD for:  difficulty breathing or increased cough     Call MD for:  severe persistent headache     Call MD for:  persistent dizziness, light-headedness, or visual disturbances     Call MD for:  increased confusion or weakness     Call MD for:  persistent nausea and vomiting or diarrhea     Call MD for:  severe uncontrolled pain     Call MD for:  redness, tenderness, or signs of infection (pain, swelling, redness, odor or green/yellow discharge around incision site)       Medications:  Reconciled Home Medications:   Discharge Medication List as of 4/12/2017  1:27 PM      START taking these medications    Details   losartan (COZAAR) 50 MG tablet Take 1 tablet (50 mg total) by mouth once daily., Starting 4/12/2017, Until u 4/12/18, Normal         CONTINUE these  medications which have NOT CHANGED    Details   aspirin (ECOTRIN) 81 MG EC tablet Take 81 mg by mouth once daily. , Until Discontinued, Historical Med      atorvastatin (LIPITOR) 20 MG tablet Take 20 mg by mouth once daily., Until Discontinued, Historical Med      cetirizine (ZYRTEC) 10 MG tablet Take 10 mg by mouth once daily., Until Discontinued, Historical Med      cinacalcet (SENSIPAR) 30 MG Tab Take 30 mg by mouth every evening., Until Discontinued, Historical Med      diclofenac sodium (VOLTAREN) 1 % Gel Apply 2 g topically 4 (four) times daily as needed (for pain)., Until Discontinued, Historical Med      ergocalciferol (ERGOCALCIFEROL) 50,000 unit Cap Take 50,000 Units by mouth every 14 (fourteen) days. 1 cap PO weekly x12 wks then twice per month, Until Discontinued, Historical Med      GUAIFENESIN/DM/PSEUDOEPHEDRINE (TUSSIN CF ORAL) Take 10 mLs by mouth every 6 (six) hours as needed (for cough)., Until Discontinued, Historical Med      hydrocodone-acetaminophen 5-325mg (NORCO) 5-325 mg per tablet Take 1-2 tablets PO q4-6hours PRN pain, Print      ipratropium (ATROVENT HFA) 17 mcg/actuation inhaler Inhale 2 puffs into the lungs 4 (four) times daily. Do not exceed 12 puffs per day, Until Discontinued, Historical Med      ketotifen (ZADITOR) 0.025 % (0.035 %) ophthalmic solution Apply 1 drop to eye every 8 (eight) hours., Until Discontinued, Historical Med      loperamide (IMODIUM) 2 mg capsule Take 1 capsule by mouth before and after each stool, Until Discontinued, Historical Med      mometasone (NASONEX) 50 mcg/actuation nasal spray 2 sprays by Nasal route once daily., Until Discontinued, Historical Med      podofilox (CONDYLOX) 0.5 % external solution Apply a small amount to lesions twice daily for three days, then hold for four days and repeat regimen, Until Discontinued, Historical Med      sevelamer carbonate (RENVELA) 800 mg Tab Take two tablets by mouth four times daily with meals and snacks, Until  Discontinued, Historical Med         STOP taking these medications       furosemide (LASIX) 80 MG tablet Comments:   Reason for Stopping:         hydrALAZINE (APRESOLINE) 50 MG tablet Comments:   Reason for Stopping:         amlodipine (NORVASC) 10 MG tablet Comments:   Reason for Stopping:         gabapentin (NEURONTIN) 300 mg tablet Comments:   Reason for Stopping:         hydrALAZINE (APRESOLINE) 100 MG tablet Comments:   Reason for Stopping:                 Sasha Martinez MD  Department of Hospital Medicine  [facility]

## 2017-04-13 NOTE — PT/OT/SLP DISCHARGE
Physical Therapy Discharge Summary    Wilder Carbajal  MRN: [id number]   Hyperkalemia, diminished renal excretion   Patient Discharged from acute Physical Therapy on 17.  Please refer to prior PT noted date on 17 for functional status.     Assessment:   Patient appropriate for care in another setting.  GOALS:   Physical Therapy Goals        Problem: Physical Therapy Goal    Goal Priority Disciplines Outcome Goal Variances Interventions   Physical Therapy Goal     PT/OT, PT Ongoing (interventions implemented as appropriate)     Description:  Goals to be met by: 2017     Patient will increase functional independence with mobility by performin. Supine to sit with Modified [place]  2. Sit to supine with Modified [place]  3. Sit to stand transfer with Modified [place]  4. Bed to chair transfer with Modified [place] using rollator  5. Gait  x 200 feet with Supervision using rollaotr  6. Ascend/descend 4 stair with no Handrails with Minimal Assistance   7. Lower extremity exercise program x 10 reps per handout, with independence               Reasons for Discontinuation of Therapy Services  Transfer to alternate level of care.      Plan:  Patient Discharged to: Home with Home Health Service.    Magdalena Diaz DPT, PT  2017

## 2017-04-13 NOTE — ASSESSMENT & PLAN NOTE
- EKG- atrial fibrillation with normal rate  - seen in cardiology clinic 2/2/17 by Dr. Viveros  - rate controlled. Previously on blood thinners, but discontinued based on bleeding issues during dialysis  - Patient with [id number] > 2, cards recommended resuming blood thinner if amenable to Nephrology. Coumadin would be best in the setting of ESRD

## 2017-04-15 LAB — BACTERIA BLD CULT: NORMAL

## 2017-04-17 ENCOUNTER — OFFICE VISIT (OUTPATIENT)
Dept: SURGERY | Facility: CLINIC | Age: 67
End: 2017-04-17
Payer: COMMERCIAL

## 2017-04-17 VITALS
SYSTOLIC BLOOD PRESSURE: 163 MMHG | HEART RATE: 59 BPM | HEIGHT: 69 IN | TEMPERATURE: 98 F | DIASTOLIC BLOOD PRESSURE: 82 MMHG

## 2017-04-17 DIAGNOSIS — L98.9 SKIN LESION: Primary | ICD-10-CM

## 2017-04-17 PROCEDURE — 99999 PR PBB SHADOW E&M-EST. PATIENT-LVL III: CPT | Mod: PBBFAC,,, | Performed by: SURGERY

## 2017-04-17 PROCEDURE — 99024 POSTOP FOLLOW-UP VISIT: CPT | Mod: S$GLB,,, | Performed by: SURGERY

## 2017-04-17 NOTE — MR AVS SNAPSHOT
[facility] Surgery  [address] [place] [address]  4th [address] [place] [address]  Phone: [phone]                  Wilder Carbajal   2017 10:00 AM   Office Visit    Description:  Male : 1950   Provider:  Laure Holm DO   Department:  [facility] Surgery                To Do List           Future Appointments        Provider Department Dept Phone    2017 10:40 AM Laure Holm DO [facility] Surgery [phone]    2017 1:00 PM VASCULAR, LAB [facility] Vascular Laboratory [phone]    2017 1:30 PM Liz Zhou MD [facility] Vascular Surgery [phone]    2017 10:00 AM Ambar Schmidt MD [facility] Neurology [phone]      Goals (5 Years of Data)     None      [facility]     [facility] Care Line -  Assistance  Unless otherwise directed by your provider, please contact [facility], our nurse care line that is available for  assistance.     Registered nurses in the [facility] provide: appointment scheduling, clinical advisement, health education, and other advisory services.  Call: [phone] (toll free)               Medications           Message regarding Medications     Verify the changes and/or additions to your medication regime listed below are the same as discussed with your clinician today.  If any of these changes or additions are incorrect, please notify your healthcare provider.             Verify that the below list of medications is an accurate representation of the medications you are currently taking.  If none reported, the list may be blank. If incorrect, please contact your healthcare provider. Carry this list with you in case of emergency.           Current Medications     aspirin (ECOTRIN) 81 MG EC tablet Take 81 mg by mouth once daily.     atorvastatin (LIPITOR) 20 MG tablet Take 20 mg by mouth once daily.    cetirizine (ZYRTEC) 10 MG tablet Take 10 mg by mouth once daily.  "   cinacalcet (SENSIPAR) 30 MG Tab Take 30 mg by mouth every evening.    diclofenac sodium (VOLTAREN) 1 % Gel Apply 2 g topically 4 (four) times daily as needed (for pain).    ergocalciferol (ERGOCALCIFEROL) 50,000 unit Cap Take 50,000 Units by mouth every 14 (fourteen) days. 1 cap PO weekly x12 wks then twice per month    GUAIFENESIN/DM/PSEUDOEPHEDRINE (TUSSIN CF ORAL) Take 10 mLs by mouth every 6 (six) hours as needed (for cough).    hydrocodone-acetaminophen 5-325mg (NORCO) 5-325 mg per tablet Take 1-2 tablets PO q4-6hours PRN pain    ipratropium (ATROVENT HFA) 17 mcg/actuation inhaler Inhale 2 puffs into the lungs 4 (four) times daily. Do not exceed 12 puffs per day    ketotifen (ZADITOR) 0.025 % (0.035 %) ophthalmic solution Apply 1 drop to eye every 8 (eight) hours.    loperamide (IMODIUM) 2 mg capsule Take 1 capsule by mouth before and after each stool    losartan (COZAAR) 50 MG tablet Take 1 tablet (50 mg total) by mouth once daily.    mometasone (NASONEX) 50 mcg/actuation nasal spray 2 sprays by Nasal route once daily.    podofilox (CONDYLOX) 0.5 % external solution Apply a small amount to lesions twice daily for three days, then hold for four days and repeat regimen    sevelamer carbonate (RENVELA) 800 mg Tab Take two tablets by mouth four times daily with meals and snacks           Clinical Reference Information           Your Vitals Were     BP Pulse Temp Height          163/82 (BP Location: Right arm, Patient Position: Sitting) 59 97.9 °F (36.6 °C) (Oral) 5' 9" (1.753 m)        Blood Pressure          Most Recent Value    BP  (!)  163/82      Allergies as of 4/17/2017     No Known Allergies      Immunizations Administered on Date of Encounter - 4/17/2017     None      Maintenance Dialysis History     Start End Type Comments Center    1/8/1888  [place]  [facility]            Current Dialysis Center Information     [facility] [address] Phone #:  [phone]    " Contact:  N/A MADELINE Aaron  [address] Fax #:  [phone]            [facility] Sign-Up     Activating your [facility] account is as easy as 1-2-3!     1) Visit [email or web address], select Sign Up Now, enter this activation code and your date of birth, then select Next.  [email or web address]  Expires: 4/20/2017 12:34 PM      2) Create a username and password to use when you visit [facility] in the future and select a security question in case you lose your password and select Next.    3) Enter your e-mail address and click Sign Up!    Additional Information  If you have questions, please e-mail [email or web address] or call [phone] to talk to our [facility] staff. Remember, [facility] is NOT to be used for urgent needs. For medical emergencies, dial 911.         Language Assistance Services     ATTENTION: Language assistance services are available, free of charge. Please call [phone].      ATENCIÓN: Si habla angelinaañol, tiene a diaz disposición servicios gratuitos de asistencia lingüística. Llame al [phone].     CHÚ Ý: N?u b?n nói Ti?ng Vi?t, có các d?ch v? h? tr? ngôn ng? mi?n phí dành cho b?n. G?i s? [phone].         [facility] complies with applicable Federal civil rights laws and does not discriminate on the basis of race, color, national origin, age, disability, or sex.

## 2017-04-20 NOTE — PROGRESS NOTES
"Wilder Carbajal is a 66 y.o. male patient.   No diagnosis found.  Past Medical History:   Diagnosis Date    Acute pain of left shoulder 1/7/2017    Arthritis     Asthma     Benign neoplasm of eyelid     RUL    Blood clotting tendency     Blood transfusion     Cataract     Chronic kidney disease requiring chronic dialysis     Diabetes mellitus     Diabetic retinopathy     Fever blister     Hyperlipidemia     Hypertension     Infertility     Joint pain     Retinal detachment     Thyroid disease     Weakness 1/7/2017       Review of patient's allergies indicates:  No Known Allergies  There are no hospital problems to display for this patient.    Blood pressure (!) 163/82, pulse (!) 59, temperature 97.9 °F (36.6 °C), temperature source Oral, height 5' 9" (1.753 m).    Subjective   Patient complains of soreness at the region of his wound.  No drainage.  Firmness noted at the incision site.    Objective wound.  NAD  Buttock wound with running nylon suture intact with some residual areas of dermabond overlying the wound.  Residual Dermabond removed.  Running suture removed.  Incision remained intact.  Periwound inflammation versus edema noted.  No erythema or fluctuance.  Mild tenderness to palpation.    Path:   FINAL PATHOLOGIC DIAGNOSIS  LEFT BUTTOCK SKIN MASS:  -Inflamed skin and dermis with changes suggestive of epidermal inclusion cyst  -Patchy stromal hemorrhage, hemosiderin laden macrophages, and microabscess formation  -Negative for dysplasia or malignancy     Assessment & Plan    66-year-old male 2 weeks status post excision of buttock lesion which appears consistent with an epidermal inclusion cyst:  Continue to keep the region clean and dry  Continue current medications  Follow-up in 3-4 weeks for wound reevaluation.  All questions were answered.    Laure Holm, DO  4/19/2017  "

## 2017-04-21 ENCOUNTER — HOSPITAL ENCOUNTER (INPATIENT)
Facility: HOSPITAL | Age: 67
LOS: 3 days | Discharge: LEFT BEFORE DISCHARGE | DRG: 291 | End: 2017-04-24
Attending: EMERGENCY MEDICINE | Admitting: INTERNAL MEDICINE
Payer: COMMERCIAL

## 2017-04-21 DIAGNOSIS — A41.9 SEPSIS, DUE TO UNSPECIFIED ORGANISM: Primary | ICD-10-CM

## 2017-04-21 DIAGNOSIS — N18.6 DIABETES MELLITUS WITH ESRD (END-STAGE RENAL DISEASE): ICD-10-CM

## 2017-04-21 DIAGNOSIS — R07.9 CHEST PAIN: ICD-10-CM

## 2017-04-21 DIAGNOSIS — I50.33 ACUTE ON CHRONIC DIASTOLIC HEART FAILURE: ICD-10-CM

## 2017-04-21 DIAGNOSIS — Z99.2 DEPENDENCE ON HEMODIALYSIS: ICD-10-CM

## 2017-04-21 DIAGNOSIS — R09.02 HYPOXIA: ICD-10-CM

## 2017-04-21 DIAGNOSIS — E11.22 DIABETES MELLITUS WITH ESRD (END-STAGE RENAL DISEASE): ICD-10-CM

## 2017-04-21 DIAGNOSIS — Z99.2 ESRD (END STAGE RENAL DISEASE) ON DIALYSIS: ICD-10-CM

## 2017-04-21 DIAGNOSIS — I48.91 A-FIB: ICD-10-CM

## 2017-04-21 DIAGNOSIS — N18.6 ESRD (END STAGE RENAL DISEASE) ON DIALYSIS: ICD-10-CM

## 2017-04-21 PROBLEM — R10.31 RIGHT LOWER QUADRANT ABDOMINAL PAIN: Status: ACTIVE | Noted: 2017-04-21

## 2017-04-21 PROBLEM — E87.5 HYPERKALEMIA, DIMINISHED RENAL EXCRETION: Status: RESOLVED | Noted: 2017-01-07 | Resolved: 2017-04-21

## 2017-04-21 LAB
ABO + RH BLD: NORMAL
ALBUMIN SERPL BCP-MCNC: 3.2 G/DL
ALP SERPL-CCNC: 138 U/L
ALT SERPL W/O P-5'-P-CCNC: 12 U/L
ANION GAP SERPL CALC-SCNC: 14 MMOL/L
APTT BLDCRRT: 24.1 SEC
AST SERPL-CCNC: 21 U/L
BASOPHILS # BLD AUTO: 0.01 K/UL
BASOPHILS NFR BLD: 0.1 %
BILIRUB SERPL-MCNC: 1.2 MG/DL
BLD GP AB SCN CELLS X3 SERPL QL: NORMAL
BNP SERPL-MCNC: 4230 PG/ML
BUN SERPL-MCNC: 19 MG/DL
CALCIUM SERPL-MCNC: 9.2 MG/DL
CHLORIDE SERPL-SCNC: 98 MMOL/L
CO2 SERPL-SCNC: 30 MMOL/L
CORTIS SERPL-MCNC: 21.5 UG/DL
CREAT SERPL-MCNC: 4 MG/DL
DIFFERENTIAL METHOD: ABNORMAL
EOSINOPHIL # BLD AUTO: 0.1 K/UL
EOSINOPHIL NFR BLD: 0.8 %
ERYTHROCYTE [DISTWIDTH] IN BLOOD BY AUTOMATED COUNT: 18.4 %
EST. GFR  (AFRICAN AMERICAN): 16.8 ML/MIN/1.73 M^2
EST. GFR  (NON AFRICAN AMERICAN): 14.5 ML/MIN/1.73 M^2
GLUCOSE SERPL-MCNC: 77 MG/DL
GRAM STN SPEC: NORMAL
GRAM STN SPEC: NORMAL
HCT VFR BLD AUTO: 34 %
HGB BLD-MCNC: 10.9 G/DL
INR PPP: 1.2
LACTATE SERPL-SCNC: 1 MMOL/L
LACTATE SERPL-SCNC: 1.3 MMOL/L
LIPASE SERPL-CCNC: 19 U/L
LYMPHOCYTES # BLD AUTO: 1 K/UL
LYMPHOCYTES NFR BLD: 9.8 %
MAGNESIUM SERPL-MCNC: 2.3 MG/DL
MAGNESIUM SERPL-MCNC: 2.4 MG/DL
MCH RBC QN AUTO: 29.9 PG
MCHC RBC AUTO-ENTMCNC: 32.1 %
MCV RBC AUTO: 93 FL
MONOCYTES # BLD AUTO: 1 K/UL
MONOCYTES NFR BLD: 9.7 %
NEUTROPHILS # BLD AUTO: 7.9 K/UL
NEUTROPHILS NFR BLD: 79.1 %
PHOSPHATE SERPL-MCNC: 4.4 MG/DL
PHOSPHATE SERPL-MCNC: 5 MG/DL
PLATELET # BLD AUTO: 93 K/UL
PMV BLD AUTO: 11.4 FL
POCT GLUCOSE: 136 MG/DL (ref 70–110)
POTASSIUM SERPL-SCNC: 4.7 MMOL/L
PROCALCITONIN SERPL IA-MCNC: 0.25 NG/ML
PROT SERPL-MCNC: 8.2 G/DL
PROTHROMBIN TIME: 12.3 SEC
RBC # BLD AUTO: 3.64 M/UL
SODIUM SERPL-SCNC: 142 MMOL/L
TROPONIN I SERPL DL<=0.01 NG/ML-MCNC: 0.1 NG/ML
TSH SERPL DL<=0.005 MIU/L-ACNC: 2.88 UIU/ML
WBC # BLD AUTO: 9.99 K/UL

## 2017-04-21 PROCEDURE — 86900 BLOOD TYPING SEROLOGIC ABO: CPT

## 2017-04-21 PROCEDURE — 97162 PT EVAL MOD COMPLEX 30 MIN: CPT

## 2017-04-21 PROCEDURE — 83735 ASSAY OF MAGNESIUM: CPT

## 2017-04-21 PROCEDURE — 86901 BLOOD TYPING SEROLOGIC RH(D): CPT

## 2017-04-21 PROCEDURE — 84100 ASSAY OF PHOSPHORUS: CPT

## 2017-04-21 PROCEDURE — 99285 EMERGENCY DEPT VISIT HI MDM: CPT | Mod: ,,, | Performed by: EMERGENCY MEDICINE

## 2017-04-21 PROCEDURE — 84100 ASSAY OF PHOSPHORUS: CPT | Mod: 91

## 2017-04-21 PROCEDURE — 94761 N-INVAS EAR/PLS OXIMETRY MLT: CPT

## 2017-04-21 PROCEDURE — 80053 COMPREHEN METABOLIC PANEL: CPT

## 2017-04-21 PROCEDURE — 36415 COLL VENOUS BLD VENIPUNCTURE: CPT

## 2017-04-21 PROCEDURE — 85730 THROMBOPLASTIN TIME PARTIAL: CPT

## 2017-04-21 PROCEDURE — 83735 ASSAY OF MAGNESIUM: CPT | Mod: 91

## 2017-04-21 PROCEDURE — 63600175 PHARM REV CODE 636 W HCPCS: Performed by: EMERGENCY MEDICINE

## 2017-04-21 PROCEDURE — 87086 URINE CULTURE/COLONY COUNT: CPT

## 2017-04-21 PROCEDURE — 93010 ELECTROCARDIOGRAM REPORT: CPT | Mod: ,,, | Performed by: INTERNAL MEDICINE

## 2017-04-21 PROCEDURE — 84145 PROCALCITONIN (PCT): CPT

## 2017-04-21 PROCEDURE — 11000001 HC ACUTE MED/SURG PRIVATE ROOM

## 2017-04-21 PROCEDURE — 85025 COMPLETE CBC W/AUTO DIFF WBC: CPT

## 2017-04-21 PROCEDURE — 84484 ASSAY OF TROPONIN QUANT: CPT

## 2017-04-21 PROCEDURE — 25000003 PHARM REV CODE 250: Performed by: EMERGENCY MEDICINE

## 2017-04-21 PROCEDURE — 82533 TOTAL CORTISOL: CPT

## 2017-04-21 PROCEDURE — 63600175 PHARM REV CODE 636 W HCPCS

## 2017-04-21 PROCEDURE — 99285 EMERGENCY DEPT VISIT HI MDM: CPT | Mod: 25

## 2017-04-21 PROCEDURE — 85610 PROTHROMBIN TIME: CPT

## 2017-04-21 PROCEDURE — 83880 ASSAY OF NATRIURETIC PEPTIDE: CPT

## 2017-04-21 PROCEDURE — 87040 BLOOD CULTURE FOR BACTERIA: CPT | Mod: 59

## 2017-04-21 PROCEDURE — 93005 ELECTROCARDIOGRAM TRACING: CPT

## 2017-04-21 PROCEDURE — 87205 SMEAR GRAM STAIN: CPT

## 2017-04-21 PROCEDURE — 83690 ASSAY OF LIPASE: CPT

## 2017-04-21 PROCEDURE — 99223 1ST HOSP IP/OBS HIGH 75: CPT | Mod: GC,,, | Performed by: INTERNAL MEDICINE

## 2017-04-21 PROCEDURE — 25000003 PHARM REV CODE 250

## 2017-04-21 PROCEDURE — 83605 ASSAY OF LACTIC ACID: CPT | Mod: 91

## 2017-04-21 PROCEDURE — 97165 OT EVAL LOW COMPLEX 30 MIN: CPT

## 2017-04-21 PROCEDURE — 96366 THER/PROPH/DIAG IV INF ADDON: CPT

## 2017-04-21 PROCEDURE — 84443 ASSAY THYROID STIM HORMONE: CPT

## 2017-04-21 PROCEDURE — 27000221 HC OXYGEN, UP TO 24 HOURS

## 2017-04-21 PROCEDURE — 96365 THER/PROPH/DIAG IV INF INIT: CPT

## 2017-04-21 RX ORDER — CETIRIZINE HYDROCHLORIDE 10 MG/1
10 TABLET ORAL DAILY
Status: DISCONTINUED | OUTPATIENT
Start: 2017-04-21 | End: 2017-04-24 | Stop reason: HOSPADM

## 2017-04-21 RX ORDER — SODIUM CHLORIDE 9 MG/ML
INJECTION, SOLUTION INTRAVENOUS
Status: DISCONTINUED | OUTPATIENT
Start: 2017-04-21 | End: 2017-04-24

## 2017-04-21 RX ORDER — HYDROXYZINE PAMOATE 25 MG/1
25 CAPSULE ORAL NIGHTLY PRN
Status: DISCONTINUED | OUTPATIENT
Start: 2017-04-21 | End: 2017-04-24 | Stop reason: HOSPADM

## 2017-04-21 RX ORDER — METRONIDAZOLE 500 MG/1
500 TABLET ORAL EVERY 8 HOURS
Status: DISCONTINUED | OUTPATIENT
Start: 2017-04-21 | End: 2017-04-22

## 2017-04-21 RX ORDER — HYDRALAZINE HYDROCHLORIDE 50 MG/1
50 TABLET, FILM COATED ORAL 3 TIMES DAILY
Status: ON HOLD | COMMUNITY
End: 2018-03-05 | Stop reason: HOSPADM

## 2017-04-21 RX ORDER — SEVELAMER CARBONATE 800 MG/1
1600 TABLET, FILM COATED ORAL
Status: DISCONTINUED | OUTPATIENT
Start: 2017-04-21 | End: 2017-04-24 | Stop reason: HOSPADM

## 2017-04-21 RX ORDER — AMMONIUM LACTATE 12 G/100G
LOTION TOPICAL
COMMUNITY

## 2017-04-21 RX ORDER — ATORVASTATIN CALCIUM 20 MG/1
20 TABLET, FILM COATED ORAL DAILY
Status: DISCONTINUED | OUTPATIENT
Start: 2017-04-21 | End: 2017-04-24 | Stop reason: HOSPADM

## 2017-04-21 RX ORDER — CINACALCET 30 MG/1
30 TABLET, FILM COATED ORAL NIGHTLY
Status: DISCONTINUED | OUTPATIENT
Start: 2017-04-21 | End: 2017-04-24 | Stop reason: HOSPADM

## 2017-04-21 RX ORDER — HYDROCODONE BITARTRATE AND ACETAMINOPHEN 5; 325 MG/1; MG/1
1 TABLET ORAL EVERY 6 HOURS PRN
Status: DISCONTINUED | OUTPATIENT
Start: 2017-04-21 | End: 2017-04-24 | Stop reason: HOSPADM

## 2017-04-21 RX ORDER — ASPIRIN 81 MG/1
81 TABLET ORAL DAILY
Status: DISCONTINUED | OUTPATIENT
Start: 2017-04-21 | End: 2017-04-24 | Stop reason: HOSPADM

## 2017-04-21 RX ORDER — FUROSEMIDE 40 MG/1
80 TABLET ORAL DAILY
Status: DISCONTINUED | OUTPATIENT
Start: 2017-04-21 | End: 2017-04-24 | Stop reason: HOSPADM

## 2017-04-21 RX ORDER — CEFEPIME HYDROCHLORIDE 2 G/50ML
2 INJECTION, SOLUTION INTRAVENOUS
Status: COMPLETED | OUTPATIENT
Start: 2017-04-21 | End: 2017-04-21

## 2017-04-21 RX ORDER — SODIUM CHLORIDE 9 MG/ML
INJECTION, SOLUTION INTRAVENOUS ONCE
Status: COMPLETED | OUTPATIENT
Start: 2017-04-21 | End: 2017-04-22

## 2017-04-21 RX ORDER — HEPARIN SODIUM 5000 [USP'U]/ML
5000 INJECTION, SOLUTION INTRAVENOUS; SUBCUTANEOUS EVERY 8 HOURS
Status: DISCONTINUED | OUTPATIENT
Start: 2017-04-21 | End: 2017-04-24

## 2017-04-21 RX ORDER — ACETAMINOPHEN 500 MG
1000 TABLET ORAL
Status: COMPLETED | OUTPATIENT
Start: 2017-04-21 | End: 2017-04-21

## 2017-04-21 RX ORDER — ONDANSETRON 4 MG/1
4 TABLET, ORALLY DISINTEGRATING ORAL EVERY 8 HOURS PRN
Status: DISCONTINUED | OUTPATIENT
Start: 2017-04-22 | End: 2017-04-24 | Stop reason: HOSPADM

## 2017-04-21 RX ORDER — FUROSEMIDE 80 MG/1
80 TABLET ORAL DAILY
Status: ON HOLD | COMMUNITY
End: 2018-03-05 | Stop reason: HOSPADM

## 2017-04-21 RX ORDER — ERGOCALCIFEROL 1.25 MG/1
50000 CAPSULE ORAL
Status: DISCONTINUED | OUTPATIENT
Start: 2017-04-21 | End: 2017-04-24 | Stop reason: HOSPADM

## 2017-04-21 RX ORDER — HYDRALAZINE HYDROCHLORIDE 50 MG/1
50 TABLET, FILM COATED ORAL EVERY 8 HOURS
Status: DISCONTINUED | OUTPATIENT
Start: 2017-04-21 | End: 2017-04-24 | Stop reason: HOSPADM

## 2017-04-21 RX ORDER — SODIUM CHLORIDE 0.9 % (FLUSH) 0.9 %
3 SYRINGE (ML) INJECTION EVERY 8 HOURS
Status: DISCONTINUED | OUTPATIENT
Start: 2017-04-21 | End: 2017-04-24 | Stop reason: HOSPADM

## 2017-04-21 RX ADMIN — Medication 3 ML: at 10:04

## 2017-04-21 RX ADMIN — FUROSEMIDE 80 MG: 40 TABLET ORAL at 09:04

## 2017-04-21 RX ADMIN — HEPARIN SODIUM 5000 UNITS: 5000 INJECTION, SOLUTION INTRAVENOUS; SUBCUTANEOUS at 10:04

## 2017-04-21 RX ADMIN — ACETAMINOPHEN 1000 MG: 500 TABLET ORAL at 06:04

## 2017-04-21 RX ADMIN — ASPIRIN 81 MG: 81 TABLET, COATED ORAL at 09:04

## 2017-04-21 RX ADMIN — CEFEPIME HYDROCHLORIDE 2 G: 2 INJECTION, SOLUTION INTRAVENOUS at 06:04

## 2017-04-21 RX ADMIN — HYDRALAZINE HYDROCHLORIDE 50 MG: 50 TABLET ORAL at 10:04

## 2017-04-21 RX ADMIN — SEVELAMER CARBONATE 1600 MG: 800 TABLET, FILM COATED ORAL at 04:04

## 2017-04-21 RX ADMIN — SEVELAMER CARBONATE 1600 MG: 800 TABLET, FILM COATED ORAL at 11:04

## 2017-04-21 RX ADMIN — ATORVASTATIN CALCIUM 20 MG: 20 TABLET, FILM COATED ORAL at 09:04

## 2017-04-21 RX ADMIN — CETIRIZINE HYDROCHLORIDE 10 MG: 10 TABLET, FILM COATED ORAL at 09:04

## 2017-04-21 RX ADMIN — CINACALCET HYDROCHLORIDE 30 MG: 30 TABLET, COATED ORAL at 10:04

## 2017-04-21 RX ADMIN — HYDRALAZINE HYDROCHLORIDE 50 MG: 50 TABLET ORAL at 03:04

## 2017-04-21 RX ADMIN — HYDROCODONE BITARTRATE AND ACETAMINOPHEN 1 TABLET: 5; 325 TABLET ORAL at 04:04

## 2017-04-21 RX ADMIN — ERGOCALCIFEROL 50000 UNITS: 1.25 CAPSULE ORAL at 09:04

## 2017-04-21 RX ADMIN — METRONIDAZOLE 500 MG: 500 TABLET ORAL at 10:04

## 2017-04-21 RX ADMIN — CEFTRIAXONE 1 G: 1 INJECTION, SOLUTION INTRAVENOUS at 11:04

## 2017-04-21 RX ADMIN — VANCOMYCIN HYDROCHLORIDE 1750 MG: 1 INJECTION, POWDER, LYOPHILIZED, FOR SOLUTION INTRAVENOUS at 06:04

## 2017-04-21 RX ADMIN — HEPARIN SODIUM 5000 UNITS: 5000 INJECTION, SOLUTION INTRAVENOUS; SUBCUTANEOUS at 03:04

## 2017-04-21 RX ADMIN — HYDROXYZINE PAMOATE 25 MG: 25 CAPSULE ORAL at 10:04

## 2017-04-21 NOTE — PT/OT/SLP EVAL
Occupational Therapy  Evaluation    Wilder Carbajal   MRN: [id number]   Admitting Diagnosis: <principal problem not specified>    OT Date of Treatment: 04/21/17   OT Start Time: 1617  OT Stop Time: 1628  OT Total Time (min): 11 min    Billable Minutes:  Evaluation 11 minutes    Diagnosis: <principal problem not specified>       Past Medical History:   Diagnosis Date    Acute pain of left shoulder 1/7/2017    Arthritis     Asthma     Benign neoplasm of eyelid     RUL    Blood clotting tendency     Blood transfusion     Cataract     Chronic kidney disease requiring chronic dialysis     Diabetes mellitus     Diabetic retinopathy     Fever blister     Hyperlipidemia     Hypertension     Infertility     Joint pain     Retinal detachment     Thyroid disease     Weakness 1/7/2017      Past Surgical History:   Procedure Laterality Date    CATARACT EXTRACTION      RETINAL DETACHMENT SURGERY         Referring physician: Stout E  Date referred to OT: 4/21/2017    General Precautions: Standard,        Patient History:  Pt lives in a [facility] w/ son who is indep w/ 4 steps to enter w/ B/L rails. PLOF indep except mobility a rollator  and assist into tub w/ tub bench. Pt's son works at night. Pt has a bath bench, rollator, and walker available to him at home.    Prior level of function:   Bed Mobility/Transfers: needs device and assist  Grooming: independent  Bathing: needs device  Upper Body Dressing: independent  Lower Body Dressing: needs assist  Toileting: independent  Home Management Skills: independent        Dominant hand: right    Subjective:  Communicated with nurse prior to session.  Pt agreeable to skilled OT services.  Chief Complaint: fatigue  Patient/Family stated goals: return home  Pt had no c/o pain.     Objective:  Pt received sitting EOB.     Cognitive Exam:  Oriented to: Person, Place, Time and Situation  Follows Commands/attention: Follows multistep  commands  Communication:  "clear/fluent  Memory:  No Deficits noted  Safety awareness/insight to disability: intact  Coping skills/emotional control: Appropriate to situation    Visual/perceptual:  Intact    Physical Exam:  Postural examination/scapula alignment: Rounded shoulder and Head forward  Skin integrity: Dry  Edema: None noted     Sensation:   Impaired  light/touch BLE    Upper Extremity Range of Motion:  Right Upper Extremity: WFL  Left Upper Extremity: WFL    Upper Extremity Strength:  Right Upper Extremity: WFL  Left Upper Extremity: WFL   Strength: 3+/5    Fine motor coordination:   Intact    Gross motor coordination: WFL    Functional Mobility:    Transfers:  Sit <> Stand Assistance: Stand By Assistance  Sit <> Stand Assistive Device: Rolling Walker    Functional Ambulation: Pt ambulated 60 ft at cga w/ rollator.     Activities of Daily Living:  LE Dressing Level of Assistance: Total assistance (TA to don.doff socks)    Balance:   Static Sit: GOOD-: Takes MODERATE challenges from all directions but inconsistently  Dynamic Sit: FAIR+: Maintains balance through MINIMAL excursions of active trunk motion  Static Stand: FAIR+: Takes MINIMAL challenges from all directions  Dynamic stand: FAIR: Needs CONTACT GUARD during gait    Therapeutic Activities and Exercises:  Pt educated on POC.    AM-PAC 6 CLICK ADL  How much help from another person does this patient currently need?  1 = Unable, Total/Dependent Assistance  2 = A lot, Maximum/Moderate Assistance  3 = A little, Minimum/Contact Guard/Supervision  4 = None, Modified [place]/Independent    Putting on and taking off regular lower body clothing? : 2  Bathing (including washing, rinsing, drying)?: 3  Toileting, which includes using toilet, bedpan, or urinal? : 3  Putting on and taking off regular upper body clothing?: 4  Taking care of personal grooming such as brushing teeth?: 4  Eating meals?: 4  Total Score: 20    AM-PAC Raw Score CMS "G-Code Modifier Level of Impairment " Assistance   6 % Total / Unable   7 - 9 CM 80 - 100% Maximal Assist   10 - 14 CL 60 - 80% Moderate Assist   15 - 19 CK 40 - 60% Moderate Assist   20 - 22 CJ 20 - 40% Minimal Assist   23 CI 1-20% SBA / CGA   24 CH 0% Independent/ Mod I       Patient left seated EOB at pt request with call button in reach    Assessment:  Wilder Carbajal is a 66 y.o. male with a medical diagnosis of <principal problem not specified> and presents with impairments listed below. Pt would benefit from skilled OT services to improve independence and overall occupational functioning.    Rehab identified problem list/impairments: Rehab identified problem list/impairments: weakness, impaired endurance, impaired self care skills, impaired functional mobilty, gait instability, impaired balance, decreased lower extremity function, decreased upper extremity function    Rehab potential is good.    Activity tolerance: Good    Discharge recommendations: Discharge Facility/Level Of Care Needs: home with home health     Barriers to discharge: decreased caregiver support; inaccessible home environment.     Equipment recommendations: none     GOALS:   Occupational Therapy Goals        Problem: Occupational Therapy Goal    Goal Priority Disciplines Outcome Interventions   Occupational Therapy Goal     OT, PT/OT     Description:  Goals to be met by: 4/28/2017     Patient will increase functional independence with ADLs by performing:    UE Dressing with Supervision.  LE Dressing with Maximum Assistance.  Grooming while standing with Stand-by Assistance.  Toileting from toilet with Minimal Assistance for hygiene and clothing management.   Toilet transfer to toilet with Stand-by Assistance.                PLAN:  Patient to be seen 4 x/week to address the above listed problems via self-care/home management, therapeutic activities, therapeutic exercises  Plan of Care expires: 05/21/17  Plan of Care reviewed with: patient    Linden Douglas,  OT  04/21/2017

## 2017-04-21 NOTE — CONSULTS
[facility]  Consult Note Nephrology    Consult Requested By: Veronica Stout MD  Reason for Consult: ESRD on iHD    SUBJECTIVE:     History of Present Illness:  Wilder Carbajal is a 66 y.o. male, who presents with a  PMHx relevant for DMT2, HTN, HLD, and ESRD on iHD who presents with complaint of arthralgia x 3 days. Patient c/o chest pain that started around 8:00 PM the night before with no alleviating sx. He endorses  that he became febrile amd around the same time. He also c/o nausea but no vomiting yet. He reports that he was in the hospital a week ago for dialysis and states that he has a wound in the middle of his buttocks from a surgery that he had about a week ago. Patient c/o abdominal pain when it is pushed on. He dialyzes at [facility] under he care of Dr. Solares, [facility]    Past Medical History:   Diagnosis Date    Acute pain of left shoulder 1/7/2017    Arthritis     Asthma     Benign neoplasm of eyelid     RUL    Blood clotting tendency     Blood transfusion     Cataract     Chronic kidney disease requiring chronic dialysis     Diabetes mellitus     Diabetic retinopathy     Fever blister     Hyperlipidemia     Hypertension     Infertility     Joint pain     Retinal detachment     Thyroid disease     Weakness 1/7/2017     Past Surgical History:   Procedure Laterality Date    CATARACT EXTRACTION      RETINAL DETACHMENT SURGERY       Family History   Problem Relation Age of Onset    Heart attack Mother     No Known Problems Father     No Known Problems Sister     No Known Problems Brother     No Known Problems Maternal Aunt     No Known Problems Maternal Uncle     No Known Problems Paternal Aunt     No Known Problems Paternal Uncle     No Known Problems Maternal Grandmother     No Known Problems Maternal Grandfather     No Known Problems Paternal Grandmother     No Known Problems Paternal Grandfather     Cancer Neg Hx     Amblyopia Neg Hx      Blindness Neg Hx     Cataracts Neg Hx     Diabetes Neg Hx     Glaucoma Neg Hx     Hypertension Neg Hx     Macular degeneration Neg Hx     Retinal detachment Neg Hx     Strabismus Neg Hx     Stroke Neg Hx     Thyroid disease Neg Hx      Social History   Substance Use Topics    Smoking status: Never Smoker    Smokeless tobacco: Never Used    Alcohol use Yes      Comment: 1 mary berger     Review of patient's allergies indicates:  No Known Allergies    Current Facility-Administered Medications   Medication Dose Route Frequency Provider Last Rate Last Dose    aspirin EC tablet 81 mg  81 mg Oral Daily Laure Huerta MD   81 mg at 04/21/17 0926    atorvastatin tablet 20 mg  20 mg Oral Daily Laure Huerta MD   20 mg at 04/21/17 0926    cetirizine tablet 10 mg  10 mg Oral Daily Laure Huerta MD   10 mg at 04/21/17 0926    cinacalcet tablet 30 mg  30 mg Oral QHS Laure Huerta MD        ergocalciferol capsule 50,000 Units  50,000 Units Oral Q14 Days Laure Huerta MD   50,000 Units at 04/21/17 0941    furosemide tablet 80 mg  80 mg Oral Daily Laure Huerta MD   80 mg at 04/21/17 0926    heparin (porcine) injection 5,000 Units  5,000 Units Subcutaneous Q8H Laure Huerta MD        hydrALAZINE tablet 50 mg  50 mg Oral Q8H Laure Huerta MD        hydrocodone-acetaminophen 5-325mg per tablet 1 tablet  1 tablet Oral Q6H PRN Laure Huerta MD        sevelamer carbonate tablet 1,600 mg  1,600 mg Oral TID WM Laure Huerta MD   1,600 mg at 04/21/17 1148       No Known Allergies     Review of Systems:  Constitutional: no fever or chills  Respiratory: no cough or shortness of breath  Cardiovascular: positve chest pain and positive palpitations  Gastrointestinal: no nausea or vomiting, no abdominal pain or change in bowel habits  Hematologic/Lymphatic: no easy bruising or lymphadenopathy  Musculoskeletal: no arthralgias or  myalgias  Neurological: no seizures or tremors          OBJECTIVE:     Vital Signs (Most Recent)  Temp: 97.5 °F (36.4 °C) (04/21/17 1407)  Pulse: 65 (04/21/17 1407)  Resp: 19 (04/21/17 1407)  BP: (!) 169/83 (04/21/17 1407)  SpO2: 96 % (04/21/17 1101)    Vital Signs Range (Last 24H):  Temp:  [97.5 °F (36.4 °C)-101.9 °F (38.8 °C)]   Pulse:  [64-90]   Resp:  [18-19]   BP: (155-180)/(72-83)   SpO2:  [94 %-99 %]     No intake or output data in the 24 hours ending 04/21/17 1418    Physical Exam:  General: Well developed, well nourished in NAD  HEENT: Conjunctiva clear; Oropharynx clear  Neck: No JVD noted, Supple  CV- Normal S1, S2 with no murmurs,gallops,rubs  Resp- Lungs CTA Bilaterally, Unlabored  Abdomen- NTND, BS normoactive x4 quads, soft  Extrem- No cyanosis, clubbing, edema.  Skin- No rashes, lesions, ulcers  Neuro: awake, Oriented x3, no FND      Laboratory:  CBC:   Recent Labs  Lab 04/21/17  0549   WBC 9.99   RBC 3.64*   HGB 10.9*   HCT 34.0*   PLT 93*   MCV 93   MCH 29.9   MCHC 32.1     BMP:   Recent Labs  Lab 04/21/17  0549   GLU 77   CL 98   CO2 30*   BUN 19   CREATININE 4.0*   CALCIUM 9.2   MG 2.3     CMP:   Recent Labs  Lab 04/21/17  0549   GLU 77   CALCIUM 9.2   ALBUMIN 3.2*   PROT 8.2      K 4.7   CO2 30*   CL 98   BUN 19   CREATININE 4.0*   ALKPHOS 138*   ALT 12   AST 21   BILITOT 1.2*       Diagnostic Results:  Labs: Reviewed  ECG: Reviewed    ASSESSMENT/PLAN:     Active Hospital Problems    Diagnosis  POA    Blood poisoning [A41.9]  Yes      Resolved Hospital Problems    Diagnosis Date Resolved POA   No resolved problems to display.       Wilder Carbajal is a 66 y.o. male with PMHx relevant for DMT2, HTN, HLD, asthma, ESRD on iHD TThS and thyroid disease, hypervolemia, hyperkalemia and chest pain, Nephrology consulted for ESRD management.    ESRD on IHD TTS   Out patient HD Center - [facility]/ Dr. Chavarria  On HD for: ~ 10 years  Duration of outpatient dialysis session - 4.25 hrs  EDW - 253  lbs.  Residual Petty Function - minimal  - Will provide dialysis for metabolic clearance and volume management x 4 in am as its his day and he had his Last HD yesterday complete tx.   -   - Target ultrafiltration 2-3 lts as tolerated keep MAP > 65  - Dialysate will be adjusted to current labs   Aceess:LFA-AVG good thrill and bruit     Active problem in hospital  - Chest pain  - Panic attack      Anemia of Chronic Kidney Disease   - Will resume LISSETTE with HD   - Hg 10.9  - Will request iron studies to assess further needs of supplementation      Mineral Bone Disease in CKD   - Renal Function Panel Daily for electrolytes monitoring  - Phos will 4.4  - Already on binders as outpatient Please continue Sevelamer 1600 mg AC and with snacks.  - CoCa 9.8     Nutrition   - Renal Diet     HTN   - Uncontrolled BP, will continue to monitor. Goal for BP is <130 mmHg SBP and BDP <80 mmHg.   - Resume home regiment.     Case discuss with Staff further recs with attestation.     Ken Branham MD  Nephrology Fellow PGY4  [phone]

## 2017-04-21 NOTE — PLAN OF CARE
Problem: Physical Therapy Goal  Goal: Physical Therapy Goal  Goals to be met by: 2017     Patient will increase functional independence with mobility by performin. Supine to sit with Stand-by Assistance  2. Sit to supine with Stand-by Assistance  3. Sit to stand transfer with Supervision  4. Bed to chair transfer with Supervision using rollator  5. Gait x 150 feet with Supervision using rollator  6. Ascend/descend 4 stair with bilateral Handrails Contact Guard Assistance using LRAD.   Outcome: Ongoing (interventions implemented as appropriate)  PT evaluation complete. POC initiated.

## 2017-04-21 NOTE — PT/OT/SLP EVAL
Physical Therapy  Evaluation    Wilder Carbajal   MRN: [id number]   Admitting Diagnosis: blood poisoning    PT Received On: 04/21/17  PT Start Time: 1618     PT Stop Time: 1628    PT Total Time (min): 10 min       Billable Minutes:  Evaluation 10    Diagnosis: blood poisoning       Past Medical History:   Diagnosis Date    Acute pain of left shoulder 1/7/2017    Arthritis     Asthma     Benign neoplasm of eyelid     RUL    Blood clotting tendency     Blood transfusion     Cataract     Chronic kidney disease requiring chronic dialysis     Diabetes mellitus     Diabetic retinopathy     Fever blister     Hyperlipidemia     Hypertension     Infertility     Joint pain     Retinal detachment     Thyroid disease     Weakness 1/7/2017      Past Surgical History:   Procedure Laterality Date    CATARACT EXTRACTION      RETINAL DETACHMENT SURGERY         Referring physician: Girish  Date referred to PT: 4/21/2017    General Precautions: Standard, fall  Orthopedic Precautions: N/A  Braces: N/A    Patient History:  Lives With: child(robert), adult   Living Arrangements: house  Home Accessibility: stairs to enter home  Number of Stairs to Enter Home: 4   Stair Railings at Home: outside, present at both sides   Transportation Available: family or friend will provide   Living Environment Comment: Pt lives w/ his adult son in a [facility] w/ 4 N[address] and B railings. Pt reports needs assistance getting in/out of the shower and bed. Pt states that he uses a rollator for ambulation. Pt reports that he attends [facility] and has dialysis T TH Sat. Pt states that son works nights so he is home alone all night.   Equipment Currently Used at Home: bath bench, walker, rolling, rollator     Previous Level of Function:  Ambulation Skills: needs device  Transfer Skills: needs device  ADL Skills: needs assist  Work/Leisure Activity: needs assist    Subjective:  Communicated with RN prior to session.  Pt agreeable to evaluation.     Chief  Complaint: weakness  Patient goals: return home    Pain Ratin/10  Pain Rating Post-Intervention: 0/10    Objective:   Patient found with: telemetry  Pt found sitting EOB.     Cognitive Exam:  Oriented to: Person, Place, Time and Situation    Follows Commands/attention: Follows multistep  commands  Communication: clear/fluent  Safety awareness/insight to disability: intact    Physical Exam:  Postural examination/scapula alignment: Rounded shoulder and Head forward    Skin integrity: Visible skin intact  Edema: Mild BLE    Sensation:   Impaired  light/touch BLE    Lower Extremity Range of Motion:  Right Lower Extremity: WFL  Left Lower Extremity: WFL    Lower Extremity Strength:  Right Lower Extremity: WFL except hip flexion 4-/5, knee flexion 4/5  Left Lower Extremity: WFL except hip flexion 4-/5, knee flexion 4/5     Gross motor coordination: WFL    Functional Mobility:  Bed Mobility:  Not performed 2/2 pt found sitting EOB and returned EOB at conclusion of evaluation.     Transfers:  Sit <> Stand Assistance: Stand By Assistance  Sit <> Stand Assistive Device:  (rollator)    Gait:   Gait Distance: Pt ambulated 60ft w/ rollator and CGA. Pt cued on upright posture and rollator management.   Assistance 1: Contact Guard Assistance  Gait Assistive Device: Rollator  Gait Pattern: swing-to gait  Gait Deviation(s): decreased noel, decreased step length, decreased stride length, increased time in double stance, decreased velocity of limb motion    Stairs:  Not performed.     Balance:   Static Sit: GOOD-: Takes MODERATE challenges from all directions but inconsistently  Dynamic Sit: GOOD-: Maintains balance through MODERATE excursions of active trunk movement,     Static Stand: FAIR: Maintains without assist but unable to take challenges  Dynamic stand: FAIR: Needs CONTACT GUARD during gait    Therapeutic Activities and Exercises:  PT evaluation performed.   White board updated.   Pt educated on role of PT and POC.      AM-PAC 6 CLICK MOBILITY  How much help from another person does this patient currently need?   1 = Unable, Total/Dependent Assistance  2 = A lot, Maximum/Moderate Assistance  3 = A little, Minimum/Contact Guard/Supervision  4 = None, Modified Ashe/Independent    Turning over in bed (including adjusting bedclothes, sheets and blankets)?: 4  Sitting down on and standing up from a chair with arms (e.g., wheelchair, bedside commode, etc.): 4  Moving from lying on back to sitting on the side of the bed?: 3  Moving to and from a bed to a chair (including a wheelchair)?: 3  Need to walk in hospital room?: 3  Climbing 3-5 steps with a railing?: 3  Total Score: 20     AM-PAC Raw Score CMS G-Code Modifier Level of Impairment Assistance   6 % Total / Unable   7 - 9 CM 80 - 100% Maximal Assist   10 - 14 CL 60 - 80% Moderate Assist   15 - 19 CK 40 - 60% Moderate Assist   20 - 22 CJ 20 - 40% Minimal Assist   23 CI 1-20% SBA / CGA   24 CH 0% Independent/ Mod I     Patient left sitting EOB with all lines intact and call button in reach.    Assessment:   Wilder Carbajal is a 66 y.o. male with a medical diagnosis of blood poisoning and presents with the deficits listed below. Pt tolerated evaluation well today. Pt has good overall mobility but is currently limited 2/2 weakness and fatigue. Pt would benefit from HHPT w/ supervision upon discharge. Pt is a good candidate for skilled therapy services to address the deficits listed below and increase independence with functional mobility.    Rehab identified problem list/impairments: Rehab identified problem list/impairments: weakness, impaired endurance, impaired sensation, impaired self care skills, impaired functional mobilty, gait instability, impaired balance, decreased lower extremity function    Rehab potential is good.    Activity tolerance: Good    Discharge recommendations: Discharge Facility/Level Of Care Needs: home health PT w/ supervision     Barriers  to discharge: Barriers to Discharge: Decreased caregiver support, Inaccessible home environment    Equipment recommendations: Equipment Needed After Discharge: none     GOALS:   Physical Therapy Goals        Problem: Physical Therapy Goal    Goal Priority Disciplines Outcome Goal Variances Interventions   Physical Therapy Goal     PT/OT, PT Ongoing (interventions implemented as appropriate)     Description:  Goals to be met by: 2017     Patient will increase functional independence with mobility by performin. Supine to sit with Stand-by Assistance  2. Sit to supine with Stand-by Assistance  3. Sit to stand transfer with Supervision  4. Bed to chair transfer with Supervision using rollator  5. Gait  x 150 feet with Supervision using rollator  6. Ascend/descend 4 stair with bilateral Handrails Contact Guard Assistance using LRAD.                 PLAN:    Patient to be seen 4 x/week to address the above listed problems via gait training, therapeutic activities, therapeutic exercises  Plan of Care expires: 17  Plan of Care reviewed with: patient    Stepan Reece Ruby, SPT  2017

## 2017-04-21 NOTE — PLAN OF CARE
Problem: Occupational Therapy Goal  Goal: Occupational Therapy Goal  Goals to be met by: 4/28/2017     Patient will increase functional independence with ADLs by performing:    UE Dressing with Supervision.  LE Dressing with Maximum Assistance.  Grooming while standing with Stand-by Assistance.  Toileting from toilet with Minimal Assistance for hygiene and clothing management.   Toilet transfer to toilet with Stand-by Assistance.  Initiate OT POC    Comments:   Linden Douglas OTR/L  4/21/2017

## 2017-04-21 NOTE — ED NOTES
Nurse unable to receive report at this time. Charge nurse informed this nurse that receiving nurse would call to get report.

## 2017-04-21 NOTE — ASSESSMENT & PLAN NOTE
-Fluid overload on exam  -Troponin 0.095  -BNP 4230  -Consulted nephrology for dialysis to remove fluid

## 2017-04-21 NOTE — ASSESSMENT & PLAN NOTE
"-Sacral cyst removed on 3/30  -Tenderness to area, possible infectious source  -Ultrasound with "Nonspecific, heterogeneously hypoechoic, subcutaneous focus at the region of recent excision on the left buttocks measuring 1.5 x 5.1 x 3.7 cm."  -General surgery consult, appreciate recs  -Received 1 dose vanc and cefepime in ED  -Will continue vanc and start ceftriaxone and flagyl    "

## 2017-04-21 NOTE — ED NOTES
Two patient identifiers have been checked and are correct.      Appearance: Pt awake, alert & oriented to person, place & time. Pt in no acute distress at present time. Pt is clean and well groomed with clothes appropriately fastened.   Skin: Skin warm, dry & intact. Color consistent with ethnicity. Mucous membranes moist. No breakdown or brusing noted.   Musculoskeletal: Patient moving all extremities well, swelling noted to BLE's. Generalized weakness noted.  Respiratory: Respirations spontaneous, even, and non-labored. Visible chest rise noted. Airway is open and patent. No accessory muscle use noted.   Neurologic: Sensation is intact. Speech is clear and appropriate. Eyes open spontaneously, behavior appropriate to situation, follows commands, facial expression symmetrical, bilateral hand grasp equal and even, purposeful motor response noted.  Cardiac: All peripheral pulses present. No Bilateral lower extremity edema. Cap refill is <3 seconds. Reports chest pain.   Abdomen: Abdomen firm which pt states is baseline and reports tenderness.   : Pt reports no dysuria or hematuria.

## 2017-04-21 NOTE — ED NOTES
Called dietary and ordered patient's tray. Per dietary, should be delivered to patient's room in one hour.

## 2017-04-21 NOTE — ED TRIAGE NOTES
"Reports generalized weakness, chest pain, "getting short winded," with constipation since 8pm. Pt Tuesday, Thursday, Saturday HD pt.   "

## 2017-04-21 NOTE — ED PROVIDER NOTES
Encounter Date: 4/21/2017    SCRIBE #1 NOTE: I, Paty Carrillo , am scribing for, and in the presence of,  Dr. Montero . I have scribed the entire note.       History     Chief Complaint   Patient presents with    Generalized Body Aches     generalized body pain x3 days     Review of patient's allergies indicates:  No Known Allergies  HPI Comments: Time seen by provider: 5:32 AM    This is a 66 y.o. male with PMHx of DM, HTN, HLD, and CKD who presents with complaint of generalized body pain x 3 days. Patient c/o chest pain that started around 8:00 PM yesterday. He states that he became febrile around the same time. He also c/o nausea but no vomiting yet. He reports that he was in the hospital a week ago for dialysis and states that he has a wound in the middle of his buttocks from a surgery that he had about a week ago. Patient c/o abdominal pain when it is pushed on.     The history is provided by the patient and medical records.     Past Medical History:   Diagnosis Date    Acute pain of left shoulder 1/7/2017    Arthritis     Asthma     Benign neoplasm of eyelid     RUL    Blood clotting tendency     Blood transfusion     Cataract     Chronic kidney disease requiring chronic dialysis     Diabetes mellitus     Diabetic retinopathy     Fever blister     Hyperlipidemia     Hypertension     Infertility     Joint pain     Retinal detachment     Thyroid disease     Weakness 1/7/2017     Past Surgical History:   Procedure Laterality Date    CATARACT EXTRACTION      RETINAL DETACHMENT SURGERY       Family History   Problem Relation Age of Onset    Heart attack Mother     No Known Problems Father     No Known Problems Sister     No Known Problems Brother     No Known Problems Maternal Aunt     No Known Problems Maternal Uncle     No Known Problems Paternal Aunt     No Known Problems Paternal Uncle     No Known Problems Maternal Grandmother     No Known Problems Maternal Grandfather     No  Known Problems Paternal Grandmother     No Known Problems Paternal Grandfather     Cancer Neg Hx     Amblyopia Neg Hx     Blindness Neg Hx     Cataracts Neg Hx     Diabetes Neg Hx     Glaucoma Neg Hx     Hypertension Neg Hx     Macular degeneration Neg Hx     Retinal detachment Neg Hx     Strabismus Neg Hx     Stroke Neg Hx     Thyroid disease Neg Hx      Social History   Substance Use Topics    Smoking status: Never Smoker    Smokeless tobacco: Never Used    Alcohol use Yes      Comment: 1 pint bourbon     Review of Systems   Constitutional: Positive for fever.   HENT: Negative for ear pain.    Eyes: Negative for pain.   Respiratory: Negative for shortness of breath.    Cardiovascular: Positive for chest pain.   Gastrointestinal: Positive for nausea. Negative for vomiting.   Genitourinary: Negative for dysuria.   Musculoskeletal: Negative for neck pain.   Skin: Positive for wound.   Neurological: Negative for headaches.       Physical Exam   Initial Vitals   BP Pulse Resp Temp SpO2   04/21/17 0324 04/21/17 0324 04/21/17 0324 04/21/17 0324 04/21/17 0324   180/76 90 18 101.9 °F (38.8 °C) 99 %     Physical Exam    Nursing note and vitals reviewed.  Constitutional: He appears well-developed and well-nourished. He is not diaphoretic. No distress.   HENT:   Head: Normocephalic and atraumatic.   Eyes: EOM are normal. Pupils are equal, round, and reactive to light.   Neck: Normal range of motion. Neck supple.   Cardiovascular: Normal rate and regular rhythm.   Murmur heard.  2/6 systolic murmur.    Pulmonary/Chest:   Bibasilar crackles   Abdominal: Soft. He exhibits no distension. There is tenderness. There is no rebound and no guarding.   Mild diffuse abdominal tenderness    Musculoskeletal: Normal range of motion. He exhibits no edema or tenderness.   Left arm dialysis fistula. Bilateral lower extremities have 4+ pitting edema and dependent changes.    Neurological: He is alert and oriented to person,  place, and time. He has normal strength. No cranial nerve deficit or sensory deficit.   Skin: Skin is warm and dry. No rash noted. No erythema.   Healed incision to his gluteal cleft that has some mild induration but no obvious signs of infection.      Psychiatric: He has a normal mood and affect. His behavior is normal. Judgment and thought content normal.         ED Course   Procedures  Labs Reviewed   B-TYPE NATRIURETIC PEPTIDE - Abnormal; Notable for the following:        Result Value    BNP 4230 (*)     All other components within normal limits   CBC W/ AUTO DIFFERENTIAL - Abnormal; Notable for the following:     RBC 3.64 (*)     Hemoglobin 10.9 (*)     Hematocrit 34.0 (*)     RDW 18.4 (*)     Platelets 93 (*)     Gran # 7.9 (*)     Gran% 79.1 (*)     Lymph% 9.8 (*)     All other components within normal limits   COMPREHENSIVE METABOLIC PANEL - Abnormal; Notable for the following:     CO2 30 (*)     Creatinine 4.0 (*)     Albumin 3.2 (*)     Total Bilirubin 1.2 (*)     Alkaline Phosphatase 138 (*)     eGFR if  16.8 (*)     eGFR if non  14.5 (*)     All other components within normal limits   PROCALCITONIN - Abnormal; Notable for the following:     Procalcitonin 0.25 (*)     All other components within normal limits   TROPONIN I - Abnormal; Notable for the following:     Troponin I 0.095 (*)     All other components within normal limits   APTT   CORTISOL, RANDOM   LACTIC ACID, PLASMA   LIPASE   MAGNESIUM   PHOSPHORUS   PROTIME-INR   TSH   LACTIC ACID, PLASMA   URINALYSIS   LACTIC ACID, PLASMA   BASIC METABOLIC PANEL   TYPE & SCREEN   POCT GLUCOSE MONITORING CONTINUOUS             Medical Decision Making:   History:   Old Medical Records: I decided to obtain old medical records.  Initial Assessment:   Differential diagnosis includes sepsis, pneumonia, UTI, pyelonephritis. Patient with fever of unclear etiology. Pan culture and admit.   Independently Interpreted Test(s):   I have  ordered and independently interpreted X-rays - see summary below.       <> Summary of X-Ray Reading(s): CXR: nad  I have ordered and independently interpreted EKG Reading(s) - see summary below       <> Summary of EKG Reading(s): EKG: afib, no st elevation or depression, no change from priors  Clinical Tests:   Lab Tests: Ordered and Reviewed  Radiological Study: Ordered  Medical Tests: Ordered  Other:   I have discussed this case with another health care provider.       <> Summary of the Discussion: IM  Pt with fever of unclear etiology.  Will begin sepsis protocol and admit to IM.            Scribe Attestation:   Scribe #1: I performed the above scribed service and the documentation accurately describes the services I performed. I attest to the accuracy of the note.    Attending Attestation:           Physician Attestation for Scribe:  Physician Attestation Statement for Scribe #1: I, Dr. Montero , reviewed documentation, as scribed by Paty Carrillo  in my presence, and it is both accurate and complete.                 ED Course     Clinical Impression:   The primary encounter diagnosis was Sepsis, due to unspecified organism. Diagnoses of Chest pain, ESRD (end stage renal disease) on dialysis, and Acute on chronic diastolic heart failure were also pertinent to this visit.          Jewel Montero III, MD  04/23/17 1007

## 2017-04-22 LAB
ANION GAP SERPL CALC-SCNC: 12 MMOL/L
ANION GAP SERPL CALC-SCNC: 9 MMOL/L
BACTERIA #/AREA URNS AUTO: ABNORMAL /HPF
BACTERIA UR CULT: NORMAL
BACTERIA UR CULT: NORMAL
BASOPHILS # BLD AUTO: 0.01 K/UL
BASOPHILS NFR BLD: 0.1 %
BILIRUB UR QL STRIP: NEGATIVE
BUN SERPL-MCNC: 18 MG/DL
BUN SERPL-MCNC: 26 MG/DL
CALCIUM SERPL-MCNC: 9.2 MG/DL
CALCIUM SERPL-MCNC: 9.7 MG/DL
CHLORIDE SERPL-SCNC: 102 MMOL/L
CHLORIDE SERPL-SCNC: 93 MMOL/L
CLARITY UR REFRACT.AUTO: CLEAR
CO2 SERPL-SCNC: 26 MMOL/L
CO2 SERPL-SCNC: 27 MMOL/L
COLOR UR AUTO: YELLOW
CREAT SERPL-MCNC: 3.7 MG/DL
CREAT SERPL-MCNC: 5.1 MG/DL
DIFFERENTIAL METHOD: ABNORMAL
EOSINOPHIL # BLD AUTO: 0.1 K/UL
EOSINOPHIL NFR BLD: 0.8 %
ERYTHROCYTE [DISTWIDTH] IN BLOOD BY AUTOMATED COUNT: 18.4 %
EST. GFR  (AFRICAN AMERICAN): 12.5 ML/MIN/1.73 M^2
EST. GFR  (AFRICAN AMERICAN): 18.4 ML/MIN/1.73 M^2
EST. GFR  (NON AFRICAN AMERICAN): 10.8 ML/MIN/1.73 M^2
EST. GFR  (NON AFRICAN AMERICAN): 15.9 ML/MIN/1.73 M^2
ESTIMATED AVG GLUCOSE: 105 MG/DL
FERRITIN SERPL-MCNC: 2028 NG/ML
GLUCOSE SERPL-MCNC: 83 MG/DL
GLUCOSE SERPL-MCNC: 84 MG/DL
GLUCOSE UR QL STRIP: NEGATIVE
HBA1C MFR BLD HPLC: 5.3 %
HCT VFR BLD AUTO: 33.9 %
HGB BLD-MCNC: 10.6 G/DL
HGB UR QL STRIP: NEGATIVE
HYALINE CASTS UR QL AUTO: 0 /LPF
IRON SERPL-MCNC: 31 UG/DL
KETONES UR QL STRIP: NEGATIVE
LEUKOCYTE ESTERASE UR QL STRIP: NEGATIVE
LYMPHOCYTES # BLD AUTO: 1.4 K/UL
LYMPHOCYTES NFR BLD: 18.1 %
MAGNESIUM SERPL-MCNC: 2.9 MG/DL
MCH RBC QN AUTO: 29.5 PG
MCHC RBC AUTO-ENTMCNC: 31.3 %
MCV RBC AUTO: 94 FL
MICROSCOPIC COMMENT: ABNORMAL
MONOCYTES # BLD AUTO: 0.7 K/UL
MONOCYTES NFR BLD: 8.8 %
NEUTROPHILS # BLD AUTO: 5.7 K/UL
NEUTROPHILS NFR BLD: 72.2 %
NITRITE UR QL STRIP: NEGATIVE
NON-SQ EPI CELLS #/AREA URNS AUTO: 0 /HPF
PH UR STRIP: >8 [PH] (ref 5–8)
PHOSPHATE SERPL-MCNC: 5.6 MG/DL
PLATELET # BLD AUTO: 102 K/UL
PLATELET BLD QL SMEAR: ABNORMAL
PMV BLD AUTO: 11.7 FL
POCT GLUCOSE: 114 MG/DL (ref 70–110)
POCT GLUCOSE: 146 MG/DL (ref 70–110)
POCT GLUCOSE: 79 MG/DL (ref 70–110)
POCT GLUCOSE: 98 MG/DL (ref 70–110)
POTASSIUM SERPL-SCNC: 4.9 MMOL/L
POTASSIUM SERPL-SCNC: 5.3 MMOL/L
PROCALCITONIN SERPL IA-MCNC: 1.17 NG/ML
PROT UR QL STRIP: ABNORMAL
RBC # BLD AUTO: 3.59 M/UL
RBC #/AREA URNS AUTO: 2 /HPF (ref 0–4)
SATURATED IRON: 19 %
SODIUM SERPL-SCNC: 131 MMOL/L
SODIUM SERPL-SCNC: 138 MMOL/L
SP GR UR STRIP: 1.01 (ref 1–1.03)
SQUAMOUS #/AREA URNS AUTO: 1 /HPF
TOTAL IRON BINDING CAPACITY: 163 UG/DL
TRANSFERRIN SERPL-MCNC: 110 MG/DL
URN SPEC COLLECT METH UR: ABNORMAL
UROBILINOGEN UR STRIP-ACNC: NEGATIVE EU/DL
VANCOMYCIN SERPL-MCNC: 8 UG/ML
WBC # BLD AUTO: 7.94 K/UL
WBC #/AREA URNS AUTO: 8 /HPF (ref 0–5)

## 2017-04-22 PROCEDURE — 80100016 HC MAINTENANCE HEMODIALYSIS

## 2017-04-22 PROCEDURE — 11000001 HC ACUTE MED/SURG PRIVATE ROOM

## 2017-04-22 PROCEDURE — 84145 PROCALCITONIN (PCT): CPT

## 2017-04-22 PROCEDURE — 83036 HEMOGLOBIN GLYCOSYLATED A1C: CPT

## 2017-04-22 PROCEDURE — 85025 COMPLETE CBC W/AUTO DIFF WBC: CPT

## 2017-04-22 PROCEDURE — 90935 HEMODIALYSIS ONE EVALUATION: CPT | Mod: ,,, | Performed by: INTERNAL MEDICINE

## 2017-04-22 PROCEDURE — 93306 TTE W/DOPPLER COMPLETE: CPT

## 2017-04-22 PROCEDURE — 25000003 PHARM REV CODE 250: Performed by: STUDENT IN AN ORGANIZED HEALTH CARE EDUCATION/TRAINING PROGRAM

## 2017-04-22 PROCEDURE — 84100 ASSAY OF PHOSPHORUS: CPT

## 2017-04-22 PROCEDURE — 25000003 PHARM REV CODE 250

## 2017-04-22 PROCEDURE — 81001 URINALYSIS AUTO W/SCOPE: CPT

## 2017-04-22 PROCEDURE — 83735 ASSAY OF MAGNESIUM: CPT

## 2017-04-22 PROCEDURE — 80048 BASIC METABOLIC PNL TOTAL CA: CPT | Mod: 91

## 2017-04-22 PROCEDURE — 82728 ASSAY OF FERRITIN: CPT

## 2017-04-22 PROCEDURE — 93306 TTE W/DOPPLER COMPLETE: CPT | Mod: 26,,, | Performed by: INTERNAL MEDICINE

## 2017-04-22 PROCEDURE — 25000003 PHARM REV CODE 250: Performed by: HOSPITALIST

## 2017-04-22 PROCEDURE — 99232 SBSQ HOSP IP/OBS MODERATE 35: CPT | Mod: GC,,, | Performed by: INTERNAL MEDICINE

## 2017-04-22 PROCEDURE — 36415 COLL VENOUS BLD VENIPUNCTURE: CPT

## 2017-04-22 PROCEDURE — 83540 ASSAY OF IRON: CPT

## 2017-04-22 PROCEDURE — 80202 ASSAY OF VANCOMYCIN: CPT

## 2017-04-22 RX ADMIN — Medication 10 ML: at 09:04

## 2017-04-22 RX ADMIN — CINACALCET HYDROCHLORIDE 30 MG: 30 TABLET, COATED ORAL at 09:04

## 2017-04-22 RX ADMIN — HEPARIN SODIUM 5000 UNITS: 5000 INJECTION, SOLUTION INTRAVENOUS; SUBCUTANEOUS at 05:04

## 2017-04-22 RX ADMIN — METRONIDAZOLE 500 MG: 500 TABLET ORAL at 05:04

## 2017-04-22 RX ADMIN — HYDROCODONE BITARTRATE AND ACETAMINOPHEN 1 TABLET: 5; 325 TABLET ORAL at 09:04

## 2017-04-22 RX ADMIN — ONDANSETRON 4 MG: 4 TABLET, ORALLY DISINTEGRATING ORAL at 12:04

## 2017-04-22 RX ADMIN — ATORVASTATIN CALCIUM 20 MG: 20 TABLET, FILM COATED ORAL at 09:04

## 2017-04-22 RX ADMIN — ASPIRIN 81 MG: 81 TABLET, COATED ORAL at 09:04

## 2017-04-22 RX ADMIN — HYDRALAZINE HYDROCHLORIDE 50 MG: 50 TABLET ORAL at 09:04

## 2017-04-22 RX ADMIN — SODIUM CHLORIDE: 0.9 INJECTION, SOLUTION INTRAVENOUS at 01:04

## 2017-04-22 RX ADMIN — HEPARIN SODIUM 5000 UNITS: 5000 INJECTION, SOLUTION INTRAVENOUS; SUBCUTANEOUS at 09:04

## 2017-04-22 RX ADMIN — Medication 3 ML: at 06:04

## 2017-04-22 RX ADMIN — FUROSEMIDE 80 MG: 40 TABLET ORAL at 09:04

## 2017-04-22 RX ADMIN — HYDRALAZINE HYDROCHLORIDE 50 MG: 50 TABLET ORAL at 05:04

## 2017-04-22 RX ADMIN — CETIRIZINE HYDROCHLORIDE 10 MG: 10 TABLET, FILM COATED ORAL at 09:04

## 2017-04-22 RX ADMIN — SEVELAMER CARBONATE 1600 MG: 800 TABLET, FILM COATED ORAL at 12:04

## 2017-04-22 RX ADMIN — SEVELAMER CARBONATE 1600 MG: 800 TABLET, FILM COATED ORAL at 08:04

## 2017-04-22 RX ADMIN — SEVELAMER CARBONATE 1600 MG: 800 TABLET, FILM COATED ORAL at 06:04

## 2017-04-22 NOTE — PROGRESS NOTES
Pt left the floor for dialysis on stretcher with transporter . Pt is awake , alert and oriented x 4 . Stable V/S . Pt on O2 N/C 3 L . Pt on tele .

## 2017-04-22 NOTE — PROGRESS NOTES
[facility] Hospital Medicine  Progress Note    Patient Name: Wilder Carbajal  MRN: [id number]  Patient Class: IP- Inpatient   Admission Date: 4/21/2017  Length of Stay: 0 days  Attending Physician: Veronica Stout MD  Primary Care Provider: Akhil Adorno MD    Hospital Medicine Team: [facility] HOSP MED 1 Laure Huerta MD    Subjective:     Principal Problem:Acute on chronic diastolic heart failure    HPI:  Mr. Wilder Carbajal is a 65 yo male with a PMH significant for T2DM, HTN, HLD, CKD on TTS HD (for past 11 years). He presented to [facility] ED in the night of 4/20 with subjective fevers, anxiety, and chest pain. His symptoms started after dialysis earlier in the day.  4.5L of fluid were removed on HD, however per facility protocol, that is max fluid removal allowable.  Following dialysis, he reported some difficulty with bleeding at fistula that is now resolved.  He states he became anxious due to the bleeding and started having chest palpitations associated with sharp, non-radiating pain which is what caused him to present to the ED.    Of note, he had recent epidermal inclusion cyst (5.3cm x 2.6cm x 1.9cm) removal done on 3/31 at [facility] and complains of tenderness to palpation in that area.  In the ED the patient was found to be febrile with a T of 101.9.  He was given vanc and cefepime and an infectious work-up was initiated.        Past Medical History:   Diagnosis Date    Acute pain of left shoulder 1/7/2017    Arthritis     Asthma     Benign neoplasm of eyelid     RUL    Blood clotting tendency     Blood transfusion     Cataract     Chronic kidney disease requiring chronic dialysis     Diabetes mellitus     Diabetic retinopathy     Fever blister     Hyperlipidemia     Hypertension     Infertility     Joint pain     Retinal detachment     Thyroid disease     Weakness 1/7/2017       Past Surgical History:   Procedure Laterality Date    CATARACT EXTRACTION       RETINAL DETACHMENT SURGERY         Review of patient's allergies indicates:  No Known Allergies    No current facility-administered medications on file prior to encounter.      Current Outpatient Prescriptions on File Prior to Encounter   Medication Sig    aspirin (ECOTRIN) 81 MG EC tablet Take 81 mg by mouth once daily.     atorvastatin (LIPITOR) 20 MG tablet Take 20 mg by mouth once daily.    cetirizine (ZYRTEC) 10 MG tablet Take 10 mg by mouth once daily.    cinacalcet (SENSIPAR) 30 MG Tab Take 30 mg by mouth every evening.    diclofenac sodium (VOLTAREN) 1 % Gel Apply 2 g topically 4 (four) times daily as needed (for pain).    ergocalciferol (ERGOCALCIFEROL) 50,000 unit Cap Take 50,000 Units by mouth every 14 (fourteen) days.     hydrocodone-acetaminophen 5-325mg (NORCO) 5-325 mg per tablet Take 1-2 tablets PO q4-6hours PRN pain (Patient taking differently: Take 1 tablet by mouth every 6 to 8 hours as needed for Pain. )    ipratropium (ATROVENT HFA) 17 mcg/actuation inhaler Inhale 2 puffs into the lungs 4 (four) times daily. Do not exceed 12 puffs per day    ketotifen (ZADITOR) 0.025 % (0.035 %) ophthalmic solution Apply 1 drop to eye every 8 (eight) hours.    loperamide (IMODIUM) 2 mg capsule Take 2 mg by mouth every 4 (four) hours as needed for Diarrhea.     mometasone (NASONEX) 50 mcg/actuation nasal spray 2 sprays by Nasal route once daily.    sevelamer carbonate (RENVELA) 800 mg Tab Take two tablets by mouth four times daily with meals and snacks    losartan (COZAAR) 50 MG tablet Take 1 tablet (50 mg total) by mouth once daily.    podofilox (CONDYLOX) 0.5 % external solution Apply a small amount to lesions twice daily for three days, then hold for four days and repeat regimen    [DISCONTINUED] GUAIFENESIN/DM/PSEUDOEPHEDRINE (TUSSIN CF ORAL) Take 10 mLs by mouth every 6 (six) hours as needed (for cough).     Family History     Problem Relation (Age of Onset)    Heart attack Mother    No Known  "Problems Father, Sister, Brother, Maternal Aunt, Maternal Uncle, Paternal Aunt, Paternal Uncle, Maternal Grandmother, Maternal Grandfather, Paternal Grandmother, Paternal Grandfather        Social History Main Topics    Smoking status: Never Smoker    Smokeless tobacco: Never Used    Alcohol use Yes      Comment: 1 pint bourbon    Drug use: No    Sexual activity: Not on file     Review of Systems   Constitutional: Positive for appetite change (decreased), chills and fever.        Drenching night sweats "for many years"   HENT: Positive for dental problem (gingival bleeding), hearing loss (chronic), sore throat (for many months) and trouble swallowing (chronic).    Eyes: Positive for visual disturbance (blind in right eye, "flashing lights, chronic"). Negative for photophobia.   Respiratory: Positive for cough and shortness of breath (assoc with anxiety).    Cardiovascular: Positive for chest pain (assoc with anxiety), palpitations and leg swelling.   Gastrointestinal: Positive for abdominal pain (for many years). Negative for blood in stool, constipation (last BM on 4/20), diarrhea, nausea and vomiting.   Genitourinary: Positive for difficulty urinating (occassional incontinence, chronic) and dysuria (for many years). Negative for hematuria.   Musculoskeletal: Positive for neck pain.        Shooting leg pains, chronic     Skin: Positive for rash (lower legs and abdomen) and wound (blisters on lower legs).   Neurological: Positive for dizziness (assoc with anxiety), weakness, light-headedness (assoc with anxiety), numbness (legs, chronic) and headaches (chronic).   Hematological: Negative for adenopathy. Does not bruise/bleed easily.   Psychiatric/Behavioral: Negative for confusion. The patient is nervous/anxious.      Objective:     Vital Signs (Most Recent):  Temp: 98.3 °F (36.8 °C) (04/21/17 1636)  Pulse: 65 (04/21/17 1636)  Resp: 18 (04/21/17 1636)  BP: (!) 165/77 (04/21/17 1636)  SpO2: (!) 90 % (04/21/17 " 1636) Vital Signs (24h Range):  Temp:  [97.5 °F (36.4 °C)-101.9 °F (38.8 °C)] 98.3 °F (36.8 °C)  Pulse:  [62-90] 65  Resp:  [18-19] 18  SpO2:  [90 %-99 %] 90 %  BP: (155-180)/(72-83) 165/77     Weight: 112.9 kg (249 lb)  Body mass index is 36.77 kg/(m^2).    Physical Exam   Constitutional: He is oriented to person, place, and time. He appears well-developed and well-nourished.   Restless, morbidly obese male   HENT:   Head: Normocephalic and atraumatic.   Eyes: Conjunctivae and EOM are normal.   Neck: Normal range of motion. Neck supple.   Cardiovascular: Normal rate and regular rhythm.    Murmur (systolic) heard.  Pulmonary/Chest: Effort normal. No respiratory distress. He has rales.   Abdominal: Soft. Bowel sounds are normal. There is tenderness.   Peau d'orange appearance of RLQ with TTP   Musculoskeletal: He exhibits edema (tense edema to lower extremities bilat).   Neurological: He is alert and oriented to person, place, and time.   Skin: Skin is warm. Rash (blistering stasis dermatitis) noted. He is not diaphoretic.   Psychiatric: His speech is normal. His mood appears anxious. He is agitated. Cognition and memory are normal.   Very restless, requesting frequent position changes in bed from lying to sitting to lying   Nursing note and vitals reviewed.       Significant Labs:   CBC:   Recent Labs  Lab 04/21/17  0549   WBC 9.99   HGB 10.9*   HCT 34.0*   PLT 93*     CMP:   Recent Labs  Lab 04/21/17  0549      K 4.7   CL 98   CO2 30*   GLU 77   BUN 19   CREATININE 4.0*   CALCIUM 9.2   PROT 8.2   ALBUMIN 3.2*   BILITOT 1.2*   ALKPHOS 138*   AST 21   ALT 12   ANIONGAP 14   EGFRNONAA 14.5*       Significant Imaging: I have reviewed all pertinent imaging results/findings within the past 24 hours.    Assessment/Plan:      * Acute on chronic diastolic heart failure  -Fluid overload on exam  -Troponin 0.095  -BNP 4230  -Consulted nephrology for dialysis to remove fluid    ESRD (end stage renal disease) on  "dialysis  -Nephrology consulted, appreciate assistance  -Will continue sevelamer 1600 mg tidwm    Fever  -Febrile on presentation, now resolved s/p vanc, cefepime and tylenol  -No leukocytosis, lactate 1.3, procal 0.25  -Infectious work up with UA, CXR, blood cultures  -Ultrasound sacrum with fluid collection, possible infectious source  -Obtain CT abdo due to abdo TTP with manipulation of pannus   -Will continue with vanc and add metronidazole and ceftriaxone    History of excision of epidermal inclusion cyst  -Sacral cyst removed on 3/30  -Tenderness to area, possible infectious source  -Ultrasound with "Nonspecific, heterogeneously hypoechoic, subcutaneous focus at the region of recent excision on the left buttocks measuring 1.5 x 5.1 x 3.7 cm."  -General surgery consult, appreciate recs  -Received 1 dose vanc and cefepime in ED  -Will continue vanc and start ceftriaxone and flagyl    Right lower quadrant abdominal pain  -Chronic skin changes to area of tenderness, possible cellulitis though not erythematous or warm  -CT abdo/pelvis    HTN (hypertension)  -Continue home hydralazine 50 mg tid  -Expect improve with volume removal    HLD (hyperlipidemia)  -Continue home atorvastatin 20 mg    DM II (diabetes mellitus, type II), controlled  -Not on home medications or insulin  -HbA1c in 1/2017 was 5.7%    VTE Risk Mitigation         Ordered     heparin (porcine) injection 5,000 Units  Every 8 hours     Route:  Subcutaneous        04/21/17 1112          Laure Huerta MD  Department of Hospital Medicine   [facility]  "

## 2017-04-22 NOTE — ASSESSMENT & PLAN NOTE
-Chronic skin changes to area of tenderness, possible cellulitis though not erythematous or warm  -CT abdo/pelvis

## 2017-04-22 NOTE — ASSESSMENT & PLAN NOTE
No sign of infection, not likely the cause of fevers  Will discuss with staff if drainage of seroma/hematoma is necessary. Likely no further intervention.

## 2017-04-22 NOTE — ASSESSMENT & PLAN NOTE
-Febrile on presentation, now resolved s/p vanc, cefepime and tylenol  -No leukocytosis, lactate 1.3, procal 0.25  -Infectious work up with UA, CXR, blood cultures  -Ultrasound sacrum with fluid collection, possible infectious source  -Obtain CT abdo due to abdo TTP with manipulation of pannus   -Will continue with vanc and add metronidazole and ceftriaxone

## 2017-04-22 NOTE — H&P
[facility] Hospital Medicine  History & Physical    Patient Name: Wilder Carbajal  MRN: [id number]  Admission Date: 4/21/2017  Attending Physician: Veronica Stout MD   Primary Care Provider: Akhil Adorno MD    Hospital Medicine Team: [facility] HOSP MED 1 Laure Huerta MD     Patient information was obtained from patient, past medical records and ER records.     Subjective:     Principal Problem:Acute on chronic diastolic heart failure    Chief Complaint:   Chief Complaint   Patient presents with    Generalized Body Aches     generalized body pain x3 days        HPI: Mr. Wilder Carbajal is a 67 yo male with a PMH significant for T2DM, HTN, HLD, CKD on TTS HD (for past 11 years). He presented to [facility] ED in the night of 4/20 with subjective fevers, anxiety, and chest pain. His symptoms started after dialysis earlier in the day.  4.5L of fluid were removed on HD, however per facility protocol, that is max fluid removal allowable.  Following dialysis, he reported some difficulty with bleeding at fistula that is now resolved.  He states he became anxious due to the bleeding and started having chest palpitations associated with sharp, non-radiating pain which is what caused him to present to the ED.    Of note, he had recent epidermal inclusion cyst (5.3cm x 2.6cm x 1.9cm) removal done on 3/31 at [facility] and complains of tenderness to palpation in that area.  In the ED the patient was found to be febrile with a T of 101.9.  He was given vanc and cefepime and an infectious work-up was initiated.        Past Medical History:   Diagnosis Date    Acute pain of left shoulder 1/7/2017    Arthritis     Asthma     Benign neoplasm of eyelid     RUL    Blood clotting tendency     Blood transfusion     Cataract     Chronic kidney disease requiring chronic dialysis     Diabetes mellitus     Diabetic retinopathy     Fever blister     Hyperlipidemia     Hypertension     Infertility     Joint  pain     Retinal detachment     Thyroid disease     Weakness 1/7/2017       Past Surgical History:   Procedure Laterality Date    CATARACT EXTRACTION      RETINAL DETACHMENT SURGERY         Review of patient's allergies indicates:  No Known Allergies    No current facility-administered medications on file prior to encounter.      Current Outpatient Prescriptions on File Prior to Encounter   Medication Sig    aspirin (ECOTRIN) 81 MG EC tablet Take 81 mg by mouth once daily.     atorvastatin (LIPITOR) 20 MG tablet Take 20 mg by mouth once daily.    cetirizine (ZYRTEC) 10 MG tablet Take 10 mg by mouth once daily.    cinacalcet (SENSIPAR) 30 MG Tab Take 30 mg by mouth every evening.    diclofenac sodium (VOLTAREN) 1 % Gel Apply 2 g topically 4 (four) times daily as needed (for pain).    ergocalciferol (ERGOCALCIFEROL) 50,000 unit Cap Take 50,000 Units by mouth every 14 (fourteen) days.     hydrocodone-acetaminophen 5-325mg (NORCO) 5-325 mg per tablet Take 1-2 tablets PO q4-6hours PRN pain (Patient taking differently: Take 1 tablet by mouth every 6 to 8 hours as needed for Pain. )    ipratropium (ATROVENT HFA) 17 mcg/actuation inhaler Inhale 2 puffs into the lungs 4 (four) times daily. Do not exceed 12 puffs per day    ketotifen (ZADITOR) 0.025 % (0.035 %) ophthalmic solution Apply 1 drop to eye every 8 (eight) hours.    loperamide (IMODIUM) 2 mg capsule Take 2 mg by mouth every 4 (four) hours as needed for Diarrhea.     mometasone (NASONEX) 50 mcg/actuation nasal spray 2 sprays by Nasal route once daily.    sevelamer carbonate (RENVELA) 800 mg Tab Take two tablets by mouth four times daily with meals and snacks    losartan (COZAAR) 50 MG tablet Take 1 tablet (50 mg total) by mouth once daily.    podofilox (CONDYLOX) 0.5 % external solution Apply a small amount to lesions twice daily for three days, then hold for four days and repeat regimen     Family History     Problem Relation (Age of Onset)     "Heart attack Mother    No Known Problems Father, Sister, Brother, Maternal Aunt, Maternal Uncle, Paternal Aunt, Paternal Uncle, Maternal Grandmother, Maternal Grandfather, Paternal Grandmother, Paternal Grandfather        Social History Main Topics    Smoking status: Never Smoker    Smokeless tobacco: Never Used    Alcohol use Yes      Comment: 1 pint bourbon    Drug use: No    Sexual activity: Not on file     Review of Systems   Constitutional: Positive for appetite change (decreased), chills and fever.        Drenching night sweats "for many years"   HENT: Positive for dental problem (gingival bleeding), hearing loss (chronic), sore throat (for many months) and trouble swallowing (chronic).    Eyes: Positive for visual disturbance (blind in right eye, "flashing lights, chronic"). Negative for photophobia.   Respiratory: Positive for cough and shortness of breath (assoc with anxiety).    Cardiovascular: Positive for chest pain (assoc with anxiety), palpitations and leg swelling.   Gastrointestinal: Positive for abdominal pain (for many years). Negative for blood in stool, constipation (last BM on 4/20), diarrhea, nausea and vomiting.   Genitourinary: Positive for difficulty urinating (occassional incontinence, chronic) and dysuria (for many years). Negative for hematuria.   Musculoskeletal: Positive for neck pain.        Shooting leg pains, chronic     Skin: Positive for rash (lower legs and abdomen) and wound (blisters on lower legs).   Neurological: Positive for dizziness (assoc with anxiety), weakness, light-headedness (assoc with anxiety), numbness (legs, chronic) and headaches (chronic).   Hematological: Negative for adenopathy. Does not bruise/bleed easily.   Psychiatric/Behavioral: Negative for confusion. The patient is nervous/anxious.      Objective:     Vital Signs (Most Recent):  Temp: 98.6 °F (37 °C) (04/22/17 0850)  Pulse: 68 (04/22/17 0850)  Resp: 19 (04/22/17 0850)  BP: 138/65 (04/22/17 " 0850)  SpO2: 95 % (04/22/17 0850) Vital Signs (24h Range):  Temp:  [97.5 °F (36.4 °C)-99.2 °F (37.3 °C)] 98.6 °F (37 °C)  Pulse:  [60-83] 68  Resp:  [16-19] 19  SpO2:  [90 %-98 %] 95 %  BP: (138-169)/(65-83) 138/65     Weight: 112.9 kg (249 lb)  Body mass index is 36.77 kg/(m^2).    Physical Exam   Constitutional: He is oriented to person, place, and time. He appears well-developed and well-nourished.   Restless, morbidly obese male   HENT:   Head: Normocephalic and atraumatic.   Eyes: Conjunctivae and EOM are normal.   Neck: Normal range of motion. Neck supple.   Cardiovascular: Normal rate and regular rhythm.    Murmur (systolic) heard.  Pulmonary/Chest: Effort normal. No respiratory distress. He has rales.   Abdominal: Soft. Bowel sounds are normal. There is tenderness.   Peau d'orange appearance of RLQ with TTP   Musculoskeletal: He exhibits edema (tense edema to lower extremities bilat).   Neurological: He is alert and oriented to person, place, and time.   Skin: Skin is warm. Rash (blistering stasis dermatitis) noted. He is not diaphoretic.   Psychiatric: His speech is normal. His mood appears anxious. He is agitated. Cognition and memory are normal.   Very restless, requesting frequent position changes in bed from lying to sitting to lying   Nursing note and vitals reviewed.    Significant Labs:   CBC:   Recent Labs  Lab 04/21/17  0549   WBC 9.99   HGB 10.9*   HCT 34.0*   PLT 93*      CMP:   Recent Labs  Lab 04/21/17  0549      K 4.7   CL 98   CO2 30*   GLU 77   BUN 19   CREATININE 4.0*   CALCIUM 9.2   PROT 8.2   ALBUMIN 3.2*   BILITOT 1.2*   ALKPHOS 138*   AST 21   ALT 12   ANIONGAP 14   EGFRNONAA 14.5*       Significant Imaging: I have reviewed all pertinent imaging results/findings within the past 24 hours.    Assessment/Plan:     * Acute on chronic diastolic heart failure  -Fluid overload on exam  -Troponin 0.095  -BNP 4230  -Consulted nephrology for dialysis to remove fluid    ESRD (end stage renal  "disease) on dialysis  -Nephrology consulted, appreciate assistance  -Will continue sevelamer 1600 mg tidwm    Fever  -Febrile on presentation, now resolved s/p vanc, cefepime and tylenol  -No leukocytosis, lactate 1.3, procal 0.25  -Infectious work up with UA, CXR, blood cultures  -Ultrasound sacrum with fluid collection, possible infectious source  -Obtain CT abdo due to abdo TTP with manipulation of pannus   -Will continue with vanc and add metronidazole and ceftriaxone    History of excision of epidermal inclusion cyst  -Sacral cyst removed on 3/30  -Tenderness to area, possible infectious source  -Ultrasound with "Nonspecific, heterogeneously hypoechoic, subcutaneous focus at the region of recent excision on the left buttocks measuring 1.5 x 5.1 x 3.7 cm."  -General surgery consult, appreciate recs  -Received 1 dose vanc and cefepime in ED  -Will continue vanc and start ceftriaxone and flagyl    Right lower quadrant abdominal pain  -Chronic skin changes to area of tenderness, possible cellulitis though not erythematous or warm  -CT abdo/pelvis    HTN (hypertension)  -Continue home hydralazine 50 mg tid  -Expect improve with volume removal    HLD (hyperlipidemia)  -Continue home atorvastatin 20 mg    DM II (diabetes mellitus, type II), controlled  -Not on home medications or insulin  -HbA1c in 1/2017 was 5.7%    VTE Risk Mitigation         Ordered     heparin (porcine) injection 5,000 Units  Every 8 hours     Route:  Subcutaneous        04/21/17 1112        Laure Huerta MD  Department of Hospital Medicine   [facility]      Hypoxia/chest pain/fever: concerning for PE vs volume overload vs pneumonia however pt is refusing CTA. Also, pt with very rapid improvement with HD and BIPAP. Will likely de-escalate abx ASAP as pt with only one fever, negative procalcitonin, and non toxic appearing. Other potential cause of fever is hematoma vs abscess from recent excision of epidermal inclusion cyst. " Will consult gen surg for their recommendations.     Veronica Stout MD

## 2017-04-22 NOTE — SUBJECTIVE & OBJECTIVE
Interval History:   Afebrile overnight, vitals stable. No complaints of pain around area of previous excision.    Medications:  Continuous Infusions:   Scheduled Meds:   sodium chloride 0.9%   Intravenous Once    aspirin  81 mg Oral Daily    atorvastatin  20 mg Oral Daily    cefTRIAXone (ROCEPHIN) IVPB  1 g Intravenous Q24H    cetirizine  10 mg Oral Daily    cinacalcet  30 mg Oral QHS    ergocalciferol  50,000 Units Oral Q14 Days    furosemide  80 mg Oral Daily    heparin (porcine)  5,000 Units Subcutaneous Q8H    hydrALAZINE  50 mg Oral Q8H    metronidazole  500 mg Oral Q8H    sevelamer carbonate  1,600 mg Oral TID WM    sodium chloride 0.9%  3 mL Intravenous Q8H    vancomycin (VANCOCIN) IVPB  15 mg/kg Intravenous Once     PRN Meds:sodium chloride 0.9%, hydrocodone-acetaminophen 5-325mg, hydrOXYzine pamoate, omnipaque, ondansetron     Review of patient's allergies indicates:  No Known Allergies  Objective:     Vital Signs (Most Recent):  Temp: 99.2 °F (37.3 °C) (04/22/17 0400)  Pulse: 60 (04/22/17 0700)  Resp: 18 (04/22/17 0400)  BP: (!) 164/79 (04/22/17 0400)  SpO2: 98 % (04/22/17 0400) Vital Signs (24h Range):  Temp:  [97.5 °F (36.4 °C)-99.2 °F (37.3 °C)] 99.2 °F (37.3 °C)  Pulse:  [60-83] 60  Resp:  [16-19] 18  SpO2:  [90 %-98 %] 98 %  BP: (146-169)/(71-83) 164/79     Weight: 112.9 kg (249 lb)  Body mass index is 36.77 kg/(m^2).    Intake/Output - Last 3 Shifts       04/20 0700 - 04/21 0659 04/21 0700 - 04/22 0659 04/22 0700 - 04/23 0659           Stool Occurrence  1 x           Physical Exam   Constitutional: He is oriented to person, place, and time. He appears well-developed and well-nourished.   Eyes: Pupils are equal, round, and reactive to light.   Cardiovascular: Normal rate.    Pulmonary/Chest: Effort normal. No respiratory distress.   Abdominal: Soft. He exhibits no distension. There is no tenderness.   Genitourinary:   Genitourinary Comments: Incision on left buttock with seroma/hematoma.  No erythema, minimal tenderness. No drainage. No signs of infection.   Neurological: He is alert and oriented to person, place, and time.   Psychiatric: He has a normal mood and affect. His behavior is normal.       Significant Labs:  CBC:   Recent Labs  Lab 04/21/17 0549 04/22/17 0512   WBC 9.99 7.94   RBC 3.64* 3.59*   HGB 10.9* 10.6*   HCT 34.0* 33.9*   PLT 93*  --    MCV 93 94   MCH 29.9 29.5   MCHC 32.1 31.3*     BMP:   Recent Labs  Lab 04/22/17 0512   GLU 83   *   K 5.3*   CL 93*   CO2 26   BUN 26*   CREATININE 5.1*   CALCIUM 9.7   MG 2.9*

## 2017-04-22 NOTE — PLAN OF CARE
Problem: Patient Care Overview  Goal: Plan of Care Review  Outcome: Ongoing (interventions implemented as appropriate)  HD 3 hours, 2 Liters UF removed and tolerated well. Left upper arm de-accessed and pressure held until hemostasis achieved

## 2017-04-22 NOTE — PROGRESS NOTES
[facility]  General Surgery  Progress Note    Subjective:     History of Present Illness:  65 yo male with hx of CKD on HD, DM, HLD, HTN, and recent epidermal inclusion cyst (5.3cm x 2.6cm x 1.9cm) removal done on 3/31 at [facility]. Saw Dr. Holm in clinic 2 days prior . Presents to the hospital with fever (Tmax 101.9) and general body aches and CP with  Nausea and vomiting.         Post-Op Info:  * No surgery found *         Interval History:   Afebrile overnight, vitals stable. No complaints of pain around area of previous excision.    Medications:  Continuous Infusions:   Scheduled Meds:   sodium chloride 0.9%   Intravenous Once    aspirin  81 mg Oral Daily    atorvastatin  20 mg Oral Daily    cefTRIAXone (ROCEPHIN) IVPB  1 g Intravenous Q24H    cetirizine  10 mg Oral Daily    cinacalcet  30 mg Oral QHS    ergocalciferol  50,000 Units Oral Q14 Days    furosemide  80 mg Oral Daily    heparin (porcine)  5,000 Units Subcutaneous Q8H    hydrALAZINE  50 mg Oral Q8H    metronidazole  500 mg Oral Q8H    sevelamer carbonate  1,600 mg Oral TID WM    sodium chloride 0.9%  3 mL Intravenous Q8H    vancomycin (VANCOCIN) IVPB  15 mg/kg Intravenous Once     PRN Meds:sodium chloride 0.9%, hydrocodone-acetaminophen 5-325mg, hydrOXYzine pamoate, omnipaque, ondansetron     Review of patient's allergies indicates:  No Known Allergies  Objective:     Vital Signs (Most Recent):  Temp: 99.2 °F (37.3 °C) (04/22/17 0400)  Pulse: 60 (04/22/17 0700)  Resp: 18 (04/22/17 0400)  BP: (!) 164/79 (04/22/17 0400)  SpO2: 98 % (04/22/17 0400) Vital Signs (24h Range):  Temp:  [97.5 °F (36.4 °C)-99.2 °F (37.3 °C)] 99.2 °F (37.3 °C)  Pulse:  [60-83] 60  Resp:  [16-19] 18  SpO2:  [90 %-98 %] 98 %  BP: (146-169)/(71-83) 164/79     Weight: 112.9 kg (249 lb)  Body mass index is 36.77 kg/(m^2).    Intake/Output - Last 3 Shifts       04/20 0700 - 04/21 0659 04/21 0700 - 04/22 0659 04/22 0700 - 04/23 0659            Stool Occurrence  1 x           Physical Exam   Constitutional: He is oriented to person, place, and time. He appears well-developed and well-nourished.   Eyes: Pupils are equal, round, and reactive to light.   Cardiovascular: Normal rate.    Pulmonary/Chest: Effort normal. No respiratory distress.   Abdominal: Soft. He exhibits no distension. There is no tenderness.   Genitourinary:   Genitourinary Comments: Incision on left buttock with seroma/hematoma. No erythema, minimal tenderness. No drainage. No signs of infection.   Neurological: He is alert and oriented to person, place, and time.   Psychiatric: He has a normal mood and affect. His behavior is normal.       Significant Labs:  CBC:   Recent Labs  Lab 04/21/17  0549 04/22/17  0512   WBC 9.99 7.94   RBC 3.64* 3.59*   HGB 10.9* 10.6*   HCT 34.0* 33.9*   PLT 93*  --    MCV 93 94   MCH 29.9 29.5   MCHC 32.1 31.3*     BMP:   Recent Labs  Lab 04/22/17  0512   GLU 83   *   K 5.3*   CL 93*   CO2 26   BUN 26*   CREATININE 5.1*   CALCIUM 9.7   MG 2.9*         Assessment/Plan:     History of excision of epidermal inclusion cyst  No sign of infection, not likely the cause of fevers  Will discuss with staff if drainage of seroma/hematoma is necessary. Likely no further intervention.      Kamilla Traore MD  General Surgery  [facility]        Addendum: Spoke with staff, Dr. Goldberg.  No need for surgical intervention.  Will sign off.  Please call with questions.

## 2017-04-22 NOTE — SUBJECTIVE & OBJECTIVE
Past Medical History:   Diagnosis Date    Acute pain of left shoulder 1/7/2017    Arthritis     Asthma     Benign neoplasm of eyelid     RUL    Blood clotting tendency     Blood transfusion     Cataract     Chronic kidney disease requiring chronic dialysis     Diabetes mellitus     Diabetic retinopathy     Fever blister     Hyperlipidemia     Hypertension     Infertility     Joint pain     Retinal detachment     Thyroid disease     Weakness 1/7/2017       Past Surgical History:   Procedure Laterality Date    CATARACT EXTRACTION      RETINAL DETACHMENT SURGERY         Review of patient's allergies indicates:  No Known Allergies    No current facility-administered medications on file prior to encounter.      Current Outpatient Prescriptions on File Prior to Encounter   Medication Sig    aspirin (ECOTRIN) 81 MG EC tablet Take 81 mg by mouth once daily.     atorvastatin (LIPITOR) 20 MG tablet Take 20 mg by mouth once daily.    cetirizine (ZYRTEC) 10 MG tablet Take 10 mg by mouth once daily.    cinacalcet (SENSIPAR) 30 MG Tab Take 30 mg by mouth every evening.    diclofenac sodium (VOLTAREN) 1 % Gel Apply 2 g topically 4 (four) times daily as needed (for pain).    ergocalciferol (ERGOCALCIFEROL) 50,000 unit Cap Take 50,000 Units by mouth every 14 (fourteen) days.     hydrocodone-acetaminophen 5-325mg (NORCO) 5-325 mg per tablet Take 1-2 tablets PO q4-6hours PRN pain (Patient taking differently: Take 1 tablet by mouth every 6 to 8 hours as needed for Pain. )    ipratropium (ATROVENT HFA) 17 mcg/actuation inhaler Inhale 2 puffs into the lungs 4 (four) times daily. Do not exceed 12 puffs per day    ketotifen (ZADITOR) 0.025 % (0.035 %) ophthalmic solution Apply 1 drop to eye every 8 (eight) hours.    loperamide (IMODIUM) 2 mg capsule Take 2 mg by mouth every 4 (four) hours as needed for Diarrhea.     mometasone (NASONEX) 50 mcg/actuation nasal spray 2 sprays by Nasal route once daily.     "sevelamer carbonate (RENVELA) 800 mg Tab Take two tablets by mouth four times daily with meals and snacks    losartan (COZAAR) 50 MG tablet Take 1 tablet (50 mg total) by mouth once daily.    podofilox (CONDYLOX) 0.5 % external solution Apply a small amount to lesions twice daily for three days, then hold for four days and repeat regimen     Family History     Problem Relation (Age of Onset)    Heart attack Mother    No Known Problems Father, Sister, Brother, Maternal Aunt, Maternal Uncle, Paternal Aunt, Paternal Uncle, Maternal Grandmother, Maternal Grandfather, Paternal Grandmother, Paternal Grandfather        Social History Main Topics    Smoking status: Never Smoker    Smokeless tobacco: Never Used    Alcohol use Yes      Comment: 1 pint bourbon    Drug use: No    Sexual activity: Not on file     Review of Systems   Constitutional: Positive for appetite change (decreased), chills and fever.        Drenching night sweats "for many years"   HENT: Positive for dental problem (gingival bleeding), hearing loss (chronic), sore throat (for many months) and trouble swallowing (chronic).    Eyes: Positive for visual disturbance (blind in right eye, "flashing lights, chronic"). Negative for photophobia.   Respiratory: Positive for cough and shortness of breath (assoc with anxiety).    Cardiovascular: Positive for chest pain (assoc with anxiety), palpitations and leg swelling.   Gastrointestinal: Positive for abdominal pain (for many years). Negative for blood in stool, constipation (last BM on 4/20), diarrhea, nausea and vomiting.   Genitourinary: Positive for difficulty urinating (occassional incontinence, chronic) and dysuria (for many years). Negative for hematuria.   Musculoskeletal: Positive for neck pain.        Shooting leg pains, chronic     Skin: Positive for rash (lower legs and abdomen) and wound (blisters on lower legs).   Neurological: Positive for dizziness (assoc with anxiety), weakness, " light-headedness (assoc with anxiety), numbness (legs, chronic) and headaches (chronic).   Hematological: Negative for adenopathy. Does not bruise/bleed easily.   Psychiatric/Behavioral: Negative for confusion. The patient is nervous/anxious.      Objective:     Vital Signs (Most Recent):  Temp: 98.6 °F (37 °C) (04/22/17 0850)  Pulse: 68 (04/22/17 0850)  Resp: 19 (04/22/17 0850)  BP: 138/65 (04/22/17 0850)  SpO2: 95 % (04/22/17 0850) Vital Signs (24h Range):  Temp:  [97.5 °F (36.4 °C)-99.2 °F (37.3 °C)] 98.6 °F (37 °C)  Pulse:  [60-83] 68  Resp:  [16-19] 19  SpO2:  [90 %-98 %] 95 %  BP: (138-169)/(65-83) 138/65     Weight: 112.9 kg (249 lb)  Body mass index is 36.77 kg/(m^2).    Physical Exam   Constitutional: He is oriented to person, place, and time. He appears well-developed and well-nourished.   Restless, morbidly obese male   HENT:   Head: Normocephalic and atraumatic.   Eyes: Conjunctivae and EOM are normal.   Neck: Normal range of motion. Neck supple.   Cardiovascular: Normal rate and regular rhythm.    Murmur (systolic) heard.  Pulmonary/Chest: Effort normal. No respiratory distress. He has rales.   Abdominal: Soft. Bowel sounds are normal. There is tenderness.   Peau d'orange appearance of RLQ with TTP   Musculoskeletal: He exhibits edema (tense edema to lower extremities bilat).   Neurological: He is alert and oriented to person, place, and time.   Skin: Skin is warm. Rash (blistering stasis dermatitis) noted. He is not diaphoretic.   Psychiatric: His speech is normal. His mood appears anxious. He is agitated. Cognition and memory are normal.   Very restless, requesting frequent position changes in bed from lying to sitting to lying   Nursing note and vitals reviewed.    Significant Labs:   CBC:   Recent Labs  Lab 04/21/17  0549   WBC 9.99   HGB 10.9*   HCT 34.0*   PLT 93*      CMP:   Recent Labs  Lab 04/21/17  0549      K 4.7   CL 98   CO2 30*   GLU 77   BUN 19   CREATININE 4.0*   CALCIUM 9.2   PROT  8.2   ALBUMIN 3.2*   BILITOT 1.2*   ALKPHOS 138*   AST 21   ALT 12   ANIONGAP 14   EGFRNONAA 14.5*       Significant Imaging: I have reviewed all pertinent imaging results/findings within the past 24 hours.

## 2017-04-23 PROBLEM — R09.02 HYPOXIA: Status: ACTIVE | Noted: 2017-04-23

## 2017-04-23 LAB
ANION GAP SERPL CALC-SCNC: 10 MMOL/L
ANION GAP SERPL CALC-SCNC: 13 MMOL/L
AORTIC VALVE REGURGITATION: ABNORMAL
AORTIC VALVE STENOSIS: ABNORMAL
BASOPHILS # BLD AUTO: 0.01 K/UL
BASOPHILS NFR BLD: 0.2 %
BUN SERPL-MCNC: 21 MG/DL
BUN SERPL-MCNC: 26 MG/DL
CALCIUM SERPL-MCNC: 8.9 MG/DL
CALCIUM SERPL-MCNC: 9 MG/DL
CHLORIDE SERPL-SCNC: 101 MMOL/L
CHLORIDE SERPL-SCNC: 103 MMOL/L
CO2 SERPL-SCNC: 24 MMOL/L
CO2 SERPL-SCNC: 27 MMOL/L
CREAT SERPL-MCNC: 4.2 MG/DL
CREAT SERPL-MCNC: 4.6 MG/DL
DIFFERENTIAL METHOD: ABNORMAL
EOSINOPHIL # BLD AUTO: 0.2 K/UL
EOSINOPHIL NFR BLD: 3.6 %
ERYTHROCYTE [DISTWIDTH] IN BLOOD BY AUTOMATED COUNT: 18.3 %
EST. GFR  (AFRICAN AMERICAN): 14.2 ML/MIN/1.73 M^2
EST. GFR  (AFRICAN AMERICAN): 15.8 ML/MIN/1.73 M^2
EST. GFR  (NON AFRICAN AMERICAN): 12.3 ML/MIN/1.73 M^2
EST. GFR  (NON AFRICAN AMERICAN): 13.7 ML/MIN/1.73 M^2
ESTIMATED PA SYSTOLIC PRESSURE: 54.69
GLUCOSE SERPL-MCNC: 118 MG/DL
GLUCOSE SERPL-MCNC: 77 MG/DL
HCT VFR BLD AUTO: 29 %
HGB BLD-MCNC: 9.3 G/DL
LYMPHOCYTES # BLD AUTO: 1 K/UL
LYMPHOCYTES NFR BLD: 16.5 %
MAGNESIUM SERPL-MCNC: 2.3 MG/DL
MCH RBC QN AUTO: 30.2 PG
MCHC RBC AUTO-ENTMCNC: 32.1 %
MCV RBC AUTO: 94 FL
MITRAL VALVE MOBILITY: NORMAL
MONOCYTES # BLD AUTO: 0.9 K/UL
MONOCYTES NFR BLD: 13.9 %
NEUTROPHILS # BLD AUTO: 4 K/UL
NEUTROPHILS NFR BLD: 65.5 %
PHOSPHATE SERPL-MCNC: 4.3 MG/DL
PLATELET # BLD AUTO: 91 K/UL
PMV BLD AUTO: 10.9 FL
POCT GLUCOSE: 103 MG/DL (ref 70–110)
POCT GLUCOSE: 116 MG/DL (ref 70–110)
POCT GLUCOSE: 75 MG/DL (ref 70–110)
POCT GLUCOSE: 78 MG/DL (ref 70–110)
POTASSIUM SERPL-SCNC: 4.6 MMOL/L
POTASSIUM SERPL-SCNC: 4.8 MMOL/L
RBC # BLD AUTO: 3.08 M/UL
RETIRED EF AND QEF - SEE NOTES: 45 (ref 55–65)
SODIUM SERPL-SCNC: 138 MMOL/L
SODIUM SERPL-SCNC: 140 MMOL/L
TRICUSPID VALVE REGURGITATION: ABNORMAL
WBC # BLD AUTO: 6.11 K/UL

## 2017-04-23 PROCEDURE — 36415 COLL VENOUS BLD VENIPUNCTURE: CPT

## 2017-04-23 PROCEDURE — 80048 BASIC METABOLIC PNL TOTAL CA: CPT

## 2017-04-23 PROCEDURE — 99900035 HC TECH TIME PER 15 MIN (STAT)

## 2017-04-23 PROCEDURE — 25000003 PHARM REV CODE 250: Performed by: INTERNAL MEDICINE

## 2017-04-23 PROCEDURE — 85025 COMPLETE CBC W/AUTO DIFF WBC: CPT

## 2017-04-23 PROCEDURE — 83735 ASSAY OF MAGNESIUM: CPT

## 2017-04-23 PROCEDURE — 11000001 HC ACUTE MED/SURG PRIVATE ROOM

## 2017-04-23 PROCEDURE — 63600175 PHARM REV CODE 636 W HCPCS: Performed by: STUDENT IN AN ORGANIZED HEALTH CARE EDUCATION/TRAINING PROGRAM

## 2017-04-23 PROCEDURE — 93010 ELECTROCARDIOGRAM REPORT: CPT | Mod: ,,, | Performed by: INTERNAL MEDICINE

## 2017-04-23 PROCEDURE — 84100 ASSAY OF PHOSPHORUS: CPT

## 2017-04-23 PROCEDURE — 99233 SBSQ HOSP IP/OBS HIGH 50: CPT | Mod: GC,,, | Performed by: INTERNAL MEDICINE

## 2017-04-23 PROCEDURE — 25000003 PHARM REV CODE 250

## 2017-04-23 PROCEDURE — 93005 ELECTROCARDIOGRAM TRACING: CPT

## 2017-04-23 RX ORDER — AMOXICILLIN AND CLAVULANATE POTASSIUM 500; 125 MG/1; MG/1
1 TABLET, FILM COATED ORAL DAILY
Status: DISCONTINUED | OUTPATIENT
Start: 2017-04-23 | End: 2017-04-24 | Stop reason: HOSPADM

## 2017-04-23 RX ORDER — SODIUM CHLORIDE 9 MG/ML
INJECTION, SOLUTION INTRAVENOUS ONCE
Status: DISCONTINUED | OUTPATIENT
Start: 2017-04-23 | End: 2017-04-24 | Stop reason: HOSPADM

## 2017-04-23 RX ORDER — SODIUM CHLORIDE 9 MG/ML
INJECTION, SOLUTION INTRAVENOUS
Status: DISCONTINUED | OUTPATIENT
Start: 2017-04-23 | End: 2017-04-24

## 2017-04-23 RX ORDER — FUROSEMIDE 10 MG/ML
80 INJECTION INTRAMUSCULAR; INTRAVENOUS ONCE
Status: COMPLETED | OUTPATIENT
Start: 2017-04-23 | End: 2017-04-23

## 2017-04-23 RX ADMIN — FUROSEMIDE 80 MG: 10 INJECTION, SOLUTION INTRAMUSCULAR; INTRAVENOUS at 06:04

## 2017-04-23 RX ADMIN — CINACALCET HYDROCHLORIDE 30 MG: 30 TABLET, COATED ORAL at 09:04

## 2017-04-23 RX ADMIN — Medication 10 ML: at 05:04

## 2017-04-23 RX ADMIN — HYDROCODONE BITARTRATE AND ACETAMINOPHEN 1 TABLET: 5; 325 TABLET ORAL at 09:04

## 2017-04-23 RX ADMIN — ATORVASTATIN CALCIUM 20 MG: 20 TABLET, FILM COATED ORAL at 08:04

## 2017-04-23 RX ADMIN — Medication 10 ML: at 09:04

## 2017-04-23 RX ADMIN — HYDROCODONE BITARTRATE AND ACETAMINOPHEN 1 TABLET: 5; 325 TABLET ORAL at 02:04

## 2017-04-23 RX ADMIN — HEPARIN SODIUM 5000 UNITS: 5000 INJECTION, SOLUTION INTRAVENOUS; SUBCUTANEOUS at 05:04

## 2017-04-23 RX ADMIN — HEPARIN SODIUM 5000 UNITS: 5000 INJECTION, SOLUTION INTRAVENOUS; SUBCUTANEOUS at 09:04

## 2017-04-23 RX ADMIN — HYDRALAZINE HYDROCHLORIDE 50 MG: 50 TABLET ORAL at 03:04

## 2017-04-23 RX ADMIN — HEPARIN SODIUM 5000 UNITS: 5000 INJECTION, SOLUTION INTRAVENOUS; SUBCUTANEOUS at 03:04

## 2017-04-23 RX ADMIN — FUROSEMIDE 80 MG: 40 TABLET ORAL at 08:04

## 2017-04-23 RX ADMIN — AMOXICILLIN AND CLAVULANATE POTASSIUM 500 MG: 500; 125 TABLET, FILM COATED ORAL at 05:04

## 2017-04-23 RX ADMIN — CETIRIZINE HYDROCHLORIDE 10 MG: 10 TABLET, FILM COATED ORAL at 08:04

## 2017-04-23 RX ADMIN — SEVELAMER CARBONATE 1600 MG: 800 TABLET, FILM COATED ORAL at 12:04

## 2017-04-23 RX ADMIN — SEVELAMER CARBONATE 1600 MG: 800 TABLET, FILM COATED ORAL at 05:04

## 2017-04-23 RX ADMIN — HYDRALAZINE HYDROCHLORIDE 50 MG: 50 TABLET ORAL at 09:04

## 2017-04-23 RX ADMIN — Medication 3 ML: at 03:04

## 2017-04-23 RX ADMIN — ASPIRIN 81 MG: 81 TABLET, COATED ORAL at 08:04

## 2017-04-23 RX ADMIN — HYDRALAZINE HYDROCHLORIDE 50 MG: 50 TABLET ORAL at 05:04

## 2017-04-23 RX ADMIN — SEVELAMER CARBONATE 1600 MG: 800 TABLET, FILM COATED ORAL at 08:04

## 2017-04-23 NOTE — PROGRESS NOTES
Pt is back to the floor from dialysis on stretcher with transporter . Pt is awake ,alert and oriented x 4 . V/S checked .

## 2017-04-23 NOTE — PLAN OF CARE
Problem: Diabetes, Type 2 (Adult)  Goal: Signs and Symptoms of Listed Potential Problems Will be Absent, Minimized or Managed (Diabetes, Type 2)  Signs and symptoms of listed potential problems will be absent, minimized or managed by discharge/transition of care (reference Diabetes, Type 2 (Adult) CPG).   Outcome: Ongoing (interventions implemented as appropriate)  Pt is free of S/S of hypo/hyperglycemia during the day . BS monitored before meals .     Problem: Fall Risk (Adult)  Goal: Absence of Falls  Patient will demonstrate the desired outcomes by discharge/transition of care.   Outcome: Ongoing (interventions implemented as appropriate)  Pt is free of falls and injuries per shift , fall precautions maintained .

## 2017-04-23 NOTE — CONSULTS
"Admission Medication Reconciliation - Pharmacy Consult Note    The home medication history was taken by Monika Garcia, Pharmacy Technician.  Based on information gathered and subsequent review by the clinical pharmacist, the items below may need attention.     You may go to "Admission" then "Reconcile Home Medications" tabs to review and/or act upon these items. Based on information gathered and subsequent review by the clinical pharmacist, the items below may need attention.    Potentially problematic discrepancies with current MAR  o Patient IS taking the following which was not ordered upon admit  o Ipratropium (ATROVENT) 17mcg/actuation inhaler: inhale 2 puffs four times daily   o Losartan 50mg tablet: 1 tablet daily       Please address this information as you see fit.  Feel free to contact us if you have any questions or require assistance.    Jyothi Rubalcava, PharmD  PGY-1 Pharmacy Resident     "

## 2017-04-23 NOTE — PROGRESS NOTES
[facility] HEMODIALYSIS NOTE     Patient currently on hemodialysis for removal of uremic toxins and volume.    Patient seen and evaluated on hemodialysis, tolerating treatment, see HD flowsheet for vitals and assessments.      Ultrafiltration goal is 1-2L     Labs have been reviewed and the dialysate bath has been adjusted.     Assessment/Plan:     ESRD, HD today for removal of uremic toxins and volume, continue TIW.

## 2017-04-23 NOTE — PROGRESS NOTES
Home Oxygen Evaluation    Date Performed: 2017    1) Patient's Home O2 Sat on room air, while at rest: 96%        If O2 sats on room air at rest are 88% or below, patient qualifies. No additional testing needed. Document N/A in steps 2 and 3. If 89% or above, complete steps 2.      2) Patient's O2 Sat on room air while exercisin %        If O2 sats on room air while exercising remain 89% or above patient does not qualify, no further testing needed Document N/A in step 3. If O2 sats on room air while exercising are 88% or below, continue to step 3.      3) Patient's O2 Sat while exercising on O2: 95% at 3  LPM         (Must show improvement from #2 for patients to qualify)    If O2 sats improve on oxygen, patient qualifies for portable oxygen. If not, the patient does not qualify.

## 2017-04-23 NOTE — SUBJECTIVE & OBJECTIVE
Interval History: NAEON.  Pt was unable to obtain CT abdomen due to inability to lie flat comfortably.  Went for HD today.  Evaluated by general surgery for fluid collection in post-operative sacral wound.  No indication for intervention.      Review of Systems   Constitutional: Negative for chills and fever.   HENT: Negative for trouble swallowing.    Respiratory: Negative for cough and shortness of breath.    Cardiovascular: Positive for leg swelling. Negative for chest pain.   Gastrointestinal: Positive for abdominal pain. Negative for constipation and diarrhea.     Objective:     Vital Signs (Most Recent):  Temp: 97.9 °F (36.6 °C) (04/22/17 1826)  Pulse: 62 (04/22/17 1826)  Resp: 18 (04/22/17 1826)  BP: (!) 150/70 (04/22/17 1826)  SpO2: 99 % (04/22/17 1826) Vital Signs (24h Range):  Temp:  [97.7 °F (36.5 °C)-99.2 °F (37.3 °C)] 97.9 °F (36.6 °C)  Pulse:  [51-83] 62  Resp:  [16-20] 18  SpO2:  [92 %-100 %] 99 %  BP: (138-165)/(65-85) 150/70     Weight: 112.9 kg (249 lb)  Body mass index is 36.77 kg/(m^2).    Intake/Output Summary (Last 24 hours) at 04/22/17 1921  Last data filed at 04/22/17 1645   Gross per 24 hour   Intake              920 ml   Output             2700 ml   Net            -1780 ml      Physical Exam   Constitutional: He is oriented to person, place, and time. He appears well-developed and well-nourished.   Restless, morbidly obese male   HENT:   Head: Normocephalic and atraumatic.   Eyes: Conjunctivae and EOM are normal.   Neck: Normal range of motion. Neck supple.   Cardiovascular: Normal rate and regular rhythm.    Murmur (systolic) heard.  Pulmonary/Chest: Effort normal. No respiratory distress. He has rales.   Abdominal: Soft. Bowel sounds are normal. There is tenderness.   Peau d'orange appearance of RLQ with TTP   Musculoskeletal: He exhibits edema (tense edema to lower extremities bilat).   Neurological: He is alert and oriented to person, place, and time.   Skin: Skin is warm. Rash  (blistering stasis dermatitis) noted. He is not diaphoretic.   Psychiatric: His speech is normal. His mood appears anxious. He is agitated. Cognition and memory are normal.   Very restless, requesting frequent position changes in bed from lying to sitting to lying   Nursing note and vitals reviewed.      Significant Labs:   BMP:   Recent Labs  Lab 04/22/17  0512 04/22/17  1817   GLU 83 84   * 138   K 5.3* 4.9   CL 93* 102   CO2 26 27   BUN 26* 18   CREATININE 5.1* 3.7*   CALCIUM 9.7 9.2   MG 2.9*  --      CBC:   Recent Labs  Lab 04/21/17  0549 04/22/17  0512   WBC 9.99 7.94   HGB 10.9* 10.6*   HCT 34.0* 33.9*   PLT 93* 102*       Significant Imaging: I have reviewed all pertinent imaging results/findings within the past 24 hours.

## 2017-04-23 NOTE — ASSESSMENT & PLAN NOTE
"-Sacral cyst removed on 3/30  -Tenderness to area  -Ultrasound with "Nonspecific, heterogeneously hypoechoic, subcutaneous focus at the region of recent excision on the left buttocks measuring 1.5 x 5.1 x 3.7 cm."  -General surgery consult, no planned intervention    "

## 2017-04-23 NOTE — PROGRESS NOTES
Spoke with IM Carmel Rincon And he said pt is scheduled for Abd CT when he finish dialysis and he order to give the pt the contrast when he is back to the floor and call CT to arrange time for the image . Pt is still in dialysis , will follow order .

## 2017-04-23 NOTE — PLAN OF CARE
Problem: Diabetes, Type 2 (Adult)  Goal: Signs and Symptoms of Listed Potential Problems Will be Absent, Minimized or Managed (Diabetes, Type 2)  Signs and symptoms of listed potential problems will be absent, minimized or managed by discharge/transition of care (reference Diabetes, Type 2 (Adult) CPG).   Outcome: Ongoing (interventions implemented as appropriate)  BS monitored before meals during the day , no insulin given , BS remains within normal  range .     Problem: Fall Risk (Adult)  Goal: Absence of Falls  Patient will demonstrate the desired outcomes by discharge/transition of care.   Outcome: Ongoing (interventions implemented as appropriate)  Pt is free of falls and injuries per shift , fall precautions maintained . Pt preferred to sit on chair all day .     Problem: Pressure Ulcer Risk (Jasmeet Scale) (Adult,Obstetrics,Pediatric)  Goal: Skin Integrity  Patient will demonstrate the desired outcomes by discharge/transition of care.   Outcome: Ongoing (interventions implemented as appropriate)  Pt is able to ambulate using the walker , skin is intact .

## 2017-04-23 NOTE — ASSESSMENT & PLAN NOTE
-Febrile on presentation, now resolved s/p vanc, cefepime and tylenol  -No leukocytosis, lactate 1.3, procal 0.25  -Infectious work up with UA, CXR, blood cultures, negative to date  -Ultrasound sacrum with fluid collection, evaluated by general surgery, do not feel this is source  -Obtain CT abdo due to abdo TTP with manipulation of pannus   -Holding abx, will resume if spikes new fever

## 2017-04-23 NOTE — PROGRESS NOTES
Pt refused to drink the contrast and he refused to do the CT scan , pt reported he feels weak and he wants to eat now . IM 1 Dr notified and he said it is ok , may try again tomorrow . CT tech notified that CT cancelled for tonight .

## 2017-04-23 NOTE — NURSING
Patient sleeping sitting on sofa in room.  Recliner provided so that his legs may be elevated.  Observed patient with difficulty ambulating just 3 steps/unsteady.  Patient instructed to not get up unassisted.  Voices good understanding.  Call light in reach.  Telephone and urinal also.  Telemetry reapplied and shows RSR.

## 2017-04-23 NOTE — ASSESSMENT & PLAN NOTE
-Chronic skin changes to area of tenderness, possible cellulitis though not erythematous or warm  -CT abdo/pelvis pending fluid removal and ability to lie flat

## 2017-04-23 NOTE — PROGRESS NOTES
[facility] Hospital Medicine  Progress Note    Patient Name: Wilder Carbajal  MRN: [id number]  Patient Class: IP- Inpatient   Admission Date: 4/21/2017  Length of Stay: 1 days  Attending Physician: Veronica Stout MD  Primary Care Provider: Akhil Adorno MD    [facility] Medicine Team: [facility] HOSP MED 1 Laure Huerta MD    Subjective:     Principal Problem:Acute on chronic diastolic heart failure    Interval History: NAEON.  Pt was unable to obtain CT abdomen due to inability to lie flat comfortably.  Went for HD today.  Evaluated by general surgery for fluid collection in post-operative sacral wound.  No indication for intervention.      Review of Systems   Constitutional: Negative for chills and fever.   HENT: Negative for trouble swallowing.    Respiratory: Negative for cough and shortness of breath.    Cardiovascular: Positive for leg swelling. Negative for chest pain.   Gastrointestinal: Positive for abdominal pain. Negative for constipation and diarrhea.     Objective:     Vital Signs (Most Recent):  Temp: 97.9 °F (36.6 °C) (04/22/17 1826)  Pulse: 62 (04/22/17 1826)  Resp: 18 (04/22/17 1826)  BP: (!) 150/70 (04/22/17 1826)  SpO2: 99 % (04/22/17 1826) Vital Signs (24h Range):  Temp:  [97.7 °F (36.5 °C)-99.2 °F (37.3 °C)] 97.9 °F (36.6 °C)  Pulse:  [51-83] 62  Resp:  [16-20] 18  SpO2:  [92 %-100 %] 99 %  BP: (138-165)/(65-85) 150/70     Weight: 112.9 kg (249 lb)  Body mass index is 36.77 kg/(m^2).    Intake/Output Summary (Last 24 hours) at 04/22/17 1921  Last data filed at 04/22/17 1645   Gross per 24 hour   Intake              920 ml   Output             2700 ml   Net            -1780 ml      Physical Exam   Constitutional: He is oriented to person, place, and time. He appears well-developed and well-nourished.   Restless, morbidly obese male   HENT:   Head: Normocephalic and atraumatic.   Eyes: Conjunctivae and EOM are normal.   Neck: Normal range of motion. Neck supple.    Cardiovascular: Normal rate and regular rhythm.    Murmur (systolic) heard.  Pulmonary/Chest: Effort normal. No respiratory distress. He has rales.   Abdominal: Soft. Bowel sounds are normal. There is tenderness.   Peau d'orange appearance of RLQ with TTP   Musculoskeletal: He exhibits edema (tense edema to lower extremities bilat).   Neurological: He is alert and oriented to person, place, and time.   Skin: Skin is warm. Rash (blistering stasis dermatitis) noted. He is not diaphoretic.   Psychiatric: His speech is normal. His mood appears anxious. He is agitated. Cognition and memory are normal.   Very restless, requesting frequent position changes in bed from lying to sitting to lying   Nursing note and vitals reviewed.      Significant Labs:   BMP:   Recent Labs  Lab 04/22/17  0512 04/22/17  1817   GLU 83 84   * 138   K 5.3* 4.9   CL 93* 102   CO2 26 27   BUN 26* 18   CREATININE 5.1* 3.7*   CALCIUM 9.7 9.2   MG 2.9*  --      CBC:   Recent Labs  Lab 04/21/17  0549 04/22/17  0512   WBC 9.99 7.94   HGB 10.9* 10.6*   HCT 34.0* 33.9*   PLT 93* 102*       Significant Imaging: I have reviewed all pertinent imaging results/findings within the past 24 hours.    Assessment/Plan:      * Acute on chronic diastolic heart failure  -Fluid overload on exam  -Troponin 0.095  -BNP 4230  -Dialysis today with 2L off    ESRD (end stage renal disease) on dialysis  -Nephrology consulted, appreciate assistance  -Will continue sevelamer 1600 mg tidwm  -HD today with 2L off    Fever  -Febrile on presentation, now resolved s/p vanc, cefepime and tylenol  -No leukocytosis, lactate 1.3, procal 0.25  -Infectious work up with UA, CXR, blood cultures, negative to date  -Ultrasound sacrum with fluid collection, evaluated by general surgery, do not feel this is source  -Obtain CT abdo due to abdo TTP with manipulation of pannus   -Holding abx, will resume if spikes new fever    History of excision of epidermal inclusion cyst  -Sacral cyst  "removed on 3/30  -Tenderness to area  -Ultrasound with "Nonspecific, heterogeneously hypoechoic, subcutaneous focus at the region of recent excision on the left buttocks measuring 1.5 x 5.1 x 3.7 cm."  -General surgery consult, no planned intervention    Right lower quadrant abdominal pain  -Chronic skin changes to area of tenderness, possible cellulitis though not erythematous or warm  -CT abdo/pelvis pending fluid removal and ability to lie flat    HTN (hypertension)  -Continue home hydralazine 50 mg tid  -Expect improvement with volume removal    HLD (hyperlipidemia)  -Continue home atorvastatin 20 mg    DM II (diabetes mellitus, type II), controlled  -Not on home medications or insulin  -HbA1c in 1/2017 was 5.7%      VTE Risk Mitigation         Ordered     heparin (porcine) injection 5,000 Units  Every 8 hours     Route:  Subcutaneous        04/21/17 1112        Dispo:  Potentially home tomorrow with home health PT/OT    Laure Huerta MD  Department of Hospital Medicine   [facility]  "

## 2017-04-23 NOTE — PROGRESS NOTES
Pt left the floor for X ray on stretcher with transporter . Pt is awake ,alert and oriented x 4 . Stable V/S . Pt on O2 3 L N/C . Pt on tele .

## 2017-04-24 VITALS
SYSTOLIC BLOOD PRESSURE: 174 MMHG | TEMPERATURE: 99 F | OXYGEN SATURATION: 93 % | DIASTOLIC BLOOD PRESSURE: 74 MMHG | WEIGHT: 249 LBS | BODY MASS INDEX: 36.88 KG/M2 | HEART RATE: 74 BPM | RESPIRATION RATE: 18 BRPM | HEIGHT: 69 IN

## 2017-04-24 PROBLEM — I82.411 RIGHT FEMORAL VEIN DVT: Status: ACTIVE | Noted: 2017-04-24

## 2017-04-24 LAB
ANION GAP SERPL CALC-SCNC: 13 MMOL/L
APTT BLDCRRT: 24.9 SEC
BASOPHILS # BLD AUTO: 0.02 K/UL
BASOPHILS # BLD AUTO: 0.03 K/UL
BASOPHILS NFR BLD: 0.4 %
BASOPHILS NFR BLD: 0.5 %
BUN SERPL-MCNC: 28 MG/DL
CALCIUM SERPL-MCNC: 9.4 MG/DL
CHLORIDE SERPL-SCNC: 102 MMOL/L
CO2 SERPL-SCNC: 24 MMOL/L
CREAT SERPL-MCNC: 5.3 MG/DL
DIFFERENTIAL METHOD: ABNORMAL
DIFFERENTIAL METHOD: ABNORMAL
EOSINOPHIL # BLD AUTO: 0.2 K/UL
EOSINOPHIL # BLD AUTO: 0.2 K/UL
EOSINOPHIL NFR BLD: 3.2 %
EOSINOPHIL NFR BLD: 4 %
ERYTHROCYTE [DISTWIDTH] IN BLOOD BY AUTOMATED COUNT: 18.3 %
ERYTHROCYTE [DISTWIDTH] IN BLOOD BY AUTOMATED COUNT: 18.4 %
EST. GFR  (AFRICAN AMERICAN): 11.9 ML/MIN/1.73 M^2
EST. GFR  (NON AFRICAN AMERICAN): 10.3 ML/MIN/1.73 M^2
GLUCOSE SERPL-MCNC: 65 MG/DL
HCT VFR BLD AUTO: 31.4 %
HCT VFR BLD AUTO: 32.9 %
HGB BLD-MCNC: 10.3 G/DL
HGB BLD-MCNC: 10.7 G/DL
LYMPHOCYTES # BLD AUTO: 1.2 K/UL
LYMPHOCYTES # BLD AUTO: 1.2 K/UL
LYMPHOCYTES NFR BLD: 18.7 %
LYMPHOCYTES NFR BLD: 21.6 %
MAGNESIUM SERPL-MCNC: 2.6 MG/DL
MCH RBC QN AUTO: 29.9 PG
MCH RBC QN AUTO: 30.2 PG
MCHC RBC AUTO-ENTMCNC: 32.5 %
MCHC RBC AUTO-ENTMCNC: 32.8 %
MCV RBC AUTO: 92 FL
MCV RBC AUTO: 92 FL
MONOCYTES # BLD AUTO: 0.6 K/UL
MONOCYTES # BLD AUTO: 0.7 K/UL
MONOCYTES NFR BLD: 10.4 %
MONOCYTES NFR BLD: 13.4 %
NEUTROPHILS # BLD AUTO: 3.3 K/UL
NEUTROPHILS # BLD AUTO: 4.1 K/UL
NEUTROPHILS NFR BLD: 60.2 %
NEUTROPHILS NFR BLD: 67 %
PHOSPHATE SERPL-MCNC: 4.2 MG/DL
PLATELET # BLD AUTO: 116 K/UL
PLATELET # BLD AUTO: 121 K/UL
PMV BLD AUTO: 10.3 FL
PMV BLD AUTO: 10.8 FL
POTASSIUM SERPL-SCNC: 4.8 MMOL/L
RBC # BLD AUTO: 3.41 M/UL
RBC # BLD AUTO: 3.58 M/UL
SODIUM SERPL-SCNC: 139 MMOL/L
WBC # BLD AUTO: 5.52 K/UL
WBC # BLD AUTO: 6.16 K/UL

## 2017-04-24 PROCEDURE — 83735 ASSAY OF MAGNESIUM: CPT

## 2017-04-24 PROCEDURE — 99233 SBSQ HOSP IP/OBS HIGH 50: CPT | Mod: GC,,, | Performed by: INTERNAL MEDICINE

## 2017-04-24 PROCEDURE — 25000003 PHARM REV CODE 250: Performed by: INTERNAL MEDICINE

## 2017-04-24 PROCEDURE — 25000003 PHARM REV CODE 250: Performed by: STUDENT IN AN ORGANIZED HEALTH CARE EDUCATION/TRAINING PROGRAM

## 2017-04-24 PROCEDURE — 84100 ASSAY OF PHOSPHORUS: CPT

## 2017-04-24 PROCEDURE — 80048 BASIC METABOLIC PNL TOTAL CA: CPT

## 2017-04-24 PROCEDURE — 36415 COLL VENOUS BLD VENIPUNCTURE: CPT

## 2017-04-24 PROCEDURE — 25000003 PHARM REV CODE 250

## 2017-04-24 PROCEDURE — 85730 THROMBOPLASTIN TIME PARTIAL: CPT

## 2017-04-24 PROCEDURE — 85025 COMPLETE CBC W/AUTO DIFF WBC: CPT

## 2017-04-24 RX ORDER — AMOXICILLIN AND CLAVULANATE POTASSIUM 500; 125 MG/1; MG/1
1 TABLET, FILM COATED ORAL DAILY
Qty: 3 TABLET | Refills: 0 | Status: SHIPPED | OUTPATIENT
Start: 2017-04-24 | End: 2017-04-27

## 2017-04-24 RX ORDER — WARFARIN 2.5 MG/1
2.5 TABLET ORAL DAILY
Status: DISCONTINUED | OUTPATIENT
Start: 2017-04-24 | End: 2017-04-24

## 2017-04-24 RX ORDER — HEPARIN SODIUM,PORCINE/D5W 25000/250
17 INTRAVENOUS SOLUTION INTRAVENOUS CONTINUOUS
Status: DISCONTINUED | OUTPATIENT
Start: 2017-04-24 | End: 2017-04-24

## 2017-04-24 RX ADMIN — HEPARIN SODIUM 5000 UNITS: 5000 INJECTION, SOLUTION INTRAVENOUS; SUBCUTANEOUS at 06:04

## 2017-04-24 RX ADMIN — ATORVASTATIN CALCIUM 20 MG: 20 TABLET, FILM COATED ORAL at 08:04

## 2017-04-24 RX ADMIN — CETIRIZINE HYDROCHLORIDE 10 MG: 10 TABLET, FILM COATED ORAL at 08:04

## 2017-04-24 RX ADMIN — AMOXICILLIN AND CLAVULANATE POTASSIUM 500 MG: 500; 125 TABLET, FILM COATED ORAL at 08:04

## 2017-04-24 RX ADMIN — FUROSEMIDE 80 MG: 40 TABLET ORAL at 08:04

## 2017-04-24 RX ADMIN — APIXABAN 10 MG: 5 TABLET, FILM COATED ORAL at 03:04

## 2017-04-24 RX ADMIN — SEVELAMER CARBONATE 1600 MG: 800 TABLET, FILM COATED ORAL at 08:04

## 2017-04-24 RX ADMIN — ASPIRIN 81 MG: 81 TABLET, COATED ORAL at 08:04

## 2017-04-24 RX ADMIN — HYDRALAZINE HYDROCHLORIDE 50 MG: 50 TABLET ORAL at 06:04

## 2017-04-24 NOTE — SUBJECTIVE & OBJECTIVE
Interval History: 2DE w/ RV strain (2.2 cm increased in diameter in 1yr period), Afib on EKG, O2 desat to 79% on RA-> CTA chest pending.     Review of Systems   Constitutional: Negative for chills and fever.   HENT: Negative for trouble swallowing.    Respiratory: Positive for shortness of breath. Negative for cough.    Cardiovascular: Positive for leg swelling. Negative for chest pain.   Gastrointestinal: Negative for constipation and diarrhea.     Objective:     Vital Signs (Most Recent):  Temp: 98.3 °F (36.8 °C) (04/23/17 1551)  Pulse: 66 (04/23/17 1800)  Resp: 17 (04/23/17 1551)  BP: (!) 159/72 (04/23/17 1551)  SpO2: 95 % (04/23/17 1741) Vital Signs (24h Range):  Temp:  [97.9 °F (36.6 °C)-99.2 °F (37.3 °C)] 98.3 °F (36.8 °C)  Pulse:  [51-74] 66  Resp:  [16-18] 17  SpO2:  [92 %-99 %] 95 %  BP: (145-159)/(67-84) 159/72     Weight: 112.9 kg (249 lb)  Body mass index is 36.77 kg/(m^2).    Intake/Output Summary (Last 24 hours) at 04/23/17 1903  Last data filed at 04/23/17 1800   Gross per 24 hour   Intake              790 ml   Output                0 ml   Net              790 ml      Physical Exam   Constitutional: He is oriented to person, place, and time. He appears well-developed and well-nourished.   Restless, morbidly obese male   HENT:   Head: Normocephalic and atraumatic.   Eyes: Conjunctivae and EOM are normal.   Neck: Normal range of motion. Neck supple.   Cardiovascular: Normal rate and regular rhythm.    Murmur (systolic) heard.  Pulmonary/Chest: Effort normal. No respiratory distress. He has rales.   Abdominal: Soft. Bowel sounds are normal. There is tenderness.   Peau d'orange appearance of RLQ with TTP   Musculoskeletal: He exhibits edema (tense edema to lower extremities bilat).   Neurological: He is alert and oriented to person, place, and time.   Skin: Skin is warm. Rash (blistering stasis dermatitis) noted. He is not diaphoretic.   Psychiatric: His speech is normal. His mood appears anxious. He is  agitated. Cognition and memory are normal.   Very restless, requesting frequent position changes in bed from lying to sitting to lying   Nursing note and vitals reviewed.      Significant Labs:   CBC:   Recent Labs  Lab 04/22/17  0512 04/23/17  0500   WBC 7.94 6.11   HGB 10.6* 9.3*   HCT 33.9* 29.0*   * 91*     CMP:   Recent Labs  Lab 04/22/17  1817 04/23/17  0500 04/23/17  1557    140 138   K 4.9 4.6 4.8    103 101   CO2 27 27 24   GLU 84 77 118*   BUN 18 21 26*   CREATININE 3.7* 4.2* 4.6*   CALCIUM 9.2 8.9 9.0   ANIONGAP 9 10 13   EGFRNONAA 15.9* 13.7* 12.3*     Coagulation: No results for input(s): INR, APTT in the last 48 hours.    Invalid input(s): PT    Significant Imaging: CT PE pending

## 2017-04-24 NOTE — ASSESSMENT & PLAN NOTE
-CHF vs PE  -2D echo w/ RV strain (2.2 cm increased in diameter in 1yr period), Afib not on anticoagulation 2/2 to bleeding during HD, O2 desat to 79% on RA, and febrile all lead to increased concern for PE  -LE ultrasound with right femoral DVT   -CTA chest pending  -Ordered oxygen for home use

## 2017-04-24 NOTE — ASSESSMENT & PLAN NOTE
-Nephrology consulted, appreciate assistance  -Will continue sevelamer 1600 mg tidwm    -discussed w/ nephrology, Pt w/ emergent CTA chest tonight, Will aim for HD tomorrow morning

## 2017-04-24 NOTE — ASSESSMENT & PLAN NOTE
-Febrile on presentation, now resolved s/p vanc, cefepime and tylenol  -No leukocytosis, lactate 1.3, procal 0.25  -Infectious work up with UA, CXR, blood cultures, negative to date  -Ultrasound sacrum with fluid collection, evaluated by general surgery, do not feel this is source  -Obtain CT abdo due to abdo TTP with manipulation of pannus   -Holding abx, will resume if spikes new fever  -CTA chest pending to r/o PE

## 2017-04-24 NOTE — PLAN OF CARE
SW received call from pt's [facility] , [facility]serrat [place] [phone] stating that pt would like nursing home placement at discharge because he does not want to go home alone. [facility]errat provided that nursing homes that [facility] is contacted with, [facility], [facility], [facility] and [facility].

## 2017-04-24 NOTE — DISCHARGE SUMMARY
[facility] Hospital Medicine  Discharge Summary      Patient Name: Wilder Carbajal  MRN: [id number]  Admission Date: 4/21/2017  Hospital Length of Stay: 3 days  Discharge Date and Time:  04/24/2017 5:24 PM  Attending Physician: No att. providers found   Discharging Provider: Laure Huerta MD  Primary Care Provider: Akhil Adorno MD  Hospital Medicine Team: [facility] [place] HOSP MED 1 Laure Huerta MD    HPI:   Mr. Wilder Carbajal is a 67 yo male with a PMH significant for T2DM, HTN, HLD, CKD on TTS HD (for past 11 years). He presented to [facility] ED in the night of 4/20 with subjective fevers, anxiety, and chest pain. His symptoms started after dialysis earlier in the day.  4.5L of fluid were removed on HD, however per facility protocol, that is max fluid removal allowable.  Following dialysis, he reported some difficulty with bleeding at fistula that is now resolved.  He states he became anxious due to the bleeding and started having chest palpitations associated with sharp, non-radiating pain which is what caused him to present to the ED.    Of note, he had recent epidermal inclusion cyst (5.3cm x 2.6cm x 1.9cm) removal done on 3/31 at [facility] and complains of tenderness to palpation in that area.  In the ED the patient was found to be febrile with a T of 101.9.  He was given vanc and cefepime and an infectious work-up was initiated.        Indwelling Lines/Drains at time of discharge:   Lines/Drains/Airways     Drain                 Hemodialysis AV Graft 06/17/14 0915  1042 days              Hospital Course:   4/22 - Received HD with 2L fluid off  4/23 - S/P HD w/ some improvement in SOB but not back to baseline. Pt desaturated to 79% on RA walking test,CT PE ordered & pending   4/24 - LE ultrasound revealed right femoral DVT.  Pt refused HD and left AMA       Consults:   Consults         Status Ordering Provider     Inpatient consult to Nephrology  Once     Provider:  (Not yet assigned)     Completed UMESH DAVID III          Significant Diagnostic Studies: Labs:   CMP   Recent Labs  Lab 04/23/17  0500 04/23/17  1557 04/24/17  0607    138 139   K 4.6 4.8 4.8    101 102   CO2 27 24 24   GLU 77 118* 65*   BUN 21 26* 28*   CREATININE 4.2* 4.6* 5.3*   CALCIUM 8.9 9.0 9.4   ANIONGAP 10 13 13   ESTGFRAFRICA 15.8* 14.2* 11.9*   EGFRNONAA 13.7* 12.3* 10.3*   , CBC   Recent Labs  Lab 04/23/17  0500 04/24/17  0607 04/24/17  1358   WBC 6.11 5.52 6.16   HGB 9.3* 10.3* 10.7*   HCT 29.0* 31.4* 32.9*   PLT 91* 116* 121*    and A1C:   Recent Labs  Lab 01/07/17  2051 04/22/17  0512   HGBA1C 5.7 5.3       Pending Diagnostic Studies:     Procedure Component Value Units Date/Time    Basic metabolic panel [[id number]]     Order Status:  Sent Lab Status:  No result     Specimen:  Blood from Blood         Final Active Diagnoses:    Diagnosis Date Noted POA    PRINCIPAL PROBLEM:  Acute on chronic diastolic heart failure [I50.33] 04/11/2017 Yes    Right femoral vein DVT [I82.411] 04/24/2017 Clinically Undetermined    Hypoxia [R09.02] 04/23/2017 Yes    ESRD (end stage renal disease) on dialysis [N18.6, Z99.2] 08/15/2015 Not Applicable    Fever [R50.9] 08/04/2016 Yes    History of excision of epidermal inclusion cyst [Z98.890, Z87.2] 11/07/2012 Not Applicable    Right lower quadrant abdominal pain [R10.31] 04/21/2017 Yes    HTN (hypertension) [I10] 12/21/2013 Yes    HLD (hyperlipidemia) [E78.5] 12/21/2013 Yes    DM II (diabetes mellitus, type II), controlled [E11.9] 12/21/2013 Yes      Problems Resolved During this Admission:    Diagnosis Date Noted Date Resolved POA      Hypoxia  -CHF vs PE  -2D echo w/ RV strain (2.2 cm increased in diameter in 1yr period)  -Concern for PE due to Afib with anticoagulation temporarily held 2/2 complaint of minor bleeding from fistula site after HD (that resolved with pressure), O2 desat to 79% on RA, and persistent fever  -LE ultrasound with right femoral DVT   -Unable to  "obtain CTA due to pt refusal  -Ordered oxygen for home use    Right femoral vein DVT  -As evidenced on lower extremity ultrasound on 4/24  -Heparin gtt while inpatient  -The risk of  treating suspected PE with anticoagulation felt to outweigh the benefit of preventing further bleeding (minor and now stopped)  -Discussed the risks/benefits of starting eliquis and pt was amenable to start.  He expressed understanding that not treating DVT and suspected PE could lead to death.  First dose eliquis administered before pt left AMA.      ESRD (end stage renal disease) on dialysis  -Continue sevelamer 1600 mg tidwm  -Pt refused HD today    * Acute on chronic diastolic heart failure  -Remains fluid overloaded on exam  -Troponin 0.095  -BNP 4230  -Continue furosemide 80 mg daily  -Dialysis 4/23 with 2L off   -Pt refused further HD today     Fever  -Febrile on presentation, now resolved s/p vanc, cefepime and tylenol  -No leukocytosis, lactate 1.3, procal 0.25  -Infectious work up with UA, CXR, blood cultures, negative to date  -Ultrasound sacrum with fluid collection, evaluated by general surgery, do not feel this is source  -Pt refused CT abdo and CTA  -Suspect PE as possible source of fever given femoral DVT    History of excision of epidermal inclusion cyst  -Sacral cyst removed on 3/30  -Tenderness to area  -Ultrasound with "Nonspecific, heterogeneously hypoechoic, subcutaneous focus at the region of recent excision on the left buttocks measuring 1.5 x 5.1 x 3.7 cm."  -General surgery consulted, no planned intervention    Right lower quadrant abdominal pain  -Chronic skin changes to area of tenderness, possible cellulitis though not erythematous or warm  -Unable to obtain CT abdo due to pt refusal     HTN (hypertension)  -Continue home hydralazine 50 mg tid    HLD (hyperlipidemia)  -Continue home atorvastatin 20 mg    DM II (diabetes mellitus, type II), controlled  -Not on home medications or insulin  -HbA1c in 1/2017 was " "5.7%    Discharged Condition: against medical advice    Disposition: Left Against Medical Adv*    Follow Up:  Nephrology and dialysis    Patient Instructions:     OXYGEN FOR HOME USE   Order Specific Question Answer Comments   Liter Flow 2    Duration With activity    Qualifying SpO2: 79    Testing done at: Exercise/Activity    Route nasal cannula    Device home concentrator with portable unit    Patient condition with qualifying saturation CHF    Height: 5' 9" (1.753 m)    Weight: 112.9 kg (249 lb)    Does patient have medical equipment at home? bath bench Simultaneous filing. User may not have seen previous data. / Simultaneous filing. User may not have seen previous data. / Simultaneous filing. User may not have seen previous data.   Does patient have medical equipment at home? walker, rolling    Does patient have medical equipment at home? rollator    Alternative treatment measures have been tried or considered and deemed clinically ineffective. Yes        Medications:  Reconciled Home Medications:   Discharge Medication List as of 4/24/2017  4:59 PM      START taking these medications    Details   amoxicillin-clavulanate 500-125mg (AUGMENTIN) 500-125 mg Tab Take 1 tablet (500 mg total) by mouth once daily., Starting 4/24/2017, Until Thu 4/27/17, Normal      !! apixaban 5 mg Tab Take 2 tablets (10 mg total) by mouth 2 (two) times daily., Starting 4/25/2017, Until Tue 5/2/17, Normal      !! apixaban 5 mg Tab Take 1 tablet (5 mg total) by mouth 2 (two) times daily., Starting 5/2/2017, Until Discontinued, Normal       !! - Potential duplicate medications found. Please discuss with provider.      CONTINUE these medications which have NOT CHANGED    Details   ammonium lactate (LAC-HYDRIN) 12 % lotion Apply topically as needed for Dry Skin., Until Discontinued, Historical Med      aspirin (ECOTRIN) 81 MG EC tablet Take 81 mg by mouth once daily. , Until Discontinued, Historical Med      atorvastatin (LIPITOR) 20 MG " tablet Take 20 mg by mouth once daily., Until Discontinued, Historical Med      cetirizine (ZYRTEC) 10 MG tablet Take 10 mg by mouth once daily., Until Discontinued, Historical Med      cinacalcet (SENSIPAR) 30 MG Tab Take 30 mg by mouth every evening., Until Discontinued, Historical Med      diclofenac sodium (VOLTAREN) 1 % Gel Apply 2 g topically 4 (four) times daily as needed (for pain)., Until Discontinued, Historical Med      ergocalciferol (ERGOCALCIFEROL) 50,000 unit Cap Take 50,000 Units by mouth every 14 (fourteen) days. , Until Discontinued, Historical Med      furosemide (LASIX) 80 MG tablet Take 80 mg by mouth once daily., Until Discontinued, Historical Med      hydrALAZINE (APRESOLINE) 50 MG tablet Take 50 mg by mouth 3 (three) times daily., Until Discontinued, Historical Med      ipratropium (ATROVENT HFA) 17 mcg/actuation inhaler Inhale 2 puffs into the lungs 4 (four) times daily. Do not exceed 12 puffs per day, Until Discontinued, Historical Med      ketotifen (ZADITOR) 0.025 % (0.035 %) ophthalmic solution Apply 1 drop to eye every 8 (eight) hours., Until Discontinued, Historical Med      sevelamer carbonate (RENVELA) 800 mg Tab Take two tablets by mouth four times daily with meals and snacks, Until Discontinued, Historical Med      losartan (COZAAR) 50 MG tablet Take 1 tablet (50 mg total) by mouth once daily., Starting 4/12/2017, Until Thu 4/12/18, Normal      podofilox (CONDYLOX) 0.5 % external solution Apply a small amount to lesions twice daily for three days, then hold for four days and repeat regimen, Until Discontinued, Historical Med         STOP taking these medications       hydrocodone-acetaminophen 5-325mg (NORCO) 5-325 mg per tablet Comments:   Reason for Stopping:         loperamide (IMODIUM) 2 mg capsule Comments:   Reason for Stopping:         mometasone (NASONEX) 50 mcg/actuation nasal spray Comments:   Reason for Stopping:             Time spent on the discharge of patient: 35  minutes    Laure Huerta MD  Department of Hospital Medicine  [facility]

## 2017-04-24 NOTE — PROGRESS NOTES
[facility] Hospital Medicine  Progress Note    Patient Name: Wilder Carbajal  MRN: [id number]  Patient Class: IP- Inpatient   Admission Date: 4/21/2017  Length of Stay: 3 days  Attending Physician: Veronica Stout MD  Primary Care Provider: Akhil Adorno MD    [facility] Medicine Team: [facility] MED 1 Laure Huerta MD    Subjective:     Principal Problem:Acute on chronic diastolic heart failure    Interval History: NAEON.  Pt refused to go to CT for CTA to evaluate for PE due to fear of lying flat and claustrophobia.  Pt would be willing to go for scan if under conscious sedation.  Otherwise pt has no complaints.  Pt sitting up reading Bible on exam.      Review of Systems   Constitutional: Negative for chills and fever.   HENT: Negative for trouble swallowing.    Respiratory: Negative for cough and shortness of breath.    Cardiovascular: Positive for leg swelling. Negative for chest pain.   Gastrointestinal: Positive for abdominal pain. Negative for constipation and diarrhea.     Objective:     Vital Signs (Most Recent):  Temp: 98.9 °F (37.2 °C) (04/24/17 0824)  Pulse: 74 (04/24/17 0824)  Resp: 18 (04/24/17 0824)  BP: (!) 174/74 (04/24/17 0824)  SpO2: (!) 93 % (04/24/17 0824) Vital Signs (24h Range):  Temp:  [98.3 °F (36.8 °C)-98.9 °F (37.2 °C)] 98.9 °F (37.2 °C)  Pulse:  [54-76] 74  Resp:  [17-18] 18  SpO2:  [91 %-98 %] 93 %  BP: (137-174)/(65-77) 174/74     Weight: 112.9 kg (249 lb)  Body mass index is 36.77 kg/(m^2).    Intake/Output Summary (Last 24 hours) at 04/24/17 1216  Last data filed at 04/24/17 0500   Gross per 24 hour   Intake              640 ml   Output                0 ml   Net              640 ml      Physical Exam   Constitutional: He is oriented to person, place, and time. He appears well-developed and well-nourished.   Restless, morbidly obese male   HENT:   Head: Normocephalic and atraumatic.   Eyes: Conjunctivae and EOM are normal.   Neck: Normal range of motion. Neck  supple.   Cardiovascular: Normal rate and regular rhythm.    Murmur (systolic) heard.  Pulmonary/Chest: Effort normal. No respiratory distress. He has rales.   Abdominal: Soft. Bowel sounds are normal. There is tenderness.   Peau d'orange appearance of RLQ with TTP   Musculoskeletal: He exhibits edema (tense edema to lower extremities bilat).   Neurological: He is alert and oriented to person, place, and time.   Skin: Skin is warm. Rash (blistering stasis dermatitis) noted. He is not diaphoretic.   Psychiatric: His speech is normal. Cognition and memory are normal.   Nursing note and vitals reviewed.      Significant Labs:   CBC:   Recent Labs  Lab 04/23/17  0500 04/24/17  0607   WBC 6.11 5.52   HGB 9.3* 10.3*   HCT 29.0* 31.4*   PLT 91* 116*     CMP:   Recent Labs  Lab 04/23/17  0500 04/23/17  1557 04/24/17  0607    138 139   K 4.6 4.8 4.8    101 102   CO2 27 24 24   GLU 77 118* 65*   BUN 21 26* 28*   CREATININE 4.2* 4.6* 5.3*   CALCIUM 8.9 9.0 9.4   ANIONGAP 10 13 13   EGFRNONAA 13.7* 12.3* 10.3*       Significant Imaging: I have reviewed all pertinent imaging results/findings within the past 24 hours.    Assessment/Plan:      Hypoxia  -CHF vs PE  -2D echo w/ RV strain (2.2 cm increased in diameter in 1yr period), Afib not on anticoagulation 2/2 to bleeding during HD, O2 desat to 79% on RA, and febrile all lead to increased concern for PE  -LE ultrasound with right femoral DVT   -CTA chest pending  -Ordered oxygen for home use    Right femoral vein DVT  -As evidenced on lower extremity ultrasound on 4/24  -Heparin gtt  -Bridge to warfarin    ESRD (end stage renal disease) on dialysis  -Nephrology consulted, appreciate assistance  -Continue sevelamer 1600 mg tidwm    * Acute on chronic diastolic heart failure  -Fluid overload on exam  -Troponin 0.095  -BNP 4230  -Continue furosemide 80 mg daily  -Dialysis 4/23 with 2L off   -Plan for HD this afternoon for more volume removal    Fever  -Febrile on  "presentation, now resolved s/p vanc, cefepime and tylenol  -No leukocytosis, lactate 1.3, procal 0.25  -Infectious work up with UA, CXR, blood cultures, negative to date  -Ultrasound sacrum with fluid collection, evaluated by general surgery, do not feel this is source  -Obtain CT abdo due to abdo TTP with manipulation of pannus   -Holding abx, will resume if spikes new fever  -CTA chest pending to r/o PE     History of excision of epidermal inclusion cyst  -Sacral cyst removed on 3/30  -Tenderness to area  -Ultrasound with "Nonspecific, heterogeneously hypoechoic, subcutaneous focus at the region of recent excision on the left buttocks measuring 1.5 x 5.1 x 3.7 cm."  -General surgery consulted, no planned intervention    Right lower quadrant abdominal pain  -Chronic skin changes to area of tenderness, possible cellulitis though not erythematous or warm  -CT abdo/pelvis pending    HTN (hypertension)  -Continue home hydralazine 50 mg tid  -Expect improvement with additional volume removal    HLD (hyperlipidemia)  -Continue home atorvastatin 20 mg    DM II (diabetes mellitus, type II), controlled  -Not on home medications or insulin  -HbA1c in 1/2017 was 5.7%  -Hypoglycemia protocol     VTE Risk Mitigation         Ordered     warfarin tablet 3.5 mg  Daily     Route:  Oral        04/24/17 0335          Laure Huerta MD  Department of Hospital Medicine   [facility]  "

## 2017-04-24 NOTE — PROGRESS NOTES
"Pt in room refusing dialysis. This nurse explained the reasons for dialysis and the complications that can occur if he miss this treatment. Pt stated that he is "not going to dialysis" and would like to speak to his doctor to further discuss his discharge. Call placed to on call for Medicine team 1 to notify of patient request. MD stated that he will come up to speak to patient. Will continue to monitor.  "

## 2017-04-24 NOTE — NURSING
Refusing to go to CT scan.  Complains of claustrophobia  Medication to reduce anxiety refused.  IM 1 informed.  States CT necessary.  IM 1 will come speak with patient.

## 2017-04-24 NOTE — ASSESSMENT & PLAN NOTE
-Continue home hydralazine 50 mg tid  -Expect improvement with additional volume removal  - 80iv lasix x1

## 2017-04-24 NOTE — PROGRESS NOTES
[facility] Hospital Medicine  Progress Note    Patient Name: Wilder Carbajal  MRN: [id number]  Patient Class: IP- Inpatient   Admission Date: 4/21/2017  Length of Stay: 2 days  Attending Physician: Veronica Stout MD  Primary Care Provider: Akhil Adorno MD    Hospital Medicine [place] [facility] 1 Jose Enrique Paiz MD    Subjective:     Principal Problem:Acute on chronic diastolic heart failure    HPI:  Mr. Wilder Carbajal is a 65 yo male with a PMH significant for T2DM, HTN, HLD, CKD on TTS HD (for past 11 years). He [place] [facility] [place] in the night of 4/20 with subjective fevers, anxiety, and chest pain. His symptoms started after dialysis earlier in the day.  4.5L of fluid were removed on HD, however per facility protocol, that is max fluid removal allowable.  Following dialysis, he reported some difficulty with bleeding at fistula that is now resolved.  He states he became anxious due to the bleeding and started having chest palpitations associated with sharp, non-radiating pain which is what caused him to present to the ED.    Of note, he had recent epidermal inclusion cyst (5.3cm x 2.6cm x 1.9cm) removal done on 3/31 at [facility] and complains of tenderness to palpation in that area.  In the ED the patient was found to be febrile with a T of 101.9.  He was given vanc and cefepime and an infectious work-up was initiated.        Hospital Course:  4/22 - Received HD with 2L fluid off  4/23: S/P HD w/ some improvement in SOB but not back to baseline. Pt desaturated to 79% on RA walking test,CT PE ordered & pending       Interval History: 2DE w/ RV strain (2.2 cm increased in diameter in 1yr period), Afib on EKG, O2 desat to 79% on RA-> CTA chest pending.     Review of Systems   Constitutional: Negative for chills and fever.   HENT: Negative for trouble swallowing.    Respiratory: Positive for shortness of breath. Negative for cough.    Cardiovascular: Positive for leg swelling.  Negative for chest pain.   Gastrointestinal: Negative for constipation and diarrhea.     Objective:     Vital Signs (Most Recent):  Temp: 98.3 °F (36.8 °C) (04/23/17 1551)  Pulse: 66 (04/23/17 1800)  Resp: 17 (04/23/17 1551)  BP: (!) 159/72 (04/23/17 1551)  SpO2: 95 % (04/23/17 1741) Vital Signs (24h Range):  Temp:  [97.9 °F (36.6 °C)-99.2 °F (37.3 °C)] 98.3 °F (36.8 °C)  Pulse:  [51-74] 66  Resp:  [16-18] 17  SpO2:  [92 %-99 %] 95 %  BP: (145-159)/(67-84) 159/72     Weight: 112.9 kg (249 lb)  Body mass index is 36.77 kg/(m^2).    Intake/Output Summary (Last 24 hours) at 04/23/17 1903  Last data filed at 04/23/17 1800   Gross per 24 hour   Intake              790 ml   Output                0 ml   Net              790 ml      Physical Exam   Constitutional: He is oriented to person, place, and time. He appears well-developed and well-nourished.   Restless, morbidly obese male   HENT:   Head: Normocephalic and atraumatic.   Eyes: Conjunctivae and EOM are normal.   Neck: Normal range of motion. Neck supple.   Cardiovascular: Normal rate and regular rhythm.    Murmur (systolic) heard.  Pulmonary/Chest: Effort normal. No respiratory distress. He has rales.   Abdominal: Soft. Bowel sounds are normal. There is tenderness.   Peau d'orange appearance of RLQ with TTP   Musculoskeletal: He exhibits edema (tense edema to lower extremities bilat).   Neurological: He is alert and oriented to person, place, and time.   Skin: Skin is warm. Rash (blistering stasis dermatitis) noted. He is not diaphoretic.   Psychiatric: His speech is normal. His mood appears anxious. He is agitated. Cognition and memory are normal.   Very restless, requesting frequent position changes in bed from lying to sitting to lying   Nursing note and vitals reviewed.      Significant Labs:   CBC:   Recent Labs  Lab 04/22/17  0512 04/23/17  0500   WBC 7.94 6.11   HGB 10.6* 9.3*   HCT 33.9* 29.0*   * 91*     CMP:   Recent Labs  Lab 04/22/17  5621  "04/23/17  0500 04/23/17  1557    140 138   K 4.9 4.6 4.8    103 101   CO2 27 27 24   GLU 84 77 118*   BUN 18 21 26*   CREATININE 3.7* 4.2* 4.6*   CALCIUM 9.2 8.9 9.0   ANIONGAP 9 10 13   EGFRNONAA 15.9* 13.7* 12.3*     Coagulation: No results for input(s): INR, APTT in the last 48 hours.    Invalid input(s): PT    Significant Imaging: CT PE pending     Assessment/Plan:      * Acute on chronic diastolic heart failure  -Fluid overload on exam  -Troponin 0.095  -BNP 4230  -Dialysis 4/23 with 2L off  -80IV lasix given this PM for diuresis   - plan for HD tomorrow following CTA chest tonight       History of excision of epidermal inclusion cyst  -Sacral cyst removed on 3/30  -Tenderness to area  -Ultrasound with "Nonspecific, heterogeneously hypoechoic, subcutaneous focus at the region of recent excision on the left buttocks measuring 1.5 x 5.1 x 3.7 cm."  -General surgery consult, no planned intervention      HLD (hyperlipidemia)  -Continue home atorvastatin 20 mg      HTN (hypertension)  -Continue home hydralazine 50 mg tid  -Expect improvement with additional volume removal  - 80iv lasix x1      ESRD (end stage renal disease) on dialysis  -Nephrology consulted, appreciate assistance  -Will continue sevelamer 1600 mg tidwm    -discussed w/ nephrology, Pt w/ emergent CTA chest tonight, Will aim for HD tomorrow morning         Fever  -Febrile on presentation, now resolved s/p vanc, cefepime and tylenol  -No leukocytosis, lactate 1.3, procal 0.25  -Infectious work up with UA, CXR, blood cultures, negative to date  -Ultrasound sacrum with fluid collection, evaluated by general surgery, do not feel this is source  -Obtain CT abdo due to abdo TTP with manipulation of pannus   -Holding abx, will resume if spikes new fever    -CTA chest pending to r/o PE     Right lower quadrant abdominal pain  -Chronic skin changes to area of tenderness, possible cellulitis though not erythematous or warm  -CT abdo/pelvis pending " fluid removal and ability to lie flat         Hypoxia    - CHF vs PE  - 2DE w/ RV strain (2.2 cm increased in diameter in 1yr period), Afib not on anticoagulation 2/2 to bleeding during HD, O2 desat to 79% on RA, and febrile all lead to increased concern for PE   -- CTA chest pending.     VTE Risk Mitigation         Ordered     heparin (porcine) injection 5,000 Units  Every 8 hours     Route:  Subcutaneous        04/21/17 1112          Jose Enrique Paiz MD  Department of Hospital Medicine   [facility]

## 2017-04-24 NOTE — PLAN OF CARE
REFERRAL SENT TO     [facility]  [facility]

## 2017-04-24 NOTE — ASSESSMENT & PLAN NOTE
-CHF vs PE  -2D echo w/ RV strain (2.2 cm increased in diameter in 1yr period), Afib not on anticoagulation 2/2 to bleeding during HD, O2 desat to 79% on RA, and febrile all lead to increased concern for PE  -LE ultrasound with right femoral DVT   -Unable to obtain CTA due to pt refusal  -Ordered oxygen for home use

## 2017-04-24 NOTE — ASSESSMENT & PLAN NOTE
-Fluid overload on exam  -Troponin 0.095  -BNP 4230  -Dialysis 4/23 with 2L off  -80IV lasix given this PM for diuresis   - plan for HD tomorrow following CTA chest tonight

## 2017-04-24 NOTE — ASSESSMENT & PLAN NOTE
-Chronic skin changes to area of tenderness, possible cellulitis though not erythematous or warm  -CT abdo/pelvis pending

## 2017-04-24 NOTE — ASSESSMENT & PLAN NOTE
-Febrile on presentation, now resolved s/p vanc, cefepime and tylenol  -No leukocytosis, lactate 1.3, procal 0.25  -Infectious work up with UA, CXR, blood cultures, negative to date  -Ultrasound sacrum with fluid collection, evaluated by general surgery, do not feel this is source  -Pt refused CT abdo and CTA  -Suspect PE as possible source of fever given femoral DVT

## 2017-04-24 NOTE — ASSESSMENT & PLAN NOTE
"-Sacral cyst removed on 3/30  -Tenderness to area  -Ultrasound with "Nonspecific, heterogeneously hypoechoic, subcutaneous focus at the region of recent excision on the left buttocks measuring 1.5 x 5.1 x 3.7 cm."  -General surgery consulted, no planned intervention    "

## 2017-04-24 NOTE — PLAN OF CARE
According to attending MD, pt wants to leave AMA, however needs a ride home and requesting transportation assistance, DERIK informed MD that this SW will not be able to honor this request for transportation assistance because of pt's AMA status, however can place a call to [facility] to see if they will arrange to transport patient. DERIK called Montserrat Sotelo at [phone] and she stated that she will arrange this transport and will call when they are their way to pick him up, pt will need to waiting in front of the hospital. DERIK provided Montserrat with the 9th floor nurses stations contact information.   DERIK informed attending nurse Erlinda Kelley via phone of transportation arrangements.

## 2017-04-24 NOTE — ASSESSMENT & PLAN NOTE
-Chronic skin changes to area of tenderness, possible cellulitis though not erythematous or warm  -Unable to obtain CT abdo due to pt refusal

## 2017-04-24 NOTE — ASSESSMENT & PLAN NOTE
-Fluid overload on exam  -Troponin 0.095  -BNP 4230  -Continue furosemide 80 mg daily  -Dialysis 4/23 with 2L off   -Plan for HD this afternoon for more volume removal

## 2017-04-24 NOTE — SUBJECTIVE & OBJECTIVE
Interval History: NAEON.  Pt refused to go to CT for CTA to evaluate for PE due to fear of lying flat and claustrophobia.  Pt would be willing to go for scan if under conscious sedation.  Otherwise pt has no complaints.  Pt sitting up reading Bible on exam.      Review of Systems   Constitutional: Negative for chills and fever.   HENT: Negative for trouble swallowing.    Respiratory: Negative for cough and shortness of breath.    Cardiovascular: Positive for leg swelling. Negative for chest pain.   Gastrointestinal: Positive for abdominal pain. Negative for constipation and diarrhea.     Objective:     Vital Signs (Most Recent):  Temp: 98.9 °F (37.2 °C) (04/24/17 0824)  Pulse: 74 (04/24/17 0824)  Resp: 18 (04/24/17 0824)  BP: (!) 174/74 (04/24/17 0824)  SpO2: (!) 93 % (04/24/17 0824) Vital Signs (24h Range):  Temp:  [98.3 °F (36.8 °C)-98.9 °F (37.2 °C)] 98.9 °F (37.2 °C)  Pulse:  [54-76] 74  Resp:  [17-18] 18  SpO2:  [91 %-98 %] 93 %  BP: (137-174)/(65-77) 174/74     Weight: 112.9 kg (249 lb)  Body mass index is 36.77 kg/(m^2).    Intake/Output Summary (Last 24 hours) at 04/24/17 1216  Last data filed at 04/24/17 0500   Gross per 24 hour   Intake              640 ml   Output                0 ml   Net              640 ml      Physical Exam   Constitutional: He is oriented to person, place, and time. He appears well-developed and well-nourished.   Restless, morbidly obese male   HENT:   Head: Normocephalic and atraumatic.   Eyes: Conjunctivae and EOM are normal.   Neck: Normal range of motion. Neck supple.   Cardiovascular: Normal rate and regular rhythm.    Murmur (systolic) heard.  Pulmonary/Chest: Effort normal. No respiratory distress. He has rales.   Abdominal: Soft. Bowel sounds are normal. There is tenderness.   Peau d'orange appearance of RLQ with TTP   Musculoskeletal: He exhibits edema (tense edema to lower extremities bilat).   Neurological: He is alert and oriented to person, place, and time.   Skin: Skin  is warm. Rash (blistering stasis dermatitis) noted. He is not diaphoretic.   Psychiatric: His speech is normal. Cognition and memory are normal.   Nursing note and vitals reviewed.      Significant Labs:   CBC:   Recent Labs  Lab 04/23/17  0500 04/24/17  0607   WBC 6.11 5.52   HGB 9.3* 10.3*   HCT 29.0* 31.4*   PLT 91* 116*     CMP:   Recent Labs  Lab 04/23/17  0500 04/23/17  1557 04/24/17  0607    138 139   K 4.6 4.8 4.8    101 102   CO2 27 24 24   GLU 77 118* 65*   BUN 21 26* 28*   CREATININE 4.2* 4.6* 5.3*   CALCIUM 8.9 9.0 9.4   ANIONGAP 10 13 13   EGFRNONAA 13.7* 12.3* 10.3*       Significant Imaging: I have reviewed all pertinent imaging results/findings within the past 24 hours.

## 2017-04-24 NOTE — NURSING
Dr. Samuel here and explains to patient CT and reason ordered.  Patient again refused to go to CT.

## 2017-04-24 NOTE — ASSESSMENT & PLAN NOTE
-Remains fluid overloaded on exam  -Troponin 0.095  -BNP 4230  -Continue furosemide 80 mg daily  -Dialysis 4/23 with 2L off   -Pt refused further HD today

## 2017-04-24 NOTE — ASSESSMENT & PLAN NOTE
- CHF vs PE  - 2DE w/ RV strain (2.2 cm increased in diameter in 1yr period), Afib not on anticoagulation 2/2 to bleeding during HD, O2 desat to 79% on RA, and febrile all lead to increased concern for PE   -- CTA chest pending.

## 2017-04-24 NOTE — PLAN OF CARE
Extended Emergency Contact Information  Primary Emergency Contact: Jimmy Carbajal  Address: [address] [place] [address] [place]  Home Phone: [phone]  Mobile Phone: [phone]  Relation: Brother    Akhil Adorno MD  [address] [facility] / [place] [address]     Future Appointments  Date Time Provider Department Center   5/8/2017 10:40 AM Laure Holm DO [facility]   5/17/2017 1:00 PM VASCULAR, LAB [facility] VASCLAB [facility] beds at the [facility]   5/17/2017 1:30 PM Liz Zhou MD [facility] VASCSUR [facility] at the [facility]   5/31/2017 10:00 AM Ambar Schmidt MD [facility] NEURO [facility]     Payor: [facility] / Plan: [employer] [facility] / Product Type: Medicare Advantage /       [facility] - [address]  [address]  [place] [address]  Phone: [phone] Fax: [phone]       04/24/17 1401   Discharge Assessment   Assessment Type Discharge Planning Assessment   Confirmed/corrected address and phone number on facesheet? Yes   Assessment information obtained from? Patient;Medical Record   Expected Length of Stay (days) 5   Communicated expected length of stay with patient/caregiver yes   Prior to hospitilization cognitive status: Alert/Oriented   Prior to hospitalization functional status: Assistive Equipment   Current cognitive status: Alert/Oriented   Current Functional Status: Assistive Equipment   Arrived From home or self-care   Lives With child(robert), adult   Able to Return to Prior Arrangements yes   Is patient able to care for self after discharge? No   How many people do you have in your home that can help with your care after discharge? 1   Who are your caregiver(s) and their phone number(s)? Jimmy Carbajal Brothkrissy  [phone]    Patient's perception of discharge disposition nursing home   Readmission Within The Last 30 Days no previous admission in last 30 days   Patient currently being followed by  outpatient case management? Yes   If yes, name of outpatient case management following: other (comments)  ([facility])   Patient currently receives home health services? Yes   Patient previously received home health services and would like to resume services if necessary? Yes   If yes, name of home health provider: [facility]   Does the patient currently use HME? Yes   Patient currently receives private duty nursing? N/A   Patient currently receives any other outside agency services? No   Equipment Currently Used at Home bath bench;walker, rolling;rollator   Do you have any problems affording any of your prescribed medications? No   Is the patient taking medications as prescribed? yes   Do you have any financial concerns preventing you from receiving the healthcare you need? No   Does the patient have transportation to healthcare appointments? Yes   Transportation Available family or friend will provide   Does the patient receive services at the [facility]? No   Are there any open cases? No   Discharge Plan A New Nursing Home placement - halfway care facility   Discharge Plan B Home;Home Health   Patient/Family In Agreement With Plan yes     .

## 2017-04-25 NOTE — SIGNIFICANT EVENT
"Contacted by RN, pt requesting to leave AMA.     Pt was sitting in chair in home clothes with his packed bag and shoes on. At bedside we had extended conversation with patient (documented in paper chart) regarding his hospitalization and multiple comorbidities currently being treated including: DVT and very high likelihood for PE, HF w/ ESRD & HD dependence, and infectious workup. Pleaded with patient on expedited hospitalization and initiation of the fastest method of treatment, but he informed me "there was no way he was going to even think about it" and again verbalized that he would "rather die than have to stay in a hospital any longer", Nephrology offered short HD session but patient again refused. Pt was insistent on leaving within the hour, but well tempered . Was not willing to wait any longer in order to secure anticoagulation treatment for his DVT and likely PE. Insisted he was going to be ok. Was willing to wait for stat order of first dose apixaban. He was able to verbalize to me the risks associated with leaving AMA including debility, organ failure, coma, and death.  Pt instructed to present to ED w/ any concerning signs or symptoms. Given contact information for any further questions or concerns.     It was a pleasure caring for this patient,    Jose Enrique Paiz MD  [facility]   PGY2     "

## 2017-04-26 LAB
BACTERIA BLD CULT: NORMAL
BACTERIA BLD CULT: NORMAL

## 2017-04-27 NOTE — PT/OT/SLP DISCHARGE
Occupational Therapy Discharge Summary    Wilder Carbajal  MRN: [id number]   Acute on chronic diastolic heart failure   Patient Discharged from acute Occupational Therapy on 4/24/2017.  Please refer to prior OT note dated on 4/21/2017 for functional status.     Assessment:   Patient was discharge unexpectedly.  Information required to complete and accurate discharge summary is unknown.  Refer to therapy initial evaluation and last progress note for initial and most recent functional status and goal achievement.  Recommendations made may be found in medical record.  GOALS:   Occupational Therapy Goals        Problem: Occupational Therapy Goal    Goal Priority Disciplines Outcome Interventions   Occupational Therapy Goal     OT, PT/OT     Description:  Goals to be met by: 4/28/2017     Patient will increase functional independence with ADLs by performing:    UE Dressing with Supervision.  LE Dressing with Maximum Assistance.  Grooming while standing with Stand-by Assistance.  Toileting from toilet with Minimal Assistance for hygiene and clothing management.   Toilet transfer to toilet with Stand-by Assistance.              Reasons for Discontinuation of Therapy Services  Transfer to alternate level of care.      Plan:  Patient Discharged to: Home.    Linden Douglas OTR/L  4/27/2017

## 2017-05-24 ENCOUNTER — OFFICE VISIT (OUTPATIENT)
Dept: VASCULAR SURGERY | Facility: CLINIC | Age: 67
End: 2017-05-24
Payer: COMMERCIAL

## 2017-05-24 ENCOUNTER — HOSPITAL ENCOUNTER (OUTPATIENT)
Dept: VASCULAR SURGERY | Facility: CLINIC | Age: 67
Discharge: HOME | End: 2017-05-24
Attending: SURGERY
Payer: COMMERCIAL

## 2017-05-24 VITALS
TEMPERATURE: 98 F | WEIGHT: 252 LBS | HEART RATE: 64 BPM | SYSTOLIC BLOOD PRESSURE: 145 MMHG | DIASTOLIC BLOOD PRESSURE: 70 MMHG | BODY MASS INDEX: 38.19 KG/M2 | HEIGHT: 68 IN

## 2017-05-24 DIAGNOSIS — Z99.2 ESRD (END STAGE RENAL DISEASE) ON DIALYSIS: Primary | ICD-10-CM

## 2017-05-24 DIAGNOSIS — T82.511D PSEUDOANEURYSM OF ARTERIOVENOUS GRAFT, SUBSEQUENT ENCOUNTER: ICD-10-CM

## 2017-05-24 DIAGNOSIS — I87.1 VEIN STENOSIS: ICD-10-CM

## 2017-05-24 DIAGNOSIS — N18.6 ESRD (END STAGE RENAL DISEASE) ON DIALYSIS: Primary | ICD-10-CM

## 2017-05-24 DIAGNOSIS — N18.6 ESRD (END STAGE RENAL DISEASE) ON DIALYSIS: ICD-10-CM

## 2017-05-24 DIAGNOSIS — Z99.2 ESRD (END STAGE RENAL DISEASE) ON DIALYSIS: ICD-10-CM

## 2017-05-24 PROCEDURE — 99999 PR PBB SHADOW E&M-EST. PATIENT-LVL III: CPT | Mod: PBBFAC,,, | Performed by: SURGERY

## 2017-05-24 PROCEDURE — 99214 OFFICE O/P EST MOD 30 MIN: CPT | Mod: S$GLB,,, | Performed by: SURGERY

## 2017-05-24 PROCEDURE — 93990 DOPPLER FLOW TESTING: CPT | Mod: S$GLB,,, | Performed by: SURGERY

## 2017-05-24 NOTE — PROGRESS NOTES
Patient ID: Wilder Carbajal is a 66 y.o. male.    I. HISTORY     Chief Complaint: Follow-up      HPI Wilder Carbajal is a 66 y.o. male  with h/o left RC AVF placed in [place], [place] for ESRD 2/2 DM and HTN.  Pt has been on HD since 2007 and dialyzes onn M/W/F schedule.  He states that he continues to have significant bleeding after dialysis requiring pressure dressing to be in place for 24 hours. He is getting accessed without difficulty and has no other dialysis related complications (high venous pressures, low flow, hypotension etc).  Of note, pt does endorse some bilateral hand numbness and weakness, but nothing of major concern and no progression from baseline.  He also has complaints of b/l LE swelling with skin discoloration of the R>L lower leg.    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    Interval history:  Patient states that he is having trouble with excessive bleeding from his graft after dialysis.  He is currently only wearing his compression stockings 2-3 times a week with no improvement.        Review of Systems   Constitution: Negative.   HENT: Negative.    Eyes: Negative.    Cardiovascular: Negative.    Respiratory: Negative.    Endocrine: Negative.    Hematologic/Lymphatic: Negative.    Skin: Negative.    Musculoskeletal: Negative.    Gastrointestinal: Negative.    Genitourinary: Negative.    Neurological: Positive for numbness.        Bilateral hands   Psychiatric/Behavioral: Negative.    Allergic/Immunologic: Negative.        II. PHYSICAL EXAM     Physical Exam   Constitutional: He is oriented to person, place, and time. He appears well-developed and well-nourished. No distress.   HENT:   Head: Normocephalic and atraumatic.   Eyes: Conjunctivae and EOM are normal.   Neck: Neck supple. No JVD present.   Cardiovascular: Normal rate.    Pulmonary/Chest: Effort normal. No respiratory distress.   Abdominal: Soft.   Musculoskeletal: Normal range of  motion. He exhibits no tenderness.   B/l LE 2+pitting edema and hyperpigmentation R>L   Neurological: He is alert and oriented to person, place, and time.   Skin: Skin is warm and dry. There is erythema.   Psychiatric: He has a normal mood and affect.      Pulsatile thrill in left FA AVG.  +2 radial pulses bilateral     III. ASSESSMENT & PLAN (MEDICAL DECISION MAKING)     1. ESRD (end stage renal disease) on dialysis    2. Pseudoaneurysm of arteriovenous graft, subsequent encounter    3. Vein stenosis        Imaging Results:   Dialysis access Duplex:   Distal graft  cm/s (previously 266cm/s) --> 499 cm/s (previously 451cm/s) at venous anastomosis.  Volume Flow 1019 ml/min (previously 2063 ml/min)       Assessment/Diagnosis and Plan:  Wilder Carbajal is a 66 y.o. male with a left RC AVF with PTFE interposition.   Continues to have excessive bleeding after needle access and graft remains pulsatile  Fistulogram on 12/21/16 revealed no significant areas of stenosis requiring intervention    -Recommend that the dialysis center stick upper arm cephalic vein to avoid prolonged bleeding - discussed with nurse manager at HD unit  -Continue to wear compression stockings for BLE edema daily     RTC 4 months for follow up    Liz Zhou MD FACS [facility]  Vascular & Endovascular Surgery

## 2017-05-27 PROCEDURE — 99282 EMERGENCY DEPT VISIT SF MDM: CPT | Mod: ,,, | Performed by: EMERGENCY MEDICINE

## 2017-05-27 PROCEDURE — 99284 EMERGENCY DEPT VISIT MOD MDM: CPT

## 2017-05-28 ENCOUNTER — HOSPITAL ENCOUNTER (EMERGENCY)
Facility: HOSPITAL | Age: 67
Discharge: HOME | End: 2017-05-28
Attending: EMERGENCY MEDICINE
Payer: COMMERCIAL

## 2017-05-28 VITALS
SYSTOLIC BLOOD PRESSURE: 161 MMHG | WEIGHT: 200 LBS | HEART RATE: 52 BPM | RESPIRATION RATE: 18 BRPM | TEMPERATURE: 98 F | DIASTOLIC BLOOD PRESSURE: 76 MMHG | OXYGEN SATURATION: 95 % | BODY MASS INDEX: 30.41 KG/M2

## 2017-05-28 DIAGNOSIS — S90.811A: ICD-10-CM

## 2017-05-28 DIAGNOSIS — T14.8XXA ABRASION: Primary | ICD-10-CM

## 2017-05-28 PROCEDURE — 25000003 PHARM REV CODE 250: Performed by: EMERGENCY MEDICINE

## 2017-05-28 RX ADMIN — BACITRACIN ZINC NEOMYCIN SULFATE POLYMYXIN B SULFATE 15 G: 400; 3.5; 5 OINTMENT TOPICAL at 01:05

## 2017-05-28 NOTE — ED PROVIDER NOTES
Encounter Date: 5/27/2017    SCRIBE #1 NOTE: IJamal, am scribing for, and in the presence of, Dr. Doe.       History     Chief Complaint   Patient presents with    Foot Injury     Review of patient's allergies indicates:  No Known Allergies  This is a 66 y.o. male who presents to the ED with a chief complaint of a small abrasion with bleeding to the lateral aspect of the right heel. Pt has no pain and denies trauma, he came for evaluation given the bleeding. Pt denies any chest pain, shortness of breath, lightheadedness, or any other significant complaints. There are no other associated symptoms or mitigating/aggravating factors reported at this time.           Past Medical History:   Diagnosis Date    Acute pain of left shoulder 1/7/2017    Arthritis     Asthma     Benign neoplasm of eyelid     RUL    Blood clotting tendency     Blood transfusion     Cataract     Chronic kidney disease requiring chronic dialysis     Diabetes mellitus     Diabetic retinopathy     Fever blister     Hyperlipidemia     Hypertension     Infertility     Joint pain     Retinal detachment     Thyroid disease     Weakness 1/7/2017     Past Surgical History:   Procedure Laterality Date    CATARACT EXTRACTION      RETINAL DETACHMENT SURGERY       Family History   Problem Relation Age of Onset    Heart attack Mother     No Known Problems Father     No Known Problems Sister     No Known Problems Brother     No Known Problems Maternal Aunt     No Known Problems Maternal Uncle     No Known Problems Paternal Aunt     No Known Problems Paternal Uncle     No Known Problems Maternal Grandmother     No Known Problems Maternal Grandfather     No Known Problems Paternal Grandmother     No Known Problems Paternal Grandfather     Cancer Neg Hx     Amblyopia Neg Hx     Blindness Neg Hx     Cataracts Neg Hx     Diabetes Neg Hx     Glaucoma Neg Hx     Hypertension Neg Hx     Macular degeneration Neg Hx      Retinal detachment Neg Hx     Strabismus Neg Hx     Stroke Neg Hx     Thyroid disease Neg Hx      Social History   Substance Use Topics    Smoking status: Never Smoker    Smokeless tobacco: Never Used    Alcohol use Yes      Comment: 1 pint bourbon     Review of Systems   Skin: Positive for wound (right heel mild abrasion). Negative for rash.       Physical Exam     Initial Vitals [05/27/17 2340]   BP Pulse Resp Temp SpO2   (!) 146/76 62 18 98.2 °F (36.8 °C) 100 %     Physical Exam    Nursing note and vitals reviewed.  Constitutional: He appears well-developed and well-nourished. No distress (no acute distress).   Skin: Skin is warm and dry.   Small superficial abrasion to the right lateral heel region which shows no evidence of deep laceration or arterial bleeding. There are nor other acute findings or deformities.          ED Course   Procedures  Labs Reviewed - No data to display          Medical Decision Making:   History:   Old Medical Records: I decided to obtain old medical records.  Initial Assessment:   This is a 66 y.o. male who presents with a superficial abrasion. Will place antibiotic ointment and dress. No signs of significant bleeding whatsoever, this is a very small abrasion. Pt has no other acute complaints or concerns at this time. Pt is stable for discharge.             Scribe Attestation:   Scribe #1: I performed the above scribed service and the documentation accurately describes the services I performed. I attest to the accuracy of the note.    Attending Attestation:           Physician Attestation for Scribe:  Physician Attestation Statement for Scribe #1: I, Dr. Doe, reviewed documentation, as scribed by Jamal Avila in my presence, and it is both accurate and complete.                 ED Course     Clinical Impression:   The encounter diagnosis was Abrasion.    Disposition:   Disposition: Discharged  Condition: Stable       Bradley Doe MD  05/29/17 9259

## 2017-05-28 NOTE — ED TRIAGE NOTES
Wilder Carbajal, a 66 y.o. male presents to the ED c/o bleeding from right heel since Friday. Pt denies injury/pain. Small skin tear noted to right heel with oozing bleeding. Pt denies any other complaints.      Chief Complaint   Patient presents with    Foot Injury     Review of patient's allergies indicates:  No Known Allergies  Past Medical History:   Diagnosis Date    Acute pain of left shoulder 1/7/2017    Arthritis     Asthma     Benign neoplasm of eyelid     RUL    Blood clotting tendency     Blood transfusion     Cataract     Chronic kidney disease requiring chronic dialysis     Diabetes mellitus     Diabetic retinopathy     Fever blister     Hyperlipidemia     Hypertension     Infertility     Joint pain     Retinal detachment     Thyroid disease     Weakness 1/7/2017        Adult Physical Assessment  LOC: Wilder Carbajal, 66 y.o. male verified via two identifiers.  The patient is awake, alert, oriented and speaking appropriately at this time.  APPEARANCE: Patient resting comfortably and appears to be in no acute distress at this time. Patient is clean and well groomed, patient's clothing is properly fastened.  SKIN:The skin is warm and dry, color consistent with ethnicity, patient has normal skin turgor and moist mucus membranes, no breakdown or brusing noted. Small tear to right heel with small amount of oozing.  MUSCULOSKELETAL: Patient moving all extremities well, no obvious swelling or deformities noted.  RESPIRATORY: Airway is open and patent, respirations are spontaneous, patient has a normal effort and rate, no accessory muscle use noted.  CARDIAC: Patient has a normal rate and rhythm, no periphreal edema noted in any extremity, capillary refill < 3 seconds in all extremities  ABDOMEN: Soft and non tender to palpation, no abdominal distention noted. Bowel sounds present in all four quadrants.  NEUROLOGIC: Eyes open spontaneously, behavior appropriate to situation, follows  commands, facial expression symmetrical, bilateral hand grasp equal and even, purposeful motor response noted, normal sensation in all extremities when touched with a finger.

## 2017-06-21 ENCOUNTER — HOSPITAL ENCOUNTER (INPATIENT)
Facility: HOSPITAL | Age: 67
LOS: 4 days | Discharge: LEFT BEFORE DISCHARGE | DRG: 356 | End: 2017-06-25
Attending: EMERGENCY MEDICINE | Admitting: EMERGENCY MEDICINE
Payer: COMMERCIAL

## 2017-06-21 DIAGNOSIS — R00.0 TACHYCARDIA: ICD-10-CM

## 2017-06-21 DIAGNOSIS — Z99.2 ESRD ON DIALYSIS: ICD-10-CM

## 2017-06-21 DIAGNOSIS — I82.411: ICD-10-CM

## 2017-06-21 DIAGNOSIS — Z99.2 ESRD (END STAGE RENAL DISEASE) ON DIALYSIS: ICD-10-CM

## 2017-06-21 DIAGNOSIS — I48.91 ATRIAL FIBRILLATION: ICD-10-CM

## 2017-06-21 DIAGNOSIS — M89.8X9 METABOLIC BONE DISEASE: ICD-10-CM

## 2017-06-21 DIAGNOSIS — N18.6 ESRD ON DIALYSIS: ICD-10-CM

## 2017-06-21 DIAGNOSIS — I10 ESSENTIAL HYPERTENSION: ICD-10-CM

## 2017-06-21 DIAGNOSIS — K92.2 GASTROINTESTINAL HEMORRHAGE, UNSPECIFIED GASTROINTESTINAL HEMORRHAGE TYPE: ICD-10-CM

## 2017-06-21 DIAGNOSIS — N18.6 ESRD (END STAGE RENAL DISEASE) ON DIALYSIS: ICD-10-CM

## 2017-06-21 DIAGNOSIS — D64.9 ANEMIA, UNSPECIFIED TYPE: Primary | ICD-10-CM

## 2017-06-21 LAB
A1 AG RBC QL: NORMAL
ABO + RH BLD: NORMAL
ALBUMIN SERPL BCP-MCNC: 2.8 G/DL
ALP SERPL-CCNC: 139 U/L
ALT SERPL W/O P-5'-P-CCNC: 17 U/L
ANION GAP SERPL CALC-SCNC: 12 MMOL/L
AST SERPL-CCNC: 20 U/L
BASOPHILS # BLD AUTO: 0.03 K/UL
BASOPHILS NFR BLD: 0.5 %
BILIRUB SERPL-MCNC: 0.8 MG/DL
BLD GP AB SCN CELLS X3 SERPL QL: NORMAL
BUN SERPL-MCNC: 42 MG/DL
CALCIUM SERPL-MCNC: 8.3 MG/DL
CHLORIDE SERPL-SCNC: 99 MMOL/L
CO2 SERPL-SCNC: 31 MMOL/L
CREAT SERPL-MCNC: 5.6 MG/DL
DIFFERENTIAL METHOD: ABNORMAL
EOSINOPHIL # BLD AUTO: 0.2 K/UL
EOSINOPHIL NFR BLD: 3.7 %
ERYTHROCYTE [DISTWIDTH] IN BLOOD BY AUTOMATED COUNT: 18.3 %
EST. GFR  (AFRICAN AMERICAN): 11.2 ML/MIN/1.73 M^2
EST. GFR  (NON AFRICAN AMERICAN): 9.7 ML/MIN/1.73 M^2
GLUCOSE SERPL-MCNC: 68 MG/DL
HCT VFR BLD AUTO: 21.2 %
HGB BLD-MCNC: 6.6 G/DL
INR PPP: 1.2
LYMPHOCYTES # BLD AUTO: 1.2 K/UL
LYMPHOCYTES NFR BLD: 18.9 %
MCH RBC QN AUTO: 30.3 PG
MCHC RBC AUTO-ENTMCNC: 31.1 %
MCV RBC AUTO: 97 FL
MONOCYTES # BLD AUTO: 0.8 K/UL
MONOCYTES NFR BLD: 12.6 %
NEUTROPHILS # BLD AUTO: 4.1 K/UL
NEUTROPHILS NFR BLD: 64 %
PLATELET # BLD AUTO: 126 K/UL
PMV BLD AUTO: 10.2 FL
POTASSIUM SERPL-SCNC: 4.9 MMOL/L
PROT SERPL-MCNC: 7.3 G/DL
PROTHROMBIN TIME: 12.5 SEC
RBC # BLD AUTO: 2.18 M/UL
SODIUM SERPL-SCNC: 142 MMOL/L
TROPONIN I SERPL DL<=0.01 NG/ML-MCNC: 0.05 NG/ML
WBC # BLD AUTO: 6.44 K/UL

## 2017-06-21 PROCEDURE — P9021 RED BLOOD CELLS UNIT: HCPCS

## 2017-06-21 PROCEDURE — 99285 EMERGENCY DEPT VISIT HI MDM: CPT | Mod: ,,, | Performed by: PHYSICIAN ASSISTANT

## 2017-06-21 PROCEDURE — 11000001 HC ACUTE MED/SURG PRIVATE ROOM

## 2017-06-21 PROCEDURE — 85610 PROTHROMBIN TIME: CPT

## 2017-06-21 PROCEDURE — 93010 ELECTROCARDIOGRAM REPORT: CPT | Mod: S$GLB,,, | Performed by: INTERNAL MEDICINE

## 2017-06-21 PROCEDURE — 86901 BLOOD TYPING SEROLOGIC RH(D): CPT

## 2017-06-21 PROCEDURE — 85025 COMPLETE CBC W/AUTO DIFF WBC: CPT

## 2017-06-21 PROCEDURE — 99223 1ST HOSP IP/OBS HIGH 75: CPT | Mod: ,,, | Performed by: INTERNAL MEDICINE

## 2017-06-21 PROCEDURE — 99285 EMERGENCY DEPT VISIT HI MDM: CPT

## 2017-06-21 PROCEDURE — 80053 COMPREHEN METABOLIC PANEL: CPT

## 2017-06-21 PROCEDURE — C9113 INJ PANTOPRAZOLE SODIUM, VIA: HCPCS | Performed by: INTERNAL MEDICINE

## 2017-06-21 PROCEDURE — 84484 ASSAY OF TROPONIN QUANT: CPT

## 2017-06-21 PROCEDURE — 86905 BLOOD TYPING RBC ANTIGENS: CPT

## 2017-06-21 PROCEDURE — 63600175 PHARM REV CODE 636 W HCPCS: Performed by: INTERNAL MEDICINE

## 2017-06-21 PROCEDURE — 86900 BLOOD TYPING SEROLOGIC ABO: CPT

## 2017-06-21 PROCEDURE — 86920 COMPATIBILITY TEST SPIN: CPT

## 2017-06-21 RX ORDER — HYDROCODONE BITARTRATE AND ACETAMINOPHEN 5; 325 MG/1; MG/1
1 TABLET ORAL EVERY 6 HOURS PRN
Status: ON HOLD | COMMUNITY
End: 2018-03-05 | Stop reason: HOSPADM

## 2017-06-21 RX ORDER — MOMETASONE FUROATE 50 UG/1
2 SPRAY, METERED NASAL DAILY
Status: ON HOLD | COMMUNITY
End: 2018-03-05 | Stop reason: HOSPADM

## 2017-06-21 RX ORDER — HYDROCODONE BITARTRATE AND ACETAMINOPHEN 500; 5 MG/1; MG/1
TABLET ORAL
Status: DISCONTINUED | OUTPATIENT
Start: 2017-06-21 | End: 2017-06-25 | Stop reason: HOSPADM

## 2017-06-21 RX ORDER — PANTOPRAZOLE SODIUM 40 MG/10ML
40 INJECTION, POWDER, LYOPHILIZED, FOR SOLUTION INTRAVENOUS 2 TIMES DAILY
Status: DISCONTINUED | OUTPATIENT
Start: 2017-06-21 | End: 2017-06-25 | Stop reason: HOSPADM

## 2017-06-21 RX ORDER — GABAPENTIN 100 MG/1
100 CAPSULE ORAL 3 TIMES DAILY
COMMUNITY

## 2017-06-21 RX ORDER — CITALOPRAM 10 MG/1
10 TABLET ORAL DAILY
COMMUNITY

## 2017-06-21 RX ADMIN — PANTOPRAZOLE SODIUM 40 MG: 40 INJECTION, POWDER, FOR SOLUTION INTRAVENOUS at 09:06

## 2017-06-21 NOTE — ED TRIAGE NOTES
"Pt reprots dark blood to stool x 1 week in large amounts "cups". Also reports chest pain starting yesterday 3/10 aching pain, intermittent, worse with exertion.   +CP  +Dark blood to stool  + SOB  +Diarrhea    On Eliquis  "

## 2017-06-21 NOTE — H&P
[facility] Hospital Medicine  History & Physical    Patient Name: Wilder Carbajal  MRN: [id number]  Admission Date: 6/21/2017  Attending Physician: Jl Dill MD  Primary Care Provider: Akhil Adorno MD    [facility] Medicine Team: [facility] G Jl Dill MD     Patient information was obtained from patient and ER records.     Subjective:     Principal Problem:<principal problem not specified>    Chief Complaint:   Chief Complaint   Patient presents with    Rectal Bleeding      x 1 week        HPI: 66-year-old -American male with past medical history of diabetes, hypertension, hyperlipidemia, asthma, paroxysmal A. fib on Eliquis, thyroid disease, ESRD on HD TTS presents to the ED complaining of rectal bleeding for the past week.  He has noted bright red blood mixed in with brown stool.  He denies any rectal bleeding between bowel movements.  He has some lower abdominal pain with BM.  He has been having intermittent chest pain for the past year and reports left-sided 3/10 chest pain that started yesterday.  He describes the chest pain as feeling as though he just ran for a few minutes.  He reports shortness of breath, cough, rhinorrhea, dysuria, subjective fever.  He denies constipation, nausea, vomiting, weakness.  He went to dialysis on Tuesday but skipped last Thursday and Saturday.  He drinks alcohol and denies tobacco or drug use.    Distant hx of PUD.  Denies NSAID use currently.      Last [place] was over 10 years ago.    Diagnosed with DVT and ?PE two months ago during most recent hospital stay.      No new subjective & objective note has been filed under this hospital service since the last note was generated.    Assessment/Plan:     Anemia    Sub acute (likely lower) GI losses  HD stable currently  Two units ordered  GI consulted by ED  Overdue for repeat colo  Can start ppi BID and monitor  Hold anticoagulation for afib  Patient with intermittent chest pain,  lightheadedness over the past week but currently none        Right femoral vein DVT    Diagnosed two months ago  Will assess response to transfusions  May need to consider IVC filter  Will repeat US of legs to assess for presence or resolution of clot  Hold anti-coagulation for now          ESRD (end stage renal disease) on dialysis    Lytes stable  Will consult Nephrology to let them know he is in house          HTN (hypertension)    Continue home regimen for now, monitor for signs of hypovolemia/hypotension            VTE Risk Mitigation     None        Jl Dill MD  Department of Hospital Medicine   [facility]

## 2017-06-21 NOTE — HPI
Wilder Carbajal is a 66 y.o. male paroxysmal A. Fib/DVT on Eliquis, thyroid disease, ESRD on HD TTS presents to the ED complaining of rectal bleeding for the past week.  He has noted bright red blood mixed in with brown stool.  He denies any rectal bleeding between bowel movements. He reports some constipation preceding this along with straining. Initially started as brown stools with mixed pink blood and progressed to dark blood. He denies having prior GIB. Denies NSAID use. He has no EGD/colon in our records. He does not recollect having colonoscopy. On admission, HD stable and Hb found to be 6.6 down from 10.4 (in April 2017). He takes Eliquis for afib.

## 2017-06-21 NOTE — ED NOTES
Report called to Amparo on the 9th floor -----------------------------------------------------------going to 090V

## 2017-06-21 NOTE — ED PROVIDER NOTES
Encounter Date: 6/21/2017       History     Chief Complaint   Patient presents with    Rectal Bleeding      x 1 week     66-year-old -American male with past medical history of diabetes, hypertension, hyperlipidemia, asthma, paroxysmal A. fib on Eliquis, thyroid disease, ESRD on HD TTS presents to the ED complaining of rectal bleeding for the past week.  He has noted bright red blood mixed in with brown stool.  He denies any rectal bleeding between bowel movements.  He has some lower abdominal pain with BM.  He has been having intermittent chest pain for the past year and reports left-sided 3/10 chest pain that started yesterday.  He describes the chest pain as feeling as though he just ran for a few minutes.  He reports shortness of breath, cough, rhinorrhea, dysuria, subjective fever.  He denies constipation, nausea, vomiting, weakness.  He went to dialysis on Tuesday but skipped last Thursday and Saturday.  He drinks alcohol and denies tobacco or drug use.      The history is provided by the patient.     Review of patient's allergies indicates:  No Known Allergies  Past Medical History:   Diagnosis Date    Acute pain of left shoulder 1/7/2017    Arthritis     Asthma     Benign neoplasm of eyelid     RUL    Blood clotting tendency     Blood transfusion     Cataract     Chronic kidney disease requiring chronic dialysis     Diabetes mellitus     Diabetic retinopathy     Fever blister     Hyperlipidemia     Hypertension     Infertility     Joint pain     Retinal detachment     Thyroid disease     Weakness 1/7/2017     Past Surgical History:   Procedure Laterality Date    CATARACT EXTRACTION      RETINAL DETACHMENT SURGERY       Family History   Problem Relation Age of Onset    Heart attack Mother     No Known Problems Father     No Known Problems Sister     No Known Problems Brother     No Known Problems Maternal Aunt     No Known Problems Maternal Uncle     No Known Problems  Paternal Aunt     No Known Problems Paternal Uncle     No Known Problems Maternal Grandmother     No Known Problems Maternal Grandfather     No Known Problems Paternal Grandmother     No Known Problems Paternal Grandfather     Cancer Neg Hx     Amblyopia Neg Hx     Blindness Neg Hx     Cataracts Neg Hx     Diabetes Neg Hx     Glaucoma Neg Hx     Hypertension Neg Hx     Macular degeneration Neg Hx     Retinal detachment Neg Hx     Strabismus Neg Hx     Stroke Neg Hx     Thyroid disease Neg Hx      Social History   Substance Use Topics    Smoking status: Never Smoker    Smokeless tobacco: Never Used    Alcohol use Yes      Comment: 1 pint bourbon     Review of Systems   Constitutional: Positive for fever (subjective). Negative for chills.   HENT: Positive for rhinorrhea and sore throat. Negative for congestion.    Eyes: Negative for photophobia and visual disturbance.   Respiratory: Positive for cough and shortness of breath.    Cardiovascular: Positive for chest pain and leg swelling (at baseline).   Gastrointestinal: Positive for abdominal pain and blood in stool. Negative for constipation, diarrhea, nausea and vomiting.   Genitourinary: Positive for dysuria.   Musculoskeletal: Negative for neck pain and neck stiffness.   Skin: Negative for rash and wound.   Neurological: Negative for dizziness, weakness, numbness and headaches.   Psychiatric/Behavioral: Negative for confusion.       Physical Exam     Initial Vitals [06/21/17 1448]   BP Pulse Resp Temp SpO2   (!) 147/71 62 16 98.2 °F (36.8 °C) 96 %     Physical Exam    Nursing note and vitals reviewed.  Constitutional: He appears well-developed and well-nourished. He is not diaphoretic. No distress.   HENT:   Head: Normocephalic and atraumatic.   Neck: Normal range of motion. Neck supple.   Cardiovascular: Normal rate and regular rhythm. Exam reveals no gallop and no friction rub.    Murmur heard.  Pulmonary/Chest: Breath sounds normal. He has no  wheezes. He has no rhonchi. He has no rales. He exhibits no tenderness.   Abdominal: Soft. Bowel sounds are normal. There is no tenderness. There is no rebound and no guarding.   Genitourinary: Rectal exam shows guaiac positive stool. Rectal exam shows no external hemorrhoid, no internal hemorrhoid, no fissure and anal tone normal. Guaiac positive stool. : Acceptable.  Genitourinary Comments: Light brown heme positive stool. No maroon stools or melena noted   Musculoskeletal: Normal range of motion.   Neurological: He is alert and oriented to person, place, and time.   Skin: Skin is warm and dry. No rash noted. No erythema.   Psychiatric: He has a normal mood and affect.         ED Course   Procedures  Labs Reviewed   CBC W/ AUTO DIFFERENTIAL - Abnormal; Notable for the following:        Result Value    RBC 2.18 (*)     Hemoglobin 6.6 (*)     Hematocrit 21.2 (*)     MCHC 31.1 (*)     RDW 18.3 (*)     Platelets 126 (*)     All other components within normal limits   COMPREHENSIVE METABOLIC PANEL - Abnormal; Notable for the following:     CO2 31 (*)     Glucose 68 (*)     BUN, Bld 42 (*)     Creatinine 5.6 (*)     Calcium 8.3 (*)     Albumin 2.8 (*)     Alkaline Phosphatase 139 (*)     eGFR if  11.2 (*)     eGFR if non  9.7 (*)     All other components within normal limits   PROTIME-INR   TROPONIN I   URINALYSIS, REFLEX TO URINE CULTURE   TYPE & SCREEN   A1 TYPING   PREPARE RBC SOFT        Imaging Results          X-Ray Chest PA And Lateral (Final result)  Result time 06/21/17 16:33:06    Final result by Blossom Dunbar MD (06/21/17 16:33:06)                 Impression:     As above      Electronically signed by: Blossom Dunbar MD  Date:     06/21/17  Time:    16:33              Narrative:    2 views are obtained.  Comparison is April 2017.  Image quality degraded by image to inspiration and overlying soft tissues.    Cardiac size is enlarged but similar to prior  exam.  Calcification is present along the wall of the aorta.  There are bilateral pleural effusions which are small.  There is associated atelectasis/consolidation at the lung bases.                                 Medical Decision Making:   History:   Old Medical Records: I decided to obtain old medical records.  Clinical Tests:   Lab Tests: Ordered and Reviewed  Radiological Study: Ordered and Reviewed  Medical Tests: Ordered and Reviewed  Other:   I have discussed this case with another health care provider.       APC / Resident Notes:   66-year-old -American male with past medical history of diabetes, hypertension, hyperlipidemia, asthma, paroxysmal A. fib on Eliquis, thyroid disease, ESRD on HD TTS presents to the ED complaining of rectal bleeding for the past week.  Vital signs stable.  Murmur noted.  Lungs are clear to auscultation.  Abdomen is soft and nontender.  Light brown heme-positive stool noted on rectal exam.  No melena noted.  No hemorrhoids noted.  Differential diagnosis includes but is not limited to lower GI bleed, anemia, ACS, electrolyte abnormality, UTI.  Will obtain labs and likely admit to internal medicine.    He is not orthostatic.    CBC shows anemia with H/H 6.6/21.2 (10.7/32.9 in April 2017). No leukocytosis with WBC 6.44. CMP shows creatinine of 5.6 (on HD). Alk Phos elevated at 139. PT/INR 12.5/1.2. Troponin pending.    CXR shows enlarged cardiac silhouette, small bilateral pleural effusions.    Blood consent obtained in the ED. 2 units PRBCs ordered.    Will admit to medicine for further management. GI was consulted and will evaluate tomorrow.         Attending Attestation:     Physician Attestation Statement for NP/PA:   I have conducted a face to face encounter with this patient in addition to the NP/PA, due to Medical Complexity    Other NP/PA Attestation Additions:    History of Present Illness: Intermittent bright red blood and dark stools  No hematemesis  No abd pain  today  No fevers  No other bruising/bleeding    Medical Decision Making: Intermittent rectal bleed - upper vs lower, diverticular, hemorrhoids, fissure.  R/o symptomatic anemia.  No active bleeding at present is evident.  Labs, piv x2, type/cross, consult gi, probable admission.                   ED Course     Clinical Impression:   The primary encounter diagnosis was Anemia, unspecified type. Diagnoses of Gastrointestinal hemorrhage, unspecified gastrointestinal hemorrhage type and ESRD on dialysis were also pertinent to this visit.    Disposition:   Disposition: Admitted  Condition: Fair  Internal Medicine                        Yady Woodson PA-C  06/21/17 1700       Arie Mulligan MD  06/21/17 3121

## 2017-06-21 NOTE — ASSESSMENT & PLAN NOTE
Sub acute (likely lower) GI losses  HD stable currently  Two units ordered  GI consulted by ED  Overdue for repeat colo  Can start ppi BID and monitor  Hold anticoagulation for afib  Patient with intermittent chest pain, lightheadedness over the past week but currently none

## 2017-06-21 NOTE — HPI
66-year-old -American male with past medical history of diabetes, hypertension, hyperlipidemia, asthma, paroxysmal A. fib on Eliquis, thyroid disease, ESRD on HD TTS presents to the ED complaining of rectal bleeding for the past week.  He has noted bright red blood mixed in with brown stool.  He denies any rectal bleeding between bowel movements.  He has some lower abdominal pain with BM.  He has been having intermittent chest pain for the past year and reports left-sided 3/10 chest pain that started yesterday.  He describes the chest pain as feeling as though he just ran for a few minutes.  He reports shortness of breath, cough, rhinorrhea, dysuria, subjective fever.  He denies constipation, nausea, vomiting, weakness.  He went to dialysis on Tuesday but skipped last Thursday and Saturday.  He drinks alcohol and denies tobacco or drug use.    Distant hx of PUD.  Denies NSAID use currently.      Last [facility] was over 10 years ago.

## 2017-06-21 NOTE — SUBJECTIVE & OBJECTIVE
Past Medical History:   Diagnosis Date    Acute pain of left shoulder 1/7/2017    Arthritis     Asthma     Benign neoplasm of eyelid     RUL    Blood clotting tendency     Blood transfusion     Cataract     Chronic kidney disease requiring chronic dialysis     Diabetes mellitus     Diabetic retinopathy     Fever blister     Hyperlipidemia     Hypertension     Infertility     Joint pain     Retinal detachment     Thyroid disease     Weakness 1/7/2017       Past Surgical History:   Procedure Laterality Date    CATARACT EXTRACTION      RETINAL DETACHMENT SURGERY         Review of patient's allergies indicates:  No Known Allergies    No current facility-administered medications on file prior to encounter.      Current Outpatient Prescriptions on File Prior to Encounter   Medication Sig    apixaban 5 mg Tab Take 1 tablet (5 mg total) by mouth 2 (two) times daily.    aspirin (ECOTRIN) 81 MG EC tablet Take 81 mg by mouth once daily.     cetirizine (ZYRTEC) 10 MG tablet Take 10 mg by mouth once daily.    cinacalcet (SENSIPAR) 30 MG Tab Take 30 mg by mouth every evening.    ergocalciferol (ERGOCALCIFEROL) 50,000 unit Cap Take 50,000 Units by mouth every 14 (fourteen) days.     furosemide (LASIX) 80 MG tablet Take 80 mg by mouth once daily.    hydrALAZINE (APRESOLINE) 50 MG tablet Take 50 mg by mouth 3 (three) times daily.    ipratropium (ATROVENT HFA) 17 mcg/actuation inhaler Inhale 2 puffs into the lungs 4 (four) times daily. Do not exceed 12 puffs per day    ketotifen (ZADITOR) 0.025 % (0.035 %) ophthalmic solution Apply 1 drop to eye every 8 (eight) hours.    sevelamer carbonate (RENVELA) 800 mg Tab Take two tablets by mouth four times daily with meals and snacks    ammonium lactate (LAC-HYDRIN) 12 % lotion Apply topically as needed for Dry Skin.    atorvastatin (LIPITOR) 20 MG tablet Take 20 mg by mouth once daily.    diclofenac sodium (VOLTAREN) 1 % Gel Apply 2 g topically 4 (four) times  daily as needed (for pain).    losartan (COZAAR) 50 MG tablet Take 1 tablet (50 mg total) by mouth once daily.    podofilox (CONDYLOX) 0.5 % external solution Apply a small amount to lesions twice daily for three days, then hold for four days and repeat regimen     Family History     Problem Relation (Age of Onset)    Heart attack Mother    No Known Problems Father, Sister, Brother, Maternal Aunt, Maternal Uncle, Paternal Aunt, Paternal Uncle, Maternal Grandmother, Maternal Grandfather, Paternal Grandmother, Paternal Grandfather        Social History Main Topics    Smoking status: Never Smoker    Smokeless tobacco: Never Used    Alcohol use Yes      Comment: 1 pint bourbon    Drug use: No    Sexual activity: Not on file     Review of Systems   Gastrointestinal: Positive for abdominal pain and blood in stool.   All other systems reviewed and are negative.    Objective:     Vital Signs (Most Recent):  Temp: 97.4 °F (36.3 °C) (06/21/17 1722)  Pulse: 66 (06/21/17 1722)  Resp: 18 (06/21/17 1722)  BP: (!) 188/77 (06/21/17 1722)  SpO2: 97 % (06/21/17 1532) Vital Signs (24h Range):  Temp:  [97.4 °F (36.3 °C)-98.2 °F (36.8 °C)] 97.4 °F (36.3 °C)  Pulse:  [62-75] 66  Resp:  [14-20] 18  SpO2:  [95 %-97 %] 97 %  BP: (103-188)/(50-77) 188/77     Weight: 99.8 kg (220 lb)  Body mass index is 32.49 kg/m².    Physical Exam   Constitutional: He appears well-nourished. No distress.   Cardiovascular: Normal rate.    Pulmonary/Chest: Effort normal.   Abdominal: Soft. He exhibits no distension. There is no tenderness.   Neurological: He is alert.   Psychiatric: He has a normal mood and affect.   Nursing note and vitals reviewed.       Significant Labs:   BMP:   Recent Labs  Lab 06/21/17  1531   GLU 68*      K 4.9   CL 99   CO2 31*   BUN 42*   CREATININE 5.6*   CALCIUM 8.3*     CBC:   Recent Labs  Lab 06/21/17  1531   WBC 6.44   HGB 6.6*   HCT 21.2*   *       Significant Imaging: None

## 2017-06-21 NOTE — ASSESSMENT & PLAN NOTE
Diagnosed two months ago  Still seen on US  Discussed with heme attending Dr. Augustine will proceed with IVC filter placement  Discussed with IR will place today

## 2017-06-22 ENCOUNTER — ANESTHESIA EVENT (OUTPATIENT)
Dept: ENDOSCOPY | Facility: HOSPITAL | Age: 67
DRG: 356 | End: 2017-06-22
Payer: COMMERCIAL

## 2017-06-22 PROBLEM — M89.8X9 METABOLIC BONE DISEASE: Status: ACTIVE | Noted: 2017-06-22

## 2017-06-22 LAB
ALBUMIN SERPL BCP-MCNC: 2.7 G/DL
ALP SERPL-CCNC: 118 U/L
ALT SERPL W/O P-5'-P-CCNC: 13 U/L
ANION GAP SERPL CALC-SCNC: 10 MMOL/L
AST SERPL-CCNC: 18 U/L
BASOPHILS # BLD AUTO: 0.02 K/UL
BASOPHILS # BLD AUTO: 0.03 K/UL
BASOPHILS NFR BLD: 0.3 %
BASOPHILS NFR BLD: 0.6 %
BILIRUB SERPL-MCNC: 0.9 MG/DL
BLD PROD TYP BPU: NORMAL
BLD PROD TYP BPU: NORMAL
BLOOD UNIT EXPIRATION DATE: NORMAL
BLOOD UNIT EXPIRATION DATE: NORMAL
BLOOD UNIT TYPE CODE: 5100
BLOOD UNIT TYPE CODE: 5100
BLOOD UNIT TYPE: NORMAL
BLOOD UNIT TYPE: NORMAL
BUN SERPL-MCNC: 47 MG/DL
CALCIUM SERPL-MCNC: 8.4 MG/DL
CHLORIDE SERPL-SCNC: 100 MMOL/L
CO2 SERPL-SCNC: 30 MMOL/L
CODING SYSTEM: NORMAL
CODING SYSTEM: NORMAL
CREAT SERPL-MCNC: 6.3 MG/DL
DIFFERENTIAL METHOD: ABNORMAL
DIFFERENTIAL METHOD: ABNORMAL
DISPENSE STATUS: NORMAL
DISPENSE STATUS: NORMAL
EOSINOPHIL # BLD AUTO: 0.2 K/UL
EOSINOPHIL # BLD AUTO: 0.3 K/UL
EOSINOPHIL NFR BLD: 4 %
EOSINOPHIL NFR BLD: 4.7 %
ERYTHROCYTE [DISTWIDTH] IN BLOOD BY AUTOMATED COUNT: 17.3 %
ERYTHROCYTE [DISTWIDTH] IN BLOOD BY AUTOMATED COUNT: 17.5 %
EST. GFR  (AFRICAN AMERICAN): 9.7 ML/MIN/1.73 M^2
EST. GFR  (NON AFRICAN AMERICAN): 8.4 ML/MIN/1.73 M^2
GLUCOSE SERPL-MCNC: 64 MG/DL
HCT VFR BLD AUTO: 21.8 %
HCT VFR BLD AUTO: 25.8 %
HGB BLD-MCNC: 6.8 G/DL
HGB BLD-MCNC: 8.4 G/DL
LYMPHOCYTES # BLD AUTO: 1.3 K/UL
LYMPHOCYTES # BLD AUTO: 1.8 K/UL
LYMPHOCYTES NFR BLD: 20.9 %
LYMPHOCYTES NFR BLD: 32.2 %
MCH RBC QN AUTO: 30 PG
MCH RBC QN AUTO: 31 PG
MCHC RBC AUTO-ENTMCNC: 31.2 %
MCHC RBC AUTO-ENTMCNC: 32.6 %
MCV RBC AUTO: 95 FL
MCV RBC AUTO: 96 FL
MONOCYTES # BLD AUTO: 0.6 K/UL
MONOCYTES # BLD AUTO: 0.7 K/UL
MONOCYTES NFR BLD: 11.2 %
MONOCYTES NFR BLD: 12.4 %
NEUTROPHILS # BLD AUTO: 2.8 K/UL
NEUTROPHILS # BLD AUTO: 3.7 K/UL
NEUTROPHILS NFR BLD: 51.6 %
NEUTROPHILS NFR BLD: 61.4 %
PLATELET # BLD AUTO: 116 K/UL
PLATELET # BLD AUTO: 126 K/UL
PMV BLD AUTO: 10.1 FL
PMV BLD AUTO: 10.5 FL
POTASSIUM SERPL-SCNC: 5.3 MMOL/L
PROT SERPL-MCNC: 6.7 G/DL
RBC # BLD AUTO: 2.27 M/UL
RBC # BLD AUTO: 2.71 M/UL
SODIUM SERPL-SCNC: 140 MMOL/L
TRANS ERYTHROCYTES VOL PATIENT: NORMAL ML
TRANS ERYTHROCYTES VOL PATIENT: NORMAL ML
TROPONIN I SERPL DL<=0.01 NG/ML-MCNC: 0.04 NG/ML
WBC # BLD AUTO: 5.44 K/UL
WBC # BLD AUTO: 5.97 K/UL

## 2017-06-22 PROCEDURE — 25000003 PHARM REV CODE 250: Performed by: PHYSICIAN ASSISTANT

## 2017-06-22 PROCEDURE — 36430 TRANSFUSION BLD/BLD COMPNT: CPT

## 2017-06-22 PROCEDURE — 85025 COMPLETE CBC W/AUTO DIFF WBC: CPT | Mod: 91

## 2017-06-22 PROCEDURE — 11000001 HC ACUTE MED/SURG PRIVATE ROOM

## 2017-06-22 PROCEDURE — 99232 SBSQ HOSP IP/OBS MODERATE 35: CPT | Mod: ,,, | Performed by: INTERNAL MEDICINE

## 2017-06-22 PROCEDURE — 06H03DZ INSERTION OF INTRALUMINAL DEVICE INTO INFERIOR VENA CAVA, PERCUTANEOUS APPROACH: ICD-10-PCS | Performed by: RADIOLOGY

## 2017-06-22 PROCEDURE — 80100016 HC MAINTENANCE HEMODIALYSIS

## 2017-06-22 PROCEDURE — C9113 INJ PANTOPRAZOLE SODIUM, VIA: HCPCS | Performed by: INTERNAL MEDICINE

## 2017-06-22 PROCEDURE — 25000003 PHARM REV CODE 250: Performed by: INTERNAL MEDICINE

## 2017-06-22 PROCEDURE — 25500020 PHARM REV CODE 255: Performed by: INTERNAL MEDICINE

## 2017-06-22 PROCEDURE — 84484 ASSAY OF TROPONIN QUANT: CPT

## 2017-06-22 PROCEDURE — 80053 COMPREHEN METABOLIC PANEL: CPT

## 2017-06-22 PROCEDURE — P9021 RED BLOOD CELLS UNIT: HCPCS

## 2017-06-22 PROCEDURE — 99223 1ST HOSP IP/OBS HIGH 75: CPT | Mod: GC,,, | Performed by: INTERNAL MEDICINE

## 2017-06-22 PROCEDURE — 63600175 PHARM REV CODE 636 W HCPCS: Performed by: RADIOLOGY

## 2017-06-22 PROCEDURE — 90935 HEMODIALYSIS ONE EVALUATION: CPT | Mod: GC,,, | Performed by: INTERNAL MEDICINE

## 2017-06-22 PROCEDURE — 63600175 PHARM REV CODE 636 W HCPCS: Performed by: INTERNAL MEDICINE

## 2017-06-22 PROCEDURE — 36415 COLL VENOUS BLD VENIPUNCTURE: CPT

## 2017-06-22 RX ORDER — HYDROCODONE BITARTRATE AND ACETAMINOPHEN 5; 325 MG/1; MG/1
1 TABLET ORAL EVERY 6 HOURS PRN
Status: DISCONTINUED | OUTPATIENT
Start: 2017-06-22 | End: 2017-06-25 | Stop reason: HOSPADM

## 2017-06-22 RX ORDER — MIDAZOLAM HYDROCHLORIDE 1 MG/ML
INJECTION, SOLUTION INTRAMUSCULAR; INTRAVENOUS CODE/TRAUMA/SEDATION MEDICATION
Status: COMPLETED | OUTPATIENT
Start: 2017-06-22 | End: 2017-06-22

## 2017-06-22 RX ORDER — POLYETHYLENE GLYCOL 3350, SODIUM SULFATE ANHYDROUS, SODIUM BICARBONATE, SODIUM CHLORIDE, POTASSIUM CHLORIDE 236; 22.74; 6.74; 5.86; 2.97 G/4L; G/4L; G/4L; G/4L; G/4L
2000 POWDER, FOR SOLUTION ORAL ONCE
Status: COMPLETED | OUTPATIENT
Start: 2017-06-23 | End: 2017-06-23

## 2017-06-22 RX ORDER — LOSARTAN POTASSIUM 50 MG/1
50 TABLET ORAL DAILY
Status: DISCONTINUED | OUTPATIENT
Start: 2017-06-23 | End: 2017-06-23

## 2017-06-22 RX ORDER — GABAPENTIN 100 MG/1
100 CAPSULE ORAL 3 TIMES DAILY
Status: DISCONTINUED | OUTPATIENT
Start: 2017-06-22 | End: 2017-06-25 | Stop reason: HOSPADM

## 2017-06-22 RX ORDER — FUROSEMIDE 40 MG/1
80 TABLET ORAL DAILY
Status: DISCONTINUED | OUTPATIENT
Start: 2017-06-22 | End: 2017-06-22

## 2017-06-22 RX ORDER — POLYETHYLENE GLYCOL 3350, SODIUM SULFATE ANHYDROUS, SODIUM BICARBONATE, SODIUM CHLORIDE, POTASSIUM CHLORIDE 236; 22.74; 6.74; 5.86; 2.97 G/4L; G/4L; G/4L; G/4L; G/4L
2000 POWDER, FOR SOLUTION ORAL ONCE
Status: COMPLETED | OUTPATIENT
Start: 2017-06-22 | End: 2017-06-22

## 2017-06-22 RX ORDER — SODIUM CHLORIDE 9 MG/ML
INJECTION, SOLUTION INTRAVENOUS ONCE
Status: COMPLETED | OUTPATIENT
Start: 2017-06-22 | End: 2017-06-22

## 2017-06-22 RX ORDER — CETIRIZINE HYDROCHLORIDE 10 MG/1
10 TABLET ORAL DAILY
Status: DISCONTINUED | OUTPATIENT
Start: 2017-06-22 | End: 2017-06-22 | Stop reason: DRUGHIGH

## 2017-06-22 RX ORDER — IODIXANOL 320 MG/ML
50 INJECTION, SOLUTION INTRAVASCULAR
Status: COMPLETED | OUTPATIENT
Start: 2017-06-22 | End: 2017-06-22

## 2017-06-22 RX ORDER — CETIRIZINE HYDROCHLORIDE 5 MG/1
5 TABLET ORAL DAILY
Status: DISCONTINUED | OUTPATIENT
Start: 2017-06-22 | End: 2017-06-25 | Stop reason: HOSPADM

## 2017-06-22 RX ORDER — LOSARTAN POTASSIUM 50 MG/1
50 TABLET ORAL DAILY
Status: DISCONTINUED | OUTPATIENT
Start: 2017-06-22 | End: 2017-06-22

## 2017-06-22 RX ORDER — HYDRALAZINE HYDROCHLORIDE 50 MG/1
50 TABLET, FILM COATED ORAL 3 TIMES DAILY
Status: DISCONTINUED | OUTPATIENT
Start: 2017-06-22 | End: 2017-06-25 | Stop reason: HOSPADM

## 2017-06-22 RX ORDER — GLUCAGON 1 MG
1 KIT INJECTION
Status: DISCONTINUED | OUTPATIENT
Start: 2017-06-22 | End: 2017-06-25 | Stop reason: HOSPADM

## 2017-06-22 RX ORDER — SODIUM CHLORIDE 9 MG/ML
INJECTION, SOLUTION INTRAVENOUS
Status: DISCONTINUED | OUTPATIENT
Start: 2017-06-22 | End: 2017-06-23

## 2017-06-22 RX ORDER — CITALOPRAM 10 MG/1
10 TABLET ORAL DAILY
Status: DISCONTINUED | OUTPATIENT
Start: 2017-06-22 | End: 2017-06-25 | Stop reason: HOSPADM

## 2017-06-22 RX ORDER — ATORVASTATIN CALCIUM 20 MG/1
20 TABLET, FILM COATED ORAL DAILY
Status: DISCONTINUED | OUTPATIENT
Start: 2017-06-22 | End: 2017-06-25 | Stop reason: HOSPADM

## 2017-06-22 RX ORDER — HYDROCODONE BITARTRATE AND ACETAMINOPHEN 500; 5 MG/1; MG/1
TABLET ORAL
Status: DISCONTINUED | OUTPATIENT
Start: 2017-06-22 | End: 2017-06-25 | Stop reason: HOSPADM

## 2017-06-22 RX ORDER — OXYCODONE AND ACETAMINOPHEN 5; 325 MG/1; MG/1
1 TABLET ORAL EVERY 4 HOURS PRN
Status: DISCONTINUED | OUTPATIENT
Start: 2017-06-22 | End: 2017-06-22

## 2017-06-22 RX ORDER — IBUPROFEN 200 MG
16 TABLET ORAL
Status: DISCONTINUED | OUTPATIENT
Start: 2017-06-22 | End: 2017-06-25 | Stop reason: HOSPADM

## 2017-06-22 RX ORDER — CINACALCET 30 MG/1
30 TABLET, FILM COATED ORAL NIGHTLY
Status: DISCONTINUED | OUTPATIENT
Start: 2017-06-22 | End: 2017-06-25 | Stop reason: HOSPADM

## 2017-06-22 RX ORDER — IBUPROFEN 200 MG
24 TABLET ORAL
Status: DISCONTINUED | OUTPATIENT
Start: 2017-06-22 | End: 2017-06-25 | Stop reason: HOSPADM

## 2017-06-22 RX ORDER — SEVELAMER CARBONATE 800 MG/1
1600 TABLET, FILM COATED ORAL
Status: DISCONTINUED | OUTPATIENT
Start: 2017-06-22 | End: 2017-06-25 | Stop reason: HOSPADM

## 2017-06-22 RX ADMIN — POLYETHYLENE GLYCOL 3350, SODIUM SULFATE ANHYDROUS, SODIUM BICARBONATE, SODIUM CHLORIDE, POTASSIUM CHLORIDE 4000 ML: 236; 22.74; 6.74; 5.86; 2.97 POWDER, FOR SOLUTION ORAL at 06:06

## 2017-06-22 RX ADMIN — IODIXANOL 16 ML: 320 INJECTION, SOLUTION INTRAVASCULAR at 03:06

## 2017-06-22 RX ADMIN — SEVELAMER CARBONATE 1600 MG: 800 TABLET, FILM COATED ORAL at 06:06

## 2017-06-22 RX ADMIN — CITALOPRAM HYDROBROMIDE 10 MG: 10 TABLET ORAL at 06:06

## 2017-06-22 RX ADMIN — HYDRALAZINE HYDROCHLORIDE 50 MG: 50 TABLET ORAL at 06:06

## 2017-06-22 RX ADMIN — MIDAZOLAM HYDROCHLORIDE 0.5 MG: 1 INJECTION, SOLUTION INTRAMUSCULAR; INTRAVENOUS at 03:06

## 2017-06-22 RX ADMIN — CETIRIZINE HYDROCHLORIDE 5 MG: 5 TABLET, FILM COATED ORAL at 06:06

## 2017-06-22 RX ADMIN — CINACALCET HYDROCHLORIDE 30 MG: 30 TABLET, COATED ORAL at 09:06

## 2017-06-22 RX ADMIN — SODIUM CHLORIDE: 0.9 INJECTION, SOLUTION INTRAVENOUS at 10:06

## 2017-06-22 RX ADMIN — HYDRALAZINE HYDROCHLORIDE 50 MG: 50 TABLET ORAL at 09:06

## 2017-06-22 RX ADMIN — PANTOPRAZOLE SODIUM 40 MG: 40 INJECTION, POWDER, FOR SOLUTION INTRAVENOUS at 09:06

## 2017-06-22 RX ADMIN — GABAPENTIN 100 MG: 100 CAPSULE ORAL at 06:06

## 2017-06-22 RX ADMIN — OXYCODONE HYDROCHLORIDE AND ACETAMINOPHEN 1 TABLET: 5; 325 TABLET ORAL at 03:06

## 2017-06-22 RX ADMIN — GABAPENTIN 100 MG: 100 CAPSULE ORAL at 09:06

## 2017-06-22 RX ADMIN — ATORVASTATIN CALCIUM 20 MG: 20 TABLET, FILM COATED ORAL at 06:06

## 2017-06-22 NOTE — PROGRESS NOTES
[facility]  Nephrology  Progress Note    Patient Name: Wilder Carbajal  MRN: [id number]  Admission Date: 6/21/2017  Hospital Length of Stay: 1 days  Attending Provider: Jl Dill MD   Primary Care Physician: Akhil Adorno MD  Principal Problem:<principal problem not specified>    Subjective:     HPI: Nephrology Consult       66-year-old -American male with past medical history of diabetes, hypertension, hyperlipidemia, asthma, paroxysmal A. fib on Eliquis, thyroid disease, ESRD on HD TTS admitted for GIB with melanotic stools for the last week. HBg at outpatient unit ahs dropped from 9.3 to 8.3 in las t several weeks. He has missed several treatments in the last 2 weeks. He was last dialyzed Tuesday. He has not been able to achieve Dry wt due to noncompliance with diet and treatments  Pateint is without any current complaint of abdominal pain, cramping,  He does admit to dyspnea at rest and with exertion in the last 2 weeks. No chest pain, pressure. He has noted increasing peripheral edema.      [facility], 4h 15 min, F180, 500 BF, , Left AVF      14 point review of systems completed and pertinent significant findings as in the HPI        Review of patient's allergies indicates:  No Known Allergies  Current Facility-Administered Medications   Medication Frequency    0.9%  NaCl infusion (for blood administration) Q24H PRN    0.9%  NaCl infusion (for blood administration) Q24H PRN    0.9%  NaCl infusion PRN    0.9%  NaCl infusion Once    atorvastatin tablet 20 mg Daily    cetirizine tablet 5 mg Daily    cinacalcet tablet 30 mg QHS    citalopram tablet 10 mg Daily    dextrose 50% injection 12.5 g PRN    dextrose 50% injection 25 g PRN    gabapentin capsule 100 mg TID    glucagon (human recombinant) injection 1 mg PRN    glucose chewable tablet 16 g PRN    glucose chewable tablet 24 g PRN    hydrALAZINE tablet 50 mg TID    hydrocodone-acetaminophen 5-325mg  per tablet 1 tablet Q6H PRN    pantoprazole injection 40 mg BID    polyethylene glycol (GoLYTELY) solution Once    [START ON 6/23/2017] polyethylene glycol (GoLYTELY) solution Once    sevelamer carbonate tablet 1,600 mg TID WM       Objective:     Vital Signs (Most Recent):  Temp: 97.6 °F (36.4 °C) (06/22/17 0949)  Pulse: 61 (Simultaneous filing. User may not have seen previous data.) (06/22/17 1115)  Resp: 18 (06/22/17 0949)  BP: (!) 166/73 (06/22/17 1115)  SpO2: 96 % (06/22/17 0700)  O2 Device (Oxygen Therapy): nasal cannula (06/22/17 0949) Vital Signs (24h Range):  Temp:  [97.4 °F (36.3 °C)-98.5 °F (36.9 °C)] 97.6 °F (36.4 °C)  Pulse:  [53-75] 61  Resp:  [14-20] 18  SpO2:  [93 %-97 %] 96 %  BP: (103-194)/(50-84) 166/73     Weight: 114.8 kg (253 lb) (06/21/17 2004)  Body mass index is 37.36 kg/m².  Body surface area is 2.36 meters squared.    I/O last 3 completed shifts:  In: -   Out: 1 [Stool:1]    Physical Exam   Constitutional: He is oriented to person, place, and time. He appears well-developed and well-nourished. No distress.   Morbidly obese, comfortable at rest with NC 02   HENT:   Head: Normocephalic and atraumatic.   Mouth/Throat: Oropharynx is clear and moist. No oropharyngeal exudate.   Eyes: Conjunctivae and EOM are normal. Pupils are equal, round, and reactive to light. Right eye exhibits no discharge. Left eye exhibits no discharge. No scleral icterus.   Neck: Normal range of motion. Neck supple. No JVD present. No tracheal deviation present. No thyromegaly present.   Cardiovascular: Normal rate, regular rhythm and normal heart sounds.  Exam reveals no gallop and no friction rub.    No murmur heard.  Distal pulses not well palpated, +4 bialteral lower extremity edema present   Pulmonary/Chest: No stridor. He has rales. He exhibits no tenderness.   bilateral posterior rales 2/3 up   Abdominal: Soft. Bowel sounds are normal. He exhibits no distension and no mass. There is no tenderness. There is no  rebound and no guarding. No hernia.   Musculoskeletal: Normal range of motion. He exhibits no edema or tenderness.   LUF warm, dry no erythema good thrill and bruit   Lymphadenopathy:     He has no cervical adenopathy.   Neurological: He is alert and oriented to person, place, and time. No cranial nerve deficit. He exhibits normal muscle tone. Coordination normal.   Skin: Skin is warm and dry. No rash noted. He is not diaphoretic. No erythema. No pallor.   Psychiatric: He has a normal mood and affect. His behavior is normal. Judgment and thought content normal.   Nursing note and vitals reviewed.      Significant Labs:  CBC:   Recent Labs  Lab 06/22/17  0741   WBC 5.44   RBC 2.27*   HGB 6.8*   HCT 21.8*   *   MCV 96   MCH 30.0   MCHC 31.2*     CMP:   Recent Labs  Lab 06/22/17  0741   GLU 64*   CALCIUM 8.4*   ALBUMIN 2.7*   PROT 6.7      K 5.3*   CO2 30*      BUN 47*   CREATININE 6.3*   ALKPHOS 118   ALT 13   AST 18   BILITOT 0.9     All labs within the past 24 hours have been reviewed.     Significant Imaging:  Labs: Reviewed  X-Ray: Reviewed  aa    Assessment/Plan:     Anemia    exacerbated by GIB  2 u PRBC today,  Will dose LISSETTE for weight based once BP improved  Will hold on orderingniron studies post transfusion        ESRD (end stage renal disease) on dialysis    67 [facility] 15 min F180, 500 BF L AVF   Noncompliant with treatment schedule and dietary restriction.now with GIB Bleed and volume overload   HBg 6.8 transfuse a second unit of PRBC at HD today  Dialysis 4 hr 350 BF attempt 3 Kg as bp tolerates  Next dialysis tomorrow for metabolic clearance and volume control        HTN (hypertension)    Large component is likely due to volume overload will dialyze daily Thurs, Fri Sat for volume removal   suggest BP parameters for BP meds                 Snehal Ballard MD  Nephrology  [facility]

## 2017-06-22 NOTE — SUBJECTIVE & OBJECTIVE
Interval History:     Currently comfortable with no shortness of breath or chest pain    One bloody BM overnight    H&H not responding appropriately to transfusion.    Review of Systems   Gastrointestinal: Positive for blood in stool.   All other systems reviewed and are negative.    Objective:     Vital Signs (Most Recent):  Temp: 97.8 °F (36.6 °C) (06/22/17 1416)  Pulse: (!) 50 (06/22/17 1416)  Resp: 18 (06/22/17 1416)  BP: (!) 162/75 (06/22/17 1416)  SpO2: 96 % (06/22/17 0700) Vital Signs (24h Range):  Temp:  [97.4 °F (36.3 °C)-98.5 °F (36.9 °C)] 97.8 °F (36.6 °C)  Pulse:  [49-75] 50  Resp:  [14-20] 18  SpO2:  [93 %-97 %] 96 %  BP: (103-194)/(50-84) 162/75     Weight: 114.8 kg (253 lb)  Body mass index is 37.36 kg/m².    Intake/Output Summary (Last 24 hours) at 06/22/17 1424  Last data filed at 06/22/17 1100   Gross per 24 hour   Intake              360 ml   Output                1 ml   Net              359 ml      Physical Exam   Constitutional: He appears well-nourished.   HENT:   Head: Atraumatic.   Cardiovascular: Normal rate.    Pulmonary/Chest: Effort normal.   Abdominal: Soft.   Neurological: He is alert.   Skin: Skin is warm.   Psychiatric: He has a normal mood and affect.   Nursing note and vitals reviewed.      Significant Labs:   BMP:   Recent Labs  Lab 06/22/17  0741   GLU 64*      K 5.3*      CO2 30*   BUN 47*   CREATININE 6.3*   CALCIUM 8.4*     CBC:   Recent Labs  Lab 06/21/17  1531 06/22/17  0741   WBC 6.44 5.44   HGB 6.6* 6.8*   HCT 21.2* 21.8*   * 116*       Significant Imaging: None

## 2017-06-22 NOTE — ANESTHESIA PREPROCEDURE EVALUATION
06/22/2017  Pre-operative evaluation for Procedure(s) (LRB):  ESOPHAGOGASTRODUODENOSCOPY (EGD) (N/A)  COLONOSCOPY (N/A)    Wilder Carbajal is a 66 y.o. male with PMHx of HTN, HLD, GERD, CHF (EF 45%), aortic stenosis (MARANDA = 1.4 cm2), pulmonary HTN, atrial fibrillation, DM II, hypothyroidism, ESRD (on dialysis), asthma, and anemia who is scheduled for above procedure.     LDA:   Hemodialysis AV Graft 06/17/14 0915    Hemodialysis AV Graft - Properties Group Placement Date/Time: 06/17/14 0915 Present Prior to Hospital Arrival?: No Hand Hygiene: Performed Barrier Precautions: Performed Skin Antisepsis: ChloraPrep Location: (c) /Lot Number: [id number] [facility] 6mm x 40 cm acuseal vascular ...          Peripheral IV - Single Lumen 04/21/17 0532 Right Antecubital   IV Properties Placement Date/Time: 04/21/17 0532 Present Prior to Hospital Arrival?: Yes Size/Length: 18 G Orientation: Right Location: Antecubital          Peripheral IV - Single Lumen 06/21/17 1525 Right Antecubital   IV Properties Placement Date/Time: 06/21/17 1525 Size/Length: 18 G Orientation: Right Location: Antecubital          Hemodialysis AV Fistula Left upper arm   Hemodialysis AV Fistula - Properties Group No Placement Date or Time found. Present Prior to Hospital Arrival?: Yes Location: Left upper arm         Prev airway: None documented    Patient Active Problem List   Diagnosis    Traction detachment of both retinas    Diabetic macular edema of both eyes    NS (nuclear sclerosis)    Hypertensive retinopathy of both eyes    History of excision of epidermal inclusion cyst    Proliferative diabetic retinopathy of both eyes without macular edema associated with type 2 diabetes mellitus    Proliferative diabetic retinopathy, both eyes    Elevated PSA    Malignant hypertension    HLD (hyperlipidemia)    DM II (diabetes  mellitus, type II), controlled    HTN (hypertension)    Hypothyroidism    GERD (gastroesophageal reflux disease)    Pseudoaneurysm of arteriovenous graft    Lumbar facet arthropathy    Renovascular hypertension, malignant    Lipoma    DM (diabetes mellitus) type II controlled with renal manifestation    ESRD (end stage renal disease) on dialysis    Diabetes mellitus with ESRD (end-stage renal disease)    Lumbar spondylosis    Spinal stenosis of lumbar region    Bilateral low back pain    Complete heart block    Bicuspid aortic valve    AV block, complete    Typical angina    Fever    Sepsis    Coccygeal pain, acute    Sacrococcygeal pilonidal cyst    Second degree heart block    Vein stenosis    Weakness    Acute pain of left shoulder    Dependence on hemodialysis    Paroxysmal atrial fibrillation    Acute on chronic diastolic heart failure    Right lower quadrant abdominal pain    Hypoxia    Right femoral vein DVT    Anemia    Metabolic bone disease       Review of patient's allergies indicates:  No Known Allergies     No current facility-administered medications on file prior to encounter.      Current Outpatient Prescriptions on File Prior to Encounter   Medication Sig Dispense Refill    apixaban 5 mg Tab Take 1 tablet (5 mg total) by mouth 2 (two) times daily. 60 tablet 1    aspirin (ECOTRIN) 81 MG EC tablet Take 81 mg by mouth once daily.       cetirizine (ZYRTEC) 10 MG tablet Take 10 mg by mouth once daily.      cinacalcet (SENSIPAR) 30 MG Tab Take 30 mg by mouth every evening.      ergocalciferol (ERGOCALCIFEROL) 50,000 unit Cap Take 50,000 Units by mouth every 14 (fourteen) days.       furosemide (LASIX) 80 MG tablet Take 80 mg by mouth once daily.      hydrALAZINE (APRESOLINE) 50 MG tablet Take 50 mg by mouth 3 (three) times daily.      ipratropium (ATROVENT HFA) 17 mcg/actuation inhaler Inhale 2 puffs into the lungs 4 (four) times daily. Do not exceed 12 puffs per  day      ketotifen (ZADITOR) 0.025 % (0.035 %) ophthalmic solution Apply 1 drop to eye every 8 (eight) hours.      sevelamer carbonate (RENVELA) 800 mg Tab Take two tablets by mouth four times daily with meals and snacks      ammonium lactate (LAC-HYDRIN) 12 % lotion Apply topically as needed for Dry Skin.      atorvastatin (LIPITOR) 20 MG tablet Take 20 mg by mouth once daily.      diclofenac sodium (VOLTAREN) 1 % Gel Apply 2 g topically 4 (four) times daily as needed (for pain).      losartan (COZAAR) 50 MG tablet Take 1 tablet (50 mg total) by mouth once daily. 30 tablet 3    podofilox (CONDYLOX) 0.5 % external solution Apply a small amount to lesions twice daily for three days, then hold for four days and repeat regimen         Past Surgical History:   Procedure Laterality Date    CATARACT EXTRACTION      RETINAL DETACHMENT SURGERY         Social History     Social History    Marital status:      Spouse name: N/A    Number of children: N/A    Years of education: N/A     Occupational History    Not on file.     Social History Main Topics    Smoking status: Never Smoker    Smokeless tobacco: Never Used    Alcohol use Yes      Comment: 1 pint bourbon    Drug use: No    Sexual activity: Not on file     Other Topics Concern    Not on file     Social History Narrative    No narrative on file         Vital Signs Range (Last 24H):  Temp:  [36.3 °C (97.4 °F)-36.9 °C (98.5 °F)]   Pulse:  [49-75]   Resp:  [14-20]   BP: (103-194)/(50-84)   SpO2:  [93 %-97 %]       CBC:   Recent Labs      06/21/17   1531  06/22/17   0741   WBC  6.44  5.44   RBC  2.18*  2.27*   HGB  6.6*  6.8*   HCT  21.2*  21.8*   PLT  126*  116*   MCV  97  96   MCH  30.3  30.0   MCHC  31.1*  31.2*       CMP:   Recent Labs      06/21/17   1531  06/22/17   0741   NA  142  140   K  4.9  5.3*   CL  99  100   CO2  31*  30*   BUN  42*  47*   CREATININE  5.6*  6.3*   GLU  68*  64*   CALCIUM  8.3*  8.4*   ALBUMIN  2.8*  2.7*   PROT  7.3   6.7   ALKPHOS  139*  118   ALT  17  13   AST  20  18   BILITOT  0.8  0.9       INR  Recent Labs      06/21/17   1531   INR  1.2           Diagnostic Studies:      EKG:  Atrial fibrillation  ST and T wave abnormality, consider lateral ischemia  Abnormal ECG  When compared with ECG of 21-APR-2017 05:55,  No significant change was found    2D Echo:  CONCLUSIONS     1 - Eccentric hypertrophy.     2 - Mildly depressed left ventricular systolic function (EF 45-50%).     3 - Right ventricular enlargement with mildly depressed systolic function.     4 - Biatrial enlargement.     5 - Mild tricuspid regurgitation.     6 - Mild aortic stenosis, MARANDA = 1.4 cm2, peak velocity = 2.7 m/s, mean gradient = 15 mmHg.     7 - Pulmonary hypertension. The estimated PA systolic pressure is 55 mmHg.     8 - Increased central venous pressure.       Pre-op Assessment    I have reviewed the Patient Summary Reports.      I have reviewed the Medications.     Review of Systems  Anesthesia Hx:  No problems with previous Anesthesia Denies Hx of Anesthetic complications  History of prior surgery of interest to airway management or planning: Denies Family Hx of Anesthesia complications.   Denies Personal Hx of Anesthesia complications.   Hematology/Oncology:  Hematology Normal   Oncology Normal     EENT/Dental:EENT/Dental Normal   Cardiovascular:   Hypertension Valvular problems/Murmurs (AR ) Dysrhythmias (heart block) atrial fibrillation Angina CHF Pulmonary HTN   Pulmonary:   Asthma    Renal/:   Chronic Renal Disease, ESRD, Dialysis    Hepatic/GI:   GERD    Musculoskeletal:   Arthritis   Spine Disorders: lumbar    Endocrine:   Diabetes, type 2 Hypothyroidism        Physical Exam         Musculoskeletal:  Musculoskeletal Findings: .ca1.

## 2017-06-22 NOTE — ASSESSMENT & PLAN NOTE
Large component is likely due to volume overload will dialyze daily Thurs, Fri Sat for volume removal   suggest BP parameters for BP meds

## 2017-06-22 NOTE — ASSESSMENT & PLAN NOTE
67 yo AAM HD [facility], TTS, 4 hr 15 min F180, 500 BF L AVF   Noncompliant with treatment schedule and dietary restriction.now with GIB Bleed and volume overload   HBg 6.8 transfuse a second unit of PRBC at HD today  Dialysis 4 hr 350 BF attempt 3 Kg as bp tolerates  Next dialysis tomorrow for metabolic clearance and volume control

## 2017-06-22 NOTE — PROCEDURES
Radiology Post-Procedure Note    Pre Op Diagnosis: Deep venous thrombosis    Post Op Diagnosis: : Deep venous thrombosis    Procedure: IVC filter placement    Procedure performed by: Tom Dao MD; Alberto Reddy MD (resident)    Written Informed Consent Obtained: Yes    Specimen Removed: NO    Estimated Blood Loss: Minimal    Findings: After written informed consent and sterile prep and drape, the left femoral vein was cannulated and a pigtail catheter was placed into the IVC.  Contrast injection under fluoroscopy revealed anatomy suitable for placement of IVC filter immediately inferior to the renal veins.  The filter was placed at this level under fluoroscopic surveillance and post placement venogram demonstrated adequate position and function.  Please see full procedural report in Imaging for further details.       Patient tolerated procedure well.    Alberto Reddy MD  Resident  Department of Radiology  Pager: [phone]

## 2017-06-22 NOTE — SUBJECTIVE & OBJECTIVE
Review of patient's allergies indicates:  No Known Allergies  Current Facility-Administered Medications   Medication Frequency    0.9%  NaCl infusion (for blood administration) Q24H PRN    0.9%  NaCl infusion (for blood administration) Q24H PRN    0.9%  NaCl infusion PRN    0.9%  NaCl infusion Once    atorvastatin tablet 20 mg Daily    cetirizine tablet 5 mg Daily    cinacalcet tablet 30 mg QHS    citalopram tablet 10 mg Daily    dextrose 50% injection 12.5 g PRN    dextrose 50% injection 25 g PRN    gabapentin capsule 100 mg TID    glucagon (human recombinant) injection 1 mg PRN    glucose chewable tablet 16 g PRN    glucose chewable tablet 24 g PRN    hydrALAZINE tablet 50 mg TID    hydrocodone-acetaminophen 5-325mg per tablet 1 tablet Q6H PRN    pantoprazole injection 40 mg BID    polyethylene glycol (GoLYTELY) solution Once    [START ON 6/23/2017] polyethylene glycol (GoLYTELY) solution Once    sevelamer carbonate tablet 1,600 mg TID WM       Objective:     Vital Signs (Most Recent):  Temp: 97.6 °F (36.4 °C) (06/22/17 0949)  Pulse: 61 (Simultaneous filing. User may not have seen previous data.) (06/22/17 1115)  Resp: 18 (06/22/17 0949)  BP: (!) 166/73 (06/22/17 1115)  SpO2: 96 % (06/22/17 0700)  O2 Device (Oxygen Therapy): nasal cannula (06/22/17 0949) Vital Signs (24h Range):  Temp:  [97.4 °F (36.3 °C)-98.5 °F (36.9 °C)] 97.6 °F (36.4 °C)  Pulse:  [53-75] 61  Resp:  [14-20] 18  SpO2:  [93 %-97 %] 96 %  BP: (103-194)/(50-84) 166/73     Weight: 114.8 kg (253 lb) (06/21/17 2004)  Body mass index is 37.36 kg/m².  Body surface area is 2.36 meters squared.    I/O last 3 completed shifts:  In: -   Out: 1 [Stool:1]    Physical Exam   Constitutional: He is oriented to person, place, and time. He appears well-developed and well-nourished. No distress.   Morbidly obese, comfortable at rest with NC 02   HENT:   Head: Normocephalic and atraumatic.   Mouth/Throat: Oropharynx is clear and moist. No  oropharyngeal exudate.   Eyes: Conjunctivae and EOM are normal. Pupils are equal, round, and reactive to light. Right eye exhibits no discharge. Left eye exhibits no discharge. No scleral icterus.   Neck: Normal range of motion. Neck supple. No JVD present. No tracheal deviation present. No thyromegaly present.   Cardiovascular: Normal rate, regular rhythm and normal heart sounds.  Exam reveals no gallop and no friction rub.    No murmur heard.  Distal pulses not well palpated, +4 bialteral lower extremity edema present   Pulmonary/Chest: No stridor. He has rales. He exhibits no tenderness.   bilateral posterior rales 2/3 up   Abdominal: Soft. Bowel sounds are normal. He exhibits no distension and no mass. There is no tenderness. There is no rebound and no guarding. No hernia.   Musculoskeletal: Normal range of motion. He exhibits no edema or tenderness.   LUF warm, dry no erythema good thrill and bruit   Lymphadenopathy:     He has no cervical adenopathy.   Neurological: He is alert and oriented to person, place, and time. No cranial nerve deficit. He exhibits normal muscle tone. Coordination normal.   Skin: Skin is warm and dry. No rash noted. He is not diaphoretic. No erythema. No pallor.   Psychiatric: He has a normal mood and affect. His behavior is normal. Judgment and thought content normal.   Nursing note and vitals reviewed.      Significant Labs:  CBC:   Recent Labs  Lab 06/22/17  0741   WBC 5.44   RBC 2.27*   HGB 6.8*   HCT 21.8*   *   MCV 96   MCH 30.0   MCHC 31.2*     CMP:   Recent Labs  Lab 06/22/17  0741   GLU 64*   CALCIUM 8.4*   ALBUMIN 2.7*   PROT 6.7      K 5.3*   CO2 30*      BUN 47*   CREATININE 6.3*   ALKPHOS 118   ALT 13   AST 18   BILITOT 0.9     All labs within the past 24 hours have been reviewed.     Significant Imaging:  Labs: Reviewed  X-Ray: Reviewed  aa

## 2017-06-22 NOTE — PROGRESS NOTES
[facility] Hospital Medicine  Progress Note    Patient Name: Wilder Carbajal  MRN: [id number]  Patient Class: IP- Inpatient   Admission Date: 6/21/2017  Length of Stay: 1 days  Attending Physician: Jl Dill MD  Primary Care Provider: Akhil Adorno MD    [facility] Medicine Team: [facility] [place] Jl Dill MD    Subjective:     Principal Problem:<principal problem not specified>    HPI:  66-year-old -American male with past medical history of diabetes, hypertension, hyperlipidemia, asthma, paroxysmal A. fib on Eliquis, thyroid disease, ESRD on HD TTS presents to the ED complaining of rectal bleeding for the past week.  He has noted bright red blood mixed in with brown stool.  He denies any rectal bleeding between bowel movements.  He has some lower abdominal pain with BM.  He has been having intermittent chest pain for the past year and reports left-sided 3/10 chest pain that started yesterday.  He describes the chest pain as feeling as though he just ran for a few minutes.  He reports shortness of breath, cough, rhinorrhea, dysuria, subjective fever.  He denies constipation, nausea, vomiting, weakness.  He went to dialysis on Tuesday but skipped last Thursday and Saturday.  He drinks alcohol and denies tobacco or drug use.    Distant hx of PUD.  Denies NSAID use currently.      Last [place] was over 10 years ago.    Hospital Course:  No notes on file    Interval History:     Currently comfortable with no shortness of breath or chest pain    One bloody BM overnight    H&H not responding appropriately to transfusion.    Review of Systems   Gastrointestinal: Positive for blood in stool.   All other systems reviewed and are negative.    Objective:     Vital Signs (Most Recent):  Temp: 97.8 °F (36.6 °C) (06/22/17 1416)  Pulse: (!) 50 (06/22/17 1416)  Resp: 18 (06/22/17 1416)  BP: (!) 162/75 (06/22/17 1416)  SpO2: 96 % (06/22/17 0700) Vital Signs (24h Range):  Temp:  [97.4 °F (36.3  °C)-98.5 °F (36.9 °C)] 97.8 °F (36.6 °C)  Pulse:  [49-75] 50  Resp:  [14-20] 18  SpO2:  [93 %-97 %] 96 %  BP: (103-194)/(50-84) 162/75     Weight: 114.8 kg (253 lb)  Body mass index is 37.36 kg/m².    Intake/Output Summary (Last 24 hours) at 06/22/17 1424  Last data filed at 06/22/17 1100   Gross per 24 hour   Intake              360 ml   Output                1 ml   Net              359 ml      Physical Exam   Constitutional: He appears well-nourished.   HENT:   Head: Atraumatic.   Cardiovascular: Normal rate.    Pulmonary/Chest: Effort normal.   Abdominal: Soft.   Neurological: He is alert.   Skin: Skin is warm.   Psychiatric: He has a normal mood and affect.   Nursing note and vitals reviewed.      Significant Labs:   BMP:   Recent Labs  Lab 06/22/17  0741   GLU 64*      K 5.3*      CO2 30*   BUN 47*   CREATININE 6.3*   CALCIUM 8.4*     CBC:   Recent Labs  Lab 06/21/17  1531 06/22/17  0741   WBC 6.44 5.44   HGB 6.6* 6.8*   HCT 21.2* 21.8*   * 116*       Significant Imaging: None    Assessment/Plan:      Anemia    Sub acute (likely lower) GI losses  HD stable currently  GI consulted by ED  Overdue for repeat colo  Can start ppi BID and monitor  Hold anticoagulation for dvt, place IVC filter  Gi will proceed with colo and possible EGD tomorrow        Right femoral vein DVT    Diagnosed two months ago  Still seen on US  Discussed with heme attending Dr. Augustine will proceed with IVC filter placement  Discussed with IR will place today            ESRD (end stage renal disease) on dialysis    Nephrology following          HTN (hypertension)    Continue home regimen for now, monitor for signs of hypovolemia/hypotension            VTE Risk Mitigation         Ordered     Medium Risk of VTE  Once      06/22/17 0910     Place sequential compression device  Until discontinued      06/22/17 0910     Place RODRI hose  Until discontinued      06/22/17 0910          Jl Dill MD  Department of Hospital  Medicine   [facility]

## 2017-06-22 NOTE — ASSESSMENT & PLAN NOTE
Likely colonic source. Reports pink/dark blood per rectum.   No recent EGD/colon.  No NSAID use, no UGI sx. Less likely upper source.     Plan:  Protonix IV BID  Intravascular resuscitation/support with IVFs   Serial H/H's and pRBCs transfusion as indicated  Discontinue all NSAIDs and Heparin products  Please correct any coagulopathy with platelets and FFP to a goal of platelets >50K and INR <2.0  Maintain IV access with 2 large bore IVs  Clear LD today, NPO PMN  Prep tonight  Plan for colonoscopy tomorrow, if negative, we will also do EGD at the same time.   Please notify GI team if there is significant change in patient's clinical status

## 2017-06-22 NOTE — ASSESSMENT & PLAN NOTE
exacerbated by GIB  2 u PRBC today,  Will dose LISSETTE for weight based once BP improved  Will hold on orderingniron studies post transfusion

## 2017-06-22 NOTE — CONSULTS
[facility]  Hematology/Oncology  Consult Note    Patient Name: Wilder Carbajal  MRN: [id number]  Admission Date: 6/21/2017  Hospital Length of Stay: 1 days  Code Status: Full Code   Attending Provider: Jl Dill MD  Consulting Provider: Kimbrely Morse MD  Primary Care Physician: Akhil Adorno MD  Principal Problem:<principal problem not specified>    Consults  Subjective:     HPI: Mr Carbajal is a 66 year old male with hx of  diabetes, hypertension, hyperlipidemia, asthma, paroxysmal A. fib on Eliquis, thyroid disease, ESRD on HD TTS presents to the ED complaining of rectal bleeding for the past week. He reports black stools as well as bright red blood. Patient was admitted 4/21/17 with fever. W/U included LE US which revealed a R femoral DVT. Patient had recently undergone left buttock abscess incision end of 3/2017.   Patient had initially been placed on heparin but left AMA and was given apixiban 5 mg BID to go home with. Patient has had hx of easy bleeding at fistula sight. He reports the possibility of prior DVT in this extremity but is not clear on hx. He has intentionally lost weight. He has multiple complaints including difficulty swallowing. Lower abdominal pain.   Repeat LE US shows persistent DVT in R fem vein. Deneis SOB or CP.         Oncology Treatment Plan:   [No treatment plan]    Medications:  Continuous Infusions:   Scheduled Meds:   atorvastatin  20 mg Oral Daily    cetirizine  5 mg Oral Daily    cinacalcet  30 mg Oral QHS    citalopram  10 mg Oral Daily    gabapentin  100 mg Oral TID    hydrALAZINE  50 mg Oral TID    pantoprazole  40 mg Intravenous BID    polyethylene glycol  2,000 mL Oral Once    [START ON 6/23/2017] polyethylene glycol  2,000 mL Oral Once    sevelamer carbonate  1,600 mg Oral TID WM     PRN Meds:sodium chloride, sodium chloride, sodium chloride 0.9%, dextrose 50%, dextrose 50%, glucagon (human recombinant), glucose, glucose,  hydrocodone-acetaminophen 5-325mg, midazolam     Review of patient's allergies indicates:  No Known Allergies     Past Medical History:   Diagnosis Date    Acute pain of left shoulder 1/7/2017    Arthritis     Asthma     Benign neoplasm of eyelid     RUL    Blood clotting tendency     Blood transfusion     Cataract     Chronic kidney disease requiring chronic dialysis     Diabetes mellitus     Diabetic retinopathy     Fever blister     Hyperlipidemia     Hypertension     Infertility     Joint pain     Retinal detachment     Thyroid disease     Weakness 1/7/2017     Past Surgical History:   Procedure Laterality Date    CATARACT EXTRACTION      RETINAL DETACHMENT SURGERY       Family History     Problem Relation (Age of Onset)    Heart attack Mother    No Known Problems Father, Sister, Brother, Maternal Aunt, Maternal Uncle, Paternal Aunt, Paternal Uncle, Maternal Grandmother, Maternal Grandfather, Paternal Grandmother, Paternal Grandfather        Social History Main Topics    Smoking status: Never Smoker    Smokeless tobacco: Never Used    Alcohol use Yes      Comment: 1 pint bourbon    Drug use: No    Sexual activity: Not on file       Review of Systems   Constitutional: Negative for chills and diaphoresis.   HENT: Negative for congestion and nosebleeds.    Eyes: Negative for photophobia.   Respiratory: Negative for cough and shortness of breath.    Gastrointestinal: Positive for abdominal pain and blood in stool. Negative for abdominal distention.   Genitourinary: Negative for dysuria and frequency.   Musculoskeletal: Negative for joint swelling.   Skin: Negative for color change and pallor.   Hematological: Negative for adenopathy. Does not bruise/bleed easily.   Psychiatric/Behavioral: Negative for agitation and behavioral problems.     Objective:     Vital Signs (Most Recent):  Temp: 97.8 °F (36.6 °C) (06/22/17 1416)  Pulse: (!) 56 (06/22/17 1601)  Resp: 10 (06/22/17 1601)  BP: (!)  186/87 (06/22/17 1601)  SpO2: 100 % (06/22/17 1601) Vital Signs (24h Range):  Temp:  [97.4 °F (36.3 °C)-98.5 °F (36.9 °C)] 97.8 °F (36.6 °C)  Pulse:  [49-73] 56  Resp:  [10-20] 10  SpO2:  [93 %-100 %] 100 %  BP: (128-194)/(57-90) 186/87     Weight: 114.8 kg (253 lb)  Body mass index is 37.36 kg/m².  Body surface area is 2.36 meters squared.      Intake/Output Summary (Last 24 hours) at 06/22/17 1601  Last data filed at 06/22/17 1420   Gross per 24 hour   Intake             1460 ml   Output             3051 ml   Net            -1591 ml       Physical Exam   Constitutional: He is oriented to person, place, and time. He appears well-developed and well-nourished.   HENT:   Mouth/Throat: Oropharynx is clear and moist.   Eyes: Pupils are equal, round, and reactive to light.   Neck: Normal range of motion.   Cardiovascular: Normal rate.    Irregular rhythm    Pulmonary/Chest: Effort normal and breath sounds normal.   Abdominal: Soft. Bowel sounds are normal.   Musculoskeletal: He exhibits edema.   Bilateral lower ext edema    Lymphadenopathy:     He has no cervical adenopathy.   Neurological: He is alert and oriented to person, place, and time.   Skin: Skin is warm and dry.   Psychiatric: He has a normal mood and affect. His behavior is normal.       Significant Labs:   CBC:   Recent Labs  Lab 06/21/17  1531 06/22/17  0741   WBC 6.44 5.44   HGB 6.6* 6.8*   HCT 21.2* 21.8*   * 116*   , CMP:   Recent Labs  Lab 06/21/17  1531 06/22/17  0741    140   K 4.9 5.3*   CL 99 100   CO2 31* 30*   GLU 68* 64*   BUN 42* 47*   CREATININE 5.6* 6.3*   CALCIUM 8.3* 8.4*   PROT 7.3 6.7   ALBUMIN 2.8* 2.7*   BILITOT 0.8 0.9   ALKPHOS 139* 118   AST 20 18   ALT 17 13   ANIONGAP 12 10   EGFRNONAA 9.7* 8.4*    and Coagulation:   Recent Labs  Lab 06/21/17  1531   INR 1.2       Diagnostic Results:  U/S: reviewed     Assessment/Plan:     Active Diagnoses:    Diagnosis Date Noted POA    Metabolic bone disease [E88.9, M90.80] 06/22/2017  Yes    Anemia [D64.9] 06/21/2017 Yes    Right femoral vein DVT [I82.411] 04/24/2017 Yes    ESRD (end stage renal disease) on dialysis [N18.6, Z99.2] 08/15/2015 Not Applicable    HTN (hypertension) [I10] 12/21/2013 Yes      Problems Resolved During this Admission:    Diagnosis Date Noted Date Resolved POA     A/P  65 y/o male with R fem vein DVT diagnosed 4/2017 has been on apixaban for anticoagulation. This medication has not been studied in dialysis patients and would recommend not restarting it. Patient is not very interested in being placed on any anticoagulation (ac).   Will need to evaluate source of bleed per GI  As discussed with Dr. Carrera, given risk of bleeding at this time, ok to proceed with temporary filter placement.  Should ac be pursued, would recommend coumadin with heparin bridge. Duration of ac in this unprovoked DVT would be as long as 6 months. He would need 4 more months of treatment  Follow up GI w/u    Kimberly Morse MD  Hematology/Oncology  [facility]    Attending Note  I have personally taken the history and examined this patient and agree with the fellow's note as stated above.  Discussed case with primary team earlier today  Agree with reversible IVC filter as benefit outweighs risk with bleeding and compliance concerns  Patient still should pursue anticoagulation again when bleeding resolves and we can assist in re-assessment for this

## 2017-06-22 NOTE — CONSULTS
65 yo male with melanotic stools,atrial fibrillation on eliquis, history of PE, and ESRD. IR consulted for IVC filter placement. Case discussed with Dr. Dill.  Labs reviewed, patient NPO, and anticoagulation held. Will discuss case with IR staff and plan for possible IVC filter placement this afternoon.     Alberto Reddy MD  Resident  Department of Radiology  Pager: [phone]

## 2017-06-22 NOTE — CONSULTS
[facility]  Gastroenterology  Consult Note    Patient Name: Wilder Carbajal  MRN: [id number]  Admission Date: 6/21/2017  Hospital Length of Stay: 1 days  Code Status: Full Code   Attending Provider: Jl Dill MD   Consulting Provider: Yamila Tai MD  Primary Care Physician: Akhil Adorno MD  Principal Problem:<principal problem not specified>    Inpatient consult to Gastroenterology  Consult performed by: YAMILA TAI  Consult ordered by: VALENTIN PIKE  Reason for consult: anemia        Subjective:     HPI:  Wilder Carbajal is a 66 y.o. male paroxysmal A. Fib/DVT on Eliquis, thyroid disease, ESRD on HD TTS presents to the ED complaining of rectal bleeding for the past week.  He has noted bright red blood mixed in with brown stool.  He denies any rectal bleeding between bowel movements. He reports some constipation preceding this along with straining. Initially started as brown stools with mixed pink blood and progressed to dark blood. He denies having prior GIB. Denies NSAID use. He has no EGD/colon in our records. He does not recollect having colonoscopy. On admission, HD stable and Hb found to be 6.6 down from 10.4 (in April 2017). He takes Eliquis for afib.     Past Medical History:   Diagnosis Date    Acute pain of left shoulder 1/7/2017    Arthritis     Asthma     Benign neoplasm of eyelid     RUL    Blood clotting tendency     Blood transfusion     Cataract     Chronic kidney disease requiring chronic dialysis     Diabetes mellitus     Diabetic retinopathy     Fever blister     Hyperlipidemia     Hypertension     Infertility     Joint pain     Retinal detachment     Thyroid disease     Weakness 1/7/2017       Past Surgical History:   Procedure Laterality Date    CATARACT EXTRACTION      RETINAL DETACHMENT SURGERY         Review of patient's allergies indicates:  No Known Allergies  Family History     Problem Relation (Age of Onset)     Heart attack Mother    No Known Problems Father, Sister, Brother, Maternal Aunt, Maternal Uncle, Paternal Aunt, Paternal Uncle, Maternal Grandmother, Maternal Grandfather, Paternal Grandmother, Paternal Grandfather        Social History Main Topics    Smoking status: Never Smoker    Smokeless tobacco: Never Used    Alcohol use Yes      Comment: 1 pint bourbon    Drug use: No    Sexual activity: Not on file     Review of Systems   Constitutional: Positive for activity change and appetite change.   HENT: Negative for trouble swallowing.    Eyes: Negative for photophobia and visual disturbance.   Respiratory: Positive for shortness of breath. Negative for chest tightness.    Cardiovascular: Positive for chest pain. Negative for leg swelling.   Gastrointestinal: Positive for anal bleeding, blood in stool and constipation. Negative for abdominal distention, abdominal pain, diarrhea, nausea, rectal pain and vomiting.   Musculoskeletal: Negative for arthralgias and back pain.   Skin: Negative for color change and pallor.   Neurological: Negative for tremors and weakness.   Hematological: Bruises/bleeds easily.   Psychiatric/Behavioral: Negative for agitation and behavioral problems.     Objective:     Vital Signs (Most Recent):  Temp: 97.6 °F (36.4 °C) (06/22/17 0949)  Pulse: 60 (06/22/17 1100)  Resp: 18 (06/22/17 0949)  BP: (!) 155/83 (06/22/17 1100)  SpO2: 96 % (06/22/17 0700) Vital Signs (24h Range):  Temp:  [97.4 °F (36.3 °C)-98.5 °F (36.9 °C)] 97.6 °F (36.4 °C)  Pulse:  [53-75] 60  Resp:  [14-20] 18  SpO2:  [93 %-97 %] 96 %  BP: (103-194)/(50-84) 155/83     Weight: 114.8 kg (253 lb) (06/21/17 2004)  Body mass index is 37.36 kg/m².      Intake/Output Summary (Last 24 hours) at 06/22/17 1106  Last data filed at 06/22/17 0600   Gross per 24 hour   Intake                0 ml   Output                1 ml   Net               -1 ml       Lines/Drains/Airways     Drain                 Hemodialysis AV Fistula Left  upper arm 54550 days         Hemodialysis AV Graft 06/17/14 0915  1101 days          Peripheral Intravenous Line                 Peripheral IV - Single Lumen 04/21/17 0532 Right Antecubital 62 days         Peripheral IV - Single Lumen 06/21/17 1525 Right Antecubital less than 1 day                Physical Exam   Constitutional: He is oriented to person, place, and time. He appears well-developed and well-nourished.   HENT:   Head: Normocephalic and atraumatic.   Eyes: Conjunctivae are normal. No scleral icterus.   Neck: Neck supple.   Cardiovascular: Normal rate and regular rhythm.    No murmur heard.  Pulmonary/Chest: Effort normal. No stridor. No respiratory distress. He has no wheezes. He has no rales.   Abdominal: Soft. Bowel sounds are normal. He exhibits no distension and no mass. There is no tenderness. There is no rebound and no guarding. No hernia.   Musculoskeletal: He exhibits no edema or tenderness.   Neurological: He is alert and oriented to person, place, and time.   Skin: Skin is warm and dry.   Psychiatric: He has a normal mood and affect. His behavior is normal.   Vitals reviewed.    Lab Results   Component Value Date    WBC 5.44 06/22/2017    HGB 6.8 (L) 06/22/2017    HCT 21.8 (L) 06/22/2017    MCV 96 06/22/2017     (L) 06/22/2017     Lab Results   Component Value Date     06/22/2017    K 5.3 (H) 06/22/2017     06/22/2017    CO2 30 (H) 06/22/2017    BUN 47 (H) 06/22/2017    CREATININE 6.3 (H) 06/22/2017     Lab Results   Component Value Date    ALBUMIN 2.7 (L) 06/22/2017    ALT 13 06/22/2017    AST 18 06/22/2017    ALKPHOS 118 06/22/2017    BILITOT 0.9 06/22/2017     Lab Results   Component Value Date    LIPASE 19 04/21/2017       Imaging:  Reviewed and as noted in HPI.           Assessment/Plan:     Wilder Carbajal is a 66 y.o. male with     Anemia    Likely colonic source. Reports pink/dark blood per rectum.   No recent EGD/colon.  No NSAID use, no UGI sx. Less likely upper  source.     Plan:  Protonix IV BID  Intravascular resuscitation/support with IVFs   Serial H/H's and pRBCs transfusion as indicated  Discontinue all NSAIDs and Heparin products  Please correct any coagulopathy with platelets and FFP to a goal of platelets >50K and INR <2.0  Maintain IV access with 2 large bore IVs  Clear LD today, NPO PMN  Prep tonight  Plan for colonoscopy tomorrow, if negative, we will also do EGD at the same time.   Please notify GI team if there is significant change in patient's clinical status              Thank you for your consult. I will follow-up with patient. Please contact us if you have any additional questions.    Johnna Lau MD  Gastroenterology  [facility]

## 2017-06-22 NOTE — H&P
Inpatient Radiology Pre-procedure Note    History of Present Illness:  Wilder Carbajal is a 66 y.o. male who presents for IVC filter placement.  Admission H&P reviewed.  Past Medical History:   Diagnosis Date    Acute pain of left shoulder 1/7/2017    Arthritis     Asthma     Benign neoplasm of eyelid     RUL    Blood clotting tendency     Blood transfusion     Cataract     Chronic kidney disease requiring chronic dialysis     Diabetes mellitus     Diabetic retinopathy     Fever blister     Hyperlipidemia     Hypertension     Infertility     Joint pain     Retinal detachment     Thyroid disease     Weakness 1/7/2017     Past Surgical History:   Procedure Laterality Date    CATARACT EXTRACTION      RETINAL DETACHMENT SURGERY         Review of Systems:   As documented in primary team H&P    Home Meds:   Prior to Admission medications    Medication Sig Start Date End Date Taking? Authorizing Provider   apixaban 5 mg Tab Take 1 tablet (5 mg total) by mouth 2 (two) times daily. 5/2/17  Yes Jose Enrique Paiz MD   aspirin (ECOTRIN) 81 MG EC tablet Take 81 mg by mouth once daily.    Yes [facility]   cetirizine (ZYRTEC) 10 MG tablet Take 10 mg by mouth once daily.   Yes Historical Provider, MD   cinacalcet (SENSIPAR) 30 MG Tab Take 30 mg by mouth every evening.   Yes Historical Provider, MD   citalopram (CELEXA) 10 MG tablet Take 10 mg by mouth once daily.   Yes Historical Provider, MD   ergocalciferol (ERGOCALCIFEROL) 50,000 unit Cap Take 50,000 Units by mouth every 14 (fourteen) days.    Yes [facility]   furosemide (LASIX) 80 MG tablet Take 80 mg by mouth once daily.   Yes Historical Provider, MD   gabapentin (NEURONTIN) 100 MG capsule Take 100 mg by mouth 3 (three) times daily.   Yes Historical Provider, MD   hydrALAZINE (APRESOLINE) 50 MG tablet Take 50 mg by mouth 3 (three) times daily.   Yes Historical Provider, MD   hydrocodone-acetaminophen 5-325mg (NORCO)  5-325 mg per tablet Take 1 tablet by mouth every 6 (six) hours as needed for Pain.   Yes Historical Provider, MD   ipratropium (ATROVENT HFA) 17 mcg/actuation inhaler Inhale 2 puffs into the lungs 4 (four) times daily. Do not exceed 12 puffs per day   Yes Historical Provider, MD   ketotifen (ZADITOR) 0.025 % (0.035 %) ophthalmic solution Apply 1 drop to eye every 8 (eight) hours.   Yes Historical Provider, MD   mometasone (NASONEX) 50 mcg/actuation nasal spray 2 sprays by Nasal route once daily.   Yes Historical Provider, MD   sevelamer carbonate (RENVELA) 800 mg Tab Take two tablets by mouth four times daily with meals and snacks   Yes Historical Provider, MD   ammonium lactate (LAC-HYDRIN) 12 % lotion Apply topically as needed for Dry Skin.    Historical Provider, MD   atorvastatin (LIPITOR) 20 MG tablet Take 20 mg by mouth once daily.    Historical Provider, MD   diclofenac sodium (VOLTAREN) 1 % Gel Apply 2 g topically 4 (four) times daily as needed (for pain).    Historical Provider, MD   losartan (COZAAR) 50 MG tablet Take 1 tablet (50 mg total) by mouth once daily. 4/12/17 4/12/18  Sasha Martinez MD   podofilox (CONDYLOX) 0.5 % external solution Apply a small amount to lesions twice daily for three days, then hold for four days and repeat regimen    Historical Provider, MD     Scheduled Meds:    atorvastatin  20 mg Oral Daily    cetirizine  5 mg Oral Daily    cinacalcet  30 mg Oral QHS    citalopram  10 mg Oral Daily    gabapentin  100 mg Oral TID    hydrALAZINE  50 mg Oral TID    pantoprazole  40 mg Intravenous BID    polyethylene glycol  2,000 mL Oral Once    [START ON 6/23/2017] polyethylene glycol  2,000 mL Oral Once    sevelamer carbonate  1,600 mg Oral TID WM     Continuous Infusions:    PRN Meds:sodium chloride, sodium chloride, sodium chloride 0.9%, dextrose 50%, dextrose 50%, glucagon (human recombinant), glucose, glucose, hydrocodone-acetaminophen  5-325mg  Anticoagulants/Antiplatelets: no anticoagulation    Allergies: Review of patient's allergies indicates:  No Known Allergies  Sedation Hx: have not been any systemic reactions    Labs:    Recent Labs  Lab 06/21/17  1531   INR 1.2       Recent Labs  Lab 06/22/17  0741   WBC 5.44   HGB 6.8*   HCT 21.8*   MCV 96   *      Recent Labs  Lab 06/22/17  0741   GLU 64*      K 5.3*      CO2 30*   BUN 47*   CREATININE 6.3*   CALCIUM 8.4*   ALT 13   AST 18   ALBUMIN 2.7*   BILITOT 0.9         Vitals:  Temp: 97.8 °F (36.6 °C) (06/22/17 1416)  Pulse: 62 (06/22/17 1500)  Resp: 18 (06/22/17 1430)  BP: (!) 177/61 (06/22/17 1430)  SpO2: 96 % (06/22/17 0700)     Physical Exam:  ASA: 3  Mallampati: 2    General: no acute distress  Mental Status: alert and oriented to person, place and time  HEENT: normocephalic, atraumatic  Chest: unlabored breathing  Heart: regular heart rate  Abdomen: nondistended  Extremity: moves all extremities    Plan: IVC filter placement  Sedation Plan: Moderate    MALDONADO Miller, MILAP  Interventional Radiology  [phone] [employer]

## 2017-06-22 NOTE — SUBJECTIVE & OBJECTIVE
Past Medical History:   Diagnosis Date    Acute pain of left shoulder 1/7/2017    Arthritis     Asthma     Benign neoplasm of eyelid     RUL    Blood clotting tendency     Blood transfusion     Cataract     Chronic kidney disease requiring chronic dialysis     Diabetes mellitus     Diabetic retinopathy     Fever blister     Hyperlipidemia     Hypertension     Infertility     Joint pain     Retinal detachment     Thyroid disease     Weakness 1/7/2017       Past Surgical History:   Procedure Laterality Date    CATARACT EXTRACTION      RETINAL DETACHMENT SURGERY         Review of patient's allergies indicates:  No Known Allergies  Family History     Problem Relation (Age of Onset)    Heart attack Mother    No Known Problems Father, Sister, Brother, Maternal Aunt, Maternal Uncle, Paternal Aunt, Paternal Uncle, Maternal Grandmother, Maternal Grandfather, Paternal Grandmother, Paternal Grandfather        Social History Main Topics    Smoking status: Never Smoker    Smokeless tobacco: Never Used    Alcohol use Yes      Comment: 1 pint bourbon    Drug use: No    Sexual activity: Not on file     Review of Systems   Constitutional: Positive for activity change and appetite change.   HENT: Negative for trouble swallowing.    Eyes: Negative for photophobia and visual disturbance.   Respiratory: Positive for shortness of breath. Negative for chest tightness.    Cardiovascular: Positive for chest pain. Negative for leg swelling.   Gastrointestinal: Positive for anal bleeding, blood in stool and constipation. Negative for abdominal distention, abdominal pain, diarrhea, nausea, rectal pain and vomiting.   Musculoskeletal: Negative for arthralgias and back pain.   Skin: Negative for color change and pallor.   Neurological: Negative for tremors and weakness.   Hematological: Bruises/bleeds easily.   Psychiatric/Behavioral: Negative for agitation and behavioral problems.     Objective:     Vital Signs (Most  Recent):  Temp: 97.6 °F (36.4 °C) (06/22/17 0949)  Pulse: 60 (06/22/17 1100)  Resp: 18 (06/22/17 0949)  BP: (!) 155/83 (06/22/17 1100)  SpO2: 96 % (06/22/17 0700) Vital Signs (24h Range):  Temp:  [97.4 °F (36.3 °C)-98.5 °F (36.9 °C)] 97.6 °F (36.4 °C)  Pulse:  [53-75] 60  Resp:  [14-20] 18  SpO2:  [93 %-97 %] 96 %  BP: (103-194)/(50-84) 155/83     Weight: 114.8 kg (253 lb) (06/21/17 2004)  Body mass index is 37.36 kg/m².      Intake/Output Summary (Last 24 hours) at 06/22/17 1106  Last data filed at 06/22/17 0600   Gross per 24 hour   Intake                0 ml   Output                1 ml   Net               -1 ml       Lines/Drains/Airways     Drain                 Hemodialysis AV Fistula Left upper arm 29629 days         Hemodialysis AV Graft 06/17/14 0915  1101 days          Peripheral Intravenous Line                 Peripheral IV - Single Lumen 04/21/17 0532 Right Antecubital 62 days         Peripheral IV - Single Lumen 06/21/17 1525 Right Antecubital less than 1 day                Physical Exam   Constitutional: He is oriented to person, place, and time. He appears well-developed and well-nourished.   HENT:   Head: Normocephalic and atraumatic.   Eyes: Conjunctivae are normal. No scleral icterus.   Neck: Neck supple.   Cardiovascular: Normal rate and regular rhythm.    No murmur heard.  Pulmonary/Chest: Effort normal. No stridor. No respiratory distress. He has no wheezes. He has no rales.   Abdominal: Soft. Bowel sounds are normal. He exhibits no distension and no mass. There is no tenderness. There is no rebound and no guarding. No hernia.   Musculoskeletal: He exhibits no edema or tenderness.   Neurological: He is alert and oriented to person, place, and time.   Skin: Skin is warm and dry.   Psychiatric: He has a normal mood and affect. His behavior is normal.   Vitals reviewed.    Lab Results   Component Value Date    WBC 5.44 06/22/2017    HGB 6.8 (L) 06/22/2017    HCT 21.8 (L) 06/22/2017    MCV 96  06/22/2017     (L) 06/22/2017     Lab Results   Component Value Date     06/22/2017    K 5.3 (H) 06/22/2017     06/22/2017    CO2 30 (H) 06/22/2017    BUN 47 (H) 06/22/2017    CREATININE 6.3 (H) 06/22/2017     Lab Results   Component Value Date    ALBUMIN 2.7 (L) 06/22/2017    ALT 13 06/22/2017    AST 18 06/22/2017    ALKPHOS 118 06/22/2017    BILITOT 0.9 06/22/2017     Lab Results   Component Value Date    LIPASE 19 04/21/2017       Imaging:  Reviewed and as noted in HPI.

## 2017-06-22 NOTE — HPI
Nephrology Consult       66-year-old -American male with past medical history of diabetes, hypertension, hyperlipidemia, asthma, paroxysmal A. fib on Eliquis, thyroid disease, ESRD on HD TTS admitted for GIB with melanotic stools for the last week. HBg at outpatient unit ahs dropped from 9.3 to 8.3 in las t several weeks. He has missed several treatments in the last 2 weeks. He was last dialyzed Tuesday. He has not been able to achieve Dry wt due to noncompliance with diet and treatments  Pateint is without any current complaint of abdominal pain, cramping,  He does admit to dyspnea at rest and with exertion in the last 2 weeks. No chest pain, pressure. He has noted increasing peripheral edema.      [facility], 4h 15 min, F180, 500 BF, , Left AVF      14 point review of systems completed and pertinent significant findings as in the HPI

## 2017-06-22 NOTE — ASSESSMENT & PLAN NOTE
Sub acute (likely lower) GI losses  HD stable currently  GI consulted by ED  Overdue for repeat colo  Can start ppi BID and monitor  Hold anticoagulation for dvt, place IVC filter  Gi will proceed with colo and possible EGD tomorrow

## 2017-06-22 NOTE — ANESTHESIA PREPROCEDURE EVALUATION
06/22/2017    Pre-operative evaluation for Procedure(s) (LRB):  ESOPHAGOGASTRODUODENOSCOPY (EGD) (N/A)  COLONOSCOPY (N/A)    Wilder Carbajal is a 66 y.o. male 66-year-old -American male with past medical history of mild AS ( 1.4 cm2)   , Pulm HTN (PA pressure 55mmHg) diabetes, hypertension, hyperlipidemia, asthma, paroxysmal A. fib on Eliquis, thyroid disease, ESRD on HD TTS admitted for GIB with melanotic stools for the last week, so pt is being evaluated for the above procedure.  HBg at outpatient unit dropped from 9.3 to 8.3 in las t several weeks. He has missed several treatments in the last 2 weeks. He was last dialyzed Tuesday. He has not been able to achieve Dry wt due to noncompliance with diet and treatments.  Pateint is without any current complaint of abdominal pain, cramping,  he does admit to dyspnea at rest and with exertion in the last 2 weeks. No chest pain, pressure. He has noted increasing peripheral edema.       LDAs   LUE AVF   PIV 18G R AC    INFUSIONS  None    PREVIOUS AIRWAY  None in epic       Patient Active Problem List   Diagnosis    Traction detachment of both retinas    Diabetic macular edema of both eyes    NS (nuclear sclerosis)    Hypertensive retinopathy of both eyes    History of excision of epidermal inclusion cyst    Proliferative diabetic retinopathy of both eyes without macular edema associated with type 2 diabetes mellitus    Proliferative diabetic retinopathy, both eyes    Elevated PSA    Malignant hypertension    HLD (hyperlipidemia)    DM II (diabetes mellitus, type II), controlled    HTN (hypertension)    Hypothyroidism    GERD (gastroesophageal reflux disease)    Pseudoaneurysm of arteriovenous graft    Lumbar facet arthropathy    Renovascular hypertension, malignant    Lipoma    DM (diabetes mellitus) type II controlled with renal  manifestation    ESRD (end stage renal disease) on dialysis    Diabetes mellitus with ESRD (end-stage renal disease)    Lumbar spondylosis    Spinal stenosis of lumbar region    Bilateral low back pain    Complete heart block    Bicuspid aortic valve    AV block, complete    Typical angina    Fever    Sepsis    Coccygeal pain, acute    Sacrococcygeal pilonidal cyst    Second degree heart block    Vein stenosis    Weakness    Acute pain of left shoulder    Dependence on hemodialysis    Paroxysmal atrial fibrillation    Acute on chronic diastolic heart failure    Right lower quadrant abdominal pain    Hypoxia    Right femoral vein DVT    Anemia    Metabolic bone disease       Review of patient's allergies indicates:  No Known Allergies     No current facility-administered medications on file prior to encounter.      Current Outpatient Prescriptions on File Prior to Encounter   Medication Sig Dispense Refill    apixaban 5 mg Tab Take 1 tablet (5 mg total) by mouth 2 (two) times daily. 60 tablet 1    aspirin (ECOTRIN) 81 MG EC tablet Take 81 mg by mouth once daily.       cetirizine (ZYRTEC) 10 MG tablet Take 10 mg by mouth once daily.      cinacalcet (SENSIPAR) 30 MG Tab Take 30 mg by mouth every evening.      ergocalciferol (ERGOCALCIFEROL) 50,000 unit Cap Take 50,000 Units by mouth every 14 (fourteen) days.       furosemide (LASIX) 80 MG tablet Take 80 mg by mouth once daily.      hydrALAZINE (APRESOLINE) 50 MG tablet Take 50 mg by mouth 3 (three) times daily.      ipratropium (ATROVENT HFA) 17 mcg/actuation inhaler Inhale 2 puffs into the lungs 4 (four) times daily. Do not exceed 12 puffs per day      ketotifen (ZADITOR) 0.025 % (0.035 %) ophthalmic solution Apply 1 drop to eye every 8 (eight) hours.      sevelamer carbonate (RENVELA) 800 mg Tab Take two tablets by mouth four times daily with meals and snacks      ammonium lactate (LAC-HYDRIN) 12 % lotion Apply topically as needed  for Dry Skin.      atorvastatin (LIPITOR) 20 MG tablet Take 20 mg by mouth once daily.      diclofenac sodium (VOLTAREN) 1 % Gel Apply 2 g topically 4 (four) times daily as needed (for pain).      losartan (COZAAR) 50 MG tablet Take 1 tablet (50 mg total) by mouth once daily. 30 tablet 3    podofilox (CONDYLOX) 0.5 % external solution Apply a small amount to lesions twice daily for three days, then hold for four days and repeat regimen         Past Surgical History:   Procedure Laterality Date    CATARACT EXTRACTION      RETINAL DETACHMENT SURGERY         Social History     Social History    Marital status:      Spouse name: N/A    Number of children: N/A    Years of education: N/A     Occupational History    Not on file.     Social History Main Topics    Smoking status: Never Smoker    Smokeless tobacco: Never Used    Alcohol use Yes      Comment: 1 pint bourbon    Drug use: No    Sexual activity: Not on file     Other Topics Concern    Not on file     Social History Narrative    No narrative on file         Vital Signs Range (Last 24H):  Temp:  [36.3 °C (97.4 °F)-36.9 °C (98.5 °F)]   Pulse:  [49-75]   Resp:  [14-20]   BP: (103-194)/(50-84)   SpO2:  [93 %-97 %]       CBC:   Recent Labs      06/22/17   0741  06/22/17   1716   WBC  5.44  5.97   RBC  2.27*  2.71*   HGB  6.8*  8.4*   HCT  21.8*  25.8*   PLT  116*  126*   MCV  96  95   MCH  30.0  31.0   MCHC  31.2*  32.6       CMP:   Recent Labs      06/21/17   1531  06/22/17   0741   NA  142  140   K  4.9  5.3*   CL  99  100   CO2  31*  30*   BUN  42*  47*   CREATININE  5.6*  6.3*   GLU  68*  64*   CALCIUM  8.3*  8.4*   ALBUMIN  2.8*  2.7*   PROT  7.3  6.7   ALKPHOS  139*  118   ALT  17  13   AST  20  18   BILITOT  0.8  0.9       INR  Recent Labs      06/21/17   1531   INR  1.2           Diagnostic Studies:      EKG: done yesterday but not read.     Vent. Rate : 060 BPM     Atrial Rate : 241 BPM     P-R Int : 000 ms          QRS Dur : 102  ms      QT Int : 436 ms       P-R-T Axes : 000 003 154 degrees     QTc Int : 436 ms    Atrial fibrillation  ST and T wave abnormality, consider lateral ischemia  Abnormal ECG  When compared with ECG of 21-APR-2017 05:55,  No significant change was found  Confirmed by VIKRAM MICHELLE MD ([id number]) on 4/24/2017 3:08:54 PM    Referred By: [email or web address]   SELF           Confirmed By:VIKRAM MICHELLE MD    2D Echo:  CONCLUSIONS     1 - Eccentric hypertrophy.     2 - Mildly depressed left ventricular systolic function (EF 45-50%).     3 - Right ventricular enlargement with mildly depressed systolic function.     4 - Biatrial enlargement.     5 - Mild tricuspid regurgitation.     6 - Mild aortic stenosis, MARANDA = 1.4 cm2, peak velocity = 2.7 m/s, mean gradient = 15 mmHg.     7 - Pulmonary hypertension. The estimated PA systolic pressure is 55 mmHg.     8 - Increased central venous pressure.     This document has been electronically    SIGNED BY: Arie Lujan MD On: 04/23/2017 14:51        Anesthesia Evaluation    I have reviewed the Patient Summary Reports.     I have reviewed the Medications.     Review of Systems  Anesthesia Hx:  No problems with previous Anesthesia  Neg history of prior surgery. Denies Family Hx of Anesthesia complications.   Denies Personal Hx of Anesthesia complications.   Cardiovascular:   Hypertension, poorly controlled Dysrhythmias atrial fibrillation    Pulmonary:   Asthma    Renal/:   Chronic Renal Disease, ESRD    Hepatic/GI:   GERD    Endocrine:   Diabetes, poorly controlled        Physical Exam  General:  Obesity    Airway/Jaw/Neck:  Airway Findings: Mouth Opening: Normal Tongue: Normal  General Airway Assessment: Adult  Mallampati: III  Improves to II with phonation.  TM Distance: Normal, at least 6 cm  Jaw/Neck Findings:  Neck ROM: Normal ROM      Dental:  Dental Findings: Periodontal disease, Severe    Chest/Lungs:  Chest/Lungs Findings: Normal Respiratory Rate, Clear to auscultation      Heart/Vascular:  Heart Findings: Rate: Bradycardia  Rhythm: Regular Rhythm  Heart Murmur  Systolic  Systolic Heart Murmur Description: R Upper Sternal Border  Systolic Heart Murmur Grade: Grade II  Vascular Findings:  Dialysis Access: Left Forearm Graft     Abdomen:  Abdomen Findings: Normal    Musculoskeletal:  Musculoskeletal Findings: .ca1.    Skin:  Skin Findings: Normal    Mental Status:  Mental Status Findings:  Alert and Oriented         Anesthesia Plan  Type of Anesthesia, risks & benefits discussed:  Anesthesia Type:  MAC, general  Patient's Preference:   Intra-op Monitoring Plan: standard ASA monitors  Intra-op Monitoring Plan Comments:   Post Op Pain Control Plan: per primary service following discharge from PACU  Post Op Pain Control Plan Comments:   Induction:   IV  Beta Blocker:  Patient is not currently on a Beta-Blocker (No further documentation required).       Informed Consent: Patient understands risks and agrees with Anesthesia plan.  Questions answered. Anesthesia consent signed with patient.  ASA Score: 4     Day of Surgery Review of History & Physical: I have interviewed and examined the patient. I have reviewed the patient's H&P dated:  There are no significant changes.  H&P update referred to the provider.         Ready For Surgery From Anesthesia Perspective.

## 2017-06-22 NOTE — SEDATION DOCUMENTATION
Pt to Interventional Radiology room 190 via stretcher for IVC Filter placement. Pt transferred to procedure table in the supine position. No complaints of pain or discomfort at this time. Procedure site (left groin) prepped by A Coral RTR. Will continue to monitor.

## 2017-06-22 NOTE — PLAN OF CARE
Problem: Patient Care Overview  Goal: Plan of Care Review  Outcome: Ongoing (interventions implemented as appropriate)  4 hour dialysis complete.  Blood rinsed back.  Whitharral pulled from DIMAS fistula.  Pressure held x 5 minutes.  Hemostasis achieved.  Covered with gauze and paper tape.  +thrill +bruit.  Net UF 1.950L  One unit of PRBC's given .  Tolerated well.

## 2017-06-23 ENCOUNTER — SURGERY (OUTPATIENT)
Age: 67
End: 2017-06-23

## 2017-06-23 ENCOUNTER — ANESTHESIA (OUTPATIENT)
Dept: ENDOSCOPY | Facility: HOSPITAL | Age: 67
DRG: 356 | End: 2017-06-23
Payer: COMMERCIAL

## 2017-06-23 LAB
ANION GAP SERPL CALC-SCNC: 15 MMOL/L
BASOPHILS # BLD AUTO: 0.01 K/UL
BASOPHILS NFR BLD: 0.2 %
BLD PROD TYP BPU: NORMAL
BLOOD UNIT EXPIRATION DATE: NORMAL
BLOOD UNIT TYPE CODE: 5100
BLOOD UNIT TYPE: NORMAL
BUN SERPL-MCNC: 28 MG/DL
CALCIUM SERPL-MCNC: 9.1 MG/DL
CHLORIDE SERPL-SCNC: 105 MMOL/L
CO2 SERPL-SCNC: 20 MMOL/L
CODING SYSTEM: NORMAL
CREAT SERPL-MCNC: 4.4 MG/DL
DIFFERENTIAL METHOD: ABNORMAL
DISPENSE STATUS: NORMAL
EOSINOPHIL # BLD AUTO: 0.3 K/UL
EOSINOPHIL NFR BLD: 4.2 %
ERYTHROCYTE [DISTWIDTH] IN BLOOD BY AUTOMATED COUNT: 17.4 %
EST. GFR  (AFRICAN AMERICAN): 14.9 ML/MIN/1.73 M^2
EST. GFR  (NON AFRICAN AMERICAN): 12.9 ML/MIN/1.73 M^2
GLUCOSE SERPL-MCNC: 48 MG/DL
HCT VFR BLD AUTO: 24 %
HGB BLD-MCNC: 7.8 G/DL
LYMPHOCYTES # BLD AUTO: 1.1 K/UL
LYMPHOCYTES NFR BLD: 19 %
MCH RBC QN AUTO: 31 PG
MCHC RBC AUTO-ENTMCNC: 32.5 %
MCV RBC AUTO: 95 FL
MONOCYTES # BLD AUTO: 0.8 K/UL
MONOCYTES NFR BLD: 12.5 %
NEUTROPHILS # BLD AUTO: 3.8 K/UL
NEUTROPHILS NFR BLD: 63.9 %
PATHOLOGIST INTERPRETATION AB/XM: NORMAL
PLATELET # BLD AUTO: 121 K/UL
PMV BLD AUTO: 9.5 FL
POCT GLUCOSE: 73 MG/DL (ref 70–110)
POCT GLUCOSE: 82 MG/DL (ref 70–110)
POCT GLUCOSE: 94 MG/DL (ref 70–110)
POTASSIUM SERPL-SCNC: 5.8 MMOL/L
RBC # BLD AUTO: 2.52 M/UL
SODIUM SERPL-SCNC: 140 MMOL/L
TRANS ERYTHROCYTES VOL PATIENT: NORMAL ML
WBC # BLD AUTO: 6 K/UL

## 2017-06-23 PROCEDURE — 0DJ08ZZ INSPECTION OF UPPER INTESTINAL TRACT, VIA NATURAL OR ARTIFICIAL OPENING ENDOSCOPIC: ICD-10-PCS | Performed by: INTERNAL MEDICINE

## 2017-06-23 PROCEDURE — 25000003 PHARM REV CODE 250: Performed by: INTERNAL MEDICINE

## 2017-06-23 PROCEDURE — 25000003 PHARM REV CODE 250: Performed by: ANESTHESIOLOGY

## 2017-06-23 PROCEDURE — 63600175 PHARM REV CODE 636 W HCPCS: Performed by: NURSE ANESTHETIST, CERTIFIED REGISTERED

## 2017-06-23 PROCEDURE — 85025 COMPLETE CBC W/AUTO DIFF WBC: CPT

## 2017-06-23 PROCEDURE — 45385 COLONOSCOPY W/LESION REMOVAL: CPT | Mod: ,,, | Performed by: INTERNAL MEDICINE

## 2017-06-23 PROCEDURE — 80100016 HC MAINTENANCE HEMODIALYSIS

## 2017-06-23 PROCEDURE — D9220A PRA ANESTHESIA: Mod: CRNA,,, | Performed by: NURSE ANESTHETIST, CERTIFIED REGISTERED

## 2017-06-23 PROCEDURE — C9113 INJ PANTOPRAZOLE SODIUM, VIA: HCPCS | Performed by: INTERNAL MEDICINE

## 2017-06-23 PROCEDURE — 0DBL8ZX EXCISION OF TRANSVERSE COLON, VIA NATURAL OR ARTIFICIAL OPENING ENDOSCOPIC, DIAGNOSTIC: ICD-10-PCS | Performed by: INTERNAL MEDICINE

## 2017-06-23 PROCEDURE — 45385 COLONOSCOPY W/LESION REMOVAL: CPT | Performed by: INTERNAL MEDICINE

## 2017-06-23 PROCEDURE — 90935 HEMODIALYSIS ONE EVALUATION: CPT | Mod: ,,, | Performed by: INTERNAL MEDICINE

## 2017-06-23 PROCEDURE — 11000001 HC ACUTE MED/SURG PRIVATE ROOM

## 2017-06-23 PROCEDURE — 99232 SBSQ HOSP IP/OBS MODERATE 35: CPT | Mod: ,,, | Performed by: INTERNAL MEDICINE

## 2017-06-23 PROCEDURE — 37000009 HC ANESTHESIA EA ADD 15 MINS: Performed by: INTERNAL MEDICINE

## 2017-06-23 PROCEDURE — P9021 RED BLOOD CELLS UNIT: HCPCS

## 2017-06-23 PROCEDURE — 43235 EGD DIAGNOSTIC BRUSH WASH: CPT | Performed by: INTERNAL MEDICINE

## 2017-06-23 PROCEDURE — 86077 PHYS BLOOD BANK SERV XMATCH: CPT | Mod: ,,, | Performed by: PATHOLOGY

## 2017-06-23 PROCEDURE — 80048 BASIC METABOLIC PNL TOTAL CA: CPT

## 2017-06-23 PROCEDURE — 88305 TISSUE EXAM BY PATHOLOGIST: CPT | Performed by: PATHOLOGY

## 2017-06-23 PROCEDURE — 25000003 PHARM REV CODE 250: Performed by: NURSE ANESTHETIST, CERTIFIED REGISTERED

## 2017-06-23 PROCEDURE — 37000008 HC ANESTHESIA 1ST 15 MINUTES: Performed by: INTERNAL MEDICINE

## 2017-06-23 PROCEDURE — 63600175 PHARM REV CODE 636 W HCPCS: Performed by: INTERNAL MEDICINE

## 2017-06-23 PROCEDURE — 0DBK8ZX EXCISION OF ASCENDING COLON, VIA NATURAL OR ARTIFICIAL OPENING ENDOSCOPIC, DIAGNOSTIC: ICD-10-PCS | Performed by: INTERNAL MEDICINE

## 2017-06-23 PROCEDURE — D9220A PRA ANESTHESIA: Mod: ANES,,, | Performed by: ANESTHESIOLOGY

## 2017-06-23 PROCEDURE — 36415 COLL VENOUS BLD VENIPUNCTURE: CPT

## 2017-06-23 PROCEDURE — 36430 TRANSFUSION BLD/BLD COMPNT: CPT

## 2017-06-23 PROCEDURE — 27201089 HC SNARE, DISP (ANY): Performed by: INTERNAL MEDICINE

## 2017-06-23 PROCEDURE — 43235 EGD DIAGNOSTIC BRUSH WASH: CPT | Mod: 51,,, | Performed by: INTERNAL MEDICINE

## 2017-06-23 RX ORDER — DEXTROSE MONOHYDRATE AND SODIUM CHLORIDE 5; .45 G/100ML; G/100ML
INJECTION, SOLUTION INTRAVENOUS
Status: COMPLETED | OUTPATIENT
Start: 2017-06-23 | End: 2017-06-23

## 2017-06-23 RX ORDER — GLYCOPYRROLATE 0.2 MG/ML
INJECTION INTRAMUSCULAR; INTRAVENOUS
Status: DISCONTINUED | OUTPATIENT
Start: 2017-06-23 | End: 2017-06-23

## 2017-06-23 RX ORDER — LIDOCAINE HYDROCHLORIDE 20 MG/ML
SOLUTION OROPHARYNGEAL
Status: DISCONTINUED | OUTPATIENT
Start: 2017-06-23 | End: 2017-06-23

## 2017-06-23 RX ORDER — PROPOFOL 10 MG/ML
VIAL (ML) INTRAVENOUS CONTINUOUS PRN
Status: DISCONTINUED | OUTPATIENT
Start: 2017-06-23 | End: 2017-06-23

## 2017-06-23 RX ORDER — MIDAZOLAM HYDROCHLORIDE 1 MG/ML
INJECTION, SOLUTION INTRAMUSCULAR; INTRAVENOUS
Status: DISCONTINUED | OUTPATIENT
Start: 2017-06-23 | End: 2017-06-23

## 2017-06-23 RX ORDER — KETAMINE HCL IN 0.9 % NACL 50 MG/5 ML
SYRINGE (ML) INTRAVENOUS
Status: DISCONTINUED | OUTPATIENT
Start: 2017-06-23 | End: 2017-06-23

## 2017-06-23 RX ORDER — HYDROCODONE BITARTRATE AND ACETAMINOPHEN 500; 5 MG/1; MG/1
TABLET ORAL
Status: DISCONTINUED | OUTPATIENT
Start: 2017-06-23 | End: 2017-06-25 | Stop reason: HOSPADM

## 2017-06-23 RX ORDER — WARFARIN SODIUM 5 MG/1
5 TABLET ORAL DAILY
Status: DISCONTINUED | OUTPATIENT
Start: 2017-06-23 | End: 2017-06-23

## 2017-06-23 RX ORDER — HEPARIN SODIUM,PORCINE/D5W 25000/250
17 INTRAVENOUS SOLUTION INTRAVENOUS CONTINUOUS
Status: DISCONTINUED | OUTPATIENT
Start: 2017-06-23 | End: 2017-06-25 | Stop reason: HOSPADM

## 2017-06-23 RX ORDER — SODIUM CHLORIDE 9 MG/ML
INJECTION, SOLUTION INTRAVENOUS
Status: DISCONTINUED | OUTPATIENT
Start: 2017-06-23 | End: 2017-06-25 | Stop reason: HOSPADM

## 2017-06-23 RX ADMIN — Medication 10 MG: at 12:06

## 2017-06-23 RX ADMIN — PANTOPRAZOLE SODIUM 40 MG: 40 INJECTION, POWDER, FOR SOLUTION INTRAVENOUS at 09:06

## 2017-06-23 RX ADMIN — GABAPENTIN 100 MG: 100 CAPSULE ORAL at 05:06

## 2017-06-23 RX ADMIN — PROPOFOL 50 MCG/KG/MIN: 10 INJECTION, EMULSION INTRAVENOUS at 11:06

## 2017-06-23 RX ADMIN — Medication 20 MG: at 11:06

## 2017-06-23 RX ADMIN — GLYCOPYRROLATE 0.2 MG: 0.2 INJECTION, SOLUTION INTRAMUSCULAR; INTRAVENOUS at 11:06

## 2017-06-23 RX ADMIN — SODIUM CHLORIDE: 0.9 INJECTION, SOLUTION INTRAVENOUS at 04:06

## 2017-06-23 RX ADMIN — DEXTROSE MONOHYDRATE AND SODIUM CHLORIDE: 5; .45 INJECTION, SOLUTION INTRAVENOUS at 11:06

## 2017-06-23 RX ADMIN — CINACALCET HYDROCHLORIDE 30 MG: 30 TABLET, COATED ORAL at 09:06

## 2017-06-23 RX ADMIN — CETIRIZINE HYDROCHLORIDE 5 MG: 5 TABLET, FILM COATED ORAL at 09:06

## 2017-06-23 RX ADMIN — CITALOPRAM HYDROBROMIDE 10 MG: 10 TABLET ORAL at 09:06

## 2017-06-23 RX ADMIN — Medication 15 ML: at 11:06

## 2017-06-23 RX ADMIN — MIDAZOLAM HYDROCHLORIDE 1 MG: 1 INJECTION, SOLUTION INTRAMUSCULAR; INTRAVENOUS at 11:06

## 2017-06-23 RX ADMIN — POLYETHYLENE GLYCOL 3350, SODIUM SULFATE ANHYDROUS, SODIUM BICARBONATE, SODIUM CHLORIDE, POTASSIUM CHLORIDE 2000 ML: 236; 22.74; 6.74; 5.86; 2.97 POWDER, FOR SOLUTION ORAL at 04:06

## 2017-06-23 RX ADMIN — HYDROCODONE BITARTRATE AND ACETAMINOPHEN 1 TABLET: 5; 325 TABLET ORAL at 03:06

## 2017-06-23 RX ADMIN — Medication 10 MG: at 11:06

## 2017-06-23 RX ADMIN — HYDRALAZINE HYDROCHLORIDE 50 MG: 50 TABLET ORAL at 05:06

## 2017-06-23 RX ADMIN — ATORVASTATIN CALCIUM 20 MG: 20 TABLET, FILM COATED ORAL at 09:06

## 2017-06-23 RX ADMIN — GABAPENTIN 100 MG: 100 CAPSULE ORAL at 09:06

## 2017-06-23 RX ADMIN — HYDRALAZINE HYDROCHLORIDE 50 MG: 50 TABLET ORAL at 09:06

## 2017-06-23 RX ADMIN — LOSARTAN POTASSIUM 50 MG: 50 TABLET, FILM COATED ORAL at 09:06

## 2017-06-23 RX ADMIN — HEPARIN SODIUM AND DEXTROSE 17 UNITS/KG/HR: 10000; 5 INJECTION INTRAVENOUS at 10:06

## 2017-06-23 RX ADMIN — SEVELAMER CARBONATE 1600 MG: 800 TABLET, FILM COATED ORAL at 10:06

## 2017-06-23 NOTE — ANESTHESIA RELEASE NOTE
"Anesthesia Release from PACU Note    Patient: Wilder Carbajal    Procedure(s) Performed: Procedure(s) (LRB):  ESOPHAGOGASTRODUODENOSCOPY (EGD) (N/A)  COLONOSCOPY (N/A)    Anesthesia type: GEN    Post pain: Adequate analgesia reported    Post assessment: no apparent anesthetic complications, tolerated procedure well and no evidence of recall    Post vital signs: /65   Pulse 65   Temp 36.6 °C (97.9 °F) (Temporal)   Resp 12   Ht 5' 9" (1.753 m)   Wt 114.8 kg (253 lb)   SpO2 (!) 94%   BMI 37.36 kg/m²     Level of consciousness: awake, alert and oriented    Nausea/Vomiting: no nausea/no vomiting    Complications: none    Airway Patency: patent    Respiratory: unassisted, spontaneous ventilation, room air    Cardiovascular: stable and blood pressure at baseline    Hydration: euvolemic      "

## 2017-06-23 NOTE — NURSING
Mr Carbajal has been weak, needing to be  assisted off and on the bedside commode and has been  Leaking  urine on the floor. States he is very weak. Unable to wipe himself.  Bowels are clear and Go lytely has stopped.  Endo aware of clear return.    Safety measures utilized to keep floor dry.    Lab called for critical value of blood sugar, patient was on the gurney enroute to endoscopy for colonoscopy.  Given 40 cc of juice.  Endo contacted.  They will recheck blood sugar, start IV D5W and continue with procedure..   Telemetry notified us that V-tach episode, brief x 1 minute.  Now in afib star at 61bpm.

## 2017-06-23 NOTE — PATIENT INSTRUCTIONS
Discharge Summary/Instructions after an Endoscopic Procedure  Patient Name: Wilder Carbajal  Patient MRN: [id number]  Patient YOB: 1950 Friday, June 23, 2017  Vic Clark MD  RESTRICTIONS ON ACTIVITY:  - DO NOT drive a car, operate machinery, make legal/financial decisions, or   drink alcohol until the day after the procedure.    - The following day: return to full activity including work, except no heavy   lifting, straining or running for 3 days if polyps were removed.  - Diet: Eat and drink normally unless instructed otherwise.  TREATMENT FOR COMMON SIDE EFFECTS:  - Mild abdominal pain, bloating or excessive gas: rest, eat lightly and use   a heating pad.  - Sore Throat - treat with throat lozenges. Gargle with warm salt water.  SYMPTOMS TO WATCH FOR AND REPORT TO YOUR PHYSICIAN:  1. Severe abdominal pain or bloating.  2. Pain in chest.  3. Chills or fever occurring within 24 hours after a procedure.  4. A large amount of rectal bleeding, which would show as bright red,   maroon, or black stools. (A small amount of blood from the rectum is not   serious, especially if hemorrhoids are present.)  5. Because air was used during the procedure, expelling large amounts of air   from your rectum or belching is normal.  6. If a bowel prep was taken, you may not have a bowel movement for 1-3   days.  This is normal.  7. Go directly to the emergency room if you notice any of the following:   Chills and/or fever over 101 F   Persistent vomiting   Severe abdominal pain, other than gas cramps   Severe chest pain   Black, tarry stools   Any bleeding - exceeding one tablespoon  Your doctor recommends these additional instructions:  If any biopsies were performed, my office will call you in 5 to 6 business   days with any results.  Resume taking Eliquis (apixaban) at your prior dose in 2 days.   We are waiting for your pathology results.   Your physician has recommended a repeat colonoscopy in three years for    surveillance.   The findings and recommendations were discussed with your referring   physician.  For questions, problems or results please call your physician - Vic Clark MD at Work:  [phone].  [facility], EMERGENCY ROOM PHONE NUMBER: [phone]  IF A COMPLICATION OR EMERGENCY SITUATION ARISES AND YOU ARE UNABLE TO REACH   YOUR PHYSICIAN - GO TO THE EMERGENCY ROOM.  Vic Clark MD  6/23/2017 12:34:34 PM  This report has been verified and signed electronically.

## 2017-06-23 NOTE — PROGRESS NOTES
[facility]  Nephrology  Progress Note    Patient Name: Wilder Carbajal  MRN: [id number]  Admission Date: 6/21/2017  Hospital Length of Stay: 2 days  Attending Provider: Jl Dill MD   Primary Care Physician: Akhil Adorno MD  Principal Problem:<principal problem not specified>    Subjective:     HPI: Nephrology Consult       66-year-old -American male with past medical history of diabetes, hypertension, hyperlipidemia, asthma, paroxysmal A. fib on Eliquis, thyroid disease, ESRD on HD TTS admitted for GIB with melanotic stools for the last week. HBg at outpatient unit ahs dropped from 9.3 to 8.3 in las t several weeks. He has missed several treatments in the last 2 weeks. He was last dialyzed Tuesday. He has not been able to achieve Dry wt due to noncompliance with diet and treatments  Pateint is without any current complaint of abdominal pain, cramping,  He does admit to dyspnea at rest and with exertion in the last 2 weeks. No chest pain, pressure. He has noted increasing peripheral edema.      [facility], 4h 15 min, F180, 500 BF, , Left AVF      14 point review of systems completed and pertinent significant findings as in the HPI    Interval History:   Patient feels weak.  Hbg   7.8 today   Patient is s/p egd and colonoscopy  scheduled for IVC filter tomorrow.          Review of patient's allergies indicates:  No Known Allergies  Current Facility-Administered Medications   Medication Frequency    0.9%  NaCl infusion (for blood administration) Q24H PRN    0.9%  NaCl infusion (for blood administration) Q24H PRN    0.9%  NaCl infusion PRN    atorvastatin tablet 20 mg Daily    cetirizine tablet 5 mg Daily    cinacalcet tablet 30 mg QHS    citalopram tablet 10 mg Daily    dextrose 50% injection 12.5 g PRN    dextrose 50% injection 25 g PRN    gabapentin capsule 100 mg TID    glucagon (human recombinant) injection 1 mg PRN    glucose chewable tablet 16 g PRN     glucose chewable tablet 24 g PRN    heparin 25,000 units in dextrose 5% 250 mL (100 units/mL) bolus from bag; ADDITIONAL PRN BOLUS PRN    heparin 25,000 units in dextrose 5% 250 mL (100 units/mL) bolus from bag; ADDITIONAL PRN BOLUS PRN    heparin 25,000 units in dextrose 5% 250 mL (100 units/mL) infusion Continuous    hydrALAZINE tablet 50 mg TID    hydrocodone-acetaminophen 5-325mg per tablet 1 tablet Q6H PRN    pantoprazole injection 40 mg BID    sevelamer carbonate tablet 1,600 mg TID WM    warfarin (COUMADIN) tablet 5 mg Daily       Objective:     Vital Signs (Most Recent):  Temp: 97.8 °F (36.6 °C) (06/23/17 1510)  Pulse: 67 (06/23/17 1700)  Resp: 20 (06/23/17 1615)  BP: 125/68 (06/23/17 1700)  SpO2: 97 % (06/23/17 1510)  O2 Device (Oxygen Therapy): room air (06/23/17 1545) Vital Signs (24h Range):  Temp:  [97.5 °F (36.4 °C)-98 °F (36.7 °C)] 97.8 °F (36.6 °C)  Pulse:  [57-79] 67  Resp:  [12-20] 20  SpO2:  [92 %-100 %] 97 %  BP: (112-183)/(62-88) 125/68     Weight: 114.8 kg (253 lb) (06/21/17 2004)  Body mass index is 37.36 kg/m².  Body surface area is 2.36 meters squared.    I/O last 3 completed shifts:  In: 3360 [P.O.:2260; Other:1100]  Out: 4251 [Other:3050; Stool:1201]    Physical Exam    Significant Labs:  BMP:   Recent Labs  Lab 06/23/17  0534   GLU 48*      CO2 20*   BUN 28*   CREATININE 4.4*   CALCIUM 9.1     CBC:   Recent Labs  Lab 06/23/17  0734   WBC 6.00   RBC 2.52*   HGB 7.8*   HCT 24.0*   *   MCV 95   MCH 31.0   MCHC 32.5     All labs within the past 24 hours have been reviewed.     Significant Imaging:  Labs: Reviewed  aa    Assessment/Plan:     Metabolic bone disease    Please order serum phosphorous        Anemia    exacerbated by GIB  1 u PRBC today,  Will dose LISSETTE for weight based once BP improved  Will hold on ordering iron studies post transfusion        ESRD (end stage renal disease) on dialysis    67 yo [facility], TTS, 4 hr 15 min F180, 500 BF L AVF    Noncompliant with treatment schedule and dietary restriction.now with GIB Bleed and volume overload   HBg 6.8 transfuse a unit of PRBC at HD today hbg 7.8  Dialysis today  4 hr 350 BF attempt 3 Kg as bp tolerates  Next dialysis monday for metabolic clearance and volume control              Snehal Ballard MD  Nephrology  [facility]

## 2017-06-23 NOTE — NURSING TRANSFER
Nursing Transfer Note      6/23/2017     Transfer To: Hospital, [facility], 9th Floor, Room 958B from [facility].    Transfer Via: Stretcher.    Transfer With: Continuous cardiac monitoring    Transported By: SRUTHI Joyce.    Medicines Sent: None.    Chart Sent with Patient: Yes.    Notified: ZENON Michele (Acute Dialysis Charge Nurse and [facility] nurse, ZENON Corbin.    Patient Reassessed at: 1430    Upon arrival to floor: Cardiac monitor on, bed in lowest position, and call bell within reach.

## 2017-06-23 NOTE — PROGRESS NOTES
Prescription medications taken from bedside after pt reported he would take one of his pain pills. Medications locked up and stored away per unit policy. Pt verbalizes agreement and consent to have medications stored. Will return on discharge.

## 2017-06-23 NOTE — NURSING
Received telephone call from Halel in Endooscopy.  Provided report on Mr. Carbajal.  He was sent to dialysis from Endo at 02:50;  Report is he is alert and oriented x 4.  Remains NPO for dialysis.  No meds for pain.  Vitals are 97.7 temp.  Pulse 68bpm; respirations 16/min; Sat is 95% on room air.  B/P 127/69;  Dr. Clark expected to see patient on rounds in am.  Findings will be discussed then.  EGD and Colonoscopy performed with biopsies.  IV started with 20 gauge in right forearm.  Blood glucose from 1300 was 82;  Received 100 cc of D51/2 Normal saline.

## 2017-06-23 NOTE — PATIENT INSTRUCTIONS
Discharge Summary/Instructions after an Endoscopic Procedure  Patient Name: Wilder Carbajal  Patient MRN: [id number]  Patient YOB: 1950 Friday, June 23, 2017  Vic Clark MD  RESTRICTIONS ON ACTIVITY:  - DO NOT drive a car, operate machinery, make legal/financial decisions, or   drink alcohol until the day after the procedure.    - The following day: return to full activity including work, except no heavy   lifting, straining or running for 3 days if polyps were removed.  - Diet: Eat and drink normally unless instructed otherwise.  TREATMENT FOR COMMON SIDE EFFECTS:  - Mild abdominal pain, bloating or excessive gas: rest, eat lightly and use   a heating pad.  - Sore Throat - treat with throat lozenges. Gargle with warm salt water.  SYMPTOMS TO WATCH FOR AND REPORT TO YOUR PHYSICIAN:  1. Severe abdominal pain or bloating.  2. Pain in chest.  3. Chills or fever occurring within 24 hours after a procedure.  4. A large amount of rectal bleeding, which would show as bright red,   maroon, or black stools. (A small amount of blood from the rectum is not   serious, especially if hemorrhoids are present.)  5. Because air was used during the procedure, expelling large amounts of air   from your rectum or belching is normal.  6. If a bowel prep was taken, you may not have a bowel movement for 1-3   days.  This is normal.  7. Go directly to the emergency room if you notice any of the following:   Chills and/or fever over 101 F   Persistent vomiting   Severe abdominal pain, other than gas cramps   Severe chest pain   Black, tarry stools   Any bleeding - exceeding one tablespoon  Your doctor recommends these additional instructions:  If any biopsies were performed, my office will call you in 5 to 6 business   days with any results.  Your physician has recommended a colonoscopy today.   Your physician has recommended an H. pylori serology today.   The findings and recommendations were discussed with your designated    responsible adult.  For questions, problems or results please call your physician - Vic Clark MD at Work:  [phone].  [facility], EMERGENCY ROOM PHONE NUMBER: [phone]  IF A COMPLICATION OR EMERGENCY SITUATION ARISES AND YOU ARE UNABLE TO REACH   YOUR PHYSICIAN - GO TO THE EMERGENCY ROOM.  Vic Clark MD  6/23/2017 11:59:28 AM  This report has been verified and signed electronically.

## 2017-06-23 NOTE — ANESTHESIA POSTPROCEDURE EVALUATION
"Anesthesia Post Evaluation    Patient: Wilder Carbajal    Procedure(s) Performed: Procedure(s) (LRB):  ESOPHAGOGASTRODUODENOSCOPY (EGD) (N/A)  COLONOSCOPY (N/A)    Final Anesthesia Type: general  Patient location during evaluation: PACU  Patient participation: Yes- Able to Participate  Level of consciousness: awake and alert and oriented  Post-procedure vital signs: reviewed and stable  Pain management: adequate  Airway patency: patent  PONV status at discharge: No PONV  Anesthetic complications: no      Cardiovascular status: stable  Respiratory status: unassisted, spontaneous ventilation and room air  Hydration status: euvolemic  Follow-up not needed.        Visit Vitals  /65   Pulse 65   Temp 36.6 °C (97.9 °F) (Temporal)   Resp 12   Ht 5' 9" (1.753 m)   Wt 114.8 kg (253 lb)   SpO2 (!) 94%   BMI 37.36 kg/m²       Pain/Ritesh Score: Pain Assessment Performed: Yes (6/23/2017 12:52 PM)  Presence of Pain: denies (6/23/2017 12:52 PM)  Pain Rating Prior to Med Admin: 8 (6/22/2017  3:31 AM)  Ritesh Score: 9 (6/23/2017 12:52 PM)      "

## 2017-06-23 NOTE — SUBJECTIVE & OBJECTIVE
Interval History:   Patient feels weak.  Hbg   7.8 today   Patient is s/p egd and colonoscopy  scheduled for IVC filter tomorrow.          Review of patient's allergies indicates:  No Known Allergies  Current Facility-Administered Medications   Medication Frequency    0.9%  NaCl infusion (for blood administration) Q24H PRN    0.9%  NaCl infusion (for blood administration) Q24H PRN    0.9%  NaCl infusion PRN    atorvastatin tablet 20 mg Daily    cetirizine tablet 5 mg Daily    cinacalcet tablet 30 mg QHS    citalopram tablet 10 mg Daily    dextrose 50% injection 12.5 g PRN    dextrose 50% injection 25 g PRN    gabapentin capsule 100 mg TID    glucagon (human recombinant) injection 1 mg PRN    glucose chewable tablet 16 g PRN    glucose chewable tablet 24 g PRN    heparin 25,000 units in dextrose 5% 250 mL (100 units/mL) bolus from bag; ADDITIONAL PRN BOLUS PRN    heparin 25,000 units in dextrose 5% 250 mL (100 units/mL) bolus from bag; ADDITIONAL PRN BOLUS PRN    heparin 25,000 units in dextrose 5% 250 mL (100 units/mL) infusion Continuous    hydrALAZINE tablet 50 mg TID    hydrocodone-acetaminophen 5-325mg per tablet 1 tablet Q6H PRN    pantoprazole injection 40 mg BID    sevelamer carbonate tablet 1,600 mg TID WM    warfarin (COUMADIN) tablet 5 mg Daily       Objective:     Vital Signs (Most Recent):  Temp: 97.8 °F (36.6 °C) (06/23/17 1510)  Pulse: 67 (06/23/17 1700)  Resp: 20 (06/23/17 1615)  BP: 125/68 (06/23/17 1700)  SpO2: 97 % (06/23/17 1510)  O2 Device (Oxygen Therapy): room air (06/23/17 1545) Vital Signs (24h Range):  Temp:  [97.5 °F (36.4 °C)-98 °F (36.7 °C)] 97.8 °F (36.6 °C)  Pulse:  [57-79] 67  Resp:  [12-20] 20  SpO2:  [92 %-100 %] 97 %  BP: (112-183)/(62-88) 125/68     Weight: 114.8 kg (253 lb) (06/21/17 2004)  Body mass index is 37.36 kg/m².  Body surface area is 2.36 meters squared.    I/O last 3 completed shifts:  In: 3360 [P.O.:2260; Other:1100]  Out: 4251 [Other:3050;  Stool:1201]    Physical Exam    Significant Labs:  BMP:   Recent Labs  Lab 06/23/17  0534   GLU 48*      CO2 20*   BUN 28*   CREATININE 4.4*   CALCIUM 9.1     CBC:   Recent Labs  Lab 06/23/17  0734   WBC 6.00   RBC 2.52*   HGB 7.8*   HCT 24.0*   *   MCV 95   MCH 31.0   MCHC 32.5     All labs within the past 24 hours have been reviewed.     Significant Imaging:  Labs: Reviewed  aa

## 2017-06-23 NOTE — INTERVAL H&P NOTE
The patient has been examined and the H&P has been reviewed:    I concur with the findings and no changes have occurred since H&P was written.     History and Exam unchanged from visit.    Procedure - EGD/Colonoscopy  Neck - supple  Plan of anesthesia - General  ASA - per anesthesia  Mallampati - per anesthesia  Anesthesia problems - no  Family history of anesthesia problems - no      Anesthesia/Surgery risks, benefits and alternative options discussed and understood by patient/family.          Active Hospital Problems    Diagnosis  POA    Metabolic bone disease [E88.9, M90.80]  Yes     Would monitor daily phosphorous and calcium    patietn is on sensipar and vit d 25 for secondary hyperparathyroidism, continue for now  sevelamner for hyperphosphatemia continue for now, check phos with next labs please      Anemia [D64.9]  Yes    Right femoral vein DVT [I82.411]  Yes    ESRD (end stage renal disease) on dialysis [N18.6, Z99.2]  Not Applicable    HTN (hypertension) [I10]  Yes      Resolved Hospital Problems    Diagnosis Date Resolved POA   No resolved problems to display.

## 2017-06-23 NOTE — SUBJECTIVE & OBJECTIVE
Interval History:     No blood seen with prep for colo this am.    Overall just feels weak/cold.      Review of Systems   Gastrointestinal: Negative for blood in stool.   All other systems reviewed and are negative.    Objective:     Vital Signs (Most Recent):  Temp: 97.9 °F (36.6 °C) (06/23/17 1252)  Pulse: 65 (06/23/17 1415)  Resp: 12 (06/23/17 1315)  BP: 121/65 (06/23/17 1315)  SpO2: (!) 94 % (06/23/17 1315) Vital Signs (24h Range):  Temp:  [97.5 °F (36.4 °C)-98 °F (36.7 °C)] 97.9 °F (36.6 °C)  Pulse:  [51-79] 65  Resp:  [10-20] 12  SpO2:  [92 %-100 %] 94 %  BP: (112-186)/(64-90) 121/65     Weight: 114.8 kg (253 lb)  Body mass index is 37.36 kg/m².    Intake/Output Summary (Last 24 hours) at 06/23/17 1431  Last data filed at 06/23/17 1236   Gross per 24 hour   Intake             2000 ml   Output             1200 ml   Net              800 ml      Physical Exam   Constitutional: He appears well-nourished.   HENT:   Head: Atraumatic.   Cardiovascular: Normal rate.    Pulmonary/Chest: Effort normal.   Abdominal: Soft.   Neurological: He is alert.   Skin: Skin is warm.   Nursing note and vitals reviewed.      Significant Labs:   BMP:   Recent Labs  Lab 06/23/17  0534   GLU 48*      K 5.8*      CO2 20*   BUN 28*   CREATININE 4.4*   CALCIUM 9.1     CBC:   Recent Labs  Lab 06/22/17  0741 06/22/17  1716 06/23/17  0734   WBC 5.44 5.97 6.00   HGB 6.8* 8.4* 7.8*   HCT 21.8* 25.8* 24.0*   * 126* 121*       Significant Imaging: None

## 2017-06-23 NOTE — TRANSFER OF CARE
"Anesthesia Transfer of Care Note    Patient: Wilder Carbajal    Procedure(s) Performed: Procedure(s) (LRB):  ESOPHAGOGASTRODUODENOSCOPY (EGD) (N/A)  COLONOSCOPY (N/A)    Patient location: [facility]    Anesthesia Type: general    Transport from OR: Transported from OR on room air with adequate spontaneous ventilation    Post pain: adequate analgesia    Post assessment: no apparent anesthetic complications    Post vital signs: stable    Level of consciousness: awake, alert and oriented    Nausea/Vomiting: no nausea/vomiting    Complications: none    Transfer of care protocol was followed      Last vitals:   Visit Vitals  /65   Pulse 65   Temp 36.6 °C (97.9 °F) (Temporal)   Resp 12   Ht 5' 9" (1.753 m)   Wt 114.8 kg (253 lb)   SpO2 (!) 94%   BMI 37.36 kg/m²     "

## 2017-06-23 NOTE — TREATMENT PLAN
GI Progress note    EGD/colon done - please see full report for details.     EGD - mild gastritis and duodenitis.   Colon - 3 polyps, were resected with hot snare.     Recommend:  Continue PPI  Resume AC today  If continues to bleed, consider VCE.  Resume diet and advance as tolerated  Please call GI with questions/updates.    Johnna Lau MD  Gastroenterology & Hepatology Fellow ([id number])  Pager: [phone]

## 2017-06-23 NOTE — H&P (VIEW-ONLY)
[facility]  Gastroenterology  Consult Note    Patient Name: Wilder Carbajal  MRN: [id number]  Admission Date: 6/21/2017  Hospital Length of Stay: 1 days  Code Status: Full Code   Attending Provider: Jl Dill MD   Consulting Provider: Yamila Tai MD  Primary Care Physician: Akhil Adorno MD  Principal Problem:<principal problem not specified>    Inpatient consult to Gastroenterology  Consult performed by: YAMILA TAI  Consult ordered by: VALENTIN PIKE  Reason for consult: anemia        Subjective:     HPI:  Wilder Carbajal is a 66 y.o. male paroxysmal A. Fib/DVT on Eliquis, thyroid disease, ESRD on HD TTS presents to the ED complaining of rectal bleeding for the past week.  He has noted bright red blood mixed in with brown stool.  He denies any rectal bleeding between bowel movements. He reports some constipation preceding this along with straining. Initially started as brown stools with mixed pink blood and progressed to dark blood. He denies having prior GIB. Denies NSAID use. He has no EGD/colon in our records. He does not recollect having colonoscopy. On admission, HD stable and Hb found to be 6.6 down from 10.4 (in April 2017). He takes Eliquis for afib.     Past Medical History:   Diagnosis Date    Acute pain of left shoulder 1/7/2017    Arthritis     Asthma     Benign neoplasm of eyelid     RUL    Blood clotting tendency     Blood transfusion     Cataract     Chronic kidney disease requiring chronic dialysis     Diabetes mellitus     Diabetic retinopathy     Fever blister     Hyperlipidemia     Hypertension     Infertility     Joint pain     Retinal detachment     Thyroid disease     Weakness 1/7/2017       Past Surgical History:   Procedure Laterality Date    CATARACT EXTRACTION      RETINAL DETACHMENT SURGERY         Review of patient's allergies indicates:  No Known Allergies  Family History     Problem Relation (Age of Onset)     Heart attack Mother    No Known Problems Father, Sister, Brother, Maternal Aunt, Maternal Uncle, Paternal Aunt, Paternal Uncle, Maternal Grandmother, Maternal Grandfather, Paternal Grandmother, Paternal Grandfather        Social History Main Topics    Smoking status: Never Smoker    Smokeless tobacco: Never Used    Alcohol use Yes      Comment: 1 pint bourbon    Drug use: No    Sexual activity: Not on file     Review of Systems   Constitutional: Positive for activity change and appetite change.   HENT: Negative for trouble swallowing.    Eyes: Negative for photophobia and visual disturbance.   Respiratory: Positive for shortness of breath. Negative for chest tightness.    Cardiovascular: Positive for chest pain. Negative for leg swelling.   Gastrointestinal: Positive for anal bleeding, blood in stool and constipation. Negative for abdominal distention, abdominal pain, diarrhea, nausea, rectal pain and vomiting.   Musculoskeletal: Negative for arthralgias and back pain.   Skin: Negative for color change and pallor.   Neurological: Negative for tremors and weakness.   Hematological: Bruises/bleeds easily.   Psychiatric/Behavioral: Negative for agitation and behavioral problems.     Objective:     Vital Signs (Most Recent):  Temp: 97.6 °F (36.4 °C) (06/22/17 0949)  Pulse: 60 (06/22/17 1100)  Resp: 18 (06/22/17 0949)  BP: (!) 155/83 (06/22/17 1100)  SpO2: 96 % (06/22/17 0700) Vital Signs (24h Range):  Temp:  [97.4 °F (36.3 °C)-98.5 °F (36.9 °C)] 97.6 °F (36.4 °C)  Pulse:  [53-75] 60  Resp:  [14-20] 18  SpO2:  [93 %-97 %] 96 %  BP: (103-194)/(50-84) 155/83     Weight: 114.8 kg (253 lb) (06/21/17 2004)  Body mass index is 37.36 kg/m².      Intake/Output Summary (Last 24 hours) at 06/22/17 1106  Last data filed at 06/22/17 0600   Gross per 24 hour   Intake                0 ml   Output                1 ml   Net               -1 ml       Lines/Drains/Airways     Drain                 Hemodialysis AV Fistula Left  upper arm 59565 days         Hemodialysis AV Graft 06/17/14 0915  1101 days          Peripheral Intravenous Line                 Peripheral IV - Single Lumen 04/21/17 0532 Right Antecubital 62 days         Peripheral IV - Single Lumen 06/21/17 1525 Right Antecubital less than 1 day                Physical Exam   Constitutional: He is oriented to person, place, and time. He appears well-developed and well-nourished.   HENT:   Head: Normocephalic and atraumatic.   Eyes: Conjunctivae are normal. No scleral icterus.   Neck: Neck supple.   Cardiovascular: Normal rate and regular rhythm.    No murmur heard.  Pulmonary/Chest: Effort normal. No stridor. No respiratory distress. He has no wheezes. He has no rales.   Abdominal: Soft. Bowel sounds are normal. He exhibits no distension and no mass. There is no tenderness. There is no rebound and no guarding. No hernia.   Musculoskeletal: He exhibits no edema or tenderness.   Neurological: He is alert and oriented to person, place, and time.   Skin: Skin is warm and dry.   Psychiatric: He has a normal mood and affect. His behavior is normal.   Vitals reviewed.    Lab Results   Component Value Date    WBC 5.44 06/22/2017    HGB 6.8 (L) 06/22/2017    HCT 21.8 (L) 06/22/2017    MCV 96 06/22/2017     (L) 06/22/2017     Lab Results   Component Value Date     06/22/2017    K 5.3 (H) 06/22/2017     06/22/2017    CO2 30 (H) 06/22/2017    BUN 47 (H) 06/22/2017    CREATININE 6.3 (H) 06/22/2017     Lab Results   Component Value Date    ALBUMIN 2.7 (L) 06/22/2017    ALT 13 06/22/2017    AST 18 06/22/2017    ALKPHOS 118 06/22/2017    BILITOT 0.9 06/22/2017     Lab Results   Component Value Date    LIPASE 19 04/21/2017       Imaging:  Reviewed and as noted in HPI.           Assessment/Plan:     Wilder Carbajal is a 66 y.o. male with     Anemia    Likely colonic source. Reports pink/dark blood per rectum.   No recent EGD/colon.  No NSAID use, no UGI sx. Less likely upper  source.     Plan:  Protonix IV BID  Intravascular resuscitation/support with IVFs   Serial H/H's and pRBCs transfusion as indicated  Discontinue all NSAIDs and Heparin products  Please correct any coagulopathy with platelets and FFP to a goal of platelets >50K and INR <2.0  Maintain IV access with 2 large bore IVs  Clear LD today, NPO PMN  Prep tonight  Plan for colonoscopy tomorrow, if negative, we will also do EGD at the same time.   Please notify GI team if there is significant change in patient's clinical status              Thank you for your consult. I will follow-up with patient. Please contact us if you have any additional questions.    Johnna Lau MD  Gastroenterology  [facility]

## 2017-06-23 NOTE — DISCHARGE INSTRUCTIONS

## 2017-06-23 NOTE — PROGRESS NOTES
Patient received from endoscopy after procedure with report from ZENON Corbin.  Maintenance dialysis began per orders via 15 gauge fistula needles to left upper arm fistula.

## 2017-06-23 NOTE — ASSESSMENT & PLAN NOTE
exacerbated by GIB  1 u PRBC today,  Will dose LISSETTE for weight based once BP improved  Will hold on ordering iron studies post transfusion

## 2017-06-23 NOTE — PROGRESS NOTES
[facility] Hospital Medicine  Progress Note    Patient Name: Wilder Carbajal  MRN: [id number]  Patient Class: IP- Inpatient   Admission Date: 6/21/2017  Length of Stay: 2 days  Attending Physician: Jl Dill MD  Primary Care Provider: Akhil Adorno MD    [facility] Jl Dill MD    Subjective:     Principal Problem:<principal problem not specified>    HPI:  66-year-old -American male with past medical history of diabetes, hypertension, hyperlipidemia, asthma, paroxysmal A. fib on Eliquis, thyroid disease, ESRD on HD TTS presents to the ED complaining of rectal bleeding for the past week.  He has noted bright red blood mixed in with brown stool.  He denies any rectal bleeding between bowel movements.  He has some lower abdominal pain with BM.  He has been having intermittent chest pain for the past year and reports left-sided 3/10 chest pain that started yesterday.  He describes the chest pain as feeling as though he just ran for a few minutes.  He reports shortness of breath, cough, rhinorrhea, dysuria, subjective fever.  He denies constipation, nausea, vomiting, weakness.  He went to dialysis on Tuesday but skipped last Thursday and Saturday.  He drinks alcohol and denies tobacco or drug use.    Distant hx of PUD.  Denies NSAID use currently.      Last [place] was over 10 years ago.    Hospital Course:  No notes on file    Interval History:     No blood seen with prep for colo this am.    Overall just feels weak/cold.      Review of Systems   Gastrointestinal: Negative for blood in stool.   All other systems reviewed and are negative.    Objective:     Vital Signs (Most Recent):  Temp: 97.9 °F (36.6 °C) (06/23/17 1252)  Pulse: 65 (06/23/17 1415)  Resp: 12 (06/23/17 1315)  BP: 121/65 (06/23/17 1315)  SpO2: (!) 94 % (06/23/17 1315) Vital Signs (24h Range):  Temp:  [97.5 °F (36.4 °C)-98 °F (36.7 °C)] 97.9 °F (36.6 °C)  Pulse:  [51-79] 65  Resp:  [10-20]  12  SpO2:  [92 %-100 %] 94 %  BP: (112-186)/(64-90) 121/65     Weight: 114.8 kg (253 lb)  Body mass index is 37.36 kg/m².    Intake/Output Summary (Last 24 hours) at 06/23/17 1431  Last data filed at 06/23/17 1236   Gross per 24 hour   Intake             2000 ml   Output             1200 ml   Net              800 ml      Physical Exam   Constitutional: He appears well-nourished.   HENT:   Head: Atraumatic.   Cardiovascular: Normal rate.    Pulmonary/Chest: Effort normal.   Abdominal: Soft.   Neurological: He is alert.   Skin: Skin is warm.   Nursing note and vitals reviewed.      Significant Labs:   BMP:   Recent Labs  Lab 06/23/17  0534   GLU 48*      K 5.8*      CO2 20*   BUN 28*   CREATININE 4.4*   CALCIUM 9.1     CBC:   Recent Labs  Lab 06/22/17  0741 06/22/17  1716 06/23/17  0734   WBC 5.44 5.97 6.00   HGB 6.8* 8.4* 7.8*   HCT 21.8* 25.8* 24.0*   * 126* 121*       Significant Imaging: None    Assessment/Plan:      Anemia    Sub acute (likely lower) GI losses  HD stable currently  GI consulted by ED  Overdue for repeat colo  Can start ppi BID and monitor  Hold anticoagulation for dvt, place IVC filter  Gi will proceed with colo and possible EGD tomorrow        Right femoral vein DVT    Diagnosed two months ago  Still seen on US  Discussed with heme attending Dr. Augustine will proceed with IVC filter placement  Discussed with IR will place today            ESRD (end stage renal disease) on dialysis    Nephrology following          HTN (hypertension)    Continue home regimen for now, monitor for signs of hypovolemia/hypotension            VTE Risk Mitigation         Ordered     Medium Risk of VTE  Once      06/22/17 0910     Place sequential compression device  Until discontinued      06/22/17 0910     Place RODRI hose  Until discontinued      06/22/17 0910          Jl Dill MD  Department of Hospital Medicine   [facility]

## 2017-06-23 NOTE — ASSESSMENT & PLAN NOTE
67 yo AAM [facility], TTS, 4 hr 15 min F180, 500 BF L AVF   Noncompliant with treatment schedule and dietary restriction.now with GIB Bleed and volume overload   HBg 6.8 transfuse a unit of PRBC at HD today hbg 7.8  Dialysis today  4 hr 350 BF attempt 3 Kg as bp tolerates  Next dialysis monday for metabolic clearance and volume control

## 2017-06-24 LAB
ANION GAP SERPL CALC-SCNC: 11 MMOL/L
ANISOCYTOSIS BLD QL SMEAR: SLIGHT
BASOPHILS # BLD AUTO: 0.02 K/UL
BASOPHILS # BLD AUTO: ABNORMAL K/UL
BASOPHILS NFR BLD: 0 %
BASOPHILS NFR BLD: 0.3 %
BUN SERPL-MCNC: 20 MG/DL
CALCIUM SERPL-MCNC: 8.8 MG/DL
CHLORIDE SERPL-SCNC: 104 MMOL/L
CO2 SERPL-SCNC: 25 MMOL/L
CREAT SERPL-MCNC: 3.8 MG/DL
DIFFERENTIAL METHOD: ABNORMAL
DIFFERENTIAL METHOD: ABNORMAL
EOSINOPHIL # BLD AUTO: 0.4 K/UL
EOSINOPHIL # BLD AUTO: ABNORMAL K/UL
EOSINOPHIL NFR BLD: 3 %
EOSINOPHIL NFR BLD: 6.1 %
ERYTHROCYTE [DISTWIDTH] IN BLOOD BY AUTOMATED COUNT: 17.9 %
ERYTHROCYTE [DISTWIDTH] IN BLOOD BY AUTOMATED COUNT: 18.1 %
EST. GFR  (AFRICAN AMERICAN): 17.8 ML/MIN/1.73 M^2
EST. GFR  (NON AFRICAN AMERICAN): 15.4 ML/MIN/1.73 M^2
FACT X PPP CHRO-ACNC: 0.39 IU/ML
GLUCOSE SERPL-MCNC: 69 MG/DL
HCT VFR BLD AUTO: 24.6 %
HCT VFR BLD AUTO: 26 %
HGB BLD-MCNC: 8.3 G/DL
HGB BLD-MCNC: 8.7 G/DL
LYMPHOCYTES # BLD AUTO: 1.3 K/UL
LYMPHOCYTES # BLD AUTO: ABNORMAL K/UL
LYMPHOCYTES NFR BLD: 20.3 %
LYMPHOCYTES NFR BLD: 7 %
MAGNESIUM SERPL-MCNC: 2.4 MG/DL
MCH RBC QN AUTO: 31.2 PG
MCH RBC QN AUTO: 31.5 PG
MCHC RBC AUTO-ENTMCNC: 33.5 %
MCHC RBC AUTO-ENTMCNC: 33.7 %
MCV RBC AUTO: 93 FL
MCV RBC AUTO: 94 FL
MONOCYTES # BLD AUTO: 0.8 K/UL
MONOCYTES # BLD AUTO: ABNORMAL K/UL
MONOCYTES NFR BLD: 11.9 %
MONOCYTES NFR BLD: 15 %
NEUTROPHILS # BLD AUTO: 3.9 K/UL
NEUTROPHILS NFR BLD: 60.5 %
NEUTROPHILS NFR BLD: 75 %
OVALOCYTES BLD QL SMEAR: ABNORMAL
PLATELET # BLD AUTO: 108 K/UL
PLATELET # BLD AUTO: 111 K/UL
PMV BLD AUTO: 10.7 FL
PMV BLD AUTO: 9.9 FL
POCT GLUCOSE: 110 MG/DL (ref 70–110)
POIKILOCYTOSIS BLD QL SMEAR: SLIGHT
POLYCHROMASIA BLD QL SMEAR: ABNORMAL
POTASSIUM SERPL-SCNC: 4.2 MMOL/L
RBC # BLD AUTO: 2.66 M/UL
RBC # BLD AUTO: 2.76 M/UL
SODIUM SERPL-SCNC: 140 MMOL/L
TROPONIN I SERPL DL<=0.01 NG/ML-MCNC: 0.06 NG/ML
WBC # BLD AUTO: 6.37 K/UL
WBC # BLD AUTO: 7.46 K/UL

## 2017-06-24 PROCEDURE — 63600175 PHARM REV CODE 636 W HCPCS: Performed by: INTERNAL MEDICINE

## 2017-06-24 PROCEDURE — 99223 1ST HOSP IP/OBS HIGH 75: CPT | Mod: ,,, | Performed by: INTERNAL MEDICINE

## 2017-06-24 PROCEDURE — 85520 HEPARIN ASSAY: CPT

## 2017-06-24 PROCEDURE — 93010 ELECTROCARDIOGRAM REPORT: CPT | Mod: S$GLB,,, | Performed by: INTERNAL MEDICINE

## 2017-06-24 PROCEDURE — 85025 COMPLETE CBC W/AUTO DIFF WBC: CPT

## 2017-06-24 PROCEDURE — 85027 COMPLETE CBC AUTOMATED: CPT

## 2017-06-24 PROCEDURE — 11000001 HC ACUTE MED/SURG PRIVATE ROOM

## 2017-06-24 PROCEDURE — 80048 BASIC METABOLIC PNL TOTAL CA: CPT

## 2017-06-24 PROCEDURE — 85007 BL SMEAR W/DIFF WBC COUNT: CPT

## 2017-06-24 PROCEDURE — 36415 COLL VENOUS BLD VENIPUNCTURE: CPT

## 2017-06-24 PROCEDURE — 99232 SBSQ HOSP IP/OBS MODERATE 35: CPT | Mod: ,,, | Performed by: INTERNAL MEDICINE

## 2017-06-24 PROCEDURE — 84484 ASSAY OF TROPONIN QUANT: CPT

## 2017-06-24 PROCEDURE — 83735 ASSAY OF MAGNESIUM: CPT

## 2017-06-24 PROCEDURE — C9113 INJ PANTOPRAZOLE SODIUM, VIA: HCPCS | Performed by: INTERNAL MEDICINE

## 2017-06-24 PROCEDURE — 25000003 PHARM REV CODE 250: Performed by: INTERNAL MEDICINE

## 2017-06-24 RX ORDER — METOPROLOL TARTRATE 25 MG/1
12.5 TABLET ORAL 2 TIMES DAILY
Status: DISCONTINUED | OUTPATIENT
Start: 2017-06-24 | End: 2017-06-24

## 2017-06-24 RX ORDER — WARFARIN SODIUM 5 MG/1
5 TABLET ORAL DAILY
Status: DISCONTINUED | OUTPATIENT
Start: 2017-06-24 | End: 2017-06-25 | Stop reason: HOSPADM

## 2017-06-24 RX ORDER — LOSARTAN POTASSIUM 25 MG/1
25 TABLET ORAL DAILY
Status: DISCONTINUED | OUTPATIENT
Start: 2017-06-25 | End: 2017-06-25 | Stop reason: HOSPADM

## 2017-06-24 RX ADMIN — WARFARIN SODIUM 5 MG: 5 TABLET ORAL at 05:06

## 2017-06-24 RX ADMIN — HYDRALAZINE HYDROCHLORIDE 50 MG: 50 TABLET ORAL at 05:06

## 2017-06-24 RX ADMIN — CETIRIZINE HYDROCHLORIDE 5 MG: 5 TABLET, FILM COATED ORAL at 08:06

## 2017-06-24 RX ADMIN — SEVELAMER CARBONATE 1600 MG: 800 TABLET, FILM COATED ORAL at 08:06

## 2017-06-24 RX ADMIN — PANTOPRAZOLE SODIUM 40 MG: 40 INJECTION, POWDER, FOR SOLUTION INTRAVENOUS at 08:06

## 2017-06-24 RX ADMIN — GABAPENTIN 100 MG: 100 CAPSULE ORAL at 05:06

## 2017-06-24 RX ADMIN — ATORVASTATIN CALCIUM 20 MG: 20 TABLET, FILM COATED ORAL at 08:06

## 2017-06-24 RX ADMIN — HYDROCODONE BITARTRATE AND ACETAMINOPHEN 1 TABLET: 5; 325 TABLET ORAL at 09:06

## 2017-06-24 RX ADMIN — HYDRALAZINE HYDROCHLORIDE 50 MG: 50 TABLET ORAL at 02:06

## 2017-06-24 RX ADMIN — CINACALCET HYDROCHLORIDE 30 MG: 30 TABLET, COATED ORAL at 09:06

## 2017-06-24 RX ADMIN — HEPARIN SODIUM AND DEXTROSE 17 UNITS/KG/HR: 10000; 5 INJECTION INTRAVENOUS at 02:06

## 2017-06-24 RX ADMIN — CITALOPRAM HYDROBROMIDE 10 MG: 10 TABLET ORAL at 08:06

## 2017-06-24 RX ADMIN — SEVELAMER CARBONATE 1600 MG: 800 TABLET, FILM COATED ORAL at 04:06

## 2017-06-24 RX ADMIN — GABAPENTIN 100 MG: 100 CAPSULE ORAL at 09:06

## 2017-06-24 RX ADMIN — SEVELAMER CARBONATE 1600 MG: 800 TABLET, FILM COATED ORAL at 12:06

## 2017-06-24 RX ADMIN — HYDRALAZINE HYDROCHLORIDE 50 MG: 50 TABLET ORAL at 09:06

## 2017-06-24 RX ADMIN — GABAPENTIN 100 MG: 100 CAPSULE ORAL at 02:06

## 2017-06-24 NOTE — CONSULTS
Cardiology Consult initial Evaluation.    Reason for consult: concern for arrhythmia on tele  Requesting Physician: Dr Dill    History of Present Illness:   67 y/o man with PMHx of ESRD on TTS HD, DMII, paFib, Hx of DVT and HTN who follows in cardiology clinic with Dr Lind and was seen in 2/2017 he was off anticoagulation because of bleeding episodes during HD. Patient was admitted on 6/21 with LGIB with Hgb drop down to 6.6 for which he underwent colonoscopy that revealed 10 mm polyp resected, as well as diverticulosis and underwent EGD that was negative. He was restarted on heparin for anticoagulation  Today patient was noted on the tele to have recurrent episodes of WCT that is concerning for VF. After analyzing the strips that was found to be artifacts with marching narrow QRSs during the episodes. Patient remained completely asymptomatic during the episodes.    EKG: AF with slow ventricular response.     Cardiac Marker:     Recent Labs  Lab 06/21/17  1531 06/22/17  0741 06/24/17  1206   TROPONINI 0.046* 0.044* 0.062*       Prior Cardiology Work up:   TTE 4/22/2017  CONCLUSIONS     1 - Eccentric hypertrophy.     2 - Mildly depressed left ventricular systolic function (EF 45-50%).     3 - Right ventricular enlargement with mildly depressed systolic function.     4 - Biatrial enlargement.     5 - Mild tricuspid regurgitation.     6 - Mild aortic stenosis, MARANDA = 1.4 cm2, peak velocity = 2.7 m/s, mean gradient = 15 mmHg.     7 - Pulmonary hypertension. The estimated PA systolic pressure is 55 mmHg.     8 - Increased central venous pressure.     Review of patient's allergies indicates:  No Known Allergies  Past Medical History:   Diagnosis Date    Acute pain of left shoulder 1/7/2017    Arthritis     Asthma     Benign neoplasm of eyelid     RUL    Blood clotting tendency     Blood transfusion     Cataract     Chronic kidney disease requiring chronic dialysis     Diabetes mellitus     Diabetic  retinopathy     Fever blister     Hyperlipidemia     Hypertension     Infertility     Joint pain     Retinal detachment     Thyroid disease     Weakness 1/7/2017   .  Past Surgical History:   Procedure Laterality Date    CATARACT EXTRACTION      RETINAL DETACHMENT SURGERY       Family History   Problem Relation Age of Onset    Heart attack Mother     No Known Problems Father     No Known Problems Sister     No Known Problems Brother     No Known Problems Maternal Aunt     No Known Problems Maternal Uncle     No Known Problems Paternal Aunt     No Known Problems Paternal Uncle     No Known Problems Maternal Grandmother     No Known Problems Maternal Grandfather     No Known Problems Paternal Grandmother     No Known Problems Paternal Grandfather     Cancer Neg Hx     Amblyopia Neg Hx     Blindness Neg Hx     Cataracts Neg Hx     Diabetes Neg Hx     Glaucoma Neg Hx     Hypertension Neg Hx     Macular degeneration Neg Hx     Retinal detachment Neg Hx     Strabismus Neg Hx     Stroke Neg Hx     Thyroid disease Neg Hx      Social History   Substance Use Topics    Smoking status: Never Smoker    Smokeless tobacco: Never Used    Alcohol use Yes      Comment: 1 pint bourbon      PTA Medications   Medication Sig    apixaban 5 mg Tab Take 1 tablet (5 mg total) by mouth 2 (two) times daily.    aspirin (ECOTRIN) 81 MG EC tablet Take 81 mg by mouth once daily.     cetirizine (ZYRTEC) 10 MG tablet Take 10 mg by mouth once daily.    cinacalcet (SENSIPAR) 30 MG Tab Take 30 mg by mouth every evening.    citalopram (CELEXA) 10 MG tablet Take 10 mg by mouth once daily.    ergocalciferol (ERGOCALCIFEROL) 50,000 unit Cap Take 50,000 Units by mouth every 14 (fourteen) days.     furosemide (LASIX) 80 MG tablet Take 80 mg by mouth once daily.    gabapentin (NEURONTIN) 100 MG capsule Take 100 mg by mouth 3 (three) times daily.    hydrALAZINE (APRESOLINE) 50 MG tablet Take 50 mg by mouth 3  (three) times daily.    hydrocodone-acetaminophen 5-325mg (NORCO) 5-325 mg per tablet Take 1 tablet by mouth every 6 (six) hours as needed for Pain.    ipratropium (ATROVENT HFA) 17 mcg/actuation inhaler Inhale 2 puffs into the lungs 4 (four) times daily. Do not exceed 12 puffs per day    ketotifen (ZADITOR) 0.025 % (0.035 %) ophthalmic solution Apply 1 drop to eye every 8 (eight) hours.    mometasone (NASONEX) 50 mcg/actuation nasal spray 2 sprays by Nasal route once daily.    sevelamer carbonate (RENVELA) 800 mg Tab Take two tablets by mouth four times daily with meals and snacks    ammonium lactate (LAC-HYDRIN) 12 % lotion Apply topically as needed for Dry Skin.    atorvastatin (LIPITOR) 20 MG tablet Take 20 mg by mouth once daily.    diclofenac sodium (VOLTAREN) 1 % Gel Apply 2 g topically 4 (four) times daily as needed (for pain).    losartan (COZAAR) 50 MG tablet Take 1 tablet (50 mg total) by mouth once daily.    podofilox (CONDYLOX) 0.5 % external solution Apply a small amount to lesions twice daily for three days, then hold for four days and repeat regimen         Review of Systems:  Constitutional: negative for chills, fevers and night sweats  Ears, nose, mouth, throat, and face: negative for nasal congestion, sore throat and tinnitus  Respiratory: negative for cough, dyspnea on exertion, pleurisy/chest pain, sputum and wheezing  Cardiovascular: See HPI  Gastrointestinal: negative  Genitourinary:negative for dysuria, frequency, hematuria, hesitancy and nocturia  Hematologic/lymphatic: negative for bleeding and easy bruising  Musculoskeletal:negative for muscle weakness and myalgias  Neurological: negative for dizziness, headaches, paresthesia and weakness  Behavioral/Psych: negative for anxiety and bad mood      Vital Signs (Most Recent)  Temp: 98 °F (36.7 °C) (06/24/17 1155)  Pulse: 65 (06/24/17 1239)  Resp: 16 (06/24/17 1155)  BP: (!) 177/85 (06/24/17 1239)  SpO2: (!) 94 % (06/24/17  1239)    Vital Signs Range (Last 24H):  Temp:  [97.7 °F (36.5 °C)-98.6 °F (37 °C)]   Pulse:  [51-79]   Resp:  [12-20]   BP: (112-172)/(61-92)   SpO2:  [91 %-100 %]     I&O:   Intake/Output Summary (Last 24 hours) at 06/24/17 1232  Last data filed at 06/24/17 0600   Gross per 24 hour   Intake          1992.61 ml   Output             4000 ml   Net         -2007.39 ml       Physical Exam:  General: Patient in no acute distress or discomfort  HEENT: No JVD, moist mucous membranes  Cardiac: S1S2 RRR no GMR  Chest: CTABL, no wheezing or rales  Abd:Soft NTND  Ext: No Edema No swelling  Neuro: A and O X 3, non focal    Laboratory:  Cardiac Biomarker:     Recent Labs  Lab 06/21/17  1531 06/22/17  0741   TROPONINI 0.046* 0.044*       CBC with Diff:     Recent Labs  Lab 06/22/17  1716 06/23/17  0734 06/24/17  0758   WBC 5.97 6.00 7.46   HGB 8.4* 7.8* 8.7*   HCT 25.8* 24.0* 26.0*   * 121* 111*   LYMPH 20.9  1.3 19.0  1.1 7.0*  CANCELED   MONO 12.4  0.7 12.5  0.8 15.0  CANCELED   EOSINOPHIL 4.7 4.2 3.0       COAG:    Recent Labs  Lab 06/21/17  1531   INR 1.2       CMP:   Recent Labs  Lab 06/21/17  1531 06/22/17  0741 06/23/17  0534 06/24/17  0357   GLU 68* 64* 48* 69*   CALCIUM 8.3* 8.4* 9.1 8.8   ALBUMIN 2.8* 2.7*  --   --    PROT 7.3 6.7  --   --     140 140 140   K 4.9 5.3* 5.8* 4.2   CO2 31* 30* 20* 25   CL 99 100 105 104   BUN 42* 47* 28* 20   CREATININE 5.6* 6.3* 4.4* 3.8*   ALKPHOS 139* 118  --   --    ALT 17 13  --   --    AST 20 18  --   --    BILITOT 0.8 0.9  --   --      Estimated Creatinine Clearance: 23.6 mL/min (based on Cr of 3.8).    Active Problems:   Present on Admission:   Anemia   HTN (hypertension)   Right femoral vein DVT   Metabolic bone disease      ASSESSMENT/PLAN:   65 y/o man with PMHx of ESRD on TTS HD, DMII, paFib, Hx of DVT and HTN who follows in cardiology clinic who is admitted with LGIB. Cardiology consulted for WCT on the tele without symptoms.    Impression:  -Artifactual  WCT simulating VT/VF. Analysis of the rhythm strips revealed marching QRSs.  -Patient asymptomatic and HD stable.    Recommendations:  -Change tele box and leads for possible device dysfunction.  -Continue with current medical management per primary team.  -Follow up outpatient with Dr Armand Lind    Thank you for allowing us to participate in the care of Mr Carbajal. Will sign off. Please call for questions.  Discussed with Dr Ko, staff Cardiologist.     Jason Dubois MD  Cardiology Fellow

## 2017-06-24 NOTE — ASSESSMENT & PLAN NOTE
Sub acute (likely lower) GI losses  HD stable currently  GI consulted by ED  EGD, [place] negative  IVC filter in place  Continue heparin and add warfarin

## 2017-06-24 NOTE — PROGRESS NOTES
[facility] Hospital Medicine  Progress Note    Patient Name: Wilder Carbajal  MRN: [id number]  Patient Class: IP- Inpatient   Admission Date: 6/21/2017  Length of Stay: 3 days  Attending Physician: Jl Dill MD  Primary Care Provider: Akhil Adorno MD    [facility] Medicine Team: [facility] HOSP MED G Jl Dill MD    Subjective:     Principal Problem:<principal problem not specified>    HPI:  66-year-old -American male with past medical history of diabetes, hypertension, hyperlipidemia, asthma, paroxysmal A. fib on Eliquis, thyroid disease, ESRD on HD TTS presents to the ED complaining of rectal bleeding for the past week.  He has noted bright red blood mixed in with brown stool.  He denies any rectal bleeding between bowel movements.  He has some lower abdominal pain with BM.  He has been having intermittent chest pain for the past year and reports left-sided 3/10 chest pain that started yesterday.  He describes the chest pain as feeling as though he just ran for a few minutes.  He reports shortness of breath, cough, rhinorrhea, dysuria, subjective fever.  He denies constipation, nausea, vomiting, weakness.  He went to dialysis on Tuesday but skipped last Thursday and Saturday.  He drinks alcohol and denies tobacco or drug use.    Distant hx of PUD.  Denies NSAID use currently.      Last [place] was over 10 years ago.    Hospital Course:  No notes on file    Interval History:     Altered to possible arrhthymia by tele office.  Cards reviewed only artifact found.      Patient doing well with no bleeding on heparin.      Review of Systems   All other systems reviewed and are negative.    Objective:     Vital Signs (Most Recent):  Temp: 98 °F (36.7 °C) (06/24/17 1155)  Pulse: (!) 56 (06/24/17 1500)  Resp: 16 (06/24/17 1155)  BP: (!) 177/85 (06/24/17 1239)  SpO2: (!) 94 % (06/24/17 1239) Vital Signs (24h Range):  Temp:  [97.9 °F (36.6 °C)-98.6 °F (37 °C)] 98 °F (36.7 °C)  Pulse:  [51-68]  56  Resp:  [14-20] 16  SpO2:  [91 %-100 %] 94 %  BP: (125-177)/(61-92) 177/85     Weight: 114.8 kg (253 lb)  Body mass index is 37.36 kg/m².    Intake/Output Summary (Last 24 hours) at 06/24/17 1645  Last data filed at 06/24/17 0600   Gross per 24 hour   Intake          1876.25 ml   Output             4000 ml   Net         -2123.75 ml      Physical Exam   Constitutional: He appears well-developed and well-nourished.   HENT:   Head: Atraumatic.   Cardiovascular: Normal rate.    Pulmonary/Chest: Effort normal.   Abdominal: Soft.   Neurological: He is alert.   Skin: Skin is warm.   Nursing note and vitals reviewed.      Significant Labs:   BMP:   Recent Labs  Lab 06/24/17  0357 06/24/17  1206   GLU 69*  --      --    K 4.2  --      --    CO2 25  --    BUN 20  --    CREATININE 3.8*  --    CALCIUM 8.8  --    MG  --  2.4     CBC:   Recent Labs  Lab 06/22/17  1716 06/23/17  0734 06/24/17  0758   WBC 5.97 6.00 7.46   HGB 8.4* 7.8* 8.7*   HCT 25.8* 24.0* 26.0*   * 121* 111*       Significant Imaging: None    Assessment/Plan:      Anemia    Sub acute (likely lower) GI losses  HD stable currently  GI consulted by ED  EGD, [place] negative  IVC filter in place  Continue heparin and add warfarin        Right femoral vein DVT    Diagnosed two months ago  Still seen on US  IVC filter placed            ESRD (end stage renal disease) on dialysis    Nephrology following          HTN (hypertension)    Continue home regimen for now, monitor for signs of hypovolemia/hypotension            VTE Risk Mitigation         Ordered     warfarin (COUMADIN) tablet 5 mg  Daily     Route:  Oral        06/24/17 1647     Medium Risk of VTE  Once      06/22/17 0910     Place sequential compression device  Until discontinued      06/22/17 0910     Place RODRI hose  Until discontinued      06/22/17 0910          Jl Dill MD  Department of Hospital Medicine   [facility]

## 2017-06-24 NOTE — PROGRESS NOTES
Received call from telemetry monitoring room.  Patient is in Atrial Fibrillation with pulse of 207.  Spoke to Dr. Dill.  Charge nurse called.  Will continue to monitor.

## 2017-06-24 NOTE — NURSING
Order for heparin infusion noted and verified with nurse practicioner.  Patient informed and infusion started.  Will observe for any bleeding/melena.

## 2017-06-24 NOTE — PROGRESS NOTES
Patient sitting up in chair. Patient positioned back in bed.  Vital signs taken.   Patient AAOX4.  Patient does not complain of any chest pain, nausea,  Vomitting, or weakness.

## 2017-06-24 NOTE — PROGRESS NOTES
Dialysis completed. Needles removed from left upper arm fistula with pressure held to needle sites for 12 minutes each with hemostasis achieved. Gauze and tape to sites. Patient dialyzed for 3.5 hours with fluid removal of 3 liters. Tolerated well with stable vital signs.. Patient received 1 unit of blood during dialysis. Tolerated well.

## 2017-06-24 NOTE — SUBJECTIVE & OBJECTIVE
Interval History:     Altered to possible arrhthymia by tele office.  Cards reviewed only artifact found.      Patient doing well with no bleeding on heparin.      Review of Systems   All other systems reviewed and are negative.    Objective:     Vital Signs (Most Recent):  Temp: 98 °F (36.7 °C) (06/24/17 1155)  Pulse: (!) 56 (06/24/17 1500)  Resp: 16 (06/24/17 1155)  BP: (!) 177/85 (06/24/17 1239)  SpO2: (!) 94 % (06/24/17 1239) Vital Signs (24h Range):  Temp:  [97.9 °F (36.6 °C)-98.6 °F (37 °C)] 98 °F (36.7 °C)  Pulse:  [51-68] 56  Resp:  [14-20] 16  SpO2:  [91 %-100 %] 94 %  BP: (125-177)/(61-92) 177/85     Weight: 114.8 kg (253 lb)  Body mass index is 37.36 kg/m².    Intake/Output Summary (Last 24 hours) at 06/24/17 1645  Last data filed at 06/24/17 0600   Gross per 24 hour   Intake          1876.25 ml   Output             4000 ml   Net         -2123.75 ml      Physical Exam   Constitutional: He appears well-developed and well-nourished.   HENT:   Head: Atraumatic.   Cardiovascular: Normal rate.    Pulmonary/Chest: Effort normal.   Abdominal: Soft.   Neurological: He is alert.   Skin: Skin is warm.   Nursing note and vitals reviewed.      Significant Labs:   BMP:   Recent Labs  Lab 06/24/17  0357 06/24/17  1206   GLU 69*  --      --    K 4.2  --      --    CO2 25  --    BUN 20  --    CREATININE 3.8*  --    CALCIUM 8.8  --    MG  --  2.4     CBC:   Recent Labs  Lab 06/22/17  1716 06/23/17  0734 06/24/17  0758   WBC 5.97 6.00 7.46   HGB 8.4* 7.8* 8.7*   HCT 25.8* 24.0* 26.0*   * 121* 111*       Significant Imaging: None

## 2017-06-25 VITALS
HEIGHT: 69 IN | BODY MASS INDEX: 37.47 KG/M2 | TEMPERATURE: 98 F | DIASTOLIC BLOOD PRESSURE: 79 MMHG | HEART RATE: 58 BPM | RESPIRATION RATE: 17 BRPM | OXYGEN SATURATION: 97 % | SYSTOLIC BLOOD PRESSURE: 177 MMHG | WEIGHT: 253 LBS

## 2017-06-25 PROCEDURE — 93005 ELECTROCARDIOGRAM TRACING: CPT

## 2017-06-25 PROCEDURE — 25000003 PHARM REV CODE 250: Performed by: INTERNAL MEDICINE

## 2017-06-25 PROCEDURE — 94760 N-INVAS EAR/PLS OXIMETRY 1: CPT

## 2017-06-25 PROCEDURE — 99239 HOSP IP/OBS DSCHRG MGMT >30: CPT | Mod: ,,, | Performed by: INTERNAL MEDICINE

## 2017-06-25 PROCEDURE — 93010 ELECTROCARDIOGRAM REPORT: CPT | Mod: S$GLB,,, | Performed by: INTERNAL MEDICINE

## 2017-06-25 RX ADMIN — LOSARTAN POTASSIUM 25 MG: 25 TABLET, FILM COATED ORAL at 09:06

## 2017-06-25 RX ADMIN — HEPARIN SODIUM AND DEXTROSE 17 UNITS/KG/HR: 10000; 5 INJECTION INTRAVENOUS at 07:06

## 2017-06-25 RX ADMIN — ATORVASTATIN CALCIUM 20 MG: 20 TABLET, FILM COATED ORAL at 09:06

## 2017-06-25 RX ADMIN — CITALOPRAM HYDROBROMIDE 10 MG: 10 TABLET ORAL at 09:06

## 2017-06-25 RX ADMIN — GABAPENTIN 100 MG: 100 CAPSULE ORAL at 06:06

## 2017-06-25 RX ADMIN — CETIRIZINE HYDROCHLORIDE 5 MG: 5 TABLET, FILM COATED ORAL at 09:06

## 2017-06-25 RX ADMIN — SEVELAMER CARBONATE 1600 MG: 800 TABLET, FILM COATED ORAL at 10:06

## 2017-06-25 RX ADMIN — HYDRALAZINE HYDROCHLORIDE 50 MG: 50 TABLET ORAL at 06:06

## 2017-06-25 NOTE — PROGRESS NOTES
Patient refused labs this morning since 4 am.  Patient has no IV access.  Patient complains of no pain, nausea or vomitting.  Spoke to Dr. Dill.  Will continue to monitor.

## 2017-06-25 NOTE — NURSING
"Pt. Is currently on a heparin drip , he refused to have any labs drawn this am, stating " I an ready to go home nobody is sticking me anymore... Dr. RACHEAL Fowler notified of pt.'s  Refusals , ordered to keep heparin running at its current rate, and address this matter with assigned team this am... Pt. Educated on the importance of knowing his heparin and also complications of this medication, pt. Became agitated and continue to refuse lab draw...  "

## 2017-06-25 NOTE — PROGRESS NOTES
Received call from Vivian in telemetry monitoring that patient was V. Fib with a pulse of 183.  Spoke to Dr. Dill.  He ordered a stat EKG.  Will continue to monitor.

## 2017-06-25 NOTE — DISCHARGE SUMMARY
[facility] Hospital Medicine  Discharge Summary      Patient Name: Wilder Carbajal  MRN: [id number]  Admission Date: 6/21/2017  Hospital Length of Stay: 4 days  Discharge Date and Time: 6/25/2017  2:02 PM  Attending Physician: No att. providers found   Discharging Provider: Jl Dill MD  Primary Care Provider: Akhil Adorno MD    [facility] Medicine Team: [facility] HOSP MED  Jl Dill MD    HPI:     66-year-old -American male with past medical history of diabetes, hypertension, hyperlipidemia, asthma, paroxysmal A. fib on Eliquis, thyroid disease, ESRD on HD TTS presents to the ED complaining of rectal bleeding for the past week.  He has noted bright red blood mixed in with brown stool.  He denies any rectal bleeding between bowel movements.  He has some lower abdominal pain with BM.  He has been having intermittent chest pain for the past year and reports left-sided 3/10 chest pain that started yesterday.  He describes the chest pain as feeling as though he just ran for a few minutes.  He reports shortness of breath, cough, rhinorrhea, dysuria, subjective fever.  He denies constipation, nausea, vomiting, weakness.  He went to dialysis on Tuesday but skipped last Thursday and Saturday.  He drinks alcohol and denies tobacco or drug use.    Procedure(s) (LRB):  ESOPHAGOGASTRODUODENOSCOPY (EGD) (N/A)  COLONOSCOPY (N/A)      Indwelling Lines/Drains at time of discharge:   Lines/Drains/Airways     Drain                 Hemodialysis AV Fistula Left upper arm 9[address]70 days              Hospital Course:     The patient's bleeding ceased but he required three units of red cells.  He underwent IVC filter placement for his DVT with no issues.  GI performed and EGD/[place] and found no source of bleeding.  He returned the the hospital floor and was started on heparin.  He did well but began to refuse all care.  He refused lab monitoring for his heparin/warfarin medications.  He also refused IV  placement.  He stated he wanted to leave AMA.  He was warned strongly against this course of action due to his recent bleeding and need for monitoring of anticoagulation.  He stated he understood the risks of PE, Death, GI bleeding and stated he wanted to leave AMA.  He left AMA.  He was advised on the symptoms of PE and told to return if they arose.  He also was told to seek care if he experienced repeat GI bleeding.      During his stay the tele office notified the nursing staff of potential arrhthymias.  This did not correlate with his symptoms and EKG findings.  Cardiology reviewed his tele and felt these were in fact artifactual in nature.      Consults:   Consults         Status Ordering Provider     Inpatient consult to Cardiology  Once     Provider:  (Not yet assigned)    Completed LUCIA COLES     Inpatient consult to Gastroenterology  Once     Provider:  (Not yet assigned)    Completed MEGHAN RODRIGES     Inpatient consult to Hematology  Once     Provider:  (Not yet assigned)    Completed LUCIA COLES     Inpatient consult to Interventional Radiology  Once     Provider:  (Not yet assigned)    Completed LUCIA COLES     Inpatient consult to Nephrology  Once     Provider:  (Not yet assigned)    Completed LUCIA COLES            Pending Diagnostic Studies:     Procedure Component Value Units Date/Time    APTT [[id number]] Collected:  06/23/17 1448    Order Status:  Sent Lab Status:  In process Updated:  06/23/17 1449    Specimen:  Blood from Blood     CBC with Automated Differential [[id number]] Collected:  06/23/17 1605    Order Status:  Sent Lab Status:  In process Updated:  06/23/17 1605    Specimen:  Blood from Blood     CBC with Automated Differential [[id number]] Collected:  06/22/17 1716    Order Status:  Sent Lab Status:  In process Updated:  06/22/17 1717    Specimen:  Blood from Blood     Potassium [[id number]] Collected:  06/23/17 1213    Order Status:  Sent Lab Status:  In process  Updated:  06/23/17 1213    Specimen:  Blood from Blood     Protime-INR [[id number]] Collected:  06/23/17 1504    Order Status:  Sent Lab Status:  In process Updated:  06/23/17 1504    Specimen:  Blood from Blood         Final Active Diagnoses:    Diagnosis Date Noted POA    Metabolic bone disease [E88.9, M90.80] 06/22/2017 Yes    Anemia [D64.9] 06/21/2017 Yes    Right femoral vein DVT [I82.411] 04/24/2017 Yes    ESRD (end stage renal disease) on dialysis [N18.6, Z99.2] 08/15/2015 Not Applicable    HTN (hypertension) [I10] 12/21/2013 Yes      Problems Resolved During this Admission:    Diagnosis Date Noted Date Resolved POA      Discharged Condition: against medical advice    Disposition: Left Against Medical Adv*    Time spent on the discharge of patient: 45 minutes    Jl Dill MD  Department of Hospital Medicine  [facility]

## 2017-06-26 NOTE — PLAN OF CARE
Pt left AMA.     06/26/17 0718   Final Note   Assessment Type Final Discharge Note   Discharge Disposition Left Against   Discharge planning education complete? Yes   Hospital Follow Up  Appt(s) scheduled? No   Discharge plans and expectations educations in teach back method with documentation complete? No   Offered [facility] Bedside Delivery? n/a   Discharge/Hospital Encounter Summary to ([facility]) PCP n/a   Referral to Outpatient Case Management complete? n/a   Referral to / orders for Home Health Complete? n/a   30 day supply of medicines given at discharge, if documented non-compliance / non-adherence? n/a   Any social issues identified prior to discharge? n/a   Did you assess the readiness or willingness of the family or caregiver to support self management of care? No   Right Care Referral Info   Post Acute Recommendation No Care

## 2017-07-09 ENCOUNTER — HOSPITAL ENCOUNTER (EMERGENCY)
Facility: HOSPITAL | Age: 67
Discharge: HOME | End: 2017-07-09
Attending: EMERGENCY MEDICINE
Payer: COMMERCIAL

## 2017-07-09 VITALS
BODY MASS INDEX: 38.51 KG/M2 | WEIGHT: 260 LBS | OXYGEN SATURATION: 96 % | HEIGHT: 69 IN | HEART RATE: 61 BPM | RESPIRATION RATE: 14 BRPM | TEMPERATURE: 98 F | SYSTOLIC BLOOD PRESSURE: 160 MMHG | DIASTOLIC BLOOD PRESSURE: 77 MMHG

## 2017-07-09 DIAGNOSIS — N50.89 SCROTAL EDEMA: Primary | ICD-10-CM

## 2017-07-09 LAB
ALBUMIN SERPL BCP-MCNC: 2.9 G/DL
ALP SERPL-CCNC: 136 U/L
ALT SERPL W/O P-5'-P-CCNC: 21 U/L
ANION GAP SERPL CALC-SCNC: 10 MMOL/L
AST SERPL-CCNC: 38 U/L
BASOPHILS # BLD AUTO: 0.05 K/UL
BASOPHILS NFR BLD: 0.9 %
BILIRUB SERPL-MCNC: 0.9 MG/DL
BUN SERPL-MCNC: 18 MG/DL
CALCIUM SERPL-MCNC: 8.4 MG/DL
CHLORIDE SERPL-SCNC: 98 MMOL/L
CO2 SERPL-SCNC: 33 MMOL/L
CREAT SERPL-MCNC: 4.5 MG/DL
DIFFERENTIAL METHOD: ABNORMAL
EOSINOPHIL # BLD AUTO: 0.3 K/UL
EOSINOPHIL NFR BLD: 5.2 %
ERYTHROCYTE [DISTWIDTH] IN BLOOD BY AUTOMATED COUNT: 17.9 %
EST. GFR  (AFRICAN AMERICAN): 14.5 ML/MIN/1.73 M^2
EST. GFR  (NON AFRICAN AMERICAN): 12.6 ML/MIN/1.73 M^2
GLUCOSE SERPL-MCNC: 68 MG/DL
HCT VFR BLD AUTO: 29.4 %
HGB BLD-MCNC: 9.5 G/DL
LACTATE SERPL-SCNC: 1.4 MMOL/L
LYMPHOCYTES # BLD AUTO: 1.2 K/UL
LYMPHOCYTES NFR BLD: 21.7 %
MAGNESIUM SERPL-MCNC: 2.6 MG/DL
MCH RBC QN AUTO: 31.4 PG
MCHC RBC AUTO-ENTMCNC: 32.3 %
MCV RBC AUTO: 97 FL
MONOCYTES # BLD AUTO: 0.6 K/UL
MONOCYTES NFR BLD: 11.9 %
NEUTROPHILS # BLD AUTO: 3.2 K/UL
NEUTROPHILS NFR BLD: 60.3 %
PHOSPHATE SERPL-MCNC: 4 MG/DL
PLATELET # BLD AUTO: 134 K/UL
PMV BLD AUTO: 12.1 FL
POTASSIUM SERPL-SCNC: 4.9 MMOL/L
PROT SERPL-MCNC: 8 G/DL
RBC # BLD AUTO: 3.03 M/UL
SODIUM SERPL-SCNC: 141 MMOL/L
WBC # BLD AUTO: 5.39 K/UL

## 2017-07-09 PROCEDURE — 25000003 PHARM REV CODE 250: Performed by: PHYSICIAN ASSISTANT

## 2017-07-09 PROCEDURE — 84100 ASSAY OF PHOSPHORUS: CPT

## 2017-07-09 PROCEDURE — 99284 EMERGENCY DEPT VISIT MOD MDM: CPT | Mod: ,,, | Performed by: EMERGENCY MEDICINE

## 2017-07-09 PROCEDURE — 80053 COMPREHEN METABOLIC PANEL: CPT

## 2017-07-09 PROCEDURE — 83735 ASSAY OF MAGNESIUM: CPT

## 2017-07-09 PROCEDURE — 99285 EMERGENCY DEPT VISIT HI MDM: CPT

## 2017-07-09 PROCEDURE — 85025 COMPLETE CBC W/AUTO DIFF WBC: CPT

## 2017-07-09 PROCEDURE — 25500020 PHARM REV CODE 255: Performed by: EMERGENCY MEDICINE

## 2017-07-09 PROCEDURE — 83605 ASSAY OF LACTIC ACID: CPT

## 2017-07-09 RX ORDER — OXYCODONE AND ACETAMINOPHEN 5; 325 MG/1; MG/1
1 TABLET ORAL
Status: COMPLETED | OUTPATIENT
Start: 2017-07-09 | End: 2017-07-09

## 2017-07-09 RX ADMIN — OXYCODONE HYDROCHLORIDE AND ACETAMINOPHEN 1 TABLET: 5; 325 TABLET ORAL at 11:07

## 2017-07-09 RX ADMIN — IOHEXOL 100 ML: 350 INJECTION, SOLUTION INTRAVENOUS at 03:07

## 2017-07-09 NOTE — DISCHARGE INSTRUCTIONS
Follow up with your primary care provider and your kidney specialist.    Call your healthcare provider right away if any of these occur:  New shortness of breath or chest pain  Shortness of breath or chest pain that gets worse  Swelling in both legs or ankles that gets worse  Swelling of the abdomen  Redness, warmth, or swelling in one leg  Fever of 100.4ºF (38ºC) or higher, or as directed by your healthcare provider  Yellow color to your skin or eyes  Rapid, unexplained weight gain  Having to sleep upright or use an increased number of pillows

## 2017-07-09 NOTE — ED NOTES
Set up transport with [facility] van service. [facility]ing states the  should be picking up the patient within the hour.

## 2017-07-09 NOTE — ED TRIAGE NOTES
Pt arrived to the ED with CC of scrotal swelling. Pt states his scrotum has been swollen and painful since Wednesday. Pt states he did not go to Dialysis the day before his scrotum started to swell. Pt states he did go Thursday and yesterday. Pt reports feeling feverish and chills.

## 2017-07-09 NOTE — ED PROVIDER NOTES
Encounter Date: 7/9/2017       History     Chief Complaint   Patient presents with    Groin Swelling     Penis and scrotal swelling x5 days. Pt on dialysis T, TH, SA      This is a 66 year old male with a PMH of HTN, DM, ESRD on HD T/Th/Sat, Paroxysmal A fib (Eliquis), DVT, asthma, thyroid dysfunction who presents to the ED with a chief complaint of scrotal swelling and pain. He reports a 10 day history of progressively worsening scrotal edema. He missed dialysis on 7/4/17 but received it on Th and Sat (yesterday) this week. He was recently admitted for a lower GI bleed and had resection of colon polyps. An IVC filter was placed on 6/22/17. The patient was started on a heparin infusion during his hospitalization. He received a 3 unit PRBC transfusion. Unfortunately he left AMA. There is a charted history of noncompliance with dialysis treatment schedule and dietary restrictions. He is dialyzed via a LUE AV fistula.    He denies fever, chills, chest pain, SOB, vomiting, appetite changes, abdominal pain, dysuria. Makes a small amount of urine at baseline, reports increased urgency and hesitancy since onset of scrotal swelling. Denies shortness of breath, abdominal or lower extremity edema.           Review of patient's allergies indicates:  No Known Allergies  Past Medical History:   Diagnosis Date    Acute pain of left shoulder 1/7/2017    Arthritis     Asthma     Benign neoplasm of eyelid     RUL    Blood clotting tendency     Blood transfusion     Cataract     Chronic kidney disease requiring chronic dialysis     Diabetes mellitus     Diabetic retinopathy     Fever blister     Hyperlipidemia     Hypertension     Infertility     Joint pain     Retinal detachment     Thyroid disease     Weakness 1/7/2017     Past Surgical History:   Procedure Laterality Date    CATARACT EXTRACTION      COLONOSCOPY N/A 6/23/2017    Procedure: COLONOSCOPY;  Surgeon: Vic Clark MD;  Location: [facility] ([address] [place] [address]);   Service: Endoscopy;  Laterality: N/A;    RETINAL DETACHMENT SURGERY       Family History   Problem Relation Age of Onset    Heart attack Mother     No Known Problems Father     No Known Problems Sister     No Known Problems Brother     No Known Problems Maternal Aunt     No Known Problems Maternal Uncle     No Known Problems Paternal Aunt     No Known Problems Paternal Uncle     No Known Problems Maternal Grandmother     No Known Problems Maternal Grandfather     No Known Problems Paternal Grandmother     No Known Problems Paternal Grandfather     Cancer Neg Hx     Amblyopia Neg Hx     Blindness Neg Hx     Cataracts Neg Hx     Diabetes Neg Hx     Glaucoma Neg Hx     Hypertension Neg Hx     Macular degeneration Neg Hx     Retinal detachment Neg Hx     Strabismus Neg Hx     Stroke Neg Hx     Thyroid disease Neg Hx      Social History   Substance Use Topics    Smoking status: Never Smoker    Smokeless tobacco: Never Used    Alcohol use No     Review of Systems   Constitutional: Negative for chills and fever.   HENT: Negative for sore throat.    Respiratory: Negative for shortness of breath.    Cardiovascular: Negative for chest pain.   Gastrointestinal: Negative for abdominal pain, nausea and vomiting.   Genitourinary: Positive for scrotal swelling and testicular pain. Negative for dysuria.   Musculoskeletal: Negative for back pain.   Skin: Negative for rash.   Neurological: Negative for weakness.   Hematological: Does not bruise/bleed easily.       Physical Exam     Initial Vitals [07/09/17 0910]   BP Pulse Resp Temp SpO2   (!) 140/72 62 18 97.7 °F (36.5 °C) 97 %      MAP       94.67         Physical Exam    Constitutional: He appears well-developed and well-nourished. No distress.   HENT:   Head: Atraumatic.   Eyes: Conjunctivae and EOM are normal. Pupils are equal, round, and reactive to light.   Cardiovascular: Normal rate, regular rhythm and normal heart sounds.   Nonpitting edema of  bilateral low extremities with chronic hypertrophic and hyperpigmented skin changes.   Pulmonary/Chest: Breath sounds normal. No respiratory distress. He has no wheezes. He has no rhonchi. He has no rales.   Abdominal: Soft. Bowel sounds are normal. He exhibits no distension. There is no tenderness. There is no rigidity, no rebound and no guarding.   Genitourinary: Right testis shows swelling and tenderness. Left testis shows swelling and tenderness.   Genitourinary Comments: Massive scrotal edema and tenderness to palpation. No erythema or warmth. No open wounds or drainage.   Neurological: He is alert and oriented to person, place, and time.   Skin: Skin is warm and dry. No rash noted.         ED Course   Procedures  Labs Reviewed   CBC W/ AUTO DIFFERENTIAL - Abnormal; Notable for the following:        Result Value    RBC 3.03 (*)     Hemoglobin 9.5 (*)     Hematocrit 29.4 (*)     MCH 31.4 (*)     RDW 17.9 (*)     Platelets 134 (*)     All other components within normal limits   COMPREHENSIVE METABOLIC PANEL - Abnormal; Notable for the following:     CO2 33 (*)     Glucose 68 (*)     Creatinine 4.5 (*)     Calcium 8.4 (*)     Albumin 2.9 (*)     Alkaline Phosphatase 136 (*)     eGFR if  14.5 (*)     eGFR if non  12.6 (*)     All other components within normal limits   MAGNESIUM   PHOSPHORUS   LACTIC ACID, PLASMA         Imaging Results          CT Abdomen Pelvis With Contrast (Final result)  Result time 07/09/17 16:28:21    Final result by Jinny Monotya MD (07/09/17 16:28:21)                 Impression:        Large hydroceles.    Infrarenal IVC filter without obvious IVC thrombus. This would be better evaluated with a CT venogram protocol.    Findings compatible with end-stage renal disease. There is a left renal cyst.    Ascites and severe anasarca.    Advanced degenerative lumbar spine change with stable endplate sclerosis and prominent Schmorl's nodes L2, L3 and  L4.    Bilateral pleural effusions.    Diverticulosis coli.  ______________________________________     Electronically signed by resident: VALENTIN MCKEON MD  Date:     07/09/17  Time:    16:21            As the supervising and teaching physician, I personally reviewed the images and resident's interpretation and I agree with the findings.            Electronically signed by: OLAYINKA ROQUE MD  Date:     07/09/17  Time:    16:28              Narrative:    Procedure comments: The patient was surveyed from the lung bases through the pelvis after the administration of 100 cc Omni 350 IV contrast and data was reconstructed for coronal, sagittal, and axial images. No oral contrast.    Comparison: CT abdomen pelvis 8/3/16.    Findings:    There are bilateral dependent pleural effusions, right greater than left with associated atelectasis in the lower lung zones.  The visualized portions of the heart demonstrate calcific atherosclerosis of the coronary arteries. There is a trace volume of pericardial fluid.    The liver is enlarged measuring 20.2 cm in craniocaudad dimensions. No focal hepatic masses are identified.  The gallbladder shows no evidence of stones or pericholecystic fluid.  There is no intra-or extrahepatic biliary ductal dilatation.    The stomach, spleen, pancreas, and adrenal glands are unremarkable.    The kidneys are atrophic bilaterally in this patient with end-stage renal disease. The kidneys do not concentrate or excrete contrast normally on delayed phase. There is nonspecific perinephric fat stranding and no evidence of hydronephrosis. No renal stones. Stable appearance of a small left renal cyst measuring 3.2 cm. The ureters appear normal in course and caliber without evidence of ureteral dilatation. The urinary bladder and prostate demonstrate no significant abnormality. There are large bilateral hydroceles. Previously described right femoral vein DVT is not seen or evaluated on this  examination.    The abdominal aorta is normal in course and caliber with moderate atherosclerotic calcifications. There is ectasia of the proximal bilateral iliac arteries without evidence of aneurysmal dilatation. There is an infrarenal IVC filter without obvious evidence of thrombus in the IVC noting suboptimal evaluation due to the fact that this is not a CT venogram protocol.    The visualized loops of small and large bowel show no evidence of obstruction or inflammation.  Scattered colonic diverticuli are noted without evidence to suggest diverticulitis. There is a moderate volume of ascites and no intraperitoneal free air. There is evidence of previous midline abdominal incision. Scattered small retroperitoneal and inguinal lymph nodes are noted which appear similar to the previous examination.    There are advanced degenerative changes involving the lower thoracic and lumbar spine as well as endplate sclerosis are, subchondral cystic change and prominent Schmorl's nodes involving L2, L3 and L4 which are stable.  There is severe anasarca.                                         APC / Resident Notes:   66 year old male presents with scrotal edema.  Pt with recent admission for GI bleed, anemia with transfusion, and DVT s/p IVC filter placement.  On exam he is afebrile and nontoxic. Vital signs stable. Abdomen soft, nontender. Scrotum is diffusely edematous with tenderness to palpation. No overlying erythema, warmth, or fluctuance. Trace nonpitting lower extremity edema    DDX includes but is not limited to IVC filter thrombus, hydrocele, orchitis, anasarca, volume overload.    Labs consistent with chronic abnormal findings. Lactate 1.4.  CT abdomen demonstrates findings consistent with anasarca/ascites. No evidence of IVC thrombus. Will discharge home with scheduled dialysis and nephrology f/u. Return to ED precautions given. He is stable for discharge. I discussed the care of this patient with my supervising  MD.               ED Course     Clinical Impression:   The encounter diagnosis was Scrotal edema.    Disposition:   Disposition: Discharged  Condition: Stable                        MARYJANE Chong  07/09/17 5030

## 2017-07-09 NOTE — ED PROVIDER NOTES
Encounter Date: 7/9/2017       History     Chief Complaint   Patient presents with    Groin Swelling     Penis and scrotal swelling x5 days. Pt on dialysis T, TH, SA      HPI  Review of patient's allergies indicates:  No Known Allergies  Past Medical History:   Diagnosis Date    Acute pain of left shoulder 1/7/2017    Arthritis     Asthma     Benign neoplasm of eyelid     RUL    Blood clotting tendency     Blood transfusion     Cataract     Chronic kidney disease requiring chronic dialysis     Diabetes mellitus     Diabetic retinopathy     Fever blister     Hyperlipidemia     Hypertension     Infertility     Joint pain     Retinal detachment     Thyroid disease     Weakness 1/7/2017     Past Surgical History:   Procedure Laterality Date    CATARACT EXTRACTION      COLONOSCOPY N/A 6/23/2017    Procedure: COLONOSCOPY;  Surgeon: Vic Clark MD;  Location: [facility] ([address] [place] [address]);  Service: Endoscopy;  Laterality: N/A;    RETINAL DETACHMENT SURGERY       Family History   Problem Relation Age of Onset    Heart attack Mother     No Known Problems Father     No Known Problems Sister     No Known Problems Brother     No Known Problems Maternal Aunt     No Known Problems Maternal Uncle     No Known Problems Paternal Aunt     No Known Problems Paternal Uncle     No Known Problems Maternal Grandmother     No Known Problems Maternal Grandfather     No Known Problems Paternal Grandmother     No Known Problems Paternal Grandfather     Cancer Neg Hx     Amblyopia Neg Hx     Blindness Neg Hx     Cataracts Neg Hx     Diabetes Neg Hx     Glaucoma Neg Hx     Hypertension Neg Hx     Macular degeneration Neg Hx     Retinal detachment Neg Hx     Strabismus Neg Hx     Stroke Neg Hx     Thyroid disease Neg Hx      Social History   Substance Use Topics    Smoking status: Never Smoker    Smokeless tobacco: Never Used    Alcohol use Yes      Comment: 1 mary berger     Review of Systems    Physical  Exam     Initial Vitals [07/09/17 0910]   BP Pulse Resp Temp SpO2   (!) 140/72 62 18 97.7 °F (36.5 °C) 97 %      MAP       94.67         Physical Exam    ED Course   Procedures  Labs Reviewed - No data to display                            ED Course     Clinical Impression:   The encounter diagnosis was Scrotal edema.

## 2017-07-09 NOTE — ED NOTES
Called CT about when pt will be scanned. CT states they have to call the radiologist to get instructions about what contrast medium they would like.

## 2017-07-14 ENCOUNTER — HOSPITAL ENCOUNTER (EMERGENCY)
Facility: OTHER | Age: 67
Discharge: HOME | End: 2017-07-15
Attending: EMERGENCY MEDICINE
Payer: COMMERCIAL

## 2017-07-14 DIAGNOSIS — L02.31 ABSCESS OF MULTIPLE SITES OF BUTTOCK: Primary | ICD-10-CM

## 2017-07-14 DIAGNOSIS — N50.89 SWELLING OF SCROTUM: ICD-10-CM

## 2017-07-14 PROCEDURE — 99284 EMERGENCY DEPT VISIT MOD MDM: CPT | Mod: 25

## 2017-07-15 VITALS
SYSTOLIC BLOOD PRESSURE: 138 MMHG | HEART RATE: 53 BPM | TEMPERATURE: 98 F | RESPIRATION RATE: 14 BRPM | OXYGEN SATURATION: 99 % | WEIGHT: 243 LBS | BODY MASS INDEX: 36.83 KG/M2 | HEIGHT: 68 IN | DIASTOLIC BLOOD PRESSURE: 62 MMHG

## 2017-07-15 PROCEDURE — 10061 I&D ABSCESS COMP/MULTIPLE: CPT

## 2017-07-15 PROCEDURE — 25000003 PHARM REV CODE 250: Performed by: EMERGENCY MEDICINE

## 2017-07-15 RX ORDER — LIDOCAINE HYDROCHLORIDE 10 MG/ML
10 INJECTION INFILTRATION; PERINEURAL
Status: COMPLETED | OUTPATIENT
Start: 2017-07-15 | End: 2017-07-15

## 2017-07-15 RX ADMIN — LIDOCAINE HYDROCHLORIDE 10 ML: 10 INJECTION, SOLUTION INFILTRATION; PERINEURAL at 12:07

## 2017-07-15 NOTE — ED PROVIDER NOTES
"Encounter Date: 7/14/2017    SCRIBE #1 NOTE: I, Stephani Hinton, am scribing for, and in the presence of,  Dr. Waldron. I have scribed the entire note.       History     Chief Complaint   Patient presents with    Groin Swelling     swollen testicles x 5 days.      Time seen by provider: 12:10 AM    This is a 66 y.o. male with arthritis, asthma, CKD, DM, HLD, and HTN who presents with complaint of constant scrotal swelling that began 2 weeks ago. He notes associated testicle pain and pain with urination. He denies trauma or injury. He also notes multiple "boils" to his buttocks that began 2 weeks ago. He reports chronic BLE swelling. He dialyzes Tues/Thurs/Sat. He denies missing any sessions this past week. He reports he was seen in the ED 1 week ago with similar scrotal swelling with CT performed at that time. He denies chest pain, SOB, fever, chills, cough, congestion, or leg swelling. He denies smoking tobacco, EtOH, or drug use.       The history is provided by the patient.     Review of patient's allergies indicates:  No Known Allergies  Past Medical History:   Diagnosis Date    Acute pain of left shoulder 1/7/2017    Arthritis     Asthma     Benign neoplasm of eyelid     RUL    Blood clotting tendency     Blood transfusion     Cataract     Chronic kidney disease requiring chronic dialysis     Diabetes mellitus     Diabetic retinopathy     Fever blister     Hyperlipidemia     Hypertension     Infertility     Joint pain     Retinal detachment     Thyroid disease     Weakness 1/7/2017     Past Surgical History:   Procedure Laterality Date    CATARACT EXTRACTION      COLONOSCOPY N/A 6/23/2017    Procedure: COLONOSCOPY;  Surgeon: Vic Clark MD;  Location: [address];  Service: Endoscopy;  Laterality: N/A;    RETINAL DETACHMENT SURGERY       Family History   Problem Relation Age of Onset    Heart attack Mother     No Known Problems Father     No Known Problems Sister     No Known " "Problems Brother     No Known Problems Maternal Aunt     No Known Problems Maternal Uncle     No Known Problems Paternal Aunt     No Known Problems Paternal Uncle     No Known Problems Maternal Grandmother     No Known Problems Maternal Grandfather     No Known Problems Paternal Grandmother     No Known Problems Paternal Grandfather     Cancer Neg Hx     Amblyopia Neg Hx     Blindness Neg Hx     Cataracts Neg Hx     Diabetes Neg Hx     Glaucoma Neg Hx     Hypertension Neg Hx     Macular degeneration Neg Hx     Retinal detachment Neg Hx     Strabismus Neg Hx     Stroke Neg Hx     Thyroid disease Neg Hx      Social History   Substance Use Topics    Smoking status: Never Smoker    Smokeless tobacco: Never Used    Alcohol use No     Review of Systems   Constitutional: Negative for chills and fever.   HENT: Negative for congestion and rhinorrhea.    Respiratory: Negative for cough and shortness of breath.    Cardiovascular: Positive for leg swelling (chronic). Negative for chest pain.   Gastrointestinal: Negative for abdominal pain, nausea and vomiting.   Genitourinary: Positive for scrotal swelling and testicular pain. Negative for dysuria.        Pain with urination.   Musculoskeletal: Negative for back pain and neck pain.   Skin: Positive for wound. Negative for rash.        2 "boils" to buttocks.   Neurological: Negative for dizziness and numbness.   Hematological: Does not bruise/bleed easily.       Physical Exam     Initial Vitals [07/14/17 2339]   BP Pulse Resp Temp SpO2   130/60 (!) 50 20 98.5 °F (36.9 °C) 98 %      MAP       83.33         Physical Exam    Nursing note and vitals reviewed.  Constitutional: He appears well-developed and well-nourished. He is not diaphoretic. No distress.   HENT:   Head: Normocephalic and atraumatic.   Eyes: Conjunctivae are normal. Pupils are equal, round, and reactive to light. Right eye exhibits no discharge. Left eye exhibits no discharge.   Neck: Normal " range of motion. Neck supple. No JVD present.   Cardiovascular: Normal rate and regular rhythm. Exam reveals no gallop and no friction rub.    Murmur heard.   Systolic murmur is present   Pulmonary/Chest: No respiratory distress. He has no wheezes. He has no rhonchi. He has rales.   Scattered crackles at bilateral bases.    Abdominal: Soft. Bowel sounds are normal. He exhibits no distension. There is no tenderness. There is no rebound and no guarding.   Genitourinary:   Genitourinary Comments: Scrotum: Approximately cantaloupe sized with tenderness to palpation. No obvious skin breakdown.   Musculoskeletal: He exhibits edema and tenderness.   2+ brawny edema to BLE with venus stasis dermatitis.    Neurological: He is alert and oriented to person, place, and time.   Skin: Skin is warm and dry. No rash noted. No erythema. No pallor.   Left medial buttock: Two 2 cm by 2 cm indurated lesions. Superior lesion has a small central punctum. No spontaneous drainage.    Psychiatric: He has a normal mood and affect. His behavior is normal. Judgment and thought content normal.         ED Course   I & D - Incision and Drainage  Date/Time: 7/15/2017 3:31 AM  Performed by: JIM LUIS  Authorized by: JIM LUIS   Type: abscess  Body area: lower extremity  Location details: left buttock  Anesthesia: local infiltration    Anesthesia:  Local Anesthetic: lidocaine 1% without epinephrine  Anesthetic total: 1 mL  Patient sedated: no  Scalpel size: 11  Incision type: single straight  Complexity: complex  Drainage: purulent and  bloody  Drainage amount: scant  Wound treatment: incision,  drainage and  deloculation  Packing material: 1/4 in gauze  Complications: No  Specimens: No  Implants: No  Patient tolerance: Patient tolerated the procedure well with no immediate complications    I & D - Incision and Drainage  Date/Time: 7/15/2017 3:44 AM  Performed by: JIM LUIS  Authorized by: JIM LUIS   Type: abscess  Body  area: lower extremity  Location details: left buttock  Anesthesia: local infiltration    Anesthesia:  Local Anesthetic: lidocaine 1% without epinephrine  Anesthetic total: 1 mL  Scalpel size: 11  Incision type: single straight  Complexity: complex  Drainage: bloody  Drainage amount: scant  Wound treatment: incision,  deloculation and  drainage  Packing material: 1/4 in gauze  Complications: No  Specimens: No  Implants: No  Patient tolerance: Patient tolerated the procedure well with no immediate complications        Labs Reviewed - No data to display   Imaging Results    None                 Medical Decision Making:   History:   Old Medical Records: I decided to obtain old medical records.  Old Records Summarized: records from clinic visits and records from another hospital.       <> Summary of Records: Patient was seen 6 days ago in the ED for scrotal swelling. He had a full work up including CT scan which showed large hydrocele anasarca.   ED Management:  Emergent evaluation of 66-year-old male with history of ESRD on dialysis who presents with 2 complaints.  First complaint of scrotal swelling.  On further questioning this is more chronic in nature and was fully worked up one week ago in the ER with labs and CT scan which showed no acute process, bilateral hydroceles and edema with anasarca.  Second complaint is 2 abscesses on the left buttock.  These were incised and drained.  Given that there is no overlying erythema or surrounding cellulitis I do not think antibiotics are required.  Packing was left in place and patient was instructed to have it removed tomorrow.  He was advised close follow-up with PCP or return for new or worsening symptoms.            Scribe Attestation:   Scribe #1: I performed the above scribed service and the documentation accurately describes the services I performed. I attest to the accuracy of the note.    Attending Attestation:           Physician Attestation for Scribe:  Physician  Attestation Statement for Scribe #1: I, Dr. Waldron, reviewed documentation, as scribed by Stephani Hinton in my presence, and it is both accurate and complete.                 ED Course     Clinical Impression:     1. Abscess of multiple sites of buttock    2. Swelling of scrotum                                 Yvonne Waldron MD  07/15/17 0608

## 2017-07-26 ENCOUNTER — HOSPITAL ENCOUNTER (EMERGENCY)
Facility: OTHER | Age: 67
Discharge: HOME | End: 2017-07-26
Attending: EMERGENCY MEDICINE
Payer: COMMERCIAL

## 2017-07-26 ENCOUNTER — OFFICE VISIT (OUTPATIENT)
Dept: UROLOGY | Facility: CLINIC | Age: 67
End: 2017-07-26
Payer: COMMERCIAL

## 2017-07-26 VITALS
RESPIRATION RATE: 18 BRPM | HEIGHT: 69 IN | DIASTOLIC BLOOD PRESSURE: 71 MMHG | BODY MASS INDEX: 34.8 KG/M2 | HEART RATE: 56 BPM | TEMPERATURE: 99 F | OXYGEN SATURATION: 98 % | SYSTOLIC BLOOD PRESSURE: 148 MMHG | WEIGHT: 235 LBS

## 2017-07-26 VITALS
SYSTOLIC BLOOD PRESSURE: 150 MMHG | BODY MASS INDEX: 35.61 KG/M2 | HEIGHT: 68 IN | DIASTOLIC BLOOD PRESSURE: 78 MMHG | HEART RATE: 54 BPM | WEIGHT: 235 LBS

## 2017-07-26 DIAGNOSIS — Z99.2 DEPENDENCE ON HEMODIALYSIS: ICD-10-CM

## 2017-07-26 DIAGNOSIS — N50.89 SCROTAL SWELLING: Primary | ICD-10-CM

## 2017-07-26 DIAGNOSIS — R58 BLEEDING: ICD-10-CM

## 2017-07-26 DIAGNOSIS — Z78.9 PROBLEM WITH VASCULAR ACCESS: Primary | ICD-10-CM

## 2017-07-26 PROCEDURE — 1159F MED LIST DOCD IN RCRD: CPT | Mod: S$GLB,,, | Performed by: NURSE PRACTITIONER

## 2017-07-26 PROCEDURE — 99283 EMERGENCY DEPT VISIT LOW MDM: CPT

## 2017-07-26 PROCEDURE — 99999 PR PBB SHADOW E&M-EST. PATIENT-LVL IV: CPT | Mod: PBBFAC,,, | Performed by: NURSE PRACTITIONER

## 2017-07-26 PROCEDURE — 99203 OFFICE O/P NEW LOW 30 MIN: CPT | Mod: S$GLB,,, | Performed by: NURSE PRACTITIONER

## 2017-07-26 NOTE — LETTER
July 26, 2017      Dio Roberson MD  [address]  [facility] [address]           [facility] - Urology 4th Floor  [address]  [facility] [address]  Phone: [phone]          Patient: Wilder Carbajal   MR Number: [id number]   YOB: 1950   Date of Visit: 7/26/2017       Dear Dr. Dio Roberson:    Thank you for referring Wilder Carbajal to me for evaluation. Attached you will find relevant portions of my assessment and plan of care.    If you have questions, please do not hesitate to call me. I look forward to following Wilder Carbajal along with you.    Sincerely,    Veronica Vargas, NP    Enclosure  CC:  No Recipients    If you would like to receive this communication electronically, please contact [email or web address] or [phone] to request more information on [facility] access.    For providers and/or their staff who would like to refer a patient to [facility], please contact us through our one-stop-shop provider referral line, [facility] , at [phone].    If you feel you have received this communication in error or would no longer like to receive these types of communications, please e-mail [email or web address]

## 2017-07-26 NOTE — ED TRIAGE NOTES
Pt arrived via EMS with c/o bleeding to left ant forearm shunt, onset 45 min ago. Left dialysis at 1630 with some bleeding to shunt that resolved prior to leaving. Post pushing self off commode at home, bleeding resumed. EMS applied pressure ace bandage. Dressing removed, blood slowly oozing from shunt. Thrill palpable to site

## 2017-07-26 NOTE — PROGRESS NOTES
"CHIEF COMPLAINT:    Mr. Carbajal is a 66 y.o. male presenting for scrotal swelling  PRESENTING ILLNESS:    Wilder Carbajal is a 66 y.o. male who presents for scrotal swelling.  Last seen with CARL Martin in 2013.     He has been on dialysis on M, W, F.  He still able to void; He voids "a little bit" twice daily with BMs.   Denies any dysuria or gross hematuria     He reports scrotal swelling and pain. He states after dialysis the swelling and pain resolved. He has no complaints today.     He denies any history of prostate problems.  No family history of prostate cancer.   No records of previous psa's.    Pt presents in a wheelchair with a PACE man.    REVIEW OF SYSTEMS:    Review of Systems    Constitutional: Negative for fever and chills.   HENT: Negative for hearing loss.   Eyes: Negative for visual disturbance.   Respiratory: Negative for shortness of breath.   Cardiovascular: Negative for chest pain.   Gastrointestinal: Negative for nausea and vomiting.  Genitourinary: see above  Neurological: Negative for dizziness.    Psychiatric/Behavioral: Negative for confusion.       PATIENT HISTORY:    Past Medical History:   Diagnosis Date    Acute pain of left shoulder 1/7/2017    Arthritis     Asthma     Benign neoplasm of eyelid     RUL    Blood clotting tendency     Blood transfusion     Cataract     Chronic kidney disease requiring chronic dialysis     Diabetes mellitus     Diabetic retinopathy     Fever blister     Hyperlipidemia     Hypertension     Infertility     Joint pain     Retinal detachment     Thyroid disease     Weakness 1/7/2017       Past Surgical History:   Procedure Laterality Date    CATARACT EXTRACTION      COLONOSCOPY N/A 6/23/2017    Procedure: COLONOSCOPY;  Surgeon: Vic Clark MD;  Location: [address];  Service: Endoscopy;  Laterality: N/A;    RETINAL DETACHMENT SURGERY         Family History   Problem Relation Age of Onset    Heart attack Mother     No Known " Problems Father     No Known Problems Sister     No Known Problems Brother     No Known Problems Maternal Aunt     No Known Problems Maternal Uncle     No Known Problems Paternal Aunt     No Known Problems Paternal Uncle     No Known Problems Maternal Grandmother     No Known Problems Maternal Grandfather     No Known Problems Paternal Grandmother     No Known Problems Paternal Grandfather     Cancer Neg Hx     Amblyopia Neg Hx     Blindness Neg Hx     Cataracts Neg Hx     Diabetes Neg Hx     Glaucoma Neg Hx     Hypertension Neg Hx     Macular degeneration Neg Hx     Retinal detachment Neg Hx     Strabismus Neg Hx     Stroke Neg Hx     Thyroid disease Neg Hx        Social History     Social History    Marital status: Single     Spouse name: N/A    Number of children: N/A    Years of education: N/A     Occupational History    Not on file.     Social History Main Topics    Smoking status: Never Smoker    Smokeless tobacco: Never Used    Alcohol use No    Drug use: No    Sexual activity: Not on file     Other Topics Concern    Not on file     Social History Narrative    No narrative on file       Allergies:  Review of patient's allergies indicates no known allergies.    Medications:    Current Outpatient Prescriptions:     ammonium lactate (LAC-HYDRIN) 12 % lotion, Apply topically as needed for Dry Skin., Disp: , Rfl:     apixaban 5 mg Tab, Take 1 tablet (5 mg total) by mouth 2 (two) times daily., Disp: 60 tablet, Rfl: 1    aspirin (ECOTRIN) 81 MG EC tablet, Take 81 mg by mouth once daily. , Disp: , Rfl:     atorvastatin (LIPITOR) 20 MG tablet, Take 20 mg by mouth once daily., Disp: , Rfl:     cetirizine (ZYRTEC) 10 MG tablet, Take 10 mg by mouth once daily., Disp: , Rfl:     cinacalcet (SENSIPAR) 30 MG Tab, Take 30 mg by mouth every evening., Disp: , Rfl:     citalopram (CELEXA) 10 MG tablet, Take 10 mg by mouth once daily., Disp: , Rfl:     diclofenac sodium (VOLTAREN) 1 %  Gel, Apply 2 g topically 4 (four) times daily as needed (for pain)., Disp: , Rfl:     ergocalciferol (ERGOCALCIFEROL) 50,000 unit Cap, Take 50,000 Units by mouth every 14 (fourteen) days. , Disp: , Rfl:     furosemide (LASIX) 80 MG tablet, Take 80 mg by mouth once daily., Disp: , Rfl:     gabapentin (NEURONTIN) 100 MG capsule, Take 100 mg by mouth 3 (three) times daily., Disp: , Rfl:     hydrALAZINE (APRESOLINE) 50 MG tablet, Take 50 mg by mouth 3 (three) times daily., Disp: , Rfl:     hydrocodone-acetaminophen 5-325mg (NORCO) 5-325 mg per tablet, Take 1 tablet by mouth every 6 (six) hours as needed for Pain., Disp: , Rfl:     ipratropium (ATROVENT HFA) 17 mcg/actuation inhaler, Inhale 2 puffs into the lungs 4 (four) times daily. Do not exceed 12 puffs per day, Disp: , Rfl:     ketotifen (ZADITOR) 0.025 % (0.035 %) ophthalmic solution, Apply 1 drop to eye every 8 (eight) hours., Disp: , Rfl:     losartan (COZAAR) 50 MG tablet, Take 1 tablet (50 mg total) by mouth once daily., Disp: 30 tablet, Rfl: 3    mometasone (NASONEX) 50 mcg/actuation nasal spray, 2 sprays by Nasal route once daily., Disp: , Rfl:     podofilox (CONDYLOX) 0.5 % external solution, Apply a small amount to lesions twice daily for three days, then hold for four days and repeat regimen, Disp: , Rfl:     sevelamer carbonate (RENVELA) 800 mg Tab, Take two tablets by mouth four times daily with meals and snacks, Disp: , Rfl:     PHYSICAL EXAMINATION:    Constitutional: He is oriented to person, place, and time. He appears well-developed and well-nourished.  He is in no apparent distress.    Neck: No tracheal deviation present.     Cardiovascular: Normal rate.      Pulmonary/Chest: Effort normal. No respiratory distress.     Abdominal:  He exhibits no distension.     Neurological: He is alert and oriented to person, place, and time.     Skin: Skin is warm and dry.     Psych: Cooperative with normal affect.    Genitourinary: The penis is not  "circumcised with evidence of phimosis, no paraphimosis. The urethral meatus is normal. The testes, epididymides, and cord structures are normal in size and contour bilaterally. The scrotum is normal in size and contour.  Pt refused JOSE ELIAS.     Physical Exam      No results found for: PSA, PSADIAG, PSATOTAL, PSAFREE, PSAFREEPCT    IMPRESSION:    Encounter Diagnoses   Name Primary?    Scrotal swelling Yes    Dependence on hemodialysis          PLAN:  -Discussed PSA with pt. Pt refused JOSE ELIAS and PSA. Discussed importance in detecting prostate cancer but he states, "I do not want to know if I have prostate cancer."  -Discussed scrotal swelling. Explained to use scrotal support and elevate of his testicles if swelling occurs. Sitz bath for pain.    -Discussed paraphimosis precautions and explained it is a medical emergency.   -RTC prn.    I encouraged him or any of his family members to call or email me with questions and/or concerns.      Veronica Vargas, MONICA-FATUMA  "

## 2017-08-02 ENCOUNTER — TELEPHONE (OUTPATIENT)
Dept: ORTHOPEDICS | Facility: CLINIC | Age: 67
End: 2017-08-02

## 2017-08-02 NOTE — TELEPHONE ENCOUNTER
Ortho Telephone Triage Call [phone]  Caller: Brigette Greenberg/Dr. Rashard Edwards/RICARDO GRUBER requesting Ortho appt  for infection R fifth toe. Today's progress notes are being faxed to Ortho Clinic.   Resolution: Reviewed with Podiatry and first available appt scheduled with Dr. Gerald Rene on 8/10/17 at 1:45pm with arrival at 1:00pm. RACHEAL Greenberg states understanding and appt slip faxed to RACHEAL Greenberg to give to  pt who is, presently, in [facility] office. Progress notes delivered to Podiatry.

## 2017-08-07 ENCOUNTER — TELEPHONE (OUTPATIENT)
Dept: PODIATRY | Facility: CLINIC | Age: 67
End: 2017-08-07

## 2017-08-07 NOTE — TELEPHONE ENCOUNTER
----- Message from Zeke Mcwilliams sent at 8/7/2017  3:18 PM CDT -----  Contact: Brigette SILVA  NP to dept Dx: gang green on R small toe requesting a sooner appt than 08/16 pt is being ref by MD RICARDO Knox contact Brigette at [phone]... Pt is only available M-W Fri

## 2017-08-14 ENCOUNTER — OFFICE VISIT (OUTPATIENT)
Dept: PODIATRY | Facility: CLINIC | Age: 67
End: 2017-08-14
Payer: COMMERCIAL

## 2017-08-14 VITALS
DIASTOLIC BLOOD PRESSURE: 74 MMHG | HEIGHT: 68 IN | SYSTOLIC BLOOD PRESSURE: 141 MMHG | HEART RATE: 59 BPM | WEIGHT: 235 LBS | BODY MASS INDEX: 35.61 KG/M2

## 2017-08-14 DIAGNOSIS — L84 TYPE 2 DIABETES MELLITUS WITH PRESSURE CALLUS: ICD-10-CM

## 2017-08-14 DIAGNOSIS — L97.511 DIABETIC ULCER OF TOE OF RIGHT FOOT ASSOCIATED WITH TYPE 2 DIABETES MELLITUS, LIMITED TO BREAKDOWN OF SKIN: Primary | ICD-10-CM

## 2017-08-14 DIAGNOSIS — E11.628 TYPE 2 DIABETES MELLITUS WITH PRESSURE CALLUS: ICD-10-CM

## 2017-08-14 DIAGNOSIS — E11.621 DIABETIC ULCER OF TOE OF RIGHT FOOT ASSOCIATED WITH TYPE 2 DIABETES MELLITUS, LIMITED TO BREAKDOWN OF SKIN: Primary | ICD-10-CM

## 2017-08-14 DIAGNOSIS — B35.1 ONYCHOMYCOSIS DUE TO DERMATOPHYTE: ICD-10-CM

## 2017-08-14 DIAGNOSIS — I73.9 PAD (PERIPHERAL ARTERY DISEASE): ICD-10-CM

## 2017-08-14 PROCEDURE — 87070 CULTURE OTHR SPECIMN AEROBIC: CPT

## 2017-08-14 PROCEDURE — 3008F BODY MASS INDEX DOCD: CPT | Mod: S$GLB,,, | Performed by: PODIATRIST

## 2017-08-14 PROCEDURE — 1159F MED LIST DOCD IN RCRD: CPT | Mod: S$GLB,,, | Performed by: PODIATRIST

## 2017-08-14 PROCEDURE — 11721 DEBRIDE NAIL 6 OR MORE: CPT | Mod: Q9,S$GLB,, | Performed by: PODIATRIST

## 2017-08-14 PROCEDURE — 4010F ACE/ARB THERAPY RXD/TAKEN: CPT | Mod: S$GLB,,, | Performed by: PODIATRIST

## 2017-08-14 PROCEDURE — 1126F AMNT PAIN NOTED NONE PRSNT: CPT | Mod: S$GLB,,, | Performed by: PODIATRIST

## 2017-08-14 PROCEDURE — 99203 OFFICE O/P NEW LOW 30 MIN: CPT | Mod: 25,S$GLB,, | Performed by: PODIATRIST

## 2017-08-14 PROCEDURE — 99999 PR PBB SHADOW E&M-EST. PATIENT-LVL V: CPT | Mod: PBBFAC,,, | Performed by: PODIATRIST

## 2017-08-14 PROCEDURE — 3044F HG A1C LEVEL LT 7.0%: CPT | Mod: S$GLB,,, | Performed by: PODIATRIST

## 2017-08-14 NOTE — LETTER
August 14, 2017      Dio Roberson MD  [address]  [facility] [address]           [facility] - Podiatry  [address]  [facility] [address]  Phone: [phone]          Patient: Wilder Carbajal   MR Number: [id number]   YOB: 1950   Date of Visit: 8/14/2017       Dear Dr. Dio Roberson:    Thank you for referring Wilder Carbajal to me for evaluation. Attached you will find relevant portions of my assessment and plan of care.    If you have questions, please do not hesitate to call me. I look forward to following Wilder Carbajal along with you.    Sincerely,    Reji Lanier, DPJOSE    Enclosure  CC:  No Recipients    If you would like to receive this communication electronically, please contact [email or web address] or [phone] to request more information on Your Office Agent Link access.    For providers and/or their staff who would like to refer a patient to [facility], please contact us through our one-stop-shop provider referral line, [facility] , at [phone].    If you feel you have received this communication in error or would no longer like to receive these types of communications, please e-mail [email or web address]

## 2017-08-14 NOTE — PROGRESS NOTES
Subjective:      Patient ID: Wilder Carbajal is a 67 y.o. male.    Chief Complaint: Diabetes Mellitus (PCP Dr. Roberson); Diabetic Foot Exam; Routine Foot Care; and Foot Problem (right ft. 5th toe)    Wilder is a 67 y.o. male who presents to the clinic for evaluation and treatment of high risk feet. Wilder has a past medical history of Acute pain of left shoulder (1/7/2017); Arthritis; Asthma; Benign neoplasm of eyelid; Blood clotting tendency; Blood transfusion; Cataract; Chronic kidney disease requiring chronic dialysis; Diabetes mellitus; Diabetic retinopathy; Fever blister; Hyperlipidemia; Hypertension; Infertility; Joint pain; Retinal detachment; Thyroid disease; and Weakness (1/7/2017). The patient's chief complaint is long, thick toenails and discoloration at right 5th toe around nail.  Thinks he may have bumped toe but does not feel it and has trouble seeing feet. Nurses at HD worried about infection. This patient has documented high risk feet requiring routine maintenance secondary to peripheral neuropathy.    PCP: Dio Roberson MD    Date Last Seen by vascular surgery, Dr. Zhou, 5/24/17        Hemoglobin A1C   Date Value Ref Range Status   04/22/2017 5.3 4.5 - 6.2 % Final     Comment:     According to ADA guidelines, hemoglobin A1C <7.0% represents  optimal control in non-pregnant diabetic patients.  Different  metrics may apply to specific populations.   Standards of Medical Care in Diabetes - 2016.  For the purpose of screening for the presence of diabetes:  <5.7%     Consistent with the absence of diabetes  5.7-6.4%  Consistent with increasing risk for diabetes   (prediabetes)  >or=6.5%  Consistent with diabetes  Currently no consensus exists for use of hemoglobin A1C  for diagnosis of diabetes for children.     01/07/2017 5.7 4.5 - 6.2 % Final     Comment:     According to ADA guidelines, hemoglobin A1C <7.0% represents  optimal control in non-pregnant diabetic patients.  Different  metrics may apply to  specific populations.   Standards of Medical Care in Diabetes - 2016.  For the purpose of screening for the presence of diabetes:  <5.7%     Consistent with the absence of diabetes  5.7-6.4%  Consistent with increasing risk for diabetes   (prediabetes)  >or=6.5%  Consistent with diabetes  Currently no consensus exists for use of hemoglobin A1C  for diagnosis of diabetes for children.     08/04/2016 5.6 4.5 - 6.2 % Final     Comment:     According to ADA guidelines, hemoglobin A1C <7.0% represents  optimal control in non-pregnant diabetic patients.  Different  metrics may apply to specific populations.   Standards of Medical Care in Diabetes - 2016.  For the purpose of screening for the presence of diabetes:  <5.7%     Consistent with the absence of diabetes  5.7-6.4%  Consistent with increasing risk for diabetes   (prediabetes)  >or=6.5%  Consistent with diabetes  Currently no consensus exists for use of hemoglobin A1C  for diagnosis of diabetes for children.         ROS        Objective:      Physical Exam   Cardiovascular:   Dorsalis pedis and posterior tibial pulses are diminished Bilaterally. Toes are cool to touch. Feet are warm proximally.There is decreased digital hair. Skin is atrophic, slightly hyperpigmented, and mildly edematous       Musculoskeletal:   Musculoskeletal:  Muscle strength is 5/5 in all groups bilaterally.  Metatarsophalangeal and subtalar range of motion are within normal limits without crepitus bilaterally. There is limitation of ankle dorsiflexion with knees extended and flexed Bilaterally.       Neurological:   [place]-Elder 5.07 monofilamant testing is diminished both feet. Sharp/dull sensation diminished Bilaterally. Light touch absent Bilaterally.       Skin:   Toenails 1-5 bilaterally are elongated by 2-3 mm, thickened by 2-3 mm, discolored/yellowed, dystrophic, brittle with subungual debris. No incurvation. Mild xerosis noted, bilat.    Bruising at right 5th toe around nail. Nail  is loose with superficial wound under nail, 3 mm. Slight odor. No probe to bone. No erythema or purulence.                Assessment:       Encounter Diagnoses   Name Primary?    Diabetic ulcer of toe of right foot associated with type 2 diabetes mellitus, limited to breakdown of skin Yes    Onychomycosis due to dermatophyte     Type 2 diabetes mellitus with pressure callus - Right Foot     PAD (peripheral artery disease)          Plan:       Wilder was seen today for diabetes mellitus, diabetic foot exam, routine foot care and foot problem.    Diagnoses and all orders for this visit:    Diabetic ulcer of toe of right foot associated with type 2 diabetes mellitus, limited to breakdown of skin  -     Aerobic culture    Onychomycosis due to dermatophyte    Type 2 diabetes mellitus with pressure callus - Right Foot    PAD (peripheral artery disease)      I counseled the patient on his conditions, their implications and medical management.        - Shoe inspection. Diabetic Foot Education. Patient reminded of the importance of good nutrition and blood sugar control to help prevent podiatric complications of diabetes. Patient instructed on proper foot hygeine. We discussed wearing proper shoe gear, daily foot inspections, never walking without protective shoe gear, never putting sharp instruments to feet.    - With patient's permission, nails were aggressively reduced and debrided x 10 to their soft tissue attachment mechanically and with electric , removing all offending nail and debris. Patient relates relief following the procedure.    - Obtained verbal consent from the patient for wound debridement, right 5th toe. No anesthesia was required, Utilizing a sterile curet, all non-viable soft tissue was excised to level of health subcutaneous tissue. The 5th toenail was loose and removed. A healthy appearing wound base was achieved with minimal blood loss. Culture taken. Hemostasis achieved with compression.  Wound site was irrigated with normal saline and dressed. Wound care instructions were reviewed with the patient. Patient tolerated the procedure well.

## 2017-08-14 NOTE — PATIENT INSTRUCTIONS
Betadine and Band-Aid to right 5th toe daily.    Surgical shoe for offloading.    Please ask nurses at dialysis to check the foot.    I will call in antibiotic based on culture results.

## 2017-08-16 LAB — BACTERIA SPEC AEROBE CULT: NORMAL

## 2017-08-23 DIAGNOSIS — N18.6 ESRD (END STAGE RENAL DISEASE) ON DIALYSIS: Primary | ICD-10-CM

## 2017-08-23 DIAGNOSIS — Z99.2 ESRD (END STAGE RENAL DISEASE) ON DIALYSIS: Primary | ICD-10-CM

## 2017-08-30 DIAGNOSIS — M79.604 RIGHT LEG PAIN: Primary | ICD-10-CM

## 2017-09-13 ENCOUNTER — HOSPITAL ENCOUNTER (OUTPATIENT)
Dept: VASCULAR SURGERY | Facility: CLINIC | Age: 67
Discharge: HOME | End: 2017-09-13
Attending: SURGERY
Payer: COMMERCIAL

## 2017-09-13 ENCOUNTER — OFFICE VISIT (OUTPATIENT)
Dept: VASCULAR SURGERY | Facility: CLINIC | Age: 67
End: 2017-09-13
Payer: COMMERCIAL

## 2017-09-13 VITALS
WEIGHT: 238 LBS | HEIGHT: 67 IN | BODY MASS INDEX: 37.35 KG/M2 | HEART RATE: 56 BPM | DIASTOLIC BLOOD PRESSURE: 87 MMHG | SYSTOLIC BLOOD PRESSURE: 180 MMHG | TEMPERATURE: 98 F

## 2017-09-13 DIAGNOSIS — M79.604 RIGHT LEG PAIN: ICD-10-CM

## 2017-09-13 DIAGNOSIS — T82.511A PSEUDOANEURYSM OF ARTERIOVENOUS GRAFT, INITIAL ENCOUNTER: ICD-10-CM

## 2017-09-13 DIAGNOSIS — N18.6 ESRD (END STAGE RENAL DISEASE) ON DIALYSIS: ICD-10-CM

## 2017-09-13 DIAGNOSIS — Z99.2 ESRD (END STAGE RENAL DISEASE) ON DIALYSIS: Primary | ICD-10-CM

## 2017-09-13 DIAGNOSIS — Z99.2 ESRD (END STAGE RENAL DISEASE) ON DIALYSIS: ICD-10-CM

## 2017-09-13 DIAGNOSIS — N18.6 ESRD (END STAGE RENAL DISEASE) ON DIALYSIS: Primary | ICD-10-CM

## 2017-09-13 PROCEDURE — 3008F BODY MASS INDEX DOCD: CPT | Mod: S$GLB,,, | Performed by: SURGERY

## 2017-09-13 PROCEDURE — 93990 DOPPLER FLOW TESTING: CPT | Mod: 59,S$GLB,, | Performed by: SURGERY

## 2017-09-13 PROCEDURE — 99999 PR PBB SHADOW E&M-EST. PATIENT-LVL III: CPT | Mod: PBBFAC,,, | Performed by: SURGERY

## 2017-09-13 PROCEDURE — 99214 OFFICE O/P EST MOD 30 MIN: CPT | Mod: S$GLB,,, | Performed by: SURGERY

## 2017-09-13 PROCEDURE — 1125F AMNT PAIN NOTED PAIN PRSNT: CPT | Mod: S$GLB,,, | Performed by: SURGERY

## 2017-09-13 PROCEDURE — 1159F MED LIST DOCD IN RCRD: CPT | Mod: S$GLB,,, | Performed by: SURGERY

## 2017-09-13 PROCEDURE — 93923 UPR/LXTR ART STDY 3+ LVLS: CPT | Mod: S$GLB,,, | Performed by: SURGERY

## 2017-09-14 ENCOUNTER — HOSPITAL ENCOUNTER (EMERGENCY)
Facility: OTHER | Age: 67
Discharge: HOME | End: 2017-09-14
Attending: EMERGENCY MEDICINE
Payer: COMMERCIAL

## 2017-09-14 VITALS
OXYGEN SATURATION: 90 % | TEMPERATURE: 98 F | RESPIRATION RATE: 16 BRPM | HEIGHT: 69 IN | DIASTOLIC BLOOD PRESSURE: 68 MMHG | SYSTOLIC BLOOD PRESSURE: 131 MMHG | WEIGHT: 238 LBS | HEART RATE: 58 BPM | BODY MASS INDEX: 35.25 KG/M2
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
## 2017-09-14 PROCEDURE — 96372 THER/PROPH/DIAG INJ SC/IM: CPT

## 2017-09-14 PROCEDURE — 99283 EMERGENCY DEPT VISIT LOW MDM: CPT | Mod: 25

## 2017-09-14 PROCEDURE — 90471 IMMUNIZATION ADMIN: CPT | Performed by: EMERGENCY MEDICINE

## 2017-09-14 PROCEDURE — 25000003 PHARM REV CODE 250: Performed by: EMERGENCY MEDICINE

## 2017-09-14 PROCEDURE — 63600175 PHARM REV CODE 636 W HCPCS: Performed by: EMERGENCY MEDICINE

## 2017-09-14 PROCEDURE — 90715 TDAP VACCINE 7 YRS/> IM: CPT | Performed by: EMERGENCY MEDICINE

## 2017-09-14 RX ORDER — MORPHINE SULFATE 10 MG/ML
5 INJECTION INTRAMUSCULAR; INTRAVENOUS; SUBCUTANEOUS
Status: COMPLETED | OUTPATIENT
Start: 2017-09-14 | End: 2017-09-14

## 2017-09-14 RX ADMIN — CLOSTRIDIUM TETANI TOXOID ANTIGEN (FORMALDEHYDE INACTIVATED), CORYNEBACTERIUM DIPHTHERIAE TOXOID ANTIGEN (FORMALDEHYDE INACTIVATED), BORDETELLA PERTUSSIS TOXOID ANTIGEN (GLUTARALDEHYDE INACTIVATED), BORDETELLA PERTUSSIS FILAMENTOUS HEMAGGLUTININ ANTIGEN (FORMALDEHYDE INACTIVATED), BORDETELLA PERTUSSIS PERTACTIN ANTIGEN, AND BORDETELLA PERTUSSIS FIMBRIAE 2/3 ANTIGEN 0.5 ML: 5; 2; 2.5; 5; 3; 5 INJECTION, SUSPENSION INTRAMUSCULAR at 12:09

## 2017-09-14 RX ADMIN — BACITRACIN, NEOMYCIN, POLYMYXIN B 1 EACH: 400; 3.5; 5 OINTMENT TOPICAL at 12:09

## 2017-09-14 RX ADMIN — MORPHINE SULFATE 5 MG: 10 INJECTION INTRAVENOUS at 12:09

## 2017-09-14 NOTE — ED TRIAGE NOTES
Pt reports to ED via [facility] after tripping and falling while walking up ramp to dialysis. Pt admits falling to right side, abrasions noted to right knee and right knuckles of hand, bleeding controlled. Pt also c/o chronic left lower back pain exacerbated after falling. Pt does admitting hitting head but denies LOC. Currently taken eliquis, last night yesterday. Denies lightheadedness/dizziness, fevers, chills, N/V/D. Pt does produce urine.
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
## 2017-09-14 NOTE — ED NOTES
SPD to arrive at 1425 to [facility] ED to transport pt to [facility] facility. Will continue to monitor.

## 2017-09-14 NOTE — PROGRESS NOTES
Patient ID: Wilder Carbajal is a 67 y.o. male.    I. HISTORY     Chief Complaint: Follow-up      HPI Wilder Carbajal is a 67 y.o. male  with h/o left RC AVF placed in [place], [place] for ESRD 2/2 DM and HTN.  Pt has been on HD since  and dialyzes onn M/W/F schedule.  He states that he continues to have significant bleeding after dialysis requiring pressure dressing to be in place for 24 hours. He is getting accessed without difficulty and has no other dialysis related complications (high venous pressures, low flow, hypotension etc).  Of note, pt does endorse some bilateral hand numbness and weakness, but nothing of major concern and no progression from baseline.  He also has complaints of b/l LE swelling with skin discoloration of the R>L lower leg.    Underwent an interposition graft of his left RC AVF for 2 large pseudoaneurysms in 2014.  Bleeding from fistula has resolved since moving needle access to upper arm cephalic vein.   No problems with access.    He had a right fifth toe ulcer/callus. Seen and managed by Dr Reji tavarez.   Ulcer has healed and he has no foot/leg complaints other than having to wear the protective shoe.       Review of Systems   Constitution: Negative.   HENT: Negative.    Eyes: Negative.    Cardiovascular: Negative.    Respiratory: Negative.    Endocrine: Negative.    Hematologic/Lymphatic: Negative.    Skin: Negative.    Musculoskeletal: Negative.    Gastrointestinal: Negative.    Genitourinary: Negative.    Neurological: Positive for numbness.        Bilateral hands   Psychiatric/Behavioral: Negative.    Allergic/Immunologic: Negative.        II. PHYSICAL EXAM   Right Arm BP - Sittin/87 (17 1408)  Pulse: (!) 56  Temp: 97.5 °F (36.4 °C)  Body mass index is 37.28 kg/m².    Physical Exam   Constitutional: He is oriented to person, place, and time. He appears well-developed and well-nourished. No distress.   HENT:   Head: Normocephalic and atraumatic.   Eyes: Conjunctivae  and EOM are normal.   Neck: Neck supple. No JVD present.   Cardiovascular: Normal rate.    Pulmonary/Chest: Effort normal. No respiratory distress.   Abdominal: Soft.   Musculoskeletal: Normal range of motion. He exhibits no tenderness.   B/l LE 2+pitting edema and hyperpigmentation R>L   Neurological: He is alert and oriented to person, place, and time.   Skin: Skin is warm and dry. There is erythema.   Psychiatric: He has a normal mood and affect.      Pulsatile thrill in left FA AVG.  +2 radial pulses bilateral   Healed fifth toe ulceration.       III. ASSESSMENT & PLAN (MEDICAL DECISION MAKING)     1. ESRD (end stage renal disease) on dialysis    2. Pseudoaneurysm of arteriovenous graft, initial encounter        Imaging Results:   HD Duplex:   Flow 1383, no stenosis     Assessment/Diagnosis and Plan:  Wilder Carbajal is a 67 y.o. male with a left RC AVF with PTFE interposition.   Bleeding resolved after moving access sites - no new problems with fistula  Toe ulcer healed. Continue local care and protective shoes.    See me back in 6 months with HD duplex    Liz Zhou MD FACS [facility]  Vascular & Endovascular Surgery

## 2017-09-14 NOTE — ED NOTES
Dr. King gave verbal report to Primitivo, nurse at [facility]. Pt ok per MD, to be transported back to facility to receive dialysis today. Will set up transport for pt.

## 2017-10-17 DIAGNOSIS — Z91.81 HISTORY OF FALL: Primary | ICD-10-CM

## 2018-01-06 ENCOUNTER — HOSPITAL ENCOUNTER (INPATIENT)
Facility: HOSPITAL | Age: 68
LOS: 4 days | Discharge: LEFT BEFORE DISCHARGE | DRG: 291 | End: 2018-01-14
Attending: EMERGENCY MEDICINE | Admitting: HOSPITALIST
Payer: COMMERCIAL

## 2018-01-06 DIAGNOSIS — E87.70 HYPERVOLEMIA: ICD-10-CM

## 2018-01-06 DIAGNOSIS — E87.70 HYPERVOLEMIA, UNSPECIFIED HYPERVOLEMIA TYPE: Primary | ICD-10-CM

## 2018-01-06 DIAGNOSIS — R05.9 COUGH: ICD-10-CM

## 2018-01-06 DIAGNOSIS — R09.02 HYPOXIA: ICD-10-CM

## 2018-01-06 DIAGNOSIS — Z99.2 ENCOUNTER FOR EXTRACORPOREAL DIALYSIS: ICD-10-CM

## 2018-01-06 DIAGNOSIS — N18.6 ESRD (END STAGE RENAL DISEASE): ICD-10-CM

## 2018-01-06 PROCEDURE — 99285 EMERGENCY DEPT VISIT HI MDM: CPT | Mod: ,,, | Performed by: PHYSICIAN ASSISTANT

## 2018-01-06 PROCEDURE — 99285 EMERGENCY DEPT VISIT HI MDM: CPT | Mod: 25

## 2018-01-06 PROCEDURE — 11000001 HC ACUTE MED/SURG PRIVATE ROOM

## 2018-01-06 PROCEDURE — 93005 ELECTROCARDIOGRAM TRACING: CPT

## 2018-01-06 PROCEDURE — 93010 ELECTROCARDIOGRAM REPORT: CPT | Mod: ,,, | Performed by: INTERNAL MEDICINE

## 2018-01-06 PROCEDURE — 82962 GLUCOSE BLOOD TEST: CPT

## 2018-01-06 RX ORDER — ALBUTEROL SULFATE 0.83 MG/ML
2.5 SOLUTION RESPIRATORY (INHALATION)
Status: COMPLETED | OUTPATIENT
Start: 2018-01-06 | End: 2018-01-07

## 2018-01-07 PROBLEM — E87.70 HYPERVOLEMIA: Status: ACTIVE | Noted: 2018-01-07

## 2018-01-07 PROBLEM — D63.8 ANEMIA, CHRONIC DISEASE: Status: ACTIVE | Noted: 2017-06-21

## 2018-01-07 PROBLEM — I27.20 PULMONARY HYPERTENSION: Status: ACTIVE | Noted: 2018-01-07

## 2018-01-07 LAB
ALBUMIN SERPL BCP-MCNC: 3 G/DL
ALP SERPL-CCNC: 90 U/L
ALT SERPL W/O P-5'-P-CCNC: 11 U/L
ANION GAP SERPL CALC-SCNC: 16 MMOL/L
AST SERPL-CCNC: 27 U/L
BASOPHILS # BLD AUTO: 0.02 K/UL
BASOPHILS NFR BLD: 0.3 %
BILIRUB SERPL-MCNC: 1.1 MG/DL
BUN SERPL-MCNC: 50 MG/DL (ref 6–30)
BUN SERPL-MCNC: 53 MG/DL
CALCIUM SERPL-MCNC: 9.8 MG/DL
CHLORIDE SERPL-SCNC: 99 MMOL/L
CHLORIDE SERPL-SCNC: 99 MMOL/L (ref 95–110)
CHOLEST SERPL-MCNC: 89 MG/DL
CHOLEST/HDLC SERPL: 2.6 {RATIO}
CO2 SERPL-SCNC: 27 MMOL/L
CREAT SERPL-MCNC: 8.3 MG/DL (ref 0.5–1.4)
CREAT SERPL-MCNC: 8.5 MG/DL
DIFFERENTIAL METHOD: ABNORMAL
EOSINOPHIL # BLD AUTO: 0 K/UL
EOSINOPHIL NFR BLD: 0.3 %
ERYTHROCYTE [DISTWIDTH] IN BLOOD BY AUTOMATED COUNT: 17.6 %
EST. GFR  (AFRICAN AMERICAN): 6.7 ML/MIN/1.73 M^2
EST. GFR  (NON AFRICAN AMERICAN): 5.8 ML/MIN/1.73 M^2
ESTIMATED AVG GLUCOSE: 85 MG/DL
FLUAV AG SPEC QL IA: NEGATIVE
FLUBV AG SPEC QL IA: NEGATIVE
GLUCOSE SERPL-MCNC: 72 MG/DL
GLUCOSE SERPL-MCNC: 73 MG/DL (ref 70–110)
HBA1C MFR BLD HPLC: 4.6 %
HCT VFR BLD AUTO: 34.6 %
HCT VFR BLD CALC: 37 %PCV (ref 36–54)
HDLC SERPL-MCNC: 34 MG/DL
HDLC SERPL: 38.2 %
HGB BLD-MCNC: 11.1 G/DL
IMM GRANULOCYTES # BLD AUTO: 0.01 K/UL
IMM GRANULOCYTES NFR BLD AUTO: 0.2 %
LDLC SERPL CALC-MCNC: 38.6 MG/DL
LYMPHOCYTES # BLD AUTO: 1.2 K/UL
LYMPHOCYTES NFR BLD: 19.8 %
MAGNESIUM SERPL-MCNC: 2.5 MG/DL
MCH RBC QN AUTO: 30.6 PG
MCHC RBC AUTO-ENTMCNC: 32.1 G/DL
MCV RBC AUTO: 95 FL
MONOCYTES # BLD AUTO: 1.2 K/UL
MONOCYTES NFR BLD: 19.5 %
NEUTROPHILS # BLD AUTO: 3.7 K/UL
NEUTROPHILS NFR BLD: 59.9 %
NONHDLC SERPL-MCNC: 55 MG/DL
NRBC BLD-RTO: 0 /100 WBC
PHOSPHATE SERPL-MCNC: 7.2 MG/DL
PLATELET # BLD AUTO: 80 K/UL
PMV BLD AUTO: 10.7 FL
POC IONIZED CALCIUM: 1.03 MMOL/L (ref 1.06–1.42)
POC TCO2 (MEASURED): 29 MMOL/L (ref 23–29)
POCT GLUCOSE: 72 MG/DL (ref 70–110)
POCT GLUCOSE: 81 MG/DL (ref 70–110)
POCT GLUCOSE: 83 MG/DL (ref 70–110)
POCT GLUCOSE: 86 MG/DL (ref 70–110)
POTASSIUM BLD-SCNC: 5.1 MMOL/L (ref 3.5–5.1)
POTASSIUM SERPL-SCNC: 5.4 MMOL/L
PROT SERPL-MCNC: 8.1 G/DL
RBC # BLD AUTO: 3.63 M/UL
SAMPLE: ABNORMAL
SODIUM BLD-SCNC: 140 MMOL/L (ref 136–145)
SODIUM SERPL-SCNC: 142 MMOL/L
SPECIMEN SOURCE: NORMAL
TRIGL SERPL-MCNC: 82 MG/DL
WBC # BLD AUTO: 6.1 K/UL

## 2018-01-07 PROCEDURE — 63600175 PHARM REV CODE 636 W HCPCS: Performed by: PHYSICIAN ASSISTANT

## 2018-01-07 PROCEDURE — 25000003 PHARM REV CODE 250: Performed by: PHYSICIAN ASSISTANT

## 2018-01-07 PROCEDURE — G0378 HOSPITAL OBSERVATION PER HR: HCPCS

## 2018-01-07 PROCEDURE — 87400 INFLUENZA A/B EACH AG IA: CPT | Mod: 59

## 2018-01-07 PROCEDURE — 99214 OFFICE O/P EST MOD 30 MIN: CPT | Mod: ,,, | Performed by: INTERNAL MEDICINE

## 2018-01-07 PROCEDURE — 84100 ASSAY OF PHOSPHORUS: CPT

## 2018-01-07 PROCEDURE — 80053 COMPREHEN METABOLIC PANEL: CPT

## 2018-01-07 PROCEDURE — 80061 LIPID PANEL: CPT

## 2018-01-07 PROCEDURE — 25000242 PHARM REV CODE 250 ALT 637 W/ HCPCS: Performed by: PHYSICIAN ASSISTANT

## 2018-01-07 PROCEDURE — 36415 COLL VENOUS BLD VENIPUNCTURE: CPT

## 2018-01-07 PROCEDURE — 83036 HEMOGLOBIN GLYCOSYLATED A1C: CPT

## 2018-01-07 PROCEDURE — 85025 COMPLETE CBC W/AUTO DIFF WBC: CPT

## 2018-01-07 PROCEDURE — 94761 N-INVAS EAR/PLS OXIMETRY MLT: CPT

## 2018-01-07 PROCEDURE — 83735 ASSAY OF MAGNESIUM: CPT

## 2018-01-07 PROCEDURE — 94640 AIRWAY INHALATION TREATMENT: CPT

## 2018-01-07 PROCEDURE — 11000001 HC ACUTE MED/SURG PRIVATE ROOM

## 2018-01-07 PROCEDURE — 99219 PR INITIAL OBSERVATION CARE,LEVL II: CPT | Mod: ,,, | Performed by: PHYSICIAN ASSISTANT

## 2018-01-07 RX ORDER — GLUCAGON 1 MG
1 KIT INJECTION
Status: DISCONTINUED | OUTPATIENT
Start: 2018-01-07 | End: 2018-01-14 | Stop reason: HOSPADM

## 2018-01-07 RX ORDER — ONDANSETRON 4 MG/1
4 TABLET, ORALLY DISINTEGRATING ORAL EVERY 8 HOURS PRN
Status: DISCONTINUED | OUTPATIENT
Start: 2018-01-07 | End: 2018-01-14 | Stop reason: HOSPADM

## 2018-01-07 RX ORDER — HYDRALAZINE HYDROCHLORIDE 50 MG/1
50 TABLET, FILM COATED ORAL EVERY 8 HOURS PRN
Status: DISCONTINUED | OUTPATIENT
Start: 2018-01-07 | End: 2018-01-14 | Stop reason: HOSPADM

## 2018-01-07 RX ORDER — CITALOPRAM 10 MG/1
10 TABLET ORAL DAILY
Status: DISCONTINUED | OUTPATIENT
Start: 2018-01-07 | End: 2018-01-14 | Stop reason: HOSPADM

## 2018-01-07 RX ORDER — IBUPROFEN 200 MG
24 TABLET ORAL
Status: DISCONTINUED | OUTPATIENT
Start: 2018-01-07 | End: 2018-01-14 | Stop reason: HOSPADM

## 2018-01-07 RX ORDER — FUROSEMIDE 40 MG/1
80 TABLET ORAL DAILY
Status: DISCONTINUED | OUTPATIENT
Start: 2018-01-07 | End: 2018-01-14 | Stop reason: HOSPADM

## 2018-01-07 RX ORDER — ASPIRIN 81 MG/1
81 TABLET ORAL DAILY
Status: DISCONTINUED | OUTPATIENT
Start: 2018-01-07 | End: 2018-01-14 | Stop reason: HOSPADM

## 2018-01-07 RX ORDER — ACETAMINOPHEN 325 MG/1
650 TABLET ORAL EVERY 4 HOURS PRN
Status: DISCONTINUED | OUTPATIENT
Start: 2018-01-07 | End: 2018-01-14 | Stop reason: HOSPADM

## 2018-01-07 RX ORDER — IBUPROFEN 200 MG
16 TABLET ORAL
Status: DISCONTINUED | OUTPATIENT
Start: 2018-01-07 | End: 2018-01-14 | Stop reason: HOSPADM

## 2018-01-07 RX ORDER — SODIUM CHLORIDE 0.9 % (FLUSH) 0.9 %
5 SYRINGE (ML) INJECTION
Status: DISCONTINUED | OUTPATIENT
Start: 2018-01-07 | End: 2018-01-14 | Stop reason: HOSPADM

## 2018-01-07 RX ORDER — LOSARTAN POTASSIUM 50 MG/1
50 TABLET ORAL DAILY
Status: DISCONTINUED | OUTPATIENT
Start: 2018-01-07 | End: 2018-01-14 | Stop reason: HOSPADM

## 2018-01-07 RX ORDER — FUROSEMIDE 10 MG/ML
40 INJECTION INTRAMUSCULAR; INTRAVENOUS ONCE
Status: COMPLETED | OUTPATIENT
Start: 2018-01-07 | End: 2018-01-07

## 2018-01-07 RX ORDER — GLUCAGON 1 MG
1 KIT INJECTION
Status: DISCONTINUED | OUTPATIENT
Start: 2018-01-07 | End: 2018-01-07

## 2018-01-07 RX ORDER — AMOXICILLIN 250 MG
1 CAPSULE ORAL 2 TIMES DAILY PRN
Status: DISCONTINUED | OUTPATIENT
Start: 2018-01-07 | End: 2018-01-14 | Stop reason: HOSPADM

## 2018-01-07 RX ORDER — INSULIN ASPART 100 [IU]/ML
0-5 INJECTION, SOLUTION INTRAVENOUS; SUBCUTANEOUS
Status: DISCONTINUED | OUTPATIENT
Start: 2018-01-07 | End: 2018-01-07

## 2018-01-07 RX ORDER — INSULIN ASPART 100 [IU]/ML
0-5 INJECTION, SOLUTION INTRAVENOUS; SUBCUTANEOUS
Status: DISCONTINUED | OUTPATIENT
Start: 2018-01-07 | End: 2018-01-14 | Stop reason: HOSPADM

## 2018-01-07 RX ORDER — ATORVASTATIN CALCIUM 20 MG/1
20 TABLET, FILM COATED ORAL DAILY
Status: DISCONTINUED | OUTPATIENT
Start: 2018-01-07 | End: 2018-01-14 | Stop reason: HOSPADM

## 2018-01-07 RX ORDER — RAMELTEON 8 MG/1
8 TABLET ORAL NIGHTLY PRN
Status: DISCONTINUED | OUTPATIENT
Start: 2018-01-07 | End: 2018-01-14 | Stop reason: HOSPADM

## 2018-01-07 RX ORDER — SEVELAMER CARBONATE 800 MG/1
800 TABLET, FILM COATED ORAL
Status: DISCONTINUED | OUTPATIENT
Start: 2018-01-07 | End: 2018-01-11

## 2018-01-07 RX ORDER — ERGOCALCIFEROL 1.25 MG/1
50000 CAPSULE ORAL
Status: DISCONTINUED | OUTPATIENT
Start: 2018-01-07 | End: 2018-01-14 | Stop reason: HOSPADM

## 2018-01-07 RX ORDER — PROCHLORPERAZINE MALEATE 10 MG
10 TABLET ORAL EVERY 6 HOURS PRN
Status: DISCONTINUED | OUTPATIENT
Start: 2018-01-07 | End: 2018-01-14 | Stop reason: HOSPADM

## 2018-01-07 RX ORDER — IBUPROFEN 200 MG
16 TABLET ORAL
Status: DISCONTINUED | OUTPATIENT
Start: 2018-01-07 | End: 2018-01-07

## 2018-01-07 RX ORDER — CINACALCET 30 MG/1
30 TABLET, FILM COATED ORAL NIGHTLY
Status: DISCONTINUED | OUTPATIENT
Start: 2018-01-07 | End: 2018-01-14 | Stop reason: HOSPADM

## 2018-01-07 RX ORDER — IBUPROFEN 200 MG
24 TABLET ORAL
Status: DISCONTINUED | OUTPATIENT
Start: 2018-01-07 | End: 2018-01-07

## 2018-01-07 RX ORDER — HYDRALAZINE HYDROCHLORIDE 50 MG/1
50 TABLET, FILM COATED ORAL 3 TIMES DAILY
Status: DISCONTINUED | OUTPATIENT
Start: 2018-01-07 | End: 2018-01-14 | Stop reason: HOSPADM

## 2018-01-07 RX ADMIN — HYDRALAZINE HYDROCHLORIDE 50 MG: 25 TABLET, FILM COATED ORAL at 02:01

## 2018-01-07 RX ADMIN — HYDRALAZINE HYDROCHLORIDE 50 MG: 50 TABLET ORAL at 09:01

## 2018-01-07 RX ADMIN — FUROSEMIDE 80 MG: 40 TABLET ORAL at 09:01

## 2018-01-07 RX ADMIN — ALBUTEROL SULFATE 2.5 MG: 2.5 SOLUTION RESPIRATORY (INHALATION) at 12:01

## 2018-01-07 RX ADMIN — SEVELAMER CARBONATE 800 MG: 800 TABLET, FILM COATED ORAL at 12:01

## 2018-01-07 RX ADMIN — SEVELAMER CARBONATE 800 MG: 800 TABLET, FILM COATED ORAL at 04:01

## 2018-01-07 RX ADMIN — APIXABAN 5 MG: 5 TABLET, FILM COATED ORAL at 09:01

## 2018-01-07 RX ADMIN — SEVELAMER CARBONATE 800 MG: 800 TABLET, FILM COATED ORAL at 09:01

## 2018-01-07 RX ADMIN — FUROSEMIDE 40 MG: 10 INJECTION, SOLUTION INTRAMUSCULAR; INTRAVENOUS at 04:01

## 2018-01-07 RX ADMIN — CITALOPRAM HYDROBROMIDE 10 MG: 10 TABLET ORAL at 09:01

## 2018-01-07 RX ADMIN — ATORVASTATIN CALCIUM 20 MG: 20 TABLET, FILM COATED ORAL at 09:01

## 2018-01-07 RX ADMIN — CINACALCET HYDROCHLORIDE 30 MG: 30 TABLET, COATED ORAL at 09:01

## 2018-01-07 RX ADMIN — ACETAMINOPHEN 650 MG: 325 TABLET, FILM COATED ORAL at 06:01

## 2018-01-07 RX ADMIN — HYDRALAZINE HYDROCHLORIDE 50 MG: 50 TABLET ORAL at 06:01

## 2018-01-07 RX ADMIN — HYDRALAZINE HYDROCHLORIDE 50 MG: 50 TABLET ORAL at 01:01

## 2018-01-07 RX ADMIN — LOSARTAN POTASSIUM 50 MG: 50 TABLET, FILM COATED ORAL at 09:01

## 2018-01-07 RX ADMIN — ERGOCALCIFEROL 50000 UNITS: 1.25 CAPSULE ORAL at 09:01

## 2018-01-07 RX ADMIN — ASPIRIN 81 MG: 81 TABLET, COATED ORAL at 09:01

## 2018-01-07 RX ADMIN — CINACALCET HYDROCHLORIDE 30 MG: 30 TABLET, COATED ORAL at 04:01

## 2018-01-07 NOTE — ED PROVIDER NOTES
"Encounter Date: 1/6/2018    SCRIBE #1 NOTE: I, Dmitry Brower, am scribing for, and in the presence of,  Renata Naranjo MD. I have scribed the following portions of the note - the EKG reading.       History     Chief Complaint   Patient presents with    Back Pain     and leg pain. pt reports " I missed dialysis today because i had to go pay my rent "      Patient is a 67-year-old male with a past medical history of end-stage renal disease on Tuesday, Thursday, and Saturday, HLD, HTN, DM 2, paroxysmal A. fib, right femoral DVT presents the ED via EMS for weakness, fatigue, and back pain.  Patient states that he was "sliding out of bed" and so he called his neighbor who helped him back into bed. He denies falling or any recent falls. Patient states that he has been feeling "lazy".  He endorses generalized weakness and fatigue onset today. He has had a productive cough for the past few days. He endorses chills, but denies any fever or chills. He has had back pain chronically, but denies any back pain at this time.  He admits to taking his pain medication today, but reports noncompliance with his other medication.          Review of patient's allergies indicates:  No Known Allergies  Past Medical History:   Diagnosis Date    Acute pain of left shoulder 1/7/2017    Arthritis     Asthma     Benign neoplasm of eyelid     RUL    Blood clotting tendency     Blood transfusion     Cataract     Chronic kidney disease requiring chronic dialysis     Diabetes mellitus     Diabetic retinopathy     Fever blister     Hyperlipidemia     Hypertension     Infertility     Joint pain     Retinal detachment     Thyroid disease     Weakness 1/7/2017     Past Surgical History:   Procedure Laterality Date    CATARACT EXTRACTION      COLONOSCOPY N/A 6/23/2017    Procedure: COLONOSCOPY;  Surgeon: Vic Clark MD;  Location: [address]);  Service: Endoscopy;  Laterality: N/A;    RETINAL DETACHMENT SURGERY       Family " History   Problem Relation Age of Onset    Heart attack Mother     No Known Problems Father     No Known Problems Sister     No Known Problems Brother     No Known Problems Maternal Aunt     No Known Problems Maternal Uncle     No Known Problems Paternal Aunt     No Known Problems Paternal Uncle     No Known Problems Maternal Grandmother     No Known Problems Maternal Grandfather     No Known Problems Paternal Grandmother     No Known Problems Paternal Grandfather     Cancer Neg Hx     Amblyopia Neg Hx     Blindness Neg Hx     Cataracts Neg Hx     Diabetes Neg Hx     Glaucoma Neg Hx     Hypertension Neg Hx     Macular degeneration Neg Hx     Retinal detachment Neg Hx     Strabismus Neg Hx     Stroke Neg Hx     Thyroid disease Neg Hx      Social History   Substance Use Topics    Smoking status: Never Smoker    Smokeless tobacco: Never Used    Alcohol use No     Review of Systems   Constitutional: Positive for chills and fatigue. Negative for appetite change, diaphoresis and fever.   HENT: Negative for mouth sores, nosebleeds and sore throat.    Eyes: Negative for visual disturbance.   Respiratory: Positive for cough (mild dry). Negative for shortness of breath.    Cardiovascular: Negative for chest pain and leg swelling.   Gastrointestinal: Negative for nausea.   Genitourinary: Negative for dysuria.   Musculoskeletal: Negative for back pain.   Skin: Negative for rash.   Neurological: Positive for weakness. Negative for numbness.   Hematological: Does not bruise/bleed easily.       Physical Exam     Initial Vitals [01/06/18 1938]   BP Pulse Resp Temp SpO2   (!) 183/88 75 16 98.2 °F (36.8 °C) 98 %      MAP       119.67         Physical Exam    Vitals reviewed.  Constitutional: He appears well-developed and well-nourished. He is not diaphoretic. No distress.   HENT:   Head: Normocephalic and atraumatic.   Nose: Nose normal.   Eyes: Conjunctivae and EOM are normal.   Neck: Normal range of  motion.   Cardiovascular: Normal rate, regular rhythm and normal heart sounds. Exam reveals no friction rub.    No murmur heard.  Chronic venous insufficiency. No signs of infection.   Pulmonary/Chest: No respiratory distress. He has wheezes in the right middle field and the left middle field. He has rales in the left middle field and the left lower field.   Abdominal: Soft. Bowel sounds are normal. He exhibits no distension. There is no tenderness.   Musculoskeletal: Normal range of motion.   Neurological: He is alert and oriented to person, place, and time. He has normal strength. No sensory deficit.   Alert and oriented ×3.  He appears somewhat lethargic. No facial asymmetry.  Answering questions appropriately.  Good finger .  No arm drift.  Normal rapid alternating hand movements and finger pincer movements.  Full range of motion and strength throughout.   Skin: Skin is warm and dry. No erythema.   Psychiatric: He has a normal mood and affect. Thought content normal.         ED Course   Procedures  Labs Reviewed   CBC W/ AUTO DIFFERENTIAL - Abnormal; Notable for the following:        Result Value    RBC 3.63 (*)     Hemoglobin 11.1 (*)     Hematocrit 34.6 (*)     RDW 17.6 (*)     Platelets 80 (*)     Mono # 1.2 (*)     Mono% 19.5 (*)     All other components within normal limits   COMPREHENSIVE METABOLIC PANEL - Abnormal; Notable for the following:     Potassium 5.4 (*)     BUN, Bld 53 (*)     Creatinine 8.5 (*)     Albumin 3.0 (*)     Total Bilirubin 1.1 (*)     eGFR if  6.7 (*)     eGFR if non  5.8 (*)     All other components within normal limits   PHOSPHORUS - Abnormal; Notable for the following:     Phosphorus 7.2 (*)     All other components within normal limits   ISTAT PROCEDURE - Abnormal; Notable for the following:     POC BUN 50 (*)     POC Creatinine 8.3 (*)     POC Ionized Calcium 1.03 (*)     All other components within normal limits   MAGNESIUM   HEMOGLOBIN A1C    POCT GLUCOSE     EKG Readings: (Independently Interpreted)   A fib at 64 bpm with no ischemic changes.        X-Rays:   Independently Interpreted Readings:   Chest X-Ray: Cardiomegaly present.  Increased vascular markings consistent with CHF are present.     Medical Decision Making:   History:   Old Medical Records: I decided to obtain old medical records.  Independently Interpreted Test(s):   I have ordered and independently interpreted EKG Reading(s) - see prior notes  Clinical Tests:   Lab Tests: Ordered and Reviewed  Radiological Study: Ordered and Reviewed  Medical Tests: Ordered and Reviewed    Imaging Results          X-Ray Chest PA And Lateral (Final result)  Result time 01/10/18 15:55:23    Final result by Leeroy Hunt III, MD (01/10/18 15:55:23)                 Narrative:    2 views: There is cardiomegaly, mild edema, aortic plaque, and no change.      Electronically signed by: LEEROY HUNT MD  Date:     01/10/18  Time:    15:55                              X-Ray Chest 1 View (Final result)  Result time 01/07/18 00:28:51    Final result by Steve Flores MD (01/07/18 00:28:51)                 Impression:        Hypoventilatory lung changes with abnormal increased attenuation within the lung bases which suggest atelectasis or airspace consolidation with or without dependent pleural fluid.  Mild pulmonary vasculature congestion.      Electronically signed by: STEVE FLORES MD  Date:     01/07/18  Time:    00:28              Narrative:    Technique: AP chest radiograph.    Comparison: Radiograph 06/21/2017.    Findings:    Mediastinal structures are midline.  There is atherosclerotic calcification of the aortic arch.  Cardiac silhouette is magnified by AP technique but appears unchanged in size when compared to the most recent prior exam.  Lung volumes are low but symmetric.  Abnormal increased parenchymal attenuation noted within the lung bases concerning for atelectasis or air space  consolidation.  No pneumothorax blunting of the right costophrenic angle may relate to adjacent parenchymal opacities or dependent pleural fluid.  No free air beneath the diaphragm.  Degenerative changes of the spine and shoulders noted.                                 APC / Resident Notes:       1:36 AM  Patient intermittently desatting. Initially came into ED with oxygen. We took patient off of oxygen, and he was satting fine until his stats started to fluctuate around 86-92%. Patient placed on O2 at 1L and satting at 97%.    CXR with signs of inc attenutation within the lung bases with depedent pleural fluid. Mild pulm vasc congestion. I do not suspect pneumonia at this time give afebrile, no leukocytosis, and mild dry cough, however should consider.    CBC with no leukocytosis. H/H of 11.1/34.6.  ISTAT with K of 5.1. Cr at 8.3.  CMP pending.    Patient to be placed in obs for further eval for further eval and management of hypervolemia and hypoxia.          Attending Attestation:     Physician Attestation Statement for NP/PA:   I discussed this assessment and plan of this patient with the NP/PA, but I did not personally examine the patient. The face to face encounter was performed by the NP/PA.    Other NP/PA Attestation Additions:      Medical Decision Makin-year-old male presenting to the ER with shortness of breath.  Patient has a history of end-stage renal disease on hemodialysis.  He missed hemodialysis secondary to social issues.  He is currently hypoxic which responds to nasal cannula.  EKG shows no evidence of hyperkalemia.  X-ray shows mild bilateral pulmonary edema.  Patient will be placed observation for dialysis.       Physician Attestation for Scribe:      Comments: I, Dr. Renata Naranjo, personally performed the services described in this documentation. All medical record entries made by the scribe were at my direction and in my presence.  I have reviewed the chart and agree that the record  reflects my personal performance and is accurate and complete. Renata Naranjo MD.              ED Course      Clinical Impression:   The primary encounter diagnosis was Hypervolemia, unspecified hypervolemia type. Diagnoses of Cough, Hypoxia, ESRD (end stage renal disease), and Hypervolemia were also pertinent to this visit.    Disposition:   Disposition: Placed in Observation                        Susana Shaikh PA-C  01/07/18 0142       Renata Naranjo MD  01/12/18 [id number]

## 2018-01-07 NOTE — CONSULTS
"Wilder Carbajal is a 67 y.o. male who our service has been consulted to evaluate and give opinion.     History of Present Illness:Patient is a 67-year-old male with a past medical history of end-stage renal disease on T/T/S, HLD, HTN, DM 2, paroxysmal A. fib, right femoral DVT presents the ED via EMS for weakness and SOB. Patient states that he was "sliding out of bed" and so he called his neighbor who helped him back into bed. He denies falling or any recent falls. He missed his dialysis yesterday "because he had to pay his landlord".   No chest pain, he reports no significant shortness of breath while taking history.      Past Medical History:   Diagnosis Date    Acute pain of left shoulder 1/7/2017    Arthritis     Asthma     Benign neoplasm of eyelid     RUL    Blood clotting tendency     Blood transfusion     Cataract     Chronic kidney disease requiring chronic dialysis     Diabetes mellitus     Diabetic retinopathy     Fever blister     Hyperlipidemia     Hypertension     Infertility     Joint pain     Retinal detachment     Thyroid disease     Weakness 1/7/2017       Past Surgical History:   Procedure Laterality Date    CATARACT EXTRACTION      COLONOSCOPY N/A 6/23/2017    Procedure: COLONOSCOPY;  Surgeon: Vic Clark MD;  Location: [address];  Service: Endoscopy;  Laterality: N/A;    RETINAL DETACHMENT SURGERY         Family History   Problem Relation Age of Onset    Heart attack Mother     No Known Problems Father     No Known Problems Sister     No Known Problems Brother     No Known Problems Maternal Aunt     No Known Problems Maternal Uncle     No Known Problems Paternal Aunt     No Known Problems Paternal Uncle     No Known Problems Maternal Grandmother     No Known Problems Maternal Grandfather     No Known Problems Paternal Grandmother     No Known Problems Paternal Grandfather     Cancer Neg Hx     Amblyopia Neg Hx     Blindness Neg Hx     Cataracts " Neg Hx     Diabetes Neg Hx     Glaucoma Neg Hx     Hypertension Neg Hx     Macular degeneration Neg Hx     Retinal detachment Neg Hx     Strabismus Neg Hx     Stroke Neg Hx     Thyroid disease Neg Hx        Review of patient's allergies indicates:  No Known Allergies    Current Facility-Administered Medications   Medication Dose Route Frequency Provider Last Rate Last Dose    acetaminophen tablet 650 mg  650 mg Oral Q4H PRN Rigoberto Cardona PA-C   650 mg at 01/07/18 0636    apixaban tablet 5 mg  5 mg Oral BID Rigoberto Cardona PA-C   5 mg at 01/07/18 0901    aspirin EC tablet 81 mg  81 mg Oral Daily JAYLENE WilliamsonC   81 mg at 01/07/18 0901    atorvastatin tablet 20 mg  20 mg Oral Daily Rigoberto Cardona PA-C   20 mg at 01/07/18 0901    cinacalcet tablet 30 mg  30 mg Oral QHS JAYLENE WilliamsonC   30 mg at 01/07/18 0404    citalopram tablet 10 mg  10 mg Oral Daily Rigoberto Cardona PA-C   10 mg at 01/07/18 0901    dextrose 50% injection 12.5 g  12.5 g Intravenous PRN Rigoberto Cardona PA-C        dextrose 50% injection 25 g  25 g Intravenous PRN Rigoberto Cardona PA-C        ergocalciferol capsule 50,000 Units  50,000 Units Oral Q14 Days JAYLENE WilliamsonC   50,000 Units at 01/07/18 0901    furosemide tablet 80 mg  80 mg Oral Daily Rigoberto Cardona PA-C   80 mg at 01/07/18 0901    glucagon (human recombinant) injection 1 mg  1 mg Intramuscular PRN Rigoberto Cardona PA-C        glucose chewable tablet 16 g  16 g Oral PRN Rigoberto Cardona PA-C        glucose chewable tablet 24 g  24 g Oral PRN Rigoberto Cardona PA-C        hydrALAZINE tablet 50 mg  50 mg Oral Q8H PRN JAYLENE WilliamsonC   50 mg at 01/07/18 0201    hydrALAZINE tablet 50 mg  50 mg Oral TID JAYLENE WilliamsonC   50 mg at 01/07/18 0637    insulin aspart pen 0-5 Units  0-5 Units Subcutaneous QID (AC + HS) PRN Rigoberto Cardona PA-C        losartan tablet 50 mg  50 mg Oral Daily Rigoberto Cardona PA-C   50 mg  at 01/07/18 0901    ondansetron disintegrating tablet 4 mg  4 mg Oral Q8H PRN Rigoberto Cardona PA-C        prochlorperazine tablet 10 mg  10 mg Oral Q6H PRN Rigoberto Cardona PA-C        ramelteon tablet 8 mg  8 mg Oral Nightly PRN Rigoberto Cardona PA-C        senna-docusate 8.6-50 mg per tablet 1 tablet  1 tablet Oral BID PRN Rigoberto Cardona PA-C        sevelamer carbonate tablet 800 mg  800 mg Oral TID WM Rigoberto Cardona PA-C   800 mg at 01/07/18 0909    sodium chloride 0.9% flush 5 mL  5 mL Intravenous PRN Rigoberto Cardona PA-C           Review of Systems:    Patient has no fever, fatigue, visual changes, chest pain, edema, nausea, vomiting, constipation, diarrhea, arthralgias, pruritis, dizziness, depression, confusion.        Temp:  [98.2 °F (36.8 °C)-99 °F (37.2 °C)] 99 °F (37.2 °C)  Pulse:  [57-75] 60  Resp:  [14-25] 20  SpO2:  [86 %-100 %] 93 %  BP: (111-183)/(57-88) 111/57    No intake or output data in the 24 hours ending 01/07/18 1220    Physical Exam:    Gen: WDWN male in no apparent distress  Psych: Normal mood and affect  Skin: No rashes or ulcers  Eyes: Normal conjunctiva and lids, PERRLA  ENT: Normal hearing with no oropharyngeal lesions  Neck: No JVD  Chest: Mild wheezing, very discrete rales at the bases  CV: 3/6 systolic murmur, no gallops or rubs  Abd: Soft, nontender, no distension, positive bowel sounds  Ext: Moderate chronic edema      Recent Labs  Lab 01/07/18  0008      K 5.4*   CL 99   CO2 27   BUN 53*   CREATININE 8.5*   CALCIUM 9.8   PHOS 7.2*           Recent Labs  Lab 01/07/18  0008 01/07/18  0018   WBC 6.10  --    HGB 11.1*  --    HCT 34.6* 37   PLT 80*  --          Impression/Plan:  67-year-old male with a past medical history of end-stage renal disease on T/T/S, HLD, HTN, DM 2 with weakness and a fall.    1. ESRD: T/T/S missed one treatment. He is chronically volume overloaded and has very mild pulmonary congestion on CXR. He does not want dialysis today and I  think he will be fine until tomorrow. So dialysis tomorrow in am.     2. Anemia of Chronic Disease: within target.     3. Metabolic bone disasese: high phos - will need to be on a renal diet and on his Sevelamer with meals.

## 2018-01-07 NOTE — ED NOTES
Tele box requested for pt transport. War room stating out of boxes and will call when one becomes availabilities.

## 2018-01-07 NOTE — ASSESSMENT & PLAN NOTE
"HD TThSa   - Continue Sevelamer 800 mg TID, lasix 80 mg daily  - Missed dialysis today d/t "had to pay rent"  - Nephrology consulted for HD, but likely to be completed Monday  - K 5.4,  Cr 8.5, BUN 53  - Phos 7.2  - Renal diet  "

## 2018-01-07 NOTE — NURSING
Patient resting in bed. No acute changes noted. Patient and son (via phone) given update and poc reviewed. accu-checks 70-80s. Vs stable.

## 2018-01-07 NOTE — H&P
"[facility] Medicine  History & Physical    Patient Name: Wilder Carbajal  MRN: [id number]  Admission Date: 1/6/2018  Attending Physician: Alberto Roblero MD   Primary Care Provider: Dio Roberson MD    [facility] Medicine Team: [facility]OS[facility] MED E Rigoberto Cardona PA-C     Patient information was obtained from patient, past medical records and ER records.     Subjective:     Principal Problem:Hypervolemia    Chief Complaint:   Chief Complaint   Patient presents with    Back Pain     and leg pain. pt reports " I missed dialysis today because i had to go pay my rent "         HPI: Patient is a 67-year-old male with a past medical history of end-stage renal disease on Tuesday, Thursday, and Saturday, HLD, HTN, DM 2, paroxysmal A. fib, right femoral DVT presents the ED via EMS for weakness, fatigue, and back pain.  Patient states that he was "sliding out of bed" and so he called his neighbor who helped him back into bed. He denies falling or any recent falls. Patient states that he has been feeling "lazy".  He endorses generalized weakness and fatigue onset today. He has had a productive cough for the past few days. He endorses chills, but denies any fever or chills. He has had back pain chronically, but denies any back pain at this time.  He admits to taking his pain medication today, but reports noncompliance with his other medication.     In ED, CXR with signs of increased attenutation within the lung bases with depedent pleural fluid. Mild pulm vasc congestion. EKG shows afib, rate controlled.      Past Medical History:   Diagnosis Date    Acute pain of left shoulder 1/7/2017    Arthritis     Asthma     Benign neoplasm of eyelid     RUL    Blood clotting tendency     Blood transfusion     Cataract     Chronic kidney disease requiring chronic dialysis     Diabetes mellitus     Diabetic retinopathy     Fever blister     Hyperlipidemia     Hypertension     Infertility     Joint " pain     Retinal detachment     Thyroid disease     Weakness 1/7/2017       Past Surgical History:   Procedure Laterality Date    CATARACT EXTRACTION      COLONOSCOPY N/A 6/23/2017    Procedure: COLONOSCOPY;  Surgeon: Vic Clark MD;  Location: [address]);  Service: Endoscopy;  Laterality: N/A;    RETINAL DETACHMENT SURGERY         Review of patient's allergies indicates:  No Known Allergies    No current facility-administered medications on file prior to encounter.      Current Outpatient Prescriptions on File Prior to Encounter   Medication Sig    apixaban 5 mg Tab Take 1 tablet (5 mg total) by mouth 2 (two) times daily.    cetirizine (ZYRTEC) 10 MG tablet Take 10 mg by mouth once daily.    gabapentin (NEURONTIN) 100 MG capsule Take 100 mg by mouth 3 (three) times daily.    hydrocodone-acetaminophen 5-325mg (NORCO) 5-325 mg per tablet Take 1 tablet by mouth every 6 (six) hours as needed for Pain.    ammonium lactate (LAC-HYDRIN) 12 % lotion Apply topically as needed for Dry Skin.    aspirin (ECOTRIN) 81 MG EC tablet Take 81 mg by mouth once daily.     atorvastatin (LIPITOR) 20 MG tablet Take 20 mg by mouth once daily.    cinacalcet (SENSIPAR) 30 MG Tab Take 30 mg by mouth every evening.    citalopram (CELEXA) 10 MG tablet Take 10 mg by mouth once daily.    diclofenac sodium (VOLTAREN) 1 % Gel Apply 2 g topically 4 (four) times daily as needed (for pain).    ergocalciferol (ERGOCALCIFEROL) 50,000 unit Cap Take 50,000 Units by mouth every 14 (fourteen) days.     furosemide (LASIX) 80 MG tablet Take 80 mg by mouth once daily.    hydrALAZINE (APRESOLINE) 50 MG tablet Take 50 mg by mouth 3 (three) times daily.    ipratropium (ATROVENT HFA) 17 mcg/actuation inhaler Inhale 2 puffs into the lungs 4 (four) times daily. Do not exceed 12 puffs per day    ketotifen (ZADITOR) 0.025 % (0.035 %) ophthalmic solution Apply 1 drop to eye every 8 (eight) hours.    losartan (COZAAR) 50 MG tablet Take 1  tablet (50 mg total) by mouth once daily.    mometasone (NASONEX) 50 mcg/actuation nasal spray 2 sprays by Nasal route once daily.    podofilox (CONDYLOX) 0.5 % external solution Apply a small amount to lesions twice daily for three days, then hold for four days and repeat regimen    sevelamer carbonate (RENVELA) 800 mg Tab Take two tablets by mouth four times daily with meals and snacks     Family History     Problem Relation (Age of Onset)    Heart attack Mother    No Known Problems Father, Sister, Brother, Maternal Aunt, Maternal Uncle, Paternal Aunt, Paternal Uncle, Maternal Grandmother, Maternal Grandfather, Paternal Grandmother, Paternal Grandfather        Social History Main Topics    Smoking status: Never Smoker    Smokeless tobacco: Never Used    Alcohol use No    Drug use: No    Sexual activity: Not on file     Review of Systems   Constitutional: Positive for fatigue. Negative for activity change, appetite change, chills and fever.        Malaise   HENT: Negative for congestion, ear pain and sore throat.    Eyes: Negative for pain and visual disturbance.   Respiratory: Positive for cough (dry). Negative for shortness of breath.    Cardiovascular: Negative for chest pain and palpitations.   Gastrointestinal: Negative for abdominal pain, diarrhea, nausea and vomiting.   Endocrine: Negative for heat intolerance and polydipsia.   Genitourinary: Negative for dysuria, frequency and urgency.   Musculoskeletal: Positive for back pain (chronic). Negative for gait problem.        (+) BL leg pain   Skin: Negative for rash and wound.   Neurological: Negative for dizziness, seizures, weakness, numbness and headaches.   Psychiatric/Behavioral: Negative for confusion. The patient is not nervous/anxious.      Objective:     Vital Signs (Most Recent):  Temp: 98.9 °F (37.2 °C) (01/07/18 0130)  Pulse: 67 (01/07/18 0237)  Resp: 14 (01/07/18 0237)  BP: (!) 150/70 (01/07/18 0237)  SpO2: 99 % (01/07/18 0237) Vital Signs  (24h Range):  Temp:  [98.2 °F (36.8 °C)-98.9 °F (37.2 °C)] 98.9 °F (37.2 °C)  Pulse:  [60-75] 67  Resp:  [14-25] 14  SpO2:  [86 %-100 %] 99 %  BP: (150-183)/(70-88) 150/70     Weight: 104.3 kg (230 lb)  Body mass index is 34.97 kg/m².    Physical Exam   Constitutional: He is oriented to person, place, and time. He appears well-developed and well-nourished.   Morbidly obese male with slight drooping of L eye/eyebrow   HENT:   Head: Normocephalic and atraumatic.   macroglossia   Eyes: Conjunctivae and EOM are normal. Pupils are equal, round, and reactive to light.   BL cataracts   Neck: Normal range of motion. Neck supple.   Cardiovascular: Normal rate.  An irregularly irregular rhythm present.   Murmur heard.  Pulmonary/Chest: Effort normal. No accessory muscle usage. No tachypnea and no bradypnea. No respiratory distress. He has wheezes in the right upper field, the right middle field, the left upper field and the left middle field. He has rhonchi in the right middle field and the left middle field. He has no rales.   Abdominal: Soft. Bowel sounds are normal.   Musculoskeletal: Normal range of motion.   BUE strength 4/5     Neurological: He is alert and oriented to person, place, and time. No cranial nerve deficit or sensory deficit.   Lethargic initially, but mental status improved.  Oriented and appropriate   Skin: Skin is warm and dry.   Darkened, discolored, stained skin to BLE. Chronic-appearing  1+ BLE pitting edema   Psychiatric: He has a normal mood and affect. His behavior is normal.   Nursing note and vitals reviewed.        CRANIAL NERVES     CN III, IV, VI   Pupils are equal, round, and reactive to light.  Extraocular motions are normal.        Significant Labs:   CBC:   Recent Labs  Lab 01/07/18  0008 01/07/18  0018   WBC 6.10  --    HGB 11.1*  --    HCT 34.6* 37   PLT 80*  --      CMP:   Recent Labs  Lab 01/07/18  0008      K 5.4*   CL 99   CO2 27   GLU 72   BUN 53*   CREATININE 8.5*   CALCIUM  "9.8   PROT 8.1   ALBUMIN 3.0*   BILITOT 1.1*   ALKPHOS 90   AST 27   ALT 11   ANIONGAP 16   EGFRNONAA 5.8*       Significant Imaging: I have reviewed and interpreted all pertinent imaging results/findings within the past 24 hours.   CXR AP   Hypoventilatory lung changes with abnormal increased attenuation within the lung bases which suggest atelectasis or airspace consolidation with or without dependent pleural fluid.  Mild pulmonary vasculature congestion.      Assessment/Plan:     * Hypervolemia    Presents with dry cough, fatigue, malaise x3 days.  Missed HD Today.  - Alk Phos, T bili WNL but CXR abnormal  - CXR AP shows "Hypoventilatory lung changes with abnormal increased attenuation within the lung bases which suggest atelectasis or airspace consolidation with or without dependent pleural fluid.  Mild pulmonary vasculature congestion."   - Cannot r/o PNA, but patient afebrile, VSS, denies SOB  - Possible flu: flu swab pending   - Needs HD.  Nephrology consulted.   - Oxygenation levels decrease to upper 80s with exertion- currently on 1 L O2 NC.    - Additional lasix 40 mg IV given  - Daily weights, strict intake/output  - Continue PRN duonebs; Uses albuterol and atrovent at home   - Noncompliant with home meds  - Noted to have trouble sitting up in bed and lethargic on exam.  Glucose normal.  Utox pending.  - PT/OT pending.    - fall precautions, neuro checks        Pulmonary hypertension    2D ECHO 4/22/2017  CONCLUSIONS     1 - Eccentric hypertrophy.     2 - Mildly depressed left ventricular systolic function (EF 45-50%).     3 - Right ventricular enlargement with mildly depressed systolic function.     4 - Biatrial enlargement.     5 - Mild tricuspid regurgitation.     6 - Mild aortic stenosis, MARANDA = 1.4 cm2, peak velocity = 2.7 m/s, mean gradient = 15 mmHg.     7 - Pulmonary hypertension. The estimated PA systolic pressure is 55 mmHg.     8 - Increased central venous pressure.       - 2D ECHO pending  - " "hypervolemic on exam  - Likely to improve after HD          Anemia, chronic disease    H/H 11.1/34.6  - increased from baseline Hg 6-9.   - stable                    Paroxysmal atrial fibrillation    - Continue apixaban 50 mg BID  - Not currently on rate control- HR 60s-70s  - Mag 2.5, phos 7.2  - On tele        ESRD (end stage renal disease) on dialysis    HD TThSa   - Continue Sevelamer 800 mg TID, lasix 80 mg daily  - Missed dialysis today d/t "had to pay rent"  - Nephrology consulted for HD, but likely to be completed Monday  - K 5.4,  Cr 8.5, BUN 53  - Phos 7.2  - Renal diet        DM (diabetes mellitus) type II controlled with renal manifestation    Hg A1c 4.6.  Not currently on any meds.  - daily glucose checks, SSI   - diabetic diet 1800        HTN (hypertension)    - Continue Losartan 50 mg daily, hydralazine 50 mg TID          HLD (hyperlipidemia)    - continue atorvastatin 20 mg daily  - pt states that he does not take at home  - No recent lipid level- Lipid panel pending        Diabetic macular edema of both eyes    - partial blindness to both eyes  - no changes          VTE Risk Mitigation         Ordered     apixaban tablet 5 mg  2 times daily     Route:  Oral        01/07/18 0304     High Risk of VTE  Once      01/07/18 0304     Place RODRI hose  Until discontinued      01/07/18 0304     Place sequential compression device  Until discontinued      01/07/18 0304     Reason for No Pharmacological VTE Prophylaxis  Once      01/07/18 0304             Rigoberto Cardona PA-C  Department of Hospital Medicine   [facility]  "

## 2018-01-07 NOTE — ASSESSMENT & PLAN NOTE
"Presents with dry cough, fatigue, malaise x3 days.  Missed HD Today.  - Alk Phos, T bili WNL but CXR abnormal  - CXR AP shows "Hypoventilatory lung changes with abnormal increased attenuation within the lung bases which suggest atelectasis or airspace consolidation with or without dependent pleural fluid.  Mild pulmonary vasculature congestion."   - Cannot r/o PNA, but patient afebrile, VSS, denies SOB  - Possible flu: flu swab pending   - Needs HD.  Nephrology consulted.   - Oxygenation levels decrease to upper 80s with exertion- currently on 1 L O2 NC.    - Additional lasix 40 mg IV given  - Daily weights, strict intake/output  - Continue PRN duonebs; Uses albuterol and atrovent at home   - Endorses that he has stopped taking his meds for several days.   - Noted to have trouble sitting up in bed and lethargic on exam.  Glucose normal.  Utox pending.  - PT/OT pending.    - fall precautions, neuro checks  "

## 2018-01-07 NOTE — ED NOTES
Two patient identifiers checked and confirmed.      NEURO: Awake, alert, appropriate for age, and cooperative with a calm affect; pupils equal and round. Oriented x4.  HEENT: Head symmetrical. Bilateral eyes without redness or drainage. Pt reports increase in fatigue. Pt appears tired while answering examination questions.  CARDIAC: Regular rate and rhythm.  RESPIRATORY: Airway is open and patent. Respirations are spontaneous on room air. Normal respiratory effort and rate noted. Pt denies any SOB. Productive cough with green colored sputum noticed. Slight expiratory wheezes noted.  GI/: Abdomen soft and non-distended. Patient is reported to void and stool appropriately for age.  NEUROVASCULAR: All extremities are warm and pink with +2 pulses and capillary refill less than 3 seconds.  MUSCULOSKELETAL: Moves all extremities well; no obvious deformities noted.  SKIN: Warm and dry, adequate turgor, mucus membranes slightly dry and pink. Darkening noted to bilateral lower extremities with slight  in sensation. Dialysis shunt noted to the left upper arm.

## 2018-01-07 NOTE — NURSING
Pt uncooperative and refused to perform bladder scanning and straight cath. On call med team MD made aware and ordered to keep monitoring.

## 2018-01-07 NOTE — ASSESSMENT & PLAN NOTE
- Continue apixaban 50 mg BID  - Not currently on rate control- HR 60s-70s  - Mag 2.5, phos 7.2  - On tele

## 2018-01-07 NOTE — ED NOTES
Pt reporting left sided thigh pain. Wedge pillow placed under the left hip to elevate area. Pt reports some releif.

## 2018-01-07 NOTE — ED TRIAGE NOTES
Pt reports his neighbor called EMS because he was going to sleep and kept falling out of the bed. Pt states he didn't fall onto the floor. Pt states he is also having some lower back pain that begain several years ago but was made worse today when moving in and out of the bed.     Pt states he missed dialysis today.

## 2018-01-07 NOTE — NURSING
Pt admitted from ED in stretcher accompanied by transporting staff in stable condition. Oriented to the room and unit.

## 2018-01-07 NOTE — HPI
"Patient is a 67-year-old male with a past medical history of end-stage renal disease on Tuesday, Thursday, and Saturday, HLD, HTN, DM 2, paroxysmal A. fib, right femoral DVT presents the ED via EMS for weakness, fatigue, and back pain.  Patient states that he was "sliding out of bed" and so he called his neighbor who helped him back into bed. He denies falling or any recent falls. Patient states that he has been feeling "lazy".  He endorses generalized weakness and fatigue onset today. He has had a productive cough for the past few days. He endorses chills, but denies any fever or chills. He has had back pain chronically, but denies any back pain at this time.  He admits to taking his pain medication today, but reports noncompliance with his other medication.     In ED, CXR with signs of increased attenutation within the lung bases with depedent pleural fluid. Mild pulm vasc congestion. EKG shows afib, rate controlled.    "

## 2018-01-07 NOTE — PROGRESS NOTES
Patient seen and examined. Here for hypervolemia 2/2 missed HD, no SOB, no CP, no confusion, no new complaints. Some mild crackles at bases of lungs, but otherwise sitting up in bed eating breakfast. Patient to get HD 01/08, then likely okay for d/c.    Waqas Aviles, PA-C  [facility] Medicine

## 2018-01-07 NOTE — SUBJECTIVE & OBJECTIVE
Past Medical History:   Diagnosis Date    Acute pain of left shoulder 1/7/2017    Arthritis     Asthma     Benign neoplasm of eyelid     RUL    Blood clotting tendency     Blood transfusion     Cataract     Chronic kidney disease requiring chronic dialysis     Diabetes mellitus     Diabetic retinopathy     Fever blister     Hyperlipidemia     Hypertension     Infertility     Joint pain     Retinal detachment     Thyroid disease     Weakness 1/7/2017       Past Surgical History:   Procedure Laterality Date    CATARACT EXTRACTION      COLONOSCOPY N/A 6/23/2017    Procedure: COLONOSCOPY;  Surgeon: Vic Clark MD;  Location: [address];  Service: Endoscopy;  Laterality: N/A;    RETINAL DETACHMENT SURGERY         Review of patient's allergies indicates:  No Known Allergies    No current facility-administered medications on file prior to encounter.      Current Outpatient Prescriptions on File Prior to Encounter   Medication Sig    apixaban 5 mg Tab Take 1 tablet (5 mg total) by mouth 2 (two) times daily.    cetirizine (ZYRTEC) 10 MG tablet Take 10 mg by mouth once daily.    gabapentin (NEURONTIN) 100 MG capsule Take 100 mg by mouth 3 (three) times daily.    hydrocodone-acetaminophen 5-325mg (NORCO) 5-325 mg per tablet Take 1 tablet by mouth every 6 (six) hours as needed for Pain.    ammonium lactate (LAC-HYDRIN) 12 % lotion Apply topically as needed for Dry Skin.    aspirin (ECOTRIN) 81 MG EC tablet Take 81 mg by mouth once daily.     atorvastatin (LIPITOR) 20 MG tablet Take 20 mg by mouth once daily.    cinacalcet (SENSIPAR) 30 MG Tab Take 30 mg by mouth every evening.    citalopram (CELEXA) 10 MG tablet Take 10 mg by mouth once daily.    diclofenac sodium (VOLTAREN) 1 % Gel Apply 2 g topically 4 (four) times daily as needed (for pain).    ergocalciferol (ERGOCALCIFEROL) 50,000 unit Cap Take 50,000 Units by mouth every 14 (fourteen) days.     furosemide (LASIX) 80 MG tablet Take  80 mg by mouth once daily.    hydrALAZINE (APRESOLINE) 50 MG tablet Take 50 mg by mouth 3 (three) times daily.    ipratropium (ATROVENT HFA) 17 mcg/actuation inhaler Inhale 2 puffs into the lungs 4 (four) times daily. Do not exceed 12 puffs per day    ketotifen (ZADITOR) 0.025 % (0.035 %) ophthalmic solution Apply 1 drop to eye every 8 (eight) hours.    losartan (COZAAR) 50 MG tablet Take 1 tablet (50 mg total) by mouth once daily.    mometasone (NASONEX) 50 mcg/actuation nasal spray 2 sprays by Nasal route once daily.    podofilox (CONDYLOX) 0.5 % external solution Apply a small amount to lesions twice daily for three days, then hold for four days and repeat regimen    sevelamer carbonate (RENVELA) 800 mg Tab Take two tablets by mouth four times daily with meals and snacks     Family History     Problem Relation (Age of Onset)    Heart attack Mother    No Known Problems Father, Sister, Brother, Maternal Aunt, Maternal Uncle, Paternal Aunt, Paternal Uncle, Maternal Grandmother, Maternal Grandfather, Paternal Grandmother, Paternal Grandfather        Social History Main Topics    Smoking status: Never Smoker    Smokeless tobacco: Never Used    Alcohol use No    Drug use: No    Sexual activity: Not on file     Review of Systems   Constitutional: Positive for fatigue. Negative for activity change, appetite change, chills and fever.        Malaise   HENT: Negative for congestion, ear pain and sore throat.    Eyes: Negative for pain and visual disturbance.   Respiratory: Positive for cough (dry). Negative for shortness of breath.    Cardiovascular: Negative for chest pain and palpitations.   Gastrointestinal: Negative for abdominal pain, diarrhea, nausea and vomiting.   Endocrine: Negative for heat intolerance and polydipsia.   Genitourinary: Negative for dysuria, frequency and urgency.   Musculoskeletal: Positive for back pain (chronic). Negative for gait problem.        (+) BL leg pain   Skin: Negative for  rash and wound.   Neurological: Negative for dizziness, seizures, weakness, numbness and headaches.   Psychiatric/Behavioral: Negative for confusion. The patient is not nervous/anxious.      Objective:     Vital Signs (Most Recent):  Temp: 98.9 °F (37.2 °C) (01/07/18 0130)  Pulse: 67 (01/07/18 0237)  Resp: 14 (01/07/18 0237)  BP: (!) 150/70 (01/07/18 0237)  SpO2: 99 % (01/07/18 0237) Vital Signs (24h Range):  Temp:  [98.2 °F (36.8 °C)-98.9 °F (37.2 °C)] 98.9 °F (37.2 °C)  Pulse:  [60-75] 67  Resp:  [14-25] 14  SpO2:  [86 %-100 %] 99 %  BP: (150-183)/(70-88) 150/70     Weight: 104.3 kg (230 lb)  Body mass index is 34.97 kg/m².    Physical Exam   Constitutional: He is oriented to person, place, and time. He appears well-developed and well-nourished.   Morbidly obese male with slight drooping of L eye/eyebrow   HENT:   Head: Normocephalic and atraumatic.   macroglossia   Eyes: Conjunctivae and EOM are normal. Pupils are equal, round, and reactive to light.   BL cataracts   Neck: Normal range of motion. Neck supple.   Cardiovascular: Normal rate.  An irregularly irregular rhythm present.   Murmur heard.  Pulmonary/Chest: Effort normal. No accessory muscle usage. No tachypnea and no bradypnea. No respiratory distress. He has wheezes in the right upper field, the right middle field, the left upper field and the left middle field. He has rhonchi in the right middle field and the left middle field. He has no rales.   Abdominal: Soft. Bowel sounds are normal.   Musculoskeletal: Normal range of motion.   BUE strength 4/5     Neurological: He is alert and oriented to person, place, and time. No cranial nerve deficit or sensory deficit.   Lethargic initially, but mental status improved.  Oriented and appropriate   Skin: Skin is warm and dry.   Darkened, discolored, stained skin to BLE. Chronic-appearing  1+ BLE pitting edema   Psychiatric: He has a normal mood and affect. His behavior is normal.   Nursing note and vitals  reviewed.        CRANIAL NERVES     CN III, IV, VI   Pupils are equal, round, and reactive to light.  Extraocular motions are normal.        Significant Labs:   CBC:   Recent Labs  Lab 01/07/18  0008 01/07/18  0018   WBC 6.10  --    HGB 11.1*  --    HCT 34.6* 37   PLT 80*  --      CMP:   Recent Labs  Lab 01/07/18  0008      K 5.4*   CL 99   CO2 27   GLU 72   BUN 53*   CREATININE 8.5*   CALCIUM 9.8   PROT 8.1   ALBUMIN 3.0*   BILITOT 1.1*   ALKPHOS 90   AST 27   ALT 11   ANIONGAP 16   EGFRNONAA 5.8*       Significant Imaging: I have reviewed and interpreted all pertinent imaging results/findings within the past 24 hours.   CXR AP   Hypoventilatory lung changes with abnormal increased attenuation within the lung bases which suggest atelectasis or airspace consolidation with or without dependent pleural fluid.  Mild pulmonary vasculature congestion.

## 2018-01-07 NOTE — ASSESSMENT & PLAN NOTE
- continue atorvastatin 20 mg daily  - pt states that he does not take at home  - No recent lipid level- Lipid panel pending

## 2018-01-08 LAB
ALBUMIN SERPL BCP-MCNC: 2.8 G/DL
ALBUMIN SERPL BCP-MCNC: 2.8 G/DL
ANION GAP SERPL CALC-SCNC: 12 MMOL/L
ANION GAP SERPL CALC-SCNC: 15 MMOL/L
BUN SERPL-MCNC: 51 MG/DL
BUN SERPL-MCNC: 66 MG/DL
CALCIUM SERPL-MCNC: 9.3 MG/DL
CALCIUM SERPL-MCNC: 9.4 MG/DL
CHLORIDE SERPL-SCNC: 101 MMOL/L
CHLORIDE SERPL-SCNC: 98 MMOL/L
CO2 SERPL-SCNC: 25 MMOL/L
CO2 SERPL-SCNC: 27 MMOL/L
CREAT SERPL-MCNC: 7.9 MG/DL
CREAT SERPL-MCNC: 9.3 MG/DL
EST. GFR  (AFRICAN AMERICAN): 6 ML/MIN/1.73 M^2
EST. GFR  (AFRICAN AMERICAN): 7.3 ML/MIN/1.73 M^2
EST. GFR  (NON AFRICAN AMERICAN): 5.2 ML/MIN/1.73 M^2
EST. GFR  (NON AFRICAN AMERICAN): 6.3 ML/MIN/1.73 M^2
GLUCOSE SERPL-MCNC: 59 MG/DL
GLUCOSE SERPL-MCNC: 97 MG/DL
PHOSPHATE SERPL-MCNC: 6.3 MG/DL
PHOSPHATE SERPL-MCNC: 7.7 MG/DL
POCT GLUCOSE: 63 MG/DL (ref 70–110)
POCT GLUCOSE: 72 MG/DL (ref 70–110)
POCT GLUCOSE: 77 MG/DL (ref 70–110)
POCT GLUCOSE: 81 MG/DL (ref 70–110)
POCT GLUCOSE: 85 MG/DL (ref 70–110)
POTASSIUM SERPL-SCNC: 5.1 MMOL/L
POTASSIUM SERPL-SCNC: 5.7 MMOL/L
SODIUM SERPL-SCNC: 138 MMOL/L
SODIUM SERPL-SCNC: 140 MMOL/L

## 2018-01-08 PROCEDURE — 25000003 PHARM REV CODE 250: Performed by: PHYSICIAN ASSISTANT

## 2018-01-08 PROCEDURE — 99225 PR SUBSEQUENT OBSERVATION CARE,LEVEL II: CPT | Mod: ,,, | Performed by: PHYSICIAN ASSISTANT

## 2018-01-08 PROCEDURE — 90935 HEMODIALYSIS ONE EVALUATION: CPT

## 2018-01-08 PROCEDURE — 36415 COLL VENOUS BLD VENIPUNCTURE: CPT

## 2018-01-08 PROCEDURE — 80069 RENAL FUNCTION PANEL: CPT | Mod: 91

## 2018-01-08 PROCEDURE — 11000001 HC ACUTE MED/SURG PRIVATE ROOM

## 2018-01-08 PROCEDURE — G0378 HOSPITAL OBSERVATION PER HR: HCPCS

## 2018-01-08 PROCEDURE — 80069 RENAL FUNCTION PANEL: CPT

## 2018-01-08 PROCEDURE — 90935 HEMODIALYSIS ONE EVALUATION: CPT | Mod: ,,, | Performed by: INTERNAL MEDICINE

## 2018-01-08 RX ORDER — SODIUM CHLORIDE 9 MG/ML
INJECTION, SOLUTION INTRAVENOUS ONCE
Status: DISCONTINUED | OUTPATIENT
Start: 2018-01-08 | End: 2018-01-13

## 2018-01-08 RX ADMIN — CINACALCET HYDROCHLORIDE 30 MG: 30 TABLET, COATED ORAL at 09:01

## 2018-01-08 RX ADMIN — HYDRALAZINE HYDROCHLORIDE 50 MG: 50 TABLET ORAL at 05:01

## 2018-01-08 RX ADMIN — CITALOPRAM HYDROBROMIDE 10 MG: 10 TABLET ORAL at 01:01

## 2018-01-08 RX ADMIN — LOSARTAN POTASSIUM 50 MG: 50 TABLET, FILM COATED ORAL at 01:01

## 2018-01-08 RX ADMIN — ASPIRIN 81 MG: 81 TABLET, COATED ORAL at 01:01

## 2018-01-08 RX ADMIN — HYDRALAZINE HYDROCHLORIDE 50 MG: 50 TABLET ORAL at 09:01

## 2018-01-08 RX ADMIN — APIXABAN 5 MG: 5 TABLET, FILM COATED ORAL at 02:01

## 2018-01-08 RX ADMIN — FUROSEMIDE 80 MG: 40 TABLET ORAL at 01:01

## 2018-01-08 RX ADMIN — SEVELAMER CARBONATE 800 MG: 800 TABLET, FILM COATED ORAL at 05:01

## 2018-01-08 RX ADMIN — SEVELAMER CARBONATE 800 MG: 800 TABLET, FILM COATED ORAL at 01:01

## 2018-01-08 RX ADMIN — APIXABAN 5 MG: 5 TABLET, FILM COATED ORAL at 09:01

## 2018-01-08 RX ADMIN — ATORVASTATIN CALCIUM 20 MG: 20 TABLET, FILM COATED ORAL at 01:01

## 2018-01-08 NOTE — ASSESSMENT & PLAN NOTE
2D ECHO 4/22/2017    1 - Eccentric hypertrophy.     2 - Mildly depressed left ventricular systolic function (EF 45-50%).     3 - Right ventricular enlargement with mildly depressed systolic function.     4 - Biatrial enlargement.     5 - Mild tricuspid regurgitation.     6 - Mild aortic stenosis, MARANDA = 1.4 cm2, peak velocity = 2.7 m/s, mean gradient = 15 mmHg.     7 - Pulmonary hypertension. The estimated PA systolic pressure is 55 mmHg.     8 - Increased central venous pressure.

## 2018-01-08 NOTE — NURSING
Resting in bed. Denies c/o. No distress noted. Assessment complete. Repositioned in bed. HOB up at 45 degrees. SR up X2. CB within reach. Will monitor.

## 2018-01-08 NOTE — PROGRESS NOTES
HD treatment started. No complications with access to left upper arm. Lines secured and telemetry in place. No complaints of discomfort at this time.

## 2018-01-08 NOTE — SUBJECTIVE & OBJECTIVE
"Interval History: No acute events overnight. This AM, pt seen during HD. Informed by dialysis nurse that patient requesting to stop session early 2/2 "personal issues." Pt refuses to elaborate. Denies anxiety, depression, SI/HI/AVH.    Review of Systems   Constitutional: Positive for fatigue. Negative for activity change, appetite change, chills and fever.        Malaise   Respiratory: Positive for cough (dry). Negative for shortness of breath.    Cardiovascular: Negative for chest pain, palpitations and leg swelling.   Gastrointestinal: Negative for abdominal pain, diarrhea, nausea and vomiting.   Musculoskeletal: Positive for back pain (chronic). Negative for gait problem.        (+) BL leg pain   Neurological: Negative for dizziness, seizures, weakness, numbness and headaches.   Psychiatric/Behavioral: Negative for agitation, behavioral problems and confusion. The patient is not nervous/anxious.      Objective:     Vital Signs (Most Recent):  Temp: 99 °F (37.2 °C) (01/08/18 0930)  Pulse: 69 (01/08/18 1200)  Resp: 18 (01/08/18 1045)  BP: (!) 152/73 (01/08/18 1045)  SpO2: 97 % (01/08/18 1203) Vital Signs (24h Range):  Temp:  [98.9 °F (37.2 °C)-99.3 °F (37.4 °C)] 99 °F (37.2 °C)  Pulse:  [61-69] 69  Resp:  [18-20] 18  SpO2:  [80 %-97 %] 97 %  BP: (121-152)/(60-90) 152/73     Weight: 115.6 kg (254 lb 13.6 oz)  Body mass index is 38.75 kg/m².    Intake/Output Summary (Last 24 hours) at 01/08/18 1506  Last data filed at 01/08/18 0600   Gross per 24 hour   Intake              400 ml   Output                0 ml   Net              400 ml      Physical Exam   Constitutional: He is oriented to person, place, and time. He appears well-developed and well-nourished. No distress.   Eyes:   BL cataracts   Cardiovascular: Normal rate.  An irregularly irregular rhythm present.   Murmur heard.  Pulmonary/Chest: Effort normal. No accessory muscle usage. No tachypnea and no bradypnea. No respiratory distress. He has no wheezes. He " has rhonchi in the right middle field and the left middle field. He has no rales.   Abdominal: Soft. Bowel sounds are normal. He exhibits no distension. There is no tenderness.   Obese    Musculoskeletal: Normal range of motion. He exhibits no edema or tenderness.   Neurological: He is alert and oriented to person, place, and time. No cranial nerve deficit or sensory deficit.   Skin: Skin is warm and dry. He is not diaphoretic. No erythema.   Darkened, discolored, stained skin to BLE. Chronic-appearing   Psychiatric: He has a normal mood and affect. His behavior is normal.   Nursing note and vitals reviewed.      Significant Labs: All pertinent labs within the past 24 hours have been reviewed.    Significant Imaging: I have reviewed all pertinent imaging results/findings within the past 24 hours.

## 2018-01-08 NOTE — ASSESSMENT & PLAN NOTE
- Continue Losartan 50 mg daily, hydralazine 50 mg TID  - BP stable with home regimen  - Will continue to monitor

## 2018-01-08 NOTE — ASSESSMENT & PLAN NOTE
"HD TThSa   - Continue Sevelamer 800 mg TID, lasix 80 mg daily  - Missed dialysis today d/t "had to pay rent"  - Nephrology consulted for HD  - Renal diet  "

## 2018-01-08 NOTE — PLAN OF CARE
Problem: Patient Care Overview  Goal: Plan of Care Review  Outcome: Ongoing (interventions implemented as appropriate)  HD treatment ended early due to pt. Request to be taken off. Treatment duration 1 hour and 30 minutes. Pt. States it was a emotional reason for wanting to end treatment early.Angie NP notified. Blood rinsed back and needles removed from left upper arm. Dressing intact and no drainage noted. Thrill and bruit present.

## 2018-01-08 NOTE — PROGRESS NOTES
"[facility] Hospital Medicine  Progress Note    Patient Name: Wilder Carbajal  MRN: [id number]  Patient Class: OP- Observation   Admission Date: 1/6/2018  Length of Stay: 0 days  Attending Physician: Alberto Roblero MD  Primary Care Provider: Dio Roberson MD    [facility] Medicine Team: [facility] MED F Melanie Zepeda PA-C    Subjective:     Principal Problem:Hypervolemia    HPI:  Patient is a 67-year-old male with a past medical history of end-stage renal disease on Tuesday, Thursday, and Saturday, HLD, HTN, DM 2, paroxysmal A. fib, right femoral DVT presents the ED via EMS for weakness, fatigue, and back pain.  Patient states that he was "sliding out of bed" and so he called his neighbor who helped him back into bed. He denies falling or any recent falls. Patient states that he has been feeling "lazy".  He endorses generalized weakness and fatigue onset today. He has had a productive cough for the past few days. He endorses chills, but denies any fever or chills. He has had back pain chronically, but denies any back pain at this time.  He admits to taking his pain medication today, but reports noncompliance with his other medication.     In ED, CXR with signs of increased attenutation within the lung bases with depedent pleural fluid. Mild pulm vasc congestion. EKG shows afib, rate controlled.      Hospital Course:  Pt admitted to observation for hypervolemia 2/2 missed HD. Nephrology consulted. HD 1/8 stopped prematurely per patient request. Pt requiring O2 (de-satted on RA).     Interval History: No acute events overnight. This AM, pt seen during HD. Informed by dialysis nurse that patient requesting to stop session early 2/2 "personal issues." Pt refuses to elaborate. Denies anxiety, depression, SI/HI/AVH.    Review of Systems   Constitutional: Positive for fatigue. Negative for activity change, appetite change, chills and fever.        Malaise   Respiratory: Positive for cough (dry). Negative " for shortness of breath.    Cardiovascular: Negative for chest pain, palpitations and leg swelling.   Gastrointestinal: Negative for abdominal pain, diarrhea, nausea and vomiting.   Musculoskeletal: Positive for back pain (chronic). Negative for gait problem.        (+) BL leg pain   Neurological: Negative for dizziness, seizures, weakness, numbness and headaches.   Psychiatric/Behavioral: Negative for agitation, behavioral problems and confusion. The patient is not nervous/anxious.      Objective:     Vital Signs (Most Recent):  Temp: 99 °F (37.2 °C) (01/08/18 0930)  Pulse: 69 (01/08/18 1200)  Resp: 18 (01/08/18 1045)  BP: (!) 152/73 (01/08/18 1045)  SpO2: 97 % (01/08/18 1203) Vital Signs (24h Range):  Temp:  [98.9 °F (37.2 °C)-99.3 °F (37.4 °C)] 99 °F (37.2 °C)  Pulse:  [61-69] 69  Resp:  [18-20] 18  SpO2:  [80 %-97 %] 97 %  BP: (121-152)/(60-90) 152/73     Weight: 115.6 kg (254 lb 13.6 oz)  Body mass index is 38.75 kg/m².    Intake/Output Summary (Last 24 hours) at 01/08/18 1506  Last data filed at 01/08/18 0600   Gross per 24 hour   Intake              400 ml   Output                0 ml   Net              400 ml      Physical Exam   Constitutional: He is oriented to person, place, and time. He appears well-developed and well-nourished. No distress.   Eyes:   BL cataracts   Cardiovascular: Normal rate.  An irregularly irregular rhythm present.   Murmur heard.  Pulmonary/Chest: Effort normal. No accessory muscle usage. No tachypnea and no bradypnea. No respiratory distress. He has no wheezes. He has rhonchi in the right middle field and the left middle field. He has no rales.   Abdominal: Soft. Bowel sounds are normal. He exhibits no distension. There is no tenderness.   Obese    Musculoskeletal: Normal range of motion. He exhibits no edema or tenderness.   Neurological: He is alert and oriented to person, place, and time. No cranial nerve deficit or sensory deficit.   Skin: Skin is warm and dry. He is not  "diaphoretic. No erythema.   Darkened, discolored, stained skin to BLE. Chronic-appearing   Psychiatric: He has a normal mood and affect. His behavior is normal.   Nursing note and vitals reviewed.      Significant Labs: All pertinent labs within the past 24 hours have been reviewed.    Significant Imaging: I have reviewed all pertinent imaging results/findings within the past 24 hours.    Assessment/Plan:      * Hypervolemia    Missed outpatient HD and endorses noncompliance with home medications  - Presents with dry cough, fatigue, malaise x3 days  - CXR shows hypoventilatory lung changes with abnormal increased attenuation within the lung bases which suggest atelectasis or airspace consolidation with or without dependent pleural fluid.  Mild pulmonary vasculature congestion  - Cannot r/o PNA, but patient afebrile, VSS, denies SOB  - Flu swab negative  - Oxygenation levels decrease to upper 80s with exertion- currently on 1 L O2 NC.    - Continue PRN duonebs; Uses albuterol and atrovent at home   1/8: HD stopped prematurely per patient request (pt will not elaborate on why). Pt requiring O2 (de-satted on RA).         ESRD (end stage renal disease) on dialysis    HD TThSa   - Continue Sevelamer 800 mg TID, lasix 80 mg daily  - Missed dialysis today d/t "had to pay rent"  - Nephrology consulted for HD  - Renal diet        DM (diabetes mellitus) type II controlled with renal manifestation    Hg A1c 4.6. Not currently on any meds.  - Daily glucose checks, SSI   - Diabetic diet 1800        HTN (hypertension)    - Continue Losartan 50 mg daily, hydralazine 50 mg TID  - BP stable with home regimen  - Will continue to monitor        Anemia, chronic disease    H/H 11.1/34.6  - Increased from baseline Hg 6-9.   - HDS, no s/s bleeding        Paroxysmal atrial fibrillation    - Continue apixaban 50 mg BID  - Not currently on rate control- HR 60s-70s  - Telemetry        HLD (hyperlipidemia)    - Continue atorvastatin 20 mg " daily  - Noncompliant with medications at home        Pulmonary hypertension    2D ECHO 4/22/2017    1 - Eccentric hypertrophy.     2 - Mildly depressed left ventricular systolic function (EF 45-50%).     3 - Right ventricular enlargement with mildly depressed systolic function.     4 - Biatrial enlargement.     5 - Mild tricuspid regurgitation.     6 - Mild aortic stenosis, MARANDA = 1.4 cm2, peak velocity = 2.7 m/s, mean gradient = 15 mmHg.     7 - Pulmonary hypertension. The estimated PA systolic pressure is 55 mmHg.     8 - Increased central venous pressure.         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

## 2018-01-08 NOTE — HOSPITAL COURSE
Pt admitted to observation for hypervolemia 2/2 noncompliance with outpatient HD. Nephrology consulted and appreciate assistance. HD 1/8 stopped prematurely per patient request. HD session again 1/9 and duonebs ordered for wheezing. 1/10: Repeated CXR (no change). HD 1/11 (-4.5L); remains above dry weight and still requiring oxygen. HD 1/13 (-4L). 6 min walk shows patient requiring oxygen. Will need insurance approval for home O2; [facility] not open until 1/16 due to holiday. PT/OT rec SNF; pt declined. 1/14: Pt left AMA despite education of risks associated with hypoxia. Encouraged patient to seek medical attention and follow up with PCP.

## 2018-01-08 NOTE — ASSESSMENT & PLAN NOTE
Missed outpatient HD and endorses noncompliance with home medications  - Presents with dry cough, fatigue, malaise x3 days  - CXR shows hypoventilatory lung changes with abnormal increased attenuation within the lung bases which suggest atelectasis or airspace consolidation with or without dependent pleural fluid.  Mild pulmonary vasculature congestion  - Cannot r/o PNA, but patient afebrile, VSS, denies SOB  - Flu swab negative  - Oxygenation levels decrease to upper 80s with exertion- currently on 1 L O2 NC.    - Continue PRN duonebs; Uses albuterol and atrovent at home   1/8: HD stopped prematurely per patient request (pt will not elaborate on why). Pt requiring O2 (de-satted on RA).

## 2018-01-08 NOTE — PLAN OF CARE
Problem: Fall Risk (Adult)  Goal: Absence of Falls  Patient will demonstrate the desired outcomes by discharge/transition of care.   Outcome: Ongoing (interventions implemented as appropriate)  Fall precaution maintained. Hourly rounds done confirming with the lowest position of the bed, locked bed, s/r placement. Pt slept well during the shift. Remained free from fall on this shift.

## 2018-01-08 NOTE — PLAN OF CARE
10am-sw attempted to meet with pt to complete assessment-pt off the floor for dialysis.    3:30pm-sw attempted to meet with pt again, working with therapy.    Kirsty Culver, LCSW [id number]

## 2018-01-08 NOTE — PROGRESS NOTES
HEMODIALYSIS NOTE  Patient evaluated while undergoing hemodialysis indicated for ESRD. Tolerating session with current UFR, no complications.

## 2018-01-09 LAB
ALBUMIN SERPL BCP-MCNC: 2.7 G/DL
ANION GAP SERPL CALC-SCNC: 13 MMOL/L
BASOPHILS # BLD AUTO: 0.03 K/UL
BASOPHILS NFR BLD: 0.6 %
BUN SERPL-MCNC: 57 MG/DL
CALCIUM SERPL-MCNC: 8.8 MG/DL
CHLORIDE SERPL-SCNC: 101 MMOL/L
CO2 SERPL-SCNC: 25 MMOL/L
CREAT SERPL-MCNC: 8.3 MG/DL
DIFFERENTIAL METHOD: ABNORMAL
EOSINOPHIL # BLD AUTO: 0.1 K/UL
EOSINOPHIL NFR BLD: 2.6 %
ERYTHROCYTE [DISTWIDTH] IN BLOOD BY AUTOMATED COUNT: 16.8 %
EST. GFR  (AFRICAN AMERICAN): 6.9 ML/MIN/1.73 M^2
EST. GFR  (NON AFRICAN AMERICAN): 6 ML/MIN/1.73 M^2
GLUCOSE SERPL-MCNC: 67 MG/DL
HCT VFR BLD AUTO: 31.4 %
HGB BLD-MCNC: 10.2 G/DL
IMM GRANULOCYTES # BLD AUTO: 0.01 K/UL
IMM GRANULOCYTES NFR BLD AUTO: 0.2 %
LYMPHOCYTES # BLD AUTO: 1.1 K/UL
LYMPHOCYTES NFR BLD: 23.1 %
MCH RBC QN AUTO: 30.9 PG
MCHC RBC AUTO-ENTMCNC: 32.5 G/DL
MCV RBC AUTO: 95 FL
MONOCYTES # BLD AUTO: 0.9 K/UL
MONOCYTES NFR BLD: 18.8 %
NEUTROPHILS # BLD AUTO: 2.5 K/UL
NEUTROPHILS NFR BLD: 54.7 %
NRBC BLD-RTO: 0 /100 WBC
PHOSPHATE SERPL-MCNC: 6.3 MG/DL
PLATELET # BLD AUTO: 74 K/UL
PMV BLD AUTO: 10.8 FL
POCT GLUCOSE: 101 MG/DL (ref 70–110)
POCT GLUCOSE: 73 MG/DL (ref 70–110)
POCT GLUCOSE: 75 MG/DL (ref 70–110)
POCT GLUCOSE: 76 MG/DL (ref 70–110)
POCT GLUCOSE: 94 MG/DL (ref 70–110)
POTASSIUM SERPL-SCNC: 5.3 MMOL/L
RBC # BLD AUTO: 3.3 M/UL
SODIUM SERPL-SCNC: 139 MMOL/L
WBC # BLD AUTO: 4.63 K/UL

## 2018-01-09 PROCEDURE — 11000001 HC ACUTE MED/SURG PRIVATE ROOM

## 2018-01-09 PROCEDURE — 27000221 HC OXYGEN, UP TO 24 HOURS

## 2018-01-09 PROCEDURE — 85025 COMPLETE CBC W/AUTO DIFF WBC: CPT

## 2018-01-09 PROCEDURE — 25000242 PHARM REV CODE 250 ALT 637 W/ HCPCS: Performed by: PHYSICIAN ASSISTANT

## 2018-01-09 PROCEDURE — 90935 HEMODIALYSIS ONE EVALUATION: CPT | Mod: ,,, | Performed by: NURSE PRACTITIONER

## 2018-01-09 PROCEDURE — 25000003 PHARM REV CODE 250: Performed by: PHYSICIAN ASSISTANT

## 2018-01-09 PROCEDURE — 94640 AIRWAY INHALATION TREATMENT: CPT

## 2018-01-09 PROCEDURE — 25000003 PHARM REV CODE 250: Performed by: NURSE PRACTITIONER

## 2018-01-09 PROCEDURE — G0378 HOSPITAL OBSERVATION PER HR: HCPCS

## 2018-01-09 PROCEDURE — 90935 HEMODIALYSIS ONE EVALUATION: CPT

## 2018-01-09 PROCEDURE — 99225 PR SUBSEQUENT OBSERVATION CARE,LEVEL II: CPT | Mod: ,,, | Performed by: PHYSICIAN ASSISTANT

## 2018-01-09 PROCEDURE — 94761 N-INVAS EAR/PLS OXIMETRY MLT: CPT

## 2018-01-09 PROCEDURE — 36415 COLL VENOUS BLD VENIPUNCTURE: CPT

## 2018-01-09 PROCEDURE — 80069 RENAL FUNCTION PANEL: CPT

## 2018-01-09 RX ORDER — IPRATROPIUM BROMIDE AND ALBUTEROL SULFATE 2.5; .5 MG/3ML; MG/3ML
3 SOLUTION RESPIRATORY (INHALATION) EVERY 4 HOURS
Status: DISCONTINUED | OUTPATIENT
Start: 2018-01-09 | End: 2018-01-14 | Stop reason: HOSPADM

## 2018-01-09 RX ORDER — GABAPENTIN 100 MG/1
100 CAPSULE ORAL 3 TIMES DAILY
Status: DISCONTINUED | OUTPATIENT
Start: 2018-01-09 | End: 2018-01-14 | Stop reason: HOSPADM

## 2018-01-09 RX ORDER — SODIUM CHLORIDE 9 MG/ML
INJECTION, SOLUTION INTRAVENOUS ONCE
Status: COMPLETED | OUTPATIENT
Start: 2018-01-09 | End: 2018-01-09

## 2018-01-09 RX ADMIN — FUROSEMIDE 80 MG: 40 TABLET ORAL at 01:01

## 2018-01-09 RX ADMIN — CITALOPRAM HYDROBROMIDE 10 MG: 10 TABLET ORAL at 01:01

## 2018-01-09 RX ADMIN — HYDRALAZINE HYDROCHLORIDE 50 MG: 50 TABLET ORAL at 06:01

## 2018-01-09 RX ADMIN — APIXABAN 5 MG: 5 TABLET, FILM COATED ORAL at 09:01

## 2018-01-09 RX ADMIN — CINACALCET HYDROCHLORIDE 30 MG: 30 TABLET, COATED ORAL at 09:01

## 2018-01-09 RX ADMIN — SEVELAMER CARBONATE 800 MG: 800 TABLET, FILM COATED ORAL at 01:01

## 2018-01-09 RX ADMIN — IPRATROPIUM BROMIDE AND ALBUTEROL SULFATE 3 ML: .5; 3 SOLUTION RESPIRATORY (INHALATION) at 08:01

## 2018-01-09 RX ADMIN — ATORVASTATIN CALCIUM 20 MG: 20 TABLET, FILM COATED ORAL at 01:01

## 2018-01-09 RX ADMIN — ACETAMINOPHEN 650 MG: 325 TABLET, FILM COATED ORAL at 10:01

## 2018-01-09 RX ADMIN — STANDARDIZED SENNA CONCENTRATE AND DOCUSATE SODIUM 1 TABLET: 8.6; 5 TABLET, FILM COATED ORAL at 01:01

## 2018-01-09 RX ADMIN — APIXABAN 5 MG: 5 TABLET, FILM COATED ORAL at 01:01

## 2018-01-09 RX ADMIN — ASPIRIN 81 MG: 81 TABLET, COATED ORAL at 01:01

## 2018-01-09 RX ADMIN — SODIUM CHLORIDE: 0.9 INJECTION, SOLUTION INTRAVENOUS at 08:01

## 2018-01-09 RX ADMIN — GABAPENTIN 100 MG: 100 CAPSULE ORAL at 09:01

## 2018-01-09 RX ADMIN — SEVELAMER CARBONATE 800 MG: 800 TABLET, FILM COATED ORAL at 06:01

## 2018-01-09 RX ADMIN — ACETAMINOPHEN 650 MG: 325 TABLET, FILM COATED ORAL at 09:01

## 2018-01-09 RX ADMIN — IPRATROPIUM BROMIDE AND ALBUTEROL SULFATE 3 ML: .5; 3 SOLUTION RESPIRATORY (INHALATION) at 02:01

## 2018-01-09 RX ADMIN — LOSARTAN POTASSIUM 50 MG: 50 TABLET, FILM COATED ORAL at 01:01

## 2018-01-09 RX ADMIN — HYDRALAZINE HYDROCHLORIDE 50 MG: 50 TABLET ORAL at 09:01

## 2018-01-09 RX ADMIN — IPRATROPIUM BROMIDE AND ALBUTEROL SULFATE 3 ML: .5; 3 SOLUTION RESPIRATORY (INHALATION) at 11:01

## 2018-01-09 RX ADMIN — GABAPENTIN 100 MG: 100 CAPSULE ORAL at 05:01

## 2018-01-09 NOTE — PLAN OF CARE
Problem: Patient Care Overview  Goal: Plan of Care Review  Outcome: Ongoing (interventions implemented as appropriate)  POC: had HD today, continues c/o weakness and burning, tingling to bilateral feet, restarted gabapentin per orders, accu checks AC/HS,  0700 glucose 73, encouraged to eat diet, tolerating 25-50% of meals today, had c/o SOB when returned from HD, audible wheezing noted, wheezing to bibasilar upper lung lobes, order for albuterol-iprotropium Q4 hours given, breath sounds improving after treatment and weaned down to 1LNC with o2 saturation 95%, to continue nebulizer treatment through night.  Reported from dialysis RN having trouble with AVF to LA, bleeding occurred.

## 2018-01-09 NOTE — PLAN OF CARE
01/09/18 1425   Discharge Assessment   Assessment Type Discharge Planning Assessment   Confirmed/corrected address and phone number on facesheet? Yes   Assessment information obtained from? Patient   Expected Length of Stay (days) 3   Communicated expected length of stay with patient/caregiver yes   Prior to hospitilization cognitive status: Alert/Oriented   Prior to hospitalization functional status: Assistive Equipment   Current cognitive status: Alert/Oriented   Current Functional Status: Needs Assistance   Lives With child(robert), adult  (son, Raymond Carbajal)   Able to Return to Prior Arrangements yes   Is patient able to care for self after discharge? Yes   Patient's perception of discharge disposition home or selfcare   Readmission Within The Last 30 Days no previous admission in last 30 days   Patient currently being followed by outpatient case management? No   Equipment Currently Used at Home walker, rolling;rollator;bath bench   Do you have any problems affording any of your prescribed medications? No   Is the patient taking medications as prescribed? yes   Does the patient have transportation home? No   Transportation Available ([facility] or [facility] provides transportation)   Dialysis Name and Scheduled days [facility] 1045 LUE graft   Does the patient receive services at the Coumadin Clinic? No   Discharge Plan A Home with family   Discharge Plan B Home Health   Patient/Family In Agreement With Plan yes     Patient awake & alert eating lunch in bed when CM rounded. No family at the bedside. Patient was admitted with hypervolemia. Patient receives HD treatments at [facility] 104 via LUE graft. Patient received his HD treatment this AM. Patient lives with his son, Raymond Carbajal, & has equipment to assist with ambulation. Plan to discharge patient home with support or home with home health when medically stable. Patient stated that he will need assistance with transportation at time of  discharge. Hospital follow up appointment scheduled for the patient with Dr. Dio Roberson (PCP at [place]) on 1/12/18 at 1300. Will continue to follow.

## 2018-01-09 NOTE — PLAN OF CARE
Problem: Patient Care Overview  Goal: Plan of Care Review  Outcome: Ongoing (interventions implemented as appropriate)  POC: Remain BR, call for assist, BSC PRN at bedside, monitor I/O Q shift, 1NC supplemental O2 maintain, AVF to LA without complications, SCDs on to prevent DVTs, renal 1800cal ADA diet, remains fall free, skin intact. continue to monitor

## 2018-01-09 NOTE — PROGRESS NOTES
Pt arrived via stretcher.  Placed on cardiac monitor and b/p check every 15 minutes. Left upper arm   Dialysis access prep with prevantic swab.

## 2018-01-09 NOTE — NURSING
Received patient from HD per stretcher, audible wheezing to anterior chest lobes, patient c/o SOB, choking while eating lunch. O2 sats 97-98% on 2LNC, RR 24/min, AAOx4. Sitting high fowlers position. Call button in reach. Called and spoke with Melanie on IMF team, states will order respiratory treatments, and after, need to try and wean off oxygen.

## 2018-01-09 NOTE — PROGRESS NOTES
[facility] HEMODIALYSIS NOTE     Patient currently on hemodialysis for removal of uremic toxins and volume.     Patient seen and evaluated on hemodialysis, tolerating treatment, see HD flowsheet for vitals and assessments.      Ultrafiltration goal is 3L   Labs have been reviewed and the dialysate bath has been adjusted.     Assessment/Plan:  Seen on dialysis and tolerating well w/o complaints.    Phos elevated, likely 2/2 poor compliance with HD treatments.  Continue Renvela, consider increasing dose to 1600 mg PO TID.  H/H within range, no need for LISSETTE at this time.    MALDONADO Pena, [facility]-BC  Nephrology  Pager:  [phone]

## 2018-01-10 LAB
ALBUMIN SERPL BCP-MCNC: 2.6 G/DL
ANION GAP SERPL CALC-SCNC: 12 MMOL/L
BASOPHILS # BLD AUTO: 0.01 K/UL
BASOPHILS NFR BLD: 0.2 %
BUN SERPL-MCNC: 44 MG/DL
CALCIUM SERPL-MCNC: 8.3 MG/DL
CHLORIDE SERPL-SCNC: 101 MMOL/L
CO2 SERPL-SCNC: 25 MMOL/L
CREAT SERPL-MCNC: 7 MG/DL
DIFFERENTIAL METHOD: ABNORMAL
EOSINOPHIL # BLD AUTO: 0.1 K/UL
EOSINOPHIL NFR BLD: 1.3 %
ERYTHROCYTE [DISTWIDTH] IN BLOOD BY AUTOMATED COUNT: 17.1 %
EST. GFR  (AFRICAN AMERICAN): 8.5 ML/MIN/1.73 M^2
EST. GFR  (NON AFRICAN AMERICAN): 7.3 ML/MIN/1.73 M^2
GLUCOSE SERPL-MCNC: 76 MG/DL
HCT VFR BLD AUTO: 29.4 %
HGB BLD-MCNC: 9.5 G/DL
IMM GRANULOCYTES # BLD AUTO: 0.02 K/UL
IMM GRANULOCYTES NFR BLD AUTO: 0.4 %
LYMPHOCYTES # BLD AUTO: 1 K/UL
LYMPHOCYTES NFR BLD: 21.5 %
MCH RBC QN AUTO: 30.6 PG
MCHC RBC AUTO-ENTMCNC: 32.3 G/DL
MCV RBC AUTO: 95 FL
MONOCYTES # BLD AUTO: 0.7 K/UL
MONOCYTES NFR BLD: 15.2 %
NEUTROPHILS # BLD AUTO: 2.9 K/UL
NEUTROPHILS NFR BLD: 61.4 %
NRBC BLD-RTO: 0 /100 WBC
PHOSPHATE SERPL-MCNC: 5.4 MG/DL
PLATELET # BLD AUTO: 64 K/UL
PMV BLD AUTO: 11.1 FL
POCT GLUCOSE: 100 MG/DL (ref 70–110)
POCT GLUCOSE: 102 MG/DL (ref 70–110)
POCT GLUCOSE: 88 MG/DL (ref 70–110)
POCT GLUCOSE: 97 MG/DL (ref 70–110)
POCT GLUCOSE: 97 MG/DL (ref 70–110)
POTASSIUM SERPL-SCNC: 4.7 MMOL/L
RBC # BLD AUTO: 3.1 M/UL
SODIUM SERPL-SCNC: 138 MMOL/L
WBC # BLD AUTO: 4.74 K/UL

## 2018-01-10 PROCEDURE — 85025 COMPLETE CBC W/AUTO DIFF WBC: CPT

## 2018-01-10 PROCEDURE — 11000001 HC ACUTE MED/SURG PRIVATE ROOM

## 2018-01-10 PROCEDURE — 25000003 PHARM REV CODE 250: Performed by: PHYSICIAN ASSISTANT

## 2018-01-10 PROCEDURE — 27000221 HC OXYGEN, UP TO 24 HOURS

## 2018-01-10 PROCEDURE — 36415 COLL VENOUS BLD VENIPUNCTURE: CPT

## 2018-01-10 PROCEDURE — 25000242 PHARM REV CODE 250 ALT 637 W/ HCPCS: Performed by: PHYSICIAN ASSISTANT

## 2018-01-10 PROCEDURE — 80069 RENAL FUNCTION PANEL: CPT

## 2018-01-10 PROCEDURE — 94761 N-INVAS EAR/PLS OXIMETRY MLT: CPT

## 2018-01-10 PROCEDURE — 99232 SBSQ HOSP IP/OBS MODERATE 35: CPT | Mod: ,,, | Performed by: PHYSICIAN ASSISTANT

## 2018-01-10 PROCEDURE — 94640 AIRWAY INHALATION TREATMENT: CPT

## 2018-01-10 RX ORDER — SODIUM CHLORIDE 9 MG/ML
INJECTION, SOLUTION INTRAVENOUS ONCE
Status: COMPLETED | OUTPATIENT
Start: 2018-01-10 | End: 2018-01-11

## 2018-01-10 RX ORDER — SODIUM CHLORIDE 9 MG/ML
INJECTION, SOLUTION INTRAVENOUS
Status: DISCONTINUED | OUTPATIENT
Start: 2018-01-10 | End: 2018-01-14 | Stop reason: HOSPADM

## 2018-01-10 RX ADMIN — IPRATROPIUM BROMIDE AND ALBUTEROL SULFATE 3 ML: .5; 3 SOLUTION RESPIRATORY (INHALATION) at 09:01

## 2018-01-10 RX ADMIN — FUROSEMIDE 80 MG: 40 TABLET ORAL at 08:01

## 2018-01-10 RX ADMIN — IPRATROPIUM BROMIDE AND ALBUTEROL SULFATE 3 ML: .5; 3 SOLUTION RESPIRATORY (INHALATION) at 08:01

## 2018-01-10 RX ADMIN — CINACALCET HYDROCHLORIDE 30 MG: 30 TABLET, COATED ORAL at 09:01

## 2018-01-10 RX ADMIN — GABAPENTIN 100 MG: 100 CAPSULE ORAL at 05:01

## 2018-01-10 RX ADMIN — SEVELAMER CARBONATE 800 MG: 800 TABLET, FILM COATED ORAL at 01:01

## 2018-01-10 RX ADMIN — IPRATROPIUM BROMIDE AND ALBUTEROL SULFATE 3 ML: .5; 3 SOLUTION RESPIRATORY (INHALATION) at 11:01

## 2018-01-10 RX ADMIN — APIXABAN 5 MG: 5 TABLET, FILM COATED ORAL at 09:01

## 2018-01-10 RX ADMIN — APIXABAN 5 MG: 5 TABLET, FILM COATED ORAL at 08:01

## 2018-01-10 RX ADMIN — GABAPENTIN 100 MG: 100 CAPSULE ORAL at 01:01

## 2018-01-10 RX ADMIN — CITALOPRAM HYDROBROMIDE 10 MG: 10 TABLET ORAL at 08:01

## 2018-01-10 RX ADMIN — ATORVASTATIN CALCIUM 20 MG: 20 TABLET, FILM COATED ORAL at 08:01

## 2018-01-10 RX ADMIN — SEVELAMER CARBONATE 800 MG: 800 TABLET, FILM COATED ORAL at 08:01

## 2018-01-10 RX ADMIN — IPRATROPIUM BROMIDE AND ALBUTEROL SULFATE 3 ML: .5; 3 SOLUTION RESPIRATORY (INHALATION) at 03:01

## 2018-01-10 RX ADMIN — ACETAMINOPHEN 650 MG: 325 TABLET, FILM COATED ORAL at 05:01

## 2018-01-10 RX ADMIN — GABAPENTIN 100 MG: 100 CAPSULE ORAL at 09:01

## 2018-01-10 RX ADMIN — HYDRALAZINE HYDROCHLORIDE 50 MG: 50 TABLET ORAL at 05:01

## 2018-01-10 RX ADMIN — IPRATROPIUM BROMIDE AND ALBUTEROL SULFATE 3 ML: .5; 3 SOLUTION RESPIRATORY (INHALATION) at 04:01

## 2018-01-10 RX ADMIN — HYDRALAZINE HYDROCHLORIDE 50 MG: 50 TABLET ORAL at 01:01

## 2018-01-10 RX ADMIN — LOSARTAN POTASSIUM 50 MG: 50 TABLET, FILM COATED ORAL at 08:01

## 2018-01-10 RX ADMIN — HYDRALAZINE HYDROCHLORIDE 50 MG: 50 TABLET ORAL at 09:01

## 2018-01-10 RX ADMIN — SEVELAMER CARBONATE 800 MG: 800 TABLET, FILM COATED ORAL at 06:01

## 2018-01-10 RX ADMIN — ASPIRIN 81 MG: 81 TABLET, COATED ORAL at 08:01

## 2018-01-10 NOTE — PROGRESS NOTES
"[facility] Hospital Medicine  Progress Note    Patient Name: Wilder Carbajal  MRN: [id number]  Patient Class: OP- Observation   Admission Date: 1/6/2018  Length of Stay: 0 days  Attending Physician: Alberto Roblero MD  Primary Care Provider: Dio Roberson MD    [facility] Medicine Team: [facility] MED F Melanie Zepeda PA-C    Subjective:     Principal Problem:Hypervolemia    HPI:  Patient is a 67-year-old male with a past medical history of end-stage renal disease on Tuesday, Thursday, and Saturday, HLD, HTN, DM 2, paroxysmal A. fib, right femoral DVT presents the ED via EMS for weakness, fatigue, and back pain.  Patient states that he was "sliding out of bed" and so he called his neighbor who helped him back into bed. He denies falling or any recent falls. Patient states that he has been feeling "lazy".  He endorses generalized weakness and fatigue onset today. He has had a productive cough for the past few days. He endorses chills, but denies any fever or chills. He has had back pain chronically, but denies any back pain at this time.  He admits to taking his pain medication today, but reports noncompliance with his other medication.     In ED, CXR with signs of increased attenutation within the lung bases with depedent pleural fluid. Mild pulm vasc congestion. EKG shows afib, rate controlled.      Hospital Course:  Pt admitted to observation for hypervolemia 2/2 missed HD. Nephrology consulted. HD 1/8 stopped prematurely per patient request. HD session again 1/9. Duonebs ordered for wheezing. Weaning oxygen as tolerated.     Interval History: No acute events overnight. This AM, pt sitting upright in bed during HD. Pt c/o fatigue. No other complaints. Discussed plan of care.    Review of Systems   Constitutional: Positive for fatigue. Negative for activity change, appetite change, chills and fever.        Malaise   Respiratory: Positive for cough (dry). Negative for shortness of breath.  "   Cardiovascular: Negative for chest pain, palpitations and leg swelling.   Gastrointestinal: Negative for abdominal pain, diarrhea, nausea and vomiting.   Musculoskeletal: Positive for back pain (chronic). Negative for gait problem.        (+) BL leg pain   Neurological: Negative for dizziness, seizures, weakness, numbness and headaches.   Psychiatric/Behavioral: Negative for agitation, behavioral problems and confusion. The patient is not nervous/anxious.      Objective:     Vital Signs (Most Recent):  Temp: 98.7 °F (37.1 °C) (01/09/18 1603)  Pulse: 68 (01/09/18 1632)  Resp: 16 (01/09/18 1632)  BP: (!) 124/56 (01/09/18 1603)  SpO2: 95 % (01/09/18 1632) Vital Signs (24h Range):  Temp:  [98.1 °F (36.7 °C)-99 °F (37.2 °C)] 98.7 °F (37.1 °C)  Pulse:  [55-76] 68  Resp:  [16-24] 16  SpO2:  [89 %-98 %] 95 %  BP: (116-155)/(56-76) 124/56     Weight: 117.6 kg (259 lb 4.2 oz)  Body mass index is 39.42 kg/m².    Intake/Output Summary (Last 24 hours) at 01/09/18 1819  Last data filed at 01/09/18 0404   Gross per 24 hour   Intake              120 ml   Output                0 ml   Net              120 ml      Physical Exam   Constitutional: He is oriented to person, place, and time. He appears well-developed and well-nourished. No distress.   Eyes:   BL cataracts   Cardiovascular: Normal rate.  An irregularly irregular rhythm present.   Murmur heard.  Pulmonary/Chest: Effort normal. No accessory muscle usage. No tachypnea and no bradypnea. No respiratory distress. He has wheezes (expiratory wheezing at bilateral bases). He has rhonchi in the right middle field and the left middle field. He has no rales.   Abdominal: Soft. Bowel sounds are normal. He exhibits no distension. There is no tenderness.   Obese    Musculoskeletal: Normal range of motion. He exhibits no edema or tenderness.   Neurological: He is alert and oriented to person, place, and time. No cranial nerve deficit or sensory deficit.   Skin: Skin is warm and dry. He  "is not diaphoretic. No erythema.   Darkened, discolored, stained skin to BLE. Chronic-appearing   Psychiatric: He has a normal mood and affect. His behavior is normal.   Nursing note and vitals reviewed.      Significant Labs: All pertinent labs within the past 24 hours have been reviewed.    Significant Imaging: I have reviewed all pertinent imaging results/findings within the past 24 hours.    Assessment/Plan:      * Hypervolemia    Missed outpatient HD and endorses noncompliance with home medications  - Presents with dry cough, fatigue, malaise x3 days  - CXR shows hypoventilatory lung changes with abnormal increased attenuation within the lung bases which suggest atelectasis or airspace consolidation with or without dependent pleural fluid.  Mild pulmonary vasculature congestion  - Cannot r/o PNA, but patient afebrile, VSS, denies SOB  - Flu swab negative  - Oxygenation levels decrease to upper 80s with exertion- currently on 1 L O2 NC.    - Continue PRN duonebs; Uses albuterol and atrovent at home   1/8: HD stopped prematurely per patient request (pt will not elaborate on why). Pt requiring O2 (de-satted on RA).   1/9: HD session again. Duonebs ordered for wheezing. Weaning oxygen as tolerated.         ESRD (end stage renal disease)    HD TThSa   - Continue Sevelamer 800 mg TID, lasix 80 mg daily  - Missed dialysis today d/t "had to pay rent"  - Nephrology consulted for HD  - Renal diet        DM (diabetes mellitus) type II controlled with renal manifestation    Hg A1c 4.6. Not currently on any meds.  - Daily glucose checks, SSI   - Diabetic diet 1800        HTN (hypertension)    - Continue Losartan 50 mg daily, hydralazine 50 mg TID  - BP stable with home regimen  - Will continue to monitor        Anemia, chronic disease    H/H 11.1/34.6  - Increased from baseline Hg 6-9.   - HDS, no s/s bleeding        Paroxysmal atrial fibrillation    - Continue apixaban 50 mg BID  - Not currently on rate control- HR " 60s-70s  - Telemetry        HLD (hyperlipidemia)    - Continue atorvastatin 20 mg daily  - Noncompliant with medications at home        Pulmonary hypertension    2D ECHO 4/22/2017    1 - Eccentric hypertrophy.     2 - Mildly depressed left ventricular systolic function (EF 45-50%).     3 - Right ventricular enlargement with mildly depressed systolic function.     4 - Biatrial enlargement.     5 - Mild tricuspid regurgitation.     6 - Mild aortic stenosis, MARANDA = 1.4 cm2, peak velocity = 2.7 m/s, mean gradient = 15 mmHg.     7 - Pulmonary hypertension. The estimated PA systolic pressure is 55 mmHg.     8 - Increased central venous pressure.         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

## 2018-01-10 NOTE — ASSESSMENT & PLAN NOTE
Missed outpatient HD and endorses noncompliance with home medications  - Presents with dry cough, fatigue, malaise x3 days  - CXR shows hypoventilatory lung changes with abnormal increased attenuation within the lung bases which suggest atelectasis or airspace consolidation with or without dependent pleural fluid.  Mild pulmonary vasculature congestion  - Cannot r/o PNA, but patient afebrile, VSS, denies SOB  - Flu swab negative  - Oxygenation levels decrease to upper 80s with exertion- currently on 1 L O2 NC.    - Continue PRN duonebs; Uses albuterol and atrovent at home   1/8: HD stopped prematurely per patient request (pt will not elaborate on why). Pt requiring O2 (de-satted on RA).   1/9: HD session again. Duonebs ordered for wheezing. Weaning oxygen as tolerated.

## 2018-01-10 NOTE — NURSING
O2 Sat 84% on room air. Pt c/o SOB. Crackles noted to bilateral upper lung fields. O2 at 2L/NC placed. O2 Sat remain at 88%. Oxygen increased to 3L/NC. O2 Sat increased to 93%. Orders present to wean pt off oxygen. Lukasz ramd.

## 2018-01-10 NOTE — NURSING
Pt noted sitting on bedside.  Oxygen in place at 2L/NC. Oxygen removed. Denies SOB or distress. Oxygen Sat 95% on room air. Non-productive cough noted. Education provided on coughing and deep breathing. Pt reports understanding of teaching. Will monitor.

## 2018-01-10 NOTE — SUBJECTIVE & OBJECTIVE
Interval History: No acute events overnight. This AM, pt sitting upright in bed during HD. Pt c/o fatigue. No other complaints. Discussed plan of care.    Review of Systems   Constitutional: Positive for fatigue. Negative for activity change, appetite change, chills and fever.        Malaise   Respiratory: Positive for cough (dry). Negative for shortness of breath.    Cardiovascular: Negative for chest pain, palpitations and leg swelling.   Gastrointestinal: Negative for abdominal pain, diarrhea, nausea and vomiting.   Musculoskeletal: Positive for back pain (chronic). Negative for gait problem.        (+) BL leg pain   Neurological: Negative for dizziness, seizures, weakness, numbness and headaches.   Psychiatric/Behavioral: Negative for agitation, behavioral problems and confusion. The patient is not nervous/anxious.      Objective:     Vital Signs (Most Recent):  Temp: 98.7 °F (37.1 °C) (01/09/18 1603)  Pulse: 68 (01/09/18 1632)  Resp: 16 (01/09/18 1632)  BP: (!) 124/56 (01/09/18 1603)  SpO2: 95 % (01/09/18 1632) Vital Signs (24h Range):  Temp:  [98.1 °F (36.7 °C)-99 °F (37.2 °C)] 98.7 °F (37.1 °C)  Pulse:  [55-76] 68  Resp:  [16-24] 16  SpO2:  [89 %-98 %] 95 %  BP: (116-155)/(56-76) 124/56     Weight: 117.6 kg (259 lb 4.2 oz)  Body mass index is 39.42 kg/m².    Intake/Output Summary (Last 24 hours) at 01/09/18 1819  Last data filed at 01/09/18 0404   Gross per 24 hour   Intake              120 ml   Output                0 ml   Net              120 ml      Physical Exam   Constitutional: He is oriented to person, place, and time. He appears well-developed and well-nourished. No distress.   Eyes:   BL cataracts   Cardiovascular: Normal rate.  An irregularly irregular rhythm present.   Murmur heard.  Pulmonary/Chest: Effort normal. No accessory muscle usage. No tachypnea and no bradypnea. No respiratory distress. He has wheezes (expiratory wheezing at bilateral bases). He has rhonchi in the right middle field and  the left middle field. He has no rales.   Abdominal: Soft. Bowel sounds are normal. He exhibits no distension. There is no tenderness.   Obese    Musculoskeletal: Normal range of motion. He exhibits no edema or tenderness.   Neurological: He is alert and oriented to person, place, and time. No cranial nerve deficit or sensory deficit.   Skin: Skin is warm and dry. He is not diaphoretic. No erythema.   Darkened, discolored, stained skin to BLE. Chronic-appearing   Psychiatric: He has a normal mood and affect. His behavior is normal.   Nursing note and vitals reviewed.      Significant Labs: All pertinent labs within the past 24 hours have been reviewed.    Significant Imaging: I have reviewed all pertinent imaging results/findings within the past 24 hours.

## 2018-01-10 NOTE — ASSESSMENT & PLAN NOTE
Missed outpatient HD and endorses noncompliance with home medications  Hypoxia  - Presents with dry cough, fatigue, malaise x3 days  - CXR shows hypoventilatory lung changes with abnormal increased attenuation within the lung bases which suggest atelectasis or airspace consolidation with or without dependent pleural fluid.  Mild pulmonary vasculature congestion  - Cannot r/o PNA, but patient afebrile, VSS, denies SOB  - Flu swab negative  - Oxygenation levels decrease to upper 80s with exertion- currently on 1 L O2 NC.    - Continue PRN duonebs; Uses albuterol and atrovent at home   1/8: HD stopped prematurely per patient request (pt will not elaborate on why). Pt requiring O2 (de-satted on RA).   1/9: HD session again. Duonebs ordered for wheezing. Weaning oxygen as tolerated.   1/10: Repeated CXR (no change) due to increased wheezing. Stable on 2L. Continue duonebs q4 and weaning oxygen as tolerated. Remains afebrile without leukocytosis. Plan for HD 1/11.

## 2018-01-10 NOTE — PROGRESS NOTES
Dialysis complete.  Blood returned.   Hemostasis complete.   Held pressure to     Left upper arm A/v site for  10  Minutes to each site.   Guaze and tape applied to needle site.   No active bleeding noted.   + thrill and bruit.    Pt tolerated hemodialysis without diff.   Pt ran  3  Hrs on hemodialysis machine.   Took off   3000  Net volume.   Used  f160     Dialyzer.

## 2018-01-10 NOTE — PROGRESS NOTES
"[facility] Hospital Medicine  Progress Note    Patient Name: Wilder Carbajal  MRN: [id number]  Patient Class: IP- Inpatient   Admission Date: 1/6/2018  Length of Stay: 0 days  Attending Physician: Alberto Roblero MD  Primary Care Provider: Dio Roberson MD    [facility] Medicine Team: [facility] F Melanie Zepeda PA-C    Subjective:     Principal Problem:Hypervolemia    HPI:  Patient is a 67-year-old male with a past medical history of end-stage renal disease on Tuesday, Thursday, and Saturday, HLD, HTN, DM 2, paroxysmal A. fib, right femoral DVT presents the ED via EMS for weakness, fatigue, and back pain.  Patient states that he was "sliding out of bed" and so he called his neighbor who helped him back into bed. He denies falling or any recent falls. Patient states that he has been feeling "lazy".  He endorses generalized weakness and fatigue onset today. He has had a productive cough for the past few days. He endorses chills, but denies any fever or chills. He has had back pain chronically, but denies any back pain at this time.  He admits to taking his pain medication today, but reports noncompliance with his other medication.     In ED, CXR with signs of increased attenutation within the lung bases with depedent pleural fluid. Mild pulm vasc congestion. EKG shows afib, rate controlled.      Hospital Course:  Pt admitted to observation for hypervolemia 2/2 missed HD. Nephrology consulted. HD 1/8 stopped prematurely per patient request. HD session again 1/9 and duonebs ordered for wheezing. 1/10: Repeated CXR (no change) due to increased wheezing. Continue duonebs q4 and weaning oxygen as tolerated. Plan for HD 1/11.    Interval History: No acute events overnight. This AM, pt sitting upright on bedside. Pt reports improved sx/breathing. Later called by RN to report increased wheezing/SOB. Ordered repeat CXR. Discussed plan of care.    Review of Systems   Constitutional: Positive for fatigue. " Negative for activity change, appetite change, chills and fever.        Malaise   Respiratory: Positive for cough (dry). Negative for shortness of breath.    Cardiovascular: Negative for chest pain, palpitations and leg swelling.   Gastrointestinal: Negative for abdominal pain, diarrhea, nausea and vomiting.   Musculoskeletal: Positive for back pain (chronic). Negative for gait problem.        (+) BL leg pain   Neurological: Negative for dizziness, seizures, weakness, numbness and headaches.   Psychiatric/Behavioral: Negative for agitation, behavioral problems and confusion. The patient is not nervous/anxious.      Objective:     Vital Signs (Most Recent):  Temp: 98.8 °F (37.1 °C) (01/10/18 1610)  Pulse: 69 (01/10/18 1645)  Resp: (!) 21 (01/10/18 1645)  BP: (!) 170/81 (01/10/18 1610)  SpO2: 98 % (01/10/18 1645) Vital Signs (24h Range):  Temp:  [97.9 °F (36.6 °C)-99.6 °F (37.6 °C)] 98.8 °F (37.1 °C)  Pulse:  [62-93] 69  Resp:  [16-21] 21  SpO2:  [84 %-98 %] 98 %  BP: (115-170)/(54-81) 170/81     Weight: 117.2 kg (258 lb 6.1 oz)  Body mass index is 39.29 kg/m².    Intake/Output Summary (Last 24 hours) at 01/10/18 1722  Last data filed at 01/10/18 0400   Gross per 24 hour   Intake              580 ml   Output                0 ml   Net              580 ml      Physical Exam   Constitutional: He is oriented to person, place, and time. He appears well-developed and well-nourished. No distress.   Eyes:   BL cataracts   Cardiovascular: Normal rate.  An irregularly irregular rhythm present.   Murmur heard.  Pulmonary/Chest: Effort normal. No accessory muscle usage. No tachypnea and no bradypnea. No respiratory distress. He has wheezes (expiratory wheezing). He has rhonchi in the right middle field and the left middle field. He has no rales.   crackles   Abdominal: Soft. Bowel sounds are normal. He exhibits no distension. There is no tenderness.   Obese    Musculoskeletal: Normal range of motion. He exhibits no edema or  "tenderness.   Neurological: He is alert and oriented to person, place, and time. No cranial nerve deficit or sensory deficit.   Skin: Skin is warm and dry. He is not diaphoretic. No erythema.   Darkened, discolored, stained skin to BLE. Chronic-appearing   Psychiatric: He has a normal mood and affect. His behavior is normal.   Nursing note and vitals reviewed.      Significant Labs: All pertinent labs within the past 24 hours have been reviewed.    Significant Imaging: I have reviewed all pertinent imaging results/findings within the past 24 hours.    Assessment/Plan:      * Hypervolemia    Missed outpatient HD and endorses noncompliance with home medications  Hypoxia  - Presents with dry cough, fatigue, malaise x3 days  - CXR shows hypoventilatory lung changes with abnormal increased attenuation within the lung bases which suggest atelectasis or airspace consolidation with or without dependent pleural fluid.  Mild pulmonary vasculature congestion  - Cannot r/o PNA, but patient afebrile, VSS, denies SOB  - Flu swab negative  - Oxygenation levels decrease to upper 80s with exertion- currently on 1 L O2 NC.    - Continue PRN duonebs; Uses albuterol and atrovent at home   1/8: HD stopped prematurely per patient request (pt will not elaborate on why). Pt requiring O2 (de-satted on RA).   1/9: HD session again. Duonebs ordered for wheezing. Weaning oxygen as tolerated.   1/10: Repeated CXR (no change) due to increased wheezing. Stable on 2L. Continue duonebs q4 and weaning oxygen as tolerated. Remains afebrile without leukocytosis. Plan for HD 1/11.        ESRD (end stage renal disease)    HD TThSa   - Continue Sevelamer 800 mg TID, lasix 80 mg daily  - Missed dialysis today d/t "had to pay rent"  - Nephrology consulted for HD  - Renal diet        DM (diabetes mellitus) type II controlled with renal manifestation    Hg A1c 4.6. Not currently on any meds.  - Daily glucose checks, SSI   - Diabetic diet 1800        HTN " (hypertension)    - Continue Losartan 50 mg daily, hydralazine 50 mg TID  - BP stable with home regimen  - Will continue to monitor        Anemia, chronic disease    H/H 11.1/34.6  - Increased from baseline Hg 6-9.   - HDS, no s/s bleeding        Paroxysmal atrial fibrillation    - Continue apixaban 50 mg BID  - Not currently on rate control- HR 60s-70s  - Telemetry        HLD (hyperlipidemia)    - Continue atorvastatin 20 mg daily  - Noncompliant with medications at home        Pulmonary hypertension    2D ECHO 4/22/2017    1 - Eccentric hypertrophy.     2 - Mildly depressed left ventricular systolic function (EF 45-50%).     3 - Right ventricular enlargement with mildly depressed systolic function.     4 - Biatrial enlargement.     5 - Mild tricuspid regurgitation.     6 - Mild aortic stenosis, MARANDA = 1.4 cm2, peak velocity = 2.7 m/s, mean gradient = 15 mmHg.     7 - Pulmonary hypertension. The estimated PA systolic pressure is 55 mmHg.     8 - Increased central venous pressure.         Diabetic macular edema of both eyes    - Partial blindness to both eyes  - No changes        Acute on chronic diastolic heart failure                VTE Risk Mitigation         Ordered     apixaban tablet 5 mg  2 times daily     Route:  Oral        01/07/18 0304     High Risk of VTE  Once      01/07/18 0304     Place RODRI hose  Until discontinued      01/07/18 0304     Place sequential compression device  Until discontinued      01/07/18 0304     Reason for No Pharmacological VTE Prophylaxis  Once      01/07/18 0304              Melanie Zepeda PA-C  Department of Hospital Medicine   [facility]

## 2018-01-10 NOTE — NURSING
MARYJANE Gutierrez notified of pt's c/o and O2 Sat. Orders given to keep pt on oxygen and will order CXR.

## 2018-01-10 NOTE — SUBJECTIVE & OBJECTIVE
Interval History: No acute events overnight. This AM, pt sitting upright on bedside. Pt reports improved sx/breathing. Later called by RN to report increased wheezing/SOB. Ordered repeat CXR. Discussed plan of care.    Review of Systems   Constitutional: Positive for fatigue. Negative for activity change, appetite change, chills and fever.        Malaise   Respiratory: Positive for cough (dry). Negative for shortness of breath.    Cardiovascular: Negative for chest pain, palpitations and leg swelling.   Gastrointestinal: Negative for abdominal pain, diarrhea, nausea and vomiting.   Musculoskeletal: Positive for back pain (chronic). Negative for gait problem.        (+) BL leg pain   Neurological: Negative for dizziness, seizures, weakness, numbness and headaches.   Psychiatric/Behavioral: Negative for agitation, behavioral problems and confusion. The patient is not nervous/anxious.      Objective:     Vital Signs (Most Recent):  Temp: 98.8 °F (37.1 °C) (01/10/18 1610)  Pulse: 69 (01/10/18 1645)  Resp: (!) 21 (01/10/18 1645)  BP: (!) 170/81 (01/10/18 1610)  SpO2: 98 % (01/10/18 1645) Vital Signs (24h Range):  Temp:  [97.9 °F (36.6 °C)-99.6 °F (37.6 °C)] 98.8 °F (37.1 °C)  Pulse:  [62-93] 69  Resp:  [16-21] 21  SpO2:  [84 %-98 %] 98 %  BP: (115-170)/(54-81) 170/81     Weight: 117.2 kg (258 lb 6.1 oz)  Body mass index is 39.29 kg/m².    Intake/Output Summary (Last 24 hours) at 01/10/18 1722  Last data filed at 01/10/18 0400   Gross per 24 hour   Intake              580 ml   Output                0 ml   Net              580 ml      Physical Exam   Constitutional: He is oriented to person, place, and time. He appears well-developed and well-nourished. No distress.   Eyes:   BL cataracts   Cardiovascular: Normal rate.  An irregularly irregular rhythm present.   Murmur heard.  Pulmonary/Chest: Effort normal. No accessory muscle usage. No tachypnea and no bradypnea. No respiratory distress. He has wheezes (expiratory  wheezing). He has rhonchi in the right middle field and the left middle field. He has no rales.   crackles   Abdominal: Soft. Bowel sounds are normal. He exhibits no distension. There is no tenderness.   Obese    Musculoskeletal: Normal range of motion. He exhibits no edema or tenderness.   Neurological: He is alert and oriented to person, place, and time. No cranial nerve deficit or sensory deficit.   Skin: Skin is warm and dry. He is not diaphoretic. No erythema.   Darkened, discolored, stained skin to BLE. Chronic-appearing   Psychiatric: He has a normal mood and affect. His behavior is normal.   Nursing note and vitals reviewed.      Significant Labs: All pertinent labs within the past 24 hours have been reviewed.    Significant Imaging: I have reviewed all pertinent imaging results/findings within the past 24 hours.

## 2018-01-11 LAB
ALBUMIN SERPL BCP-MCNC: 2.7 G/DL
ANION GAP SERPL CALC-SCNC: 12 MMOL/L
BASOPHILS # BLD AUTO: 0.02 K/UL
BASOPHILS NFR BLD: 0.4 %
BUN SERPL-MCNC: 52 MG/DL
CALCIUM SERPL-MCNC: 8.5 MG/DL
CHLORIDE SERPL-SCNC: 102 MMOL/L
CO2 SERPL-SCNC: 25 MMOL/L
CREAT SERPL-MCNC: 7.7 MG/DL
DIFFERENTIAL METHOD: ABNORMAL
EOSINOPHIL # BLD AUTO: 0.1 K/UL
EOSINOPHIL NFR BLD: 2.1 %
ERYTHROCYTE [DISTWIDTH] IN BLOOD BY AUTOMATED COUNT: 17 %
EST. GFR  (AFRICAN AMERICAN): 7.5 ML/MIN/1.73 M^2
EST. GFR  (NON AFRICAN AMERICAN): 6.5 ML/MIN/1.73 M^2
GLUCOSE SERPL-MCNC: 70 MG/DL
HCT VFR BLD AUTO: 28.9 %
HGB BLD-MCNC: 9.5 G/DL
IMM GRANULOCYTES # BLD AUTO: 0.04 K/UL
IMM GRANULOCYTES NFR BLD AUTO: 0.7 %
LYMPHOCYTES # BLD AUTO: 1.1 K/UL
LYMPHOCYTES NFR BLD: 21.3 %
MCH RBC QN AUTO: 31 PG
MCHC RBC AUTO-ENTMCNC: 32.9 G/DL
MCV RBC AUTO: 94 FL
MONOCYTES # BLD AUTO: 0.7 K/UL
MONOCYTES NFR BLD: 13.9 %
NEUTROPHILS # BLD AUTO: 3.3 K/UL
NEUTROPHILS NFR BLD: 61.6 %
NRBC BLD-RTO: 0 /100 WBC
PHOSPHATE SERPL-MCNC: 5.1 MG/DL
PLATELET # BLD AUTO: 64 K/UL
PMV BLD AUTO: 11.3 FL
POCT GLUCOSE: 105 MG/DL (ref 70–110)
POCT GLUCOSE: 72 MG/DL (ref 70–110)
POCT GLUCOSE: 83 MG/DL (ref 70–110)
POCT GLUCOSE: 91 MG/DL (ref 70–110)
POTASSIUM SERPL-SCNC: 5.3 MMOL/L
RBC # BLD AUTO: 3.06 M/UL
SODIUM SERPL-SCNC: 139 MMOL/L
WBC # BLD AUTO: 5.34 K/UL

## 2018-01-11 PROCEDURE — 25000003 PHARM REV CODE 250: Performed by: PHYSICIAN ASSISTANT

## 2018-01-11 PROCEDURE — 25000242 PHARM REV CODE 250 ALT 637 W/ HCPCS: Performed by: PHYSICIAN ASSISTANT

## 2018-01-11 PROCEDURE — 27000221 HC OXYGEN, UP TO 24 HOURS

## 2018-01-11 PROCEDURE — 36415 COLL VENOUS BLD VENIPUNCTURE: CPT

## 2018-01-11 PROCEDURE — 90935 HEMODIALYSIS ONE EVALUATION: CPT | Mod: ,,, | Performed by: INTERNAL MEDICINE

## 2018-01-11 PROCEDURE — 99232 SBSQ HOSP IP/OBS MODERATE 35: CPT | Mod: ,,, | Performed by: PHYSICIAN ASSISTANT

## 2018-01-11 PROCEDURE — 80069 RENAL FUNCTION PANEL: CPT

## 2018-01-11 PROCEDURE — 94761 N-INVAS EAR/PLS OXIMETRY MLT: CPT

## 2018-01-11 PROCEDURE — 85025 COMPLETE CBC W/AUTO DIFF WBC: CPT

## 2018-01-11 PROCEDURE — 94640 AIRWAY INHALATION TREATMENT: CPT

## 2018-01-11 PROCEDURE — 11000001 HC ACUTE MED/SURG PRIVATE ROOM

## 2018-01-11 PROCEDURE — 90935 HEMODIALYSIS ONE EVALUATION: CPT

## 2018-01-11 PROCEDURE — 25000003 PHARM REV CODE 250: Performed by: HOSPITALIST

## 2018-01-11 RX ORDER — SEVELAMER CARBONATE 800 MG/1
1600 TABLET, FILM COATED ORAL
Status: DISCONTINUED | OUTPATIENT
Start: 2018-01-11 | End: 2018-01-14 | Stop reason: HOSPADM

## 2018-01-11 RX ORDER — BENZONATATE 100 MG/1
100 CAPSULE ORAL 3 TIMES DAILY PRN
Status: DISCONTINUED | OUTPATIENT
Start: 2018-01-11 | End: 2018-01-14 | Stop reason: HOSPADM

## 2018-01-11 RX ADMIN — HYDRALAZINE HYDROCHLORIDE 50 MG: 50 TABLET ORAL at 02:01

## 2018-01-11 RX ADMIN — APIXABAN 5 MG: 5 TABLET, FILM COATED ORAL at 09:01

## 2018-01-11 RX ADMIN — SODIUM CHLORIDE 400 ML: 0.9 INJECTION, SOLUTION INTRAVENOUS at 08:01

## 2018-01-11 RX ADMIN — IPRATROPIUM BROMIDE AND ALBUTEROL SULFATE 3 ML: .5; 3 SOLUTION RESPIRATORY (INHALATION) at 09:01

## 2018-01-11 RX ADMIN — HYDRALAZINE HYDROCHLORIDE 50 MG: 50 TABLET ORAL at 09:01

## 2018-01-11 RX ADMIN — GABAPENTIN 100 MG: 100 CAPSULE ORAL at 02:01

## 2018-01-11 RX ADMIN — IPRATROPIUM BROMIDE AND ALBUTEROL SULFATE 3 ML: .5; 3 SOLUTION RESPIRATORY (INHALATION) at 04:01

## 2018-01-11 RX ADMIN — GABAPENTIN 100 MG: 100 CAPSULE ORAL at 09:01

## 2018-01-11 RX ADMIN — IPRATROPIUM BROMIDE AND ALBUTEROL SULFATE 3 ML: .5; 3 SOLUTION RESPIRATORY (INHALATION) at 01:01

## 2018-01-11 RX ADMIN — CINACALCET HYDROCHLORIDE 30 MG: 30 TABLET, COATED ORAL at 09:01

## 2018-01-11 RX ADMIN — SEVELAMER CARBONATE 1600 MG: 800 TABLET, FILM COATED ORAL at 04:01

## 2018-01-11 NOTE — ASSESSMENT & PLAN NOTE
Missed outpatient HD and endorses noncompliance with home medications  Hypoxia  - Presents with dry cough, fatigue, malaise x3 days  - CXR shows hypoventilatory lung changes with abnormal increased attenuation within the lung bases which suggest atelectasis or airspace consolidation with or without dependent pleural fluid.  Mild pulmonary vasculature congestion  - Cannot r/o PNA, but patient afebrile, VSS, denies SOB  - Flu swab negative  - Oxygenation levels decrease to upper 80s with exertion- currently on 1 L O2 NC.    - Continue PRN duonebs; Uses albuterol and atrovent at home   1/8: HD stopped prematurely per patient request (pt will not elaborate on why). Pt requiring O2 (de-satted on RA).   1/9: HD session again. Duonebs ordered for wheezing. Weaning oxygen as tolerated.   1/10: Repeated CXR (no change) due to increased wheezing. Stable on 2L. Continue duonebs q4 and weaning oxygen as tolerated. Remains afebrile without leukocytosis. Plan for HD 1/11.  1/11: HD (-4.5L); remains above dry weight and still requiring oxygen. UF pending dialysis schedule. Continue duonebs q4 and weaning oxygen as tolerated.

## 2018-01-11 NOTE — PLAN OF CARE
Problem: Pressure Ulcer Risk (Jasmeet Scale) (Adult,Obstetrics,Pediatric)  Goal: Skin Integrity  Patient will demonstrate the desired outcomes by discharge/transition of care.   Outcome: Ongoing (interventions implemented as appropriate)   01/11/18 0304   Pressure Ulcer Risk (Jasmeet Scale) (Adult,Obstetrics,Pediatric)   Skin Integrity making progress toward outcome

## 2018-01-11 NOTE — PROGRESS NOTES
[facility]    Wilder Carbajal is a 67 y.o. male currently on hemodialysis for removal of uremic toxins and volume.    Labs have been reviewed and the dialysate bath has been adjusted.    There are no symptoms of hypotension, chest pain, dyspnea, nausea or vomiting.    Labs:        Recent Labs  Lab 01/09/18  0401 01/10/18  0342 01/11/18  0507    138 139   K 5.3* 4.7 5.3*    101 102   CO2 25 25 25   BUN 57* 44* 52*   CREATININE 8.3* 7.0* 7.7*   CALCIUM 8.8 8.3* 8.5*   PHOS 6.3* 5.4* 5.1*         Recent Labs  Lab 01/09/18  0401 01/10/18  0342 01/11/18  0507   WBC 4.63 4.74 5.34   HGB 10.2* 9.5* 9.5*   HCT 31.4* 29.4* 28.9*   PLT 74* 64* 64*       Assessment/Plan:    HD today for removal of uremic toxins and volume.  UF goal 4.5L.

## 2018-01-11 NOTE — SUBJECTIVE & OBJECTIVE
Interval History: No acute events overnight. This AM, pt seen in HD. Pt reports improved sx/breathing, no other complaints. Discussed plan of care.    Review of Systems   Constitutional: Positive for fatigue. Negative for activity change, appetite change, chills and fever.        Malaise   Respiratory: Positive for cough (dry). Negative for shortness of breath.    Cardiovascular: Negative for chest pain, palpitations and leg swelling.   Gastrointestinal: Negative for abdominal pain, diarrhea, nausea and vomiting.   Musculoskeletal: Positive for back pain (chronic). Negative for gait problem.        (+) BL leg pain   Neurological: Negative for dizziness, seizures, weakness, numbness and headaches.   Psychiatric/Behavioral: Negative for agitation, behavioral problems and confusion. The patient is not nervous/anxious.      Objective:     Vital Signs (Most Recent):  Temp: 98.4 °F (36.9 °C) (01/11/18 1656)  Pulse: 69 (01/11/18 1656)  Resp: 16 (01/11/18 1656)  BP: (!) 179/81 (01/11/18 1656)  SpO2: 100 % (01/11/18 1656) Vital Signs (24h Range):  Temp:  [98 °F (36.7 °C)-98.8 °F (37.1 °C)] 98.4 °F (36.9 °C)  Pulse:  [60-90] 69  Resp:  [16-20] 16  SpO2:  [93 %-100 %] 100 %  BP: (117-179)/(57-82) 179/81     Weight: 113 kg (249 lb 1.9 oz)  Body mass index is 37.88 kg/m².    Intake/Output Summary (Last 24 hours) at 01/11/18 1721  Last data filed at 01/11/18 1200   Gross per 24 hour   Intake             1180 ml   Output             5200 ml   Net            -4020 ml      Physical Exam   Constitutional: He is oriented to person, place, and time. He appears well-developed and well-nourished. No distress.   Eyes:   BL cataracts   Cardiovascular: Normal rate.  An irregularly irregular rhythm present.   Murmur heard.  Pulmonary/Chest: Effort normal. No accessory muscle usage. No tachypnea and no bradypnea. No respiratory distress. He has wheezes. He has rhonchi in the right middle field and the left middle field. He has no rales.    Improved wheezing and crackles s/p HD   Abdominal: Soft. Bowel sounds are normal. He exhibits no distension. There is no tenderness.   Obese    Musculoskeletal: Normal range of motion. He exhibits no edema or tenderness.   Neurological: He is alert and oriented to person, place, and time. No cranial nerve deficit or sensory deficit.   Skin: Skin is warm and dry. He is not diaphoretic. No erythema.   Darkened, discolored, stained skin to BLE. Chronic-appearing   Psychiatric: He has a normal mood and affect. His behavior is normal.   Nursing note and vitals reviewed.      Significant Labs: All pertinent labs within the past 24 hours have been reviewed.    Significant Imaging: I have reviewed all pertinent imaging results/findings within the past 24 hours.

## 2018-01-11 NOTE — PLAN OF CARE
Problem: Patient Care Overview  Goal: Plan of Care Review  Outcome: Ongoing (interventions implemented as appropriate)  4 hr hd completed, net fluid nuoobuj=0325 mls, target goal achieved, pt tolerated well.  AVF deaccessed, , pressure held to the site 12 mins and 10 mins, A/V respectively, hemostasis achieved, bruit/thrill present post tx.  Report given to KATHY Gaytan RN.

## 2018-01-11 NOTE — PROGRESS NOTES
Maintenance HD initiated via Lt Upper  Arm AVF without difficulty,, site noted to have a small hematoma with ecchymosis, prior to  accessing the fistula, affected area avoided by sticking above and below the hematoma, good flows obtained.

## 2018-01-12 LAB
ALBUMIN SERPL BCP-MCNC: 2.5 G/DL
ANION GAP SERPL CALC-SCNC: 9 MMOL/L
BASOPHILS # BLD AUTO: 0.02 K/UL
BASOPHILS NFR BLD: 0.3 %
BUN SERPL-MCNC: 33 MG/DL
CALCIUM SERPL-MCNC: 8.5 MG/DL
CHLORIDE SERPL-SCNC: 102 MMOL/L
CO2 SERPL-SCNC: 26 MMOL/L
CREAT SERPL-MCNC: 5.8 MG/DL
DIFFERENTIAL METHOD: ABNORMAL
EOSINOPHIL # BLD AUTO: 0.1 K/UL
EOSINOPHIL NFR BLD: 2 %
ERYTHROCYTE [DISTWIDTH] IN BLOOD BY AUTOMATED COUNT: 16.7 %
EST. GFR  (AFRICAN AMERICAN): 10.6 ML/MIN/1.73 M^2
EST. GFR  (NON AFRICAN AMERICAN): 9.2 ML/MIN/1.73 M^2
GLUCOSE SERPL-MCNC: 75 MG/DL
HCT VFR BLD AUTO: 27.5 %
HGB BLD-MCNC: 8.9 G/DL
IMM GRANULOCYTES # BLD AUTO: 0.02 K/UL
IMM GRANULOCYTES NFR BLD AUTO: 0.3 %
LYMPHOCYTES # BLD AUTO: 1.1 K/UL
LYMPHOCYTES NFR BLD: 17.9 %
MCH RBC QN AUTO: 30 PG
MCHC RBC AUTO-ENTMCNC: 32.4 G/DL
MCV RBC AUTO: 93 FL
MONOCYTES # BLD AUTO: 0.7 K/UL
MONOCYTES NFR BLD: 11.7 %
NEUTROPHILS # BLD AUTO: 4.1 K/UL
NEUTROPHILS NFR BLD: 67.8 %
NRBC BLD-RTO: 0 /100 WBC
PHOSPHATE SERPL-MCNC: 3.8 MG/DL
PLATELET # BLD AUTO: 70 K/UL
PMV BLD AUTO: 11.6 FL
POCT GLUCOSE: 71 MG/DL (ref 70–110)
POCT GLUCOSE: 72 MG/DL (ref 70–110)
POCT GLUCOSE: 82 MG/DL (ref 70–110)
POCT GLUCOSE: 90 MG/DL (ref 70–110)
POTASSIUM SERPL-SCNC: 4.3 MMOL/L
RBC # BLD AUTO: 2.97 M/UL
SODIUM SERPL-SCNC: 137 MMOL/L
WBC # BLD AUTO: 5.99 K/UL

## 2018-01-12 PROCEDURE — 36415 COLL VENOUS BLD VENIPUNCTURE: CPT

## 2018-01-12 PROCEDURE — 94640 AIRWAY INHALATION TREATMENT: CPT

## 2018-01-12 PROCEDURE — 97535 SELF CARE MNGMENT TRAINING: CPT

## 2018-01-12 PROCEDURE — 97166 OT EVAL MOD COMPLEX 45 MIN: CPT

## 2018-01-12 PROCEDURE — 99232 SBSQ HOSP IP/OBS MODERATE 35: CPT | Mod: ,,, | Performed by: PHYSICIAN ASSISTANT

## 2018-01-12 PROCEDURE — 25000242 PHARM REV CODE 250 ALT 637 W/ HCPCS: Performed by: PHYSICIAN ASSISTANT

## 2018-01-12 PROCEDURE — 11000001 HC ACUTE MED/SURG PRIVATE ROOM

## 2018-01-12 PROCEDURE — 97161 PT EVAL LOW COMPLEX 20 MIN: CPT

## 2018-01-12 PROCEDURE — 97530 THERAPEUTIC ACTIVITIES: CPT

## 2018-01-12 PROCEDURE — 85025 COMPLETE CBC W/AUTO DIFF WBC: CPT

## 2018-01-12 PROCEDURE — 25000003 PHARM REV CODE 250: Performed by: PHYSICIAN ASSISTANT

## 2018-01-12 PROCEDURE — 27000221 HC OXYGEN, UP TO 24 HOURS

## 2018-01-12 PROCEDURE — 80069 RENAL FUNCTION PANEL: CPT

## 2018-01-12 PROCEDURE — 94761 N-INVAS EAR/PLS OXIMETRY MLT: CPT

## 2018-01-12 RX ADMIN — IPRATROPIUM BROMIDE AND ALBUTEROL SULFATE 3 ML: .5; 3 SOLUTION RESPIRATORY (INHALATION) at 09:01

## 2018-01-12 RX ADMIN — SEVELAMER CARBONATE 1600 MG: 800 TABLET, FILM COATED ORAL at 05:01

## 2018-01-12 RX ADMIN — CINACALCET HYDROCHLORIDE 30 MG: 30 TABLET, COATED ORAL at 09:01

## 2018-01-12 RX ADMIN — ASPIRIN 81 MG: 81 TABLET, COATED ORAL at 09:01

## 2018-01-12 RX ADMIN — APIXABAN 5 MG: 5 TABLET, FILM COATED ORAL at 09:01

## 2018-01-12 RX ADMIN — HYDRALAZINE HYDROCHLORIDE 50 MG: 50 TABLET ORAL at 09:01

## 2018-01-12 RX ADMIN — IPRATROPIUM BROMIDE AND ALBUTEROL SULFATE 3 ML: .5; 3 SOLUTION RESPIRATORY (INHALATION) at 12:01

## 2018-01-12 RX ADMIN — GABAPENTIN 100 MG: 100 CAPSULE ORAL at 02:01

## 2018-01-12 RX ADMIN — IPRATROPIUM BROMIDE AND ALBUTEROL SULFATE 3 ML: .5; 3 SOLUTION RESPIRATORY (INHALATION) at 11:01

## 2018-01-12 RX ADMIN — LOSARTAN POTASSIUM 50 MG: 50 TABLET, FILM COATED ORAL at 09:01

## 2018-01-12 RX ADMIN — SEVELAMER CARBONATE 1600 MG: 800 TABLET, FILM COATED ORAL at 10:01

## 2018-01-12 RX ADMIN — IPRATROPIUM BROMIDE AND ALBUTEROL SULFATE 3 ML: .5; 3 SOLUTION RESPIRATORY (INHALATION) at 04:01

## 2018-01-12 RX ADMIN — IPRATROPIUM BROMIDE AND ALBUTEROL SULFATE 3 ML: .5; 3 SOLUTION RESPIRATORY (INHALATION) at 05:01

## 2018-01-12 RX ADMIN — SEVELAMER CARBONATE 1600 MG: 800 TABLET, FILM COATED ORAL at 02:01

## 2018-01-12 RX ADMIN — GABAPENTIN 100 MG: 100 CAPSULE ORAL at 09:01

## 2018-01-12 RX ADMIN — ATORVASTATIN CALCIUM 20 MG: 20 TABLET, FILM COATED ORAL at 09:01

## 2018-01-12 RX ADMIN — FUROSEMIDE 80 MG: 40 TABLET ORAL at 09:01

## 2018-01-12 RX ADMIN — HYDRALAZINE HYDROCHLORIDE 50 MG: 50 TABLET ORAL at 03:01

## 2018-01-12 RX ADMIN — HYDRALAZINE HYDROCHLORIDE 50 MG: 50 TABLET ORAL at 05:01

## 2018-01-12 RX ADMIN — IPRATROPIUM BROMIDE AND ALBUTEROL SULFATE 3 ML: .5; 3 SOLUTION RESPIRATORY (INHALATION) at 08:01

## 2018-01-12 RX ADMIN — CITALOPRAM HYDROBROMIDE 10 MG: 10 TABLET ORAL at 09:01

## 2018-01-12 NOTE — PLAN OF CARE
Problem: Infection, Risk/Actual (Adult)  Goal: Identify Related Risk Factors and Signs and Symptoms  Related risk factors and signs and symptoms are identified upon initiation of Human Response Clinical Practice Guideline (CPG)   Outcome: Ongoing (interventions implemented as appropriate)   01/12/18 1620   Infection, Risk/Actual   Related Risk Factors (Infection, Risk/Actual) prolonged hospitalization;immunization status   Signs and Symptoms (Infection, Risk/Actual) body temperature changes;feeding/eating intolerance

## 2018-01-12 NOTE — PLAN OF CARE
SW was informed by the PA that the pt will need SNF.  CM spoke with the pt and he does not want SNF.  SW left a message with RICARDO and was called back by Raj at [facility].  She stated that they would need to meet in their rounds to discuss SNF or HH.  They are closed on Monday and will not meet again until Tuesday.  They will review him in their meeting.  DERIK updated the CM and PA.  DERIK will f/u on Monday.    Elizabeth Weaver, ANURAG  [phone]

## 2018-01-12 NOTE — SUBJECTIVE & OBJECTIVE
Interval History: No acute events overnight. This AM, pt sitting up on bedside. Pt reports improved sx/breathing, no other complaints. Discussed plan of care.    Review of Systems   Constitutional: Positive for fatigue. Negative for activity change, appetite change, chills and fever.        Malaise   Respiratory: Positive for cough (dry). Negative for shortness of breath.    Cardiovascular: Negative for chest pain, palpitations and leg swelling.   Gastrointestinal: Negative for abdominal pain, diarrhea, nausea and vomiting.   Musculoskeletal: Positive for back pain (chronic). Negative for gait problem.        (+) BL leg pain   Neurological: Negative for dizziness, seizures, weakness, numbness and headaches.   Psychiatric/Behavioral: Negative for agitation, behavioral problems and confusion. The patient is not nervous/anxious.      Objective:     Vital Signs (Most Recent):  Temp: 98.7 °F (37.1 °C) (01/12/18 1219)  Pulse: 75 (01/12/18 1244)  Resp: 16 (01/12/18 1244)  BP: (!) 140/71 (01/12/18 1219)  SpO2: 95 % (01/12/18 1244) Vital Signs (24h Range):  Temp:  [98 °F (36.7 °C)-98.7 °F (37.1 °C)] 98.7 °F (37.1 °C)  Pulse:  [62-76] 75  Resp:  [16-22] 16  SpO2:  [85 %-100 %] 95 %  BP: (130-186)/() 140/71     Weight: 106.2 kg (234 lb 2.1 oz)  Body mass index is 35.6 kg/m².    Intake/Output Summary (Last 24 hours) at 01/12/18 1249  Last data filed at 01/12/18 0600   Gross per 24 hour   Intake              700 ml   Output                0 ml   Net              700 ml      Physical Exam   Constitutional: He is oriented to person, place, and time. He appears well-developed and well-nourished. No distress.   Eyes:   BL cataracts   Cardiovascular: Normal rate.  An irregularly irregular rhythm present.   Murmur heard.  Pulmonary/Chest: Effort normal. No accessory muscle usage. No tachypnea and no bradypnea. No respiratory distress. He has no wheezes. He has rhonchi in the right middle field and the left middle field. He has  no rales.   Abdominal: Soft. Bowel sounds are normal. He exhibits no distension. There is no tenderness.   Obese    Musculoskeletal: Normal range of motion. He exhibits no edema or tenderness.   Neurological: He is alert and oriented to person, place, and time. No cranial nerve deficit or sensory deficit.   Skin: Skin is warm and dry. He is not diaphoretic. No erythema.   Darkened, discolored, stained skin to BLE. Chronic-appearing   Psychiatric: He has a normal mood and affect. His behavior is normal.   Nursing note and vitals reviewed.      Significant Labs: All pertinent labs within the past 24 hours have been reviewed.    Significant Imaging: I have reviewed all pertinent imaging results/findings within the past 24 hours.

## 2018-01-12 NOTE — PLAN OF CARE
SW spoke with the PA and the CM.  Pt is not stable for dc today.  Pt will need transportation at the time of dc.  Therapy has been ordered to assess the pt.  Pt is a [facility] pt and if pt has therapy dc needs it will need to go through [facility] ([phone]).  SW will f/u as needed.    Elizabeth Weaver, [facility] x [phone]

## 2018-01-12 NOTE — PLAN OF CARE
Problem: Patient Care Overview  Goal: Plan of Care Review  Outcome: Ongoing (interventions implemented as appropriate)  Patient returned from dialysis at 1410, assisted back to bed with assistance of 2. VS stable, CBG 72-91 today. O2 saturation was 83% on RA at 1600, O2 replaced at 2LPM n/c and immediately came up to 96%. Rechecked sat again with sensor on ear and 2 different machines and sat was 96% on RA. Pt c/o not having a bath x 4days but when PCT offered he refused. DIMAS fistula with good bruit and thrill, no urine output this shift. In no acute distress; will continue to monitor.

## 2018-01-12 NOTE — PLAN OF CARE
Problem: Patient Care Overview  Goal: Plan of Care Review  Outcome: Ongoing (interventions implemented as appropriate)   01/12/18 2367   Coping/Psychosocial   Plan Of Care Reviewed With patient

## 2018-01-12 NOTE — PLAN OF CARE
Problem: Occupational Therapy Goal  Goal: Occupational Therapy Goal  Goals to be met by: 1/29/18    Patient will increase functional independence with ADLs by performing:    UE Dressing with Contact Guard Assistance.  LE Dressing with Minimal Assistance.  Toileting from toilet with Contact Guard Assistance for hygiene and clothing management.   Toilet transfer to toilet with Contact Guard Assistance.    Outcome: Ongoing (interventions implemented as appropriate)  Evaluation complete and goals set.  Cont with POC  Amaya Ha OT  1/12/2018

## 2018-01-12 NOTE — ASSESSMENT & PLAN NOTE
Missed outpatient HD and endorses noncompliance with home medications  Hypoxia  - Presents with dry cough, fatigue, malaise x3 days  - CXR shows hypoventilatory lung changes with abnormal increased attenuation within the lung bases which suggest atelectasis or airspace consolidation with or without dependent pleural fluid.  Mild pulmonary vasculature congestion  - Cannot r/o PNA, but patient afebrile, VSS, denies SOB  - Flu swab negative  - Oxygenation levels decrease to upper 80s with exertion- currently on 1 L O2 NC.    - Continue PRN duonebs; Uses albuterol and atrovent at home   1/8: HD stopped prematurely per patient request (pt will not elaborate on why). Pt requiring O2 (de-satted on RA).   1/9: HD session again. Duonebs ordered for wheezing. Weaning oxygen as tolerated.   1/10: Repeated CXR (no change) due to increased wheezing. Stable on 2L. Continue duonebs q4 and weaning oxygen as tolerated. Remains afebrile without leukocytosis. Plan for HD 1/11.  1/11: HD (-4.5L); remains above dry weight and still requiring oxygen.   1/12: UF pending dialysis schedule. Continue duonebs q4 and weaning oxygen as tolerated.

## 2018-01-12 NOTE — PROGRESS NOTES
"[facility] Hospital Medicine  Progress Note    Patient Name: Wilder Carbajal  MRN: [id number]  Patient Class: IP- Inpatient   Admission Date: 1/6/2018  Length of Stay: 2 days  Attending Physician: Alberto Roblero MD  Primary Care Provider: Dio Roberson MD    [facility] Medicine Team: [facility] HOSP MED F Melanie Zepeda PA-C    Subjective:     Principal Problem:Hypervolemia    HPI:  Patient is a 67-year-old male with a past medical history of end-stage renal disease on Tuesday, Thursday, and Saturday, HLD, HTN, DM 2, paroxysmal A. fib, right femoral DVT presents the ED via EMS for weakness, fatigue, and back pain.  Patient states that he was "sliding out of bed" and so he called his neighbor who helped him back into bed. He denies falling or any recent falls. Patient states that he has been feeling "lazy".  He endorses generalized weakness and fatigue onset today. He has had a productive cough for the past few days. He endorses chills, but denies any fever or chills. He has had back pain chronically, but denies any back pain at this time.  He admits to taking his pain medication today, but reports noncompliance with his other medication.     In ED, CXR with signs of increased attenutation within the lung bases with depedent pleural fluid. Mild pulm vasc congestion. EKG shows afib, rate controlled.      Hospital Course:  Pt admitted to observation for hypervolemia 2/2 noncompliance with outpatient HD. Nephrology consulted and appreciate assistance. HD 1/8 stopped prematurely per patient request. HD session again 1/9 and duonebs ordered for wheezing. 1/10: Repeated CXR (no change) due to increased wheezing. HD 1/11 (-4.5L); remains above dry weight and still requiring oxygen. UF pending dialysis schedule. Continue duonebs q4 and weaning oxygen as tolerated. PT/OT rec SNF; pt declined.    Interval History: No acute events overnight. This AM, pt sitting up on bedside. Pt reports improved sx/breathing, no " other complaints. Discussed plan of care.    Review of Systems   Constitutional: Positive for fatigue. Negative for activity change, appetite change, chills and fever.        Malaise   Respiratory: Positive for cough (dry). Negative for shortness of breath.    Cardiovascular: Negative for chest pain, palpitations and leg swelling.   Gastrointestinal: Negative for abdominal pain, diarrhea, nausea and vomiting.   Musculoskeletal: Positive for back pain (chronic). Negative for gait problem.        (+) BL leg pain   Neurological: Negative for dizziness, seizures, weakness, numbness and headaches.   Psychiatric/Behavioral: Negative for agitation, behavioral problems and confusion. The patient is not nervous/anxious.      Objective:     Vital Signs (Most Recent):  Temp: 98.7 °F (37.1 °C) (01/12/18 1219)  Pulse: 75 (01/12/18 1244)  Resp: 16 (01/12/18 1244)  BP: (!) 140/71 (01/12/18 1219)  SpO2: 95 % (01/12/18 1244) Vital Signs (24h Range):  Temp:  [98 °F (36.7 °C)-98.7 °F (37.1 °C)] 98.7 °F (37.1 °C)  Pulse:  [62-76] 75  Resp:  [16-22] 16  SpO2:  [85 %-100 %] 95 %  BP: (130-186)/() 140/71     Weight: 106.2 kg (234 lb 2.1 oz)  Body mass index is 35.6 kg/m².    Intake/Output Summary (Last 24 hours) at 01/12/18 1249  Last data filed at 01/12/18 0600   Gross per 24 hour   Intake              700 ml   Output                0 ml   Net              700 ml      Physical Exam   Constitutional: He is oriented to person, place, and time. He appears well-developed and well-nourished. No distress.   Eyes:   BL cataracts   Cardiovascular: Normal rate.  An irregularly irregular rhythm present.   Murmur heard.  Pulmonary/Chest: Effort normal. No accessory muscle usage. No tachypnea and no bradypnea. No respiratory distress. He has no wheezes. He has rhonchi in the right middle field and the left middle field. He has no rales.   Abdominal: Soft. Bowel sounds are normal. He exhibits no distension. There is no tenderness.   Obese   "  Musculoskeletal: Normal range of motion. He exhibits no edema or tenderness.   Neurological: He is alert and oriented to person, place, and time. No cranial nerve deficit or sensory deficit.   Skin: Skin is warm and dry. He is not diaphoretic. No erythema.   Darkened, discolored, stained skin to BLE. Chronic-appearing   Psychiatric: He has a normal mood and affect. His behavior is normal.   Nursing note and vitals reviewed.      Significant Labs: All pertinent labs within the past 24 hours have been reviewed.    Significant Imaging: I have reviewed all pertinent imaging results/findings within the past 24 hours.    Assessment/Plan:      * Hypervolemia    Missed outpatient HD and endorses noncompliance with home medications  Hypoxia  - Presents with dry cough, fatigue, malaise x3 days  - CXR shows hypoventilatory lung changes with abnormal increased attenuation within the lung bases which suggest atelectasis or airspace consolidation with or without dependent pleural fluid.  Mild pulmonary vasculature congestion  - Cannot r/o PNA, but patient afebrile, VSS, denies SOB  - Flu swab negative  - Oxygenation levels decrease to upper 80s with exertion- currently on 1 L O2 NC.    - Continue PRN duonebs; Uses albuterol and atrovent at home   1/8: HD stopped prematurely per patient request (pt will not elaborate on why). Pt requiring O2 (de-satted on RA).   1/9: HD session again. Duonebs ordered for wheezing. Weaning oxygen as tolerated.   1/10: Repeated CXR (no change) due to increased wheezing. Stable on 2L. Continue duonebs q4 and weaning oxygen as tolerated. Remains afebrile without leukocytosis. Plan for HD 1/11.  1/11: HD (-4.5L); remains above dry weight and still requiring oxygen.   1/12: UF pending dialysis schedule. Continue duonebs q4 and weaning oxygen as tolerated.         ESRD (end stage renal disease)    HD TThSa   - Continue Sevelamer 800 mg TID, lasix 80 mg daily  - Missed dialysis today d/t "had to pay " "rent"  - Nephrology consulted for HD  - Renal diet        DM (diabetes mellitus) type II controlled with renal manifestation    Hg A1c 4.6. Not currently on any meds.  - Daily glucose checks, SSI   - Diabetic diet 1800        HTN (hypertension)    - Continue Losartan 50 mg daily, hydralazine 50 mg TID  - BP stable with home regimen  - Will continue to monitor        Anemia, chronic disease    H/H 11.1/34.6 on admit  - BL Hg 6-9  - HDS, no s/s bleeding  - Trend daily        Paroxysmal atrial fibrillation    - Continue apixaban 50 mg BID  - Not currently on rate control- HR 60s-70s  - Telemetry        HLD (hyperlipidemia)    - Continue atorvastatin 20 mg daily  - Noncompliant with medications at home        Pulmonary hypertension    2D ECHO 4/22/2017    1 - Eccentric hypertrophy.     2 - Mildly depressed left ventricular systolic function (EF 45-50%).     3 - Right ventricular enlargement with mildly depressed systolic function.     4 - Biatrial enlargement.     5 - Mild tricuspid regurgitation.     6 - Mild aortic stenosis, MARANDA = 1.4 cm2, peak velocity = 2.7 m/s, mean gradient = 15 mmHg.     7 - Pulmonary hypertension. The estimated PA systolic pressure is 55 mmHg.     8 - Increased central venous pressure.         Diabetic macular edema of both eyes    - Partial blindness to both eyes  - No changes                    VTE Risk Mitigation         Ordered     apixaban tablet 5 mg  2 times daily     Route:  Oral        01/07/18 0304     High Risk of VTE  Once      01/07/18 0304     Place RODRI hose  Until discontinued      01/07/18 0304     Place sequential compression device  Until discontinued      01/07/18 0304     Reason for No Pharmacological VTE Prophylaxis  Once      01/07/18 0304              Melanie Zepeda PA-C  Department of Hospital Medicine   [facility]  "

## 2018-01-12 NOTE — PLAN OF CARE
Problem: Physical Therapy Goal  Goal: Physical Therapy Goal  Goals to be met by: 18    Patient will increase functional independence with mobility by performin. Supine to sit with Set-up [place]  2. Sit to supine with Set-up [place]  3. Sit to stand transfer with Stand-by Assistance  4. Bed to chair transfer with Stand-by Assistance using Rolling Walker  5. Gait  x 100 feet with Stand-by Assistance using Rolling Walker.   6. Lower extremity exercise program x20 reps per handout, with assistance as needed  7. Ascend/Descned 4 stairs w/ BHR at CGA    Outcome: Ongoing (interventions implemented as appropriate)  PT Goals established following initial evchayo Brown, PT  2018

## 2018-01-12 NOTE — PLAN OF CARE
INOCENTE was informed by DERIK Johnson ([id number]) that PT/OT is recommending SNF placement following discharge. Patient awake & alert sitting in chair at the bedside when CM rounded. CM informed patient of above. Patient stated that he does not want to be admitted to a SNF & would rather discharge home with home health. Patient unable to provide HH choice at this time. INOCENTE informed Elizabeth of above & requested that she questioned [facility] regarding HH companies in network. Will continue to follow.

## 2018-01-12 NOTE — PT/OT/SLP EVAL
Physical Therapy Evaluation    Patient Name:  Wilder Carbajal   MRN:  [id number]    Recommendations:     Discharge Recommendations:  nursing facility, skilled   Discharge Equipment Recommendations:  (DME TBD)   Barriers to discharge: Inaccessible home and Decreased caregiver support    Assessment:     Wilder Carbajal is a 67 y.o. male admitted with a medical diagnosis of Hypervolemia.  He presents with the following impairments/functional limitations:  weakness, impaired endurance, impaired functional mobilty, impaired self care skills. Patient is globally weak and had decreased endurance. Transfers requires a large amount of patient's energy. Patient limited in gait distance and was unable to attempt stairs today. Patient communicates a wound under one of his adipose folds on the lateral side of his waist. Upon PT inspection, it was found that patient had packed the area with paper towels-Nursing was notified-Severity of the sore was not further assessed. Patient is pleasant and is expected to progress well in therapy.     Rehab Prognosis:  Fair; patient would benefit from acute skilled PT services to address these deficits and reach maximum level of function.      Recent Surgery: * No surgery found *      Plan:     During this hospitalization, patient to be seen 4 x/week to address the above listed problems via therapeutic activities, therapeutic exercises, gait training, neuromuscular re-education  · Plan of Care Expires:  02/11/18   Plan of Care Reviewed with: patient    Subjective     Communicated with nurse prior to session.  Patient found sitting EOB upon PT entry to room, agreeable to evaluation.      Chief Complaint: decreased functional mobility and endurance  Patient comments/goals: to get better  Pain/Comfort:  · Pain Rating 1: 0/10    Patients cultural, spiritual, L[employer] day conflicts given the current situation: none stated    Living Environment:  Patient lives with his son in a single story house, 4  [address] w/ BHR. Patient reports that his son is only present at night time; patient is alone during the day.     Prior to admission, patients level of function was needing varying levels of assistance.  Patient has the following equipment: walker, rolling, bath bench, bedside commode. Upon discharge, patient will have assistance from son at night.    Objective:     Patient found with: telemetry     General Precautions: Standard, fall   Orthopedic Precautions:    Braces:       Exams:  · Cognitive Exam:  Patient is oriented to Person, Place, Time and Situation and follows 100% of all commands   · Fine Motor Coordination:    · -       Intact  · Gross Motor Coordination:  WFL  · Postural Exam:  Patient presented with the following abnormalities:    · -       Rounded shoulders  · Sensation:    · -       Intact  · RLE ROM: WFL  · RLE Strength: WFL  · LLE ROM: WFL  · LLE Strength: WFL    Functional Mobility:  · Bed Mobility:     · Scooting: contact guard assistance  · Transfers:     · Sit to Stand:  minimum assistance with rolling walker  · Bed to Chair: contact guard assistance with  rolling walker  using  Stand Pivot  · Gait: 52' (with one standing rest break during ambulation)  · Patient ambulated w/ RW at CGA; decreased step length and forward trunk lean    AM-PAC 6 CLICK MOBILITY  Total Score:15       Therapeutic Activities and Exercises:   PT Eval completed  Patient assisted w/ hallway ambulation  Patient assisted w/ stand pivot transfer and positioning for room comfort    Patient left up in chair with all lines intact, call button in reach and nurse notified.    GOALS:    Physical Therapy Goals        Problem: Physical Therapy Goal    Goal Priority Disciplines Outcome Goal Variances Interventions   Physical Therapy Goal     PT/OT, PT Ongoing (interventions implemented as appropriate)     Description:  Goals to be met by: 18    Patient will increase functional independence with mobility by performin. Supine  to sit with Set-up [place]  2. Sit to supine with Set-up [place]  3. Sit to stand transfer with Stand-by Assistance  4. Bed to chair transfer with Stand-by Assistance using Rolling Walker  5. Gait  x 100 feet with Stand-by Assistance using Rolling Walker.   6. Lower extremity exercise program x20 reps per handout, with assistance as needed  7. Ascend/Descned 4 stairs w/ BHR at CGA                       History:     Past Medical History:   Diagnosis Date    Acute pain of left shoulder 1/7/2017    Arthritis     Asthma     Benign neoplasm of eyelid     RUL    Blood clotting tendency     Blood transfusion     Cataract     Chronic kidney disease requiring chronic dialysis     Diabetes mellitus     Diabetic retinopathy     Fever blister     Hyperlipidemia     Hypertension     Infertility     Joint pain     Retinal detachment     Thyroid disease     Weakness 1/7/2017       Past Surgical History:   Procedure Laterality Date    CATARACT EXTRACTION      COLONOSCOPY N/A 6/23/2017    Procedure: COLONOSCOPY;  Surgeon: Vic Clark MD;  Location: [address];  Service: Endoscopy;  Laterality: N/A;    RETINAL DETACHMENT SURGERY         Clinical Decision Making:     History  Co-morbidities and personal factors that may impact the plan of care Examination  Body Structures and Functions, activity limitations and participation restrictions that may impact the plan of care Clinical Presentation   Decision Making/ Complexity Score   Co-morbidities:   [] Time since onset of injury / illness / exacerbation  [x] Status of current condition  []Patient's cognitive status and safety concerns    [x] Multiple Medical Problems (see med hx)  Personal Factors:   [] Patient's age  [] Prior Level of function   [] Patient's home situation (environment and family support)  [] Patient's level of motivation  [] Expected progression of patient      HISTORY:(criteria)    [] 83940 - no personal factors/history    [x]  59931 - has 1-2 personal factor/comorbidity     [] 84256 - has >3 personal factor/comorbidity     Body Regions:  [] Objective examination findings  [] Head     []  Neck  [] Trunk   [] Upper Extremity  [] Lower Extremity    Body Systems:  [] For communication ability, affect, cognition, language, and learning style: the assessment of the ability to make needs known, consciousness, orientation (person, place, and time), expected emotional /behavioral responses, and learning preferences (eg, learning barriers, education  needs)  [] For the neuromuscular system: a general assessment of gross coordinated movement (eg, balance, gait, locomotion, transfers, and transitions) and motor function  (motor control and motor learning)  [] For the musculoskeletal system: the assessment of gross symmetry, gross range of motion, gross strength, height, and weight  [] For the integumentary system: the assessment of pliability(texture), presence of scar formation, skin color, and skin integrity  [] For cardiovascular/pulmonary system: the assessment of heart rate, respiratory rate, blood pressure, and edema     Activity limitations:    [] Patient's cognitive status and saf ety concerns          [] Status of current condition      [] Weight bearing restriction  [] Cardiopulmunary Restriction    Participation Restrictions:   [] Goals and goal agreement with the patient     [] Rehab potential (prognosis) and probable outcome      Examination of Body System: (criteria)    [x] 15278 - addressing 1-2 elements    [] 46806 - addressing a total of 3 or more elements     [] 02453 -  Addressing a total of 4 or more elements         Clinical Presentation: (criteria)  Stable - 37701     On examination of body system using standardized tests and measures patient presents with 1-2 elements from any of the following: body structures and functions, activity limitations, and/or participation restrictions.  Leading to a clinical presentation that is  considered stable and/or uncomplicated                              Clinical Decision Making  (Eval Complexity):  Low- 59668     Time Tracking:     PT Received On: 01/12/18  PT Start Time: 0839     PT Stop Time: 0857  PT Total Time (min): 18 min     Billable Minutes: all lines intact, call button in reach and nurse notified      Marquise Brown, PT  01/12/2018

## 2018-01-12 NOTE — PLAN OF CARE
Problem: Fall Risk (Adult)  Goal: Identify Related Risk Factors and Signs and Symptoms  Related risk factors and signs and symptoms are identified upon initiation of Human Response Clinical Practice Guideline (CPG)   Outcome: Ongoing (interventions implemented as appropriate)   01/10/18 1601 01/12/18 1620   Fall Risk   Related Risk Factors (Fall Risk) age-related changes;polypharmacy;gait/mobility problems --    Signs and Symptoms (Fall Risk) --  presence of risk factors

## 2018-01-12 NOTE — PROGRESS NOTES
"[facility] Hospital Medicine  Progress Note    Patient Name: Wilder Carbajal  MRN: [id number]  Patient Class: IP- Inpatient   Admission Date: 1/6/2018  Length of Stay: 1 days  Attending Physician: Maki Murphy MD  Primary Care Provider: Dio Roberson MD    [facility] Medicine Team: [facility] MED F Melanie Zepeda PA-C    Subjective:     Principal Problem:Hypervolemia    HPI:  Patient is a 67-year-old male with a past medical history of end-stage renal disease on Tuesday, Thursday, and Saturday, HLD, HTN, DM 2, paroxysmal A. fib, right femoral DVT presents the ED via EMS for weakness, fatigue, and back pain.  Patient states that he was "sliding out of bed" and so he called his neighbor who helped him back into bed. He denies falling or any recent falls. Patient states that he has been feeling "lazy".  He endorses generalized weakness and fatigue onset today. He has had a productive cough for the past few days. He endorses chills, but denies any fever or chills. He has had back pain chronically, but denies any back pain at this time.  He admits to taking his pain medication today, but reports noncompliance with his other medication.     In ED, CXR with signs of increased attenutation within the lung bases with depedent pleural fluid. Mild pulm vasc congestion. EKG shows afib, rate controlled.      Hospital Course:  Pt admitted to observation for hypervolemia 2/2 noncompliance with outpatient HD. Nephrology consulted and appreciate assistance. HD 1/8 stopped prematurely per patient request. HD session again 1/9 and duonebs ordered for wheezing. 1/10: Repeated CXR (no change) due to increased wheezing. HD 1/11 (-4.5L); remains above dry weight and still requiring oxygen. UF pending dialysis schedule. Continue duonebs q4 and weaning oxygen as tolerated. Consulted PT/OT.    Interval History: No acute events overnight. This AM, pt seen in HD. Pt reports improved sx/breathing, no other complaints. " Discussed plan of care.    Review of Systems   Constitutional: Positive for fatigue. Negative for activity change, appetite change, chills and fever.        Malaise   Respiratory: Positive for cough (dry). Negative for shortness of breath.    Cardiovascular: Negative for chest pain, palpitations and leg swelling.   Gastrointestinal: Negative for abdominal pain, diarrhea, nausea and vomiting.   Musculoskeletal: Positive for back pain (chronic). Negative for gait problem.        (+) BL leg pain   Neurological: Negative for dizziness, seizures, weakness, numbness and headaches.   Psychiatric/Behavioral: Negative for agitation, behavioral problems and confusion. The patient is not nervous/anxious.      Objective:     Vital Signs (Most Recent):  Temp: 98.4 °F (36.9 °C) (01/11/18 1656)  Pulse: 69 (01/11/18 1656)  Resp: 16 (01/11/18 1656)  BP: (!) 179/81 (01/11/18 1656)  SpO2: 100 % (01/11/18 1656) Vital Signs (24h Range):  Temp:  [98 °F (36.7 °C)-98.8 °F (37.1 °C)] 98.4 °F (36.9 °C)  Pulse:  [60-90] 69  Resp:  [16-20] 16  SpO2:  [93 %-100 %] 100 %  BP: (117-179)/(57-82) 179/81     Weight: 113 kg (249 lb 1.9 oz)  Body mass index is 37.88 kg/m².    Intake/Output Summary (Last 24 hours) at 01/11/18 1721  Last data filed at 01/11/18 1200   Gross per 24 hour   Intake             1180 ml   Output             5200 ml   Net            -4020 ml      Physical Exam   Constitutional: He is oriented to person, place, and time. He appears well-developed and well-nourished. No distress.   Eyes:   BL cataracts   Cardiovascular: Normal rate.  An irregularly irregular rhythm present.   Murmur heard.  Pulmonary/Chest: Effort normal. No accessory muscle usage. No tachypnea and no bradypnea. No respiratory distress. He has wheezes. He has rhonchi in the right middle field and the left middle field. He has no rales.   Improved wheezing and crackles s/p HD   Abdominal: Soft. Bowel sounds are normal. He exhibits no distension. There is no  tenderness.   Obese    Musculoskeletal: Normal range of motion. He exhibits no edema or tenderness.   Neurological: He is alert and oriented to person, place, and time. No cranial nerve deficit or sensory deficit.   Skin: Skin is warm and dry. He is not diaphoretic. No erythema.   Darkened, discolored, stained skin to BLE. Chronic-appearing   Psychiatric: He has a normal mood and affect. His behavior is normal.   Nursing note and vitals reviewed.      Significant Labs: All pertinent labs within the past 24 hours have been reviewed.    Significant Imaging: I have reviewed all pertinent imaging results/findings within the past 24 hours.    Assessment/Plan:      * Hypervolemia    Missed outpatient HD and endorses noncompliance with home medications  Hypoxia  - Presents with dry cough, fatigue, malaise x3 days  - CXR shows hypoventilatory lung changes with abnormal increased attenuation within the lung bases which suggest atelectasis or airspace consolidation with or without dependent pleural fluid.  Mild pulmonary vasculature congestion  - Cannot r/o PNA, but patient afebrile, VSS, denies SOB  - Flu swab negative  - Oxygenation levels decrease to upper 80s with exertion- currently on 1 L O2 NC.    - Continue PRN duonebs; Uses albuterol and atrovent at home   1/8: HD stopped prematurely per patient request (pt will not elaborate on why). Pt requiring O2 (de-satted on RA).   1/9: HD session again. Duonebs ordered for wheezing. Weaning oxygen as tolerated.   1/10: Repeated CXR (no change) due to increased wheezing. Stable on 2L. Continue duonebs q4 and weaning oxygen as tolerated. Remains afebrile without leukocytosis. Plan for HD 1/11.  1/11: HD (-4.5L); remains above dry weight and still requiring oxygen. UF pending dialysis schedule. Continue duonebs q4 and weaning oxygen as tolerated.         ESRD (end stage renal disease)    HD TThSa   - Continue Sevelamer 800 mg TID, lasix 80 mg daily  - Missed dialysis today d/t  ""had to pay rent"  - Nephrology consulted for HD  - Renal diet        DM (diabetes mellitus) type II controlled with renal manifestation    Hg A1c 4.6. Not currently on any meds.  - Daily glucose checks, SSI   - Diabetic diet 1800        HTN (hypertension)    - Continue Losartan 50 mg daily, hydralazine 50 mg TID  - BP stable with home regimen  - Will continue to monitor        Anemia, chronic disease    H/H 11.1/34.6  - Increased from baseline Hg 6-9.   - HDS, no s/s bleeding        Paroxysmal atrial fibrillation    - Continue apixaban 50 mg BID  - Not currently on rate control- HR 60s-70s  - Telemetry        HLD (hyperlipidemia)    - Continue atorvastatin 20 mg daily  - Noncompliant with medications at home        Pulmonary hypertension    2D ECHO 4/22/2017    1 - Eccentric hypertrophy.     2 - Mildly depressed left ventricular systolic function (EF 45-50%).     3 - Right ventricular enlargement with mildly depressed systolic function.     4 - Biatrial enlargement.     5 - Mild tricuspid regurgitation.     6 - Mild aortic stenosis, MARANDA = 1.4 cm2, peak velocity = 2.7 m/s, mean gradient = 15 mmHg.     7 - Pulmonary hypertension. The estimated PA systolic pressure is 55 mmHg.     8 - Increased central venous pressure.         Diabetic macular edema of both eyes    - Partial blindness to both eyes  - No changes        Acute on chronic diastolic heart failure                VTE Risk Mitigation         Ordered     apixaban tablet 5 mg  2 times daily     Route:  Oral        01/07/18 0304     High Risk of VTE  Once      01/07/18 0304     Place RODRI hose  Until discontinued      01/07/18 0304     Place sequential compression device  Until discontinued      01/07/18 0304     Reason for No Pharmacological VTE Prophylaxis  Once      01/07/18 0304              Melanie Zepeda PA-C  Department of Hospital Medicine   [facility]  "

## 2018-01-12 NOTE — PT/OT/SLP EVAL
Occupational Therapy   Evaluation    Name: Wilder Carbajal  MRN: [id number]  Admitting Diagnosis:  Hypervolemia      Recommendations:     Discharge Recommendations:    Discharge Equipment Recommendations:     Barriers to discharge:  Inaccessible home environment, Decreased caregiver support    History:     Occupational Profile:  Living Environment: Pt lives with son in 1 story home with 4 steps to enter with railing  Previous level of function: Pt reports I self care but presume pt with poor tolerance of tasks  Equipment Owned:     Assistance upon Discharge: Son with limited availability for assitance    Past Medical History:   Diagnosis Date    Acute pain of left shoulder 1/7/2017    Arthritis     Asthma     Benign neoplasm of eyelid     RUL    Blood clotting tendency     Blood transfusion     Cataract     Chronic kidney disease requiring chronic dialysis     Diabetes mellitus     Diabetic retinopathy     Fever blister     Hyperlipidemia     Hypertension     Infertility     Joint pain     Retinal detachment     Thyroid disease     Weakness 1/7/2017       Past Surgical History:   Procedure Laterality Date    CATARACT EXTRACTION      COLONOSCOPY N/A 6/23/2017    Procedure: COLONOSCOPY;  Surgeon: Vic Clark MD;  Location: [address];  Service: Endoscopy;  Laterality: N/A;    RETINAL DETACHMENT SURGERY         Subjective     Chief Complaint: poor endurance   Patient/Family stated goals: return to home  Communicated with: RN prior to session.  Pain/Comfort:  ·      Objective:     Patient found with:      General Precautions: Standard,     Orthopedic Precautions:N/A   Braces: N/A     Occupational Performance:    Bed Mobility:    · Patient completed Scooting/Bridging with minimum assistance  · Patient completed Sit to Supine with minimum assistance    Functional Mobility/Transfers:  · Patient completed Sit <> Stand Transfer with moderate assistance  with  4 wheeled walker     Activities of  "Daily Living:  · Grooming: minimum assistance    · UB Dressing: minimum assistance    · LB Dressing: maximal assistance      Cognitive/Visual Perceptual:  Cognitive/Psychosocial Skills:     -       Oriented to: Person, Place, Time and Situation   -       Follows Commands/attention:Follows two-step commands  -       Communication: clear/fluent  -       Memory: No Deficits noted  -       Safety awareness/insight to disability: intact   -       Mood/Affect/Coping skills/emotional control: Appropriate to situation    Physical Exam:  Postural examination/scapula alignment:    -       Rounded shoulders  -       Forward head  Skin integrity: Visible skin intact  Edema:  None noted  Sensation:    -       Intact  Upper Extremity Range of Motion:     -       Right Upper Extremity: WFL  -       Left Upper Extremity: WFL  Upper Extremity Strength:    -       Right Upper Extremity: WFL  -       Left Upper Extremity: WFL    Patient left supine with all lines intact and call button in reach    AMPA 6 Click:  [facility] Total Score: 13    Treatment & Education:  Evaluation complete and pt educated on safety, expectations for gains, POC, role of Ot.  Bed mobility with min A, Trunk control tasks with poor tolerance.  Sit to stand with min+ with rolling walker.  Lower extremity dressing and training with max assist  Education:    Assessment:     Wilder Carbajal is a 67 y.o. male with a medical diagnosis of Hypervolemia.  He presents with decreased self care and poor tolerance of functional performance .  Performance deficits affecting function are weakness, poor endurance, poor safety awareness, poor self care  .      Rehab Prognosis:  good; patient would benefit from acute skilled OT services to address these deficits and reach maximum level of function.         Clinical Decision Makin.  OT Mod:  "Pt evaluation falls under moderate complexity for evaluation coding due to identification of 3-5 performance deficits noted as " "stated above. Eval required Min/Mod assistance to complete on this date and detailed assessment(s) were utilized. Moreover, an expanded review of history and occupational profile obtained with additional review of cognitive, physical and psychosocial hx."     Plan:     Patient to be seen 4 x/week to address the above listed problems via self-care/home management, therapeutic exercises, therapeutic activities  · Plan of Care Expires: 02/12/18  · Plan of Care Reviewed with:      This Plan of care has been discussed with the patient who was involved in its development and understands and is in agreement with the identified goals and treatment plan    GOALS:    Occupational Therapy Goals        Problem: Occupational Therapy Goal    Goal Priority Disciplines Outcome Interventions   Occupational Therapy Goal     OT, PT/OT Ongoing (interventions implemented as appropriate)    Description:  Goals to be met by: 1/29/18    Patient will increase functional independence with ADLs by performing:    UE Dressing with Contact Guard Assistance.  LE Dressing with Minimal Assistance.  Toileting from toilet with Contact Guard Assistance for hygiene and clothing management.   Toilet transfer to toilet with Contact Guard Assistance.                      Time Tracking:     OT Date of Treatment: 01/12/18  OT Start Time: 0850  OT Stop Time: 0910  OT Total Time (min): 20 min    Billable Minutes:Evaluation 10  Self Care/Home Management 10    Amaya Ha, OT  1/12/2018    "

## 2018-01-13 LAB
ALBUMIN SERPL BCP-MCNC: 2.7 G/DL
ANION GAP SERPL CALC-SCNC: 10 MMOL/L
BASOPHILS # BLD AUTO: 0.01 K/UL
BASOPHILS NFR BLD: 0.1 %
BUN SERPL-MCNC: 34 MG/DL
CALCIUM SERPL-MCNC: 8.6 MG/DL
CHLORIDE SERPL-SCNC: 103 MMOL/L
CO2 SERPL-SCNC: 25 MMOL/L
CREAT SERPL-MCNC: 5.6 MG/DL
DIFFERENTIAL METHOD: ABNORMAL
EOSINOPHIL # BLD AUTO: 0.1 K/UL
EOSINOPHIL NFR BLD: 1.3 %
ERYTHROCYTE [DISTWIDTH] IN BLOOD BY AUTOMATED COUNT: 16.4 %
EST. GFR  (AFRICAN AMERICAN): 11.1 ML/MIN/1.73 M^2
EST. GFR  (NON AFRICAN AMERICAN): 9.6 ML/MIN/1.73 M^2
GLUCOSE SERPL-MCNC: 80 MG/DL
HCT VFR BLD AUTO: 28.5 %
HGB BLD-MCNC: 9.2 G/DL
IMM GRANULOCYTES # BLD AUTO: 0.03 K/UL
IMM GRANULOCYTES NFR BLD AUTO: 0.4 %
LYMPHOCYTES # BLD AUTO: 0.8 K/UL
LYMPHOCYTES NFR BLD: 11.2 %
MCH RBC QN AUTO: 30 PG
MCHC RBC AUTO-ENTMCNC: 32.3 G/DL
MCV RBC AUTO: 93 FL
MONOCYTES # BLD AUTO: 0.6 K/UL
MONOCYTES NFR BLD: 8.7 %
NEUTROPHILS # BLD AUTO: 5.5 K/UL
NEUTROPHILS NFR BLD: 78.3 %
NRBC BLD-RTO: 0 /100 WBC
PHOSPHATE SERPL-MCNC: 3.5 MG/DL
PLATELET # BLD AUTO: 91 K/UL
PMV BLD AUTO: 11.1 FL
POCT GLUCOSE: 75 MG/DL (ref 70–110)
POCT GLUCOSE: 76 MG/DL (ref 70–110)
POTASSIUM SERPL-SCNC: 4.3 MMOL/L
RBC # BLD AUTO: 3.07 M/UL
SODIUM SERPL-SCNC: 138 MMOL/L
WBC # BLD AUTO: 6.98 K/UL

## 2018-01-13 PROCEDURE — 94761 N-INVAS EAR/PLS OXIMETRY MLT: CPT

## 2018-01-13 PROCEDURE — 25000003 PHARM REV CODE 250: Performed by: PHYSICIAN ASSISTANT

## 2018-01-13 PROCEDURE — 80069 RENAL FUNCTION PANEL: CPT

## 2018-01-13 PROCEDURE — 11000001 HC ACUTE MED/SURG PRIVATE ROOM

## 2018-01-13 PROCEDURE — 27000221 HC OXYGEN, UP TO 24 HOURS

## 2018-01-13 PROCEDURE — 25000242 PHARM REV CODE 250 ALT 637 W/ HCPCS: Performed by: PHYSICIAN ASSISTANT

## 2018-01-13 PROCEDURE — 36415 COLL VENOUS BLD VENIPUNCTURE: CPT

## 2018-01-13 PROCEDURE — 99232 SBSQ HOSP IP/OBS MODERATE 35: CPT | Mod: ,,, | Performed by: PHYSICIAN ASSISTANT

## 2018-01-13 PROCEDURE — 90935 HEMODIALYSIS ONE EVALUATION: CPT

## 2018-01-13 PROCEDURE — 85025 COMPLETE CBC W/AUTO DIFF WBC: CPT

## 2018-01-13 PROCEDURE — 94640 AIRWAY INHALATION TREATMENT: CPT

## 2018-01-13 PROCEDURE — 90935 HEMODIALYSIS ONE EVALUATION: CPT | Mod: ,,, | Performed by: INTERNAL MEDICINE

## 2018-01-13 RX ORDER — SODIUM CHLORIDE 9 MG/ML
INJECTION, SOLUTION INTRAVENOUS ONCE
Status: DISCONTINUED | OUTPATIENT
Start: 2018-01-13 | End: 2018-01-13

## 2018-01-13 RX ORDER — SODIUM CHLORIDE 9 MG/ML
INJECTION, SOLUTION INTRAVENOUS
Status: DISCONTINUED | OUTPATIENT
Start: 2018-01-13 | End: 2018-01-14 | Stop reason: HOSPADM

## 2018-01-13 RX ADMIN — IPRATROPIUM BROMIDE AND ALBUTEROL SULFATE 3 ML: .5; 3 SOLUTION RESPIRATORY (INHALATION) at 09:01

## 2018-01-13 RX ADMIN — CINACALCET HYDROCHLORIDE 30 MG: 30 TABLET, COATED ORAL at 09:01

## 2018-01-13 RX ADMIN — FUROSEMIDE 80 MG: 40 TABLET ORAL at 02:01

## 2018-01-13 RX ADMIN — CITALOPRAM HYDROBROMIDE 10 MG: 10 TABLET ORAL at 02:01

## 2018-01-13 RX ADMIN — LOSARTAN POTASSIUM 50 MG: 50 TABLET, FILM COATED ORAL at 02:01

## 2018-01-13 RX ADMIN — HYDRALAZINE HYDROCHLORIDE 50 MG: 50 TABLET ORAL at 05:01

## 2018-01-13 RX ADMIN — GABAPENTIN 100 MG: 100 CAPSULE ORAL at 02:01

## 2018-01-13 RX ADMIN — ATORVASTATIN CALCIUM 20 MG: 20 TABLET, FILM COATED ORAL at 02:01

## 2018-01-13 RX ADMIN — RAMELTEON 8 MG: 8 TABLET, FILM COATED ORAL at 09:01

## 2018-01-13 RX ADMIN — HYDRALAZINE HYDROCHLORIDE 50 MG: 50 TABLET ORAL at 02:01

## 2018-01-13 RX ADMIN — IPRATROPIUM BROMIDE AND ALBUTEROL SULFATE 3 ML: .5; 3 SOLUTION RESPIRATORY (INHALATION) at 11:01

## 2018-01-13 RX ADMIN — SEVELAMER CARBONATE 1600 MG: 800 TABLET, FILM COATED ORAL at 05:01

## 2018-01-13 RX ADMIN — GABAPENTIN 100 MG: 100 CAPSULE ORAL at 09:01

## 2018-01-13 RX ADMIN — HYDRALAZINE HYDROCHLORIDE 50 MG: 50 TABLET ORAL at 09:01

## 2018-01-13 RX ADMIN — IPRATROPIUM BROMIDE AND ALBUTEROL SULFATE 3 ML: .5; 3 SOLUTION RESPIRATORY (INHALATION) at 05:01

## 2018-01-13 RX ADMIN — ACETAMINOPHEN 650 MG: 325 TABLET, FILM COATED ORAL at 02:01

## 2018-01-13 NOTE — SUBJECTIVE & OBJECTIVE
Interval History: No acute events overnight. This AM, pt seen in dialysis. Tolerating well and without complaints. Discussed plan of care.    Review of Systems   Constitutional: Positive for fatigue. Negative for activity change, appetite change, chills and fever.        Malaise   Respiratory: Positive for cough (dry). Negative for shortness of breath.    Cardiovascular: Negative for chest pain, palpitations and leg swelling.   Gastrointestinal: Negative for abdominal pain, diarrhea, nausea and vomiting.   Musculoskeletal: Positive for back pain (chronic). Negative for gait problem.        (+) BL leg pain   Neurological: Negative for dizziness, seizures, weakness, numbness and headaches.   Psychiatric/Behavioral: Negative for agitation, behavioral problems and confusion. The patient is not nervous/anxious.      Objective:     Vital Signs (Most Recent):  Temp: 98.2 °F (36.8 °C) (01/13/18 1644)  Pulse: 72 (01/13/18 1644)  Resp: 18 (01/13/18 1644)  BP: 133/62 (01/13/18 1644)  SpO2: 96 % (01/13/18 1508) Vital Signs (24h Range):  Temp:  [97.8 °F (36.6 °C)-98.3 °F (36.8 °C)] 98.2 °F (36.8 °C)  Pulse:  [63-85] 72  Resp:  [16-18] 18  SpO2:  [93 %-99 %] 96 %  BP: (110-166)/(62-86) 133/62     Weight: 106.2 kg (234 lb 2.1 oz)  Body mass index is 35.6 kg/m².    Intake/Output Summary (Last 24 hours) at 01/13/18 1731  Last data filed at 01/13/18 1305   Gross per 24 hour   Intake             1200 ml   Output             4700 ml   Net            -3500 ml      Physical Exam   Constitutional: He is oriented to person, place, and time. He appears well-developed and well-nourished. No distress.   Eyes:   BL cataracts   Cardiovascular: Normal rate.  An irregularly irregular rhythm present.   Murmur heard.  Pulmonary/Chest: Effort normal. No accessory muscle usage. No tachypnea and no bradypnea. No respiratory distress. He has no wheezes. He has rhonchi in the right middle field and the left middle field. He has no rales.   Abdominal:  Soft. Bowel sounds are normal. He exhibits no distension. There is no tenderness.   Obese    Musculoskeletal: Normal range of motion. He exhibits no edema or tenderness.   Neurological: He is alert and oriented to person, place, and time. No cranial nerve deficit or sensory deficit.   Skin: Skin is warm and dry. He is not diaphoretic. No erythema.   Darkened, discolored, stained skin to BLE. Chronic-appearing   Psychiatric: He has a normal mood and affect. His behavior is normal.   Nursing note and vitals reviewed.      Significant Labs: All pertinent labs within the past 24 hours have been reviewed.    Significant Imaging: I have reviewed all pertinent imaging results/findings within the past 24 hours.

## 2018-01-13 NOTE — ASSESSMENT & PLAN NOTE
Missed outpatient HD and endorses noncompliance with home medications  Hypoxia  - Presents with dry cough, fatigue, malaise x3 days  - CXR shows hypoventilatory lung changes with abnormal increased attenuation within the lung bases which suggest atelectasis or airspace consolidation with or without dependent pleural fluid.  Mild pulmonary vasculature congestion  - Cannot r/o PNA, but patient afebrile, VSS, denies SOB  - Flu swab negative  - Oxygenation levels decrease to upper 80s with exertion- currently on 1 L O2 NC.    - Continue PRN duonebs; Uses albuterol and atrovent at home   1/8: HD stopped prematurely per patient request (pt will not elaborate on why). Pt requiring O2 (de-satted on RA).   1/9: HD session again. Duonebs ordered for wheezing. Weaning oxygen as tolerated.   1/10: Repeated CXR (no change) due to increased wheezing. Stable on 2L. Continue duonebs q4 and weaning oxygen as tolerated. Remains afebrile without leukocytosis. Plan for HD 1/11.  1/11: HD (-4.5L); remains above dry weight and still requiring oxygen.   1/13: HD (-4L). 6 min walk shows patient requiring oxygen. Will need insurance approval for home O2; [facility] not open until 1/16 due to holiday. Continue duonebs q4 and weaning oxygen as tolerated.

## 2018-01-13 NOTE — PROGRESS NOTES
"[facility] Hospital Medicine  Progress Note    Patient Name: Wilder Carbajal  MRN: [id number]  Patient Class: IP- Inpatient   Admission Date: 1/6/2018  Length of Stay: 3 days  Attending Physician: YVONNE Mas MD  Primary Care Provider: Dio Roberson MD    [facility] Medicine Team: [facility] F Melanie Zepeda PA-C    Subjective:     Principal Problem:Hypervolemia    HPI:  Patient is a 67-year-old male with a past medical history of end-stage renal disease on Tuesday, Thursday, and Saturday, HLD, HTN, DM 2, paroxysmal A. fib, right femoral DVT presents the ED via EMS for weakness, fatigue, and back pain.  Patient states that he was "sliding out of bed" and so he called his neighbor who helped him back into bed. He denies falling or any recent falls. Patient states that he has been feeling "lazy".  He endorses generalized weakness and fatigue onset today. He has had a productive cough for the past few days. He endorses chills, but denies any fever or chills. He has had back pain chronically, but denies any back pain at this time.  He admits to taking his pain medication today, but reports noncompliance with his other medication.     In ED, CXR with signs of increased attenutation within the lung bases with depedent pleural fluid. Mild pulm vasc congestion. EKG shows afib, rate controlled.      Hospital Course:  Pt admitted to observation for hypervolemia 2/2 noncompliance with outpatient HD. Nephrology consulted and appreciate assistance. HD 1/8 stopped prematurely per patient request. HD session again 1/9 and duonebs ordered for wheezing. 1/10: Repeated CXR (no change) due to increased wheezing. HD 1/11 (-4.5L); remains above dry weight and still requiring oxygen. HD 1/13 (-4L). 6 min walk shows patient requiring oxygen. Will need insurance approval for home O2; [facility] not open until 1/16 due to holiday. Continue duonebs q4 and weaning oxygen as tolerated. PT/OT rec SNF; pt declined.    Interval " History: No acute events overnight. This AM, pt seen in dialysis. Tolerating well and without complaints. Discussed plan of care.    Review of Systems   Constitutional: Positive for fatigue. Negative for activity change, appetite change, chills and fever.        Malaise   Respiratory: Positive for cough (dry). Negative for shortness of breath.    Cardiovascular: Negative for chest pain, palpitations and leg swelling.   Gastrointestinal: Negative for abdominal pain, diarrhea, nausea and vomiting.   Musculoskeletal: Positive for back pain (chronic). Negative for gait problem.        (+) BL leg pain   Neurological: Negative for dizziness, seizures, weakness, numbness and headaches.   Psychiatric/Behavioral: Negative for agitation, behavioral problems and confusion. The patient is not nervous/anxious.      Objective:     Vital Signs (Most Recent):  Temp: 98.2 °F (36.8 °C) (01/13/18 1644)  Pulse: 72 (01/13/18 1644)  Resp: 18 (01/13/18 1644)  BP: 133/62 (01/13/18 1644)  SpO2: 96 % (01/13/18 1508) Vital Signs (24h Range):  Temp:  [97.8 °F (36.6 °C)-98.3 °F (36.8 °C)] 98.2 °F (36.8 °C)  Pulse:  [63-85] 72  Resp:  [16-18] 18  SpO2:  [93 %-99 %] 96 %  BP: (110-166)/(62-86) 133/62     Weight: 106.2 kg (234 lb 2.1 oz)  Body mass index is 35.6 kg/m².    Intake/Output Summary (Last 24 hours) at 01/13/18 1731  Last data filed at 01/13/18 1305   Gross per 24 hour   Intake             1200 ml   Output             4700 ml   Net            -3500 ml      Physical Exam   Constitutional: He is oriented to person, place, and time. He appears well-developed and well-nourished. No distress.   Eyes:   BL cataracts   Cardiovascular: Normal rate.  An irregularly irregular rhythm present.   Murmur heard.  Pulmonary/Chest: Effort normal. No accessory muscle usage. No tachypnea and no bradypnea. No respiratory distress. He has no wheezes. He has rhonchi in the right middle field and the left middle field. He has no rales.   Abdominal: Soft. Bowel  sounds are normal. He exhibits no distension. There is no tenderness.   Obese    Musculoskeletal: Normal range of motion. He exhibits no edema or tenderness.   Neurological: He is alert and oriented to person, place, and time. No cranial nerve deficit or sensory deficit.   Skin: Skin is warm and dry. He is not diaphoretic. No erythema.   Darkened, discolored, stained skin to BLE. Chronic-appearing   Psychiatric: He has a normal mood and affect. His behavior is normal.   Nursing note and vitals reviewed.      Significant Labs: All pertinent labs within the past 24 hours have been reviewed.    Significant Imaging: I have reviewed all pertinent imaging results/findings within the past 24 hours.    Assessment/Plan:      * Hypervolemia    Missed outpatient HD and endorses noncompliance with home medications  Hypoxia  - Presents with dry cough, fatigue, malaise x3 days  - CXR shows hypoventilatory lung changes with abnormal increased attenuation within the lung bases which suggest atelectasis or airspace consolidation with or without dependent pleural fluid.  Mild pulmonary vasculature congestion  - Cannot r/o PNA, but patient afebrile, VSS, denies SOB  - Flu swab negative  - Oxygenation levels decrease to upper 80s with exertion- currently on 1 L O2 NC.    - Continue PRN duonebs; Uses albuterol and atrovent at home   1/8: HD stopped prematurely per patient request (pt will not elaborate on why). Pt requiring O2 (de-satted on RA).   1/9: HD session again. Duonebs ordered for wheezing. Weaning oxygen as tolerated.   1/10: Repeated CXR (no change) due to increased wheezing. Stable on 2L. Continue duonebs q4 and weaning oxygen as tolerated. Remains afebrile without leukocytosis. Plan for HD 1/11.  1/11: HD (-4.5L); remains above dry weight and still requiring oxygen.   1/13: HD (-4L). 6 min walk shows patient requiring oxygen. Will need insurance approval for home O2; [facility] not open until 1/16 due to holiday. Continue duonebs  "q4 and weaning oxygen as tolerated.         ESRD (end stage renal disease)    HD TThSa   - Continue Sevelamer 800 mg TID, lasix 80 mg daily  - Missed dialysis today d/t "had to pay rent"  - Nephrology consulted for HD  - Renal diet; fluid restrict        DM (diabetes mellitus) type II controlled with renal manifestation    Hg A1c 4.6. Not currently on any meds.  - Daily glucose checks, SSI   - Diabetic diet 1800        HTN (hypertension)    - Continue Losartan 50 mg daily, hydralazine 50 mg TID  - BP stable with home regimen  - Will continue to monitor        Anemia, chronic disease    H/H 11.1/34.6 on admit  - BL Hg 6-9  - HDS, no s/s bleeding  - Trend daily        Paroxysmal atrial fibrillation    - Continue apixaban 50 mg BID  - Not currently on rate control- HR 60s-70s  - Telemetry        HLD (hyperlipidemia)    - Continue atorvastatin 20 mg daily  - Noncompliant with medications at home        Pulmonary hypertension    2D ECHO 4/22/2017    1 - Eccentric hypertrophy.     2 - Mildly depressed left ventricular systolic function (EF 45-50%).     3 - Right ventricular enlargement with mildly depressed systolic function.     4 - Biatrial enlargement.     5 - Mild tricuspid regurgitation.     6 - Mild aortic stenosis, MARANDA = 1.4 cm2, peak velocity = 2.7 m/s, mean gradient = 15 mmHg.     7 - Pulmonary hypertension. The estimated PA systolic pressure is 55 mmHg.     8 - Increased central venous pressure.     1/13: 6 min walk shows patient requiring oxygen. Will need insurance approval for home O2; [facility] not open until 1/16 due to holiday. Continue duonebs q4 and weaning oxygen as tolerated.         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

## 2018-01-13 NOTE — NURSING
Home Oxygen Evaluation    Date Performed: 1/13/2018    1) Patient's Home O2 Sat on room air, while at rest: 88%         If O2 sats on room air at rest are 88% or below, patient qualifies. No additional testing needed. Document N/A in steps 2 and 3. If 89% or above, complete steps 2.      2) Patient's O2 Sat on room air while exercising: NA         If O2 sats on room air while exercising remain 89% or above patient does not qualify, no further testing needed Document N/A in step 3. If O2 sats on room air while exercising are 88% or below, continue to step 3.      3) Patient's O2 Sat while exercising on O2: NA at NA LPM         (Must show improvement from #2 for patients to qualify)    If O2 sats improve on oxygen, patient qualifies for portable oxygen. If not, the patient does not qualify.

## 2018-01-13 NOTE — ASSESSMENT & PLAN NOTE
2D ECHO 4/22/2017    1 - Eccentric hypertrophy.     2 - Mildly depressed left ventricular systolic function (EF 45-50%).     3 - Right ventricular enlargement with mildly depressed systolic function.     4 - Biatrial enlargement.     5 - Mild tricuspid regurgitation.     6 - Mild aortic stenosis, MARANDA = 1.4 cm2, peak velocity = 2.7 m/s, mean gradient = 15 mmHg.     7 - Pulmonary hypertension. The estimated PA systolic pressure is 55 mmHg.     8 - Increased central venous pressure.     1/13: 6 min walk shows patient requiring oxygen. Will need insurance approval for home O2; [facility] not open until 1/16 due to holiday. Continue duonebs q4 and weaning oxygen as tolerated.

## 2018-01-13 NOTE — PLAN OF CARE
Problem: Patient Care Overview  Goal: Plan of Care Review  Outcome: Ongoing (interventions implemented as appropriate)  POC was reviewed with pt. Pt refused some of medication and lab. Explained pt the necessity and benefit, also the risks of not having them. Pt showed understanding, however, still refused. Continue to encourage and educate pt.

## 2018-01-13 NOTE — ASSESSMENT & PLAN NOTE
"HD TThSa   - Continue Sevelamer 800 mg TID, lasix 80 mg daily  - Missed dialysis today d/t "had to pay rent"  - Nephrology consulted for HD  - Renal diet; fluid restrict  "

## 2018-01-13 NOTE — PROGRESS NOTES
4hr HD treatment completed 4L of fluid removed pt tolerated well. Both needles of a DIMAS fistula removed and pressure held until hemostasis achieved dressing applied. Report given to ZENON Hernandez.

## 2018-01-13 NOTE — PROGRESS NOTES
reporty received from ZENON Hernandez. Pt arrived from floor AAOx4. Maintenance dialysis initiated via DIMAS fistula without difficulty. Morning labs drawn.

## 2018-01-14 VITALS
SYSTOLIC BLOOD PRESSURE: 130 MMHG | TEMPERATURE: 99 F | BODY MASS INDEX: 35.48 KG/M2 | WEIGHT: 234.13 LBS | HEIGHT: 68 IN | OXYGEN SATURATION: 96 % | HEART RATE: 66 BPM | DIASTOLIC BLOOD PRESSURE: 66 MMHG | RESPIRATION RATE: 18 BRPM

## 2018-01-14 LAB
ALBUMIN SERPL BCP-MCNC: 2.7 G/DL
ANION GAP SERPL CALC-SCNC: 10 MMOL/L
BASOPHILS # BLD AUTO: 0.01 K/UL
BASOPHILS NFR BLD: 0.1 %
BUN SERPL-MCNC: 26 MG/DL
CALCIUM SERPL-MCNC: 8.8 MG/DL
CHLORIDE SERPL-SCNC: 106 MMOL/L
CO2 SERPL-SCNC: 22 MMOL/L
CREAT SERPL-MCNC: 4.8 MG/DL
DIFFERENTIAL METHOD: ABNORMAL
EOSINOPHIL # BLD AUTO: 0.2 K/UL
EOSINOPHIL NFR BLD: 2.5 %
ERYTHROCYTE [DISTWIDTH] IN BLOOD BY AUTOMATED COUNT: 16.8 %
EST. GFR  (AFRICAN AMERICAN): 13.4 ML/MIN/1.73 M^2
EST. GFR  (NON AFRICAN AMERICAN): 11.6 ML/MIN/1.73 M^2
GLUCOSE SERPL-MCNC: 78 MG/DL
HCT VFR BLD AUTO: 29.8 %
HGB BLD-MCNC: 9.5 G/DL
IMM GRANULOCYTES # BLD AUTO: 0.03 K/UL
IMM GRANULOCYTES NFR BLD AUTO: 0.4 %
LYMPHOCYTES # BLD AUTO: 0.8 K/UL
LYMPHOCYTES NFR BLD: 12.1 %
MCH RBC QN AUTO: 30.5 PG
MCHC RBC AUTO-ENTMCNC: 31.9 G/DL
MCV RBC AUTO: 96 FL
MONOCYTES # BLD AUTO: 0.6 K/UL
MONOCYTES NFR BLD: 9.3 %
NEUTROPHILS # BLD AUTO: 5.2 K/UL
NEUTROPHILS NFR BLD: 75.6 %
NRBC BLD-RTO: 0 /100 WBC
PHOSPHATE SERPL-MCNC: 3.2 MG/DL
PLATELET # BLD AUTO: 103 K/UL
PMV BLD AUTO: 10.3 FL
POTASSIUM SERPL-SCNC: 4.4 MMOL/L
RBC # BLD AUTO: 3.11 M/UL
SODIUM SERPL-SCNC: 138 MMOL/L
WBC # BLD AUTO: 6.87 K/UL

## 2018-01-14 PROCEDURE — 25000242 PHARM REV CODE 250 ALT 637 W/ HCPCS: Performed by: PHYSICIAN ASSISTANT

## 2018-01-14 PROCEDURE — 25000003 PHARM REV CODE 250: Performed by: PHYSICIAN ASSISTANT

## 2018-01-14 PROCEDURE — 94761 N-INVAS EAR/PLS OXIMETRY MLT: CPT

## 2018-01-14 PROCEDURE — 36415 COLL VENOUS BLD VENIPUNCTURE: CPT

## 2018-01-14 PROCEDURE — 94640 AIRWAY INHALATION TREATMENT: CPT

## 2018-01-14 PROCEDURE — 27000221 HC OXYGEN, UP TO 24 HOURS

## 2018-01-14 PROCEDURE — 25000003 PHARM REV CODE 250: Performed by: INTERNAL MEDICINE

## 2018-01-14 PROCEDURE — 99239 HOSP IP/OBS DSCHRG MGMT >30: CPT | Mod: ,,, | Performed by: PHYSICIAN ASSISTANT

## 2018-01-14 PROCEDURE — 80074 ACUTE HEPATITIS PANEL: CPT

## 2018-01-14 PROCEDURE — 80069 RENAL FUNCTION PANEL: CPT

## 2018-01-14 PROCEDURE — 85025 COMPLETE CBC W/AUTO DIFF WBC: CPT

## 2018-01-14 RX ORDER — SEVELAMER CARBONATE 800 MG/1
1600 TABLET, FILM COATED ORAL
Qty: 180 TABLET | Refills: 1 | Status: ON HOLD | OUTPATIENT
Start: 2018-01-14 | End: 2018-03-05

## 2018-01-14 RX ADMIN — SEVELAMER CARBONATE 1600 MG: 800 TABLET, FILM COATED ORAL at 09:01

## 2018-01-14 RX ADMIN — GABAPENTIN 100 MG: 100 CAPSULE ORAL at 05:01

## 2018-01-14 RX ADMIN — LOSARTAN POTASSIUM 50 MG: 50 TABLET, FILM COATED ORAL at 09:01

## 2018-01-14 RX ADMIN — ASPIRIN 81 MG: 81 TABLET, COATED ORAL at 09:01

## 2018-01-14 RX ADMIN — FUROSEMIDE 80 MG: 40 TABLET ORAL at 09:01

## 2018-01-14 RX ADMIN — HYDRALAZINE HYDROCHLORIDE 50 MG: 50 TABLET ORAL at 05:01

## 2018-01-14 RX ADMIN — IPRATROPIUM BROMIDE AND ALBUTEROL SULFATE 3 ML: .5; 3 SOLUTION RESPIRATORY (INHALATION) at 03:01

## 2018-01-14 RX ADMIN — ATORVASTATIN CALCIUM 20 MG: 20 TABLET, FILM COATED ORAL at 09:01

## 2018-01-14 RX ADMIN — CITALOPRAM HYDROBROMIDE 10 MG: 10 TABLET ORAL at 09:01

## 2018-01-14 RX ADMIN — IPRATROPIUM BROMIDE AND ALBUTEROL SULFATE 3 ML: .5; 3 SOLUTION RESPIRATORY (INHALATION) at 08:01

## 2018-01-14 RX ADMIN — HYPROMELLOSE 2910 1 DROP: 5 SOLUTION OPHTHALMIC at 05:01

## 2018-01-14 RX ADMIN — APIXABAN 5 MG: 5 TABLET, FILM COATED ORAL at 09:01

## 2018-01-14 NOTE — NURSING
"PA at bedside updating pt on plan of care- pt will need home oxygen before discharge. Pt states he understands but he is ready to go. Pt states "I will be okay." PA expressed to pt the risks of leaving without oxygen and pt states he will be okay. Pt states he have cab fare; pt given list of cab [employer]. Pt states he is waiting for his son to come with his jacket before he leaves.   "

## 2018-01-14 NOTE — ASSESSMENT & PLAN NOTE
Missed outpatient HD and endorses noncompliance with home medications  Hypoxia  - Presents with dry cough, fatigue, malaise x3 days  - CXR shows hypoventilatory lung changes with abnormal increased attenuation within the lung bases which suggest atelectasis or airspace consolidation with or without dependent pleural fluid.  Mild pulmonary vasculature congestion  - Cannot r/o PNA, but patient afebrile, VSS, denies SOB  - Flu swab negative  - Oxygenation levels decrease to upper 80s with exertion- currently on 1 L O2 NC.    - Continue PRN duonebs; Uses albuterol and atrovent at home   1/8: HD stopped prematurely per patient request (pt will not elaborate on why). Pt requiring O2 (de-satted on RA).   1/9: HD session again. Duonebs ordered for wheezing. Weaning oxygen as tolerated.   1/10: Repeated CXR (no change) due to increased wheezing. Stable on 2L. Continue duonebs q4 and weaning oxygen as tolerated. Remains afebrile without leukocytosis. Plan for HD 1/11.  1/11: HD (-4.5L); remains above dry weight and still requiring oxygen.   1/13: HD (-4L). 6 min walk shows patient requiring oxygen. Will need insurance approval for home O2; P[facility] not open until 1/16 due to holiday. Continue duonebs q4 and weaning oxygen as tolerated.   1/14: Pt left AMA

## 2018-01-14 NOTE — ASSESSMENT & PLAN NOTE
"HD TThSa   - Continue Sevelamer 800 mg TID, lasix 80 mg daily  - Missed dialysis today d/t "had to pay rent"  - Nephrology consulted for HD  - Renal diet; fluid restrict  1/14: Left AMA. Encouraged compliance with dialysis  "

## 2018-01-14 NOTE — ASSESSMENT & PLAN NOTE
2D ECHO 4/22/2017    1 - Eccentric hypertrophy.     2 - Mildly depressed left ventricular systolic function (EF 45-50%).     3 - Right ventricular enlargement with mildly depressed systolic function.     4 - Biatrial enlargement.     5 - Mild tricuspid regurgitation.     6 - Mild aortic stenosis, MARANDA = 1.4 cm2, peak velocity = 2.7 m/s, mean gradient = 15 mmHg.     7 - Pulmonary hypertension. The estimated PA systolic pressure is 55 mmHg.     8 - Increased central venous pressure.     1/13: 6 min walk shows patient requiring oxygen. Will need insurance approval for home O2; PACE not open until 1/16 due to holiday. Continue duonebs q4 and weaning oxygen as tolerated.   1/14: Pt left AMA

## 2018-01-15 LAB
HAV IGM SERPL QL IA: NEGATIVE
HBV CORE IGM SERPL QL IA: NEGATIVE
HBV SURFACE AG SERPL QL IA: NEGATIVE
HCV AB SERPL QL IA: NEGATIVE

## 2018-01-15 NOTE — PLAN OF CARE
01/15/18 1550   Final Note   Assessment Type Final Discharge Note     Patient discharged home with no needs 1/14/18. Patient refused oxygen for home use.

## 2018-01-31 NOTE — PHYSICIAN QUERY
PT Name: Wilder Carbajal  MR #: [id number]     Physician Query Form - Etiology of Condition Clarification      Terrence Raines RN CDS    Contact Information: [phone]  This form is a permanent document in the medical record.     Query Date: January 31, 2018    By submitting this query, we are merely seeking further clarification of documentation.  Please utilize your independent clinical judgment when addressing the question(s) below.     The medical record contains the following:    Findings Supporting Clinical Information Location in Medical Record   Hypervolemia   67-year-old male presenting to the ER with shortness of breath. Patient has a history of end-stage renal disease on hemodialysis. He missed &hemodialysis secondary to social issues. He is currently hypoxic which responds to nasal cannula  Independently Interpreted Readings:   Chest X-Ray: Cardiomegaly present. Increased vascular markings consistent with CHF are present.     He has had a productive cough for the past few days. He endorses chills,    In ED, CXR with signs of increased attenutation within the lung bases with depedent pleural fluid. Mild pulm vasc congestion    Oxygenation levels decrease to upper 80s with exertion- currently on 1 L O2 NC.   Additional lasix 40 mg IV given   - Daily weights, strict intake/output   - Continue PRN duonebs; Uses albuterol and atrovent at home   - Noncompliant with home meds   - Noted to have trouble sitting up in bed and lethargic on exam.     Pulmonary Hypertension  depressed left ventricular systolic function (EF 45-50%).   3 - Right ventricular enlargement with mildly depressed systolic function.   4 - Biatrial enlargement.   5 - Mild tricuspid regurgitation.   6 - Mild aortic stenosis, MARANDA = 1.4 cm2, peak velocity = 2.7 m/s, mean gradient = 15 mmHg.   7 - Pulmonary hypertension. The estimated PA systolic pressure is   ESRD(end stage renal disease on dialysis  Hypertension    Pt admitted to observation for  hypervolemia 2/2 noncompliance with outpatient HD. Nephrology consulted and appreciate assistance. HD 1/8 stopped prematurely per patient request. HD session again 1/9 and duonebs ordered for wheezing. 1/10: Repeated CXR (no change). HD 1/11 (-4.5L);   HD 1/13 (-4L). 6 min walk shows patient requiring oxygen.     H&P 1/7    ED MD note 1/6                  H&P 1/7                                                          DC summary 1/14       Please document your best medical opinion regarding the etiology of _Hypervolemia________________ for which the primary focus of treatment is/was directed.         [  x] Hypervolemia associated with acute on systolic heart failure    [  ] Hypervolemia associated with acute Pulmonary Edema with acute systolic heart failure    [  ] Fluid overload associated with acute systolic heart failure    [  ] Other hypervolemia (Specify)__________________________              [    ]  Clinically Undetermined    Please document in your progress notes daily for the duration of treatment, until resolved, and include in your discharge summary.

## 2018-02-04 ENCOUNTER — HOSPITAL ENCOUNTER (INPATIENT)
Facility: HOSPITAL | Age: 68
LOS: 30 days | Discharge: NURSING, REHAB OR LONG-TERM CARE | DRG: 853 | End: 2018-03-06
Attending: EMERGENCY MEDICINE | Admitting: EMERGENCY MEDICINE
Payer: COMMERCIAL

## 2018-02-04 DIAGNOSIS — G93.41 SEPTIC ENCEPHALOPATHY: ICD-10-CM

## 2018-02-04 DIAGNOSIS — I96 GANGRENE OF RIGHT FOOT: ICD-10-CM

## 2018-02-04 DIAGNOSIS — F03.90 DEMENTIA WITHOUT BEHAVIORAL DISTURBANCE, UNSPECIFIED DEMENTIA TYPE: ICD-10-CM

## 2018-02-04 DIAGNOSIS — L97.512 FOOT ULCERATION, RIGHT, WITH FAT LAYER EXPOSED: ICD-10-CM

## 2018-02-04 DIAGNOSIS — I73.9 PVD (PERIPHERAL VASCULAR DISEASE): ICD-10-CM

## 2018-02-04 DIAGNOSIS — R00.1 BRADYCARDIA: ICD-10-CM

## 2018-02-04 DIAGNOSIS — E11.22 TYPE 2 DIABETES MELLITUS WITH STAGE 5 CHRONIC KIDNEY DISEASE AND HYPERTENSION: ICD-10-CM

## 2018-02-04 DIAGNOSIS — L08.9 INFECTED SKIN ULCER WITH FAT LAYER EXPOSED: ICD-10-CM

## 2018-02-04 DIAGNOSIS — R60.9 SWELLING: ICD-10-CM

## 2018-02-04 DIAGNOSIS — E11.42 DIABETIC POLYNEUROPATHY ASSOCIATED WITH TYPE 2 DIABETES MELLITUS: ICD-10-CM

## 2018-02-04 DIAGNOSIS — L98.492 INFECTED SKIN ULCER WITH FAT LAYER EXPOSED: ICD-10-CM

## 2018-02-04 DIAGNOSIS — I12.0 TYPE 2 DIABETES MELLITUS WITH STAGE 5 CHRONIC KIDNEY DISEASE AND HYPERTENSION: ICD-10-CM

## 2018-02-04 DIAGNOSIS — N18.5 TYPE 2 DIABETES MELLITUS WITH STAGE 5 CHRONIC KIDNEY DISEASE AND HYPERTENSION: ICD-10-CM

## 2018-02-04 DIAGNOSIS — N18.6 ESRD (END STAGE RENAL DISEASE): ICD-10-CM

## 2018-02-04 DIAGNOSIS — S78.111A UNILATERAL AKA, RIGHT: ICD-10-CM

## 2018-02-04 DIAGNOSIS — E87.5 HYPERKALEMIA: Primary | ICD-10-CM

## 2018-02-04 DIAGNOSIS — M86.171 OTHER ACUTE OSTEOMYELITIS OF RIGHT FOOT: ICD-10-CM

## 2018-02-04 LAB
ALBUMIN SERPL BCP-MCNC: 2 G/DL
ALLENS TEST: ABNORMAL
ALP SERPL-CCNC: 138 U/L
ALT SERPL W/O P-5'-P-CCNC: 13 U/L
ANION GAP SERPL CALC-SCNC: 15 MMOL/L
AST SERPL-CCNC: 14 U/L
BASOPHILS # BLD AUTO: 0.03 K/UL
BASOPHILS NFR BLD: 0.2 %
BILIRUB SERPL-MCNC: 1.3 MG/DL
BUN SERPL-MCNC: 53 MG/DL (ref 6–30)
BUN SERPL-MCNC: 55 MG/DL
BUN SERPL-MCNC: 84 MG/DL (ref 6–30)
CALCIUM SERPL-MCNC: 9.3 MG/DL
CHLORIDE SERPL-SCNC: 93 MMOL/L
CHLORIDE SERPL-SCNC: 95 MMOL/L (ref 95–110)
CHLORIDE SERPL-SCNC: 95 MMOL/L (ref 95–110)
CO2 SERPL-SCNC: 26 MMOL/L
CREAT SERPL-MCNC: 8.6 MG/DL
CREAT SERPL-MCNC: 8.6 MG/DL (ref 0.5–1.4)
CREAT SERPL-MCNC: 9.2 MG/DL (ref 0.5–1.4)
DELSYS: ABNORMAL
DIFFERENTIAL METHOD: ABNORMAL
EOSINOPHIL # BLD AUTO: 0.1 K/UL
EOSINOPHIL NFR BLD: 0.5 %
ERYTHROCYTE [DISTWIDTH] IN BLOOD BY AUTOMATED COUNT: 17.6 %
EST. GFR  (AFRICAN AMERICAN): 6.6 ML/MIN/1.73 M^2
EST. GFR  (NON AFRICAN AMERICAN): 5.7 ML/MIN/1.73 M^2
GLUCOSE SERPL-MCNC: 103 MG/DL (ref 70–110)
GLUCOSE SERPL-MCNC: 105 MG/DL
GLUCOSE SERPL-MCNC: 68 MG/DL (ref 70–110)
HCO3 UR-SCNC: 35.3 MMOL/L (ref 24–28)
HCT VFR BLD AUTO: 26.1 %
HCT VFR BLD CALC: 30 %PCV (ref 36–54)
HCT VFR BLD CALC: 30 %PCV (ref 36–54)
HGB BLD-MCNC: 9 G/DL
IMM GRANULOCYTES # BLD AUTO: 0.12 K/UL
IMM GRANULOCYTES NFR BLD AUTO: 0.7 %
LACTATE SERPL-SCNC: 3.2 MMOL/L
LYMPHOCYTES # BLD AUTO: 0.8 K/UL
LYMPHOCYTES NFR BLD: 4.1 %
MAGNESIUM SERPL-MCNC: 2.1 MG/DL
MCH RBC QN AUTO: 30.4 PG
MCHC RBC AUTO-ENTMCNC: 34.5 G/DL
MCV RBC AUTO: 88 FL
MONOCYTES # BLD AUTO: 0.8 K/UL
MONOCYTES NFR BLD: 4.3 %
NEUTROPHILS # BLD AUTO: 16.5 K/UL
NEUTROPHILS NFR BLD: 90.2 %
NRBC BLD-RTO: 0 /100 WBC
PCO2 BLDA: 57.3 MMHG (ref 35–45)
PH SMN: 7.4 [PH] (ref 7.35–7.45)
PLATELET # BLD AUTO: 310 K/UL
PMV BLD AUTO: 12.1 FL
PO2 BLDA: 25 MMHG (ref 40–60)
POC BE: 10 MMOL/L
POC IONIZED CALCIUM: 0.88 MMOL/L (ref 1.06–1.42)
POC IONIZED CALCIUM: 1.05 MMOL/L (ref 1.06–1.42)
POC SATURATED O2: 42 % (ref 95–100)
POC TCO2 (MEASURED): 30 MMOL/L (ref 23–29)
POC TCO2 (MEASURED): 30 MMOL/L (ref 23–29)
POC TCO2: 37 MMOL/L (ref 24–29)
POCT GLUCOSE: 106 MG/DL (ref 70–110)
POCT GLUCOSE: 113 MG/DL (ref 70–110)
POCT GLUCOSE: 171 MG/DL (ref 70–110)
POTASSIUM BLD-SCNC: 4 MMOL/L (ref 3.5–5.1)
POTASSIUM BLD-SCNC: 7.9 MMOL/L (ref 3.5–5.1)
POTASSIUM SERPL-SCNC: 4.1 MMOL/L
PROT SERPL-MCNC: 7.6 G/DL
RBC # BLD AUTO: 2.96 M/UL
SAMPLE: ABNORMAL
SITE: ABNORMAL
SODIUM BLD-SCNC: 132 MMOL/L (ref 136–145)
SODIUM BLD-SCNC: 137 MMOL/L (ref 136–145)
SODIUM SERPL-SCNC: 134 MMOL/L
WBC # BLD AUTO: 18.31 K/UL

## 2018-02-04 PROCEDURE — 25000242 PHARM REV CODE 250 ALT 637 W/ HCPCS: Performed by: EMERGENCY MEDICINE

## 2018-02-04 PROCEDURE — 85025 COMPLETE CBC W/AUTO DIFF WBC: CPT

## 2018-02-04 PROCEDURE — 63600175 PHARM REV CODE 636 W HCPCS: Performed by: EMERGENCY MEDICINE

## 2018-02-04 PROCEDURE — 93005 ELECTROCARDIOGRAM TRACING: CPT

## 2018-02-04 PROCEDURE — 93010 ELECTROCARDIOGRAM REPORT: CPT | Mod: ,,, | Performed by: INTERNAL MEDICINE

## 2018-02-04 PROCEDURE — 83605 ASSAY OF LACTIC ACID: CPT

## 2018-02-04 PROCEDURE — 82962 GLUCOSE BLOOD TEST: CPT

## 2018-02-04 PROCEDURE — 25000003 PHARM REV CODE 250: Performed by: INTERNAL MEDICINE

## 2018-02-04 PROCEDURE — 94640 AIRWAY INHALATION TREATMENT: CPT

## 2018-02-04 PROCEDURE — 11000001 HC ACUTE MED/SURG PRIVATE ROOM

## 2018-02-04 PROCEDURE — 99285 EMERGENCY DEPT VISIT HI MDM: CPT | Mod: ,,, | Performed by: EMERGENCY MEDICINE

## 2018-02-04 PROCEDURE — 27000221 HC OXYGEN, UP TO 24 HOURS

## 2018-02-04 PROCEDURE — 83735 ASSAY OF MAGNESIUM: CPT

## 2018-02-04 PROCEDURE — 96375 TX/PRO/DX INJ NEW DRUG ADDON: CPT

## 2018-02-04 PROCEDURE — 99223 1ST HOSP IP/OBS HIGH 75: CPT | Mod: ,,, | Performed by: INTERNAL MEDICINE

## 2018-02-04 PROCEDURE — 82803 BLOOD GASES ANY COMBINATION: CPT

## 2018-02-04 PROCEDURE — 96365 THER/PROPH/DIAG IV INF INIT: CPT

## 2018-02-04 PROCEDURE — 87040 BLOOD CULTURE FOR BACTERIA: CPT | Mod: 59

## 2018-02-04 PROCEDURE — 80053 COMPREHEN METABOLIC PANEL: CPT

## 2018-02-04 PROCEDURE — 96368 THER/DIAG CONCURRENT INF: CPT

## 2018-02-04 PROCEDURE — 12000002 HC ACUTE/MED SURGE SEMI-PRIVATE ROOM

## 2018-02-04 PROCEDURE — 25000003 PHARM REV CODE 250: Performed by: EMERGENCY MEDICINE

## 2018-02-04 PROCEDURE — 96366 THER/PROPH/DIAG IV INF ADDON: CPT

## 2018-02-04 PROCEDURE — 99285 EMERGENCY DEPT VISIT HI MDM: CPT | Mod: 25

## 2018-02-04 RX ORDER — ALBUTEROL SULFATE 0.83 MG/ML
15 SOLUTION RESPIRATORY (INHALATION)
Status: COMPLETED | OUTPATIENT
Start: 2018-02-04 | End: 2018-02-04

## 2018-02-04 RX ORDER — HYDROCODONE BITARTRATE AND ACETAMINOPHEN 5; 325 MG/1; MG/1
1 TABLET ORAL EVERY 6 HOURS PRN
Status: DISCONTINUED | OUTPATIENT
Start: 2018-02-04 | End: 2018-02-09

## 2018-02-04 RX ORDER — ONDANSETRON 8 MG/1
8 TABLET, ORALLY DISINTEGRATING ORAL EVERY 8 HOURS PRN
Status: DISCONTINUED | OUTPATIENT
Start: 2018-02-04 | End: 2018-03-06 | Stop reason: HOSPADM

## 2018-02-04 RX ORDER — DEXTROSE MONOHYDRATE 25 G/50ML
INJECTION, SOLUTION INTRAVENOUS
Status: DISPENSED
Start: 2018-02-04 | End: 2018-02-05

## 2018-02-04 RX ORDER — FUROSEMIDE 40 MG/1
80 TABLET ORAL DAILY
Status: DISCONTINUED | OUTPATIENT
Start: 2018-02-05 | End: 2018-02-08

## 2018-02-04 RX ORDER — DEXTROSE 50 % IN WATER (D50W) INTRAVENOUS SYRINGE
25
Status: DISCONTINUED | OUTPATIENT
Start: 2018-02-04 | End: 2018-02-04

## 2018-02-04 RX ORDER — DEXTROSE MONOHYDRATE 25 G/50ML
25 INJECTION, SOLUTION INTRAVENOUS
Status: COMPLETED | OUTPATIENT
Start: 2018-02-04 | End: 2018-02-04

## 2018-02-04 RX ORDER — SODIUM CHLORIDE 9 MG/ML
INJECTION, SOLUTION INTRAVENOUS
Status: DISCONTINUED | OUTPATIENT
Start: 2018-02-04 | End: 2018-02-08

## 2018-02-04 RX ORDER — SEVELAMER CARBONATE 800 MG/1
1600 TABLET, FILM COATED ORAL
Status: DISCONTINUED | OUTPATIENT
Start: 2018-02-05 | End: 2018-03-06 | Stop reason: HOSPADM

## 2018-02-04 RX ORDER — SODIUM CHLORIDE 9 MG/ML
INJECTION, SOLUTION INTRAVENOUS ONCE
Status: COMPLETED | OUTPATIENT
Start: 2018-02-04 | End: 2018-02-04

## 2018-02-04 RX ORDER — GABAPENTIN 100 MG/1
100 CAPSULE ORAL 3 TIMES DAILY
Status: DISCONTINUED | OUTPATIENT
Start: 2018-02-04 | End: 2018-03-06 | Stop reason: HOSPADM

## 2018-02-04 RX ORDER — AMOXICILLIN 250 MG
1 CAPSULE ORAL 2 TIMES DAILY
Status: DISCONTINUED | OUTPATIENT
Start: 2018-02-04 | End: 2018-02-05

## 2018-02-04 RX ORDER — ALBUTEROL SULFATE 0.83 MG/ML
15 SOLUTION RESPIRATORY (INHALATION)
Status: DISCONTINUED | OUTPATIENT
Start: 2018-02-04 | End: 2018-02-04

## 2018-02-04 RX ORDER — CINACALCET 30 MG/1
30 TABLET, FILM COATED ORAL NIGHTLY
Status: DISCONTINUED | OUTPATIENT
Start: 2018-02-04 | End: 2018-03-06 | Stop reason: HOSPADM

## 2018-02-04 RX ORDER — CITALOPRAM 10 MG/1
10 TABLET ORAL DAILY
Status: DISCONTINUED | OUTPATIENT
Start: 2018-02-05 | End: 2018-03-06 | Stop reason: HOSPADM

## 2018-02-04 RX ORDER — ASPIRIN 81 MG/1
81 TABLET ORAL DAILY
Status: DISCONTINUED | OUTPATIENT
Start: 2018-02-05 | End: 2018-03-06 | Stop reason: HOSPADM

## 2018-02-04 RX ORDER — ATORVASTATIN CALCIUM 20 MG/1
20 TABLET, FILM COATED ORAL DAILY
Status: DISCONTINUED | OUTPATIENT
Start: 2018-02-05 | End: 2018-03-06 | Stop reason: HOSPADM

## 2018-02-04 RX ORDER — SODIUM CHLORIDE 0.9 % (FLUSH) 0.9 %
5 SYRINGE (ML) INJECTION
Status: DISCONTINUED | OUTPATIENT
Start: 2018-02-04 | End: 2018-03-06 | Stop reason: HOSPADM

## 2018-02-04 RX ORDER — AMMONIUM LACTATE 12 G/100G
LOTION TOPICAL 2 TIMES DAILY
Status: DISCONTINUED | OUTPATIENT
Start: 2018-02-04 | End: 2018-03-06 | Stop reason: HOSPADM

## 2018-02-04 RX ORDER — ACETAMINOPHEN 325 MG/1
650 TABLET ORAL EVERY 6 HOURS PRN
Status: DISCONTINUED | OUTPATIENT
Start: 2018-02-04 | End: 2018-02-27

## 2018-02-04 RX ORDER — INDOMETHACIN 25 MG/1
50 CAPSULE ORAL
Status: DISCONTINUED | OUTPATIENT
Start: 2018-02-04 | End: 2018-02-04

## 2018-02-04 RX ORDER — SODIUM BICARBONATE 1 MEQ/ML
50 SYRINGE (ML) INTRAVENOUS
Status: COMPLETED | OUTPATIENT
Start: 2018-02-04 | End: 2018-02-04

## 2018-02-04 RX ORDER — ALBUTEROL SULFATE 0.83 MG/ML
15 SOLUTION RESPIRATORY (INHALATION)
Status: DISPENSED | OUTPATIENT
Start: 2018-02-04 | End: 2018-02-05

## 2018-02-04 RX ADMIN — GABAPENTIN 100 MG: 100 CAPSULE ORAL at 09:02

## 2018-02-04 RX ADMIN — ALBUTEROL SULFATE 15 MG: 2.5 SOLUTION RESPIRATORY (INHALATION) at 12:02

## 2018-02-04 RX ADMIN — CALCIUM GLUCONATE 1 G: 94 INJECTION, SOLUTION INTRAVENOUS at 01:02

## 2018-02-04 RX ADMIN — STANDARDIZED SENNA CONCENTRATE AND DOCUSATE SODIUM 1 TABLET: 8.6; 5 TABLET, FILM COATED ORAL at 09:02

## 2018-02-04 RX ADMIN — VANCOMYCIN HCL-SODIUM CHLORIDE IV SOLN 1.5 GM/250ML-0.9% 1500 MG: 1.5-0.9/25 SOLUTION at 12:02

## 2018-02-04 RX ADMIN — PIPERACILLIN AND TAZOBACTAM 4.5 G: 4; .5 INJECTION, POWDER, LYOPHILIZED, FOR SOLUTION INTRAVENOUS; PARENTERAL at 01:02

## 2018-02-04 RX ADMIN — SODIUM CHLORIDE: 0.9 INJECTION, SOLUTION INTRAVENOUS at 02:02

## 2018-02-04 RX ADMIN — DEXTROSE MONOHYDRATE 25 G: 25 INJECTION, SOLUTION INTRAVENOUS at 02:02

## 2018-02-04 RX ADMIN — DEXTROSE MONOHYDRATE 25 G: 25 INJECTION, SOLUTION INTRAVENOUS at 12:02

## 2018-02-04 RX ADMIN — SODIUM BICARBONATE 50 MEQ: 84 INJECTION, SOLUTION INTRAVENOUS at 01:02

## 2018-02-04 RX ADMIN — INSULIN HUMAN 5 UNITS: 100 INJECTION, SOLUTION PARENTERAL at 12:02

## 2018-02-04 RX ADMIN — APIXABAN 5 MG: 5 TABLET, FILM COATED ORAL at 09:02

## 2018-02-04 RX ADMIN — SODIUM POLYSTYRENE SULFONATE 30 G: 15 SUSPENSION ORAL; RECTAL at 12:02

## 2018-02-04 NOTE — CONSULTS
[facility]  Critical Care Medicine  Consult Note    Patient Name: Wilder Carbajal  MRN: [id number]  Admission Date: 2/4/2018  Hospital Length of Stay: 0 days  Code Status: Full Code  Attending Physician: Melanie Larose MD   Primary Care Provider: Dio Roberson MD   Principal Problem: <principal problem not specified>    Consults  Subjective:     HPI:  Mr. Carbajal is a 67 y.o. male with PMHx of  DM, HTN, diabetic retinopathy, and ESRD on HD who presented with lethargy and worsening lower extremity swelling and multiple foot sores. Pt reports that he missed his dialysis yesterday due to lack of transportation (last . Denies fever, nausea, vomiting, diarrhea, SOB or chest pain. Pt lives with his son. In the ED pt was found with hyperkalemia (7.6) without EKG changes, gluc (68) leukocytosis (18) and elevated lactate (3.2) and was started on vanc and zosyn for possible cellulitis. CXR was unremarkable. MICU consulted for sepsis and hyperkalemia in pt with ESRD on HD.    Hospital/ICU Course:  No notes on file    Past Medical History:   Diagnosis Date    Acute pain of left shoulder 1/7/2017    Arthritis     Asthma     Benign neoplasm of eyelid     RUL    Blood clotting tendency     Blood transfusion     Cataract     Chronic kidney disease requiring chronic dialysis     Diabetes mellitus     Diabetic retinopathy     Fever blister     Hyperlipidemia     Hypertension     Infertility     Joint pain     Retinal detachment     Thyroid disease     Weakness 1/7/2017       Past Surgical History:   Procedure Laterality Date    CATARACT EXTRACTION      COLONOSCOPY N/A 6/23/2017    Procedure: COLONOSCOPY;  Surgeon: Vic Clark MD;  Location: [address];  Service: Endoscopy;  Laterality: N/A;    RETINAL DETACHMENT SURGERY         Review of patient's allergies indicates:  No Known Allergies    No current facility-administered medications on file prior to encounter.      Current  Outpatient Prescriptions on File Prior to Encounter   Medication Sig    ammonium lactate (LAC-HYDRIN) 12 % lotion Apply topically as needed for Dry Skin.    apixaban 5 mg Tab Take 1 tablet (5 mg total) by mouth 2 (two) times daily.    aspirin (ECOTRIN) 81 MG EC tablet Take 81 mg by mouth once daily.     atorvastatin (LIPITOR) 20 MG tablet Take 20 mg by mouth once daily.    cetirizine (ZYRTEC) 10 MG tablet Take 10 mg by mouth once daily.    cinacalcet (SENSIPAR) 30 MG Tab Take 30 mg by mouth every evening.    citalopram (CELEXA) 10 MG tablet Take 10 mg by mouth once daily.    diclofenac sodium (VOLTAREN) 1 % Gel Apply 2 g topically 4 (four) times daily as needed (for pain).    ergocalciferol (ERGOCALCIFEROL) 50,000 unit Cap Take 50,000 Units by mouth every 14 (fourteen) days.     furosemide (LASIX) 80 MG tablet Take 80 mg by mouth once daily.    gabapentin (NEURONTIN) 100 MG capsule Take 100 mg by mouth 3 (three) times daily.    hydrALAZINE (APRESOLINE) 50 MG tablet Take 50 mg by mouth 3 (three) times daily.    hydrocodone-acetaminophen 5-325mg (NORCO) 5-325 mg per tablet Take 1 tablet by mouth every 6 (six) hours as needed for Pain.    ipratropium (ATROVENT HFA) 17 mcg/actuation inhaler Inhale 2 puffs into the lungs 4 (four) times daily. Do not exceed 12 puffs per day    ketotifen (ZADITOR) 0.025 % (0.035 %) ophthalmic solution Apply 1 drop to eye every 8 (eight) hours.    losartan (COZAAR) 50 MG tablet Take 1 tablet (50 mg total) by mouth once daily.    mometasone (NASONEX) 50 mcg/actuation nasal spray 2 sprays by Nasal route once daily.    podofilox (CONDYLOX) 0.5 % external solution Apply a small amount to lesions twice daily for three days, then hold for four days and repeat regimen    sevelamer carbonate (RENVELA) 800 mg Tab Take 2 tablets (1,600 mg total) by mouth 3 (three) times daily with meals. Take two tablets by mouth four times daily with meals and snacks     Family History     Problem  Relation (Age of Onset)    Heart attack Mother    No Known Problems Father, Sister, Brother, Maternal Aunt, Maternal Uncle, Paternal Aunt, Paternal Uncle, Maternal Grandmother, Maternal Grandfather, Paternal Grandmother, Paternal Grandfather        Social History Main Topics    Smoking status: Never Smoker    Smokeless tobacco: Never Used    Alcohol use No    Drug use: No    Sexual activity: Not on file     Review of Systems   Constitutional: Positive for activity change and fatigue. Negative for chills and fever.   HENT: Negative for congestion, ear pain, rhinorrhea and sore throat.    Respiratory: Negative for cough and shortness of breath.    Cardiovascular: Positive for leg swelling. Negative for chest pain and palpitations.   Gastrointestinal: Negative for abdominal pain, constipation, diarrhea, nausea and vomiting.   Genitourinary:        Anuric   Musculoskeletal: Negative for myalgias.   Neurological: Negative for dizziness and headaches.   Psychiatric/Behavioral: Negative for confusion.     Objective:     Vital Signs (Most Recent):  Temp: 98.1 °F (36.7 °C) (02/04/18 1047)  Pulse: 81 (02/04/18 1502)  Resp: 17 (02/04/18 1502)  BP: (!) 108/56 (02/04/18 1502)  SpO2: 95 % (02/04/18 1502) Vital Signs (24h Range):  Temp:  [98.1 °F (36.7 °C)] 98.1 °F (36.7 °C)  Pulse:  [59-81] 81  Resp:  [14-95] 17  SpO2:  [24 %-99 %] 95 %  BP: (108-118)/(55-61) 108/56     Weight: 113.4 kg (250 lb)  Body mass index is 38.01 kg/m².    Physical Exam   Constitutional: He is oriented to person, place, and time. He appears well-developed and well-nourished. No distress.   Lethargic, ill appearing   HENT:   Head: Normocephalic and atraumatic.   Nose: Nose normal.   Mouth/Throat: Oropharynx is clear and moist.   Eyes: Conjunctivae and EOM are normal. Pupils are equal, round, and reactive to light.   Neck: Normal range of motion.   Cardiovascular: Normal rate, regular rhythm, normal heart sounds and intact distal pulses.     Pulmonary/Chest: Effort normal and breath sounds normal. No respiratory distress. He has no wheezes.   Abdominal: Soft. Bowel sounds are normal. He exhibits no distension.   Musculoskeletal: Normal range of motion. He exhibits edema.   Neurological: He is alert and oriented to person, place, and time.   Skin: Skin is warm. He is not diaphoretic.   Skin discoloration on lower extremities with thick skin, changes which appear chronic. Ulceration on lower extremities with clear secretions.   Psychiatric: He has a normal mood and affect.       Significant Labs: All pertinent labs within the past 24 hours have been reviewed.    Significant Imaging: I have reviewed and interpreted all pertinent imaging results/findings within the past 24 hours.    Assessment/Plan:     Renal/   Hyperkalemia     -2/2 renal disease  -no EKG changes  -shifted in ED  -hemodinamically stable  -agree with nephrology consult for emergent HD.                 ID   Sepsis     -2/4 SIRS- leukocytosis and tachypnea  -source possible cellulitis of lower extremities  -CXR without consolidations suggestive of PNA  -agree on broad spectrum abx for now w vanc and zosyn; de-escalate pending cultures  -f/up blood cultures  -Lactic acid 3.2; continue to trend  -patient currently stable for the floor                  Critical care was time spent personally by me on the following activities: development of treatment plan with patient or surrogate and bedside caregivers, discussions with consultants, evaluation of patient's response to treatment, examination of patient, ordering and performing treatments and interventions, ordering and review of laboratory studies, ordering and review of radiographic studies, pulse oximetry, re-evaluation of patient's condition. This critical care time did not overlap with that of any other provider or involve time for any procedures.    Thank you for your consult. I will sign off. Please contact us if you have any  additional questions.   Case discussed with Pulmonary Critical Care fellow Dr. Le.   Sasha Martinez MD  Critical Care Medicine  [facility]

## 2018-02-04 NOTE — HPI
Mr. Carbajal is a 67 y.o. male with PMHx of  DM, HTN, diabetic retinopathy, and ESRD on HD who presented with lethargy and worsening lower extremity swelling and multiple foot sores. Pt reports that he missed his dialysis yesterday due to lack of transportation (last . Denies fever, nausea, vomiting, diarrhea, SOB or chest pain. Pt lives with his son. In the ED pt was found with hyperkalemia (7.6) without EKG changes, gluc (68) leukocytosis (18) and elevated lactate (3.2) and was started on vanc and zosyn for possible cellulitis. CXR was unremarkable. MICU consulted for sepsis and hyperkalemia in pt with ESRD on HD.

## 2018-02-04 NOTE — ASSESSMENT & PLAN NOTE
-2/4 SIRS- leukocytosis and tachypnea  -source possible cellulitis of lower extremities  -CXR without consolidations suggestive of PNA  -agree on broad spectrum abx for now w vanc and zosyn; de-escalate pending cultures  -f/up blood cultures  -Lactic acid 3.2; continue to trend  -patient currently stable for the floor

## 2018-02-04 NOTE — ASSESSMENT & PLAN NOTE
-2/4 SIRS- leukocytosis and tachypnea  -source possible cellulitis of lower extremities  -CXR without consolidations suggestive of PNA  -agree on broad spectrum abx for now w vanc and zosyn; de-escalate pending cultures  -f/up blood cultures  -Lactic acid 3.2; continue to trend

## 2018-02-04 NOTE — ASSESSMENT & PLAN NOTE
-2/2 renal disease  -no EKG changes  -shifted in ED  -agree with nephrology consult for emergent HD

## 2018-02-04 NOTE — ASSESSMENT & PLAN NOTE
-2/2 renal disease  -no EKG changes  -shifted in ED  -hemodinamically stable  -agree with nephrology consult for emergent HD.

## 2018-02-04 NOTE — SUBJECTIVE & OBJECTIVE
Past Medical History:   Diagnosis Date    Acute pain of left shoulder 1/7/2017    Arthritis     Asthma     Benign neoplasm of eyelid     RUL    Blood clotting tendency     Blood transfusion     Cataract     Chronic kidney disease requiring chronic dialysis     Diabetes mellitus     Diabetic retinopathy     Fever blister     Hyperlipidemia     Hypertension     Infertility     Joint pain     Retinal detachment     Thyroid disease     Weakness 1/7/2017       Past Surgical History:   Procedure Laterality Date    CATARACT EXTRACTION      COLONOSCOPY N/A 6/23/2017    Procedure: COLONOSCOPY;  Surgeon: Vic Clark MD;  Location: [address];  Service: Endoscopy;  Laterality: N/A;    RETINAL DETACHMENT SURGERY         Review of patient's allergies indicates:  No Known Allergies    No current facility-administered medications on file prior to encounter.      Current Outpatient Prescriptions on File Prior to Encounter   Medication Sig    ammonium lactate (LAC-HYDRIN) 12 % lotion Apply topically as needed for Dry Skin.    apixaban 5 mg Tab Take 1 tablet (5 mg total) by mouth 2 (two) times daily.    aspirin (ECOTRIN) 81 MG EC tablet Take 81 mg by mouth once daily.     atorvastatin (LIPITOR) 20 MG tablet Take 20 mg by mouth once daily.    cetirizine (ZYRTEC) 10 MG tablet Take 10 mg by mouth once daily.    cinacalcet (SENSIPAR) 30 MG Tab Take 30 mg by mouth every evening.    citalopram (CELEXA) 10 MG tablet Take 10 mg by mouth once daily.    diclofenac sodium (VOLTAREN) 1 % Gel Apply 2 g topically 4 (four) times daily as needed (for pain).    ergocalciferol (ERGOCALCIFEROL) 50,000 unit Cap Take 50,000 Units by mouth every 14 (fourteen) days.     furosemide (LASIX) 80 MG tablet Take 80 mg by mouth once daily.    gabapentin (NEURONTIN) 100 MG capsule Take 100 mg by mouth 3 (three) times daily.    hydrALAZINE (APRESOLINE) 50 MG tablet Take 50 mg by mouth 3 (three) times daily.     hydrocodone-acetaminophen 5-325mg (NORCO) 5-325 mg per tablet Take 1 tablet by mouth every 6 (six) hours as needed for Pain.    ipratropium (ATROVENT HFA) 17 mcg/actuation inhaler Inhale 2 puffs into the lungs 4 (four) times daily. Do not exceed 12 puffs per day    ketotifen (ZADITOR) 0.025 % (0.035 %) ophthalmic solution Apply 1 drop to eye every 8 (eight) hours.    losartan (COZAAR) 50 MG tablet Take 1 tablet (50 mg total) by mouth once daily.    mometasone (NASONEX) 50 mcg/actuation nasal spray 2 sprays by Nasal route once daily.    podofilox (CONDYLOX) 0.5 % external solution Apply a small amount to lesions twice daily for three days, then hold for four days and repeat regimen    sevelamer carbonate (RENVELA) 800 mg Tab Take 2 tablets (1,600 mg total) by mouth 3 (three) times daily with meals. Take two tablets by mouth four times daily with meals and snacks     Family History     Problem Relation (Age of Onset)    Heart attack Mother    No Known Problems Father, Sister, Brother, Maternal Aunt, Maternal Uncle, Paternal Aunt, Paternal Uncle, Maternal Grandmother, Maternal Grandfather, Paternal Grandmother, Paternal Grandfather        Social History Main Topics    Smoking status: Never Smoker    Smokeless tobacco: Never Used    Alcohol use No    Drug use: No    Sexual activity: Not on file     Review of Systems   Constitutional: Positive for activity change and fatigue. Negative for chills and fever.   HENT: Negative for congestion, ear pain, rhinorrhea and sore throat.    Respiratory: Negative for cough and shortness of breath.    Cardiovascular: Positive for leg swelling. Negative for chest pain and palpitations.   Gastrointestinal: Negative for abdominal pain, constipation, diarrhea, nausea and vomiting.   Genitourinary:        Anuric   Musculoskeletal: Negative for myalgias.   Neurological: Negative for dizziness and headaches.   Psychiatric/Behavioral: Negative for confusion.     Objective:      Vital Signs (Most Recent):  Temp: 98.1 °F (36.7 °C) (02/04/18 1047)  Pulse: (!) 59 (02/04/18 1302)  Resp: 16 (02/04/18 1302)  BP: 118/61 (02/04/18 1302)  SpO2: 97 % (02/04/18 1302) Vital Signs (24h Range):  Temp:  [98.1 °F (36.7 °C)] 98.1 °F (36.7 °C)  Pulse:  [59-72] 59  Resp:  [14-95] 16  SpO2:  [24 %-97 %] 97 %  BP: (114-118)/(55-61) 118/61     Weight: 113.4 kg (250 lb)  Body mass index is 38.01 kg/m².    Physical Exam   Constitutional: He is oriented to person, place, and time. He appears well-developed and well-nourished. No distress.   Lethargic, ill appearing   HENT:   Head: Normocephalic and atraumatic.   Nose: Nose normal.   Mouth/Throat: Oropharynx is clear and moist.   Eyes: Conjunctivae and EOM are normal. Pupils are equal, round, and reactive to light.   Neck: Normal range of motion.   Cardiovascular: Normal rate, regular rhythm, normal heart sounds and intact distal pulses.    Pulmonary/Chest: Effort normal and breath sounds normal. No respiratory distress. He has no wheezes.   Abdominal: Soft. Bowel sounds are normal. He exhibits no distension.   Musculoskeletal: Normal range of motion. He exhibits edema.   Neurological: He is alert and oriented to person, place, and time.   Skin: Skin is warm. He is not diaphoretic.   Skin discoloration on lower extremities with thick skin, changes which appear chronic. Ulceration on lower extremities with clear secretions.   Psychiatric: He has a normal mood and affect.       Significant Labs: All pertinent labs within the past 24 hours have been reviewed.    Significant Imaging: I have reviewed and interpreted all pertinent imaging results/findings within the past 24 hours.

## 2018-02-04 NOTE — CONSULTS
[facility] Hospital Medicine  Consult Note    Patient Name: Wilder Carbajal  MRN: [id number]  Admission Date: 2/4/2018  Hospital Length of Stay: 0 days  Attending Physician: Melanie Larose MD   Primary Care Provider: Dio Roberson MD     [facility]: [facility] MED D Sasha Martinez MD      Patient information was obtained from patient, past medical records and ER records.     Consults  Subjective:     Principal Problem: <principal problem not specified>    Chief Complaint:   Chief Complaint   Patient presents with    Leg Swelling     missed dialysis yesterday        HPI: Mr. Carbajal is a 67 y.o. male with PMHx of  DM, HTN, diabetic retinopathy, and ESRD on HD who presented with lethargy and worsening lower extremity swelling and multiple foot sores. Pt reports that he missed his dialysis yesterday due to lack of transportation (last . Denies fever, nausea, vomiting, diarrhea, SOB or chest pain. Pt lives with his son. In the ED pt was found with hyperkalemia (7.6) without EKG changes, gluc (68) leukocytosis (18) and elevated lactate (3.2) and was started on vanc and zosyn for possible cellulitis. CXR was unremarkable. MICU consulted for sepsis and hyperkalemia in pt with ESRD on HD.    Past Medical History:   Diagnosis Date    Acute pain of left shoulder 1/7/2017    Arthritis     Asthma     Benign neoplasm of eyelid     RUL    Blood clotting tendency     Blood transfusion     Cataract     Chronic kidney disease requiring chronic dialysis     Diabetes mellitus     Diabetic retinopathy     Fever blister     Hyperlipidemia     Hypertension     Infertility     Joint pain     Retinal detachment     Thyroid disease     Weakness 1/7/2017       Past Surgical History:   Procedure Laterality Date    CATARACT EXTRACTION      COLONOSCOPY N/A 6/23/2017    Procedure: COLONOSCOPY;  Surgeon: Vic Clark MD;  Location: [address]);  Service: Endoscopy;   Laterality: N/A;    RETINAL DETACHMENT SURGERY         Review of patient's allergies indicates:  No Known Allergies    No current facility-administered medications on file prior to encounter.      Current Outpatient Prescriptions on File Prior to Encounter   Medication Sig    ammonium lactate (LAC-HYDRIN) 12 % lotion Apply topically as needed for Dry Skin.    apixaban 5 mg Tab Take 1 tablet (5 mg total) by mouth 2 (two) times daily.    aspirin (ECOTRIN) 81 MG EC tablet Take 81 mg by mouth once daily.     atorvastatin (LIPITOR) 20 MG tablet Take 20 mg by mouth once daily.    cetirizine (ZYRTEC) 10 MG tablet Take 10 mg by mouth once daily.    cinacalcet (SENSIPAR) 30 MG Tab Take 30 mg by mouth every evening.    citalopram (CELEXA) 10 MG tablet Take 10 mg by mouth once daily.    diclofenac sodium (VOLTAREN) 1 % Gel Apply 2 g topically 4 (four) times daily as needed (for pain).    ergocalciferol (ERGOCALCIFEROL) 50,000 unit Cap Take 50,000 Units by mouth every 14 (fourteen) days.     furosemide (LASIX) 80 MG tablet Take 80 mg by mouth once daily.    gabapentin (NEURONTIN) 100 MG capsule Take 100 mg by mouth 3 (three) times daily.    hydrALAZINE (APRESOLINE) 50 MG tablet Take 50 mg by mouth 3 (three) times daily.    hydrocodone-acetaminophen 5-325mg (NORCO) 5-325 mg per tablet Take 1 tablet by mouth every 6 (six) hours as needed for Pain.    ipratropium (ATROVENT HFA) 17 mcg/actuation inhaler Inhale 2 puffs into the lungs 4 (four) times daily. Do not exceed 12 puffs per day    ketotifen (ZADITOR) 0.025 % (0.035 %) ophthalmic solution Apply 1 drop to eye every 8 (eight) hours.    losartan (COZAAR) 50 MG tablet Take 1 tablet (50 mg total) by mouth once daily.    mometasone (NASONEX) 50 mcg/actuation nasal spray 2 sprays by Nasal route once daily.    podofilox (CONDYLOX) 0.5 % external solution Apply a small amount to lesions twice daily for three days, then hold for four days and repeat regimen     sevelamer carbonate (RENVELA) 800 mg Tab Take 2 tablets (1,600 mg total) by mouth 3 (three) times daily with meals. Take two tablets by mouth four times daily with meals and snacks     Family History     Problem Relation (Age of Onset)    Heart attack Mother    No Known Problems Father, Sister, Brother, Maternal Aunt, Maternal Uncle, Paternal Aunt, Paternal Uncle, Maternal Grandmother, Maternal Grandfather, Paternal Grandmother, Paternal Grandfather        Social History Main Topics    Smoking status: Never Smoker    Smokeless tobacco: Never Used    Alcohol use No    Drug use: No    Sexual activity: Not on file     Review of Systems   Constitutional: Positive for activity change and fatigue. Negative for chills and fever.   HENT: Negative for congestion, ear pain, rhinorrhea and sore throat.    Respiratory: Negative for cough and shortness of breath.    Cardiovascular: Positive for leg swelling. Negative for chest pain and palpitations.   Gastrointestinal: Negative for abdominal pain, constipation, diarrhea, nausea and vomiting.   Genitourinary:        Anuric   Musculoskeletal: Negative for myalgias.   Neurological: Negative for dizziness and headaches.   Psychiatric/Behavioral: Negative for confusion.     Objective:     Vital Signs (Most Recent):  Temp: 98.1 °F (36.7 °C) (02/04/18 1047)  Pulse: (!) 59 (02/04/18 1302)  Resp: 16 (02/04/18 1302)  BP: 118/61 (02/04/18 1302)  SpO2: 97 % (02/04/18 1302) Vital Signs (24h Range):  Temp:  [98.1 °F (36.7 °C)] 98.1 °F (36.7 °C)  Pulse:  [59-72] 59  Resp:  [14-95] 16  SpO2:  [24 %-97 %] 97 %  BP: (114-118)/(55-61) 118/61     Weight: 113.4 kg (250 lb)  Body mass index is 38.01 kg/m².    Physical Exam   Constitutional: He is oriented to person, place, and time. He appears well-developed and well-nourished. No distress.   Lethargic, ill appearing   HENT:   Head: Normocephalic and atraumatic.   Nose: Nose normal.   Mouth/Throat: Oropharynx is clear and moist.   Eyes:  Conjunctivae and EOM are normal. Pupils are equal, round, and reactive to light.   Neck: Normal range of motion.   Cardiovascular: Normal rate, regular rhythm, normal heart sounds and intact distal pulses.    Pulmonary/Chest: Effort normal and breath sounds normal. No respiratory distress. He has no wheezes.   Abdominal: Soft. Bowel sounds are normal. He exhibits no distension.   Musculoskeletal: Normal range of motion. He exhibits edema.   Neurological: He is alert and oriented to person, place, and time.   Skin: Skin is warm. He is not diaphoretic.   Skin discoloration on lower extremities with thick skin, changes which appear chronic. Ulceration on lower extremities with clear secretions.   Psychiatric: He has a normal mood and affect.       Significant Labs: All pertinent labs within the past 24 hours have been reviewed.    Significant Imaging: I have reviewed and interpreted all pertinent imaging results/findings within the past 24 hours.    Assessment/Plan:     Hyperkalemia    -2/2 renal disease  -no EKG changes  -shifted in ED  -hemodinamically stable  -agree with nephrology consult for emergent HD.             Sepsis    -2/4 SIRS- leukocytosis and tachypnea  -source possible cellulitis of lower extremities  -CXR without consolidations suggestive of PNA  -agree on broad spectrum abx for now w vanc and zosyn; de-escalate pending cultures  -f/up blood cultures  -Lactic acid 3.2; continue to trend  -patient currently stable for the floor            VTE Risk Mitigation     None              Thank you for your consult. I will sign off. Please contact us if you have any additional questions.     Case discussed with Pulmonary Critical Care fellow Dr. Le.     Sasha Martinez MD  Department of Hospital Medicine   [facility]

## 2018-02-04 NOTE — H&P
History & Physical  Hospital Medicine      SUBJECTIVE:     Chief Complaint/Reason for Admission: leg swelling    History of Present Illness:  Patient is a 67 y.o. male with ESRF on dialysis TTS presents with leg swelling two weeks. He states he has had associated blisters on his legs with fluid weeping out of them. Patient stated he missed dialysis yesterday (Saturday) because his [facility] transportation did not arrive. He denies fevers, chills, URI symptoms, or diarrhea. His son called EMS. Patient was admitted 1/7-1/14 for volume overload due to HD non-compliance. He was still above dry weight and hypoxic when he left AMA. Patient maintaining that he has been complaint with dialysis since until yesterday.  Patient is not a good historian and is oriented only to name, city and the street the hospital is located. He could not tell me name of hospital nor the the year. He was intermittently falling asleep during the interview. Spoke with son who confirms that transport for dialysis did not come yesterday. Patient has otherwise been complaint with dialysis as far as son knows. However patient did admit to taking himself off of dialysis Thursday due to profound weakness and fatigue. He states patient has been confused and more lethargic since four days ago. Son call EMS because of progressively worsening BLE swelling and the development of fluid coming out of his feet this morning.     Allergies: Patient has No Known Allergies.     PTA Medications   Medication Sig    ammonium lactate (LAC-HYDRIN) 12 % lotion Apply topically as needed for Dry Skin.    apixaban 5 mg Tab Take 1 tablet (5 mg total) by mouth 2 (two) times daily.    aspirin (ECOTRIN) 81 MG EC tablet Take 81 mg by mouth once daily.     atorvastatin (LIPITOR) 20 MG tablet Take 20 mg by mouth once daily.    cetirizine (ZYRTEC) 10 MG tablet Take 10 mg by mouth once daily.    cinacalcet (SENSIPAR) 30 MG Tab Take 30 mg by mouth every evening.    citalopram  (CELEXA) 10 MG tablet Take 10 mg by mouth once daily.    diclofenac sodium (VOLTAREN) 1 % Gel Apply 2 g topically 4 (four) times daily as needed (for pain).    ergocalciferol (ERGOCALCIFEROL) 50,000 unit Cap Take 50,000 Units by mouth every 14 (fourteen) days.     furosemide (LASIX) 80 MG tablet Take 80 mg by mouth once daily.    gabapentin (NEURONTIN) 100 MG capsule Take 100 mg by mouth 3 (three) times daily.    hydrALAZINE (APRESOLINE) 50 MG tablet Take 50 mg by mouth 3 (three) times daily.    hydrocodone-acetaminophen 5-325mg (NORCO) 5-325 mg per tablet Take 1 tablet by mouth every 6 (six) hours as needed for Pain.    ipratropium (ATROVENT HFA) 17 mcg/actuation inhaler Inhale 2 puffs into the lungs 4 (four) times daily. Do not exceed 12 puffs per day    losartan (COZAAR) 50 MG tablet Take 1 tablet (50 mg total) by mouth once daily.    mometasone (NASONEX) 50 mcg/actuation nasal spray 2 sprays by Nasal route once daily.    podofilox (CONDYLOX) 0.5 % external solution Apply a small amount to lesions twice daily for three days, then hold for four days and repeat regimen    sevelamer carbonate (RENVELA) 800 mg Tab Take 2 tablets (1,600 mg total) by mouth 3 (three) times daily with meals. Take two tablets by mouth four times daily with meals and snacks     Past Medical History: Patient has a past medical history of Acute pain of left shoulder (1/7/2017); Arthritis; Asthma; Benign neoplasm of eyelid; Blood clotting tendency; Blood transfusion; Cataract; Chronic kidney disease requiring chronic dialysis; Diabetes mellitus; Diabetic retinopathy; Fever blister; Hyperlipidemia; Hypertension; Infertility; Joint pain; Retinal detachment; Thyroid disease; and Weakness (1/7/2017).  Past Surgical History: Patient has a past surgical history that includes Cataract extraction; Retinal detachment surgery; and Colonoscopy (N/A, 6/23/2017).  Social History: Patient reports that he has never smoked. He has never used  smokeless tobacco. He reports that he does not drink alcohol or use drugs.  Family History: family history includes Heart attack in his mother; No Known Problems in his brother, father, maternal aunt, maternal grandfather, maternal grandmother, maternal uncle, paternal aunt, paternal grandfather, paternal grandmother, paternal uncle, and sister.  PCP: Dio Roberson MD    Review of Systems:  General/Constitutional - see HPI, no night sweats  Skin - no rash  ENT - no rhinorrhea, no sore throat  Cardiovascular - no chest pain, no palpitations  Respiratory - no SOB, no cough  Gastrointestinal - no nausea, no vomiting  Genitourinary - no dysuria, no urinary retention  Musculoskeletal - no arthralgias, no myalagias  Neurologic - no generalized weakness, no dizziness  Psychiatric - no depression, no anxiety  Endocrine - no polyuria, no polydypsia  All other systems reviewed and are negative    OBJECTIVE:     Vital Signs (Most Recent)  Temp: 98.1 °F (36.7 °C) (02/04/18 1047)  Pulse: 75 (02/04/18 1702)  Resp: 18 (02/04/18 1702)  BP: (!) 112/55 (02/04/18 1702)  SpO2: 99 % (02/04/18 1602)    Physical Exam:  General Appearance: Well-developed well-nourished in no apparent distress, Well-groomed in hospital gown.  Eyes: anicteric, no scleral or conjunctival injection, no discharge  Ears, Nose, Mouth and Throat- Ears symmetric without pits, nose symmetric, MMM, pink, OP clear, no thrush  Neck: supple, non-tender, no goiter  Respiratory: no accessory muscle use, lungs clear to auscultation  Cardiovascular: no thrills, no heave, regular rate and rhythm without murmur  GI: normal active bowel sounds, soft, non-tender, no organomegaly, non-distended, no mass  Lymphatic- no cervical or groin lymphadenopathy  Musculoskeletal- no clubbing, no cyanosis; 4+ pitting edema of BLE up to sacrum with scant 2 cm weeping blisters on bilat feet, left foot with healing ulceration dorsally, no erythema or drainage seen; mild papular edema of  BLE  Skin: Normal temperature, turgor and texture; no rash, no subcutaneous nodules  Neurology: speech normal, sensation grossly intact, A&O x3, tremors    Laboratory    Recent Labs  Lab 02/04/18  1132   *   K 4.1   CL 93*   CO2 26   BUN 55*   CREATININE 8.6*      CALCIUM 9.3   MG 2.1       Recent Labs  Lab 02/04/18  1132   ALKPHOS 138*   ALT 13   AST 14   ALBUMIN 2.0*   PROT 7.6   BILITOT 1.3*         Recent Labs  Lab 02/04/18  1132 02/04/18  1140 02/04/18  1339   WBC 18.31*  --   --    HGB 9.0*  --   --    HCT 26.1* 30* 30*     --   --    GRAN 90.2*  16.5*  --   --    LYMPH 4.1*  0.8*  --   --    MONO 4.3  0.8  --   --          Recent Labs  Lab 02/04/18  1217 02/04/18  1430 02/04/18  2148   POCTGLUCOSE 113* 171* 106         Diagnostic Results:  Imaging Results          X-Ray Chest 1 View (Final result)  Result time 02/04/18 11:26:12    Final result by Reji Bah MD (02/04/18 11:26:12)                 Impression:        No acute cardiothoracic disease evident.      Electronically signed by: Dr. Reji Bah MD  Date:     02/04/18  Time:    11:26              Narrative:    AP CHEST (1 View):      Comparison: 1/10/18    Findings:      The lungs are clear. The cardiac silhouette is enlarged. Atherosclerotic change is noted of the aorta.. The pulmonary vasculature is unremarkable. There is no pleural effusion or pneumothorax. The hilar and mediastinal contours are unremarkable. There are no acute bony abnormalities.                              ASSESSMENT/PLAN:     Hypervolemia due to non-compliance with dialysis  hyperkalemia  Sifted in ER with albuterol, insulin/destrose, bicarbonate. K 7.9--> 4  No respiratory compromise. Emergent dialysis  Daily weights  Withhold anti-hypertensives for volume removal    delirium vs dementia  Dialysis as above    DVT  BLE ultrasound  apixaban 5 mg BID - ? compliance    DM II with HTN and CKD V  HgbA1c 4.6%  Likely due to declining intake  No further  intervention needed at this time  Will watch fasting glucose on morning labs    Protein calorie malnutrtion  Poor prognosis  christie love    Essential HTN - hold home hydralazine 50 mg TID and losartan 50 mg daily    DM II hyperlipidemia - atorvastatin 40 mg daily    DM II neuropathy - gabapentin 100 mg TID    ESRF HD TTS - nephrology consult to assist with dialysis  CKD MBD - continue cinacalcet 30 mg qhs and sevelamer 1600 mg daily    Depression - continue citalopram 10 mg daily    Leukocytosis - no fevers or vitals signs associated with sepsis syndrome. Elevated LA and procalcitonin. Work up thus negative in ER. History of C.diff with isolated fever a few months ago. Will check for c.diff. Given dose of vanc and zosyn in ER

## 2018-02-04 NOTE — ED PROVIDER NOTES
Encounter Date: 2/4/2018    SCRIBE #1 NOTE: I, Dr. Larose, am scribing for, and in the presence of,  Alberto Aguilar. I have scribed the entire note.       History     Chief Complaint   Patient presents with    Leg Swelling     missed dialysis yesterday     Time patient was seen by the provider: 10:57 AM    The patient is a 67 y.o. male with co-morbidities including: DM, HTN, diabetic retinopathy, and chronic kidney disease who was brought into the ED by EMT with a complaint of severe right and left leg swelling (right greater than left) of gradual worsening for the past 2 weeks. Associated symptoms include: multiple blisters to lower extremities. Pt reports that he missed his dialysis yesterday due to his lack of transportation and states his legs only swell this much when he misses dialysis. He claims that he is currently only on coumadin. Denies fever, nausea, vomiting, diarrhea, and any trouble breathing at this time.        The history is provided by the patient, medical records and the EMS personnel.     Review of patient's allergies indicates:  No Known Allergies  Past Medical History:   Diagnosis Date    Acute pain of left shoulder 1/7/2017    Arthritis     Asthma     Benign neoplasm of eyelid     RUL    Blood clotting tendency     Blood transfusion     Cataract     Chronic kidney disease requiring chronic dialysis     Diabetes mellitus     Diabetic retinopathy     Fever blister     Hyperlipidemia     Hypertension     Infertility     Joint pain     Retinal detachment     Thyroid disease     Weakness 1/7/2017     Past Surgical History:   Procedure Laterality Date    CATARACT EXTRACTION      COLONOSCOPY N/A 6/23/2017    Procedure: COLONOSCOPY;  Surgeon: Vic Clark MD;  Location: [address];  Service: Endoscopy;  Laterality: N/A;    RETINAL DETACHMENT SURGERY       Family History   Problem Relation Age of Onset    Heart attack Mother     No Known Problems Father     No Known  Problems Sister     No Known Problems Brother     No Known Problems Maternal Aunt     No Known Problems Maternal Uncle     No Known Problems Paternal Aunt     No Known Problems Paternal Uncle     No Known Problems Maternal Grandmother     No Known Problems Maternal Grandfather     No Known Problems Paternal Grandmother     No Known Problems Paternal Grandfather     Depression Child     Cancer Neg Hx     Amblyopia Neg Hx     Blindness Neg Hx     Cataracts Neg Hx     Diabetes Neg Hx     Glaucoma Neg Hx     Hypertension Neg Hx     Macular degeneration Neg Hx     Retinal detachment Neg Hx     Strabismus Neg Hx     Stroke Neg Hx     Thyroid disease Neg Hx      Social History   Substance Use Topics    Smoking status: Never Smoker    Smokeless tobacco: Never Used    Alcohol use No     Review of Systems   Constitutional: Negative for fever.   HENT: Negative for sore throat.    Respiratory: Negative for shortness of breath.         No trouble breathing.    Cardiovascular: Positive for leg swelling. Negative for chest pain.   Gastrointestinal: Negative for diarrhea, nausea and vomiting.   Genitourinary: Negative for dysuria.   Musculoskeletal: Negative for back pain.        Severe right and leg swelling.    Skin:        Multiple blisters located on lower extremities.    Neurological: Negative for weakness.   Hematological: Does not bruise/bleed easily.       Physical Exam     Initial Vitals [02/04/18 1047]   BP Pulse Resp Temp SpO2   (!) 114/55 72 18 98.1 °F (36.7 °C) 95 %      MAP       74.67         Physical Exam    Nursing note and vitals reviewed.  Constitutional:   Pt is chronically ill appearing. Slightly non-toxic.    HENT:   Head: Normocephalic and atraumatic.   Mouth/Throat: Oropharynx is clear and moist.   Moist mucous membranes.   Eyes: EOM are normal. Pupils are equal, round, and reactive to light.   Cardiovascular: Normal rate and regular rhythm.   No murmur heard.  Pulmonary/Chest:  Breath sounds normal. No respiratory distress.   Clear auscultation bilaterally.   Abdominal: Soft. He exhibits no distension. There is no tenderness.   Musculoskeletal: He exhibits edema.   +3 edema on lower extremities bilaterally with hyperpigmented skin.   Feet are warm although I am unable to palpate the dorsalis pedis pulse bilaterally.    Neurological:   Slightly lethargic, but responds to voice. Able to follow commands. No focal weakness.    Skin: Skin is warm.   Large partially intact blisters over his left dorsal foot.        ED Course   Procedures  Labs Reviewed   CBC W/ AUTO DIFFERENTIAL - Abnormal; Notable for the following:        Result Value    WBC 18.31 (*)     RBC 2.96 (*)     Hemoglobin 9.0 (*)     Hematocrit 26.1 (*)     RDW 17.6 (*)     Immature Granulocytes 0.7 (*)     Gran # (ANC) 16.5 (*)     Immature Grans (Abs) 0.12 (*)     Lymph # 0.8 (*)     Gran% 90.2 (*)     Lymph% 4.1 (*)     All other components within normal limits   COMPREHENSIVE METABOLIC PANEL - Abnormal; Notable for the following:     Sodium 134 (*)     Chloride 93 (*)     BUN, Bld 55 (*)     Creatinine 8.6 (*)     Albumin 2.0 (*)     Total Bilirubin 1.3 (*)     Alkaline Phosphatase 138 (*)     eGFR if  6.6 (*)     eGFR if non  5.7 (*)     All other components within normal limits   LACTIC ACID, PLASMA - Abnormal; Notable for the following:     Lactate (Lactic Acid) 3.2 (*)     All other components within normal limits   ISTAT PROCEDURE - Abnormal; Notable for the following:     POC BUN 84 (*)     POC Creatinine 9.2 (*)     POC Sodium 132 (*)     POC Potassium 7.9 (*)     POC TCO2 (MEASURED) 30 (*)     POC Ionized Calcium 0.88 (*)     POC Hematocrit 30 (*)     All other components within normal limits   POCT GLUCOSE - Abnormal; Notable for the following:     POCT Glucose 113 (*)     All other components within normal limits   ISTAT PROCEDURE - Abnormal; Notable for the following:     POC PCO2 57.3  (*)     POC PO2 25 (*)     POC HCO3 35.3 (*)     POC SATURATED O2 42 (*)     POC TCO2 37 (*)     All other components within normal limits   ISTAT PROCEDURE - Abnormal; Notable for the following:     POC Glucose 68 (*)     POC BUN 53 (*)     POC Creatinine 8.6 (*)     POC TCO2 (MEASURED) 30 (*)     POC Ionized Calcium 1.05 (*)     POC Hematocrit 30 (*)     All other components within normal limits   POCT GLUCOSE - Abnormal; Notable for the following:     POCT Glucose 171 (*)     All other components within normal limits   MAGNESIUM   POCT GLUCOSE     EKG Readings: (Independently Interpreted)   A-Fib rate of 65. Mild widened QRS. Stable from previous EKG.        X-Rays:   Independently Interpreted Readings:   Chest X-Ray: Clear. No infiltrate of effusion.      Medical Decision Making:   History:   Old Medical Records: I decided to obtain old medical records.  Old Records Summarized: records from previous admission(s).       <> Summary of Records: Pt with a history of end stage renal disease. Hemodialysis Tuesday, Thursday, and Saturday. Diabetes, HTN, A-Fib, and DVT. Pt has an IVC filter and is supposed to be on apixaban and is followed by vascular surgery. Has chronic lower extremity edema, right greater than left. Ankle brachial indices suggested mild to moderate peripheral artery disease to his lower extremities bilaterally. Just admitted here from January 6th to the 14th for volume overload.   Initial Assessment:   66 yo m, multiple comorbidities as above, now here with generalized weakness/mild lethargy/increased LE edema in the setting of missed HD yesterday, last session on Thursday.  Pt denies any other complaints (no fever/chills/URI sx/v/d).  On exam, chronically ill appearing, stable VS.  EKG a fib with wide QRS but baseline compared to old.  Significant LE edema with some skin breakdown, but comparison to old notes suggests this is likely chronic.  Differential Diagnosis:   R/o hyperkalemia/volume  overload 2/2 missed HD    ? Unclear if changes to feet represent acute vs chronic issue - will check doppler pulses  Independently Interpreted Test(s):   I have ordered and independently interpreted X-rays - see prior notes.  I have ordered and independently interpreted EKG Reading(s) - see prior notes  Clinical Tests:   Lab Tests: Ordered and Reviewed  Radiological Study: Ordered and Reviewed  Medical Tests: Ordered and Reviewed  ED Management:  -labs  -CXR  -anticipate admit for HD  Other:   I have discussed this case with another health care provider.            Scribe Attestation:   Scribe #1: I performed the above scribed service and the documentation accurately describes the services I performed. I attest to the accuracy of the note.    Attending Attestation:             Attending ED Notes:     11:58 AM  K 7.9 on the ISTAT  Hyperkalemia meds ordered  Nephrology consulted for HD    12:24 PM  WBC 18, lactate 3  Reassessed pt again   R LE slightly more warm than left, mild tenderness along calf.  Feet foul-swelling B.  Blister to R foot has ruptured, though this is nontender, draining blood  Strong doppler signal to R foot  As pt has no other infectious sx, will presume that R LE could be harboring cellulitis though very difficult to appreciate on exam  Empirically treating with vanc/zosyn  Blood cultures added  Will likely consult ICU          ED Course      Clinical Impression:   The primary encounter diagnosis was Hyperkalemia. Diagnoses of Swelling, ESRD (end stage renal disease), Type 2 diabetes mellitus with stage 5 chronic kidney disease and hypertension, Septic encephalopathy, Dementia without behavioral disturbance, unspecified dementia type, Diabetic polyneuropathy associated with type 2 diabetes mellitus, Foot ulceration, right, with fat layer exposed, PVD (peripheral vascular disease), Infected skin ulcer with fat layer exposed, Other acute osteomyelitis of right foot, and Bradycardia were also  pertinent to this visit.       I, Dr. Melanie Larose, personally performed the services described in this documentation. All medical record entries made by the scribe were at my direction and in my presence.  I have reviewed the chart and agree that the record reflects my personal performance and is accurate and complete. Melanie Larose MD.  10:09 PM 02/12/2018                        Melanie Larose MD  02/12/18 2337

## 2018-02-04 NOTE — CONSULTS
BRIEF RENAL STAFF NOTE (full consult to follow)    I saw and evaluated the patient in the ED, missed HD yesterday, will plan for HD today for clearance and volume removal.

## 2018-02-04 NOTE — SUBJECTIVE & OBJECTIVE
Past Medical History:   Diagnosis Date    Acute pain of left shoulder 1/7/2017    Arthritis     Asthma     Benign neoplasm of eyelid     RUL    Blood clotting tendency     Blood transfusion     Cataract     Chronic kidney disease requiring chronic dialysis     Diabetes mellitus     Diabetic retinopathy     Fever blister     Hyperlipidemia     Hypertension     Infertility     Joint pain     Retinal detachment     Thyroid disease     Weakness 1/7/2017       Past Surgical History:   Procedure Laterality Date    CATARACT EXTRACTION      COLONOSCOPY N/A 6/23/2017    Procedure: COLONOSCOPY;  Surgeon: Vic Clark MD;  Location: [address];  Service: Endoscopy;  Laterality: N/A;    RETINAL DETACHMENT SURGERY         Review of patient's allergies indicates:  No Known Allergies    No current facility-administered medications on file prior to encounter.      Current Outpatient Prescriptions on File Prior to Encounter   Medication Sig    ammonium lactate (LAC-HYDRIN) 12 % lotion Apply topically as needed for Dry Skin.    apixaban 5 mg Tab Take 1 tablet (5 mg total) by mouth 2 (two) times daily.    aspirin (ECOTRIN) 81 MG EC tablet Take 81 mg by mouth once daily.     atorvastatin (LIPITOR) 20 MG tablet Take 20 mg by mouth once daily.    cetirizine (ZYRTEC) 10 MG tablet Take 10 mg by mouth once daily.    cinacalcet (SENSIPAR) 30 MG Tab Take 30 mg by mouth every evening.    citalopram (CELEXA) 10 MG tablet Take 10 mg by mouth once daily.    diclofenac sodium (VOLTAREN) 1 % Gel Apply 2 g topically 4 (four) times daily as needed (for pain).    ergocalciferol (ERGOCALCIFEROL) 50,000 unit Cap Take 50,000 Units by mouth every 14 (fourteen) days.     furosemide (LASIX) 80 MG tablet Take 80 mg by mouth once daily.    gabapentin (NEURONTIN) 100 MG capsule Take 100 mg by mouth 3 (three) times daily.    hydrALAZINE (APRESOLINE) 50 MG tablet Take 50 mg by mouth 3 (three) times daily.     hydrocodone-acetaminophen 5-325mg (NORCO) 5-325 mg per tablet Take 1 tablet by mouth every 6 (six) hours as needed for Pain.    ipratropium (ATROVENT HFA) 17 mcg/actuation inhaler Inhale 2 puffs into the lungs 4 (four) times daily. Do not exceed 12 puffs per day    ketotifen (ZADITOR) 0.025 % (0.035 %) ophthalmic solution Apply 1 drop to eye every 8 (eight) hours.    losartan (COZAAR) 50 MG tablet Take 1 tablet (50 mg total) by mouth once daily.    mometasone (NASONEX) 50 mcg/actuation nasal spray 2 sprays by Nasal route once daily.    podofilox (CONDYLOX) 0.5 % external solution Apply a small amount to lesions twice daily for three days, then hold for four days and repeat regimen    sevelamer carbonate (RENVELA) 800 mg Tab Take 2 tablets (1,600 mg total) by mouth 3 (three) times daily with meals. Take two tablets by mouth four times daily with meals and snacks     Family History     Problem Relation (Age of Onset)    Heart attack Mother    No Known Problems Father, Sister, Brother, Maternal Aunt, Maternal Uncle, Paternal Aunt, Paternal Uncle, Maternal Grandmother, Maternal Grandfather, Paternal Grandmother, Paternal Grandfather        Social History Main Topics    Smoking status: Never Smoker    Smokeless tobacco: Never Used    Alcohol use No    Drug use: No    Sexual activity: Not on file     Review of Systems   Constitutional: Positive for activity change and fatigue. Negative for chills and fever.   HENT: Negative for congestion, ear pain, rhinorrhea and sore throat.    Respiratory: Negative for cough and shortness of breath.    Cardiovascular: Positive for leg swelling. Negative for chest pain and palpitations.   Gastrointestinal: Negative for abdominal pain, constipation, diarrhea, nausea and vomiting.   Genitourinary:        Anuric   Musculoskeletal: Negative for myalgias.   Neurological: Negative for dizziness and headaches.   Psychiatric/Behavioral: Negative for confusion.     Objective:      Vital Signs (Most Recent):  Temp: 98.1 °F (36.7 °C) (02/04/18 1047)  Pulse: 81 (02/04/18 1502)  Resp: 17 (02/04/18 1502)  BP: (!) 108/56 (02/04/18 1502)  SpO2: 95 % (02/04/18 1502) Vital Signs (24h Range):  Temp:  [98.1 °F (36.7 °C)] 98.1 °F (36.7 °C)  Pulse:  [59-81] 81  Resp:  [14-95] 17  SpO2:  [24 %-99 %] 95 %  BP: (108-118)/(55-61) 108/56     Weight: 113.4 kg (250 lb)  Body mass index is 38.01 kg/m².    Physical Exam   Constitutional: He is oriented to person, place, and time. He appears well-developed and well-nourished. No distress.   Lethargic, ill appearing   HENT:   Head: Normocephalic and atraumatic.   Nose: Nose normal.   Mouth/Throat: Oropharynx is clear and moist.   Eyes: Conjunctivae and EOM are normal. Pupils are equal, round, and reactive to light.   Neck: Normal range of motion.   Cardiovascular: Normal rate, regular rhythm, normal heart sounds and intact distal pulses.    Pulmonary/Chest: Effort normal and breath sounds normal. No respiratory distress. He has no wheezes.   Abdominal: Soft. Bowel sounds are normal. He exhibits no distension.   Musculoskeletal: Normal range of motion. He exhibits edema.   Neurological: He is alert and oriented to person, place, and time.   Skin: Skin is warm. He is not diaphoretic.   Skin discoloration on lower extremities with thick skin, changes which appear chronic. Ulceration on lower extremities with clear secretions.   Psychiatric: He has a normal mood and affect.       Significant Labs: All pertinent labs within the past 24 hours have been reviewed.    Significant Imaging: I have reviewed and interpreted all pertinent imaging results/findings within the past 24 hours.

## 2018-02-05 ENCOUNTER — ANESTHESIA EVENT (OUTPATIENT)
Dept: SURGERY | Facility: HOSPITAL | Age: 68
DRG: 853 | End: 2018-02-05
Payer: COMMERCIAL

## 2018-02-05 PROBLEM — R41.0 DELIRIUM: Status: ACTIVE | Noted: 2018-02-05

## 2018-02-05 PROBLEM — I82.409 DVT (DEEP VENOUS THROMBOSIS): Status: ACTIVE | Noted: 2018-02-05

## 2018-02-05 PROBLEM — L98.492 INFECTED SKIN ULCER WITH FAT LAYER EXPOSED: Status: ACTIVE | Noted: 2018-02-05

## 2018-02-05 PROBLEM — E83.9 CHRONIC KIDNEY DISEASE-MINERAL AND BONE DISORDER: Status: ACTIVE | Noted: 2018-02-05

## 2018-02-05 PROBLEM — F32.A DEPRESSION: Status: ACTIVE | Noted: 2018-02-05

## 2018-02-05 PROBLEM — E11.69 DM TYPE 2 WITH DIABETIC MIXED HYPERLIPIDEMIA: Status: ACTIVE | Noted: 2018-02-05

## 2018-02-05 PROBLEM — L97.512 FOOT ULCERATION, RIGHT, WITH FAT LAYER EXPOSED: Status: ACTIVE | Noted: 2018-02-05

## 2018-02-05 PROBLEM — G93.41 SEPTIC ENCEPHALOPATHY: Status: ACTIVE | Noted: 2018-02-05

## 2018-02-05 PROBLEM — M89.9 CHRONIC KIDNEY DISEASE-MINERAL AND BONE DISORDER: Status: ACTIVE | Noted: 2018-02-05

## 2018-02-05 PROBLEM — E11.42 DIABETIC POLYNEUROPATHY ASSOCIATED WITH TYPE 2 DIABETES MELLITUS: Status: ACTIVE | Noted: 2018-02-05

## 2018-02-05 PROBLEM — E43 PROTEIN-CALORIE MALNUTRITION, SEVERE: Status: ACTIVE | Noted: 2018-02-05

## 2018-02-05 PROBLEM — E78.2 DM TYPE 2 WITH DIABETIC MIXED HYPERLIPIDEMIA: Status: ACTIVE | Noted: 2018-02-05

## 2018-02-05 PROBLEM — F03.90 DEMENTIA WITHOUT BEHAVIORAL DISTURBANCE: Status: ACTIVE | Noted: 2018-02-05

## 2018-02-05 PROBLEM — N18.9 CHRONIC KIDNEY DISEASE-MINERAL AND BONE DISORDER: Status: ACTIVE | Noted: 2018-02-05

## 2018-02-05 PROBLEM — R23.9 ALTERATION IN SKIN INTEGRITY: Status: ACTIVE | Noted: 2018-02-05

## 2018-02-05 PROBLEM — L08.9 INFECTED SKIN ULCER WITH FAT LAYER EXPOSED: Status: ACTIVE | Noted: 2018-02-05

## 2018-02-05 PROBLEM — D72.829 LEUKOCYTOSIS: Status: ACTIVE | Noted: 2018-02-05

## 2018-02-05 LAB
BASOPHILS # BLD AUTO: 0.03 K/UL
BASOPHILS NFR BLD: 0.2 %
DIFFERENTIAL METHOD: ABNORMAL
EOSINOPHIL # BLD AUTO: 0.1 K/UL
EOSINOPHIL NFR BLD: 0.5 %
ERYTHROCYTE [DISTWIDTH] IN BLOOD BY AUTOMATED COUNT: 16.9 %
HCT VFR BLD AUTO: 26.7 %
HGB BLD-MCNC: 9 G/DL
IMM GRANULOCYTES # BLD AUTO: 0.09 K/UL
IMM GRANULOCYTES NFR BLD AUTO: 0.6 %
LYMPHOCYTES # BLD AUTO: 0.8 K/UL
LYMPHOCYTES NFR BLD: 5.6 %
MAGNESIUM SERPL-MCNC: 2.1 MG/DL
MCH RBC QN AUTO: 30.2 PG
MCHC RBC AUTO-ENTMCNC: 33.7 G/DL
MCV RBC AUTO: 90 FL
MONOCYTES # BLD AUTO: 0.7 K/UL
MONOCYTES NFR BLD: 5 %
NEUTROPHILS # BLD AUTO: 12.2 K/UL
NEUTROPHILS NFR BLD: 88.1 %
NRBC BLD-RTO: 0 /100 WBC
PLATELET # BLD AUTO: 216 K/UL
PMV BLD AUTO: 10 FL
RBC # BLD AUTO: 2.98 M/UL
VANCOMYCIN SERPL-MCNC: 13.9 UG/ML
WBC # BLD AUTO: 13.86 K/UL

## 2018-02-05 PROCEDURE — 25000003 PHARM REV CODE 250: Performed by: INTERNAL MEDICINE

## 2018-02-05 PROCEDURE — 11000001 HC ACUTE MED/SURG PRIVATE ROOM

## 2018-02-05 PROCEDURE — 99223 1ST HOSP IP/OBS HIGH 75: CPT | Mod: ,,, | Performed by: PODIATRIST

## 2018-02-05 PROCEDURE — 80100014 HC HEMODIALYSIS 1:1

## 2018-02-05 PROCEDURE — 80202 ASSAY OF VANCOMYCIN: CPT

## 2018-02-05 PROCEDURE — 97161 PT EVAL LOW COMPLEX 20 MIN: CPT

## 2018-02-05 PROCEDURE — 93922 UPR/L XTREMITY ART 2 LEVELS: CPT

## 2018-02-05 PROCEDURE — 63600175 PHARM REV CODE 636 W HCPCS: Performed by: INTERNAL MEDICINE

## 2018-02-05 PROCEDURE — 93922 UPR/L XTREMITY ART 2 LEVELS: CPT | Performed by: SURGERY

## 2018-02-05 PROCEDURE — 87075 CULTR BACTERIA EXCEPT BLOOD: CPT

## 2018-02-05 PROCEDURE — 87070 CULTURE OTHR SPECIMN AEROBIC: CPT

## 2018-02-05 PROCEDURE — 99233 SBSQ HOSP IP/OBS HIGH 50: CPT | Mod: ,,, | Performed by: INTERNAL MEDICINE

## 2018-02-05 PROCEDURE — 97535 SELF CARE MNGMENT TRAINING: CPT

## 2018-02-05 PROCEDURE — 83735 ASSAY OF MAGNESIUM: CPT

## 2018-02-05 PROCEDURE — 87186 SC STD MICRODIL/AGAR DIL: CPT

## 2018-02-05 PROCEDURE — 99223 1ST HOSP IP/OBS HIGH 75: CPT | Mod: ,,, | Performed by: SURGERY

## 2018-02-05 PROCEDURE — 85025 COMPLETE CBC W/AUTO DIFF WBC: CPT

## 2018-02-05 PROCEDURE — 97530 THERAPEUTIC ACTIVITIES: CPT

## 2018-02-05 PROCEDURE — 87077 CULTURE AEROBIC IDENTIFY: CPT

## 2018-02-05 PROCEDURE — 97165 OT EVAL LOW COMPLEX 30 MIN: CPT

## 2018-02-05 PROCEDURE — 36415 COLL VENOUS BLD VENIPUNCTURE: CPT

## 2018-02-05 PROCEDURE — 86580 TB INTRADERMAL TEST: CPT | Performed by: INTERNAL MEDICINE

## 2018-02-05 RX ORDER — AMOXICILLIN 250 MG
2 CAPSULE ORAL 2 TIMES DAILY
Status: DISCONTINUED | OUTPATIENT
Start: 2018-02-05 | End: 2018-03-06 | Stop reason: HOSPADM

## 2018-02-05 RX ORDER — MIDODRINE HYDROCHLORIDE 5 MG/1
10 TABLET ORAL
Status: DISCONTINUED | OUTPATIENT
Start: 2018-02-06 | End: 2018-02-05

## 2018-02-05 RX ORDER — MIDODRINE HYDROCHLORIDE 5 MG/1
10 TABLET ORAL DAILY PRN
Status: DISCONTINUED | OUTPATIENT
Start: 2018-02-05 | End: 2018-02-13

## 2018-02-05 RX ADMIN — APIXABAN 5 MG: 5 TABLET, FILM COATED ORAL at 10:02

## 2018-02-05 RX ADMIN — VANCOMYCIN 1000 MG: 1 INJECTION, SOLUTION INTRAVENOUS at 09:02

## 2018-02-05 RX ADMIN — FUROSEMIDE 80 MG: 40 TABLET ORAL at 11:02

## 2018-02-05 RX ADMIN — SEVELAMER CARBONATE 1600 MG: 800 TABLET, FILM COATED ORAL at 11:02

## 2018-02-05 RX ADMIN — STANDARDIZED SENNA CONCENTRATE AND DOCUSATE SODIUM 1 TABLET: 8.6; 5 TABLET, FILM COATED ORAL at 11:02

## 2018-02-05 RX ADMIN — GABAPENTIN 100 MG: 100 CAPSULE ORAL at 05:02

## 2018-02-05 RX ADMIN — ATORVASTATIN CALCIUM 20 MG: 20 TABLET, FILM COATED ORAL at 11:02

## 2018-02-05 RX ADMIN — Medication 2.25 G: at 11:02

## 2018-02-05 RX ADMIN — CITALOPRAM HYDROBROMIDE 10 MG: 10 TABLET ORAL at 11:02

## 2018-02-05 RX ADMIN — MICONAZOLE NITRATE: 20 OINTMENT TOPICAL at 11:02

## 2018-02-05 RX ADMIN — ASPIRIN 81 MG: 81 TABLET, COATED ORAL at 11:02

## 2018-02-05 RX ADMIN — GABAPENTIN 100 MG: 100 CAPSULE ORAL at 01:02

## 2018-02-05 RX ADMIN — Medication 5 UNITS: at 11:02

## 2018-02-05 NOTE — HPI
Patient is a 67 y.o. male with ESRF on dialysis TTS, DM2 admitted for hyperkalemia, urgent dialysis. Consulted for Ulceration B/L LE. Pt. Reports leg swelling x 2 weeks. Pt. Followed by podiatry last seen by Dr. Lanier 8/14/17. Denies fevers, chills. Patient poor historian history obtained from chart review. Pt. Has h/o ulceration right 5th toe that had healed with local wound care.

## 2018-02-05 NOTE — PT/OT/SLP EVAL
Occupational Therapy   Evaluation/Treatment     Name: Wilder Carbajal  MRN: [id number]  Admitting Diagnosis:  Hyperkalemia      Recommendations:     Discharge Recommendations: nursing facility, skilled  Discharge Equipment Recommendations:  none  Barriers to discharge:  Inaccessible home environment, Decreased caregiver support    History:     Occupational Profile:  Living Environment: Pt lives with his son in [facility] with 4STE and B hand rails. Pt uses a tub/shower combo.   Previous level of function: PTA, pt reports being Mod I with Adls and functional mobility   Equipment Owned:  walker, rolling, cane, straight, bath bench, bedside commode  Assistance upon Discharge: Pt reports his son is not able to assist due to son working during the day.     Past Medical History:   Diagnosis Date    Acute pain of left shoulder 1/7/2017    Arthritis     Asthma     Benign neoplasm of eyelid     RUL    Blood clotting tendency     Blood transfusion     Cataract     Chronic kidney disease requiring chronic dialysis     Diabetes mellitus     Diabetic retinopathy     Fever blister     Hyperlipidemia     Hypertension     Infertility     Joint pain     Retinal detachment     Thyroid disease     Weakness 1/7/2017       Past Surgical History:   Procedure Laterality Date    CATARACT EXTRACTION      COLONOSCOPY N/A 6/23/2017    Procedure: COLONOSCOPY;  Surgeon: Vic Clark MD;  Location: [facility] ([address] [place] [address]);  Service: Endoscopy;  Laterality: N/A;    RETINAL DETACHMENT SURGERY         Subjective     Chief Complaint: limited mobility, pain   Patient/Family stated goals: to return to PLOF  Communicated with: RN prior to session.  Pain/Comfort:  · Pain Rating 1: 10/10  · Location - Side 1: Left  · Location - Orientation 1: generalized  · Location 1: hip  · Pain Addressed 1: Reposition, Distraction, Cessation of Activity  · Pain Rating Post-Intervention 1: 9/10    Patients cultural, spiritual, Lutheran conflicts given the  current situation: none stated     Objective:     Patient found with: telemetry    General Precautions: Standard, fall   Orthopedic Precautions:N/A       Occupational Performance:    Bed Mobility:    · Patient completed Scooting/Bridging with total assistance  · Patient completed Supine to Sit with maximal assistance    Functional Mobility/Transfers:  · Patient completed Sit <> Stand Transfer with maximal assistance  with  no assistive device   · Patient completed Bed <> Chair Transfer using Stand Pivot technique with maximal assistance with no assistive device  · Functional Mobility: Did not occur 2/2 pt unable to put weight through B feet 2/2 wounds and pain in L hip.     Activities of Daily Living:  · LB Dressing: total assistance to don L sock    Cognitive/Visual Perceptual:  Cognitive/Psychosocial Skills:     -       Oriented to: Person   -       Follows Commands/attention:Follows multistep  commands  -       Communication: clear/fluent    Physical Exam:  Balance:    -       Pt demonstrates difficulty with both sitting and standing balance. He cannot sit EOB without using B UE for support and requires Max A to stand and transfer   Upper Extremity Range of Motion:     -       Right Upper Extremity: WFL except shoulder flexion   -       Left Upper Extremity: WFL except shoulder flexion   Upper Extremity Strength:    -       Right Upper Extremity: 3/5  -       Left Upper Extremity: 3/5   Strength:    -       Right Upper Extremity: 4/5  -       Left Upper Extremity: 4/5  Fine Motor Coordination:    -       Intact    Patient left up in chair with all lines intact and call button in reach    AMPA 6 Click: Self-care  AMPA Total Score: 12    Treatment & Education:  OT eval complete. Pt educated on:   - OT role/POC  - Importance of OOB activity with staff assist  - Safety with functional mobility   - Pt noted to have impaired sitting and standing balance. He was unable to perform LB dressing as this would have  "caused decreased support from using B UE to complete self-care.   - White board updated  Education:    Assessment:     Wilder Carbajal is a 67 y.o. male with a medical diagnosis of Hyperkalemia.  He presents with L hip pain and inability to bear weight through B LE's limiting his functional participation in functional mobility tasks. Pt presents with decreased B UE ROM and strength as well as impaired overall balance causing difficulty performing self-care tasks. He tolerated session fairly but could not tolerate much movement. He will continue to benefit from acute OT services to address the below listed impairments.     Performance deficits affecting function are weakness, impaired endurance, impaired self care skills, impaired functional mobilty, gait instability, impaired balance, decreased lower extremity function, decreased ROM.      Rehab Prognosis:  fair; patient would benefit from acute skilled OT services to address these deficits and reach maximum level of function.         Clinical Decision Makin.  OT Low:  "Pt evaluation falls under low complexity for evaluation coding due to performance deficits noted in 1-3 areas as stated above and 0 co-morbities affecting current functional status. Data obtained from problem focused assessments. No modifications or assistance was required for completion of evaluation. Only brief occupational profile and history review completed."     Plan:     Patient to be seen 4 x/week to address the above listed problems via self-care/home management, therapeutic activities, therapeutic exercises  · Plan of Care Expires:    · Plan of Care Reviewed with: patient    This Plan of care has been discussed with the patient who was involved in its development and understands and is in agreement with the identified goals and treatment plan    GOALS:    Occupational Therapy Goals        Problem: Occupational Therapy Goal    Goal Priority Disciplines Outcome Interventions "   Occupational Therapy Goal     OT, PT/OT Ongoing (interventions implemented as appropriate)    Description:  Goals to be met by: 7 days      Patient will increase functional independence with ADLs by performing:    UE Dressing with Moderate Assistance.  LE Dressing with Moderate Assistance.  Grooming while seated with Supervision.  Toileting from bedside commode with Moderate Assistance for hygiene and clothing management.   Supine to sit with Moderate Assistance.  Stand pivot transfers with Moderate Assistance.  Toilet transfer to bedside commode with Moderate Assistance.  Increased functional strength to WFL for safe and independent function with self-care and functional mobility tasks.                      Time Tracking:     OT Date of Treatment: 02/05/18  OT Start Time: 1330  OT Stop Time: 1358  OT Total Time (min): 28 min    Billable Minutes:Evaluation 10  Self Care/Home Management 18    Karo Castrejon OT  2/5/2018

## 2018-02-05 NOTE — SUBJECTIVE & OBJECTIVE
Scheduled Meds:   ammonium lactate   Topical (Top) BID    apixaban  5 mg Oral BID    aspirin  81 mg Oral Daily    atorvastatin  20 mg Oral Daily    cinacalcet  30 mg Oral QHS    citalopram  10 mg Oral Daily    furosemide  80 mg Oral Daily    gabapentin  100 mg Oral TID    senna-docusate 8.6-50 mg  1 tablet Oral BID    sevelamer carbonate  1,600 mg Oral TID WM    tuberculin  5 Units Intradermal Once     Continuous Infusions:  PRN Meds:sodium chloride 0.9%, acetaminophen, hydrocodone-acetaminophen 5-325mg, ondansetron, sodium chloride 0.9%    Review of patient's allergies indicates:  No Known Allergies     Past Medical History:   Diagnosis Date    Acute pain of left shoulder 1/7/2017    Arthritis     Asthma     Benign neoplasm of eyelid     RUL    Blood clotting tendency     Blood transfusion     Cataract     Chronic kidney disease requiring chronic dialysis     Diabetes mellitus     Diabetic retinopathy     Fever blister     Hyperlipidemia     Hypertension     Infertility     Joint pain     Retinal detachment     Thyroid disease     Weakness 1/7/2017     Past Surgical History:   Procedure Laterality Date    CATARACT EXTRACTION      COLONOSCOPY N/A 6/23/2017    Procedure: COLONOSCOPY;  Surgeon: Vic Clark MD;  Location: [address];  Service: Endoscopy;  Laterality: N/A;    RETINAL DETACHMENT SURGERY         Family History     Problem Relation (Age of Onset)    Heart attack Mother    No Known Problems Father, Sister, Brother, Maternal Aunt, Maternal Uncle, Paternal Aunt, Paternal Uncle, Maternal Grandmother, Maternal Grandfather, Paternal Grandmother, Paternal Grandfather        Social History Main Topics    Smoking status: Never Smoker    Smokeless tobacco: Never Used    Alcohol use No    Drug use: No    Sexual activity: Not on file     Review of Systems   Constitutional: Positive for activity change.   Respiratory: Negative for shortness of breath.    Cardiovascular:  Negative for chest pain and leg swelling.   Gastrointestinal: Negative.  Negative for nausea and vomiting.   Genitourinary: Negative.    Musculoskeletal: Positive for arthralgias.   Skin: Positive for wound.   Neurological: Positive for numbness. Negative for weakness.     Objective:     Vital Signs (Most Recent):  Temp: 98.2 °F (36.8 °C) (02/05/18 1539)  Pulse: 67 (02/05/18 1539)  Resp: 18 (02/05/18 1539)  BP: (!) 109/54 (02/05/18 1539)  SpO2: (!) 93 % (02/05/18 1539) Vital Signs (24h Range):  Temp:  [97.2 °F (36.2 °C)-98.8 °F (37.1 °C)] 98.2 °F (36.8 °C)  Pulse:  [61-79] 67  Resp:  [12-20] 18  SpO2:  [93 %-100 %] 93 %  BP: ()/(47-69) 109/54     Weight: 114.3 kg (251 lb 15.8 oz)  Body mass index is 38.31 kg/m².    Foot Exam    General  Orientation: disoriented       Right Foot/Ankle     Inspection and Palpation  Tenderness: none   Swelling: metatarsals   Skin Exam: blister, maceration, cellulitis, skin changes and ulcer;     Neurovascular  Dorsalis pedis: 1+  Posterior tibial: 1+  Saphenous nerve sensation: diminished  Tibial nerve sensation: diminished  Superficial peroneal nerve sensation: diminished  Deep peroneal nerve sensation: diminished  Sural nerve sensation: diminished      Left Foot/Ankle      Inspection and Palpation  Tenderness: none   Swelling: metatarsals   Skin Exam: skin changes and ulcer;     Neurovascular  Dorsalis pedis: 1+  Posterior tibial: 1+  Saphenous nerve sensation: diminished  Tibial nerve sensation: diminished  Superficial peroneal nerve sensation: diminished  Deep peroneal nerve sensation: diminished  Sural nerve sensation: diminished          Right foot Large blister to right dorsal foot tracking along dorsal foot to 5th toe.  Wound 1: blister deroofed revealing  Right dorsal foot ulceration,   Measurement:[id number].  Base: fibrous and necrotic eschar base  Periwound skin: mild erythema, maceration  Drainage:purulent drainage noted with malodor  Erythema: mild  Probe: none,  no bone exposed  Tracking  To right 4th and 5th toes.     .    Ischemic appearance to right 4th and 5th toes, blister noted to right plantar foot. ,     Left foot ulceration noted to dorsal foot    Wound 1: Left dorsal foot   Measurement: [id number].  Base: fibrous base  Periwound skin: mild erythema,   Drainage: none  Erythema: mild  Probe: none, no bone exposed              Wound 1: Left heel ulceration   Measurement: [id number].  Periwound skin: callus  Drainage: none  Erythema:mild   Probe: none, no bone exposed            Laboratory:  Xray: Right: There is DJD, soft tissue swelling, a calcaneal spur, and diabetic vascular calcifications. No fracture dislocation bone destruction seen.    Left: There is DJD, diabetic vascular calcifications, and Spurs on the calcaneus. No fracture dislocation bone destruction seen.  MRI: pending    PHILLIP: Impression:   Right Leg:Segmental pressures and PVR waveforms suggest mild to moderate peripheral arterial occlusive disease.     Left Leg:Segmental pressures and PVR waveforms suggest mild to moderate peripheral arterial occlusive disease.        Diagnostic Results:  X-Ray: I have reviewed all pertinent results/findings within the past 24 hours.  reviewed    Clinical Findings:  Ulceration right dorsal foot with purulent malodorous drainage.

## 2018-02-05 NOTE — CONSULTS
[facility]  Vascular Surgery  Consult Note    Inpatient consult to Vascular Surgery  Consult performed by: KADE BURGOS  Consult ordered by: BALAJI ROSS        Vascular Surgery Staff  I have seen and examined the patient and reviewed the residents note. I agree with their assessment and plan.    Infected right foot. Needs debridement. PVRs suggest adequate tissue perfusion.  Will follow expectantly.    Liz Zhou MD [facility]  Vascular & Endovascular Surgery      Subjective:     Chief Complaint/Reason for Admission: bilateral leg swelling    History of Present Illness: 68 yo M with h/o ESRD on HD (TThS) via L RC AVF (with interposition graft), HTN, HLD, DM 2( Hgb A1c 4.7), HFrEF (45% 4/2017)  presenting with fluid overload after missing dialysis.  Patient reports increased bilateral leg swelling over the past two weeks. Reports his legs have been weeping.  Missed his Saturday HD secondary to transportation issues. He does not know when right foot ulcer began; reports some chills no fevers. Also reports that he has been mainly nonambulatory over the past 3-4 weeks secondary to leg stiffness.        Prescriptions Prior to Admission   Medication Sig Dispense Refill Last Dose    ammonium lactate (LAC-HYDRIN) 12 % lotion Apply topically as needed for Dry Skin.   Taking    apixaban 5 mg Tab Take 1 tablet (5 mg total) by mouth 2 (two) times daily. 60 tablet 1 1/6/2018    aspirin (ECOTRIN) 81 MG EC tablet Take 81 mg by mouth once daily.    9/13/2017    atorvastatin (LIPITOR) 20 MG tablet Take 20 mg by mouth once daily.   9/14/2017    cetirizine (ZYRTEC) 10 MG tablet Take 10 mg by mouth once daily.   1/6/2018    cinacalcet (SENSIPAR) 30 MG Tab Take 30 mg by mouth every evening.   Taking    citalopram (CELEXA) 10 MG tablet Take 10 mg by mouth once daily.   Taking    diclofenac sodium (VOLTAREN) 1 % Gel Apply 2 g topically 4 (four) times daily as needed (for pain).   Taking     ergocalciferol (ERGOCALCIFEROL) 50,000 unit Cap Take 50,000 Units by mouth every 14 (fourteen) days.    Taking    furosemide (LASIX) 80 MG tablet Take 80 mg by mouth once daily.   Taking    gabapentin (NEURONTIN) 100 MG capsule Take 100 mg by mouth 3 (three) times daily.   1/6/2018    hydrALAZINE (APRESOLINE) 50 MG tablet Take 50 mg by mouth 3 (three) times daily.   9/13/2017    hydrocodone-acetaminophen 5-325mg (NORCO) 5-325 mg per tablet Take 1 tablet by mouth every 6 (six) hours as needed for Pain.   1/6/2018    ipratropium (ATROVENT HFA) 17 mcg/actuation inhaler Inhale 2 puffs into the lungs 4 (four) times daily. Do not exceed 12 puffs per day   Taking    losartan (COZAAR) 50 MG tablet Take 1 tablet (50 mg total) by mouth once daily. 30 tablet 3 Taking    mometasone (NASONEX) 50 mcg/actuation nasal spray 2 sprays by Nasal route once daily.   Taking    podofilox (CONDYLOX) 0.5 % external solution Apply a small amount to lesions twice daily for three days, then hold for four days and repeat regimen   Taking    sevelamer carbonate (RENVELA) 800 mg Tab Take 2 tablets (1,600 mg total) by mouth 3 (three) times daily with meals. Take two tablets by mouth four times daily with meals and snacks 180 tablet 1        Review of patient's allergies indicates:  No Known Allergies    Past Medical History:   Diagnosis Date    Acute pain of left shoulder 1/7/2017    Arthritis     Asthma     Benign neoplasm of eyelid     RUL    Blood clotting tendency     Blood transfusion     Cataract     Chronic kidney disease requiring chronic dialysis     Diabetes mellitus     Diabetic retinopathy     Fever blister     Hyperlipidemia     Hypertension     Infertility     Joint pain     Retinal detachment     Thyroid disease     Weakness 1/7/2017     Past Surgical History:   Procedure Laterality Date    CATARACT EXTRACTION      COLONOSCOPY N/A 6/23/2017    Procedure: COLONOSCOPY;  Surgeon: Vic Clark MD;  [facility] (2ND FLR);  Service: Endoscopy;  Laterality: N/A;    RETINAL DETACHMENT SURGERY       Family History     Problem Relation (Age of Onset)    Heart attack Mother    No Known Problems Father, Sister, Brother, Maternal Aunt, Maternal Uncle, Paternal Aunt, Paternal Uncle, Maternal Grandmother, Maternal Grandfather, Paternal Grandmother, Paternal Grandfather        Social History Main Topics    Smoking status: Never Smoker    Smokeless tobacco: Never Used    Alcohol use No    Drug use: No    Sexual activity: Not on file     Review of Systems   Constitutional: Positive for activity change and chills. Negative for fever.   HENT: Negative for congestion.    Eyes: Negative for discharge and itching.   Respiratory: Positive for shortness of breath. Negative for apnea.    Cardiovascular: Positive for leg swelling. Negative for chest pain.   Gastrointestinal: Negative for abdominal distention.   Endocrine: Negative for cold intolerance and heat intolerance.   Musculoskeletal: Positive for gait problem and joint swelling. Negative for arthralgias.   Skin: Positive for wound. Negative for color change.   Neurological: Negative for dizziness.   Hematological: Negative for adenopathy.   Psychiatric/Behavioral: Negative for agitation and behavioral problems.     Objective:     Vital Signs (Most Recent):  Temp: 98.2 °F (36.8 °C) (02/05/18 1539)  Pulse: 67 (02/05/18 1539)  Resp: 18 (02/05/18 1539)  BP: (!) 109/54 (02/05/18 1539)  SpO2: (!) 93 % (02/05/18 1539) Vital Signs (24h Range):  Temp:  [97.2 °F (36.2 °C)-98.8 °F (37.1 °C)] 98.2 °F (36.8 °C)  Pulse:  [61-79] 67  Resp:  [12-20] 18  SpO2:  [93 %-100 %] 93 %  BP: ()/(47-69) 109/54     Weight: 114.3 kg (251 lb 15.8 oz)  Body mass index is 38.31 kg/m².    Physical Exam   Constitutional: He appears well-developed and well-nourished.   HENT:   Head: Normocephalic and atraumatic.   Eyes: Pupils are equal, round, and reactive to light.   Cardiovascular: Normal rate  and regular rhythm.    Pulses:       Femoral pulses are 2+ on the right side, and 2+ on the left side.  Right biphasic AT and PT  Left biphasic AT and PT  Left RC avf with strong thrill   Pulmonary/Chest: Effort normal. No respiratory distress.   Abdominal: Soft. He exhibits no distension. There is no tenderness.   Fungal infection in bilateral groin creases   Musculoskeletal:   Bilateral venous stasis   Right dorsal foot with open wound foul smelling, fat layer exposed; evidence of 4th 5th digit ischemia on plantar aspect of foot extending to metatarsals  No ascending erythema, no crepitus   Neurological: He is alert.   Poor historian   Skin: Skin is warm and dry.       Significant Labs:  CBC:   Recent Labs  Lab 18  1051   WBC 13.86*   RBC 2.98*   HGB 9.0*   HCT 26.7*      MCV 90   MCH 30.2   MCHC 33.7     CMP:   Recent Labs  Lab 18  1132      CALCIUM 9.3   ALBUMIN 2.0*   PROT 7.6   *   K 4.1   CO2 26   CL 93*   BUN 55*   CREATININE 8.6*   ALKPHOS 138*   ALT 13   AST 14   BILITOT 1.3*     Coagulation: No results for input(s): LABPROT, INR, APTT in the last 48 hours.    Significant Diagnostics:  PAT NAME: YUNIOR MOYER  [facility] REC#: [id number]  :      1950  SEX:      M  EXAM MARIA C: 2018 11:07  REF PHYS: SELF, AAAREFERRAL      Indication:  PVD.  Results:  Lower Extremities Segmental Pressure [mmHg]:                    Right             Left  _______________________________________________________________  Brachial          113               113  Posterior Tibial  234               212  Dorsalis Pedis    195               232  PHILLIP (Post. Tib.)  2.07              1.88  PHILLIP (Dors. Ped.)  1.73              2.05    The presence of artifactually elevated pressures in arteries of the lower extremity renders all pressure measurements unreliable due to suspected medial calcinosis.    Report Summary:  Impression:   Right Leg:Segmental pressures and PVR waveforms suggest mild to  moderate peripheral arterial occlusive disease.     Left Leg:Segmental pressures and PVR waveforms suggest mild to moderate peripheral arterial occlusive disease.    Sonographer: BENTLEY Charles    Electronically Signed by:  Antonio Garcia MD [[id number]]                        On: 02/05/2018 13:33    Assessment/Plan:     Foot ulceration, right, with fat layer exposed    66 yo M with h/o ESRD on HD (TThS) via L RC AVF (with interposition graft), HTN, HLD, DM 2( Hgb A1c 4.7), HFrEF (45% 4/2017)  presenting with fluid overload after missing dialysis with evidence of right diabetic foot ulceration with wet gangrene.    Patient with medial calcinosis; PVR waveforms of right foot good with biphasic waveforms  Would recommend debridement of right foot wound, to include amputation of 4/5th toes if needed depending on findings; podiatry following  No vascular intervention planned at this time  Recommend continuing ASA, statin  Would recommend elevating legs; heel offloading when in bed  Please call vascular surgery with questions or concerns              Thank you for your consult.     Alexandrea Johnston MD  Vascular Surgery  [facility]

## 2018-02-05 NOTE — PLAN OF CARE
Problem: Physical Therapy Goal  Goal: Physical Therapy Goal  Goals to be met by: 2/15/18     Patient will increase functional independence with mobility by performin. Supine to sit with MInimal Assistance  2. Sit to supine with MInimal Assistance  3. Sit to stand transfer with Minimal Assistance  4. Bed to chair transfer with Minimal Assistance using Rolling Walker  5. Gait  x 10 feet with Moderate Assistance using Rolling Walker.   6. Lower extremity exercise program x 30 reps per handout, with independence    Outcome: Ongoing (interventions implemented as appropriate)  PT eval complete and POC initiated.

## 2018-02-05 NOTE — SUBJECTIVE & OBJECTIVE
Prescriptions Prior to Admission   Medication Sig Dispense Refill Last Dose    ammonium lactate (LAC-HYDRIN) 12 % lotion Apply topically as needed for Dry Skin.   Taking    apixaban 5 mg Tab Take 1 tablet (5 mg total) by mouth 2 (two) times daily. 60 tablet 1 1/6/2018    aspirin (ECOTRIN) 81 MG EC tablet Take 81 mg by mouth once daily.    9/13/2017    atorvastatin (LIPITOR) 20 MG tablet Take 20 mg by mouth once daily.   9/14/2017    cetirizine (ZYRTEC) 10 MG tablet Take 10 mg by mouth once daily.   1/6/2018    cinacalcet (SENSIPAR) 30 MG Tab Take 30 mg by mouth every evening.   Taking    citalopram (CELEXA) 10 MG tablet Take 10 mg by mouth once daily.   Taking    diclofenac sodium (VOLTAREN) 1 % Gel Apply 2 g topically 4 (four) times daily as needed (for pain).   Taking    ergocalciferol (ERGOCALCIFEROL) 50,000 unit Cap Take 50,000 Units by mouth every 14 (fourteen) days.    Taking    furosemide (LASIX) 80 MG tablet Take 80 mg by mouth once daily.   Taking    gabapentin (NEURONTIN) 100 MG capsule Take 100 mg by mouth 3 (three) times daily.   1/6/2018    hydrALAZINE (APRESOLINE) 50 MG tablet Take 50 mg by mouth 3 (three) times daily.   9/13/2017    hydrocodone-acetaminophen 5-325mg (NORCO) 5-325 mg per tablet Take 1 tablet by mouth every 6 (six) hours as needed for Pain.   1/6/2018    ipratropium (ATROVENT HFA) 17 mcg/actuation inhaler Inhale 2 puffs into the lungs 4 (four) times daily. Do not exceed 12 puffs per day   Taking    losartan (COZAAR) 50 MG tablet Take 1 tablet (50 mg total) by mouth once daily. 30 tablet 3 Taking    mometasone (NASONEX) 50 mcg/actuation nasal spray 2 sprays by Nasal route once daily.   Taking    podofilox (CONDYLOX) 0.5 % external solution Apply a small amount to lesions twice daily for three days, then hold for four days and repeat regimen   Taking    sevelamer carbonate (RENVELA) 800 mg Tab Take 2 tablets (1,600 mg total) by mouth 3 (three) times daily with meals.  Take two tablets by mouth four times daily with meals and snacks 180 tablet 1        Review of patient's allergies indicates:  No Known Allergies    Past Medical History:   Diagnosis Date    Acute pain of left shoulder 1/7/2017    Arthritis     Asthma     Benign neoplasm of eyelid     RUL    Blood clotting tendency     Blood transfusion     Cataract     Chronic kidney disease requiring chronic dialysis     Diabetes mellitus     Diabetic retinopathy     Fever blister     Hyperlipidemia     Hypertension     Infertility     Joint pain     Retinal detachment     Thyroid disease     Weakness 1/7/2017     Past Surgical History:   Procedure Laterality Date    CATARACT EXTRACTION      COLONOSCOPY N/A 6/23/2017    Procedure: COLONOSCOPY;  Surgeon: Vic Clark MD;  Location: [address];  Service: Endoscopy;  Laterality: N/A;    RETINAL DETACHMENT SURGERY       Family History     Problem Relation (Age of Onset)    Heart attack Mother    No Known Problems Father, Sister, Brother, Maternal Aunt, Maternal Uncle, Paternal Aunt, Paternal Uncle, Maternal Grandmother, Maternal Grandfather, Paternal Grandmother, Paternal Grandfather        Social History Main Topics    Smoking status: Never Smoker    Smokeless tobacco: Never Used    Alcohol use No    Drug use: No    Sexual activity: Not on file     Review of Systems   Constitutional: Positive for activity change and chills. Negative for fever.   HENT: Negative for congestion.    Eyes: Negative for discharge and itching.   Respiratory: Positive for shortness of breath. Negative for apnea.    Cardiovascular: Positive for leg swelling. Negative for chest pain.   Gastrointestinal: Negative for abdominal distention.   Endocrine: Negative for cold intolerance and heat intolerance.   Musculoskeletal: Positive for gait problem and joint swelling. Negative for arthralgias.   Skin: Positive for wound. Negative for color change.   Neurological: Negative for  dizziness.   Hematological: Negative for adenopathy.   Psychiatric/Behavioral: Negative for agitation and behavioral problems.     Objective:     Vital Signs (Most Recent):  Temp: 98.2 °F (36.8 °C) (02/05/18 1539)  Pulse: 67 (02/05/18 1539)  Resp: 18 (02/05/18 1539)  BP: (!) 109/54 (02/05/18 1539)  SpO2: (!) 93 % (02/05/18 1539) Vital Signs (24h Range):  Temp:  [97.2 °F (36.2 °C)-98.8 °F (37.1 °C)] 98.2 °F (36.8 °C)  Pulse:  [61-79] 67  Resp:  [12-20] 18  SpO2:  [93 %-100 %] 93 %  BP: ()/(47-69) 109/54     Weight: 114.3 kg (251 lb 15.8 oz)  Body mass index is 38.31 kg/m².    Physical Exam   Constitutional: He appears well-developed and well-nourished.   HENT:   Head: Normocephalic and atraumatic.   Eyes: Pupils are equal, round, and reactive to light.   Cardiovascular: Normal rate and regular rhythm.    Pulses:       Femoral pulses are 2+ on the right side, and 2+ on the left side.  Right biphasic AT and PT  Left biphasic AT and PT  Left RC avf with strong thrill   Pulmonary/Chest: Effort normal. No respiratory distress.   Abdominal: Soft. He exhibits no distension. There is no tenderness.   Fungal infection in bilateral groin creases   Musculoskeletal:   Bilateral venous stasis   Right dorsal foot with open wound foul smelling, fat layer exposed; evidence of 4th 5th digit ischemia on plantar aspect of foot extending to metatarsals  No ascending erythema, no crepitus   Neurological: He is alert.   Poor historian   Skin: Skin is warm and dry.       Significant Labs:  CBC:   Recent Labs  Lab 02/05/18  1051   WBC 13.86*   RBC 2.98*   HGB 9.0*   HCT 26.7*      MCV 90   MCH 30.2   MCHC 33.7     CMP:   Recent Labs  Lab 02/04/18  1132      CALCIUM 9.3   ALBUMIN 2.0*   PROT 7.6   *   K 4.1   CO2 26   CL 93*   BUN 55*   CREATININE 8.6*   ALKPHOS 138*   ALT 13   AST 14   BILITOT 1.3*     Coagulation: No results for input(s): LABPROT, INR, APTT in the last 48 hours.    Significant Diagnostics:  PAT  NAME: YUNIOR MOYER  [facility] REC#: [id number]  :      1950  SEX:      M  EXAM MARIA C: 2018 11:07  REF PHYS: SELF, AAAREFERRAL      Indication:  PVD.  Results:  Lower Extremities Segmental Pressure [mmHg]:                    Right             Left  _______________________________________________________________  Brachial          113               113  Posterior Tibial  234               212  Dorsalis Pedis    195               232  PHILLIP (Post. Tib.)  2.07              1.88  PHILLIP (Dors. Ped.)  1.73              2.05    The presence of artifactually elevated pressures in arteries of the lower extremity renders all pressure measurements unreliable due to suspected medial calcinosis.    Report Summary:  Impression:   Right Leg:Segmental pressures and PVR waveforms suggest mild to moderate peripheral arterial occlusive disease.     Left Leg:Segmental pressures and PVR waveforms suggest mild to moderate peripheral arterial occlusive disease.    Sonographer: BENTLEY Charles    Electronically Signed by:  Antonio Garcia MD [[id number]]                        On: 2018 13:33

## 2018-02-05 NOTE — PROGRESS NOTES
Consult received on patient's buttocks wound. Wound noted to left upper buttocks unknown etiology, appears to have been abscess at one point and unroofed itself. Wound approximately 0.2cm in diameter with 0.3cm depth. No induration or fluctuance noted. Small amount of serosanguinous drainage noted. Recommend dressing with a foam dressing and change every MWF. Discussed with Dr. Lino, telephone order received for nursing. Nursing to continue care.     02/05/18 1134       Wound 02/05/18 1134 Abscess upper Buttocks   Date First Assessed/Time First Assessed: 02/05/18 1134   Pre-existing: Yes  Wound Type: Abscess  Side: Left  Orientation: upper  Location: Buttocks   Wound WDL ex   Drainage Amount Small   Drainage Characteristics/Odor Serosanguineous;No odor   Appearance Moist;Pink   Tissue loss description Full thickness   Red (%), Wound Tissue Color 100 %   Periwound Area Intact  (areas of scattered scar tissue noted)   Wound Length (cm) 0.2   Wound Width (cm) 0.2   Depth (cm) 0.3   Care Cleansed with:;Sterile normal saline   Dressing Applied;Foam   Dressing Change Due 02/07/18

## 2018-02-05 NOTE — ED NOTES
Pt. Resting in bed in NAD. RR e/u. Continuous BP, cardiac, and O2 monitoring in progress. VS being monitoring continuously per MD orders. Pt. Offered bathroom assistance and denies need at this time. Explanation of care/wait provided. Bed in low, locked position with rails up and call bell in reach. Will continue to monitor

## 2018-02-05 NOTE — PLAN OF CARE
Problem: Patient Care Overview  Goal: Plan of Care Review  Outcome: Ongoing (interventions implemented as appropriate)  Pt alert and verbally responsive, able to make his needs known. Incontinent care done by PCT. Kept dry and comfortable. Will expect podiatry consult for BLE leg and feet wounds. Bedside dialysis in progress on this shift. All needs attended.

## 2018-02-05 NOTE — PLAN OF CARE
05-Oct-2017 10:27 Problem: Occupational Therapy Goal  Goal: Occupational Therapy Goal  Goals to be met by: 7 days      Patient will increase functional independence with ADLs by performing:    UE Dressing with Moderate Assistance.  LE Dressing with Moderate Assistance.  Grooming while seated with Supervision.  Toileting from bedside commode with Moderate Assistance for hygiene and clothing management.   Supine to sit with Moderate Assistance.  Stand pivot transfers with Moderate Assistance.  Toilet transfer to bedside commode with Moderate Assistance.  Increased functional strength to WFL for safe and independent function with self-care and functional mobility tasks.    Outcome: Ongoing (interventions implemented as appropriate)  Pt goals established today based on his presenting functional level     Comments: Cont OT POC

## 2018-02-05 NOTE — ASSESSMENT & PLAN NOTE
66 yo M with h/o ESRD on HD (TThS) via L RC AVF (with interposition graft), HTN, HLD, DM 2( Hgb A1c 4.7), HFrEF (45% 4/2017)  presenting with fluid overload after missing dialysis with evidence of right diabetic foot ulceration with wet gangrene.    Patient with medial calcinosis; PVR waveforms of right foot good with biphasic waveforms  Would recommend debridement of right foot wound, to include amputation of 4/5th toes if needed depending on findings; podiatry following  No vascular intervention planned at this time  Recommend continuing ASA, statin  Would recommend elevating legs; heel offloading when in bed  Please call vascular surgery with questions or concerns

## 2018-02-05 NOTE — PLAN OF CARE
Cm to room for Discharge Planning Assessment, pt off floor for US, will return later.    Kristy Mallory, ZENON  Case Management  [phone]

## 2018-02-05 NOTE — PLAN OF CARE
"CM contacted [facility], spoke with CM Yahaira, updated on pt's case.  Yahaira reports long history of noncomplience with [facility], lives at home with son who is primary caregiver.  Pt ambulates, is transported to HD by [facility].  Yahaira states pt's last HD session was scheduled for Saturday.  Yahaira states pt has missed HD in past stating reasons being  didn't wait for him to complete dressing and multiple other reasons.  Pt has also been known to sign out of HD 3 hrs early.  [facility] aware of multiple calls from pt to 911. CM has arrived to home for scheduled visit to find fire truck and ambulance but unsure why pt has called them.      Son Juan M Sargent originally agreed to family meeting with [facility] today but did not show up for appt.  Pt request more assistance at home by CNA but also reports son is in the home during the day, this does not meet criteria for more assistance at home.      1500  CM met with pt to discuss d/c plans.  Pt tells CM he is no longer able to live at home alone and take care of himself.  States his son is supposed to live with him to help but he is "in and out" and never at home to assist.  States he does use [facility] for [place] [address] [place] but uses the [facility] bus for Saturday's at 7:30.  Pt states he "never has issues" with consistency using the [facility].  He states he is in need of help and is ready for be placed in a nursing facility, aware this will likely be permanent.  He also states he has discussed this with his son and he's in agreement. He is aware he needs I&D tomorrow and knows he cannot manage wound at home.  Pt will likely need skilled placement at d/c.  PPD ordered.  Per Yahaira at [facility], determination has be made prior to referrals being placed.    Kristy Mallory RN  Case Management  [phone]  "

## 2018-02-05 NOTE — ANESTHESIA PREPROCEDURE EVALUATION
[facility]  Anesthesia Pre-Operative Evaluation         Patient Name: Wilder Carbajal  YOB: 1950  MRN: [id number]    SUBJECTIVE:     Pre-operative evaluation for Procedure(s) (LRB):  INCISION AND DRAINAGE (I & D) (Right)  Scheduled for 2/6/2018    HPI 02/05/2018:  Wilder Carbajal is a 67 y.o. male with hx of HTN, HLD, DM with retinopathy and ESRD on dialysis Tu[facility]Sa, arthritis, asthma, hypothyroidism.    Patient was admitted on 2/4/2018 for LE edema. Missed dialysis on Saturday. Pt admitted 1/7-1/4 for volume overload due to hemodialysis non-compliance, pt left that admission against medical advice when he was still hypoxic and above dry weight.     Patient presents for the above procedure(s).    Prev airway:   No prior records in Epic or Legacy Documents.    Oxygen/Ventilation Requirements:  Satting high 90s on 3L NC       Current LDA:   PIV R forearm 18G  PIV R hand 20G       Peripheral IV - Single Lumen 02/04/18 1441 Right Forearm (Active)   Site Assessment Dry;Intact;No redness;No swelling 2/5/2018  1:00 AM   Line Status Flushed 2/5/2018  1:00 AM   Dressing Status Dry;Intact 2/5/2018  1:00 AM   Dressing Intervention New dressing 2/4/2018  2:41 PM   Number of days: 0            Peripheral IV - Single Lumen 02/04/18 1215 Right Hand (Active)   Site Assessment Dry;Intact 2/5/2018  1:00 AM   Line Status Flushed 2/5/2018  1:00 AM   Dressing Status Dry;Intact 2/5/2018  1:00 AM   Number of days: 1            Hemodialysis AV Fistula Left upper arm (Active)   Needle Size 15ga 2/5/2018  1:00 AM   Site Assessment Clean;Dry;Intact 2/5/2018  4:30 AM   Patency Present;Thrill;Bruit 2/5/2018  1:00 AM   Status Deaccessed 2/5/2018  4:30 AM   Flows Good 2/5/2018  4:30 AM   Site Condition No complications 2/5/2018  4:30 AM   Number of days:        Current Drips:  None documented.      Patient Active Problem List   Diagnosis    Traction detachment of both retinas    Diabetic macular edema of  both eyes    NS (nuclear sclerosis)    Hypertensive retinopathy of both eyes    History of excision of epidermal inclusion cyst    Proliferative diabetic retinopathy of both eyes without macular edema associated with type 2 diabetes mellitus    Proliferative diabetic retinopathy, both eyes    Elevated PSA    Malignant hypertension    HLD (hyperlipidemia)    DM II (diabetes mellitus, type II), controlled    Essential hypertension    Hypothyroidism    GERD (gastroesophageal reflux disease)    Pseudoaneurysm of arteriovenous graft    Lumbar facet arthropathy    Renovascular hypertension, malignant    Lipoma    Type 2 diabetes mellitus with stage 5 chronic kidney disease and hypertension    ESRD (end stage renal disease)    Diabetes mellitus with ESRD (end-stage renal disease)    Lumbar spondylosis    Spinal stenosis of lumbar region    Bilateral low back pain    Complete heart block    Bicuspid aortic valve    AV block, complete    Typical angina    Fever    Coccygeal pain, acute    Sacrococcygeal pilonidal cyst    Second degree heart block    Vein stenosis    Weakness    Acute pain of left shoulder    Dependence on hemodialysis    Paroxysmal atrial fibrillation    Acute on chronic diastolic heart failure    Right lower quadrant abdominal pain    Hypoxia    Right femoral vein DVT    Anemia, chronic disease    Metabolic bone disease    Right leg pain    Hypervolemia    Pulmonary hypertension    Hyperkalemia    Delirium    Dementia without behavioral disturbance    Protein-calorie malnutrition, severe    DVT (deep venous thrombosis)    DM type 2 with diabetic mixed hyperlipidemia    Diabetic polyneuropathy associated with type 2 diabetes mellitus    Chronic kidney disease-mineral and bone disorder    Depression    Leukocytosis       Review of patient's allergies indicates:  No Known Allergies    Outpatient Medications:  No current facility-administered medications on  file prior to encounter.      Current Outpatient Prescriptions on File Prior to Encounter   Medication Sig Dispense Refill    ammonium lactate (LAC-HYDRIN) 12 % lotion Apply topically as needed for Dry Skin.      apixaban 5 mg Tab Take 1 tablet (5 mg total) by mouth 2 (two) times daily. 60 tablet 1    aspirin (ECOTRIN) 81 MG EC tablet Take 81 mg by mouth once daily.       atorvastatin (LIPITOR) 20 MG tablet Take 20 mg by mouth once daily.      cetirizine (ZYRTEC) 10 MG tablet Take 10 mg by mouth once daily.      cinacalcet (SENSIPAR) 30 MG Tab Take 30 mg by mouth every evening.      citalopram (CELEXA) 10 MG tablet Take 10 mg by mouth once daily.      diclofenac sodium (VOLTAREN) 1 % Gel Apply 2 g topically 4 (four) times daily as needed (for pain).      ergocalciferol (ERGOCALCIFEROL) 50,000 unit Cap Take 50,000 Units by mouth every 14 (fourteen) days.       furosemide (LASIX) 80 MG tablet Take 80 mg by mouth once daily.      gabapentin (NEURONTIN) 100 MG capsule Take 100 mg by mouth 3 (three) times daily.      hydrALAZINE (APRESOLINE) 50 MG tablet Take 50 mg by mouth 3 (three) times daily.      hydrocodone-acetaminophen 5-325mg (NORCO) 5-325 mg per tablet Take 1 tablet by mouth every 6 (six) hours as needed for Pain.      ipratropium (ATROVENT HFA) 17 mcg/actuation inhaler Inhale 2 puffs into the lungs 4 (four) times daily. Do not exceed 12 puffs per day      losartan (COZAAR) 50 MG tablet Take 1 tablet (50 mg total) by mouth once daily. 30 tablet 3    mometasone (NASONEX) 50 mcg/actuation nasal spray 2 sprays by Nasal route once daily.      podofilox (CONDYLOX) 0.5 % external solution Apply a small amount to lesions twice daily for three days, then hold for four days and repeat regimen      sevelamer carbonate (RENVELA) 800 mg Tab Take 2 tablets (1,600 mg total) by mouth 3 (three) times daily with meals. Take two tablets by mouth four times daily with meals and snacks 180 tablet 1        Current  Inpatient Medications:   ammonium lactate   Topical (Top) BID    apixaban  5 mg Oral BID    aspirin  81 mg Oral Daily    atorvastatin  20 mg Oral Daily    cinacalcet  30 mg Oral QHS    citalopram  10 mg Oral Daily    furosemide  80 mg Oral Daily    gabapentin  100 mg Oral TID    senna-docusate 8.6-50 mg  1 tablet Oral BID    sevelamer carbonate  1,600 mg Oral TID WM       Past Surgical History:   Procedure Laterality Date    CATARACT EXTRACTION      COLONOSCOPY N/A 6/23/2017    Procedure: COLONOSCOPY;  Surgeon: Vic Clark MD;  Location: [address];  Service: Endoscopy;  Laterality: N/A;    RETINAL DETACHMENT SURGERY         Social History     Social History    Marital status: Single     Spouse name: N/A    Number of children: N/A    Years of education: N/A     Occupational History    Not on file.     Social History Main Topics    Smoking status: Never Smoker    Smokeless tobacco: Never Used    Alcohol use No    Drug use: No    Sexual activity: Not on file     Other Topics Concern    Not on file     Social History Narrative    No narrative on file       OBJECTIVE:   Weight:  Wt Readings from Last 4 Encounters:   02/05/18 114.3 kg (251 lb 15.8 oz)   01/12/18 106.2 kg (234 lb 2.1 oz)   09/14/17 108 kg (238 lb)   09/13/17 108 kg (238 lb)       Vital Signs Range (Last 24H):  Temp:  [36.2 °C (97.2 °F)-37.1 °C (98.8 °F)]   Pulse:  [61-81]   Resp:  [12-20]   BP: ()/(47-69)   SpO2:  [95 %-100 %]       CBC:   Recent Labs      02/04/18   1132   02/04/18   1339  02/05/18   1051   WBC  18.31*   --    --   13.86*   RBC  2.96*   --    --   2.98*   HGB  9.0*   --    --   9.0*   HCT  26.1*   < >  30*  26.7*   PLT  310   --    --   216   MCV  88   --    --   90   MCH  30.4   --    --   30.2   MCHC  34.5   --    --   33.7    < > = values in this interval not displayed.       CMP:   Recent Labs      02/04/18   1132  02/05/18   1051   NA  134*   --    K  4.1   --    CL  93*   --    CO2  26   --     BUN  55*   --    CREATININE  8.6*   --    GLU  105   --    MG  2.1  2.1   CALCIUM  9.3   --    ALBUMIN  2.0*   --    PROT  7.6   --    ALKPHOS  138*   --    ALT  13   --    AST  14   --    BILITOT  1.3*   --        INR:  No results for input(s): INR, PROTIME, APTT in the last 72 hours.    Diagnostic Studies:  Foot Xray 2018  Right: There is DJD, soft tissue swelling, a calcaneal spur, and diabetic vascular calcifications. No fracture dislocation bone destruction seen.    Left: There is DJD, diabetic vascular calcifications, and Spurs on the calcaneus. No fracture dislocation bone destruction seen.    US LE 2018  Chronic partially occlusive clot in the right femoral vein. No new DVT.    Small subcutaneous fluid collection in the right anterior proximal thigh. Small microabscess cannot be excluded. Recommend clinical correlation.    CXR 2018  The lungs are clear. The cardiac silhouette is enlarged. Atherosclerotic change is noted of the aorta.. The pulmonary vasculature is unremarkable. There is no pleural effusion or pneumothorax. The hilar and mediastinal contours are unremarkable. There are no acute bony abnormalities.    EK2018  Atrial fibrillation with a competing junctional pacemaker  Low voltage QRS  Cannot rule out Anterior infarct (cited on or before 2018)  T wave abnormality, consider lateral ischemia  Abnormal ECG  When compared with ECG of 2018 23:40,  Nonspecific T wave abnormality no longer evident in Inferior leads    2018  Blood Pressure : 174/082 mmHG  Vent. Rate : 064 BPM     Atrial Rate : 030 BPM     P-R Int : 000 ms          QRS Dur : 102 ms      QT Int : 432 ms       P-R-T Axes : 000 038 163 degrees     QTc Int : 445 ms    Atrial fibrillation  Low voltage QRS  Cannot rule out Anterior infarct ,age undetermined  Nonspecific ST-t wave abnormality  Abnormal ECG  When compared with ECG of 2017 10:59,  Atrial fibrillation has replaced Junctional  rhythm  Minimal criteria for Anterior infarct are now Present     2D Echo:  4/22/2017    1 - Eccentric hypertrophy.     2 - Mildly depressed left ventricular systolic function (EF 45-50%).     3 - Right ventricular enlargement with mildly depressed systolic function.     4 - Biatrial enlargement.     5 - Mild tricuspid regurgitation.     6 - Mild aortic stenosis, MARANDA = 1.4 cm2, peak velocity = 2.7 m/s, mean gradient = 15 mmHg.     7 - Pulmonary hypertension. The estimated PA systolic pressure is 55 mmHg.     8 - Increased central venous pressure.     Results for orders placed or performed during the hospital encounter of 04/21/17   2D echo with color flow doppler   Result Value Ref Range    EF 45 55 - 65    Aortic Valve Regurgitation TRIVIAL     Aortic Valve Stenosis MILD (A)     Est. PA Systolic Pressure 54.69 (A)     Mitral Valve Mobility NORMAL     Tricuspid Valve Regurgitation MILD          ASSESSMENT/PLAN:         Anesthesia Evaluation    I have reviewed the Patient Summary Reports.     I have reviewed the Medications.     Review of Systems  Anesthesia Hx:  No problems with previous Anesthesia  Neg history of prior surgery. Denies Family Hx of Anesthesia complications.   Denies Personal Hx of Anesthesia complications.   Social:  Non-Smoker, No Alcohol Use    Hematology/Oncology:     Oncology Normal    -- Anemia: Denies Current/Recent Cancer   EENT/Dental:  EENT/Dental Normal denies chronic allergic rhinitis    Cardiovascular:   Hypertension, poorly controlled Denies MI.  Denies CAD.    Denies CABG/stent. Dysrhythmias atrial fibrillation    Pulmonary:   Denies Pneumonia Asthma Denies Shortness of breath.  Denies Recent URI.    Renal/:   Chronic Renal Disease, ESRD    Hepatic/GI:   Denies PUD. Denies Hiatal Hernia. GERD    Musculoskeletal:   Arthritis     Neurological:   Denies TIA. Denies CVA. Neuromuscular Disease,  Denies Seizures.   Peripheral Neuropathy    Endocrine:   Diabetes, poorly controlled, type 2  Hypothyroidism    Psych:   Psychiatric History depression          Physical Exam  General:  Obesity, Well nourished    Airway/Jaw/Neck:  Airway Findings: Mouth Opening: Normal Tongue: Normal  General Airway Assessment: Adult  Mallampati: III  Improves to III with phonation.  TM Distance: Normal, at least 6 cm  Jaw/Neck Findings:  Neck ROM: Normal ROM      Dental:  Dental Findings: Periodontal disease, Severe    Chest/Lungs:  Chest/Lungs Findings: Normal Respiratory Rate, Clear to auscultation     Heart/Vascular:  Heart Findings: Rate: Bradycardia  Rhythm: Regular Rhythm  Heart Murmur  Systolic  Systolic Heart Murmur Description: R Upper Sternal Border  Systolic Heart Murmur Grade: Grade II  Vascular Findings:  Dialysis Access: Left Forearm Graft     Abdomen:  Abdomen Findings: Normal    Musculoskeletal:  Musculoskeletal Findings: .ca1.    Skin:  Skin Findings: Normal    Mental Status:  Mental Status Findings:  Cooperative, Alert and Oriented         Anesthesia Plan  Type of Anesthesia, risks & benefits discussed:  Anesthesia Type:  MAC, general  Patient's Preference:   Intra-op Monitoring Plan: standard ASA monitors  Intra-op Monitoring Plan Comments:   Post Op Pain Control Plan: multimodal analgesia, per primary service following discharge from PACU and IV/PO Opioids PRN  Post Op Pain Control Plan Comments:   Induction:   IV  Beta Blocker:  Patient is not currently on a Beta-Blocker (No further documentation required).       Informed Consent: Patient understands risks and agrees with Anesthesia plan.  Questions answered. Anesthesia consent signed with patient.  ASA Score: 3     Day of Surgery Review of History & Physical: I have interviewed and examined the patient. I have reviewed the patient's H&P dated:  There are no significant changes.  H&P update referred to the surgeon.         Ready For Surgery From Anesthesia Perspective.

## 2018-02-05 NOTE — PT/OT/SLP EVAL
Physical Therapy Evaluation    Patient Name:  Wilder Carbajal   MRN:  [id number]    Recommendations:     Discharge Recommendations:  nursing facility, skilled   Discharge Equipment Recommendations: none   Barriers to discharge: Decreased caregiver support (requires increased assistance at this time)    Assessment:     Wilder Carbajal is a 67 y.o. male admitted with a medical diagnosis of Hyperkalemia.  He presents with the below impairments/functional limitations.  Pt tolerated evaluation well today after slight encouragement.  Pt is a good candidate for skilled PT services to address the below deficits and to increase functional independence.      Rehab Prognosis: fair; patient would benefit from acute skilled PT services to address these deficits and reach maximum level of function.      Rehab Identified impairments/functional limitations:   weakness, impaired endurance, impaired functional mobilty, gait instability, impaired balance, decreased lower extremity function, decreased ROM, edema, impaired skin, pain, decreased safety awareness    Recent Surgery: Procedure(s) (LRB):  INCISION AND DRAINAGE (I & D) (Right)      Plan:     During this hospitalization, patient to be seen 4 x/week to address the above listed problems via gait training, therapeutic activities, therapeutic exercises, neuromuscular re-education  · Plan of Care Expires:  03/07/18   Plan of Care Reviewed with: patient    Subjective     Communicated with RN prior to session.  Patient found supine in bed upon PT entry to room, agreeable to evaluation.      Chief Complaint: none reported  Patient comments/goals: to return home safely    Pain/Comfort:  · Pain Rating 1: 10/10  · Location - Side 1: Left  · Location - Orientation 1: generalized  · Location 1: hip  · Pain Addressed 1: Reposition, Pre-medicate for activity, Distraction  · Pain Rating Post-Intervention 1: 9/10    Patients cultural, spiritual, Confucianism conflicts given the current  situation: none noted    Living Environment:  Pt lives with his son in [facility] with 4STE and B hand rails. Pt uses a tub/shower combo.   Previous level of function: PTA, pt reports being Mod I with Adls and functional mobility   Equipment Owned:  walker, rolling, cane, straight, bath bench, bedside commode  Assistance upon Discharge: Pt reports his son is not able to assist due to son working during the day.   Objective:     Patient found with: telemetry     General Precautions: Standard, fall   Orthopedic Precautions: Per recent podiatry note, Partial Heel WB B/:L   Braces: N/A     Exams:    Cognitive/Psychosocial Skills:     -       Oriented to: Person, Place and Time   -       Follows Commands/attention:Easily distracted and Follows one-step commands  -       Communication: clear/fluent  -       Safety awareness/insight to disability: impaired     Physical Exams:  · Gross Motor Coordination:  WFL  · Postural Exam:  Patient presented with the following abnormalities:    · -       Rounded shoulders  · Sensation:    · -       Impaired  light/touch BLE  · Skin Integrity/Edema:      · -       Skin integrity: Wound B feet, Thin and Dry  · -       Edema: Moderate BLE  · RLE ROM: WFL  · RLE Strength: WFL  · LLE ROM: WFL  · LLE Strength: WFL    Functional Mobility:    Bed Mobility:    · Patient completed Scooting/Bridging with total assistance to EOB  · Patient completed Supine to Sit with maximal assistance     Transfers:  · Patient completed Sit <> Stand Transfer with total assistance  with  no assistive device   · Patient completed Bed <> Chair Transfer using Stand Pivot technique with total assistance with no assistive device    AM-PAC 6 CLICK MOBILITY  Total Score:11     Therapeutic Activities and Exercises:  · PT evaluation performed.  · Pt educated on role of PT, safety with functional mobility.   · Pt sat EOB for ~10 minutes with SBA to maintain balance.      Patient left up in chair with all lines intact, call button  in reach and RN notified.    GOALS:    Physical Therapy Goals        Problem: Physical Therapy Goal    Goal Priority Disciplines Outcome Goal Variances Interventions   Physical Therapy Goal     PT/OT, PT Ongoing (interventions implemented as appropriate)     Description:  Goals to be met by: 2/15/18     Patient will increase functional independence with mobility by performin. Supine to sit with MInimal Assistance  2. Sit to supine with MInimal Assistance  3. Sit to stand transfer with Minimal Assistance  4. Bed to chair transfer with Minimal Assistance using Rolling Walker  5. Gait  x 10 feet with Moderate Assistance using Rolling Walker.   6. Lower extremity exercise program x 30 reps per handout, with independence                      History:     Past Medical History:   Diagnosis Date    Acute pain of left shoulder 2017    Arthritis     Asthma     Benign neoplasm of eyelid     RUL    Blood clotting tendency     Blood transfusion     Cataract     Chronic kidney disease requiring chronic dialysis     Diabetes mellitus     Diabetic retinopathy     Fever blister     Hyperlipidemia     Hypertension     Infertility     Joint pain     Retinal detachment     Thyroid disease     Weakness 2017       Past Surgical History:   Procedure Laterality Date    CATARACT EXTRACTION      COLONOSCOPY N/A 2017    Procedure: COLONOSCOPY;  Surgeon: Vic Clark MD;  Location: [address];  Service: Endoscopy;  Laterality: N/A;    RETINAL DETACHMENT SURGERY         Time Tracking:     PT Received On: 18  PT Start Time: 1330     PT Stop Time: 1358  PT Total Time (min): 28 min     Billable Minutes: Evaluation 18 and Therapeutic Activity 10      Alan Melendez, PT, DPT  2018   [phone]

## 2018-02-05 NOTE — NURSING
Pt admitted from ED in stretcher accompanied by ED staff in stable condition. Reoriented to the unit and oriented to the room.

## 2018-02-05 NOTE — CONSULTS
[facility]  Podiatry  Consult Note    Patient Name: Wilder Carbajal  MRN: [id number]  Admission Date: 2/4/2018  Hospital Length of Stay: 1 days  Attending Physician: Laura Helms MD  Primary Care Provider: Dio Roberson MD     Inpatient consult to Podiatry  Consult performed by: BALAJI ROSS  Consult ordered by: LAURA HELMS  Reason for consult: feet blisters and ulcerations        Subjective:     History of Present Illness:  Patient is a 67 y.o. male with ESRF on dialysis TTS, DM2 admitted for hyperkalemia, urgent dialysis. Consulted for Ulceration B/L LE. Pt. Reports leg swelling x 2 weeks. Pt. Followed by podiatry last seen by Dr. Lanier 8/14/17. Denies fevers, chills. Patient poor historian history obtained from chart review. Pt. Has h/o ulceration right 5th toe that had healed with local wound care.     Scheduled Meds:   ammonium lactate   Topical (Top) BID    apixaban  5 mg Oral BID    aspirin  81 mg Oral Daily    atorvastatin  20 mg Oral Daily    cinacalcet  30 mg Oral QHS    citalopram  10 mg Oral Daily    furosemide  80 mg Oral Daily    gabapentin  100 mg Oral TID    senna-docusate 8.6-50 mg  1 tablet Oral BID    sevelamer carbonate  1,600 mg Oral TID WM    tuberculin  5 Units Intradermal Once     Continuous Infusions:  PRN Meds:sodium chloride 0.9%, acetaminophen, hydrocodone-acetaminophen 5-325mg, ondansetron, sodium chloride 0.9%    Review of patient's allergies indicates:  No Known Allergies     Past Medical History:   Diagnosis Date    Acute pain of left shoulder 1/7/2017    Arthritis     Asthma     Benign neoplasm of eyelid     RUL    Blood clotting tendency     Blood transfusion     Cataract     Chronic kidney disease requiring chronic dialysis     Diabetes mellitus     Diabetic retinopathy     Fever blister     Hyperlipidemia     Hypertension     Infertility     Joint pain     Retinal detachment     Thyroid disease     Weakness 1/7/2017     Past  Surgical History:   Procedure Laterality Date    CATARACT EXTRACTION      COLONOSCOPY N/A 6/23/2017    Procedure: COLONOSCOPY;  Surgeon: Vic Clark MD;  Location: [facility] ([address] [place] [address]);  Service: Endoscopy;  Laterality: N/A;    RETINAL DETACHMENT SURGERY         Family History     Problem Relation (Age of Onset)    Heart attack Mother    No Known Problems Father, Sister, Brother, Maternal Aunt, Maternal Uncle, Paternal Aunt, Paternal Uncle, Maternal Grandmother, Maternal Grandfather, Paternal Grandmother, Paternal Grandfather        Social History Main Topics    Smoking status: Never Smoker    Smokeless tobacco: Never Used    Alcohol use No    Drug use: No    Sexual activity: Not on file     Review of Systems   Constitutional: Positive for activity change.   Respiratory: Negative for shortness of breath.    Cardiovascular: Negative for chest pain and leg swelling.   Gastrointestinal: Negative.  Negative for nausea and vomiting.   Genitourinary: Negative.    Musculoskeletal: Positive for arthralgias.   Skin: Positive for wound.   Neurological: Positive for numbness. Negative for weakness.     Objective:     Vital Signs (Most Recent):  Temp: 98.2 °F (36.8 °C) (02/05/18 1539)  Pulse: 67 (02/05/18 1539)  Resp: 18 (02/05/18 1539)  BP: (!) 109/54 (02/05/18 1539)  SpO2: (!) 93 % (02/05/18 1539) Vital Signs (24h Range):  Temp:  [97.2 °F (36.2 °C)-98.8 °F (37.1 °C)] 98.2 °F (36.8 °C)  Pulse:  [61-79] 67  Resp:  [12-20] 18  SpO2:  [93 %-100 %] 93 %  BP: ()/(47-69) 109/54     Weight: 114.3 kg (251 lb 15.8 oz)  Body mass index is 38.31 kg/m².    Foot Exam    General  Orientation: disoriented       Right Foot/Ankle     Inspection and Palpation  Tenderness: none   Swelling: metatarsals   Skin Exam: blister, maceration, cellulitis, skin changes and ulcer;     Neurovascular  Dorsalis pedis: 1+  Posterior tibial: 1+  Saphenous nerve sensation: diminished  Tibial nerve sensation: diminished  Superficial peroneal nerve  sensation: diminished  Deep peroneal nerve sensation: diminished  Sural nerve sensation: diminished      Left Foot/Ankle      Inspection and Palpation  Tenderness: none   Swelling: metatarsals   Skin Exam: skin changes and ulcer;     Neurovascular  Dorsalis pedis: 1+  Posterior tibial: 1+  Saphenous nerve sensation: diminished  Tibial nerve sensation: diminished  Superficial peroneal nerve sensation: diminished  Deep peroneal nerve sensation: diminished  Sural nerve sensation: diminished          Right foot Large blister to right dorsal foot tracking along dorsal foot to 5th toe.  Wound 1: blister deroofed revealing  Right dorsal foot ulceration,   Measurement:2tqc5vul7.1cm.  Base: fibrous and necrotic eschar base  Periwound skin: mild erythema, maceration  Drainage:purulent drainage noted with malodor  Erythema: mild  Probe: none, no bone exposed  Tracking  To right 4th and 5th toes.     .    Ischemic appearance to right 4th and 5th toes, blister noted to right plantar foot. ,     Left foot ulceration noted to dorsal foot    Wound 1: Left dorsal foot   Measurement: 8sfg6ihl7.1cm.  Base: fibrous base  Periwound skin: mild erythema,   Drainage: none  Erythema: mild  Probe: none, no bone exposed              Wound 1: Left heel ulceration   Measurement: 5rym6dvi7.1cm.  Periwound skin: callus  Drainage: none  Erythema:mild   Probe: none, no bone exposed            Laboratory:  Xray: Right: There is DJD, soft tissue swelling, a calcaneal spur, and diabetic vascular calcifications. No fracture dislocation bone destruction seen.    Left: There is DJD, diabetic vascular calcifications, and Spurs on the calcaneus. No fracture dislocation bone destruction seen.  MRI: pending    PHILLIP: Impression:   Right Leg:Segmental pressures and PVR waveforms suggest mild to moderate peripheral arterial occlusive disease.     Left Leg:Segmental pressures and PVR waveforms suggest mild to moderate peripheral arterial occlusive  disease.        Diagnostic Results:  X-Ray: I have reviewed all pertinent results/findings within the past 24 hours.  reviewed    Clinical Findings:  Ulceration right dorsal foot with purulent malodorous drainage.     Assessment/Plan:     Foot ulceration, right, with fat layer exposed    Right dorsal foot ulceration with purulent drainage, ishemic appearance noted to right 4th and 5th toe.   Aerobic, anaerobic cultures obtained  Xray ordered B/L foot  MRI ordered right foot   Arterial US ordered  Vascular surgery consult placed.   Plan for right foot I&D right partial 4th and 5th ray amputation tomorrow 2/6/18  Case request placed for 11:30am 2/6/18  Pt. Unable to make medical decisions per discussion with primary, attempted to contact son x 3 left VM, to obtain consent for procedure tomorrow. Will try again.   NPO at midnight.     Weightbearing Status: Partial Heel WB B/:L   Offloading Device: DARCO shoe B/L - Heel protector while in bed.     Ángela Marks DPM PGY-3  Pager: [phone]          ESRD (end stage renal disease)    Per Primary           Type 2 diabetes mellitus with stage 5 chronic kidney disease and hypertension    Per Primary             UPDATE: 1745 Contacted son who agreed to right foot I&D and partial 4th and 5th ray amputation.   Long discussion with patient' s son Raymond Carbajal regarding the procedure in detail. Patient understands all risks, potential complications, and alternatives, including, but not limited to those listed on the consent form. All questions were answered. No guarantees given or implied as to outcome. Informed verbal and written consent was obtained. Consent forms read, signed, witnessed by two nurses.     Thank you for your consult. I will follow-up with patient. Please contact us if you have any additional questions.    Ángela Marks MD  Podiatry  [facility]      Resident Supervision:  I have personally taken the history and examined this patient and  agree with the resident's note as stated above.   Yaritza Matamoros DPM

## 2018-02-05 NOTE — ASSESSMENT & PLAN NOTE
Right dorsal foot ulceration with purulent drainage, ishemic appearance noted to right 4th and 5th toe.   Aerobic, anaerobic cultures obtained  Xray ordered B/L foot  MRI ordered right foot   Arterial US ordered  Vascular surgery consult placed.   Plan for right foot I&D tomorrow 2/6/18  Case request placed for 11:30am 2/6/18  Pt. Unable to make medical decisions per discussion with primary, attempted to contact son x 3 left VM, to obtain consent for procedure tomorrow. Will try again.   NPO at midnight.     Weightbearing Status: Partial Heel WB B/:L   Offloading Device: DARCO shoe B/L - Heel protector while in bed.     Ángela Marks DPM PGY-3  Pager: [phone]

## 2018-02-05 NOTE — CONSULTS
See consult note to MICU dated 02/04/2018 at 4:16PM.     Gerardo Rodriguez MD  PGY-2 House Staff, Internal Medicine

## 2018-02-05 NOTE — HPI
68 yo M with h/o ESRD on HD (TThS) via L RC AVF (with interposition graft), HTN, HLD, DM 2( Hgb A1c 4.7), HFrEF (45% 4/2017)  presenting with fluid overload after missing dialysis.  Patient reports increased bilateral leg swelling over the past two weeks. Reports his legs have been weeping.  Missed his Saturday HD secondary to transportation issues. He does not know when right foot ulcer began; reports some chills no fevers. Also reports that he has been mainly nonambulatory over the past 3-4 weeks secondary to leg stiffness.

## 2018-02-05 NOTE — ED NOTES
Called 9th floor to give report. Pt is not assigned to a floor nurse. Will try to call report again shortly.

## 2018-02-06 ENCOUNTER — ANESTHESIA (OUTPATIENT)
Dept: SURGERY | Facility: HOSPITAL | Age: 68
DRG: 853 | End: 2018-02-06
Payer: COMMERCIAL

## 2018-02-06 LAB
ALBUMIN SERPL BCP-MCNC: 1.8 G/DL
ANION GAP SERPL CALC-SCNC: 16 MMOL/L
BASOPHILS # BLD AUTO: 0.04 K/UL
BASOPHILS NFR BLD: 0.2 %
BUN SERPL-MCNC: 45 MG/DL
CALCIUM SERPL-MCNC: 9.2 MG/DL
CHLORIDE SERPL-SCNC: 99 MMOL/L
CO2 SERPL-SCNC: 23 MMOL/L
CREAT SERPL-MCNC: 6.9 MG/DL
DIFFERENTIAL METHOD: ABNORMAL
EOSINOPHIL # BLD AUTO: 0.1 K/UL
EOSINOPHIL NFR BLD: 0.3 %
ERYTHROCYTE [DISTWIDTH] IN BLOOD BY AUTOMATED COUNT: 17.2 %
EST. GFR  (AFRICAN AMERICAN): 8.6 ML/MIN/1.73 M^2
EST. GFR  (NON AFRICAN AMERICAN): 7.5 ML/MIN/1.73 M^2
GLUCOSE SERPL-MCNC: 68 MG/DL
GRAM STN SPEC: NORMAL
GRAM STN SPEC: NORMAL
HCT VFR BLD AUTO: 26.1 %
HGB BLD-MCNC: 8.7 G/DL
IMM GRANULOCYTES # BLD AUTO: 0.18 K/UL
IMM GRANULOCYTES NFR BLD AUTO: 1 %
LYMPHOCYTES # BLD AUTO: 1.2 K/UL
LYMPHOCYTES NFR BLD: 6.3 %
MAGNESIUM SERPL-MCNC: 2 MG/DL
MCH RBC QN AUTO: 30 PG
MCHC RBC AUTO-ENTMCNC: 33.3 G/DL
MCV RBC AUTO: 90 FL
MONOCYTES # BLD AUTO: 1.1 K/UL
MONOCYTES NFR BLD: 5.9 %
NEUTROPHILS # BLD AUTO: 15.8 K/UL
NEUTROPHILS NFR BLD: 86.3 %
NRBC BLD-RTO: 0 /100 WBC
PHOSPHATE SERPL-MCNC: 4.3 MG/DL
PLATELET # BLD AUTO: 207 K/UL
PMV BLD AUTO: 9.9 FL
POCT GLUCOSE: 70 MG/DL (ref 70–110)
POTASSIUM SERPL-SCNC: 3.7 MMOL/L
RBC # BLD AUTO: 2.9 M/UL
SODIUM SERPL-SCNC: 138 MMOL/L
VANCOMYCIN SERPL-MCNC: 23.8 UG/ML
WBC # BLD AUTO: 18.25 K/UL

## 2018-02-06 PROCEDURE — 71000033 HC RECOVERY, INTIAL HOUR: Performed by: PODIATRIST

## 2018-02-06 PROCEDURE — 87075 CULTR BACTERIA EXCEPT BLOOD: CPT

## 2018-02-06 PROCEDURE — 83735 ASSAY OF MAGNESIUM: CPT

## 2018-02-06 PROCEDURE — 87015 SPECIMEN INFECT AGNT CONCNTJ: CPT

## 2018-02-06 PROCEDURE — 36000704 HC OR TIME LEV I 1ST 15 MIN: Performed by: PODIATRIST

## 2018-02-06 PROCEDURE — 63600175 PHARM REV CODE 636 W HCPCS: Performed by: INTERNAL MEDICINE

## 2018-02-06 PROCEDURE — 25000003 PHARM REV CODE 250: Performed by: INTERNAL MEDICINE

## 2018-02-06 PROCEDURE — 11046 DBRDMT MUSC&/FSCA EA ADDL: CPT | Mod: ,,, | Performed by: PODIATRIST

## 2018-02-06 PROCEDURE — 11043 DBRDMT MUSC&/FSCA 1ST 20/<: CPT | Mod: ,,, | Performed by: PODIATRIST

## 2018-02-06 PROCEDURE — 99232 SBSQ HOSP IP/OBS MODERATE 35: CPT | Mod: ,,, | Performed by: INTERNAL MEDICINE

## 2018-02-06 PROCEDURE — 99232 SBSQ HOSP IP/OBS MODERATE 35: CPT | Mod: ,,, | Performed by: SURGERY

## 2018-02-06 PROCEDURE — 37000009 HC ANESTHESIA EA ADD 15 MINS: Performed by: PODIATRIST

## 2018-02-06 PROCEDURE — 85025 COMPLETE CBC W/AUTO DIFF WBC: CPT

## 2018-02-06 PROCEDURE — 87116 MYCOBACTERIA CULTURE: CPT

## 2018-02-06 PROCEDURE — 11000001 HC ACUTE MED/SURG PRIVATE ROOM

## 2018-02-06 PROCEDURE — 87102 FUNGUS ISOLATION CULTURE: CPT

## 2018-02-06 PROCEDURE — 27201423 OPTIME MED/SURG SUP & DEVICES STERILE SUPPLY: Performed by: PODIATRIST

## 2018-02-06 PROCEDURE — D9220A PRA ANESTHESIA: Mod: ANES,,, | Performed by: ANESTHESIOLOGY

## 2018-02-06 PROCEDURE — 36415 COLL VENOUS BLD VENIPUNCTURE: CPT

## 2018-02-06 PROCEDURE — 25000003 PHARM REV CODE 250: Performed by: ANESTHESIOLOGY

## 2018-02-06 PROCEDURE — 71000015 HC POSTOP RECOV 1ST HR: Performed by: PODIATRIST

## 2018-02-06 PROCEDURE — 63600175 PHARM REV CODE 636 W HCPCS: Performed by: PODIATRIST

## 2018-02-06 PROCEDURE — 82962 GLUCOSE BLOOD TEST: CPT | Performed by: PODIATRIST

## 2018-02-06 PROCEDURE — 80202 ASSAY OF VANCOMYCIN: CPT

## 2018-02-06 PROCEDURE — 0JBQ0ZZ EXCISION OF RIGHT FOOT SUBCUTANEOUS TISSUE AND FASCIA, OPEN APPROACH: ICD-10-PCS | Performed by: PODIATRIST

## 2018-02-06 PROCEDURE — 25000003 PHARM REV CODE 250: Performed by: PODIATRIST

## 2018-02-06 PROCEDURE — 71000039 HC RECOVERY, EACH ADD'L HOUR: Performed by: PODIATRIST

## 2018-02-06 PROCEDURE — 63600175 PHARM REV CODE 636 W HCPCS: Performed by: NURSE ANESTHETIST, CERTIFIED REGISTERED

## 2018-02-06 PROCEDURE — 87186 SC STD MICRODIL/AGAR DIL: CPT

## 2018-02-06 PROCEDURE — 87070 CULTURE OTHR SPECIMN AEROBIC: CPT

## 2018-02-06 PROCEDURE — 25000003 PHARM REV CODE 250: Performed by: NURSE ANESTHETIST, CERTIFIED REGISTERED

## 2018-02-06 PROCEDURE — 80069 RENAL FUNCTION PANEL: CPT

## 2018-02-06 PROCEDURE — 37000008 HC ANESTHESIA 1ST 15 MINUTES: Performed by: PODIATRIST

## 2018-02-06 PROCEDURE — 87077 CULTURE AEROBIC IDENTIFY: CPT

## 2018-02-06 PROCEDURE — D9220A PRA ANESTHESIA: Mod: CRNA,,, | Performed by: NURSE ANESTHETIST, CERTIFIED REGISTERED

## 2018-02-06 PROCEDURE — 36000705 HC OR TIME LEV I EA ADD 15 MIN: Performed by: PODIATRIST

## 2018-02-06 PROCEDURE — 87205 SMEAR GRAM STAIN: CPT

## 2018-02-06 RX ORDER — BUPIVACAINE HYDROCHLORIDE 2.5 MG/ML
INJECTION, SOLUTION EPIDURAL; INFILTRATION; INTRACAUDAL
Status: DISPENSED
Start: 2018-02-06 | End: 2018-02-06

## 2018-02-06 RX ORDER — GLYCOPYRROLATE 0.2 MG/ML
INJECTION INTRAMUSCULAR; INTRAVENOUS
Status: DISCONTINUED | OUTPATIENT
Start: 2018-02-06 | End: 2018-02-06

## 2018-02-06 RX ORDER — PROPOFOL 10 MG/ML
VIAL (ML) INTRAVENOUS CONTINUOUS PRN
Status: DISCONTINUED | OUTPATIENT
Start: 2018-02-06 | End: 2018-02-06

## 2018-02-06 RX ORDER — PROPOFOL 10 MG/ML
VIAL (ML) INTRAVENOUS
Status: DISCONTINUED | OUTPATIENT
Start: 2018-02-06 | End: 2018-02-06

## 2018-02-06 RX ORDER — LIDOCAINE HYDROCHLORIDE 10 MG/ML
INJECTION INFILTRATION; PERINEURAL
Status: DISPENSED
Start: 2018-02-06 | End: 2018-02-06

## 2018-02-06 RX ORDER — LIDOCAINE HYDROCHLORIDE 10 MG/ML
INJECTION, SOLUTION EPIDURAL; INFILTRATION; INTRACAUDAL; PERINEURAL
Status: DISCONTINUED | OUTPATIENT
Start: 2018-02-06 | End: 2018-02-06 | Stop reason: HOSPADM

## 2018-02-06 RX ORDER — LIDOCAINE HYDROCHLORIDE 20 MG/ML
INJECTION, SOLUTION INFILTRATION; PERINEURAL
Status: DISCONTINUED | OUTPATIENT
Start: 2018-02-06 | End: 2018-02-06

## 2018-02-06 RX ORDER — BUPIVACAINE HYDROCHLORIDE 2.5 MG/ML
INJECTION, SOLUTION EPIDURAL; INFILTRATION; INTRACAUDAL
Status: DISCONTINUED | OUTPATIENT
Start: 2018-02-06 | End: 2018-02-06 | Stop reason: HOSPADM

## 2018-02-06 RX ADMIN — APIXABAN 5 MG: 5 TABLET, FILM COATED ORAL at 08:02

## 2018-02-06 RX ADMIN — DEXTROSE 125 ML: 10 SOLUTION INTRAVENOUS at 02:02

## 2018-02-06 RX ADMIN — MICONAZOLE NITRATE: 20 OINTMENT TOPICAL at 09:02

## 2018-02-06 RX ADMIN — SEVELAMER CARBONATE 1600 MG: 800 TABLET, FILM COATED ORAL at 05:02

## 2018-02-06 RX ADMIN — SODIUM CHLORIDE: 0.9 INJECTION, SOLUTION INTRAVENOUS at 12:02

## 2018-02-06 RX ADMIN — VANCOMYCIN 1000 MG: 1 INJECTION, SOLUTION INTRAVENOUS at 10:02

## 2018-02-06 RX ADMIN — Medication 2.25 G: at 09:02

## 2018-02-06 RX ADMIN — Medication 2.25 G: at 10:02

## 2018-02-06 RX ADMIN — Medication: at 11:02

## 2018-02-06 RX ADMIN — EPHEDRINE SULFATE 10 MG: 50 INJECTION, SOLUTION INTRAMUSCULAR; INTRAVENOUS; SUBCUTANEOUS at 01:02

## 2018-02-06 RX ADMIN — ATORVASTATIN CALCIUM 20 MG: 20 TABLET, FILM COATED ORAL at 08:02

## 2018-02-06 RX ADMIN — STANDARDIZED SENNA CONCENTRATE AND DOCUSATE SODIUM 2 TABLET: 8.6; 5 TABLET, FILM COATED ORAL at 08:02

## 2018-02-06 RX ADMIN — GLYCOPYRROLATE 0.2 MG: 0.2 INJECTION, SOLUTION INTRAMUSCULAR; INTRAVENOUS at 01:02

## 2018-02-06 RX ADMIN — PROPOFOL 50 MCG/KG/MIN: 10 INJECTION, EMULSION INTRAVENOUS at 12:02

## 2018-02-06 RX ADMIN — HYDROCODONE BITARTRATE AND ACETAMINOPHEN 1 TABLET: 5; 325 TABLET ORAL at 09:02

## 2018-02-06 RX ADMIN — STANDARDIZED SENNA CONCENTRATE AND DOCUSATE SODIUM 2 TABLET: 8.6; 5 TABLET, FILM COATED ORAL at 09:02

## 2018-02-06 RX ADMIN — CITALOPRAM HYDROBROMIDE 10 MG: 10 TABLET ORAL at 08:02

## 2018-02-06 RX ADMIN — CINACALCET HYDROCHLORIDE 30 MG: 30 TABLET, COATED ORAL at 09:02

## 2018-02-06 RX ADMIN — GABAPENTIN 100 MG: 100 CAPSULE ORAL at 09:02

## 2018-02-06 RX ADMIN — ASPIRIN 81 MG: 81 TABLET, COATED ORAL at 09:02

## 2018-02-06 RX ADMIN — APIXABAN 5 MG: 5 TABLET, FILM COATED ORAL at 09:02

## 2018-02-06 RX ADMIN — Medication: at 09:02

## 2018-02-06 RX ADMIN — LIDOCAINE HYDROCHLORIDE 50 MG: 20 INJECTION, SOLUTION INFILTRATION; PERINEURAL at 12:02

## 2018-02-06 RX ADMIN — FUROSEMIDE 80 MG: 40 TABLET ORAL at 08:02

## 2018-02-06 RX ADMIN — PROPOFOL 50 MG: 10 INJECTION, EMULSION INTRAVENOUS at 12:02

## 2018-02-06 RX ADMIN — SEVELAMER CARBONATE 1600 MG: 800 TABLET, FILM COATED ORAL at 08:02

## 2018-02-06 RX ADMIN — GABAPENTIN 100 MG: 100 CAPSULE ORAL at 04:02

## 2018-02-06 NOTE — PROGRESS NOTES
Progress Note  Hospital Medicine      Admit Date: 2/4/2018    SUBJECTIVE:     Follow-up For:  Hyperkalemia    HPI/Interval history: No new complaints. Still somnolent and confused as yesterday. Complains of no BM since yesterday.    Review of Systems: Gen: no fever, no chills, Heart: no chest pain, palpitations; Resp: no SOB, no cough    OBJECTIVE:     Vital Signs Range (Last 24H):  Temp:  [97.2 °F (36.2 °C)-98.8 °F (37.1 °C)]   Pulse:  [61-74]   Resp:  [16-18]   BP: ()/(47-58)   SpO2:  [93 %-100 %]     Physical Exam:  General appearance: NAD, conversant  Neck: FROM, supple   Lungs: Clear to auscultation, no accessory muscle use  CV: RRR, no heave  Abdomen: Soft, non-tender; no masses or HSM  Extremities: No peripheral edema or digital cyanosis  Skin: no rash, lesions or ulcers  Psych: Alert and oriented to person and not year and building, he know he is in [place]      Recent Labs  Lab 02/04/18  1132 02/05/18  1051   *  --    K 4.1  --    CL 93*  --    CO2 26  --    BUN 55*  --    CREATININE 8.6*  --      --    CALCIUM 9.3  --    MG 2.1 2.1       Recent Labs  Lab 02/04/18  1132   ALKPHOS 138*   ALT 13   AST 14   ALBUMIN 2.0*   PROT 7.6   BILITOT 1.3*         Recent Labs  Lab 02/04/18  1132 02/04/18  1140 02/04/18  1339 02/05/18  1051   WBC 18.31*  --   --  13.86*   HGB 9.0*  --   --  9.0*   HCT 26.1* 30* 30* 26.7*     --   --  216   GRAN 90.2*  16.5*  --   --  88.1*  12.2*   LYMPH 4.1*  0.8*  --   --  5.6*  0.8*   MONO 4.3  0.8  --   --  5.0  0.7         Recent Labs  Lab 02/04/18  1217 02/04/18  1430 02/04/18  2148   POCTGLUCOSE 113* 171* 106       ASSESSMENT/PLAN:   Septic encephalopathy on probable dementia  Patient oriented only to name at this time. He is not competent at this time to agree with or refuse procedures. Should improve with treatment of his infected foot.    Infected right dorsal foot ischemic ulceration  Podiatry consulted - will do I&D in AM and probable 4th  and 5th amputation  Vascular surgery consulted - no intervention required  Wound cultures pending  Will continue vancomycin 1 g IV per random levels and Zosyn 2.25 g IV BID  Consider ID consult after surgery or when culture results return    Hypervolemia due to non-compliance with dialysis  Hyperkalemia, resolved  Sifted in ER with albuterol, insulin/destrose, bicarbonate. K 7.9--> 4  No respiratory compromise. Emergent dialysis 2/5 early AM  Daily weights  Withhold anti-hypertensives for volume removal      DVT  BLE ultrasound  apixaban 5 mg BID - ? compliance     DM II with HTN and CKD V  HgbA1c 4.6%  Likely controlled with declining intake  No further intervention needed at this time  Will watch fasting glucose on morning labs     Protein calorie malnutrtion  Poor prognosis  nepro shakes     Essential HTN - hold home hydralazine 50 mg TID and losartan 50 mg daily     DM II hyperlipidemia - atorvastatin 40 mg daily     DM II neuropathy - gabapentin 100 mg TID     ESRF HD TTS - nephrology consult to assist with dialysis. Will resume TTS here. Withholding anti-hypertensives and adding midodrine prior to dialysis for volume removal and reduce symptomatic dialysis associated hypotension.   CKD MBD - continue cinacalcet 30 mg qhs and sevelamer 1600 mg daily     Depression - continue citalopram 10 mg daily

## 2018-02-06 NOTE — ANESTHESIA RELEASE NOTE
"Anesthesia Release from PACU Note    Patient: Wilder Carbajal    Procedure(s) Performed: Procedure(s) (LRB):  INCISION AND DRAINAGE (I & D) (Right)    Anesthesia type: general    Post pain: Adequate analgesia    Post assessment: no apparent anesthetic complications, tolerated procedure well and no evidence of recall    Last Vitals:   Visit Vitals  /78   Pulse 66   Temp 36.7 °C (98 °F) (Temporal)   Resp 13   Ht 5' 8" (1.727 m)   Wt 107.1 kg (236 lb 1.8 oz)   SpO2 100%   BMI 35.90 kg/m²       Post vital signs: stable    Level of consciousness: awake, alert  and oriented    Nausea/Vomiting: no nausea/no vomiting    Complications: none    Airway Patency: patent    Respiratory: unassisted, spontaneous ventilation, nasal cannula    Cardiovascular: stable and blood pressure at baseline    Hydration: euvolemic  "

## 2018-02-06 NOTE — PLAN OF CARE
Problem: Pressure Ulcer Risk (Jasmeet Scale) (Adult,Obstetrics,Pediatric)  Goal: Skin Integrity  Patient will demonstrate the desired outcomes by discharge/transition of care.   Outcome: Ongoing (interventions implemented as appropriate)  Received pt sitting in reclining chair. Encouraged and offered to reposition as needed. Encourage to call assistance when he wants to reposition. Explained the risk to take same position for long time for skin integrity. Pt showed understanding.

## 2018-02-06 NOTE — PROGRESS NOTES
Pt moved to MRI Table / off tele and to monitor / max assistance needed / pt R foot in dressing without tape to hold in place and drainage leaking from foot / + fowl odor noted

## 2018-02-06 NOTE — PROGRESS NOTES
0840: Pt leaving for ultrasound. VSS. NAD noted, states he's exhausted from overnight. Being escorted off unit by transport staff via stretcher with 3L NC. 3-4 person asst needed to pull pt over to stretcher.    1020: Pt arrived back from US. NAD noted.

## 2018-02-06 NOTE — PLAN OF CARE
Problem: Patient Care Overview  Goal: Plan of Care Review  Outcome: Ongoing (interventions implemented as appropriate)  Greeted patient and gained consent for nursing care. He is awake, alerted. Disoriented to situation. NPO for I&D. No complaints of pain. Safety and comfort of patient maintained. Bed on lowest position, locked. Regularly checked on him. POC discussed, verbalized understanding.

## 2018-02-06 NOTE — OP NOTE
Operative Note       Surgery Date: 2/6/2018     Surgeon(s) and Role:     * Gerald Rene DPM - Primary     * Ángela Marks MD - Resident - Assisting    Pre-op Diagnosis:  ESRD (end stage renal disease) [N18.6]  Type 2 diabetes mellitus with stage 5 chronic kidney disease and hypertension [E11.22, I12.0, N18.5]    Post-op Diagnosis: Post-Op Diagnosis Codes:     * ESRD (end stage renal disease) [N18.6]     * Type 2 diabetes mellitus with stage 5 chronic kidney disease and hypertension [E11.22, I12.0, N18.5]    Procedure(s) (LRB):  INCISION AND DRAINAGE (I & D) (Right)   Debridement foot right, down to level of muscle    Anesthesia: Local MAC+ 18cc of 1:1 mixture of 0.25% marcaine plain and 1% lidocaine plain ankle block     Indications for procedure: 66 y/o male PMH of dementia, DM2, ESRD on HD presented with right foot infection.  Pt. With purulent drainage to right dorsal foot with ischemic right 4th and 5th toes. Per discussion with primary pt. With h/o dementia all medical decision making should be done by son. Initially patient and son in agreement with right 4th and 5th partial ray amputation in addition to I&D. However in preop area on day of surgery patient refusing amputation right 4th and 5th partial ray. Explained to patient he is at high risk of limb loss at this time.     Procedure in Detail/Findings:  The patient was brought to the operating room on a stretcher and placed on the operating table in a supine position. Following the successful induction of MAC anesthesia, a tourniquet was applied to the patients right ankle. Following this, a local anesthetic block consisting of aproximately  18cc of  1:1 mixture of 1% lidocaine plain + 0.5% marcaine plain was injected right ankle . Then, the right foot was scrubbed, prepped and draped in the usual aseptic manner. . A time out was performed. No tourniquet used for the duration of the procedure.     Attention was then directed to the right dorsal foot. A  #15 blade was used to make an incision overlying the wound at the right dorsal foot. Purulent liquefactive malodorous drainage noted from this wound. Necrosis to tissues  extending to right 4th and 5th toe, and towards 3rd ray as well as proximally towards the TN joint and almost to the ankle joint.  Right 4th and 5th toe noted to be gangrenous, with overlying necrotic skin down towards the bases of each metatarsal. A sterile #15 blade was used to excisionally debride viable and non viable tissue on the dorsal aspect of the right foot. Tissue debrided through epidermis, dermis, subcutaneous and down to muscle layer without including bone. Tissue excised included epidermis, dermis, sucutaneous tissue, fibrin, biofilm, necrotic fat, muscle and tendon. Wound flushed with 3L NS pulse lavage. It should be noted that there is extensive necrosis down to the level of periosteum of at least rays 4 and 5. He is a very high risk patient for TMQ or BKA. Aerobic, anaerobic cultures obtained. Wound packed with gel foam soaked with thrombin. Packed with 4x4 gauze soaked in betadine wrapped with cast padding secured with loose ACE. Final wound measurements: [id number]      The patient tolerated the procedure and anesthesia well. Following a period of post op monitoring, the patient will be returned to inpatient status with the following written and oral post op instructions:     1. Keep dressing dry and intact until reassessment tomorrow   2. Complete non weightbearing to the right foot.  3.Patient is high risk for further amputation, BKA given significant damage to soft tissues along the forefoot and significant vascular compromise to the forefoot.   4. Plan for repeat I&D , right partial 4th and 5th ray amputation on 2/8/18 Case request placed      Ángela Marks DPM PGY-3        Estimated Blood Loss: 50 mL           Specimens     None        Implants: * No implants in log *           Disposition: PACU - hemodynamically  stable.           Condition: Good    Attestation:  I was present and scrubbed for the entire procedure.

## 2018-02-06 NOTE — ANESTHESIA POSTPROCEDURE EVALUATION
"Anesthesia Post Evaluation    Patient: Wilder Carbajal    Procedure(s) Performed: Procedure(s) (LRB):  INCISION AND DRAINAGE (I & D) (Right)    Final Anesthesia Type: general  Patient location during evaluation: PACU  Patient participation: Yes- Able to Participate  Level of consciousness: awake and alert and oriented  Post-procedure vital signs: reviewed and stable  Pain management: adequate  Airway patency: patent  PONV status at discharge: No PONV  Anesthetic complications: no      Cardiovascular status: blood pressure returned to baseline, hemodynamically stable and stable  Respiratory status: unassisted, spontaneous ventilation and room air  Hydration status: euvolemic  Follow-up not needed.        Visit Vitals  /78   Pulse 66   Temp 36.7 °C (98 °F) (Temporal)   Resp 13   Ht 5' 8" (1.727 m)   Wt 107.1 kg (236 lb 1.8 oz)   SpO2 100%   BMI 35.90 kg/m²       Pain/Ritesh Score: Pain Assessment Performed: Yes (2/6/2018  1:33 PM)  Presence of Pain: non-verbal indicators absent (2/6/2018  1:33 PM)  Ritesh Score: 7 (2/6/2018  1:33 PM)      "

## 2018-02-06 NOTE — PROGRESS NOTES
Pt was found sitting on the floor on his buttock against the reclining chair in his room. Helped to transferred to the bed. No apparent injury noted and no c/o pain voiced. No distress noted. Team made aware.

## 2018-02-06 NOTE — BRIEF OP NOTE
[facility]  Brief Operative Note    SUMMARY     Surgery Date: 2/6/2018     Surgeon(s) and Role:     * Gerald Rene DPM - Primary     * Ángela Marks MD - Resident - Assisting        Pre-op Diagnosis:  ESRD (end stage renal disease) [N18.6]  Type 2 diabetes mellitus with stage 5 chronic kidney disease and hypertension [E11.22, I12.0, N18.5]    Post-op Diagnosis:  Post-Op Diagnosis Codes:     * ESRD (end stage renal disease) [N18.6]     * Type 2 diabetes mellitus with stage 5 chronic kidney disease and hypertension [E11.22, I12.0, N18.5]    Procedure(s) (LRB):  INCISION AND DRAINAGE (I & D) (Right)    Anesthesia: Local MAC + 18cc of 1:1 mixture of 0.25% marcaine plain and 1% lidocaine plain     Description of Procedure: I&D right foot, purulent liquefactive necrosis with malodor noted to right foot along 4th/5th rays including bone and extending towards 3rd ray excluding bone. Necrotic 4th and 5th toes noted. Final wound measurements: 17xze3ypk0.5cm diminished blood flow noted to entire forefoot at least to level of midfoot joints along 4th/5th rays. Patient is high risk for further amputation, BKA given significant damage to soft tissues along the forefoot and significant vascular compromise to the forefoot.     Description of the findings of the procedure: same as above     Estimated Blood Loss: <10 mL         Specimens:   Specimen (12h ago through future)    None

## 2018-02-06 NOTE — PLAN OF CARE
Problem: Patient Care Overview  Goal: Plan of Care Review  Outcome: Ongoing (interventions implemented as appropriate)  Pt remains free of falls/ injury/ trauma. Up to chair after working with phys therapy. Podiatry consult, wound care consult, ultrasound, and xray completed today. Cultures taken. Special contact isolation precautions initiated for suspected c.diff (order for stool collection). Pt to have MRI later this evening. Consents signed for I&D to take place tomorrow, and also signed for (possible) amputation. VSS. Pt remained AAOx4 throughout shift, questionable about situation. No other events. Will follow and continue to monitor.

## 2018-02-06 NOTE — PROGRESS NOTES
Pt's blood sugar upon arrival was 70.   Dr. Fay called and orders given to give 150 ml of Dextrose 10%.     Blood sugar recheck was 99.  Dr. Fay notified of new blood sugar.    No new orders given  Will continue to monitor.

## 2018-02-06 NOTE — NURSING TRANSFER
Nursing Transfer Note      2/6/2018     Transfer To: 950 from  11    Transfer via stretcher    Transfer with O2, cardiac monitoring    Transported by RN x 2    Medicines sent: normal saline    Chart send with patient: Yes    Notified: RN    Patient reassessed at: 3:47 PM 02/06/2018    Upon arrival to floor: cardiac monitor applied, patient oriented to room, call bell in reach and bed in lowest position    Report given to ZENON Sauceda before transfer.

## 2018-02-06 NOTE — PROGRESS NOTES
[facility] Hospital Medicine  Progress Note    Patient Name: Wilder Carbajal  MRN: [id number]  Patient Class: IP- Inpatient   Admission Date: 2/4/2018  Length of Stay: 2 days  ESTUARDO: 2/9/2018  Attending Physician: Kanika Bradley MD  Primary Care Provider: Dio Roberson MD    [place] [facility] Medicine Team: [facility] HOSP MED D Kanika Bradley MD    Subjective:     Principal Problem:Septic encephalopathy    Interval History: Patient with no events overnight, no new complaints.    Review of Systems   Constitutional: Positive for fatigue.   Respiratory: Negative for shortness of breath.    Psychiatric/Behavioral: Positive for dysphoric mood.     Objective:     Vital Signs (Most Recent):  Temp: 97.8 °F (36.6 °C) (02/06/18 1552)  Pulse: 62 (02/06/18 1552)  Resp: 16 (02/06/18 1552)  BP: (!) 114/59 (02/06/18 1552)  SpO2: 96 % (02/06/18 1552) Vital Signs (24h Range):  Temp:  [97.8 °F (36.6 °C)-98.8 °F (37.1 °C)] 97.8 °F (36.6 °C)  Pulse:  [55-73] 62  Resp:  [10-20] 16  SpO2:  [96 %-100 %] 96 %  BP: (100-117)/(52-78) 114/59     Weight: 107.1 kg (236 lb 1.8 oz)  Body mass index is 35.9 kg/m².    Intake/Output Summary (Last 24 hours) at 02/06/18 1727  Last data filed at 02/06/18 1307   Gross per 24 hour   Intake              120 ml   Output               50 ml   Net               70 ml      Physical Exam   Constitutional: He appears well-developed.   HENT:   Head: Normocephalic.   Mouth/Throat: Mucous membranes are not pale and not cyanotic.   Eyes: Conjunctivae and lids are normal.   Neck: Neck supple.   Cardiovascular: S1 normal and S2 normal.    Pulmonary/Chest: Effort normal and breath sounds normal.   Abdominal: Soft. Bowel sounds are normal. There is no tenderness.   Musculoskeletal: He exhibits no edema.        Right foot: There is decreased range of motion (due to surgical dressing).   Neurological: He is alert. He is not disoriented.   Skin: Skin is warm and dry. He is not diaphoretic. No cyanosis. Nails show  no clubbing.   Psychiatric: He exhibits a depressed mood.       Significant Labs:   A1C:   Recent Labs  Lab 01/07/18  0008   HGBA1C 4.6     CBC:   Recent Labs  Lab 02/05/18  1051 02/06/18  0424   WBC 13.86* 18.25*   HGB 9.0* 8.7*   HCT 26.7* 26.1*    207     CMP:   Recent Labs  Lab 02/06/18  0424      K 3.7   CL 99   CO2 23   GLU 68*   BUN 45*   CREATININE 6.9*   CALCIUM 9.2   ALBUMIN 1.8*   ANIONGAP 16   EGFRNONAA 7.5*     Magnesium:   Recent Labs  Lab 02/05/18  1051 02/06/18  0424   MG 2.1 2.0     POCT Glucose:   Recent Labs  Lab 02/06/18  1348   POCTGLUCOSE 70       Assessment/Plan:      Brief HPI:      Hospital Course Overview:     Current Hospital Problem List:    Active Hospital Problems    Diagnosis  POA    *Septic encephalopathy [G93.41]  Yes    Delirium [R41.0]  Yes    Dementia without behavioral disturbance [F03.90]  Yes    Protein-calorie malnutrition, severe [E43]  Yes    DVT (deep venous thrombosis) [I82.409]  Yes    DM type 2 with diabetic mixed hyperlipidemia [E11.69, E78.2]  Yes    Diabetic polyneuropathy associated with type 2 diabetes mellitus [E11.42]  Yes    Chronic kidney disease-mineral and bone disorder [N18.9, E83.9, M89.9]  Yes    Depression [F32.9]  Yes    Alteration in skin integrity [R23.9]  Yes    Foot ulceration, right, with fat layer exposed [L97.512]  Unknown    Infected skin ulcer with fat layer exposed [L98.492, L08.9]  Yes    Hyperkalemia [E87.5]  Yes    Hypervolemia [E87.70]  Yes    Type 2 diabetes mellitus with stage 5 chronic kidney disease and hypertension [E11.22, I12.0, N18.5]  Yes    ESRD (end stage renal disease) [N18.6]  Yes    Essential hypertension [I10]  Yes      Resolved Hospital Problems    Diagnosis Date Resolved POA   No resolved problems to display.       Medications for management of current problems:      ammonium lactate   Topical (Top) BID    apixaban  5 mg Oral BID    aspirin  81 mg Oral Daily    atorvastatin  20 mg Oral Daily     bupivacaine (PF) 0.25% (2.5 mg/ml)        cinacalcet  30 mg Oral QHS    citalopram  10 mg Oral Daily    furosemide  80 mg Oral Daily    gabapentin  100 mg Oral TID    gelatin adsorbable 100cm top sponge        lidocaine HCL 10 mg/ml (1%)        miconazole nitrate 2%   Topical (Top) BID    piperacillin-tazobactam (ZOSYN) IVPB  2.25 g Intravenous Q12H    senna-docusate 8.6-50 mg  2 tablet Oral BID    sevelamer carbonate  1,600 mg Oral TID WM    thrombin (bovine)        vancomycin (VANCOCIN) IVPB  1,000 mg Intravenous Daily       PRN:   sodium chloride 0.9%    acetaminophen    hydrocodone-acetaminophen 5-325mg    midodrine    ondansetron    sodium chloride 0.9%     IV Fluids: none    Problems addressed today:    Septic encephalopathy on possible dementia  Should improve with treatment of his infected foot.     Infected right dorsal foot, ischemic ulceration  Podiatry consulted - s/p I&D iand probable 4th and 5th amputation  Vascular surgery consulted - no intervention required  Wound cultures pending  Will continue vancomycin 1 g IV per random levels and Zosyn 2.25 g IV BID  Consider ID consult after surgery or when culture results return     Hypervolemia due to non-compliance with dialysis  Hyperkalemia, resolved  Shifted in ER with albuterol, insulin/destrose, bicarbonate. K 7.9--> 4  No respiratory compromise. Emergent dialysis 2/5 early AM  Daily weights  Withhold anti-hypertensives for volume removal      DVT  BLE ultrasound  apixaban 5 mg BID - ? compliance     DM II with HTN and CKD V  HgbA1c 4.6%  Likely controlled with declining intake  No further intervention needed at this time     Protein calorie malnutrtion  Poor prognosis  Nepro      Essential HTN - hold home hydralazine 50 mg TID and losartan 50 mg daily     DM II hyperlipidemia - atorvastatin 40 mg daily     DM II neuropathy - gabapentin 100 mg TID     ESRF HD TTS - nephrology consulted for dialysis.  Withholding anti-hypertensives  and adding midodrine prior to dialysis for volume removal and reduce symptomatic dialysis-associated hypotension.     CKD MBD - continue cinacalcet 30 mg qhs and sevelamer 1600 mg daily     Depression - continue citalopram 10 mg daily. Consult psychiatry.    Risk:  Patient is currently on drug therapy requiring intensive monitoring for toxicity: Vancomycin    Anticipated Disposition: Skilled Nursing Facility  ESTUARDO 2/9/2018    Goals of Care:  General Prognosis: poor  Goals: Curative  Comfort Only: No  Hospice: No  Code Status: Full Code    VTE Risk Mitigation         Ordered     apixaban tablet 5 mg  2 times daily     Route:  Oral        02/04/18 1724     Medium Risk of VTE  Once      02/04/18 1725             Kanika Bradley MD  Department of Hospital Medicine   [facility]

## 2018-02-07 ENCOUNTER — ANESTHESIA EVENT (OUTPATIENT)
Dept: SURGERY | Facility: HOSPITAL | Age: 68
End: 2018-02-07

## 2018-02-07 PROBLEM — L97.512 FOOT ULCERATION, RIGHT, WITH FAT LAYER EXPOSED: Status: ACTIVE | Noted: 2018-02-07

## 2018-02-07 LAB
ALBUMIN SERPL BCP-MCNC: 1.6 G/DL
ANION GAP SERPL CALC-SCNC: 12 MMOL/L
BACTERIA SPEC AEROBE CULT: NORMAL
BASOPHILS # BLD AUTO: 0.02 K/UL
BASOPHILS NFR BLD: 0.1 %
BUN SERPL-MCNC: 45 MG/DL
CALCIUM SERPL-MCNC: 9.1 MG/DL
CHLORIDE SERPL-SCNC: 99 MMOL/L
CO2 SERPL-SCNC: 26 MMOL/L
CREAT SERPL-MCNC: 7.2 MG/DL
DIFFERENTIAL METHOD: ABNORMAL
EOSINOPHIL # BLD AUTO: 0.1 K/UL
EOSINOPHIL NFR BLD: 0.8 %
ERYTHROCYTE [DISTWIDTH] IN BLOOD BY AUTOMATED COUNT: 17.1 %
EST. GFR  (AFRICAN AMERICAN): 8.2 ML/MIN/1.73 M^2
EST. GFR  (NON AFRICAN AMERICAN): 7.1 ML/MIN/1.73 M^2
GLUCOSE SERPL-MCNC: 87 MG/DL
HCT VFR BLD AUTO: 23.4 %
HGB BLD-MCNC: 7.8 G/DL
IMM GRANULOCYTES # BLD AUTO: 0.16 K/UL
IMM GRANULOCYTES NFR BLD AUTO: 1 %
LYMPHOCYTES # BLD AUTO: 1.1 K/UL
LYMPHOCYTES NFR BLD: 7.3 %
MAGNESIUM SERPL-MCNC: 2.2 MG/DL
MCH RBC QN AUTO: 29.8 PG
MCHC RBC AUTO-ENTMCNC: 33.3 G/DL
MCV RBC AUTO: 89 FL
MONOCYTES # BLD AUTO: 0.9 K/UL
MONOCYTES NFR BLD: 5.6 %
NEUTROPHILS # BLD AUTO: 13.2 K/UL
NEUTROPHILS NFR BLD: 85.2 %
NRBC BLD-RTO: 0 /100 WBC
PHOSPHATE SERPL-MCNC: 4.9 MG/DL
PLATELET # BLD AUTO: 189 K/UL
PMV BLD AUTO: 9.9 FL
POCT GLUCOSE: 99 MG/DL (ref 70–110)
POTASSIUM SERPL-SCNC: 3.7 MMOL/L
RBC # BLD AUTO: 2.62 M/UL
SODIUM SERPL-SCNC: 137 MMOL/L
VANCOMYCIN SERPL-MCNC: 28.1 UG/ML
WBC # BLD AUTO: 15.46 K/UL

## 2018-02-07 PROCEDURE — 90792 PSYCH DIAG EVAL W/MED SRVCS: CPT | Mod: GC,,, | Performed by: PSYCHIATRY & NEUROLOGY

## 2018-02-07 PROCEDURE — 80069 RENAL FUNCTION PANEL: CPT

## 2018-02-07 PROCEDURE — 36415 COLL VENOUS BLD VENIPUNCTURE: CPT

## 2018-02-07 PROCEDURE — 11000001 HC ACUTE MED/SURG PRIVATE ROOM

## 2018-02-07 PROCEDURE — 85025 COMPLETE CBC W/AUTO DIFF WBC: CPT

## 2018-02-07 PROCEDURE — 80202 ASSAY OF VANCOMYCIN: CPT

## 2018-02-07 PROCEDURE — 25000003 PHARM REV CODE 250: Performed by: INTERNAL MEDICINE

## 2018-02-07 PROCEDURE — 99232 SBSQ HOSP IP/OBS MODERATE 35: CPT | Mod: ,,, | Performed by: INTERNAL MEDICINE

## 2018-02-07 PROCEDURE — 83735 ASSAY OF MAGNESIUM: CPT

## 2018-02-07 RX ORDER — SODIUM CHLORIDE 9 MG/ML
INJECTION, SOLUTION INTRAVENOUS ONCE
Status: DISCONTINUED | OUTPATIENT
Start: 2018-02-07 | End: 2018-02-08

## 2018-02-07 RX ORDER — SODIUM CHLORIDE 9 MG/ML
INJECTION, SOLUTION INTRAVENOUS
Status: DISCONTINUED | OUTPATIENT
Start: 2018-02-07 | End: 2018-02-08

## 2018-02-07 RX ADMIN — GABAPENTIN 100 MG: 100 CAPSULE ORAL at 01:02

## 2018-02-07 RX ADMIN — MICONAZOLE NITRATE: 20 OINTMENT TOPICAL at 10:02

## 2018-02-07 RX ADMIN — Medication 2.25 G: at 12:02

## 2018-02-07 RX ADMIN — HYDROCODONE BITARTRATE AND ACETAMINOPHEN 1 TABLET: 5; 325 TABLET ORAL at 08:02

## 2018-02-07 RX ADMIN — HYDROCODONE BITARTRATE AND ACETAMINOPHEN 1 TABLET: 5; 325 TABLET ORAL at 02:02

## 2018-02-07 RX ADMIN — SEVELAMER CARBONATE 1600 MG: 800 TABLET, FILM COATED ORAL at 08:02

## 2018-02-07 RX ADMIN — SEVELAMER CARBONATE 1600 MG: 800 TABLET, FILM COATED ORAL at 05:02

## 2018-02-07 RX ADMIN — Medication: at 10:02

## 2018-02-07 RX ADMIN — MICONAZOLE NITRATE: 20 OINTMENT TOPICAL at 08:02

## 2018-02-07 RX ADMIN — CITALOPRAM HYDROBROMIDE 10 MG: 10 TABLET ORAL at 08:02

## 2018-02-07 RX ADMIN — ASPIRIN 81 MG: 81 TABLET, COATED ORAL at 08:02

## 2018-02-07 RX ADMIN — ATORVASTATIN CALCIUM 20 MG: 20 TABLET, FILM COATED ORAL at 08:02

## 2018-02-07 RX ADMIN — STANDARDIZED SENNA CONCENTRATE AND DOCUSATE SODIUM 2 TABLET: 8.6; 5 TABLET, FILM COATED ORAL at 08:02

## 2018-02-07 RX ADMIN — GABAPENTIN 100 MG: 100 CAPSULE ORAL at 05:02

## 2018-02-07 RX ADMIN — Medication: at 08:02

## 2018-02-07 RX ADMIN — FUROSEMIDE 80 MG: 40 TABLET ORAL at 08:02

## 2018-02-07 RX ADMIN — SEVELAMER CARBONATE 1600 MG: 800 TABLET, FILM COATED ORAL at 12:02

## 2018-02-07 RX ADMIN — APIXABAN 5 MG: 5 TABLET, FILM COATED ORAL at 08:02

## 2018-02-07 NOTE — PROGRESS NOTES
2115: Called Waqas WESLEY regarding night time eliquis. Right foot dressing is saturated in blood. Dressing reinforced with ABD pains and kurlex. Waqas Solares stated to elevated the foot above the heart, to reinforce dressing as needed, and gave the okay to give the eliquis    2300: Right foot has decreased in bloody drainage. Foot elevated above the heart. Patient comfortable. Will continue to monitor

## 2018-02-07 NOTE — PHYSICIAN QUERY
PT Name: Wilder Carbajal  MR #: [id number]          This form is a permanent document in the medical record.     CDS: Traci Alvarez RN CCDS                  Contact Information: [email or web address]    Query Date: February 7, 2018    By submitting this query, we are merely seeking further clarification of documentation.. Please utilize your independent clinical judgment when addressing the question(s) below.    The Medical record contains the following:   Indicators  Supporting Clinical Findings Location in Medical Record    % of Estimated Energy Intake over a time frame from p.o., TF, or TPN      Weight Status over a time frame      Subcutaneous Fat and/or Muscle Loss     X Fluid Accumulation or Edema 4+ pitting edema of BLE up to sacrum with scant 2 cm weeping blisters on bilat feet, left foot with healing ulceration dorsally, no erythema or drainage seen; mild papular edema of BLE H&P    Reduced  Strength     X Wt / BMI / Usual Body Weight BMI= 38.4 Anthropometrics    Delayed Wound Healing / Failure to Thrive     X Acute or Chronic Illness Protein calorie malnutrtion    Poor prognosis  nepro shakes  DM II hyperlipidemia  DM II with HTN and CKD V H&P    Medication      Treatment      Other       AND / ASPEN Clinical Characteristics (October 2011)  A minimum of two characteristics is recommended for diagnosing either moderate or severe malnutrition   Mild Malnutrition Moderate Malnutrition Severe Malnutrition   Energy Intake from p.o., TF or TPN. < 75% intake of estimated energy needs for less than 7 days < 75% intake of estimated energy needs for greater than 7 days < 50% intake of estimated energy needs for > 5 days   Weight Loss 1-2% in 1 month  5% in 3 months  7.5% in 6 months  10% in 1 year 1-2 % in 1 week  5% in 1 month  7.5% in 3 months  10% in 6 months  20% in 1 year > 2% in 1 week  > 5% in 1 month  > 7.5% in 3 months  > 10% in 6 months  > 20% in 1 year   Physical Findings     None *Mild subcutaneous fat  and/or muscle loss  *Mild fluid accumulation  *Stage II decubitus  *Surgical wound or non-healing wound *Mod/severe subcutaneous fat and/or muscle loss  *Mod/severe fluid accumulation  *Stage III or IV decubitus  *Non-healing surgical wound     Provider, please specify diagnosis or diagnoses associated with above clinical findings.    [ xx ] Mild Protein-Calorie Malnutrition  [ ] Other Nutritional Diagnosis (please specify): ____________________________________  [ ] Other: ________________________________  [ ] Clinically Undetermined    Please document in your progress notes daily for the duration of treatment until resolved and include in your discharge summary.

## 2018-02-07 NOTE — PROGRESS NOTES
[facility] Hospital Medicine  Progress Note    Patient Name: Wilder Carbajal  MRN: [id number]  Patient Class: IP- Inpatient   Admission Date: 2/4/2018  Length of Stay: 3 days  ESTUARDO: 2/12/2018  Attending Physician: Kanika Bradley MD  Primary Care Provider: Dio Roberson MD    [facility] Medicine Team: [facility] MED D Kanika Bradley MD    Subjective:     Principal Problem:Septic encephalopathy    Interval History: Patient with no events overnight, no new complaints.    Review of Systems   Constitutional: Positive for fatigue.   Respiratory: Negative for shortness of breath.    Psychiatric/Behavioral: Positive for dysphoric mood.     Objective:     Vital Signs (Most Recent):  Temp: 97.3 °F (36.3 °C) (02/07/18 1544)  Pulse: 60 (02/07/18 1544)  Resp: 17 (02/07/18 1544)  BP: (!) 108/58 (02/07/18 1544)  SpO2: (!) 90 % (02/07/18 1544) Vital Signs (24h Range):  Temp:  [97 °F (36.1 °C)-98.3 °F (36.8 °C)] 97.3 °F (36.3 °C)  Pulse:  [53-65] 60  Resp:  [16-20] 17  SpO2:  [90 %-98 %] 90 %  BP: (101-111)/(55-58) 108/58     Weight: 49.4 kg (109 lb)  Body mass index is 16.57 kg/m².    Intake/Output Summary (Last 24 hours) at 02/07/18 1615  Last data filed at 02/07/18 0400   Gross per 24 hour   Intake              200 ml   Output                0 ml   Net              200 ml      Physical Exam   Constitutional: He appears well-developed.   HENT:   Head: Normocephalic.   Mouth/Throat: Mucous membranes are not pale and not cyanotic.   Eyes: Conjunctivae and lids are normal.   Neck: Neck supple.   Cardiovascular: S1 normal and S2 normal.    Murmur heard.  Pulmonary/Chest: Effort normal and breath sounds normal.   Abdominal: Soft. Bowel sounds are normal. There is no tenderness.   Musculoskeletal: He exhibits no edema.        Right foot: There is decreased range of motion (due to surgical dressing).   Neurological: He is alert. He is not disoriented.   Skin: Skin is warm and dry. He is not diaphoretic. No cyanosis.  Nails show no clubbing.   Psychiatric: He exhibits a depressed mood.       Significant Labs:   A1C:     Recent Labs  Lab 01/07/18  0008   HGBA1C 4.6     CBC:     Recent Labs  Lab 02/06/18  0424 02/07/18  0412   WBC 18.25* 15.46*   HGB 8.7* 7.8*   HCT 26.1* 23.4*    189     CMP:     Recent Labs  Lab 02/06/18  0424 02/07/18  0412    137   K 3.7 3.7   CL 99 99   CO2 23 26   GLU 68* 87   BUN 45* 45*   CREATININE 6.9* 7.2*   CALCIUM 9.2 9.1   ALBUMIN 1.8* 1.6*   ANIONGAP 16 12   EGFRNONAA 7.5* 7.1*     Magnesium:     Recent Labs  Lab 02/06/18  0424 02/07/18  0412   MG 2.0 2.2     POCT Glucose:     Recent Labs  Lab 02/06/18  1348 02/06/18  1424   POCTGLUCOSE 70 99     Assessment/Plan:      Brief HPI:      Hospital Course Overview:     Current Hospital Problem List:    Active Hospital Problems    Diagnosis  POA    *Septic encephalopathy [G93.41]  Yes    Foot ulceration, right, with fat layer exposed [L97.512]  Yes    Acute delirium [R41.0]  Yes    Protein-calorie malnutrition, severe [E43]  Yes    DVT (deep venous thrombosis) [I82.409]  Yes    DM type 2 with diabetic mixed hyperlipidemia [E11.69, E78.2]  Yes    Diabetic polyneuropathy associated with type 2 diabetes mellitus [E11.42]  Yes    Chronic kidney disease-mineral and bone disorder [N18.9, E83.9, M89.9]  Yes    Depression [F32.9]  Yes    Alteration in skin integrity [R23.9]  Yes    Infected skin ulcer with fat layer exposed [L98.492, L08.9]  Yes    Hyperkalemia [E87.5]  Yes    Hypervolemia [E87.70]  Yes    Type 2 diabetes mellitus with stage 5 chronic kidney disease and hypertension [E11.22, I12.0, N18.5]  Yes    ESRD (end stage renal disease) [N18.6]  Yes    Essential hypertension [I10]  Yes      Resolved Hospital Problems    Diagnosis Date Resolved POA   No resolved problems to display.       Medications for management of current problems:      sodium chloride 0.9%   Intravenous Once    ammonium lactate   Topical (Top) BID     apixaban  5 mg Oral BID    aspirin  81 mg Oral Daily    atorvastatin  20 mg Oral Daily    cinacalcet  30 mg Oral QHS    citalopram  10 mg Oral Daily    furosemide  80 mg Oral Daily    gabapentin  100 mg Oral TID    miconazole nitrate 2%   Topical (Top) BID    piperacillin-tazobactam (ZOSYN) IVPB  2.25 g Intravenous Q12H    senna-docusate 8.6-50 mg  2 tablet Oral BID    sevelamer carbonate  1,600 mg Oral TID WM       PRN:   sodium chloride 0.9%    acetaminophen    hydrocodone-acetaminophen 5-325mg    midodrine    ondansetron    sodium chloride 0.9%     IV Fluids: none    Problems addressed today:    Septic encephalopathy on possible dementia  Should improve with treatment of his infected foot. Patient currently lacks capacity to refuse surgery, consent obtained from his son as proxy.     Infected right dorsal foot, ischemic ulceration  Podiatry consulted - s/p I&D iand probable 4th and 5th amputation  Vascular surgery consulted - no intervention required  Wound cultures pending  Continue vancomycin 1 g IV per random levels and Zosyn 2.25 g IV BID  Consider ID consult after surgery or when culture results return.     Hypervolemia due to non-compliance with dialysis  Hyperkalemia, resolved  Shifted in ER with albuterol, insulin/destrose, bicarbonate. K 7.9--> 4  No respiratory compromise. Emergent dialysis 2/5 early AM  Daily weights  Withhold anti-hypertensives for volume removal      DVT  BLE ultrasound  apixaban 5 mg BID - ? compliance     DM II with HTN and CKD V  HgbA1c 4.6%  Likely controlled with declining intake  No further intervention needed at this time     Protein calorie malnutrtion  Poor prognosis for wound healing  Nepro      Essential HTN - hold home hydralazine 50 mg TID and losartan 50 mg daily     DM II hyperlipidemia - atorvastatin 40 mg daily     DM II neuropathy - gabapentin 100 mg TID     ESRF HD TTS - Nephrology consulted for dialysis.  Withholding anti-hypertensives and added  midodrine as needed prior to dialysis for volume removal and reduce symptomatic dialysis-associated hypotension.   BP improving.    CKD MBD - continue cinacalcet 30 mg qhs and sevelamer 1600 mg daily     Depression - continue citalopram 10 mg daily. Consulted psychiatry. Rule out delirium and dementia.    Risk:  Patient is currently on drug therapy requiring intensive monitoring for toxicity: Vancomycin    Anticipated Disposition: Skilled Nursing Facility  ESTUARDO 2/9/2018    Goals of Care:  General Prognosis: poor  Goals: Curative  Comfort Only: No  Hospice: No  Code Status: Full Code    VTE Risk Mitigation         Ordered     apixaban tablet 5 mg  2 times daily     Route:  Oral        02/04/18 1724     Medium Risk of VTE  Once      02/04/18 1725             Kanika Bradley MD  Department of Hospital Medicine   [facility]

## 2018-02-07 NOTE — MEDICAL/APP STUDENT
PSYCHIATRY INPATIENT ADMISSION NOTE - H & P      2/7/2018 8:47 AM   Wilder Carbajal   1950   [id number]           DATE OF ADMISSION: 2/4/2018 10:52 AM    SITE: [facility], [address]    CURRENT LEGAL STATUS: ***      HISTORY    CHIEF COMPLAINT   Wilder Carbajal is a 67 y.o. male with a past psychiatric history of undiagnosed Depression currently admitted to the inpatient unit with the following chief complaint: (as per critical care notes) missed dialysis appointment and progressive leg swelling.      HPI   The patient had missed his dialysis appointment. He called his son, who could not assist him so [facility] was called and the patient was brought into the Emergency Department.     COLLATERAL   Raymond Carbajal, son     PAST PSYCHIATRIC HISTORY  Previous Psychiatric Hospitalizations: no, as per patient he tried to keep this to himself   Previous SI/HI: no  Previous Suicide Attempts:   Previous Medication Trials: as per the patient he does not take any medications for depression  Psychiatric Care (current & past):   History of Psychotherapy:   History of Violence: no hx      SUBSTANCE ABUSE HISTORY   Tobacco: no  Alcohol: no  Illicit Substances: no  Misuse of Prescription Medications: ***  Detoxes:   Rehabs:  12 Step Meetings:   Periods of Sobriety: patient recalled an episode many years ago when he was drink and wife at the time argued with him, has not had alcohol in many years   Withdrawal:         PAST MEDICAL & SURGICAL HISTORY   Past Medical History:   Diagnosis Date    Acute pain of left shoulder 1/7/2017    Arthritis     Asthma     Benign neoplasm of eyelid     RUL    Blood clotting tendency     Blood transfusion     Cataract     Chronic kidney disease requiring chronic dialysis     Diabetes mellitus     Diabetic retinopathy     Fever blister     Hyperlipidemia     Hypertension     Infertility     Joint pain     Retinal detachment     Thyroid disease     Weakness 1/7/2017  "    Past Surgical History:   Procedure Laterality Date    CATARACT EXTRACTION      COLONOSCOPY N/A 2017    Procedure: COLONOSCOPY;  Surgeon: Vic Clark MD;  Location: [facility] ([address] [place] [address]);  Service: Endoscopy;  Laterality: N/A;    RETINAL DETACHMENT SURGERY       ***    CURRENT MEDICATION REGIMEN   Home Psychiatric Meds: ***  OTC Meds: ***    Scheduled Meds:    ammonium lactate   Topical (Top) BID    apixaban  5 mg Oral BID    aspirin  81 mg Oral Daily    atorvastatin  20 mg Oral Daily    cinacalcet  30 mg Oral QHS    citalopram  10 mg Oral Daily    furosemide  80 mg Oral Daily    gabapentin  100 mg Oral TID    miconazole nitrate 2%   Topical (Top) BID    piperacillin-tazobactam (ZOSYN) IVPB  2.25 g Intravenous Q12H    senna-docusate 8.6-50 mg  2 tablet Oral BID    sevelamer carbonate  1,600 mg Oral TID WM      PRN Meds: sodium chloride 0.9%, acetaminophen, hydrocodone-acetaminophen 5-325mg, midodrine, ondansetron, sodium chloride 0.9%   Psychotherapeutics     Start     Stop Route Frequency Ordered    18 0900  citalopram tablet 10 mg      -- Oral Daily 18 1724            ALLERGIES   Review of patient's allergies indicates:  No Known Allergies      NEUROLOGIC HISTORY  Seizures: ***   Head trauma: ***       FAMILY PSYCHIATRIC HISTORY   As per patient, he took his children to see a psychiatrist for "mouth" movement      SOCIAL HISTORY    Employment: Retired [employer])   Children: 6 living children, 1  son ()   Housing Status: lives with sonaren     Gnosticist: very Orthodoxy       LEGAL HISTORY   Past Charges/Incarcerations:no   Pending Charges: no       EXAMINATION    VITALS   Vitals:    18 0714   BP:    Pulse: (!) 53   Resp:    Temp:           PAIN  ***/10  Subjective report of pain matches objective signs and symptoms: {YES,NO, WILDCARD(MULTI):45260}      LABORATORY DATA   Recent Results (from the past 72 hour(s))   CBC auto differential    " Collection Time: 02/04/18 11:32 AM   Result Value Ref Range    WBC 18.31 (H) 3.90 - 12.70 K/uL    RBC 2.96 (L) 4.60 - 6.20 M/uL    Hemoglobin 9.0 (L) 14.0 - 18.0 g/dL    Hematocrit 26.1 (L) 40.0 - 54.0 %    MCV 88 82 - 98 fL    MCH 30.4 27.0 - 31.0 pg    MCHC 34.5 32.0 - 36.0 g/dL    RDW 17.6 (H) 11.5 - 14.5 %    Platelets 310 150 - 350 K/uL    MPV 12.1 9.2 - 12.9 fL    Immature Granulocytes 0.7 (H) 0.0 - 0.5 %    Gran # (ANC) 16.5 (H) 1.8 - 7.7 K/uL    Immature Grans (Abs) 0.12 (H) 0.00 - 0.04 K/uL    Lymph # 0.8 (L) 1.0 - 4.8 K/uL    Mono # 0.8 0.3 - 1.0 K/uL    Eos # 0.1 0.0 - 0.5 K/uL    Baso # 0.03 0.00 - 0.20 K/uL    nRBC 0 0 /100 WBC    Gran% 90.2 (H) 38.0 - 73.0 %    Lymph% 4.1 (L) 18.0 - 48.0 %    Mono% 4.3 4.0 - 15.0 %    Eosinophil% 0.5 0.0 - 8.0 %    Basophil% 0.2 0.0 - 1.9 %    Differential Method Automated    Comprehensive metabolic panel    Collection Time: 02/04/18 11:32 AM   Result Value Ref Range    Sodium 134 (L) 136 - 145 mmol/L    Potassium 4.1 3.5 - 5.1 mmol/L    Chloride 93 (L) 95 - 110 mmol/L    CO2 26 23 - 29 mmol/L    Glucose 105 70 - 110 mg/dL    BUN, Bld 55 (H) 8 - 23 mg/dL    Creatinine 8.6 (H) 0.5 - 1.4 mg/dL    Calcium 9.3 8.7 - 10.5 mg/dL    Total Protein 7.6 6.0 - 8.4 g/dL    Albumin 2.0 (L) 3.5 - 5.2 g/dL    Total Bilirubin 1.3 (H) 0.1 - 1.0 mg/dL    Alkaline Phosphatase 138 (H) 55 - 135 U/L    AST 14 10 - 40 U/L    ALT 13 10 - 44 U/L    Anion Gap 15 8 - 16 mmol/L    eGFR if African American 6.6 (A) >60 mL/min/1.73 m^2    eGFR if non African American 5.7 (A) >60 mL/min/1.73 m^2   Lactic acid, plasma    Collection Time: 02/04/18 11:32 AM   Result Value Ref Range    Lactate (Lactic Acid) 3.2 (H) 0.5 - 2.2 mmol/L   Magnesium    Collection Time: 02/04/18 11:32 AM   Result Value Ref Range    Magnesium 2.1 1.6 - 2.6 mg/dL   ISTAT PROCEDURE    Collection Time: 02/04/18 11:40 AM   Result Value Ref Range    POC Glucose 103 70 - 110 mg/dL    POC BUN 84 (H) 6 - 30 mg/dL    POC Creatinine 9.2  (H) 0.5 - 1.4 mg/dL    POC Sodium 132 (L) 136 - 145 mmol/L    POC Potassium 7.9 (HH) 3.5 - 5.1 mmol/L    POC Chloride 95 95 - 110 mmol/L    POC TCO2 (MEASURED) 30 (H) 23 - 29 mmol/L    POC Ionized Calcium 0.88 (L) 1.06 - 1.42 mmol/L    POC Hematocrit 30 (L) 36 - 54 %PCV    Sample BYRON    POCT glucose    Collection Time: 02/04/18 12:17 PM   Result Value Ref Range    POCT Glucose 113 (H) 70 - 110 mg/dL   Blood culture #1 **CANNOT BE ORDERED STAT**    Collection Time: 02/04/18 12:23 PM   Result Value Ref Range    Blood Culture, Routine No Growth to date     Blood Culture, Routine No Growth to date     Blood Culture, Routine No Growth to date    ISTAT PROCEDURE    Collection Time: 02/04/18  1:03 PM   Result Value Ref Range    POC PH 7.398 7.35 - 7.45    POC PCO2 57.3 (H) 35 - 45 mmHg    POC PO2 25 (LL) 40 - 60 mmHg    POC HCO3 35.3 (H) 24 - 28 mmol/L    POC BE 10 -2 to 2 mmol/L    POC SATURATED O2 42 (L) 95 - 100 %    POC TCO2 37 (H) 24 - 29 mmol/L    Sample VENOUS     Site Other     Allens Test N/A     DelSys Nasal Can    ISTAT PROCEDURE    Collection Time: 02/04/18  1:39 PM   Result Value Ref Range    POC Glucose 68 (L) 70 - 110 mg/dL    POC BUN 53 (H) 6 - 30 mg/dL    POC Creatinine 8.6 (H) 0.5 - 1.4 mg/dL    POC Sodium 137 136 - 145 mmol/L    POC Potassium 4.0 3.5 - 5.1 mmol/L    POC Chloride 95 95 - 110 mmol/L    POC TCO2 (MEASURED) 30 (H) 23 - 29 mmol/L    POC Ionized Calcium 1.05 (L) 1.06 - 1.42 mmol/L    POC Hematocrit 30 (L) 36 - 54 %PCV    Sample BYRON    Blood culture #2 **CANNOT BE ORDERED STAT**    Collection Time: 02/04/18  2:23 PM   Result Value Ref Range    Blood Culture, Routine No Growth to date     Blood Culture, Routine No Growth to date     Blood Culture, Routine No Growth to date    POCT glucose    Collection Time: 02/04/18  2:30 PM   Result Value Ref Range    POCT Glucose 171 (H) 70 - 110 mg/dL   POCT glucose    Collection Time: 02/04/18  9:48 PM   Result Value Ref Range    POCT Glucose 106 70 - 110  mg/dL   Aerobic culture    Collection Time: 02/05/18  8:20 AM   Result Value Ref Range    Aerobic Bacterial Culture CITROBACTER KOSERI  Many  Susceptibility pending      Culture, Anaerobe    Collection Time: 02/05/18  8:20 AM   Result Value Ref Range    Anaerobic Culture Culture in progress    Magnesium    Collection Time: 02/05/18 10:51 AM   Result Value Ref Range    Magnesium 2.1 1.6 - 2.6 mg/dL   CBC auto differential    Collection Time: 02/05/18 10:51 AM   Result Value Ref Range    WBC 13.86 (H) 3.90 - 12.70 K/uL    RBC 2.98 (L) 4.60 - 6.20 M/uL    Hemoglobin 9.0 (L) 14.0 - 18.0 g/dL    Hematocrit 26.7 (L) 40.0 - 54.0 %    MCV 90 82 - 98 fL    MCH 30.2 27.0 - 31.0 pg    MCHC 33.7 32.0 - 36.0 g/dL    RDW 16.9 (H) 11.5 - 14.5 %    Platelets 216 150 - 350 K/uL    MPV 10.0 9.2 - 12.9 fL    Immature Granulocytes 0.6 (H) 0.0 - 0.5 %    Gran # (ANC) 12.2 (H) 1.8 - 7.7 K/uL    Immature Grans (Abs) 0.09 (H) 0.00 - 0.04 K/uL    Lymph # 0.8 (L) 1.0 - 4.8 K/uL    Mono # 0.7 0.3 - 1.0 K/uL    Eos # 0.1 0.0 - 0.5 K/uL    Baso # 0.03 0.00 - 0.20 K/uL    nRBC 0 0 /100 WBC    Gran% 88.1 (H) 38.0 - 73.0 %    Lymph% 5.6 (L) 18.0 - 48.0 %    Mono% 5.0 4.0 - 15.0 %    Eosinophil% 0.5 0.0 - 8.0 %    Basophil% 0.2 0.0 - 1.9 %    Differential Method Automated    Vancomycin, random    Collection Time: 02/05/18 10:51 AM   Result Value Ref Range    Vancomycin, Random 13.9 Not established ug/mL   Renal function panel    Collection Time: 02/06/18  4:24 AM   Result Value Ref Range    Glucose 68 (L) 70 - 110 mg/dL    Sodium 138 136 - 145 mmol/L    Potassium 3.7 3.5 - 5.1 mmol/L    Chloride 99 95 - 110 mmol/L    CO2 23 23 - 29 mmol/L    BUN, Bld 45 (H) 8 - 23 mg/dL    Calcium 9.2 8.7 - 10.5 mg/dL    Creatinine 6.9 (H) 0.5 - 1.4 mg/dL    Albumin 1.8 (L) 3.5 - 5.2 g/dL    Phosphorus 4.3 2.7 - 4.5 mg/dL    eGFR if  8.6 (A) >60 mL/min/1.73 m^2    eGFR if non African American 7.5 (A) >60 mL/min/1.73 m^2    Anion Gap 16 8 - 16 mmol/L    Magnesium    Collection Time: 02/06/18  4:24 AM   Result Value Ref Range    Magnesium 2.0 1.6 - 2.6 mg/dL   CBC auto differential    Collection Time: 02/06/18  4:24 AM   Result Value Ref Range    WBC 18.25 (H) 3.90 - 12.70 K/uL    RBC 2.90 (L) 4.60 - 6.20 M/uL    Hemoglobin 8.7 (L) 14.0 - 18.0 g/dL    Hematocrit 26.1 (L) 40.0 - 54.0 %    MCV 90 82 - 98 fL    MCH 30.0 27.0 - 31.0 pg    MCHC 33.3 32.0 - 36.0 g/dL    RDW 17.2 (H) 11.5 - 14.5 %    Platelets 207 150 - 350 K/uL    MPV 9.9 9.2 - 12.9 fL    Immature Granulocytes 1.0 (H) 0.0 - 0.5 %    Gran # (ANC) 15.8 (H) 1.8 - 7.7 K/uL    Immature Grans (Abs) 0.18 (H) 0.00 - 0.04 K/uL    Lymph # 1.2 1.0 - 4.8 K/uL    Mono # 1.1 (H) 0.3 - 1.0 K/uL    Eos # 0.1 0.0 - 0.5 K/uL    Baso # 0.04 0.00 - 0.20 K/uL    nRBC 0 0 /100 WBC    Gran% 86.3 (H) 38.0 - 73.0 %    Lymph% 6.3 (L) 18.0 - 48.0 %    Mono% 5.9 4.0 - 15.0 %    Eosinophil% 0.3 0.0 - 8.0 %    Basophil% 0.2 0.0 - 1.9 %    Differential Method Automated    Vancomycin, random    Collection Time: 02/06/18  4:24 AM   Result Value Ref Range    Vancomycin, Random 23.8 Not established ug/mL   Culture, Anaerobe    Collection Time: 02/06/18  1:29 PM   Result Value Ref Range    Anaerobic Culture Culture in progress    Aerobic culture    Collection Time: 02/06/18  1:29 PM   Result Value Ref Range    Aerobic Bacterial Culture       GRAM NEGATIVE PREETI, NON-LACTOSE   Moderate  Identification and susceptibility pending     Gram stain    Collection Time: 02/06/18  1:29 PM   Result Value Ref Range    Gram Stain Result Rare WBC's     Gram Stain Result Rare Gram negative rods    POCT glucose    Collection Time: 02/06/18  1:48 PM   Result Value Ref Range    POCT Glucose 70 70 - 110 mg/dL   POCT glucose    Collection Time: 02/06/18  2:24 PM   Result Value Ref Range    POCT Glucose 99 70 - 110 mg/dL   Renal function panel    Collection Time: 02/07/18  4:12 AM   Result Value Ref Range    Glucose 87 70 - 110 mg/dL    Sodium 137 136  - 145 mmol/L    Potassium 3.7 3.5 - 5.1 mmol/L    Chloride 99 95 - 110 mmol/L    CO2 26 23 - 29 mmol/L    BUN, Bld 45 (H) 8 - 23 mg/dL    Calcium 9.1 8.7 - 10.5 mg/dL    Creatinine 7.2 (H) 0.5 - 1.4 mg/dL    Albumin 1.6 (L) 3.5 - 5.2 g/dL    Phosphorus 4.9 (H) 2.7 - 4.5 mg/dL    eGFR if  8.2 (A) >60 mL/min/1.73 m^2    eGFR if non  7.1 (A) >60 mL/min/1.73 m^2    Anion Gap 12 8 - 16 mmol/L   Magnesium    Collection Time: 02/07/18  4:12 AM   Result Value Ref Range    Magnesium 2.2 1.6 - 2.6 mg/dL   CBC auto differential    Collection Time: 02/07/18  4:12 AM   Result Value Ref Range    WBC 15.46 (H) 3.90 - 12.70 K/uL    RBC 2.62 (L) 4.60 - 6.20 M/uL    Hemoglobin 7.8 (L) 14.0 - 18.0 g/dL    Hematocrit 23.4 (L) 40.0 - 54.0 %    MCV 89 82 - 98 fL    MCH 29.8 27.0 - 31.0 pg    MCHC 33.3 32.0 - 36.0 g/dL    RDW 17.1 (H) 11.5 - 14.5 %    Platelets 189 150 - 350 K/uL    MPV 9.9 9.2 - 12.9 fL    Immature Granulocytes 1.0 (H) 0.0 - 0.5 %    Gran # (ANC) 13.2 (H) 1.8 - 7.7 K/uL    Immature Grans (Abs) 0.16 (H) 0.00 - 0.04 K/uL    Lymph # 1.1 1.0 - 4.8 K/uL    Mono # 0.9 0.3 - 1.0 K/uL    Eos # 0.1 0.0 - 0.5 K/uL    Baso # 0.02 0.00 - 0.20 K/uL    nRBC 0 0 /100 WBC    Gran% 85.2 (H) 38.0 - 73.0 %    Lymph% 7.3 (L) 18.0 - 48.0 %    Mono% 5.6 4.0 - 15.0 %    Eosinophil% 0.8 0.0 - 8.0 %    Basophil% 0.1 0.0 - 1.9 %    Differential Method Automated    Vancomycin, random    Collection Time: 02/07/18  4:12 AM   Result Value Ref Range    Vancomycin, Random 28.1 Not established ug/mL      No results found for: PHENYTOIN, PHENOBARB, VALPROATE, CBMZ        CONSTITUTIONAL  General Appearance: 67 year old  male, appears older than stated age, obese, minimal eye contact (patient would close his eyes).    Has feelings of hope for the future; feels guilty about how he raised his children.     Visual hallucinations for approximately 1 year, sees people around him - of all sizes, and colors - does  "not bother him.    MUSCULOSKELETAL  Muscle Strength and Tone:   Abnormal Involuntary Movements: fasciculations on upper arms   Gait and Station: confined to bed     PSYCHIATRIC   Level of Consciousness: awake, alert  Orientation: orientated to person, place and time   Grooming: disheveled, unkempt, dressed in hospital gown, bearded, nails are uneven and jagged   Psychomotor Behavior: slight intention tremor, psychomotor retardation   Speech: decreased/latent rate, low tone, paucity of speech   Mood: "discouraged" - recently received news about amputation  Affect: blunted affect  Thought Process: visual hallucinations, no auditory hallucinations, no delusions, no suicidal or homicidal ideations   Attention: limited   Insight: limited - kept his feelings of depression to himself   Judgment:  Poor - poor management of diabetes, asked for 3 packets of sugar to be added to water)    ASSESSMENT & PLAN   Delirium, hypoactive  67 year old  male, transported to the ED by EMS on 2/4/18 for lethargy and worsening lower leg swelling and multiple foot sores. The patient has a past medical history of End Stage Renal Disease (on dialysis), T2DM, Hypertension, Hyperlipidemia, Atrial Fibrillation, GERD and undiagnosed Depression.       Assess for capacity   1. Follow up on treatment plan       Veronica Drew, Medical Student        "

## 2018-02-07 NOTE — HPI
"Mr Carbajal is a 67 year old black male with no psychiatric history and medical history of HTN, Dm, and ESRD on Hd. He was brought to the hospital by EMS when his son noticed fluid coming out of his legs, and he was admitted for treatment of sepsis. Psychiatry consulted for "depression, patient requested".    On interview, the patient denies that he requested a psychiatry consult but does feel that we could be helpful to him. States that he is depressed because of his worsening physical health and because his son does not really help him very much. Has been living with son for about two years now, but son works full-time and usually wants to be by himself when he is at home. Son also asks patient repeatedly why he does not get some home health care worker to help him rather than relying on him. He therefore feels sad most of the time and just sits around watching Tv, although says this is because he physically cannot do anything more. Denies feelings of hopelessness, SI/HI. Sleep has been poor for years, with difficulties in sleep onset; attributes this to pain and also to anxiety. When asked what he worries about, he replies "lots of things" but is unable to provide any examples. Appetite unchanging, and says that his usual diet is pudding, fruit cups, iced tea, and fruit punch. Says he is unable to cook because his vision is so bad. Troubles with concentration and memory just over the last few days. Endorses visual hallucinations of seeing "people of all colors and sizes" in his home - this has been occurring for about 1 year now and does not bother him. Endorses Ah of a voice telling him to do things, but then explains that these are his own thoughts. Denies paranoia, IOR. Is agreeable with having home health come to him or with going into a facility that could provide him more attention. Gave permission for me to speak with his son.    Regarding the current state of his right foot and recommendations for " "amputation: The patient states that he he is in severe pain and wants to something to help with it. After explanation of his current condition, the patient understood that "the food could take [his] life away" and that amputation now should prevent the need for more extensive amputation in the future. He states now that he is agreeable to having the amputation as soon as possible.    Spoke with son, Raymond Carbajal, on the phone. He states that the patient is usually sharp mentally. He just noticed troubles with memory yesterday. Is concerned the patient may not fully understand the situation with his toes/foot. He denies the patient being diagnosed with dementia or depression, and feels the patient was able to care for himself adequately before coming here. Is not aware of the patient taking an anti-depressant medication.     Per primary team, they are concerned that he does not have capacity to decide whether or not he will have amputation of toes. He was initially agreeing with this, but then refused after they took him to pre-op yesterday. He reported to them a belief that the surgery was un-necessary, and could not state the risks of refusing the procedure.  "

## 2018-02-07 NOTE — SUBJECTIVE & OBJECTIVE
Scheduled Meds:   ammonium lactate   Topical (Top) BID    apixaban  5 mg Oral BID    aspirin  81 mg Oral Daily    atorvastatin  20 mg Oral Daily    cinacalcet  30 mg Oral QHS    citalopram  10 mg Oral Daily    furosemide  80 mg Oral Daily    gabapentin  100 mg Oral TID    miconazole nitrate 2%   Topical (Top) BID    piperacillin-tazobactam (ZOSYN) IVPB  2.25 g Intravenous Q12H    senna-docusate 8.6-50 mg  2 tablet Oral BID    sevelamer carbonate  1,600 mg Oral TID WM    vancomycin (VANCOCIN) IVPB  1,000 mg Intravenous Daily     Continuous Infusions:  PRN Meds:sodium chloride 0.9%, acetaminophen, hydrocodone-acetaminophen 5-325mg, midodrine, ondansetron, sodium chloride 0.9%    Review of patient's allergies indicates:  No Known Allergies     Past Medical History:   Diagnosis Date    Acute pain of left shoulder 1/7/2017    Arthritis     Asthma     Benign neoplasm of eyelid     RUL    Blood clotting tendency     Blood transfusion     Cataract     Chronic kidney disease requiring chronic dialysis     Diabetes mellitus     Diabetic retinopathy     Fever blister     Hyperlipidemia     Hypertension     Infertility     Joint pain     Retinal detachment     Thyroid disease     Weakness 1/7/2017     Past Surgical History:   Procedure Laterality Date    CATARACT EXTRACTION      COLONOSCOPY N/A 6/23/2017    Procedure: COLONOSCOPY;  Surgeon: Vic Clark MD;  Location: [address]);  Service: Endoscopy;  Laterality: N/A;    RETINAL DETACHMENT SURGERY         Family History     Problem Relation (Age of Onset)    Heart attack Mother    No Known Problems Father, Sister, Brother, Maternal Aunt, Maternal Uncle, Paternal Aunt, Paternal Uncle, Maternal Grandmother, Maternal Grandfather, Paternal Grandmother, Paternal Grandfather        Social History Main Topics    Smoking status: Never Smoker    Smokeless tobacco: Never Used    Alcohol use No    Drug use: No    Sexual activity: Not on file      Review of Systems   Constitutional: Positive for activity change.   Respiratory: Negative for shortness of breath.    Cardiovascular: Negative for chest pain and leg swelling.   Gastrointestinal: Negative.  Negative for nausea and vomiting.   Genitourinary: Negative.    Musculoskeletal: Positive for arthralgias.   Skin: Positive for wound.   Neurological: Positive for numbness. Negative for weakness.     Objective:     Vital Signs (Most Recent):  Temp: 97.7 °F (36.5 °C) (02/07/18 0713)  Pulse: (!) 53 (02/07/18 0714)  Resp: 16 (02/07/18 0713)  BP: (!) 105/55 (02/07/18 0713)  SpO2: (!) 92 % (02/07/18 0713) Vital Signs (24h Range):  Temp:  [97 °F (36.1 °C)-98.8 °F (37.1 °C)] 97.7 °F (36.5 °C)  Pulse:  [53-72] 53  Resp:  [10-20] 16  SpO2:  [92 %-100 %] 92 %  BP: (102-117)/(52-78) 105/55     Weight: 107.1 kg (236 lb 1.8 oz)  Body mass index is 35.9 kg/m².    Foot Exam    General  Orientation: disoriented       Right Foot/Ankle     Inspection and Palpation  Tenderness: none   Swelling: metatarsals   Skin Exam: blister, maceration, cellulitis, skin changes and ulcer;     Neurovascular  Dorsalis pedis: 1+  Posterior tibial: 1+  Saphenous nerve sensation: diminished  Tibial nerve sensation: diminished  Superficial peroneal nerve sensation: diminished  Deep peroneal nerve sensation: diminished  Sural nerve sensation: diminished      Left Foot/Ankle      Inspection and Palpation  Tenderness: none   Swelling: metatarsals   Skin Exam: skin changes and ulcer;     Neurovascular  Dorsalis pedis: 1+  Posterior tibial: 1+  Saphenous nerve sensation: diminished  Tibial nerve sensation: diminished  Superficial peroneal nerve sensation: diminished  Deep peroneal nerve sensation: diminished  Sural nerve sensation: diminished        2/7/18    Wound 1: Right dorsal foot ulceration   Measurement:[id number]  Base: fibrous base  Periwound skin: mild erythema, maceration  Drainage: purulent, with malodor  Erythema: mild  Probe probes  to bone 4th and 5th metatarsal               Gangrene noted to right 4th and 5th toes.     Left foot ulceration noted to dorsal foot        Wound 1: Left dorsal foot   Measurement: 3bgn3edg3.1cm.  Base: fibrous base  Periwound skin: mild erythema,   Drainage: none  Erythema: mild  Probe: none, no bone exposed              Wound 1: Left heel ulceration   Measurement: 2yij2zzq7.1cm.  Periwound skin: callus  Drainage: none  Erythema:mild   Probe: none, no bone exposed            Laboratory:  Xray: Right: There is DJD, soft tissue swelling, a calcaneal spur, and diabetic vascular calcifications. No fracture dislocation bone destruction seen.    Left: There is DJD, diabetic vascular calcifications, and Spurs on the calcaneus. No fracture dislocation bone destruction seen.    MRI: 1. Limited, incomplete nondiagnostic examination secondary to early termination at the of the request of the patient.    2. Linear T1 hypointensity traversing the third metacarpal head, concerning for possible nondisplaced, likely subacute fracture.  Correlation with point tenderness recommended.    PHILLIP: Impression:   Right Leg:Segmental pressures and PVR waveforms suggest mild to moderate peripheral arterial occlusive disease.     Left Leg:Segmental pressures and PVR waveforms suggest mild to moderate peripheral arterial occlusive disease.        Diagnostic Results:  X-Ray: I have reviewed all pertinent results/findings within the past 24 hours.  reviewed    Clinical Findings:  S/p one day right foot I&D with purulent drainage and malodor.

## 2018-02-07 NOTE — PROGRESS NOTES
[facility]  Podiatry  Progress Note    Patient Name: Wilder Carbajal  MRN: [id number]  Admission Date: 2/4/2018  Hospital Length of Stay: 3 days  Attending Physician: Kanika Bradley MD  Primary Care Provider: Dio Roberson MD     Interval Hx: S/p one day right foot I&D. Strikethrough noted to bandage. No family present at bedside.     Scheduled Meds:   ammonium lactate   Topical (Top) BID    apixaban  5 mg Oral BID    aspirin  81 mg Oral Daily    atorvastatin  20 mg Oral Daily    cinacalcet  30 mg Oral QHS    citalopram  10 mg Oral Daily    furosemide  80 mg Oral Daily    gabapentin  100 mg Oral TID    miconazole nitrate 2%   Topical (Top) BID    piperacillin-tazobactam (ZOSYN) IVPB  2.25 g Intravenous Q12H    senna-docusate 8.6-50 mg  2 tablet Oral BID    sevelamer carbonate  1,600 mg Oral TID WM    vancomycin (VANCOCIN) IVPB  1,000 mg Intravenous Daily     Continuous Infusions:  PRN Meds:sodium chloride 0.9%, acetaminophen, hydrocodone-acetaminophen 5-325mg, midodrine, ondansetron, sodium chloride 0.9%    Review of patient's allergies indicates:  No Known Allergies     Past Medical History:   Diagnosis Date    Acute pain of left shoulder 1/7/2017    Arthritis     Asthma     Benign neoplasm of eyelid     RUL    Blood clotting tendency     Blood transfusion     Cataract     Chronic kidney disease requiring chronic dialysis     Diabetes mellitus     Diabetic retinopathy     Fever blister     Hyperlipidemia     Hypertension     Infertility     Joint pain     Retinal detachment     Thyroid disease     Weakness 1/7/2017     Past Surgical History:   Procedure Laterality Date    CATARACT EXTRACTION      COLONOSCOPY N/A 6/23/2017    Procedure: COLONOSCOPY;  Surgeon: Vic Clark MD;  Location: [address];  Service: Endoscopy;  Laterality: N/A;    RETINAL DETACHMENT SURGERY         Family History     Problem Relation (Age of Onset)    Heart attack Mother    No  Known Problems Father, Sister, Brother, Maternal Aunt, Maternal Uncle, Paternal Aunt, Paternal Uncle, Maternal Grandmother, Maternal Grandfather, Paternal Grandmother, Paternal Grandfather        Social History Main Topics    Smoking status: Never Smoker    Smokeless tobacco: Never Used    Alcohol use No    Drug use: No    Sexual activity: Not on file     Review of Systems   Constitutional: Positive for activity change.   Respiratory: Negative for shortness of breath.    Cardiovascular: Negative for chest pain and leg swelling.   Gastrointestinal: Negative.  Negative for nausea and vomiting.   Genitourinary: Negative.    Musculoskeletal: Positive for arthralgias.   Skin: Positive for wound.   Neurological: Positive for numbness. Negative for weakness.     Objective:     Vital Signs (Most Recent):  Temp: 97.7 °F (36.5 °C) (02/07/18 0713)  Pulse: (!) 53 (02/07/18 0714)  Resp: 16 (02/07/18 0713)  BP: (!) 105/55 (02/07/18 0713)  SpO2: (!) 92 % (02/07/18 0713) Vital Signs (24h Range):  Temp:  [97 °F (36.1 °C)-98.8 °F (37.1 °C)] 97.7 °F (36.5 °C)  Pulse:  [53-72] 53  Resp:  [10-20] 16  SpO2:  [92 %-100 %] 92 %  BP: (102-117)/(52-78) 105/55     Weight: 107.1 kg (236 lb 1.8 oz)  Body mass index is 35.9 kg/m².    Foot Exam    General  Orientation: disoriented       Right Foot/Ankle     Inspection and Palpation  Tenderness: none   Swelling: metatarsals   Skin Exam: blister, maceration, cellulitis, skin changes and ulcer;     Neurovascular  Dorsalis pedis: 1+  Posterior tibial: 1+  Saphenous nerve sensation: diminished  Tibial nerve sensation: diminished  Superficial peroneal nerve sensation: diminished  Deep peroneal nerve sensation: diminished  Sural nerve sensation: diminished      Left Foot/Ankle      Inspection and Palpation  Tenderness: none   Swelling: metatarsals   Skin Exam: skin changes and ulcer;     Neurovascular  Dorsalis pedis: 1+  Posterior tibial: 1+  Saphenous nerve sensation: diminished  Tibial nerve  sensation: diminished  Superficial peroneal nerve sensation: diminished  Deep peroneal nerve sensation: diminished  Sural nerve sensation: diminished        2/7/18    Wound 1: Right dorsal foot ulceration   Measurement:00jux5wmf0.5cm  Base: fibrous base  Periwound skin: mild erythema, maceration  Drainage: purulent, with malodor  Erythema: mild  Probe probes to bone 4th and 5th metatarsal               Gangrene noted to right 4th and 5th toes.     Left foot ulceration noted to dorsal foot        Wound 1: Left dorsal foot   Measurement: 4bqe2sgk5.1cm.  Base: fibrous base  Periwound skin: mild erythema,   Drainage: none  Erythema: mild  Probe: none, no bone exposed              Wound 1: Left heel ulceration   Measurement: 7pud0tem0.1cm.  Periwound skin: callus  Drainage: none  Erythema:mild   Probe: none, no bone exposed            Laboratory:  Xray: Right: There is DJD, soft tissue swelling, a calcaneal spur, and diabetic vascular calcifications. No fracture dislocation bone destruction seen.    Left: There is DJD, diabetic vascular calcifications, and Spurs on the calcaneus. No fracture dislocation bone destruction seen.    MRI: 1. Limited, incomplete nondiagnostic examination secondary to early termination at the of the request of the patient.    2. Linear T1 hypointensity traversing the third metacarpal head, concerning for possible nondisplaced, likely subacute fracture.  Correlation with point tenderness recommended.    PHILLIP: Impression:   Right Leg:Segmental pressures and PVR waveforms suggest mild to moderate peripheral arterial occlusive disease.     Left Leg:Segmental pressures and PVR waveforms suggest mild to moderate peripheral arterial occlusive disease.        Diagnostic Results:  X-Ray: I have reviewed all pertinent results/findings within the past 24 hours.  reviewed    Clinical Findings:  S/p one day right foot I&D with purulent drainage and malodor.     Assessment/Plan:     Foot ulceration, right,  with fat layer exposed    S/p one day right foot I&D, surgical site with malodor and necrotic tissue. Gangrene noted  To right 4th and 5th toes.   Plan for repeat right foot I&D and partial right 4th and 5th ray amputation. Pt. Currently refuses right partial 4th and 5th ray amputation, explained to patient he is at high risk of limb loss at this time.   Per discussion with primary patient with h/o dementia all medical decisions should be made through son Raymond Carbajal. I contacted Mr. Raymond Carbajal who is in agreement with plan of right partial 4th and 5th ray amputation and I&D.  Discussed case with medicine who state psychiatry consult has been placed upon patient request for depression. Primary team will discuss with psychiatry to perform capacity assessment for medical decision making.   Case request placed for tomorrow 2//8/18right foot I&D, partial 4th and 5th ray amputation.   NPO at midnight       Weightbearing Status: Partial Heel WB B/:L   Offloading Device: DARCO shoe B/L - Heel protector while in bed.     Ángela Marks DPM PGY-3  Pager: [phone]          ESRD (end stage renal disease)    Per Primary           Type 2 diabetes mellitus with stage 5 chronic kidney disease and hypertension    Per Primary               Ángela Marks MD  Podiatry  [facility]

## 2018-02-07 NOTE — PT/OT/SLP PROGRESS
Physical Therapy      Patient Name:  Wilder Carbajal   MRN:  [id number]    Patient not seen today secondary to pt in nursing care x2 attempts.  Will follow-up at next scheduled visit per PT POC.    Laure Hassan PTA

## 2018-02-07 NOTE — PLAN OF CARE
Call to RICARDO, message left for INOCENTE Syed with update/plans for amputation tentatively tomorrow.  Contact number left for any questions.    Kristy Mallory RN  Case Management  [id number]

## 2018-02-07 NOTE — CONSULTS
"[facility]  Psychiatry  Consult Note    Patient Name: Wilder Carbajal  MRN: [id number]   Code Status: Full Code  Admission Date: 2/4/2018  Hospital Length of Stay: 3 days  Attending Physician: Devante Bradley MD  Primary Care Provider: Dio Roberson MD    Current Legal Status: N/A    Patient information was obtained from patient.     Inpatient consult to Psychiatry  Consult performed by: VICKIE DICKSON  Consult ordered by: DEVANTE BRADLEY        Subjective:     Principal Problem:Septic encephalopathy    Chief Complaint:  "my foot hurts so bad"     HPI: Mr Carbajal is a 67 year old black male with no psychiatric history and medical history of HTN, Dm, and ESRD on Hd. He was brought to the hospital by EMS when his son noticed fluid coming out of his legs, and he was admitted for treatment of sepsis. Psychiatry consulted for "depression, patient requested".    On interview, the patient denies that he requested a psychiatry consult but does feel that we could be helpful to him. States that he is depressed because of his worsening physical health and because his son does not really help him very much. Has been living with son for about two years now, but son works full-time and usually wants to be by himself when he is at home. Son also asks patient repeatedly why he does not get some home health care worker to help him rather than relying on him. He therefore feels sad most of the time and just sits around watching Tv, although says this is because he physically cannot do anything more. Denies feelings of hopelessness, SI/HI. Sleep has been poor for years, with difficulties in sleep onset; attributes this to pain and also to anxiety. When asked what he worries about, he replies "lots of things" but is unable to provide any examples. Appetite unchanging, and says that his usual diet is pudding, fruit cups, iced tea, and fruit punch. Says he is unable to cook because his vision is so bad. " "Troubles with concentration and memory just over the last few days. Endorses visual hallucinations of seeing "people of all colors and sizes" in his home - this has been occurring for about 1 year now and does not bother him. Endorses Ah of a voice telling him to do things, but then explains that these are his own thoughts. Denies paranoia, IOR. Is agreeable with having home health come to him or with going into a facility that could provide him more attention. Gave permission for me to speak with his son.    Regarding the current state of his right foot and recommendations for amputation: The patient states that he he is in severe pain and wants to something to help with it. After explanation of his current condition, the patient understood that "the food could take [his] life away" and that amputation now should prevent the need for more extensive amputation in the future. He states now that he is agreeable to having the amputation as soon as possible.    Spoke with son, Raymond Carbajla, on the phone. He states that the patient is usually sharp mentally. He just noticed troubles with memory yesterday. Is concerned the patient may not fully understand the situation with his toes/foot. He denies the patient being diagnosed with dementia or depression, and feels the patient was able to care for himself adequately before coming here. Is not aware of the patient taking an anti-depressant medication.     Per primary team, they are concerned that he does not have capacity to decide whether or not he will have amputation of toes. He was initially agreeing with this, but then refused after they took him to pre-op yesterday. He reported to them a belief that the surgery was un-necessary, and could not state the risks of refusing the procedure.    Hospital Course: No notes on file         Patient History           Medical as of 2/7/2018     Past Medical History     Diagnosis Date Comments Source    Acute pain of left shoulder " 1/7/2017 -- Provider    Arthritis -- -- Provider    Asthma -- -- Provider    Benign neoplasm of eyelid -- RUL Provider    Blood clotting tendency -- -- Provider    Blood transfusion -- -- Provider    Cataract -- -- Provider    Chronic kidney disease requiring chronic dialysis -- -- Provider    Diabetes mellitus -- -- Provider    Diabetic retinopathy -- -- Provider    Fever blister -- -- Provider    Hyperlipidemia -- -- Provider    Hypertension -- -- Provider    Infertility -- -- Provider    Joint pain -- -- Provider    Retinal detachment -- -- Provider    Thyroid disease -- -- Provider    Weakness 1/7/2017 -- Provider          Pertinent Negatives     Diagnosis Date Noted Comments Source    Hallucination 2/7/2018 -- Provider    History of psychiatric hospitalization 2/7/2018 -- Provider    Hx of psychiatric care 2/7/2018 -- Provider    [place] 2/7/2018 -- Provider    Psychiatric problem 2/7/2018 -- Provider    Psychosis 2/7/2018 -- Provider    PTSD (post-traumatic stress disorder) 2/7/2018 -- Provider    Schizoaffective disorder 2/7/2018 -- Provider    Suicide attempt 2/7/2018 -- Provider    Therapy 2/7/2018 -- Provider                  Surgical as of 2/7/2018     Past Surgical History     Procedure Laterality Date Comments Source    CATARACT EXTRACTION -- -- -- Provider    RETINAL DETACHMENT SURGERY -- -- -- Provider    COLONOSCOPY N/A 6/23/2017 Procedure: COLONOSCOPY;  Surgeon: Vic Clark MD;  Location: [address];  Service: Endoscopy;  Laterality: N/A; Provider                  Family as of 2/7/2018     Problem Relation Name Age of Onset Comments Source    Heart attack Mother -- -- -- Provider    No Known Problems Father -- -- -- Provider    No Known Problems Sister -- -- -- Provider    No Known Problems Brother -- -- -- Provider    No Known Problems Maternal Aunt -- -- -- Provider    No Known Problems Maternal Uncle -- -- -- Provider    No Known Problems Paternal Aunt -- -- -- Provider    No Known Problems  Paternal Uncle -- -- -- Provider    No Known Problems Maternal Grandmother -- -- -- Provider    No Known Problems Maternal Grandfather -- -- -- Provider    No Known Problems Paternal Grandmother -- -- -- Provider    No Known Problems Paternal Grandfather -- -- -- Provider    Depression Child -- -- -- Provider    Cancer Neg Hx -- -- -- Provider    Amblyopia Neg Hx -- -- -- Provider    Blindness Neg Hx -- -- -- Provider    Cataracts Neg Hx -- -- -- Provider    Diabetes Neg Hx -- -- -- Provider    Glaucoma Neg Hx -- -- -- Provider    Hypertension Neg Hx -- -- -- Provider    Macular degeneration Neg Hx -- -- -- Provider    Retinal detachment Neg Hx -- -- -- Provider    Strabismus Neg Hx -- -- -- Provider    Stroke Neg Hx -- -- -- Provider    Thyroid disease Neg Hx -- -- -- Provider            Tobacco Use as of 2/7/2018     Smoking Status Smoking Start Date Smoking Quit Date Packs/day Years Used    Never Smoker -- -- -- --    Types Comments Smokeless Tobacco Status Smokeless Tobacco Quit Date Source    -- -- Never Used -- Provider            Alcohol Use as of 2/7/2018     Alcohol Use Drinks/Week Alcohol/Week Comments Source    No -- -- -- Provider            Drug Use as of 2/7/2018     Drug Use Types Frequency Comments Source    No -- -- -- Provider            Sexual Activity as of 2/7/2018     Sexually Active Birth Control Partners Comments Source    Not Asked -- -- -- Provider            Activities of Daily Living as of 2/7/2018     Activities of Daily Living Question Response Comments Source    Patient feels they ought to cut down on drinking/drug use Not Asked -- Provider    Patient annoyed by others criticizing their drinking/drug use Not Asked -- Provider    Patient has felt bad or guilty about drinking/drug use Not Asked -- Provider    Patient has had a drink/used drugs as an eye opener in the AM Not Asked -- Provider            Social Documentation as of 2/7/2018    Has lived with son for about 2 years now.  Formerly worked as a [employer] but quit in 1992 to become member of a [employer].  Source: Provider           Occupational as of 2/7/2018    **None**           Socioeconomic as of 2/7/2018     Marital Status Spouse Name Number of Children Years Education Preferred Language Ethnicity Race Source    Single -- 3 -- English /Black Black or  Provider         Pertinent History Q A Comments    as of 2/7/2018 Lives with  son    Place in Birth Order      Lives in home     Number of Siblings      Raised by      Legal Involvement none     Childhood Trauma      Criminal History of none     Financial Status unemployed     Highest Level of Education      Does patient have access to a firearm?       Service      Primary Leisure Activity  watching television    Spirituality actively participates in organized [employer]      Past Medical History:   Diagnosis Date    Acute pain of left shoulder 1/7/2017    Arthritis     Asthma     Benign neoplasm of eyelid     RUL    Blood clotting tendency     Blood transfusion     Cataract     Chronic kidney disease requiring chronic dialysis     Diabetes mellitus     Diabetic retinopathy     Fever blister     Hyperlipidemia     Hypertension     Infertility     Joint pain     Retinal detachment     Thyroid disease     Weakness 1/7/2017     Past Surgical History:   Procedure Laterality Date    CATARACT EXTRACTION      COLONOSCOPY N/A 6/23/2017    Procedure: COLONOSCOPY;  Surgeon: Vic Clark MD;  Location: [facility] ([address] [place] [address]);  Service: Endoscopy;  Laterality: N/A;    RETINAL DETACHMENT SURGERY       Family History     Problem Relation (Age of Onset)    Depression Child    Heart attack Mother    No Known Problems Father, Sister, Brother, Maternal Aunt, Maternal Uncle, Paternal Aunt, Paternal Uncle, Maternal Grandmother, Maternal Grandfather, Paternal Grandmother, Paternal Grandfather        Social History Main Topics    Smoking status: Never Smoker     Smokeless tobacco: Never Used    Alcohol use No    Drug use: No    Sexual activity: Not on file     Review of patient's allergies indicates:  No Known Allergies    No current facility-administered medications on file prior to encounter.      Current Outpatient Prescriptions on File Prior to Encounter   Medication Sig    ammonium lactate (LAC-HYDRIN) 12 % lotion Apply topically as needed for Dry Skin.    apixaban 5 mg Tab Take 1 tablet (5 mg total) by mouth 2 (two) times daily.    aspirin (ECOTRIN) 81 MG EC tablet Take 81 mg by mouth once daily.     atorvastatin (LIPITOR) 20 MG tablet Take 20 mg by mouth once daily.    cetirizine (ZYRTEC) 10 MG tablet Take 10 mg by mouth once daily.    cinacalcet (SENSIPAR) 30 MG Tab Take 30 mg by mouth every evening.    citalopram (CELEXA) 10 MG tablet Take 10 mg by mouth once daily.    diclofenac sodium (VOLTAREN) 1 % Gel Apply 2 g topically 4 (four) times daily as needed (for pain).    ergocalciferol (ERGOCALCIFEROL) 50,000 unit Cap Take 50,000 Units by mouth every 14 (fourteen) days.     furosemide (LASIX) 80 MG tablet Take 80 mg by mouth once daily.    gabapentin (NEURONTIN) 100 MG capsule Take 100 mg by mouth 3 (three) times daily.    hydrALAZINE (APRESOLINE) 50 MG tablet Take 50 mg by mouth 3 (three) times daily.    hydrocodone-acetaminophen 5-325mg (NORCO) 5-325 mg per tablet Take 1 tablet by mouth every 6 (six) hours as needed for Pain.    ipratropium (ATROVENT HFA) 17 mcg/actuation inhaler Inhale 2 puffs into the lungs 4 (four) times daily. Do not exceed 12 puffs per day    losartan (COZAAR) 50 MG tablet Take 1 tablet (50 mg total) by mouth once daily.    mometasone (NASONEX) 50 mcg/actuation nasal spray 2 sprays by Nasal route once daily.    podofilox (CONDYLOX) 0.5 % external solution Apply a small amount to lesions twice daily for three days, then hold for four days and repeat regimen    sevelamer carbonate (RENVELA) 800 mg Tab Take 2 tablets (1,600  "mg total) by mouth 3 (three) times daily with meals. Take two tablets by mouth four times daily with meals and snacks     Psychotherapeutics     Start     Stop Route Frequency Ordered    02/05/18 0900  citalopram tablet 10 mg      -- Oral Daily 02/04/18 1724        Review of Systems  Strengths and Liabilities: Liability: Patient is dependent., Liability: Patient has no suport network., Liability: Patient has poor health., Liability: Patient has poor judgment, Liability: Patient has possible cognitive impairment.    Objective:     Vital Signs (Most Recent):  Temp: 97.7 °F (36.5 °C) (02/07/18 0713)  Pulse: (!) 53 (02/07/18 0714)  Resp: 16 (02/07/18 0713)  BP: (!) 105/55 (02/07/18 0713)  SpO2: (!) 92 % (02/07/18 0713) Vital Signs (24h Range):  Temp:  [97 °F (36.1 °C)-98.8 °F (37.1 °C)] 97.7 °F (36.5 °C)  Pulse:  [53-72] 53  Resp:  [10-20] 16  SpO2:  [92 %-100 %] 92 %  BP: (102-117)/(52-78) 105/55     Height: 5' 8" (172.7 cm)  Weight: 49.4 kg (109 lb)  Body mass index is 16.57 kg/m².      Intake/Output Summary (Last 24 hours) at 02/07/18 1123  Last data filed at 02/07/18 0400   Gross per 24 hour   Intake              200 ml   Output               50 ml   Net              150 ml       Physical Exam   Psychiatric:   Appearance: obese black male, appears stated age, skin on lower legs thickened and darkened, right foot bandaged.  Behavior: calm, cooperative, pleasant  Speech: normal rate, volume, and tone  Mood: "sad"  Affect: mildly dysthymic, constricted, congruent to stated mood  Thought processes: slowed, mostly logical but then disorganized on targeted questioning  Thought content: +VH as described in HPI. No Ah, no SI/HI  Insight: poor  Judgment: poor  Cognition: alert and oriented to self, place, time, situation. Attention impaired (failed SAVEAHAART). Recent memory impaired, remote grossly intact. CAM-ICU positive    Neuro:  Mild, coarse resting and intention tremor of BUEs. No cogwheeling or rigidity      "   Significant Labs: All pertinent labs within the past 24 hours have been reviewed.    Significant Imaging: I have reviewed all pertinent imaging results/findings within the past 24 hours.    Assessment/Plan:     Delirium    - as evidenced by acute change from baseline, impaired attention, disorganized thinking  - most likely cause is sepsis, however anemia could also be contributing.   - would check Ua, TSH, B12, and folate to look for other organic contributors to this and then address as appropriate to provide with the best chances of a full cognitive recovery  - things like depression and dementia cannot be reliably diagnosed in the setting of delirium, but it seems like most of what he is experiencing in terms of mood is related to his situation (failing health, limited social support, etc) rather than a clinical depression. Most appropriate intervention would seem to be optimizing physical health and getting him to a place where he can get better care and support (such as an [facility] or nursing home)  - regarding capacity, the patient was able to state major benefits of surgery and risks of refusing with us this afternoon. He agreed to have the surgery at that time. He demonstrates the capacity to make this decision at present, but please remember that capacity for a decision can change over time and that making a consistent choice is an important part of that capacity. If he continues to change his mind (unless it is for a logical reason), then he would no longer be considered to have this capacity.             Total Time:  60 minutes      Osbaldo Sauceda MD   Psychiatry  [facility]

## 2018-02-07 NOTE — PLAN OF CARE
Problem: Patient Care Overview  Goal: Plan of Care Review  Outcome: Ongoing (interventions implemented as appropriate)  Greeted him and gained consent for nursing care. With surgical wound on right foot, dressing intact and no sign of further bleeding. Complained of pain, given his regular and PRN pain medications. Comfort and safety of patient maintained. Bed on lowest position, locked. POC discussed with him, verbalized understanding. Will continue to monitor.

## 2018-02-07 NOTE — SUBJECTIVE & OBJECTIVE
Patient History           Medical as of 2/7/2018     Past Medical History     Diagnosis Date Comments Source    Acute pain of left shoulder 1/7/2017 -- Provider    Arthritis -- -- Provider    Asthma -- -- Provider    Benign neoplasm of eyelid -- [facility] Provider    Blood clotting tendency -- -- Provider    Blood transfusion -- -- Provider    Cataract -- -- Provider    Chronic kidney disease requiring chronic dialysis -- -- Provider    Diabetes mellitus -- -- Provider    Diabetic retinopathy -- -- Provider    Fever blister -- -- Provider    Hyperlipidemia -- -- Provider    Hypertension -- -- Provider    Infertility -- -- Provider    Joint pain -- -- Provider    Retinal detachment -- -- Provider    Thyroid disease -- -- Provider    Weakness 1/7/2017 -- Provider          Pertinent Negatives     Diagnosis Date Noted Comments Source    Hallucination 2/7/2018 -- Provider    History of psychiatric hospitalization 2/7/2018 -- Provider    Hx of psychiatric care 2/7/2018 -- Provider    [place] 2/7/2018 -- Provider    Psychiatric problem 2/7/2018 -- Provider    Psychosis 2/7/2018 -- Provider    PTSD (post-traumatic stress disorder) 2/7/2018 -- Provider    Schizoaffective disorder 2/7/2018 -- Provider    Suicide attempt 2/7/2018 -- Provider    Therapy 2/7/2018 -- Provider                  Surgical as of 2/7/2018     Past Surgical History     Procedure Laterality Date Comments Source    CATARACT EXTRACTION -- -- -- Provider    RETINAL DETACHMENT SURGERY -- -- -- Provider    COLONOSCOPY N/A 6/23/2017 Procedure: COLONOSCOPY;  Surgeon: Vic Clark MD;  Location: [address];  Service: Endoscopy;  Laterality: N/A; Provider                  Family as of 2/7/2018     Problem Relation Name Age of Onset Comments Source    Heart attack Mother -- -- -- Provider    No Known Problems Father -- -- -- Provider    No Known Problems Sister -- -- -- Provider    No Known Problems Brother -- -- -- Provider    No Known Problems Maternal Aunt  -- -- -- Provider    No Known Problems Maternal Uncle -- -- -- Provider    No Known Problems Paternal Aunt -- -- -- Provider    No Known Problems Paternal Uncle -- -- -- Provider    No Known Problems Maternal Grandmother -- -- -- Provider    No Known Problems Maternal Grandfather -- -- -- Provider    No Known Problems Paternal Grandmother -- -- -- Provider    No Known Problems Paternal Grandfather -- -- -- Provider    Depression Child -- -- -- Provider    Cancer Neg Hx -- -- -- Provider    Amblyopia Neg Hx -- -- -- Provider    Blindness Neg Hx -- -- -- Provider    Cataracts Neg Hx -- -- -- Provider    Diabetes Neg Hx -- -- -- Provider    Glaucoma Neg Hx -- -- -- Provider    Hypertension Neg Hx -- -- -- Provider    Macular degeneration Neg Hx -- -- -- Provider    Retinal detachment Neg Hx -- -- -- Provider    Strabismus Neg Hx -- -- -- Provider    Stroke Neg Hx -- -- -- Provider    Thyroid disease Neg Hx -- -- -- Provider            Tobacco Use as of 2/7/2018     Smoking Status Smoking Start Date Smoking Quit Date Packs/day Years Used    Never Smoker -- -- -- --    Types Comments Smokeless Tobacco Status Smokeless Tobacco Quit Date Source    -- -- Never Used -- Provider            Alcohol Use as of 2/7/2018     Alcohol Use Drinks/Week Alcohol/Week Comments Source    No -- -- -- Provider            Drug Use as of 2/7/2018     Drug Use Types Frequency Comments Source    No -- -- -- Provider            Sexual Activity as of 2/7/2018     Sexually Active Birth Control Partners Comments Source    Not Asked -- -- -- Provider            Activities of Daily Living as of 2/7/2018     Activities of Daily Living Question Response Comments Source    Patient feels they ought to cut down on drinking/drug use Not Asked -- Provider    Patient annoyed by others criticizing their drinking/drug use Not Asked -- Provider    Patient has felt bad or guilty about drinking/drug use Not Asked -- Provider    Patient has had a drink/used drugs  as an eye opener in the AM Not Asked -- Provider            Social Documentation as of 2/7/2018    Has lived with son for about 2 years now. 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  Source: Provider           Occupational as of 2/7/2018    **None**           Socioeconomic as of 2/7/2018     Marital Status Spouse Name Number of Children Years Education Preferred Language Ethnicity Race Source    Single -- 3 -- English /Black Black or  Provider         Pertinent History Q A Comments    as of 2/7/2018 Lives with  son    Place in Birth Order      Lives in home     Number of Siblings      Raised by      Legal Involvement none     Childhood Trauma      Criminal History of none     Financial Status unemployed     Highest Level of Education      Does patient have access to a firearm?       Service      Primary Leisure Activity  watching television    Spirituality actively participates in organized [employer]      Past Medical History:   Diagnosis Date    Acute pain of left shoulder 1/7/2017    Arthritis     Asthma     Benign neoplasm of eyelid     [facility]UL    Blood clotting tendency     Blood transfusion     Cataract     Chronic kidney disease requiring chronic dialysis     Diabetes mellitus     Diabetic retinopathy     Fever blister     Hyperlipidemia     Hypertension     Infertility     Joint pain     Retinal detachment     Thyroid disease     Weakness 1/7/2017     Past Surgical History:   Procedure Laterality Date    CATARACT EXTRACTION      COLONOSCOPY N/A 6/23/2017    Procedure: COLONOSCOPY;  Surgeon: Vic Clark MD;  Location: [facility] ([address] [place] [address]);  Service: Endoscopy;  Laterality: N/A;    RETINAL DETACHMENT SURGERY       Family History     Problem Relation (Age of Onset)    Depression Child    Heart attack Mother    No Known Problems Father, Sister, Brother, Maternal Aunt, Maternal Uncle, Paternal Aunt, Paternal Uncle, Maternal  Grandmother, Maternal Grandfather, Paternal Grandmother, Paternal Grandfather        Social History Main Topics    Smoking status: Never Smoker    Smokeless tobacco: Never Used    Alcohol use No    Drug use: No    Sexual activity: Not on file     Review of patient's allergies indicates:  No Known Allergies    No current facility-administered medications on file prior to encounter.      Current Outpatient Prescriptions on File Prior to Encounter   Medication Sig    ammonium lactate (LAC-HYDRIN) 12 % lotion Apply topically as needed for Dry Skin.    apixaban 5 mg Tab Take 1 tablet (5 mg total) by mouth 2 (two) times daily.    aspirin (ECOTRIN) 81 MG EC tablet Take 81 mg by mouth once daily.     atorvastatin (LIPITOR) 20 MG tablet Take 20 mg by mouth once daily.    cetirizine (ZYRTEC) 10 MG tablet Take 10 mg by mouth once daily.    cinacalcet (SENSIPAR) 30 MG Tab Take 30 mg by mouth every evening.    citalopram (CELEXA) 10 MG tablet Take 10 mg by mouth once daily.    diclofenac sodium (VOLTAREN) 1 % Gel Apply 2 g topically 4 (four) times daily as needed (for pain).    ergocalciferol (ERGOCALCIFEROL) 50,000 unit Cap Take 50,000 Units by mouth every 14 (fourteen) days.     furosemide (LASIX) 80 MG tablet Take 80 mg by mouth once daily.    gabapentin (NEURONTIN) 100 MG capsule Take 100 mg by mouth 3 (three) times daily.    hydrALAZINE (APRESOLINE) 50 MG tablet Take 50 mg by mouth 3 (three) times daily.    hydrocodone-acetaminophen 5-325mg (NORCO) 5-325 mg per tablet Take 1 tablet by mouth every 6 (six) hours as needed for Pain.    ipratropium (ATROVENT HFA) 17 mcg/actuation inhaler Inhale 2 puffs into the lungs 4 (four) times daily. Do not exceed 12 puffs per day    losartan (COZAAR) 50 MG tablet Take 1 tablet (50 mg total) by mouth once daily.    mometasone (NASONEX) 50 mcg/actuation nasal spray 2 sprays by Nasal route once daily.    podofilox (CONDYLOX) 0.5 % external solution Apply a small amount  "to lesions twice daily for three days, then hold for four days and repeat regimen    sevelamer carbonate (RENVELA) 800 mg Tab Take 2 tablets (1,600 mg total) by mouth 3 (three) times daily with meals. Take two tablets by mouth four times daily with meals and snacks     Psychotherapeutics     Start     Stop Route Frequency Ordered    02/05/18 0900  citalopram tablet 10 mg      -- Oral Daily 02/04/18 1725        Review of Systems  Strengths and Liabilities: Liability: Patient is dependent., Liability: Patient has no suport network., Liability: Patient has poor health., Liability: Patient has poor judgment, Liability: Patient has possible cognitive impairment.    Objective:     Vital Signs (Most Recent):  Temp: 97.7 °F (36.5 °C) (02/07/18 0713)  Pulse: (!) 53 (02/07/18 0714)  Resp: 16 (02/07/18 0713)  BP: (!) 105/55 (02/07/18 0713)  SpO2: (!) 92 % (02/07/18 0713) Vital Signs (24h Range):  Temp:  [97 °F (36.1 °C)-98.8 °F (37.1 °C)] 97.7 °F (36.5 °C)  Pulse:  [53-72] 53  Resp:  [10-20] 16  SpO2:  [92 %-100 %] 92 %  BP: (102-117)/(52-78) 105/55     Height: 5' 8" (172.7 cm)  Weight: 49.4 kg (109 lb)  Body mass index is 16.57 kg/m².      Intake/Output Summary (Last 24 hours) at 02/07/18 1123  Last data filed at 02/07/18 0400   Gross per 24 hour   Intake              200 ml   Output               50 ml   Net              150 ml       Physical Exam   Psychiatric:   Appearance: obese black male, appears stated age, skin on lower legs thickened and darkened, right foot bandaged.  Behavior: calm, cooperative, pleasant  Speech: normal rate, volume, and tone  Mood: "sad"  Affect: mildly dysthymic, constricted, congruent to stated mood  Thought processes: slowed, mostly logical but then disorganized on targeted questioning  Thought content: +VH as described in HPI. No Ah, no SI/HI  Insight: poor  Judgment: poor  Cognition: alert and oriented to self, place, time, situation. Attention impaired (failed SAVEAHAART). Recent memory " impaired, remote grossly intact. CAM-ICU positive    Neuro:  Mild, coarse resting and intention tremor of BUEs. No cogwheeling or rigidity        Significant Labs: All pertinent labs within the past 24 hours have been reviewed.    Significant Imaging: I have reviewed all pertinent imaging results/findings within the past 24 hours.

## 2018-02-07 NOTE — ASSESSMENT & PLAN NOTE
- as evidenced by acute change from baseline, impaired attention, disorganized thinking  - most likely cause is sepsis, however anemia could also be contributing.   - would check Ua, TSH, B12, and folate to look for other organic contributors to this and then address as appropriate to provide with the best chances of a full cognitive recovery  - things like depression and dementia cannot be reliably diagnosed in the setting of delirium, but it seems like most of what he is experiencing in terms of mood is related to his situation (failing health, limited social support, etc) rather than a clinical depression. Most appropriate intervention would seem to be optimizing physical health and getting him to a place where he can get better care and support (such as an [facility] or nursing home)  - regarding capacity, the patient was able to state major benefits of surgery and risks of refusing with us this afternoon. He agreed to have the surgery at that time. He demonstrates the capacity to make this decision at present, but please remember that capacity for a decision can change over time and that making a consistent choice is an important part of that capacity. If he continues to change his mind (unless it is for a logical reason), then he would no longer be considered to have this capacity.

## 2018-02-07 NOTE — ASSESSMENT & PLAN NOTE
S/p one day right foot I&D, surgical site with malodor and necrotic tissue. Gangrene noted  To right 4th and 5th toes.   Plan for repeat right foot I&D and partial right 4th and 5th ray amputation. Pt. Currently refuses right partial 4th and 5th ray amputation, explained to patient he is at high risk of limb loss at this time.   Per discussion with primary patient with h/o dementia all medical decisions should be made through rogers Carbajal. I contacted Mr. Raymond Carbajal who is in agreement with plan of right partial 4th and 5th ray amputation and I&D.  Discussed case with medicine who state psychiatry consult has been placed upon patient request for depression. Primary team will discuss with psychiatry to perform capacity assessment for medical decision making.   Case request placed for tomorrow 2//8/18right foot I&D, partial 4th and 5th ray amputation.   NPO at midnight       Weightbearing Status: Partial Heel WB B/:L   Offloading Device: DARCO shoe B/L - Heel protector while in bed.     Ángela Marks DPM PGY-3  Pager: [phone]

## 2018-02-07 NOTE — ANESTHESIA PREPROCEDURE EVALUATION
[facility]  Anesthesia Pre-Operative Evaluation         Patient Name: Wilder Carbajal  YOB: 1950  MRN: [id number]    SUBJECTIVE:     Pre-operative evaluation for Procedure(s) (LRB):  INCISION AND DRAINAGE (I & D) (Right)     02/07/2018    Wilder Carbajal is a 67 y.o. male with a past medical history of ESRD on HD (TThS) via L RC AVF (with interposition graft), HTN, HLD, DM2 ( Hgb A1c 4.7), afib, GERD, depression, HFrEF (45% 4/2017)  presenting with fluid overload after missing dialysis. Found to have evidence of right diabetic foot ulceration with wet gangrene. Patient now presents for the above procedure(s).    Patient has requested time to think and pray overnight prior to signing both surgical and anesthesia consent.        LDA:        Peripheral IV - Single Lumen 02/04/18 1441 Right Forearm (Active)   Site Assessment Clean;Dry;Intact 2/6/2018  7:30 PM   Line Status Saline locked 2/6/2018  7:30 PM   Dressing Status Clean;Dry;Intact 2/6/2018  7:30 PM   Dressing Intervention New dressing 2/4/2018  2:41 PM   Dressing Change Due 02/08/18 2/5/2018  1:15 PM   Site Change Due 02/08/18 2/5/2018  1:15 PM   Reason Not Rotated Not due 2/5/2018  1:15 PM   Number of days: 2            Peripheral IV - Single Lumen 02/04/18 1215 Right Hand (Active)   Site Assessment Clean;Dry;Intact 2/6/2018  7:30 PM   Line Status Saline locked 2/6/2018  7:30 PM   Dressing Status Clean;Dry;Intact 2/6/2018  7:30 PM   Dressing Change Due 02/08/18 2/5/2018  1:15 PM   Site Change Due 02/08/18 2/5/2018  1:15 PM   Reason Not Rotated Not due 2/5/2018  1:15 PM   Number of days: 2            Hemodialysis AV Fistula Left upper arm (Active)   Needle Size 15ga 2/5/2018  1:00 AM   Site Assessment Clean;Dry;Intact 2/6/2018  7:30 PM   Patency Present;Thrill;Bruit 2/6/2018  7:30 PM   Status Deaccessed 2/5/2018  1:15 PM   Flows Good 2/5/2018  4:30 AM   Dressing Status Clean;Dry;Intact 2/6/2018  7:30 PM   Site Condition No  complications 2/5/2018  1:15 PM   Dressing Gauze 2/5/2018  1:15 PM   Number of days:        Prev airway: No prior records in Epic or Insurance Business Applications Documents.    Drips: None documented.      Patient Active Problem List   Diagnosis    Traction detachment of both retinas    Diabetic macular edema of both eyes    NS (nuclear sclerosis)    Hypertensive retinopathy of both eyes    History of excision of epidermal inclusion cyst    Proliferative diabetic retinopathy of both eyes without macular edema associated with type 2 diabetes mellitus    Proliferative diabetic retinopathy, both eyes    Elevated PSA    Malignant hypertension    HLD (hyperlipidemia)    DM II (diabetes mellitus, type II), controlled    Essential hypertension    Hypothyroidism    GERD (gastroesophageal reflux disease)    Pseudoaneurysm of arteriovenous graft    Lumbar facet arthropathy    Renovascular hypertension, malignant    Lipoma    Type 2 diabetes mellitus with stage 5 chronic kidney disease and hypertension    ESRD (end stage renal disease)    Diabetes mellitus with ESRD (end-stage renal disease)    Lumbar spondylosis    Spinal stenosis of lumbar region    Bilateral low back pain    Complete heart block    Bicuspid aortic valve    AV block, complete    Typical angina    Fever    Coccygeal pain, acute    Sacrococcygeal pilonidal cyst    Second degree heart block    Vein stenosis    Weakness    Acute pain of left shoulder    Dependence on hemodialysis    Paroxysmal atrial fibrillation    Acute on chronic diastolic heart failure    Right lower quadrant abdominal pain    Hypoxia    Right femoral vein DVT    Anemia, chronic disease    Metabolic bone disease    Right leg pain    Hypervolemia    Pulmonary hypertension    Hyperkalemia    Delirium    Dementia without behavioral disturbance    Protein-calorie malnutrition, severe    DVT (deep venous thrombosis)    DM type 2 with diabetic mixed hyperlipidemia     Diabetic polyneuropathy associated with type 2 diabetes mellitus    Chronic kidney disease-mineral and bone disorder    Depression    Alteration in skin integrity    Foot ulceration, right, with fat layer exposed    Septic encephalopathy    Infected skin ulcer with fat layer exposed       Review of patient's allergies indicates:  No Known Allergies    Current Inpatient Medications:   ammonium lactate   Topical (Top) BID    apixaban  5 mg Oral BID    aspirin  81 mg Oral Daily    atorvastatin  20 mg Oral Daily    cinacalcet  30 mg Oral QHS    citalopram  10 mg Oral Daily    furosemide  80 mg Oral Daily    gabapentin  100 mg Oral TID    miconazole nitrate 2%   Topical (Top) BID    piperacillin-tazobactam (ZOSYN) IVPB  2.25 g Intravenous Q12H    senna-docusate 8.6-50 mg  2 tablet Oral BID    sevelamer carbonate  1,600 mg Oral TID WM    vancomycin (VANCOCIN) IVPB  1,000 mg Intravenous Daily       No current facility-administered medications on file prior to encounter.      Current Outpatient Prescriptions on File Prior to Encounter   Medication Sig Dispense Refill    ammonium lactate (LAC-HYDRIN) 12 % lotion Apply topically as needed for Dry Skin.      apixaban 5 mg Tab Take 1 tablet (5 mg total) by mouth 2 (two) times daily. 60 tablet 1    aspirin (ECOTRIN) 81 MG EC tablet Take 81 mg by mouth once daily.       atorvastatin (LIPITOR) 20 MG tablet Take 20 mg by mouth once daily.      cetirizine (ZYRTEC) 10 MG tablet Take 10 mg by mouth once daily.      cinacalcet (SENSIPAR) 30 MG Tab Take 30 mg by mouth every evening.      citalopram (CELEXA) 10 MG tablet Take 10 mg by mouth once daily.      diclofenac sodium (VOLTAREN) 1 % Gel Apply 2 g topically 4 (four) times daily as needed (for pain).      ergocalciferol (ERGOCALCIFEROL) 50,000 unit Cap Take 50,000 Units by mouth every 14 (fourteen) days.       furosemide (LASIX) 80 MG tablet Take 80 mg by mouth once daily.      gabapentin (NEURONTIN)  100 MG capsule Take 100 mg by mouth 3 (three) times daily.      hydrALAZINE (APRESOLINE) 50 MG tablet Take 50 mg by mouth 3 (three) times daily.      hydrocodone-acetaminophen 5-325mg (NORCO) 5-325 mg per tablet Take 1 tablet by mouth every 6 (six) hours as needed for Pain.      ipratropium (ATROVENT HFA) 17 mcg/actuation inhaler Inhale 2 puffs into the lungs 4 (four) times daily. Do not exceed 12 puffs per day      losartan (COZAAR) 50 MG tablet Take 1 tablet (50 mg total) by mouth once daily. 30 tablet 3    mometasone (NASONEX) 50 mcg/actuation nasal spray 2 sprays by Nasal route once daily.      podofilox (CONDYLOX) 0.5 % external solution Apply a small amount to lesions twice daily for three days, then hold for four days and repeat regimen      sevelamer carbonate (RENVELA) 800 mg Tab Take 2 tablets (1,600 mg total) by mouth 3 (three) times daily with meals. Take two tablets by mouth four times daily with meals and snacks 180 tablet 1       Past Surgical History:   Procedure Laterality Date    CATARACT EXTRACTION      COLONOSCOPY N/A 6/23/2017    Procedure: COLONOSCOPY;  Surgeon: Vic Clark MD;  Location: [address];  Service: Endoscopy;  Laterality: N/A;    RETINAL DETACHMENT SURGERY         Social History     Social History    Marital status: Single     Spouse name: N/A    Number of children: N/A    Years of education: N/A     Occupational History    Not on file.     Social History Main Topics    Smoking status: Never Smoker    Smokeless tobacco: Never Used    Alcohol use No    Drug use: No    Sexual activity: Not on file     Other Topics Concern    Not on file     Social History Narrative    No narrative on file       OBJECTIVE:     Vital Signs Range (Last 24H):  Temp:  [36.1 °C (97 °F)-37.1 °C (98.8 °F)]   Pulse:  [55-72]   Resp:  [10-20]   BP: (102-117)/(52-78)   SpO2:  [92 %-100 %]       CBC:   Recent Labs      02/06/18   0424  02/07/18   0412   WBC  18.25*  15.46*   RBC   2.90*  2.62*   HGB  8.7*  7.8*   HCT  26.1*  23.4*   PLT  207  189   MCV  90  89   MCH  30.0  29.8   MCHC  33.3  33.3       CMP:   Recent Labs      18   1132  18   1051  18   0424   NA  134*   --   138   K  4.1   --   3.7   CL  93*   --   99   CO2  26   --   23   BUN  55*   --   45*   CREATININE  8.6*   --   6.9*   GLU  105   --   68*   MG  2.1  2.1  2.0   PHOS   --    --   4.3   CALCIUM  9.3   --   9.2   ALBUMIN  2.0*   --   1.8*   PROT  7.6   --    --    ALKPHOS  138*   --    --    ALT  13   --    --    AST  14   --    --    BILITOT  1.3*   --    --        INR:  No results for input(s): PT, INR, PROTIME, APTT in the last 72 hours.    Diagnostic Studies:     EK/4/18    Test Reason : E87.5  Vent. Rate : 065 BPM     Atrial Rate : 060 BPM     P-R Int : 000 ms          QRS Dur : 106 ms      QT Int : 412 ms       P-R-T Axes : 000 017 137 degrees     QTc Int : 428 ms    Atrial fibrillation  Low voltage QRS  Nonspecific ST and/or T wave abnormalities  Abnormal ECG  When compared with ECG of 2018 23:40,  No significant change was found    2D ECHO:  Results for orders placed or performed during the hospital encounter of 17   2D echo with color flow doppler   Result Value Ref Range    EF 45 55 - 65    Aortic Valve Regurgitation TRIVIAL     Aortic Valve Stenosis MILD (A)     Est. PA Systolic Pressure 54.69 (A)     Mitral Valve Mobility NORMAL     Tricuspid Valve Regurgitation MILD          ASSESSMENT/PLAN:         Pre-op Assessment    I have reviewed the Patient Summary Reports.    I have reviewed the Nursing Notes.   I have reviewed the Medications.     Review of Systems  Anesthesia Hx:  No problems with previous Anesthesia  History of prior surgery of interest to airway management or planning: Denies Family Hx of Anesthesia complications.   Denies Personal Hx of Anesthesia complications.   Social:  Non-Smoker, No Alcohol Use    Hematology/Oncology:     Oncology Normal    -- Anemia:  Denies Current/Recent Cancer   EENT/Dental:  EENT/Dental Normal denies chronic allergic rhinitis    Cardiovascular:   Hypertension, poorly controlled Denies MI.  Denies CAD.    Denies CABG/stent. Dysrhythmias atrial fibrillation Angina    Pulmonary:   Denies Pneumonia Asthma Denies Shortness of breath.  Denies Recent URI.    Renal/:   Chronic Renal Disease, ESRD    Hepatic/GI:   Denies PUD. Denies Hiatal Hernia. GERD    Musculoskeletal:   Arthritis     Neurological:   Denies TIA. Denies CVA. Neuromuscular Disease,  Denies Seizures.   Peripheral Neuropathy    Endocrine:   Diabetes, poorly controlled, type 2 Hypothyroidism    Psych:   Psychiatric History depression          Physical Exam  General:  Obesity, Well nourished    Airway/Jaw/Neck:  Airway Findings: Mouth Opening: Normal Tongue: Normal  General Airway Assessment: Adult  Mallampati: III  Improves to III with phonation.  TM Distance: Normal, at least 6 cm  Jaw/Neck Findings:  Neck ROM: Normal ROM  Neck Findings: Normal    Eyes/Ears/Nose:  EYES/EARS/NOSE FINDINGS: Normal   Dental:  Dental Findings: Periodontal disease, Severe    Chest/Lungs:  Chest/Lungs Findings: Normal Respiratory Rate, Clear to auscultation     Heart/Vascular:  Heart Findings: Rate: Bradycardia  Rhythm: Regular Rhythm  Heart Murmur  Systolic  Systolic Heart Murmur Description: R Upper Sternal Border  Systolic Heart Murmur Grade: Grade II  Vascular Findings:  Dialysis Access: Left Forearm Graft     Abdomen:  Abdomen Findings: Normal    Musculoskeletal:  Musculoskeletal Findings:    Skin:  Skin Findings: Normal    Mental Status:  Mental Status Findings:  Cooperative, Alert and Oriented         Anesthesia Plan  Type of Anesthesia, risks & benefits discussed:  Anesthesia Type:  MAC, general  Patient's Preference:   Intra-op Monitoring Plan: standard ASA monitors  Intra-op Monitoring Plan Comments:   Post Op Pain Control Plan: multimodal analgesia, per primary service following discharge from  PACU and IV/PO Opioids PRN  Post Op Pain Control Plan Comments:   Induction:   IV  Beta Blocker:  Patient is not currently on a Beta-Blocker (No further documentation required).       Informed Consent: Patient understands risks and agrees with Anesthesia plan.  Questions answered. Anesthesia consent signed with patient.  ASA Score:      Day of Surgery Review of History & Physical:    H&P update referred to the surgeon.

## 2018-02-08 ENCOUNTER — ANESTHESIA (OUTPATIENT)
Dept: SURGERY | Facility: HOSPITAL | Age: 68
End: 2018-02-08

## 2018-02-08 ENCOUNTER — ANESTHESIA EVENT (OUTPATIENT)
Dept: SURGERY | Facility: HOSPITAL | Age: 68
DRG: 853 | End: 2018-02-08
Payer: COMMERCIAL

## 2018-02-08 LAB
ALBUMIN SERPL BCP-MCNC: 1.5 G/DL
ANION GAP SERPL CALC-SCNC: 12 MMOL/L
BACTERIA SPEC AEROBE CULT: NORMAL
BASOPHILS # BLD AUTO: 0.03 K/UL
BASOPHILS NFR BLD: 0.2 %
BUN SERPL-MCNC: 53 MG/DL
CALCIUM SERPL-MCNC: 8.7 MG/DL
CHLORIDE SERPL-SCNC: 99 MMOL/L
CO2 SERPL-SCNC: 25 MMOL/L
CREAT SERPL-MCNC: 8 MG/DL
DIFFERENTIAL METHOD: ABNORMAL
EOSINOPHIL # BLD AUTO: 0.1 K/UL
EOSINOPHIL NFR BLD: 0.7 %
ERYTHROCYTE [DISTWIDTH] IN BLOOD BY AUTOMATED COUNT: 16.9 %
EST. GFR  (AFRICAN AMERICAN): 7.2 ML/MIN/1.73 M^2
EST. GFR  (NON AFRICAN AMERICAN): 6.2 ML/MIN/1.73 M^2
FOLATE SERPL-MCNC: 3.2 NG/ML
GLUCOSE SERPL-MCNC: 63 MG/DL
HCT VFR BLD AUTO: 22.1 %
HGB BLD-MCNC: 7.6 G/DL
IMM GRANULOCYTES # BLD AUTO: 0.21 K/UL
IMM GRANULOCYTES NFR BLD AUTO: 1.4 %
LYMPHOCYTES # BLD AUTO: 1.2 K/UL
LYMPHOCYTES NFR BLD: 7.9 %
MAGNESIUM SERPL-MCNC: 2.2 MG/DL
MCH RBC QN AUTO: 30 PG
MCHC RBC AUTO-ENTMCNC: 34.4 G/DL
MCV RBC AUTO: 87 FL
MONOCYTES # BLD AUTO: 1.1 K/UL
MONOCYTES NFR BLD: 7.2 %
NEUTROPHILS # BLD AUTO: 12.3 K/UL
NEUTROPHILS NFR BLD: 82.6 %
NRBC BLD-RTO: 0 /100 WBC
PHOSPHATE SERPL-MCNC: 4.9 MG/DL
PLATELET # BLD AUTO: 167 K/UL
PMV BLD AUTO: 10.3 FL
POTASSIUM SERPL-SCNC: 4.3 MMOL/L
RBC # BLD AUTO: 2.53 M/UL
RPR SER QL: NORMAL
SODIUM SERPL-SCNC: 136 MMOL/L
TB INDURATION 48 - 72 HR READ: 0 MM
TSH SERPL DL<=0.005 MIU/L-ACNC: 3.54 UIU/ML
VANCOMYCIN SERPL-MCNC: 26.4 UG/ML
VIT B12 SERPL-MCNC: 1879 PG/ML
WBC # BLD AUTO: 14.91 K/UL

## 2018-02-08 PROCEDURE — 80202 ASSAY OF VANCOMYCIN: CPT

## 2018-02-08 PROCEDURE — 99233 SBSQ HOSP IP/OBS HIGH 50: CPT | Mod: GC,,, | Performed by: PSYCHIATRY & NEUROLOGY

## 2018-02-08 PROCEDURE — 82746 ASSAY OF FOLIC ACID SERUM: CPT

## 2018-02-08 PROCEDURE — 82607 VITAMIN B-12: CPT

## 2018-02-08 PROCEDURE — 86592 SYPHILIS TEST NON-TREP QUAL: CPT

## 2018-02-08 PROCEDURE — 84443 ASSAY THYROID STIM HORMONE: CPT

## 2018-02-08 PROCEDURE — 83735 ASSAY OF MAGNESIUM: CPT

## 2018-02-08 PROCEDURE — 99232 SBSQ HOSP IP/OBS MODERATE 35: CPT | Mod: ,,, | Performed by: INTERNAL MEDICINE

## 2018-02-08 PROCEDURE — 36415 COLL VENOUS BLD VENIPUNCTURE: CPT

## 2018-02-08 PROCEDURE — 11000001 HC ACUTE MED/SURG PRIVATE ROOM

## 2018-02-08 PROCEDURE — 25000003 PHARM REV CODE 250: Performed by: INTERNAL MEDICINE

## 2018-02-08 PROCEDURE — 80069 RENAL FUNCTION PANEL: CPT

## 2018-02-08 PROCEDURE — 85025 COMPLETE CBC W/AUTO DIFF WBC: CPT

## 2018-02-08 RX ORDER — SODIUM CHLORIDE 9 MG/ML
INJECTION, SOLUTION INTRAVENOUS
Status: DISCONTINUED | OUTPATIENT
Start: 2018-02-08 | End: 2018-02-15

## 2018-02-08 RX ORDER — ALBUMIN HUMAN 250 G/1000ML
25 SOLUTION INTRAVENOUS
Status: DISCONTINUED | OUTPATIENT
Start: 2018-02-08 | End: 2018-03-06 | Stop reason: HOSPADM

## 2018-02-08 RX ADMIN — MICONAZOLE NITRATE: 20 OINTMENT TOPICAL at 09:02

## 2018-02-08 RX ADMIN — CITALOPRAM HYDROBROMIDE 10 MG: 10 TABLET ORAL at 09:02

## 2018-02-08 RX ADMIN — SEVELAMER CARBONATE 1600 MG: 800 TABLET, FILM COATED ORAL at 01:02

## 2018-02-08 RX ADMIN — Medication 2.25 G: at 01:02

## 2018-02-08 RX ADMIN — Medication 2.25 G: at 12:02

## 2018-02-08 RX ADMIN — CINACALCET HYDROCHLORIDE 30 MG: 30 TABLET, COATED ORAL at 09:02

## 2018-02-08 RX ADMIN — MIDODRINE HYDROCHLORIDE 10 MG: 5 TABLET ORAL at 11:02

## 2018-02-08 RX ADMIN — ATORVASTATIN CALCIUM 20 MG: 20 TABLET, FILM COATED ORAL at 09:02

## 2018-02-08 RX ADMIN — GABAPENTIN 100 MG: 100 CAPSULE ORAL at 01:02

## 2018-02-08 RX ADMIN — APIXABAN 5 MG: 5 TABLET, FILM COATED ORAL at 09:02

## 2018-02-08 RX ADMIN — FUROSEMIDE 80 MG: 40 TABLET ORAL at 09:02

## 2018-02-08 RX ADMIN — ASPIRIN 81 MG: 81 TABLET, COATED ORAL at 09:02

## 2018-02-08 RX ADMIN — SEVELAMER CARBONATE 1600 MG: 800 TABLET, FILM COATED ORAL at 09:02

## 2018-02-08 RX ADMIN — STANDARDIZED SENNA CONCENTRATE AND DOCUSATE SODIUM 2 TABLET: 8.6; 5 TABLET, FILM COATED ORAL at 09:02

## 2018-02-08 RX ADMIN — GABAPENTIN 100 MG: 100 CAPSULE ORAL at 09:02

## 2018-02-08 RX ADMIN — Medication: at 09:02

## 2018-02-08 RX ADMIN — HYDROCODONE BITARTRATE AND ACETAMINOPHEN 1 TABLET: 5; 325 TABLET ORAL at 04:02

## 2018-02-08 NOTE — ANESTHESIA PREPROCEDURE EVALUATION
[facility]  Anesthesia Pre-Operative Evaluation         Patient Name: Wilder Carbajal  YOB: 1950  MRN: [id number]    SUBJECTIVE:     Pre-operative evaluation for Procedure(s) (LRB):  INCISION AND DRAINAGE (I & D) (Right)     02/08/2018    Wilder Carbajal is a 67 y.o. male with a past medical history of ESRD on HD (TThS) via L RC AVF (with interposition graft), HTN, HLD, DM2 ( Hgb A1c 4.7), afib, GERD, depression, HFrEF (45% 4/2017)  presenting with fluid overload after missing dialysis. Found to have evidence of right diabetic foot ulceration with wet gangrene. Patient now presents for the above procedure(s).        LDA:        Peripheral IV - Single Lumen 02/04/18 1441 Right Forearm (Active)   Site Assessment Clean;Dry;Intact 2/6/2018  7:30 PM   Line Status Saline locked 2/6/2018  7:30 PM   Dressing Status Clean;Dry;Intact 2/6/2018  7:30 PM   Dressing Intervention New dressing 2/4/2018  2:41 PM   Dressing Change Due 02/08/18 2/5/2018  1:15 PM   Site Change Due 02/08/18 2/5/2018  1:15 PM   Reason Not Rotated Not due 2/5/2018  1:15 PM   Number of days: 2            Peripheral IV - Single Lumen 02/04/18 1215 Right Hand (Active)   Site Assessment Clean;Dry;Intact 2/6/2018  7:30 PM   Line Status Saline locked 2/6/2018  7:30 PM   Dressing Status Clean;Dry;Intact 2/6/2018  7:30 PM   Dressing Change Due 02/08/18 2/5/2018  1:15 PM   Site Change Due 02/08/18 2/5/2018  1:15 PM   Reason Not Rotated Not due 2/5/2018  1:15 PM   Number of days: 2            Hemodialysis AV Fistula Left upper arm (Active)   Needle Size 15ga 2/5/2018  1:00 AM   Site Assessment Clean;Dry;Intact 2/6/2018  7:30 PM   Patency Present;Thrill;Bruit 2/6/2018  7:30 PM   Status Deaccessed 2/5/2018  1:15 PM   Flows Good 2/5/2018  4:30 AM   Dressing Status Clean;Dry;Intact 2/6/2018  7:30 PM   Site Condition No complications 2/5/2018  1:15 PM   Dressing Gauze 2/5/2018  1:15 PM   Number of days:        Prev airway: No prior  records in Epic or [employer] Documents.    Drips: None documented.      Patient Active Problem List   Diagnosis    Traction detachment of both retinas    Diabetic macular edema of both eyes    NS (nuclear sclerosis)    Hypertensive retinopathy of both eyes    History of excision of epidermal inclusion cyst    Proliferative diabetic retinopathy of both eyes without macular edema associated with type 2 diabetes mellitus    Proliferative diabetic retinopathy, both eyes    Elevated PSA    Malignant hypertension    HLD (hyperlipidemia)    DM II (diabetes mellitus, type II), controlled    Essential hypertension    Hypothyroidism    GERD (gastroesophageal reflux disease)    Pseudoaneurysm of arteriovenous graft    Lumbar facet arthropathy    Renovascular hypertension, malignant    Lipoma    Type 2 diabetes mellitus with stage 5 chronic kidney disease and hypertension    ESRD (end stage renal disease)    Diabetes mellitus with ESRD (end-stage renal disease)    Lumbar spondylosis    Spinal stenosis of lumbar region    Bilateral low back pain    Complete heart block    Bicuspid aortic valve    AV block, complete    Typical angina    Fever    Coccygeal pain, acute    Sacrococcygeal pilonidal cyst    Second degree heart block    Vein stenosis    Weakness    Acute pain of left shoulder    Dependence on hemodialysis    Paroxysmal atrial fibrillation    Acute on chronic diastolic heart failure    Right lower quadrant abdominal pain    Hypoxia    Right femoral vein DVT    Anemia, chronic disease    Metabolic bone disease    Right leg pain    Hypervolemia    Pulmonary hypertension    Hyperkalemia    Acute delirium    Protein-calorie malnutrition, severe    DVT (deep venous thrombosis)    DM type 2 with diabetic mixed hyperlipidemia    Diabetic polyneuropathy associated with type 2 diabetes mellitus    Chronic kidney disease-mineral and bone disorder    Depression    Alteration in  skin integrity    Foot ulceration, right, with fat layer exposed    Septic encephalopathy    Infected skin ulcer with fat layer exposed       Review of patient's allergies indicates:  No Known Allergies    Current Inpatient Medications:      Current Facility-Administered Medications on File Prior to Visit   Medication Dose Route Frequency Provider Last Rate Last Dose    acetaminophen tablet 650 mg  650 mg Oral Q6H PRN Montserrat Lino MD        ammonium lactate 12 % lotion   Topical (Top) BID Montserrat Lino MD        apixaban tablet 5 mg  5 mg Oral BID Montserrat Lino MD   5 mg at 02/08/18 0948    aspirin EC tablet 81 mg  81 mg Oral Daily Montserrat Lino MD   81 mg at 02/08/18 0948    atorvastatin tablet 20 mg  20 mg Oral Daily Montserrat Lino MD   20 mg at 02/08/18 0948    cinacalcet tablet 30 mg  30 mg Oral QHS Montserrat Lino MD   30 mg at 02/06/18 2104    citalopram tablet 10 mg  10 mg Oral Daily Montserrat Lino MD   10 mg at 02/08/18 0948    furosemide tablet 80 mg  80 mg Oral Daily Montserrat Lino MD   80 mg at 02/08/18 0947    gabapentin capsule 100 mg  100 mg Oral TID Montserrat Lino MD   100 mg at 02/07/18 1317    hydrocodone-acetaminophen 5-325mg per tablet 1 tablet  1 tablet Oral Q6H PRN Montserrat Lino MD   1 tablet at 02/07/18 1449    miconazole nitrate 2% ointment   Topical (Top) BID Montserrat Lino MD        midodrine tablet 10 mg  10 mg Oral Daily PRN Montserrat Lino MD        ondansetron disintegrating tablet 8 mg  8 mg Oral Q8H PRN Montserrat Lino MD        piperacillin-tazobactam 2.25 g in sodium chloride 50 mL IVPB (ready to mix system)  2.25 g Intravenous Q12H Montserrat Lino  mL/hr at 02/08/18 0010 2.25 g at 02/08/18 0010    senna-docusate 8.6-50 mg per tablet 2 tablet  2 tablet Oral BID Montserrat Lino MD   2 tablet at 02/08/18 0948    sevelamer carbonate tablet 1,600 mg  1,600 mg Oral TID  Montserrat Lino MD   1,600 mg at 02/08/18 0994    sodium chloride 0.9% flush 5 mL  5 mL  Intravenous PRN Montserrat Lino MD         Current Outpatient Prescriptions on File Prior to Visit   Medication Sig Dispense Refill    ammonium lactate (LAC-HYDRIN) 12 % lotion Apply topically as needed for Dry Skin.      apixaban 5 mg Tab Take 1 tablet (5 mg total) by mouth 2 (two) times daily. 60 tablet 1    aspirin (ECOTRIN) 81 MG EC tablet Take 81 mg by mouth once daily.       atorvastatin (LIPITOR) 20 MG tablet Take 20 mg by mouth once daily.      cetirizine (ZYRTEC) 10 MG tablet Take 10 mg by mouth once daily.      cinacalcet (SENSIPAR) 30 MG Tab Take 30 mg by mouth every evening.      citalopram (CELEXA) 10 MG tablet Take 10 mg by mouth once daily.      diclofenac sodium (VOLTAREN) 1 % Gel Apply 2 g topically 4 (four) times daily as needed (for pain).      ergocalciferol (ERGOCALCIFEROL) 50,000 unit Cap Take 50,000 Units by mouth every 14 (fourteen) days.       furosemide (LASIX) 80 MG tablet Take 80 mg by mouth once daily.      gabapentin (NEURONTIN) 100 MG capsule Take 100 mg by mouth 3 (three) times daily.      hydrALAZINE (APRESOLINE) 50 MG tablet Take 50 mg by mouth 3 (three) times daily.      hydrocodone-acetaminophen 5-325mg (NORCO) 5-325 mg per tablet Take 1 tablet by mouth every 6 (six) hours as needed for Pain.      ipratropium (ATROVENT HFA) 17 mcg/actuation inhaler Inhale 2 puffs into the lungs 4 (four) times daily. Do not exceed 12 puffs per day      losartan (COZAAR) 50 MG tablet Take 1 tablet (50 mg total) by mouth once daily. 30 tablet 3    mometasone (NASONEX) 50 mcg/actuation nasal spray 2 sprays by Nasal route once daily.      podofilox (CONDYLOX) 0.5 % external solution Apply a small amount to lesions twice daily for three days, then hold for four days and repeat regimen      sevelamer carbonate (RENVELA) 800 mg Tab Take 2 tablets (1,600 mg total) by mouth 3 (three) times daily with meals. Take two tablets by mouth four times daily with meals and snacks 180 tablet 1        Past Surgical History:   Procedure Laterality Date    CATARACT EXTRACTION      COLONOSCOPY N/A 2017    Procedure: COLONOSCOPY;  Surgeon: Vic Clark MD;  [facility] ([address] [place] [address]);  Service: Endoscopy;  Laterality: N/A;    RETINAL DETACHMENT SURGERY         Social History     Social History    Marital status: Single     Spouse name: N/A    Number of children: 3    Years of education: N/A     Occupational History    Not on file.     Social History Main Topics    Smoking status: Never Smoker    Smokeless tobacco: Never Used    Alcohol use No    Drug use: No    Sexual activity: Not on file     Other Topics Concern    Not on file     Social History Narrative    Has lived with son for about 2 years now. Formerly worked as a [employer] but quit in  to become member of a [employer].       OBJECTIVE:     Vital Signs Range (Last 24H):  Temp:  [36.3 °C (97.3 °F)-37 °C (98.6 °F)]   Pulse:  [53-62]   Resp:  [16-18]   BP: (100-108)/(51-58)   SpO2:  [90 %-96 %]       CBC:   Recent Labs      18   0412  18   0509   WBC  15.46*  14.91*   RBC  2.62*  2.53*   HGB  7.8*  7.6*   HCT  23.4*  22.1*   PLT  189  167   MCV  89  87   MCH  29.8  30.0   MCHC  33.3  34.4       CMP:   Recent Labs      18   0412  18   0509   NA  137  136   K  3.7  4.3   CL  99  99   CO2  26  25   BUN  45*  53*   CREATININE  7.2*  8.0*   GLU  87  63*   MG  2.2  2.2   PHOS  4.9*  4.9*   CALCIUM  9.1  8.7   ALBUMIN  1.6*  1.5*       INR:  No results for input(s): PT, INR, PROTIME, APTT in the last 72 hours.    Diagnostic Studies:     EK/4/18    Test Reason : E87.5  Vent. Rate : 065 BPM     Atrial Rate : 060 BPM     P-R Int : 000 ms          QRS Dur : 106 ms      QT Int : 412 ms       P-R-T Axes : 000 017 137 degrees     QTc Int : 428 ms    Atrial fibrillation  Low voltage QRS  Nonspecific ST and/or T wave abnormalities  Abnormal ECG  When compared with ECG of 2018 23:40,  No significant change was found    2D  ECHO:  Results for orders placed or performed during the hospital encounter of 04/21/17   2D echo with color flow doppler   Result Value Ref Range    EF 45 55 - 65    Aortic Valve Regurgitation TRIVIAL     Aortic Valve Stenosis MILD (A)     Est. PA Systolic Pressure 54.69 (A)     Mitral Valve Mobility NORMAL     Tricuspid Valve Regurgitation MILD          ASSESSMENT/PLAN:         Anesthesia Evaluation    I have reviewed the Patient Summary Reports.    I have reviewed the Nursing Notes.   I have reviewed the Medications.     Review of Systems  Anesthesia Hx:  No problems with previous Anesthesia  History of prior surgery of interest to airway management or planning: Denies Family Hx of Anesthesia complications.   Denies Personal Hx of Anesthesia complications.   Social:  Non-Smoker, No Alcohol Use    Hematology/Oncology:     Oncology Normal    -- Anemia: Denies Current/Recent Cancer   EENT/Dental:  EENT/Dental Normal denies chronic allergic rhinitis    Cardiovascular:   Hypertension, poorly controlled Denies MI.  Denies CAD.    Denies CABG/stent. Dysrhythmias atrial fibrillation Angina    Pulmonary:   Denies Pneumonia Asthma Denies Shortness of breath.  Denies Recent URI.    Renal/:   Chronic Renal Disease, ESRD    Hepatic/GI:   Denies PUD. Denies Hiatal Hernia. GERD    Musculoskeletal:   Arthritis     Neurological:   Denies TIA. Denies CVA. Neuromuscular Disease,  Denies Seizures.   Peripheral Neuropathy    Endocrine:   Diabetes, poorly controlled, type 2 Hypothyroidism    Psych:   Psychiatric History depression          Physical Exam  General:  Obesity, Well nourished    Airway/Jaw/Neck:  Airway Findings: Mouth Opening: Normal Tongue: Normal  General Airway Assessment: Adult  Mallampati: III  Improves to III with phonation.  TM Distance: Normal, at least 6 cm  Jaw/Neck Findings:  Neck ROM: Normal ROM  Neck Findings: Normal    Eyes/Ears/Nose:  EYES/EARS/NOSE FINDINGS: Normal   Dental:  Dental Findings: Periodontal  disease, Severe    Chest/Lungs:  Chest/Lungs Findings: Normal Respiratory Rate, Clear to auscultation     Heart/Vascular:  Heart Findings: Rate: Bradycardia  Rhythm: Regular Rhythm  Heart Murmur  Systolic  Systolic Heart Murmur Description: R Upper Sternal Border  Systolic Heart Murmur Grade: Grade II  Vascular Findings:  Dialysis Access: Left Forearm Graft     Abdomen:  Abdomen Findings: Normal    Musculoskeletal:  Musculoskeletal Findings:    Skin:  Skin Findings: Normal    Mental Status:  Mental Status Findings:  Cooperative, Alert and Oriented         Anesthesia Plan  Type of Anesthesia, risks & benefits discussed:  Anesthesia Type:  general, regional  Patient's Preference:   Intra-op Monitoring Plan: standard ASA monitors  Intra-op Monitoring Plan Comments:   Post Op Pain Control Plan: multimodal analgesia, per primary service following discharge from PACU and IV/PO Opioids PRN  Post Op Pain Control Plan Comments:   Induction:   IV  Beta Blocker:  Patient is not currently on a Beta-Blocker (No further documentation required).       Informed Consent: Patient understands risks and agrees with Anesthesia plan.  Questions answered. Anesthesia consent signed with patient.  ASA Score: 3     Day of Surgery Review of History & Physical:    H&P update referred to the surgeon.     Anesthesia Plan Notes:   67M HFrEF ~45, ESRD dialyzed this AM, HTN, DM2, afib, gangrene of foot for I&D under ankle block, and GA NC TIVA        Ready For Surgery From Anesthesia Perspective.

## 2018-02-08 NOTE — SUBJECTIVE & OBJECTIVE
"Interval Hx: Pt. Seen this AM eating stating " I am not having surgery today." Reports pain improved to right foot. Psychiatry present at bedside as well.      Scheduled Meds:   ammonium lactate   Topical (Top) BID    apixaban  5 mg Oral BID    aspirin  81 mg Oral Daily    atorvastatin  20 mg Oral Daily    cinacalcet  30 mg Oral QHS    citalopram  10 mg Oral Daily    furosemide  80 mg Oral Daily    gabapentin  100 mg Oral TID    miconazole nitrate 2%   Topical (Top) BID    piperacillin-tazobactam (ZOSYN) IVPB  2.25 g Intravenous Q12H    senna-docusate 8.6-50 mg  2 tablet Oral BID    sevelamer carbonate  1,600 mg Oral TID WM     Continuous Infusions:  PRN Meds:acetaminophen, hydrocodone-acetaminophen 5-325mg, midodrine, ondansetron, sodium chloride 0.9%    Review of patient's allergies indicates:  No Known Allergies     Past Medical History:   Diagnosis Date    Acute pain of left shoulder 1/7/2017    Arthritis     Asthma     Benign neoplasm of eyelid     RUL    Blood clotting tendency     Blood transfusion     Cataract     Chronic kidney disease requiring chronic dialysis     Diabetes mellitus     Diabetic retinopathy     Fever blister     Hyperlipidemia     Hypertension     Infertility     Joint pain     Retinal detachment     Thyroid disease     Weakness 1/7/2017     Past Surgical History:   Procedure Laterality Date    CATARACT EXTRACTION      COLONOSCOPY N/A 6/23/2017    Procedure: COLONOSCOPY;  Surgeon: Vic Clark MD;  Location: [address]);  Service: Endoscopy;  Laterality: N/A;    RETINAL DETACHMENT SURGERY         Family History     Problem Relation (Age of Onset)    Heart attack Mother    No Known Problems Father, Sister, Brother, Maternal Aunt, Maternal Uncle, Paternal Aunt, Paternal Uncle, Maternal Grandmother, Maternal Grandfather, Paternal Grandmother, Paternal Grandfather        Social History Main Topics    Smoking status: Never Smoker    Smokeless tobacco: " Never Used    Alcohol use No    Drug use: No    Sexual activity: Not on file     Review of Systems   Constitutional: Positive for activity change.   Respiratory: Negative for shortness of breath.    Cardiovascular: Negative for chest pain and leg swelling.   Gastrointestinal: Negative.  Negative for nausea and vomiting.   Genitourinary: Negative.    Musculoskeletal: Positive for arthralgias.   Skin: Positive for wound.   Neurological: Positive for numbness. Negative for weakness.     Objective:     Vital Signs (Most Recent):  Temp: 98.3 °F (36.8 °C) (02/08/18 1140)  Pulse: 61 (02/08/18 1140)  Resp: 17 (02/08/18 1140)  BP: (!) 100/59 (02/08/18 1140)  SpO2: (!) 90 % (02/08/18 1140) Vital Signs (24h Range):  Temp:  [97.3 °F (36.3 °C)-98.6 °F (37 °C)] 98.3 °F (36.8 °C)  Pulse:  [53-62] 61  Resp:  [16-18] 17  SpO2:  [90 %-96 %] 90 %  BP: (100-108)/(51-59) 100/59     Weight: 49.4 kg (109 lb)  Body mass index is 16.57 kg/m².    Foot Exam    General  Orientation: disoriented       Right Foot/Ankle     Inspection and Palpation  Tenderness: none   Swelling: metatarsals   Skin Exam: blister, maceration, cellulitis, skin changes and ulcer;     Neurovascular  Dorsalis pedis: 1+  Posterior tibial: 1+  Saphenous nerve sensation: diminished  Tibial nerve sensation: diminished  Superficial peroneal nerve sensation: diminished  Deep peroneal nerve sensation: diminished  Sural nerve sensation: diminished      Left Foot/Ankle      Inspection and Palpation  Tenderness: none   Swelling: metatarsals   Skin Exam: skin changes and ulcer;     Neurovascular  Dorsalis pedis: 1+  Posterior tibial: 1+  Saphenous nerve sensation: diminished  Tibial nerve sensation: diminished  Superficial peroneal nerve sensation: diminished  Deep peroneal nerve sensation: diminished  Sural nerve sensation: diminished        2/8/18    Wound 1: Right dorsal foot ulceration   Measurement:67gnt2frr7.5cm  Base: fibrous base  Periwound skin: mild erythema,  maceration  Drainage: purulent, with malodor  Erythema: mild  Probe probes to bone 4th and 5th metatarsal                   Gangrene noted to right 4th and 5th toes.     Left foot ulceration noted to dorsal foot        Wound 1: Left dorsal foot   Measurement: 7qjf3mrn9.1cm.  Base: fibrous base  Periwound skin: mild erythema,   Drainage: none  Erythema: mild  Probe: none, no bone exposed              Wound 1: Left heel ulceration   Measurement: 4kgf7uvm0.1cm.  Periwound skin: callus  Drainage: none  Erythema:mild   Probe: none, no bone exposed            Laboratory:  Xray: Right: There is DJD, soft tissue swelling, a calcaneal spur, and diabetic vascular calcifications. No fracture dislocation bone destruction seen.    Left: There is DJD, diabetic vascular calcifications, and Spurs on the calcaneus. No fracture dislocation bone destruction seen.    MRI: 1. Limited, incomplete nondiagnostic examination secondary to early termination at the of the request of the patient.    2. Linear T1 hypointensity traversing the third metacarpal head, concerning for possible nondisplaced, likely subacute fracture.  Correlation with point tenderness recommended.    PHILLIP: Impression:   Right Leg:Segmental pressures and PVR waveforms suggest mild to moderate peripheral arterial occlusive disease.     Left Leg:Segmental pressures and PVR waveforms suggest mild to moderate peripheral arterial occlusive disease.        Diagnostic Results:  X-Ray: I have reviewed all pertinent results/findings within the past 24 hours.  reviewed    Clinical Findings:  S/p one day right foot I&D with purulent drainage and malodor.

## 2018-02-08 NOTE — NURSING
"Contacted IM-D.  Pt refused evening PO meds.  States "too much medicine in me."  Pt will not be dialyzed until at least tmrw AM.  Educated pt on apixaban and DVT prevention.  Pt refused meds.  Pt agreeable to SCD pumps tonight.  Alerted Juan M Mcgovern.  Will continue to monitor.  "

## 2018-02-08 NOTE — PT/OT/SLP PROGRESS
Physical Therapy      Patient Name:  Wilder Carbajal   MRN:  [id number]    Patient not seen today secondary to pt being cleaned first attempt, and being seen by med team second attempt. Unable to return for third attempt. Will follow-up at next scheduled visit per PT POC.    Laure Hassan, NOEMI

## 2018-02-08 NOTE — PLAN OF CARE
Call to [facility], message left for Yahaira to discuss skilled choices contracted with agency in effort to expedite referrals when pt ready for d/c.  Plan for surgery on today.      Kristy Mallory RN  Case Management  [phone]

## 2018-02-08 NOTE — ASSESSMENT & PLAN NOTE
- as evidenced by acute change from baseline, impaired attention, disorganized thinking  - most likely cause is sepsis, however anemia could also be contributing.   - TSH, B12, folate checked and unremarkable   - UA with blood but does not seem to suggest a UTI  - things like depression and dementia cannot be reliably diagnosed in the setting of delirium, but it seems like most of what he is experiencing in terms of mood is related to his situation (failing health, limited social support, etc) rather than a clinical depression. Most appropriate intervention would seem to be optimizing physical health and getting him to a place where he can get better care and support (such as an [facility] or nursing home)  - regarding capacity, the patient has been inconsistent with making this decision with no legitimate explanation for why he is doing this. He also does not demonstrate logic when making this decision, saying that he does not want amputation because he does not want to lose part of his foot (not appreciating that he would likely need a larger amputation in the future if he does not have one now). Therefore he does NOT have capacity to make this decision right now. Please proceed with the use of a proxy decision maker - SW should be helpful determining who this is and if all his children would be interested in participating

## 2018-02-08 NOTE — NURSING
Contacted dialysis re: pt's order for HD placed at 1630.  Per ZENON Mishra, pt not on caseload tonight.  Emergent HD only.  Pt expected to be dialyzed in AM.

## 2018-02-08 NOTE — NURSING
Contacted IM-D re: urinalysis order.  Pt on HD and anuric. Spoke with pt; unable to obtain clean catch sample.  OK to straight cath pt for sample?  Per Juan M Mcgovern, leave note for primary physician, Dr. Bradley.

## 2018-02-08 NOTE — MEDICAL/APP STUDENT
"2018 8:30 AM   Wilder Carbajal   1950   [id number]        Psychiatry Progress Note     SUBJECTIVE:   Patient seen and examined in the patient's room. The patient was lying in bed. Stated he was feeling "weak" and slept poorly last night due to pain in his foot (had 12 hours of sleep). Stated he wanted to get out of bed because staying in bed all day was making him "weak."    Patient was orientated to person (name, , not age - stated he was 66 years old), and time (month and year, not date).     When talking to the patient he had his eyes closed for the majority of the time, but was responsive to questions. When asked about the surgical procedure he looked distressed and had made comments about changing his mind, needing more time as this was "a lot to deal with." When prompted about the conversation yesterday with Dr. Billy he was able to recall that he had an infection in his foot and if he didn't have the surgery it will travel through his body.     The patient stated that his "savior" is watching out for him and it gives him comfort. He would like to speak to the , stated that the  had not visited him yet (which contradicts his statements yesterday that he did indeed speak to the ).        Current Medications:   Scheduled Meds:    ammonium lactate   Topical (Top) BID    apixaban  5 mg Oral BID    aspirin  81 mg Oral Daily    atorvastatin  20 mg Oral Daily    cinacalcet  30 mg Oral QHS    citalopram  10 mg Oral Daily    furosemide  80 mg Oral Daily    gabapentin  100 mg Oral TID    miconazole nitrate 2%   Topical (Top) BID    piperacillin-tazobactam (ZOSYN) IVPB  2.25 g Intravenous Q12H    senna-docusate 8.6-50 mg  2 tablet Oral BID    sevelamer carbonate  1,600 mg Oral TID WM      PRN Meds: acetaminophen, hydrocodone-acetaminophen 5-325mg, midodrine, ondansetron, sodium chloride 0.9%   Psychotherapeutics     Start     Stop Route Frequency Ordered    18 0900  " citalopram tablet 10 mg      -- Oral Daily 02/04/18 1724          Allergies:   Review of patient's allergies indicates:  No Known Allergies     OBJECTIVE:   Vitals   Vitals:    02/08/18 0841   BP: (!) 100/52   Pulse: (!) 57   Resp: 16   Temp: 98.1 °F (36.7 °C)        Labs/Imaging/Studies:   Recent Results (from the past 36 hour(s))   Renal function panel    Collection Time: 02/07/18  4:12 AM   Result Value Ref Range    Glucose 87 70 - 110 mg/dL    Sodium 137 136 - 145 mmol/L    Potassium 3.7 3.5 - 5.1 mmol/L    Chloride 99 95 - 110 mmol/L    CO2 26 23 - 29 mmol/L    BUN, Bld 45 (H) 8 - 23 mg/dL    Calcium 9.1 8.7 - 10.5 mg/dL    Creatinine 7.2 (H) 0.5 - 1.4 mg/dL    Albumin 1.6 (L) 3.5 - 5.2 g/dL    Phosphorus 4.9 (H) 2.7 - 4.5 mg/dL    eGFR if  8.2 (A) >60 mL/min/1.73 m^2    eGFR if non  7.1 (A) >60 mL/min/1.73 m^2    Anion Gap 12 8 - 16 mmol/L   Magnesium    Collection Time: 02/07/18  4:12 AM   Result Value Ref Range    Magnesium 2.2 1.6 - 2.6 mg/dL   CBC auto differential    Collection Time: 02/07/18  4:12 AM   Result Value Ref Range    WBC 15.46 (H) 3.90 - 12.70 K/uL    RBC 2.62 (L) 4.60 - 6.20 M/uL    Hemoglobin 7.8 (L) 14.0 - 18.0 g/dL    Hematocrit 23.4 (L) 40.0 - 54.0 %    MCV 89 82 - 98 fL    MCH 29.8 27.0 - 31.0 pg    MCHC 33.3 32.0 - 36.0 g/dL    RDW 17.1 (H) 11.5 - 14.5 %    Platelets 189 150 - 350 K/uL    MPV 9.9 9.2 - 12.9 fL    Immature Granulocytes 1.0 (H) 0.0 - 0.5 %    Gran # (ANC) 13.2 (H) 1.8 - 7.7 K/uL    Immature Grans (Abs) 0.16 (H) 0.00 - 0.04 K/uL    Lymph # 1.1 1.0 - 4.8 K/uL    Mono # 0.9 0.3 - 1.0 K/uL    Eos # 0.1 0.0 - 0.5 K/uL    Baso # 0.02 0.00 - 0.20 K/uL    nRBC 0 0 /100 WBC    Gran% 85.2 (H) 38.0 - 73.0 %    Lymph% 7.3 (L) 18.0 - 48.0 %    Mono% 5.6 4.0 - 15.0 %    Eosinophil% 0.8 0.0 - 8.0 %    Basophil% 0.1 0.0 - 1.9 %    Differential Method Automated    Vancomycin, random    Collection Time: 02/07/18  4:12 AM   Result Value Ref Range     Vancomycin, Random 28.1 Not established ug/mL   POCT TB Skin Test Read    Collection Time: 02/08/18 12:24 AM   Result Value Ref Range    TB Induration 48 - 72 hr read 0 mm   Renal function panel    Collection Time: 02/08/18  5:09 AM   Result Value Ref Range    Glucose 63 (L) 70 - 110 mg/dL    Sodium 136 136 - 145 mmol/L    Potassium 4.3 3.5 - 5.1 mmol/L    Chloride 99 95 - 110 mmol/L    CO2 25 23 - 29 mmol/L    BUN, Bld 53 (H) 8 - 23 mg/dL    Calcium 8.7 8.7 - 10.5 mg/dL    Creatinine 8.0 (H) 0.5 - 1.4 mg/dL    Albumin 1.5 (L) 3.5 - 5.2 g/dL    Phosphorus 4.9 (H) 2.7 - 4.5 mg/dL    eGFR if  7.2 (A) >60 mL/min/1.73 m^2    eGFR if non  6.2 (A) >60 mL/min/1.73 m^2    Anion Gap 12 8 - 16 mmol/L   Magnesium    Collection Time: 02/08/18  5:09 AM   Result Value Ref Range    Magnesium 2.2 1.6 - 2.6 mg/dL   CBC auto differential    Collection Time: 02/08/18  5:09 AM   Result Value Ref Range    WBC 14.91 (H) 3.90 - 12.70 K/uL    RBC 2.53 (L) 4.60 - 6.20 M/uL    Hemoglobin 7.6 (L) 14.0 - 18.0 g/dL    Hematocrit 22.1 (L) 40.0 - 54.0 %    MCV 87 82 - 98 fL    MCH 30.0 27.0 - 31.0 pg    MCHC 34.4 32.0 - 36.0 g/dL    RDW 16.9 (H) 11.5 - 14.5 %    Platelets 167 150 - 350 K/uL    MPV 10.3 9.2 - 12.9 fL    Immature Granulocytes 1.4 (H) 0.0 - 0.5 %    Gran # (ANC) 12.3 (H) 1.8 - 7.7 K/uL    Immature Grans (Abs) 0.21 (H) 0.00 - 0.04 K/uL    Lymph # 1.2 1.0 - 4.8 K/uL    Mono # 1.1 (H) 0.3 - 1.0 K/uL    Eos # 0.1 0.0 - 0.5 K/uL    Baso # 0.03 0.00 - 0.20 K/uL    nRBC 0 0 /100 WBC    Gran% 82.6 (H) 38.0 - 73.0 %    Lymph% 7.9 (L) 18.0 - 48.0 %    Mono% 7.2 4.0 - 15.0 %    Eosinophil% 0.7 0.0 - 8.0 %    Basophil% 0.2 0.0 - 1.9 %    Differential Method Automated    Vancomycin, random    Collection Time: 02/08/18  5:09 AM   Result Value Ref Range    Vancomycin, Random 26.4 Not established ug/mL   TSH    Collection Time: 02/08/18  5:09 AM   Result Value Ref Range    TSH 3.536 0.400 - 4.000 uIU/mL   Vitamin B12  "   Collection Time: 02/08/18  5:09 AM   Result Value Ref Range    Vitamin B-12 1879 (H) 210 - 950 pg/mL   Folate    Collection Time: 02/08/18  5:09 AM   Result Value Ref Range    Folate 3.2 (L) 4.0 - 24.0 ng/mL        Mental Status Exam:     Mental Status Exam:  Appearance: lying in bed, appears older than stated age, obese, dressed in hospital gown, unshaven, disheveled  Behavior/Cooperation: lethargic, cooperative, minimal eye contact, psychomotor retardation  Speech: decreased rate, monotone, low volume, paucity of speech  Mood: feeling "weak," and has "too much to deal with"   Affect: blunted affect, dysphoric  Thought Process: linear, coherent  Thought Content: no visual or auditory hallucinations, no suicidal or homicidal ideations, no delusions  Orientation: Orientated to person, place, month and year  Memory: fairly intact - was able to recall meeting Dr. Billy and parts of the conversation  Attention Span/Concentration: poor - [facility]  Insight: poor - wishes to delay surgical procedure  Judgment: poor       ASSESSMENT/PLAN:   Delirium,   67 year old  male. Consulted for capacity assessment to consent for surgical procedure to amputate his 4th and 5th right toes. The patient requested for more time to make a decision as the situation was "too much to deal with." The patient was able to recall speaking to Dr. Billy yesterday and the discussion about the "infection" in his foot that can "travel through his body."      The patient has a PMHx: T2DM, End Stage Renal Disease on dialysis, Hyperlipidemia, and undiagnosed depression.   Vitals: ID 57 bpm, Bp: 100/52, SpO2 91%, temperature and RR are within normal limits.   Relevant labs: infection and inflammation (granulocytes, lymphocytes elevated), end stage renal disease (nomorcytic anemia, increased BUN and creatinine, low albumin and eGFR)     Plan:  1. Treat infected right foot   2. Arrange for meeting with chaplain Veronica Drew, " Medical Student   2/8/2018

## 2018-02-08 NOTE — PHYSICIAN QUERY
PT Name: Wilder Carbajal  MR #: [id number]    Physician Query Form - Cause and Effect Relationship Clarification        CDS: Traci Alvarez RN CCDS                  Contact Information: [email or web address]    This form is a permanent document in the medical record.     Query Date: February 8, 2018    By submitting this query, we are merely seeking further clarification of documentation. Please utilize your independent clinical judgment when addressing the question(s) below.    The Medical record contains the following:  Supporting Clinical Findings   Location in record    sepsis                                                                                                                       [facility]   Moderate       Zosyn IV                                               PN 2/4    LAB 2/5      MAR 2/6                                                                                                                                                                                                       Provider, please clarify if there is any correlation between Sepsis and Citronbacter koseri.           Are the conditions:     [ xxx ] Due to or associated with each other     [  ] Unrelated to each other     [  ] Other (Please Specify): _________________________     [  ] Clinically Undetermined

## 2018-02-08 NOTE — PROGRESS NOTES
"[facility]  Podiatry  Progress Note    Patient Name: Wilder Carbajal  MRN: [id number]  Admission Date: 2/4/2018  Hospital Length of Stay: 4 days  Attending Physician: Kanika Bradley MD  Primary Care Provider: Dio Roberson MD   Interval Hx: Pt. Seen this AM eating stating " I am not having surgery today." Reports pain improved to right foot. Psychiatry present at bedside as well.      Scheduled Meds:   ammonium lactate   Topical (Top) BID    apixaban  5 mg Oral BID    aspirin  81 mg Oral Daily    atorvastatin  20 mg Oral Daily    cinacalcet  30 mg Oral QHS    citalopram  10 mg Oral Daily    furosemide  80 mg Oral Daily    gabapentin  100 mg Oral TID    miconazole nitrate 2%   Topical (Top) BID    piperacillin-tazobactam (ZOSYN) IVPB  2.25 g Intravenous Q12H    senna-docusate 8.6-50 mg  2 tablet Oral BID    sevelamer carbonate  1,600 mg Oral TID WM     Continuous Infusions:  PRN Meds:acetaminophen, hydrocodone-acetaminophen 5-325mg, midodrine, ondansetron, sodium chloride 0.9%    Review of patient's allergies indicates:  No Known Allergies     Past Medical History:   Diagnosis Date    Acute pain of left shoulder 1/7/2017    Arthritis     Asthma     Benign neoplasm of eyelid     RUL    Blood clotting tendency     Blood transfusion     Cataract     Chronic kidney disease requiring chronic dialysis     Diabetes mellitus     Diabetic retinopathy     Fever blister     Hyperlipidemia     Hypertension     Infertility     Joint pain     Retinal detachment     Thyroid disease     Weakness 1/7/2017     Past Surgical History:   Procedure Laterality Date    CATARACT EXTRACTION      COLONOSCOPY N/A 6/23/2017    Procedure: COLONOSCOPY;  Surgeon: Vic Clark MD;  Location: [address];  Service: Endoscopy;  Laterality: N/A;    RETINAL DETACHMENT SURGERY         Family History     Problem Relation (Age of Onset)    Heart attack Mother    No Known Problems Father, Sister, " Brother, Maternal Aunt, Maternal Uncle, Paternal Aunt, Paternal Uncle, Maternal Grandmother, Maternal Grandfather, Paternal Grandmother, Paternal Grandfather        Social History Main Topics    Smoking status: Never Smoker    Smokeless tobacco: Never Used    Alcohol use No    Drug use: No    Sexual activity: Not on file     Review of Systems   Constitutional: Positive for activity change.   Respiratory: Negative for shortness of breath.    Cardiovascular: Negative for chest pain and leg swelling.   Gastrointestinal: Negative.  Negative for nausea and vomiting.   Genitourinary: Negative.    Musculoskeletal: Positive for arthralgias.   Skin: Positive for wound.   Neurological: Positive for numbness. Negative for weakness.     Objective:     Vital Signs (Most Recent):  Temp: 98.3 °F (36.8 °C) (02/08/18 1140)  Pulse: 61 (02/08/18 1140)  Resp: 17 (02/08/18 1140)  BP: (!) 100/59 (02/08/18 1140)  SpO2: (!) 90 % (02/08/18 1140) Vital Signs (24h Range):  Temp:  [97.3 °F (36.3 °C)-98.6 °F (37 °C)] 98.3 °F (36.8 °C)  Pulse:  [53-62] 61  Resp:  [16-18] 17  SpO2:  [90 %-96 %] 90 %  BP: (100-108)/(51-59) 100/59     Weight: 49.4 kg (109 lb)  Body mass index is 16.57 kg/m².    Foot Exam    General  Orientation: disoriented       Right Foot/Ankle     Inspection and Palpation  Tenderness: none   Swelling: metatarsals   Skin Exam: blister, maceration, cellulitis, skin changes and ulcer;     Neurovascular  Dorsalis pedis: 1+  Posterior tibial: 1+  Saphenous nerve sensation: diminished  Tibial nerve sensation: diminished  Superficial peroneal nerve sensation: diminished  Deep peroneal nerve sensation: diminished  Sural nerve sensation: diminished      Left Foot/Ankle      Inspection and Palpation  Tenderness: none   Swelling: metatarsals   Skin Exam: skin changes and ulcer;     Neurovascular  Dorsalis pedis: 1+  Posterior tibial: 1+  Saphenous nerve sensation: diminished  Tibial nerve sensation: diminished  Superficial peroneal  nerve sensation: diminished  Deep peroneal nerve sensation: diminished  Sural nerve sensation: diminished        2/8/18    Wound 1: Right dorsal foot ulceration   Measurement:[id number]  Base: fibrous base  Periwound skin: mild erythema, maceration  Drainage: purulent, with malodor  Erythema: mild  Probe probes to bone 4th and 5th metatarsal                   Gangrene noted to right 4th and 5th toes.     Left foot ulceration noted to dorsal foot        Wound 1: Left dorsal foot   Measurement: 6jzo3ial2.1cm.  Base: fibrous base  Periwound skin: mild erythema,   Drainage: none  Erythema: mild  Probe: none, no bone exposed              Wound 1: Left heel ulceration   Measurement: 2rdt4zad3.1cm.  Periwound skin: callus  Drainage: none  Erythema:mild   Probe: none, no bone exposed            Laboratory:  Xray: Right: There is DJD, soft tissue swelling, a calcaneal spur, and diabetic vascular calcifications. No fracture dislocation bone destruction seen.    Left: There is DJD, diabetic vascular calcifications, and Spurs on the calcaneus. No fracture dislocation bone destruction seen.    MRI: 1. Limited, incomplete nondiagnostic examination secondary to early termination at the of the request of the patient.    2. Linear T1 hypointensity traversing the third metacarpal head, concerning for possible nondisplaced, likely subacute fracture.  Correlation with point tenderness recommended.    PHILLIP: Impression:   Right Leg:Segmental pressures and PVR waveforms suggest mild to moderate peripheral arterial occlusive disease.     Left Leg:Segmental pressures and PVR waveforms suggest mild to moderate peripheral arterial occlusive disease.        Diagnostic Results:  X-Ray: I have reviewed all pertinent results/findings within the past 24 hours.  reviewed    Clinical Findings:  S/p one day right foot I&D with purulent drainage and malodor.     Assessment/Plan:     Foot ulceration, right, with fat layer exposed    S/p two days  right foot I&D, surgical site with malodor and necrotic tissue. Gangrene noted  To right 4th and 5th toes.   Plan for repeat right foot I&D and partial right 4th and 5th ray amputation tomorrow. Pt. Currently refuses right partial 4th and 5th ray amputation, explained to patient he is at high risk of limb loss at this time. However yesterday when seen by psychiatry patient agreed to amputation right partial 4th and 5th ray.   Long discussion with both primary team and psychiatry team who state as patient has been inconsistent, pt  will agree to surgery at one moment then refuse surgery another , pt. does not have capacity to make medical decisions.   Discussed case at length with son Raymond Carbajal who is in agreement to proceed with right partial 4th and 5th ray amputation I&D. Explained to son that patient has been undecided regarding surgery, agreeing to it at one point then refusing again. Explained to son that pt is at high risk for limb loss at this time. Son in agreement to proceed with surgery.   Case request placed for tomorrow 2//8/18 right foot I&D,  Right partial 4th and 5th ray amputation.   NPO at midnight   Long discussion with patient's son Raymond Carbajal  regarding the procedure in detail. Patient understands all risks, potential complications, and alternatives, including, but not limited to those listed on the consent form. All questions were answered. No guarantees given or implied as to outcome. Informed verbal and written consent was obtained. Consent forms read, signed, witnessed. Phone consent obtained witnessed by two nurses.       Weightbearing Status: Partial Heel WB B/:L   Offloading Device: DARCO shoe B/L - Heel protector while in bed.     Ángela Marks DPM PGY-3  Pager: [phone]          ESRD (end stage renal disease)    Per Primary           Type 2 diabetes mellitus with stage 5 chronic kidney disease and hypertension    Per Primary               Ángela Marks MD  Podiatry  [facility]

## 2018-02-08 NOTE — PLAN OF CARE
Problem: Patient Care Overview  Goal: Plan of Care Review  Outcome: Ongoing (interventions implemented as appropriate)  Pt A&O x 4, lethargic at start of shift.  Pt appears more alert later in shift.  2 L O2 NC applied for SpO2 on room air of 89%.  Reassessed SpO2 95-96%.  Pt bradycardic.      Mepilex dressings changed to ulcers of L buttock and L dorsal foot.  Heel offloading boots applied.  Frequent weight shift provided.  IV piperacillin-tazobactam given.

## 2018-02-08 NOTE — SUBJECTIVE & OBJECTIVE
"Interval History: On initial interview, the patient states that he is doing well and denies pain in his foot. Then, later, says that his foot is hurting but pain is mild. After repeat explanation of risks of infection without surgery, the patient agreed to have procedure done but felt he was not being listened to. Then explained he changed his mind because he "talked with Hima" and that "hima didn't amputate".     On repeat interview, the patient repeated that he did not want an amputation. Despite explaining the amputation is necessary at this point and asking who could help him make this decision, he stated that he did not trust any people with this (he would only trust God with this decision). He then stated that he did not want an amputation because he does not want to lose a part of his foot.    Family History     Problem Relation (Age of Onset)    Depression Child    Heart attack Mother    No Known Problems Father, Sister, Brother, Maternal Aunt, Maternal Uncle, Paternal Aunt, Paternal Uncle, Maternal Grandmother, Maternal Grandfather, Paternal Grandmother, Paternal Grandfather        Social History Main Topics    Smoking status: Never Smoker    Smokeless tobacco: Never Used    Alcohol use No    Drug use: No    Sexual activity: Not on file     Psychotherapeutics     Start     Stop Route Frequency Ordered    02/05/18 0900  citalopram tablet 10 mg      -- Oral Daily 02/04/18 1724           Review of Systems  Objective:     Vital Signs (Most Recent):  Temp: 98.3 °F (36.8 °C) (02/08/18 1140)  Pulse: 67 (02/08/18 1452)  Resp: 17 (02/08/18 1140)  BP: (!) 100/59 (02/08/18 1140)  SpO2: (!) 90 % (02/08/18 1140) Vital Signs (24h Range):  Temp:  [97.3 °F (36.3 °C)-98.6 °F (37 °C)] 98.3 °F (36.8 °C)  Pulse:  [53-67] 67  Resp:  [16-18] 17  SpO2:  [90 %-96 %] 90 %  BP: (100-108)/(51-59) 100/59     Height: 5' 8" (172.7 cm)  Weight: 49.4 kg (109 lb)  Body mass index is 16.57 kg/m².      Intake/Output Summary (Last 24 " "hours) at 02/08/18 1516  Last data filed at 02/08/18 1347   Gross per 24 hour   Intake              660 ml   Output                0 ml   Net              660 ml       Physical Exam   Psychiatric:   Appearance: obese black male, appears stated age, skin on lower legs thickened and darkened, right foot bandaged.  Behavior: calm, cooperative, pleasant  Speech: normal rate, volume, and tone  Mood: "sad"  Affect: mildly dysthymic, constricted, congruent to stated mood  Thought processes: slowed, mostly logical but then disorganized on targeted questioning  Thought content: +VH as described in initial consult. No Ah, no SI/HI  Insight: poor  Judgment: poor  Cognition: alert and oriented to self, place, time, situation. Attention impaired (failed SAVEAHAART). Recent memory impaired, remote grossly intact. CAM-ICU positive        Significant Labs: All pertinent labs within the past 24 hours have been reviewed.    Significant Imaging: None  "

## 2018-02-08 NOTE — PROGRESS NOTES
"[facility]  Psychiatry  Progress Note    Patient Name: Wilder Carbajal  MRN: [id number]   Code Status: Full Code  Admission Date: 2/4/2018  Hospital Length of Stay: 4 days  Expected Discharge Date: 2/12/2018  Attending Physician: Kanika Bradley MD  Primary Care Provider: Dio Roberson MD    Current Legal Status: N/A    Patient information was obtained from patient.     Subjective:     Principal Problem:Septic encephalopathy    Chief Complaint: "I feel OK"    HPI: Mr Carbajal is a 67 year old black male with no psychiatric history and medical history of HTN, Dm, and ESRD on Hd. He was brought to the hospital by EMS when his son noticed fluid coming out of his legs, and he was admitted for treatment of sepsis. Psychiatry consulted for "depression, patient requested".    On interview, the patient denies that he requested a psychiatry consult but does feel that we could be helpful to him. States that he is depressed because of his worsening physical health and because his son does not really help him very much. Has been living with son for about two years now, but son works full-time and usually wants to be by himself when he is at home. Son also asks patient repeatedly why he does not get some home health care worker to help him rather than relying on him. He therefore feels sad most of the time and just sits around watching Tv, although says this is because he physically cannot do anything more. Denies feelings of hopelessness, SI/HI. Sleep has been poor for years, with difficulties in sleep onset; attributes this to pain and also to anxiety. When asked what he worries about, he replies "lots of things" but is unable to provide any examples. Appetite unchanging, and says that his usual diet is pudding, fruit cups, iced tea, and fruit punch. Says he is unable to cook because his vision is so bad. Troubles with concentration and memory just over the last few days. Endorses visual hallucinations of " "seeing "people of all colors and sizes" in his home - this has been occurring for about 1 year now and does not bother him. Endorses Ah of a voice telling him to do things, but then explains that these are his own thoughts. Denies paranoia, IOR. Is agreeable with having home health come to him or with going into a facility that could provide him more attention. Gave permission for me to speak with his son.    Regarding the current state of his right foot and recommendations for amputation: The patient states that he he is in severe pain and wants to something to help with it. After explanation of his current condition, the patient understood that "the food could take [his] life away" and that amputation now should prevent the need for more extensive amputation in the future. He states now that he is agreeable to having the amputation as soon as possible.    Spoke with son, Raymond Carbajal, on the phone. He states that the patient is usually sharp mentally. He just noticed troubles with memory yesterday. Is concerned the patient may not fully understand the situation with his toes/foot. He denies the patient being diagnosed with dementia or depression, and feels the patient was able to care for himself adequately before coming here. Is not aware of the patient taking an anti-depressant medication.     Per primary team, they are concerned that he does not have capacity to decide whether or not he will have amputation of toes. He was initially agreeing with this, but then refused after they took him to pre-op yesterday. He reported to them a belief that the surgery was un-necessary, and could not state the risks of refusing the procedure.    Hospital Course: No notes on file    Interval History: On initial interview, the patient states that he is doing well and denies pain in his foot. Then, later, says that his foot is hurting but pain is mild. After repeat explanation of risks of infection without surgery, the patient " "agreed to have procedure done but felt he was not being listened to. Then explained he changed his mind because he "talked with Hima" and that "hima didn't amputate".     On repeat interview, the patient repeated that he did not want an amputation. Despite explaining the amputation is necessary at this point and asking who could help him make this decision, he stated that he did not trust any people with this (he would only trust God with this decision). He then stated that he did not want an amputation because he does not want to lose a part of his foot.    Family History     Problem Relation (Age of Onset)    Depression Child    Heart attack Mother    No Known Problems Father, Sister, Brother, Maternal Aunt, Maternal Uncle, Paternal Aunt, Paternal Uncle, Maternal Grandmother, Maternal Grandfather, Paternal Grandmother, Paternal Grandfather        Social History Main Topics    Smoking status: Never Smoker    Smokeless tobacco: Never Used    Alcohol use No    Drug use: No    Sexual activity: Not on file     Psychotherapeutics     Start     Stop Route Frequency Ordered    02/05/18 0900  citalopram tablet 10 mg      -- Oral Daily 02/04/18 1724           Review of Systems  Objective:     Vital Signs (Most Recent):  Temp: 98.3 °F (36.8 °C) (02/08/18 1140)  Pulse: 67 (02/08/18 1452)  Resp: 17 (02/08/18 1140)  BP: (!) 100/59 (02/08/18 1140)  SpO2: (!) 90 % (02/08/18 1140) Vital Signs (24h Range):  Temp:  [97.3 °F (36.3 °C)-98.6 °F (37 °C)] 98.3 °F (36.8 °C)  Pulse:  [53-67] 67  Resp:  [16-18] 17  SpO2:  [90 %-96 %] 90 %  BP: (100-108)/(51-59) 100/59     Height: 5' 8" (172.7 cm)  Weight: 49.4 kg (109 lb)  Body mass index is 16.57 kg/m².      Intake/Output Summary (Last 24 hours) at 02/08/18 1516  Last data filed at 02/08/18 1347   Gross per 24 hour   Intake              660 ml   Output                0 ml   Net              660 ml       Physical Exam   Psychiatric:   Appearance: obese black male, appears stated " "age, skin on lower legs thickened and darkened, right foot bandaged.  Behavior: calm, cooperative, pleasant  Speech: normal rate, volume, and tone  Mood: "sad"  Affect: mildly dysthymic, constricted, congruent to stated mood  Thought processes: slowed, mostly logical but then disorganized on targeted questioning  Thought content: +VH as described in initial consult. No Ah, no SI/HI  Insight: poor  Judgment: poor  Cognition: alert and oriented to self, place, time, situation. Attention impaired (failed SAVEAHAART). Recent memory impaired, remote grossly intact. CAM-ICU positive        Significant Labs: All pertinent labs within the past 24 hours have been reviewed.    Significant Imaging: None    Assessment/Plan:     Acute delirium    - as evidenced by acute change from baseline, impaired attention, disorganized thinking  - most likely cause is sepsis, however anemia could also be contributing.   - TSH, B12, folate checked and unremarkable   - UA with blood but does not seem to suggest a UTI  - things like depression and dementia cannot be reliably diagnosed in the setting of delirium, but it seems like most of what he is experiencing in terms of mood is related to his situation (failing health, limited social support, etc) rather than a clinical depression. Most appropriate intervention would seem to be optimizing physical health and getting him to a place where he can get better care and support (such as an [facility] or nursing home)  - regarding capacity, the patient has been inconsistent with making this decision with no legitimate explanation for why he is doing this. He also does not demonstrate logic when making this decision, saying that he does not want amputation because he does not want to lose part of his foot (not appreciating that he would likely need a larger amputation in the future if he does not have one now). Therefore he does NOT have capacity to make this decision right now. Please proceed with the use " of a proxy decision maker - SW should be helpful determining who this is and if all his children would be interested in participating             Need for Continued Hospitalization:   No need for inpatient psychiatric hospitalization. Continue medical care as per the primary team.    Anticipated Disposition: Still a Patient     Total time:  25 with greater than 50% of this time spent in counseling and/or coordination of care.       Osbaldo Sauceda MD   Psychiatry  [facility]

## 2018-02-08 NOTE — ASSESSMENT & PLAN NOTE
S/p two days right foot I&D, surgical site with malodor and necrotic tissue. Gangrene noted  To right 4th and 5th toes.   Plan for repeat right foot I&D and partial right 4th and 5th ray amputation tomorrow. Pt. Currently refuses right partial 4th and 5th ray amputation, explained to patient he is at high risk of limb loss at this time. However yesterday when seen by psychiatry patient agreed to amputation right partial 4th and 5th ray.   Long discussion with both primary team and psychiatry team who state as patient has been undedcided, pt  will agree to surgery at one moment then refuse surgery another , pt. does not have capacity to make medical decisions.   Discussed case at length with son Raymond Carbajal who is in agreement to proceed with right partial 4th and 5th ray amputation I&D. Explained to son that patient has been undecided regarding surgery, agreeing to it at one point then refusing again. Explained to son that pt is at high risk for limb loss at this time. Son in agreement to proceed with surgery.   Case request placed for tomorrow 2//8/18 right foot I&D,  Right partial 4th and 5th ray amputation.   NPO at midnight   Long discussion with patient's son Raymond Carbajal  regarding the procedure in detail. Patient understands all risks, potential complications, and alternatives, including, but not limited to those listed on the consent form. All questions were answered. No guarantees given or implied as to outcome. Informed verbal and written consent was obtained. Consent forms read, signed, witnessed. Phone consent obtained witnessed by two nurses.       Weightbearing Status: Partial Heel WB B/:L   Offloading Device: DARCO shoe B/L - Heel protector while in bed.     Ángela Marks DPM PGY-3  Pager: [phone]

## 2018-02-09 ENCOUNTER — ANESTHESIA (OUTPATIENT)
Dept: SURGERY | Facility: HOSPITAL | Age: 68
DRG: 853 | End: 2018-02-09
Payer: COMMERCIAL

## 2018-02-09 DIAGNOSIS — I73.9 PERIPHERAL ARTERIAL DISEASE: Primary | ICD-10-CM

## 2018-02-09 PROBLEM — M86.9 OSTEOMYELITIS: Status: ACTIVE | Noted: 2018-02-09

## 2018-02-09 LAB
ALBUMIN SERPL BCP-MCNC: 1.8 G/DL
ANION GAP SERPL CALC-SCNC: 13 MMOL/L
BACTERIA BLD CULT: NORMAL
BACTERIA BLD CULT: NORMAL
BACTERIA SPEC ANAEROBE CULT: NORMAL
BASOPHILS # BLD AUTO: 0.03 K/UL
BASOPHILS NFR BLD: 0.2 %
BUN SERPL-MCNC: 41 MG/DL
CALCIUM SERPL-MCNC: 8.4 MG/DL
CHLORIDE SERPL-SCNC: 99 MMOL/L
CO2 SERPL-SCNC: 24 MMOL/L
CREAT SERPL-MCNC: 6.4 MG/DL
DIFFERENTIAL METHOD: ABNORMAL
EOSINOPHIL # BLD AUTO: 0.1 K/UL
EOSINOPHIL NFR BLD: 0.8 %
ERYTHROCYTE [DISTWIDTH] IN BLOOD BY AUTOMATED COUNT: 17.2 %
EST. GFR  (AFRICAN AMERICAN): 9.4 ML/MIN/1.73 M^2
EST. GFR  (NON AFRICAN AMERICAN): 8.2 ML/MIN/1.73 M^2
GLUCOSE SERPL-MCNC: 57 MG/DL
GRAM STN SPEC: NORMAL
GRAM STN SPEC: NORMAL
HCT VFR BLD AUTO: 24 %
HGB BLD-MCNC: 8 G/DL
IMM GRANULOCYTES # BLD AUTO: 0.3 K/UL
IMM GRANULOCYTES NFR BLD AUTO: 1.9 %
LYMPHOCYTES # BLD AUTO: 1.3 K/UL
LYMPHOCYTES NFR BLD: 8.4 %
MAGNESIUM SERPL-MCNC: 2.1 MG/DL
MCH RBC QN AUTO: 29.5 PG
MCHC RBC AUTO-ENTMCNC: 33.3 G/DL
MCV RBC AUTO: 89 FL
MONOCYTES # BLD AUTO: 1.3 K/UL
MONOCYTES NFR BLD: 8 %
NEUTROPHILS # BLD AUTO: 12.6 K/UL
NEUTROPHILS NFR BLD: 80.7 %
NRBC BLD-RTO: 0 /100 WBC
PHOSPHATE SERPL-MCNC: 4 MG/DL
PLATELET # BLD AUTO: 167 K/UL
PMV BLD AUTO: 10.3 FL
POCT GLUCOSE: 91 MG/DL (ref 70–110)
POTASSIUM SERPL-SCNC: 4.4 MMOL/L
RBC # BLD AUTO: 2.71 M/UL
SODIUM SERPL-SCNC: 136 MMOL/L
VANCOMYCIN SERPL-MCNC: 23.7 UG/ML
WBC # BLD AUTO: 15.64 K/UL

## 2018-02-09 PROCEDURE — 25000003 PHARM REV CODE 250: Performed by: INTERNAL MEDICINE

## 2018-02-09 PROCEDURE — 87102 FUNGUS ISOLATION CULTURE: CPT

## 2018-02-09 PROCEDURE — 80202 ASSAY OF VANCOMYCIN: CPT

## 2018-02-09 PROCEDURE — 11000001 HC ACUTE MED/SURG PRIVATE ROOM

## 2018-02-09 PROCEDURE — 87077 CULTURE AEROBIC IDENTIFY: CPT

## 2018-02-09 PROCEDURE — 88305 TISSUE EXAM BY PATHOLOGIST: CPT | Performed by: PATHOLOGY

## 2018-02-09 PROCEDURE — 87116 MYCOBACTERIA CULTURE: CPT

## 2018-02-09 PROCEDURE — 63600175 PHARM REV CODE 636 W HCPCS: Performed by: NURSE ANESTHETIST, CERTIFIED REGISTERED

## 2018-02-09 PROCEDURE — 37000008 HC ANESTHESIA 1ST 15 MINUTES: Performed by: PODIATRIST

## 2018-02-09 PROCEDURE — 0Y6M0ZD DETACHMENT AT RIGHT FOOT, PARTIAL 4TH RAY, OPEN APPROACH: ICD-10-PCS | Performed by: PODIATRIST

## 2018-02-09 PROCEDURE — 11042 DBRDMT SUBQ TIS 1ST 20SQCM/<: CPT | Mod: 58,59,, | Performed by: PODIATRIST

## 2018-02-09 PROCEDURE — 99231 SBSQ HOSP IP/OBS SF/LOW 25: CPT | Mod: ,,, | Performed by: INTERNAL MEDICINE

## 2018-02-09 PROCEDURE — 63600175 PHARM REV CODE 636 W HCPCS: Performed by: INTERNAL MEDICINE

## 2018-02-09 PROCEDURE — 36000706: Performed by: PODIATRIST

## 2018-02-09 PROCEDURE — 82962 GLUCOSE BLOOD TEST: CPT | Performed by: PODIATRIST

## 2018-02-09 PROCEDURE — 88311 DECALCIFY TISSUE: CPT | Mod: 26,,, | Performed by: PATHOLOGY

## 2018-02-09 PROCEDURE — 25000003 PHARM REV CODE 250: Performed by: PODIATRIST

## 2018-02-09 PROCEDURE — 63600175 PHARM REV CODE 636 W HCPCS: Performed by: PHYSICIAN ASSISTANT

## 2018-02-09 PROCEDURE — 71000039 HC RECOVERY, EACH ADD'L HOUR: Performed by: PODIATRIST

## 2018-02-09 PROCEDURE — 80069 RENAL FUNCTION PANEL: CPT

## 2018-02-09 PROCEDURE — 25000003 PHARM REV CODE 250: Performed by: NURSE ANESTHETIST, CERTIFIED REGISTERED

## 2018-02-09 PROCEDURE — 71000015 HC POSTOP RECOV 1ST HR: Performed by: PODIATRIST

## 2018-02-09 PROCEDURE — 83735 ASSAY OF MAGNESIUM: CPT

## 2018-02-09 PROCEDURE — 87015 SPECIMEN INFECT AGNT CONCNTJ: CPT

## 2018-02-09 PROCEDURE — 11045 DBRDMT SUBQ TISS EACH ADDL: CPT | Mod: ,,, | Performed by: PODIATRIST

## 2018-02-09 PROCEDURE — 85025 COMPLETE CBC W/AUTO DIFF WBC: CPT

## 2018-02-09 PROCEDURE — S0020 INJECTION, BUPIVICAINE HYDRO: HCPCS | Performed by: PODIATRIST

## 2018-02-09 PROCEDURE — 87075 CULTR BACTERIA EXCEPT BLOOD: CPT

## 2018-02-09 PROCEDURE — 63600175 PHARM REV CODE 636 W HCPCS: Performed by: PODIATRIST

## 2018-02-09 PROCEDURE — D9220A PRA ANESTHESIA: Mod: CRNA,,, | Performed by: NURSE ANESTHETIST, CERTIFIED REGISTERED

## 2018-02-09 PROCEDURE — 87070 CULTURE OTHR SPECIMN AEROBIC: CPT

## 2018-02-09 PROCEDURE — D9220A PRA ANESTHESIA: Mod: ANES,,, | Performed by: ANESTHESIOLOGY

## 2018-02-09 PROCEDURE — 87186 SC STD MICRODIL/AGAR DIL: CPT

## 2018-02-09 PROCEDURE — 71000033 HC RECOVERY, INTIAL HOUR: Performed by: PODIATRIST

## 2018-02-09 PROCEDURE — 99223 1ST HOSP IP/OBS HIGH 75: CPT | Mod: ,,, | Performed by: INTERNAL MEDICINE

## 2018-02-09 PROCEDURE — 37000009 HC ANESTHESIA EA ADD 15 MINS: Performed by: PODIATRIST

## 2018-02-09 PROCEDURE — 87205 SMEAR GRAM STAIN: CPT

## 2018-02-09 PROCEDURE — 28810 AMPUTATION TOE & METATARSAL: CPT | Mod: 58,51,T9, | Performed by: PODIATRIST

## 2018-02-09 PROCEDURE — 36415 COLL VENOUS BLD VENIPUNCTURE: CPT

## 2018-02-09 PROCEDURE — 88305 TISSUE EXAM BY PATHOLOGIST: CPT | Mod: 26,,, | Performed by: PATHOLOGY

## 2018-02-09 PROCEDURE — 27201423 OPTIME MED/SURG SUP & DEVICES STERILE SUPPLY: Performed by: PODIATRIST

## 2018-02-09 PROCEDURE — 0Y6M0ZF DETACHMENT AT RIGHT FOOT, PARTIAL 5TH RAY, OPEN APPROACH: ICD-10-PCS | Performed by: PODIATRIST

## 2018-02-09 PROCEDURE — 36000707: Performed by: PODIATRIST

## 2018-02-09 PROCEDURE — 80100014 HC HEMODIALYSIS 1:1

## 2018-02-09 RX ORDER — SODIUM CHLORIDE 9 MG/ML
INJECTION, SOLUTION INTRAVENOUS CONTINUOUS PRN
Status: DISCONTINUED | OUTPATIENT
Start: 2018-02-09 | End: 2018-02-09

## 2018-02-09 RX ORDER — SODIUM CHLORIDE 9 MG/ML
INJECTION, SOLUTION INTRAVENOUS ONCE
Status: DISCONTINUED | OUTPATIENT
Start: 2018-02-09 | End: 2018-02-09

## 2018-02-09 RX ORDER — LIDOCAINE HCL/PF 100 MG/5ML
SYRINGE (ML) INTRAVENOUS
Status: DISCONTINUED | OUTPATIENT
Start: 2018-02-09 | End: 2018-02-09

## 2018-02-09 RX ORDER — MIDAZOLAM HYDROCHLORIDE 1 MG/ML
0.5 INJECTION INTRAMUSCULAR; INTRAVENOUS EVERY 5 MIN PRN
Status: DISCONTINUED | OUTPATIENT
Start: 2018-02-09 | End: 2018-02-09

## 2018-02-09 RX ORDER — SODIUM CHLORIDE 0.9 % (FLUSH) 0.9 %
3 SYRINGE (ML) INJECTION
Status: DISCONTINUED | OUTPATIENT
Start: 2018-02-09 | End: 2018-02-11

## 2018-02-09 RX ORDER — BUPIVACAINE HYDROCHLORIDE 5 MG/ML
INJECTION, SOLUTION EPIDURAL; INTRACAUDAL
Status: DISCONTINUED | OUTPATIENT
Start: 2018-02-09 | End: 2018-02-09 | Stop reason: HOSPADM

## 2018-02-09 RX ORDER — MIDAZOLAM HYDROCHLORIDE 1 MG/ML
INJECTION INTRAMUSCULAR; INTRAVENOUS
Status: DISCONTINUED
Start: 2018-02-09 | End: 2018-02-09 | Stop reason: WASHOUT

## 2018-02-09 RX ORDER — FENTANYL CITRATE 50 UG/ML
25 INJECTION, SOLUTION INTRAMUSCULAR; INTRAVENOUS EVERY 5 MIN PRN
Status: DISCONTINUED | OUTPATIENT
Start: 2018-02-09 | End: 2018-02-09

## 2018-02-09 RX ORDER — PROPOFOL 10 MG/ML
VIAL (ML) INTRAVENOUS CONTINUOUS PRN
Status: DISCONTINUED | OUTPATIENT
Start: 2018-02-09 | End: 2018-02-09

## 2018-02-09 RX ORDER — GLYCOPYRROLATE 0.2 MG/ML
INJECTION INTRAMUSCULAR; INTRAVENOUS
Status: DISCONTINUED | OUTPATIENT
Start: 2018-02-09 | End: 2018-02-09

## 2018-02-09 RX ORDER — BUPIVACAINE HYDROCHLORIDE 5 MG/ML
INJECTION, SOLUTION EPIDURAL; INTRACAUDAL
Status: DISPENSED
Start: 2018-02-09 | End: 2018-02-09

## 2018-02-09 RX ORDER — VANCOMYCIN HYDROCHLORIDE 1 G/20ML
INJECTION, POWDER, LYOPHILIZED, FOR SOLUTION INTRAVENOUS
Status: DISCONTINUED | OUTPATIENT
Start: 2018-02-09 | End: 2018-02-09 | Stop reason: HOSPADM

## 2018-02-09 RX ORDER — LIDOCAINE HYDROCHLORIDE 10 MG/ML
INJECTION INFILTRATION; PERINEURAL
Status: DISCONTINUED | OUTPATIENT
Start: 2018-02-09 | End: 2018-02-09 | Stop reason: HOSPADM

## 2018-02-09 RX ORDER — FENTANYL CITRATE 50 UG/ML
INJECTION, SOLUTION INTRAMUSCULAR; INTRAVENOUS
Status: DISCONTINUED
Start: 2018-02-09 | End: 2018-02-09 | Stop reason: WASHOUT

## 2018-02-09 RX ORDER — LIDOCAINE HYDROCHLORIDE 10 MG/ML
INJECTION INFILTRATION; PERINEURAL
Status: DISPENSED
Start: 2018-02-09 | End: 2018-02-09

## 2018-02-09 RX ORDER — OXYCODONE HYDROCHLORIDE 5 MG/1
5 TABLET ORAL EVERY 6 HOURS PRN
Status: DISCONTINUED | OUTPATIENT
Start: 2018-02-09 | End: 2018-02-27

## 2018-02-09 RX ORDER — VANCOMYCIN HYDROCHLORIDE 1 G/20ML
INJECTION, POWDER, LYOPHILIZED, FOR SOLUTION INTRAVENOUS
Status: DISPENSED
Start: 2018-02-09 | End: 2018-02-09

## 2018-02-09 RX ORDER — PROPOFOL 10 MG/ML
VIAL (ML) INTRAVENOUS
Status: DISCONTINUED | OUTPATIENT
Start: 2018-02-09 | End: 2018-02-09

## 2018-02-09 RX ORDER — ACETAMINOPHEN 10 MG/ML
1000 INJECTION, SOLUTION INTRAVENOUS EVERY 8 HOURS
Status: COMPLETED | OUTPATIENT
Start: 2018-02-09 | End: 2018-02-10

## 2018-02-09 RX ADMIN — Medication: at 01:02

## 2018-02-09 RX ADMIN — Medication 2.25 G: at 11:02

## 2018-02-09 RX ADMIN — ATORVASTATIN CALCIUM 20 MG: 20 TABLET, FILM COATED ORAL at 01:02

## 2018-02-09 RX ADMIN — GABAPENTIN 100 MG: 100 CAPSULE ORAL at 10:02

## 2018-02-09 RX ADMIN — PROPOFOL 50 MG: 10 INJECTION, EMULSION INTRAVENOUS at 08:02

## 2018-02-09 RX ADMIN — STANDARDIZED SENNA CONCENTRATE AND DOCUSATE SODIUM 2 TABLET: 8.6; 5 TABLET, FILM COATED ORAL at 10:02

## 2018-02-09 RX ADMIN — CINACALCET HYDROCHLORIDE 30 MG: 30 TABLET, COATED ORAL at 10:02

## 2018-02-09 RX ADMIN — SODIUM CHLORIDE: 0.9 INJECTION, SOLUTION INTRAVENOUS at 07:02

## 2018-02-09 RX ADMIN — ASPIRIN 81 MG: 81 TABLET, COATED ORAL at 01:02

## 2018-02-09 RX ADMIN — MICONAZOLE NITRATE: 20 OINTMENT TOPICAL at 10:02

## 2018-02-09 RX ADMIN — PROPOFOL 20 MG: 10 INJECTION, EMULSION INTRAVENOUS at 08:02

## 2018-02-09 RX ADMIN — Medication: at 10:02

## 2018-02-09 RX ADMIN — ACETAMINOPHEN 1000 MG: 10 INJECTION, SOLUTION INTRAVENOUS at 10:02

## 2018-02-09 RX ADMIN — CITALOPRAM HYDROBROMIDE 10 MG: 10 TABLET ORAL at 01:02

## 2018-02-09 RX ADMIN — PROPOFOL 100 MCG/KG/MIN: 10 INJECTION, EMULSION INTRAVENOUS at 08:02

## 2018-02-09 RX ADMIN — APIXABAN 5 MG: 5 TABLET, FILM COATED ORAL at 10:02

## 2018-02-09 RX ADMIN — MICONAZOLE NITRATE: 20 OINTMENT TOPICAL at 01:02

## 2018-02-09 RX ADMIN — LIDOCAINE HYDROCHLORIDE 100 MG: 20 INJECTION, SOLUTION INTRAVENOUS at 08:02

## 2018-02-09 RX ADMIN — STANDARDIZED SENNA CONCENTRATE AND DOCUSATE SODIUM 2 TABLET: 8.6; 5 TABLET, FILM COATED ORAL at 01:02

## 2018-02-09 RX ADMIN — APIXABAN 5 MG: 5 TABLET, FILM COATED ORAL at 01:02

## 2018-02-09 RX ADMIN — GLYCOPYRROLATE 0.2 MG: 0.2 INJECTION, SOLUTION INTRAMUSCULAR; INTRAVENOUS at 08:02

## 2018-02-09 RX ADMIN — GABAPENTIN 100 MG: 100 CAPSULE ORAL at 01:02

## 2018-02-09 RX ADMIN — Medication 2.25 G: at 02:02

## 2018-02-09 RX ADMIN — PROPOFOL 30 MG: 10 INJECTION, EMULSION INTRAVENOUS at 08:02

## 2018-02-09 RX ADMIN — HYDROCODONE BITARTRATE AND ACETAMINOPHEN 1 TABLET: 5; 325 TABLET ORAL at 04:02

## 2018-02-09 NOTE — PLAN OF CARE
Patient arrived from OR with ALESHIA Mccrary CRNA and OR Nurse.  Patient stable.  Report received at this time.  Assumed care of patient at this time.

## 2018-02-09 NOTE — PT/OT/SLP DISCHARGE
Physical Therapy Discharge Summary    Name: Wilder Carbajal  MRN: [id number]   Principal Problem: Septic encephalopathy     Patient Discharged from acute Physical Therapy on 2018.  Please refer to prior PT noted date on 2018 for functional status.     Assessment:     Pt with initial evaluation only with no goals being met    Objective:     GOALS:    Physical Therapy Goals        Problem: Physical Therapy Goal    Goal Priority Disciplines Outcome Goal Variances Interventions   Physical Therapy Goal     PT/OT, PT Ongoing (interventions implemented as appropriate)     Description:  Goals to be met by: 2/15/18     Patient will increase functional independence with mobility by performin. Supine to sit with MInimal Assistance  2. Sit to supine with MInimal Assistance  3. Sit to stand transfer with Minimal Assistance  4. Bed to chair transfer with Minimal Assistance using Rolling Walker  5. Gait  x 10 feet with Moderate Assistance using Rolling Walker.   6. Lower extremity exercise program x 30 reps per handout, with independence                      Reasons for Discontinuation of Therapy Services  Pt underwent toe amputation on       Plan:     Patient Discharged to: tbd; previous recommendation SNF   .    Nani Cota, PT, DPT  2018

## 2018-02-09 NOTE — PROGRESS NOTES
[facility] Hospital Medicine  Progress Note    Patient Name: Wilder Carbajal  MRN: [id number]  Patient Class: IP- Inpatient   Admission Date: 2/4/2018  Length of Stay: 5 days  ESTUARDO: 2/15/2018  Attending Physician: Kanika Bradley MD  Primary Care Provider: Dio Roberson MD    [facility] Medicine Team: [facility] HOSP MED D Kanika Bradley MD    Subjective:     Principal Problem:Septic encephalopathy    Interval History: Patient with no events overnight, no new complaints. S/p surgery today.    Review of Systems   Constitutional: Positive for fatigue.   Respiratory: Negative for shortness of breath.    Psychiatric/Behavioral: Positive for dysphoric mood.     Objective:     Vital Signs (Most Recent):  Temp: 98.2 °F (36.8 °C) (02/09/18 1131)  Pulse: 65 (02/09/18 1131)  Resp: 14 (02/09/18 1131)  BP: (!) 119/56 (02/09/18 1131)  SpO2: 96 % (02/09/18 1131) Vital Signs (24h Range):  Temp:  [97.8 °F (36.6 °C)-100 °F (37.8 °C)] 98.2 °F (36.8 °C)  Pulse:  [53-67] 65  Resp:  [10-19] 14  SpO2:  [75 %-100 %] 96 %  BP: (101-120)/(44-58) 119/56     Weight: 49.4 kg (109 lb)  Body mass index is 16.57 kg/m².    Intake/Output Summary (Last 24 hours) at 02/09/18 1301  Last data filed at 02/09/18 1030   Gross per 24 hour   Intake          1368.84 ml   Output             2500 ml   Net         -1131.16 ml      Physical Exam   Constitutional: He appears well-developed.   HENT:   Head: Normocephalic.   Mouth/Throat: Mucous membranes are not pale and not cyanotic.   Eyes: Conjunctivae and lids are normal.   Neck: Neck supple.   Cardiovascular: S1 normal and S2 normal.    Murmur heard.  Pulmonary/Chest: Effort normal and breath sounds normal.   Abdominal: Soft. Bowel sounds are normal. There is no tenderness.   Musculoskeletal: He exhibits no edema.        Right foot: There is decreased range of motion (due to surgical dressing).   Neurological: He is alert. He is not disoriented.   Skin: Skin is warm and dry. He is not  diaphoretic. No cyanosis. Nails show no clubbing.   Psychiatric: He exhibits a depressed mood.       Significant Labs:   A1C:     Recent Labs  Lab 01/07/18  0008   HGBA1C 4.6     CBC:     Recent Labs  Lab 02/08/18  0509 02/09/18  0406   WBC 14.91* 15.64*   HGB 7.6* 8.0*   HCT 22.1* 24.0*    167     CMP:     Recent Labs  Lab 02/08/18  0509 02/09/18  0406    136   K 4.3 4.4   CL 99 99   CO2 25 24   GLU 63* 57*   BUN 53* 41*   CREATININE 8.0* 6.4*   CALCIUM 8.7 8.4*   ALBUMIN 1.5* 1.8*   ANIONGAP 12 13   EGFRNONAA 6.2* 8.2*     Magnesium:     Recent Labs  Lab 02/08/18  0509 02/09/18  0406   MG 2.2 2.1     Assessment/Plan:      Brief HPI:      Hospital Course Overview:     Current Hospital Problem List:    Active Hospital Problems    Diagnosis  POA    *Septic encephalopathy [G93.41]  Yes    Osteomyelitis [M86.9]  Unknown    Foot ulceration, right, with fat layer exposed [L97.512]  Yes    Acute delirium [R41.0]  Yes    Protein-calorie malnutrition, severe [E43]  Yes    DVT (deep venous thrombosis) [I82.409]  Yes    DM type 2 with diabetic mixed hyperlipidemia [E11.69, E78.2]  Yes    Diabetic polyneuropathy associated with type 2 diabetes mellitus [E11.42]  Yes    Chronic kidney disease-mineral and bone disorder [N18.9, E83.9, M89.9]  Yes    Depression [F32.9]  Yes    Alteration in skin integrity [R23.9]  Yes    Infected skin ulcer with fat layer exposed [L98.492, L08.9]  Yes    Hyperkalemia [E87.5]  Yes    Hypervolemia [E87.70]  Yes    Type 2 diabetes mellitus with stage 5 chronic kidney disease and hypertension [E11.22, I12.0, N18.5]  Yes    ESRD (end stage renal disease) [N18.6]  Yes    Essential hypertension [I10]  Yes      Resolved Hospital Problems    Diagnosis Date Resolved POA   No resolved problems to display.       Medications for management of current problems:      sodium chloride 0.9%   Intravenous Once    sodium chloride 0.9%   Intravenous Once    ammonium lactate   Topical  (Top) BID    apixaban  5 mg Oral BID    aspirin  81 mg Oral Daily    atorvastatin  20 mg Oral Daily    bupivacaine (PF) 0.5% (5 mg/ml)        cinacalcet  30 mg Oral QHS    citalopram  10 mg Oral Daily    gabapentin  100 mg Oral TID    lidocaine HCL 10 mg/ml (1%)        miconazole nitrate 2%   Topical (Top) BID    piperacillin-tazobactam (ZOSYN) IVPB  2.25 g Intravenous Q8H    senna-docusate 8.6-50 mg  2 tablet Oral BID    sevelamer carbonate  1,600 mg Oral TID WM    vancomycin           PRN:   sodium chloride 0.9%    acetaminophen    albumin human 25%    hydrocodone-acetaminophen 5-325mg    midodrine    ondansetron    sodium chloride 0.9%    sodium chloride 0.9%     IV Fluids: none    Problems addressed today:    Septic encephalopathy on possible dementia  Should improve with treatment of his infected foot. Patient currently lacks capacity to refuse surgery, consent obtained from his son as proxy.     Infected right dorsal foot, ischemic ulceration  Podiatry consulted - s/p I&D and probable 4th and 5th amputation  Vascular surgery consulted - no intervention required  Wound cultures pending  Continue vancomycin 1 g IV per random levels and Zosyn 2.25 g IV BID  Consider ID consult after surgery or when culture results return.  Wound culture reveals pan-sensitive CITROBACTER KOSERI.   To OR 02/09/18. Consulted ID.     Hypervolemia due to non-compliance with dialysis  Hyperkalemia, resolved  Shifted in ER with albuterol, insulin/destrose, bicarbonate. K 7.9--> 4  No respiratory compromise. Emergent dialysis 2/5 early AM  Daily weights  Withhold anti-hypertensives for volume removal      DVT  BLE ultrasound  apixaban 5 mg BID - ? compliance     DM II with HTN and CKD V  HgbA1c 4.6%  Likely controlled with declining intake  No further intervention needed at this time     Protein calorie malnutrtion  Poor prognosis for wound healing  Nepro      Essential HTN - hold home hydralazine 50 mg TID and  losartan 50 mg daily     DM II hyperlipidemia - atorvastatin 40 mg daily     DM II neuropathy - gabapentin 100 mg TID     ESRF HD TTS - Nephrology consulted for dialysis.  Withholding anti-hypertensives and added midodrine as needed prior to dialysis for volume removal and reduce symptomatic dialysis-associated hypotension.   BP improving.    CKD MBD - continue cinacalcet 30 mg qhs and sevelamer 1600 mg daily     Depression - continue citalopram 10 mg daily. Consulted psychiatry. Rule out delirium and dementia.    Risk:  Patient is currently on drug therapy requiring intensive monitoring for toxicity: Vancomycin    Anticipated Disposition: Skilled Nursing Facility  ESTUARDO 2/15/2018    Goals of Care:  General Prognosis: poor  Goals: Curative  Comfort Only: No  Hospice: No  Code Status: Full Code    VTE Risk Mitigation         Ordered     apixaban tablet 5 mg  2 times daily     Route:  Oral        02/04/18 1724     Medium Risk of VTE  Once      02/04/18 1725             Kanika Bradley MD  Department of Hospital Medicine   [facility]

## 2018-02-09 NOTE — CONSULTS
Consult received re: diabetic. Noted pt's A1C is 4.3%, RD does not provide inpatient education until A1C >9. No education warranted at this time. Will monitor.

## 2018-02-09 NOTE — CONSULTS
[facility]  Infectious Disease  Consult Note      Inpatient consult to Infectious Diseases  Consult performed by: LISANDRA TRAN  Consult ordered by: KAREN DLEANEY        Assessment/Plan:     Osteomyelitis    ID consult received for osteomyelitis of foot.  Currently on vancomycin and zosyn. Agree with this therapy for now; optimized dosing.   Full consult to follow

## 2018-02-09 NOTE — PT/OT/SLP DISCHARGE
Occupational Therapy Discharge Summary    Wilder Carbajal  MRN: [id number]   Principal Problem: Septic encephalopathy      Patient Discharged from acute Occupational Therapy on 2/9/2018.  Please refer to prior OT note dated 2/5/2018 for functional status.    Assessment:      Patient has not met goals.    Objective:     GOALS:    Occupational Therapy Goals     Not on file          Multidisciplinary Problems (Resolved)        Problem: Occupational Therapy Goal    Goal Priority Disciplines Outcome Interventions   Occupational Therapy Goal   (Resolved)     OT, PT/OT Outcome(s) achieved    Description:  Goals to be met by: 7 days      Patient will increase functional independence with ADLs by performing:    UE Dressing with Moderate Assistance.  LE Dressing with Moderate Assistance.  Grooming while seated with Supervision.  Toileting from bedside commode with Moderate Assistance for hygiene and clothing management.   Supine to sit with Moderate Assistance.  Stand pivot transfers with Moderate Assistance.  Toilet transfer to bedside commode with Moderate Assistance.  Increased functional strength to WFL for safe and independent function with self-care and functional mobility tasks.                      Reasons for Discontinuation of Therapy Services  Transfer to alternate level of care.  Pt underwent toe amputation on 2/9     Plan:   Will require new orders post op for re-eval and treatment. Prior rec from evaluating OT: SNF    MARIA ESTHER Quinones  2/9/2018

## 2018-02-09 NOTE — TRANSFER OF CARE
"Anesthesia Transfer of Care Note    Patient: Wilder Carbajal    Procedure(s) Performed: Procedure(s) (LRB):  AMPUTATION-TOE, I&D (Right)    Patient location: PACU    Anesthesia Type: epidural    Transport from OR: Transported from OR on 6-10 L/min O2 by face mask with adequate spontaneous ventilation    Post pain: adequate analgesia    Post assessment: no apparent anesthetic complications    Post vital signs: stable    Level of consciousness: sedated    Nausea/Vomiting: no nausea/vomiting    Complications: none    Transfer of care protocol was followed      Last vitals:   Visit Vitals  BP (!) 115/46 (BP Location: Right arm, Patient Position: Lying)   Pulse (!) 56   Temp 37.1 °C (98.7 °F) (Oral)   Resp 16   Ht 5' 8" (1.727 m)   Wt 49.4 kg (109 lb)   SpO2 98%   BMI 16.57 kg/m²     "

## 2018-02-09 NOTE — OP NOTE
Operative Note       Surgery Date: 2/9/2018     Surgeon(s) and Role:     * Yaritza Matamoros DPM - Primary     * George Rodríguez MD - Resident - Assisting    Pre-op Diagnosis:  Foot ulceration, right, with fat layer exposed [L97.512]  Type 2 diabetes mellitus with stage 5 chronic kidney disease and hypertension [E11.22, I12.0, N18.5]    Post-op Diagnosis: Post-Op Diagnosis Codes:     * Foot ulceration, right, with fat layer exposed [L97.512]     * Type 2 diabetes mellitus with stage 5 chronic kidney disease and hypertension [E11.22, I12.0, N18.5]    Procedure(s) (LRB):  AMPUTATION-TOES 4TH AND 5TH, I&D (Right)   Right foot debridement    Anesthesia: Local MAC    Procedure in Detail/Findings:  Pre operatively patient was unable to get regional block due to edema and thickened skin. The patient was brought to the operating room on a stretcher and placed on the operating table in a supine position. Following the successful induction of MAC anesthesia, a tourniquet was placed on the right ankle, and a block of 20cc of 1% lidocaine plain, and 0.5% bupivacaine plain was given.  The  foot was then prepped, draped in a typical sterile manner. .      Necrotic 4th and 5th digits with necrosis extending plantar and proximally was noted.  Attention was directed to the right lateral foot where an incision was made going from dorsal to the plantar aspect following the demarcated necrotic wound.  Using sharp and blunt dissection the incision was deepened to the level of bone. Bleeder were cauterized as necessary.   Sagittal saw was used to resect at the shaft of the 4th and 5th metatarsals, which were then removed and prepped to be sent to pathology    Swabs were taken for culture and sensitivity. All bleeding vessels were bovied as necessary.  The wound was then irrigated with normal saline, and surgicel was placed to obtain hemostasis. The flexor and extensor tendons were grasped and cut/removed as proximally as possible. It was  noted that there was some residual infarcted tissue within the tunnel of the plantar wound along the 3rd ray.  No purulent drainage was appreciated during the surgery.  The wound measured approximately 12cm x 10cm, with bone exposed.      Surgicel was removed, and the  wound was packed with 4x4 soaked gauze after the tunneling in the wound was irrigated with a solution of saline and vancomycin powder. Wound was dressed with betadine soaked gauze, 4x4's, kerlix, ABD, cast padding, kerlix, and ACE bandage.      The patient was transported to recovery with vital signs stable and vascular status intact as well as the following post op instructions.   1. Keep dressing dry and intact until podiatry rounds   2. Patient to be non weight bearing  3. Ice and elevate right foot when at rest.  4. Medical management to continue while inpatient.   5. Contact Podiatry for all post op follow up care and if any problems arise.      Estimated Blood Loss:less than 10cc         Specimens     None        Implants: * No implants in log *           Disposition: PACU - hemodynamically stable.           Condition: Good    Attestation:  I was present and scrubbed for the entire procedure.  Yaritza Matamoros DPM

## 2018-02-09 NOTE — CONSULTS
[facility]  Infectious Disease  Consult Note    Patient Name: Wilder Carbajal  MRN: [id number]  Admission Date: 2/4/2018  Hospital Length of Stay: 5 days  Attending Physician: Kanika Bradley MD  Primary Care Provider: Dio Roberson MD     Isolation Status: No active isolations    Patient information was obtained from patient and past medical records.      Consults  Assessment/Plan:     Osteomyelitis    67 year old male admitted for leg swelling; found to have ulcerations of right foot with necrotic 4th and 5th digits; he is now s/p amputation of 4th and 5th digits; wound cultures with citrobacter; OR cultures pending:  - pt with elevated WBC count at 15K; tmax- 100  - pt is confused, unsure what his baseline mental status; also just came out of surgery  - podiatry following  - seen by vascular; no intervention at this time    Plan:  1. Continue vancomycin and zosyn 2.25 gram IV q 8 hours empirically for now; vancomycin on hold given level of 23 today; will repeat random vanc in the a.m.  2. Will f/u on OR cultures and pathology  3. If concern for residual infection, anticipate 6 weeks of antibiotics for osteomyelitis  4. Will follow with you            Thank you for your consult. I will follow-up with patient. Please contact us if you have any additional questions.  MARYJANE Yanes, pager: [phone]  Infectious Disease  [facility]    Subjective:     Principal Problem: Septic encephalopathy    HPI: Patient is a 68 y/o male with ESRD on dialysis TTS  who presented with leg swelling for two weeks. He states he has had associated blisters on his legs with fluid weeping out of them. Patient stated he missed dialysis yesterday (Saturday) because his [facility] transportation did not arrive. He denies fevers, chills, URI symptoms, or diarrhea. His son called EMS. Patient was admitted 1/7-1/14 for volume overload due to HD non-compliance. He was still above dry weight and hypoxic when he  left AMA. Patient maintaining that he has been complaint with dialysis since until yesterday.    Patient is a poor historian and falling asleep during my exam. Patient was found to have necrotic tissue to the right 4th and 5th digit. MRI was attempted but patient was unable to be still for the exam. He is now s/p amputation of 4th and 5th toes. OR cultures are pending. Wound cultures are positive for citrobacter koseri. He is currently on vancomycin and zosyn.    Past Medical History:   Diagnosis Date    Acute pain of left shoulder 1/7/2017    Arthritis     Asthma     Benign neoplasm of eyelid     RUL    Blood clotting tendency     Blood transfusion     Cataract     Chronic kidney disease requiring chronic dialysis     Diabetes mellitus     Diabetic retinopathy     Fever blister     Hyperlipidemia     Hypertension     Infertility     Joint pain     Retinal detachment     Thyroid disease     Weakness 1/7/2017       Past Surgical History:   Procedure Laterality Date    CATARACT EXTRACTION      COLONOSCOPY N/A 6/23/2017    Procedure: COLONOSCOPY;  Surgeon: Vic Clark MD;  Location: [address]);  Service: Endoscopy;  Laterality: N/A;    RETINAL DETACHMENT SURGERY         Review of patient's allergies indicates:  No Known Allergies    Medications:  Prescriptions Prior to Admission   Medication Sig    ammonium lactate (LAC-HYDRIN) 12 % lotion Apply topically as needed for Dry Skin.    apixaban 5 mg Tab Take 1 tablet (5 mg total) by mouth 2 (two) times daily.    aspirin (ECOTRIN) 81 MG EC tablet Take 81 mg by mouth once daily.     atorvastatin (LIPITOR) 20 MG tablet Take 20 mg by mouth once daily.    cetirizine (ZYRTEC) 10 MG tablet Take 10 mg by mouth once daily.    cinacalcet (SENSIPAR) 30 MG Tab Take 30 mg by mouth every evening.    citalopram (CELEXA) 10 MG tablet Take 10 mg by mouth once daily.    diclofenac sodium (VOLTAREN) 1 % Gel Apply 2 g topically 4 (four) times daily as  needed (for pain).    ergocalciferol (ERGOCALCIFEROL) 50,000 unit Cap Take 50,000 Units by mouth every 14 (fourteen) days.     furosemide (LASIX) 80 MG tablet Take 80 mg by mouth once daily.    gabapentin (NEURONTIN) 100 MG capsule Take 100 mg by mouth 3 (three) times daily.    hydrALAZINE (APRESOLINE) 50 MG tablet Take 50 mg by mouth 3 (three) times daily.    hydrocodone-acetaminophen 5-325mg (NORCO) 5-325 mg per tablet Take 1 tablet by mouth every 6 (six) hours as needed for Pain.    ipratropium (ATROVENT HFA) 17 mcg/actuation inhaler Inhale 2 puffs into the lungs 4 (four) times daily. Do not exceed 12 puffs per day    losartan (COZAAR) 50 MG tablet Take 1 tablet (50 mg total) by mouth once daily.    mometasone (NASONEX) 50 mcg/actuation nasal spray 2 sprays by Nasal route once daily.    podofilox (CONDYLOX) 0.5 % external solution Apply a small amount to lesions twice daily for three days, then hold for four days and repeat regimen    sevelamer carbonate (RENVELA) 800 mg Tab Take 2 tablets (1,600 mg total) by mouth 3 (three) times daily with meals. Take two tablets by mouth four times daily with meals and snacks     Antibiotics     Start     Stop Route Frequency Ordered    02/09/18 2100  piperacillin-tazobactam 2.25 g in sodium chloride 5 % 50 mL IVPB (ready to mix system)      -- IV Every 8 hours (non-standard times) 02/09/18 1359    02/09/18 0835  vancomycin (VANCOCIN) 1,000 mg injection     Comments:  Created by cabinet override    02/09 2044 02/09/18 0835        Antifungals     Start     Stop Route Frequency Ordered    02/05/18 2100  miconazole nitrate 2% ointment      -- Top 2 times daily 02/05/18 1934        Antivirals     None           Immunization History   Administered Date(s) Administered    Influenza - Trivalent - PF (ADULT) 10/02/2015    PPD Test 02/05/2018    Pneumococcal Polysaccharide - 23 Valent 05/03/2012    Tdap 09/14/2017       Family History     Problem Relation (Age of Onset)     Depression Child    Heart attack Mother    No Known Problems Father, Sister, Brother, Maternal Aunt, Maternal Uncle, Paternal Aunt, Paternal Uncle, Maternal Grandmother, Maternal Grandfather, Paternal Grandmother, Paternal Grandfather        Social History     Social History    Marital status: Single     Spouse name: N/A    Number of children: 3    Years of education: N/A     Social History Main Topics    Smoking status: Never Smoker    Smokeless tobacco: Never Used    Alcohol use No    Drug use: No    Sexual activity: Not Asked     Other Topics Concern    None     Social History Narrative    Has lived with son for about 2 years now. Formerly worked as a [employer] but quit in 1992 to become member of a [employer].     Review of Systems   Unable to perform ROS: Mental status change   Skin: Positive for wound.   Psychiatric/Behavioral: Positive for confusion.     Objective:     Vital Signs (Most Recent):  Temp: 98.2 °F (36.8 °C) (02/09/18 1131)  Pulse: 65 (02/09/18 1131)  Resp: 14 (02/09/18 1131)  BP: (!) 119/56 (02/09/18 1131)  SpO2: 96 % (02/09/18 1131) Vital Signs (24h Range):  Temp:  [97.8 °F (36.6 °C)-100 °F (37.8 °C)] 98.2 °F (36.8 °C)  Pulse:  [53-67] 65  Resp:  [10-19] 14  SpO2:  [75 %-100 %] 96 %  BP: (101-120)/(44-58) 119/56     Weight: 49.4 kg (109 lb)  Body mass index is 16.57 kg/m².    Estimated Creatinine Clearance: 7.7 mL/min (based on SCr of 6.4 mg/dL (H)).    Physical Exam   Constitutional: He is oriented to person, place, and time. He appears well-developed and well-nourished. No distress.   Cardiovascular: Normal rate and regular rhythm.    Murmur heard.  Pulmonary/Chest: Effort normal and breath sounds normal. No respiratory distress.   Abdominal: Soft. Bowel sounds are normal. He exhibits no distension.   Musculoskeletal: Normal range of motion. He exhibits no edema.   Right foot with surgical dressing in place   Neurological: He is alert and oriented to person, place, and time.   Skin: Skin is  warm and dry.   Psychiatric: He has a normal mood and affect. His behavior is normal.       Significant Labs:   Blood Culture:   Recent Labs  Lab 02/04/18  1223 02/04/18  1423   LABBLOO No growth after 5 days. No Growth to date  No Growth to date  No Growth to date  No Growth to date  No Growth to date     CBC:   Recent Labs  Lab 02/08/18  0509 02/09/18  0406   WBC 14.91* 15.64*   HGB 7.6* 8.0*   HCT 22.1* 24.0*    167     CMP:   Recent Labs  Lab 02/08/18  0509 02/09/18  0406    136   K 4.3 4.4   CL 99 99   CO2 25 24   GLU 63* 57*   BUN 53* 41*   CREATININE 8.0* 6.4*   CALCIUM 8.7 8.4*   ALBUMIN 1.5* 1.8*   ANIONGAP 12 13   EGFRNONAA 6.2* 8.2*     Wound Culture:   Recent Labs  Lab 08/14/17  1216 02/05/18  0820 02/06/18  1329   LABAERO Skin anika,  no predominant organism CITROBACTER KOSERIMany CITROBACTER KOSERIModerate       Significant Imaging: I have reviewed all pertinent imaging results/findings within the past 24 hours.

## 2018-02-09 NOTE — NURSING
Contacted dialysis RN re: pt's plan for HD.  Pt to be dialyzed tonight before surgery in the morning.  HD RN expects to dialyze after 00:00.

## 2018-02-09 NOTE — PLAN OF CARE
Problem: Physical Therapy Goal  Goal: Physical Therapy Goal  Goals to be met by: 2/15/18     Patient will increase functional independence with mobility by performin. Supine to sit with MInimal Assistance  2. Sit to supine with MInimal Assistance  3. Sit to stand transfer with Minimal Assistance  4. Bed to chair transfer with Minimal Assistance using Rolling Walker  5. Gait  x 10 feet with Moderate Assistance using Rolling Walker.   6. Lower extremity exercise program x 30 reps per handout, with independence     Outcome: Outcome(s) achieved Date Met: 18  Orders discontinued due to pt undergoing sx toe amputation on . Will need new orders to continue with PT/OT services.    Nani Cota, PT, DPT  2018

## 2018-02-09 NOTE — PLAN OF CARE
Problem: Occupational Therapy Goal  Goal: Occupational Therapy Goal  Goals to be met by: 7 days      Patient will increase functional independence with ADLs by performing:    UE Dressing with Moderate Assistance.  LE Dressing with Moderate Assistance.  Grooming while seated with Supervision.  Toileting from bedside commode with Moderate Assistance for hygiene and clothing management.   Supine to sit with Moderate Assistance.  Stand pivot transfers with Moderate Assistance.  Toilet transfer to bedside commode with Moderate Assistance.  Increased functional strength to WFL for safe and independent function with self-care and functional mobility tasks.     Outcome: Outcome(s) achieved Date Met: 02/09/18  Goals not met. MARIA ESTHER Quinones 2/9/2018

## 2018-02-09 NOTE — HPI
Patient is a 68 y/o male with ESRD on dialysis TTS who presented with leg swelling for two weeks. He states he has had associated blisters on his legs with fluid weeping out of them. Patient stated he missed dialysis yesterday (Saturday) because his [facility] transportation did not arrive. He denies fevers, chills, URI symptoms, or diarrhea. His son called EMS. Patient was admitted 1/7-1/14 for volume overload due to HD non-compliance. He was still above dry weight and hypoxic when he left AMA. Patient maintaining that he has been complaint with dialysis since until yesterday.    Patient is a poor historian and falling asleep during my exam. Patient was found to have necrotic tissue to the right 4th and 5th digit. MRI was attempted but patient was unable to be still for the exam. He is now s/p amputation of 4th and 5th toes. OR cultures are pending. Wound cultures are positive for citrobacter koseri. He is currently on vancomycin and zosyn.    Interval history: ID signed off on 2/14; patient was on cipro 500 mg PO q 24 hours dosed after HD. Patient has not undergone left AKA with Dr. Zhou and source control has been achieved. ID re-consulted for further antibiotics. Patient feels okay other than pain to right AKA stump.

## 2018-02-09 NOTE — SUBJECTIVE & OBJECTIVE
Past Medical History:   Diagnosis Date    Acute pain of left shoulder 1/7/2017    Arthritis     Asthma     Benign neoplasm of eyelid     RUL    Blood clotting tendency     Blood transfusion     Cataract     Chronic kidney disease requiring chronic dialysis     Diabetes mellitus     Diabetic retinopathy     Fever blister     Hyperlipidemia     Hypertension     Infertility     Joint pain     Retinal detachment     Thyroid disease     Weakness 1/7/2017       Past Surgical History:   Procedure Laterality Date    CATARACT EXTRACTION      COLONOSCOPY N/A 6/23/2017    Procedure: COLONOSCOPY;  Surgeon: Vic Clark MD;  Location: [address]);  Service: Endoscopy;  Laterality: N/A;    RETINAL DETACHMENT SURGERY         Review of patient's allergies indicates:  No Known Allergies    Medications:  Prescriptions Prior to Admission   Medication Sig    ammonium lactate (LAC-HYDRIN) 12 % lotion Apply topically as needed for Dry Skin.    apixaban 5 mg Tab Take 1 tablet (5 mg total) by mouth 2 (two) times daily.    aspirin (ECOTRIN) 81 MG EC tablet Take 81 mg by mouth once daily.     atorvastatin (LIPITOR) 20 MG tablet Take 20 mg by mouth once daily.    cetirizine (ZYRTEC) 10 MG tablet Take 10 mg by mouth once daily.    cinacalcet (SENSIPAR) 30 MG Tab Take 30 mg by mouth every evening.    citalopram (CELEXA) 10 MG tablet Take 10 mg by mouth once daily.    diclofenac sodium (VOLTAREN) 1 % Gel Apply 2 g topically 4 (four) times daily as needed (for pain).    ergocalciferol (ERGOCALCIFEROL) 50,000 unit Cap Take 50,000 Units by mouth every 14 (fourteen) days.     furosemide (LASIX) 80 MG tablet Take 80 mg by mouth once daily.    gabapentin (NEURONTIN) 100 MG capsule Take 100 mg by mouth 3 (three) times daily.    hydrALAZINE (APRESOLINE) 50 MG tablet Take 50 mg by mouth 3 (three) times daily.    hydrocodone-acetaminophen 5-325mg (NORCO) 5-325 mg per tablet Take 1 tablet by mouth every 6 (six) hours  as needed for Pain.    ipratropium (ATROVENT HFA) 17 mcg/actuation inhaler Inhale 2 puffs into the lungs 4 (four) times daily. Do not exceed 12 puffs per day    losartan (COZAAR) 50 MG tablet Take 1 tablet (50 mg total) by mouth once daily.    mometasone (NASONEX) 50 mcg/actuation nasal spray 2 sprays by Nasal route once daily.    podofilox (CONDYLOX) 0.5 % external solution Apply a small amount to lesions twice daily for three days, then hold for four days and repeat regimen    sevelamer carbonate (RENVELA) 800 mg Tab Take 2 tablets (1,600 mg total) by mouth 3 (three) times daily with meals. Take two tablets by mouth four times daily with meals and snacks     Antibiotics     Start     Stop Route Frequency Ordered    02/09/18 2100  piperacillin-tazobactam 2.25 g in sodium chloride 5 % 50 mL IVPB (ready to mix system)      -- IV Every 8 hours (non-standard times) 02/09/18 1359    02/09/18 0835  vancomycin (VANCOCIN) 1,000 mg injection     Comments:  Created by cabinet override    02/09 2044   02/09/18 0835        Antifungals     Start     Stop Route Frequency Ordered    02/05/18 2100  miconazole nitrate 2% ointment      -- Top 2 times daily 02/05/18 1934        Antivirals     None           Immunization History   Administered Date(s) Administered    Influenza - Trivalent - PF (ADULT) 10/02/2015    PPD Test 02/05/2018    Pneumococcal Polysaccharide - 23 Valent 05/03/2012    Tdap 09/14/2017       Family History     Problem Relation (Age of Onset)    Depression Child    Heart attack Mother    No Known Problems Father, Sister, Brother, Maternal Aunt, Maternal Uncle, Paternal Aunt, Paternal Uncle, Maternal Grandmother, Maternal Grandfather, Paternal Grandmother, Paternal Grandfather        Social History     Social History    Marital status: Single     Spouse name: N/A    Number of children: 3    Years of education: N/A     Social History Main Topics    Smoking status: Never Smoker    Smokeless tobacco:  Never Used    Alcohol use No    Drug use: No    Sexual activity: Not Asked     Other Topics Concern    None     Social History Narrative    Has lived with son for about 2 years now. 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     Review of Systems   Unable to perform ROS: Mental status change   Skin: Positive for wound.   Psychiatric/Behavioral: Positive for confusion.     Objective:     Vital Signs (Most Recent):  Temp: 98.2 °F (36.8 °C) (02/09/18 1131)  Pulse: 65 (02/09/18 1131)  Resp: 14 (02/09/18 1131)  BP: (!) 119/56 (02/09/18 1131)  SpO2: 96 % (02/09/18 1131) Vital Signs (24h Range):  Temp:  [97.8 °F (36.6 °C)-100 °F (37.8 °C)] 98.2 °F (36.8 °C)  Pulse:  [53-67] 65  Resp:  [10-19] 14  SpO2:  [75 %-100 %] 96 %  BP: (101-120)/(44-58) 119/56     Weight: 49.4 kg (109 lb)  Body mass index is 16.57 kg/m².    Estimated Creatinine Clearance: 7.7 mL/min (based on SCr of 6.4 mg/dL (H)).    Physical Exam   Constitutional: He is oriented to person, place, and time. He appears well-developed and well-nourished. No distress.   Cardiovascular: Normal rate and regular rhythm.    Murmur heard.  Pulmonary/Chest: Effort normal and breath sounds normal. No respiratory distress.   Abdominal: Soft. Bowel sounds are normal. He exhibits no distension.   Musculoskeletal: Normal range of motion. He exhibits no edema.   Right foot with surgical dressing in place   Neurological: He is alert and oriented to person, place, and time.   Skin: Skin is warm and dry.   Psychiatric: He has a normal mood and affect. His behavior is normal.       Significant Labs:   Blood Culture:   Recent Labs  Lab 02/04/18  1223 02/04/18  1423   LABBLOO No growth after 5 days. No Growth to date  No Growth to date  No Growth to date  No Growth to date  No Growth to date     CBC:   Recent Labs  Lab 02/08/18  0509 02/09/18  0406   WBC 14.91* 15.64*   HGB 7.6* 8.0*   HCT 22.1* 24.0*    167     CMP:   Recent Labs  Lab  02/08/18  0509 02/09/18  0406    136   K 4.3 4.4   CL 99 99   CO2 25 24   GLU 63* 57*   BUN 53* 41*   CREATININE 8.0* 6.4*   CALCIUM 8.7 8.4*   ALBUMIN 1.5* 1.8*   ANIONGAP 12 13   EGFRNONAA 6.2* 8.2*     Wound Culture:   Recent Labs  Lab 08/14/17  1216 02/05/18  0820 02/06/18  1329   LABAERO Skin anika,  no predominant organism CITROBACTER KOSERIMany CITROBACTER KOSERIModerate       Significant Imaging: I have reviewed all pertinent imaging results/findings within the past 24 hours.

## 2018-02-09 NOTE — PROGRESS NOTES
[facility] Hospital Medicine  Progress Note    Patient Name: Wilder Carbajal  MRN: [id number]  Patient Class: IP- Inpatient   Admission Date: 2/4/2018  Length of Stay: 4 days  ESTUARDO: 2/12/2018  Attending Physician: Kanika Bradley MD  Primary Care Provider: Dio Roberson MD    [facility] Medicine Team: [facility]OSP MED D Kanika Bradley MD    Subjective:     Principal Problem:Septic encephalopathy    Interval History: Patient with no events overnight, no new complaints.    Review of Systems   Constitutional: Positive for fatigue.   Respiratory: Negative for shortness of breath.    Psychiatric/Behavioral: Positive for dysphoric mood.     Objective:     Vital Signs (Most Recent):  Temp: 99.6 °F (37.6 °C) (02/08/18 1616)  Pulse: 66 (02/08/18 1616)  Resp: 16 (02/08/18 1616)  BP: (!) 107/54 (02/08/18 1616)  SpO2: 97 % (02/08/18 1619) Vital Signs (24h Range):  Temp:  [98.1 °F (36.7 °C)-99.6 °F (37.6 °C)] 99.6 °F (37.6 °C)  Pulse:  [53-67] 66  Resp:  [16-18] 16  SpO2:  [89 %-97 %] 97 %  BP: (100-107)/(51-59) 107/54     Weight: 49.4 kg (109 lb)  Body mass index is 16.57 kg/m².    Intake/Output Summary (Last 24 hours) at 02/08/18 1823  Last data filed at 02/08/18 1347   Gross per 24 hour   Intake              660 ml   Output                0 ml   Net              660 ml      Physical Exam   Constitutional: He appears well-developed.   HENT:   Head: Normocephalic.   Mouth/Throat: Mucous membranes are not pale and not cyanotic.   Eyes: Conjunctivae and lids are normal.   Neck: Neck supple.   Cardiovascular: S1 normal and S2 normal.    Murmur heard.  Pulmonary/Chest: Effort normal and breath sounds normal.   Abdominal: Soft. Bowel sounds are normal. There is no tenderness.   Musculoskeletal: He exhibits no edema.        Right foot: There is decreased range of motion (due to surgical dressing).   Neurological: He is alert. He is not disoriented.   Skin: Skin is warm and dry. He is not diaphoretic. No cyanosis.  Nails show no clubbing.   Psychiatric: He exhibits a depressed mood.       Significant Labs:   A1C:     Recent Labs  Lab 01/07/18  0008   HGBA1C 4.6     CBC:     Recent Labs  Lab 02/07/18  0412 02/08/18  0509   WBC 15.46* 14.91*   HGB 7.8* 7.6*   HCT 23.4* 22.1*    167     CMP:     Recent Labs  Lab 02/07/18  0412 02/08/18  0509    136   K 3.7 4.3   CL 99 99   CO2 26 25   GLU 87 63*   BUN 45* 53*   CREATININE 7.2* 8.0*   CALCIUM 9.1 8.7   ALBUMIN 1.6* 1.5*   ANIONGAP 12 12   EGFRNONAA 7.1* 6.2*     Magnesium:     Recent Labs  Lab 02/07/18 0412 02/08/18  0509   MG 2.2 2.2     Assessment/Plan:      Brief HPI:      Hospital Course Overview:     Current Hospital Problem List:    Active Hospital Problems    Diagnosis  POA    *Septic encephalopathy [G93.41]  Yes    Foot ulceration, right, with fat layer exposed [L97.512]  Yes    Acute delirium [R41.0]  Yes    Protein-calorie malnutrition, severe [E43]  Yes    DVT (deep venous thrombosis) [I82.409]  Yes    DM type 2 with diabetic mixed hyperlipidemia [E11.69, E78.2]  Yes    Diabetic polyneuropathy associated with type 2 diabetes mellitus [E11.42]  Yes    Chronic kidney disease-mineral and bone disorder [N18.9, E83.9, M89.9]  Yes    Depression [F32.9]  Yes    Alteration in skin integrity [R23.9]  Yes    Infected skin ulcer with fat layer exposed [L98.492, L08.9]  Yes    Hyperkalemia [E87.5]  Yes    Hypervolemia [E87.70]  Yes    Type 2 diabetes mellitus with stage 5 chronic kidney disease and hypertension [E11.22, I12.0, N18.5]  Yes    ESRD (end stage renal disease) [N18.6]  Yes    Essential hypertension [I10]  Yes      Resolved Hospital Problems    Diagnosis Date Resolved POA   No resolved problems to display.       Medications for management of current problems:      ammonium lactate   Topical (Top) BID    apixaban  5 mg Oral BID    aspirin  81 mg Oral Daily    atorvastatin  20 mg Oral Daily    cinacalcet  30 mg Oral QHS    citalopram  10  mg Oral Daily    gabapentin  100 mg Oral TID    miconazole nitrate 2%   Topical (Top) BID    piperacillin-tazobactam (ZOSYN) IVPB  2.25 g Intravenous Q12H    senna-docusate 8.6-50 mg  2 tablet Oral BID    sevelamer carbonate  1,600 mg Oral TID WM       PRN:   acetaminophen    hydrocodone-acetaminophen 5-325mg    midodrine    ondansetron    sodium chloride 0.9%     IV Fluids: none    Problems addressed today:    Septic encephalopathy on possible dementia  Should improve with treatment of his infected foot. Patient currently lacks capacity to refuse surgery, consent obtained from his son as proxy.     Infected right dorsal foot, ischemic ulceration  Podiatry consulted - s/p I&D and probable 4th and 5th amputation  Vascular surgery consulted - no intervention required  Wound cultures pending  Continue vancomycin 1 g IV per random levels and Zosyn 2.25 g IV BID  Consider ID consult after surgery or when culture results return.  Wound culture reveals pan-sensitive CITROBACTER KOSERI.   To OR 02/09/18. Consulting ID.     Hypervolemia due to non-compliance with dialysis  Hyperkalemia, resolved  Shifted in ER with albuterol, insulin/destrose, bicarbonate. K 7.9--> 4  No respiratory compromise. Emergent dialysis 2/5 early AM  Daily weights  Withhold anti-hypertensives for volume removal      DVT  BLE ultrasound  apixaban 5 mg BID - ? compliance     DM II with HTN and CKD V  HgbA1c 4.6%  Likely controlled with declining intake  No further intervention needed at this time     Protein calorie malnutrtion  Poor prognosis for wound healing  Nepro      Essential HTN - hold home hydralazine 50 mg TID and losartan 50 mg daily     DM II hyperlipidemia - atorvastatin 40 mg daily     DM II neuropathy - gabapentin 100 mg TID     ESRF HD TTS - Nephrology consulted for dialysis.  Withholding anti-hypertensives and added midodrine as needed prior to dialysis for volume removal and reduce symptomatic dialysis-associated  hypotension.   BP improving.    CKD MBD - continue cinacalcet 30 mg qhs and sevelamer 1600 mg daily     Depression - continue citalopram 10 mg daily. Consulted psychiatry. Rule out delirium and dementia.    Risk:  Patient is currently on drug therapy requiring intensive monitoring for toxicity: Vancomycin    Anticipated Disposition: Skilled Nursing Facility  ESTUARDO 2/12/2018    Goals of Care:  General Prognosis: poor  Goals: Curative  Comfort Only: No  Hospice: No  Code Status: Full Code    VTE Risk Mitigation         Ordered     apixaban tablet 5 mg  2 times daily     Route:  Oral        02/04/18 1724     Medium Risk of VTE  Once      02/04/18 1725             Kanika Bradley MD  Department of Hospital Medicine   [facility]

## 2018-02-09 NOTE — PLAN OF CARE
Problem: Patient Care Overview  Goal: Plan of Care Review  Outcome: Ongoing (interventions implemented as appropriate)  Pt disoriented to time & situation.  VSS on 2 L NC O2.  Telemetry 50s-60s bpm, NSR to Sinus star, occasional a-fib.  NPO after 00:00.  HD performed, 2 L taken off.  Wound care to pt's L upper buttock, dressing changed.  No complaints of pain overnight.  Pt transported to surgery this AM.

## 2018-02-09 NOTE — PLAN OF CARE
Problem: Patient Care Overview  Goal: Plan of Care Review  Outcome: Ongoing (interventions implemented as appropriate)  Patient is alert and answers appropriately. POC ongoing and IV Abx continued. Pain med given x1 for right foot pain.   Called dialysis to follow up about pts schedule. HD nurse said, pt maybe dialyzed tonight or tomorrow. Informed them that pt has scheduled surgery tomorrow. Safety precautions maintained. Bed in lowest position.

## 2018-02-09 NOTE — NURSING TRANSFER
Nursing Transfer Note      2/9/2018     Transfer To: Hospital, [facility], 9th Floor, Room 950A from [facility].    Transfer Via: Stretcher.    Transfer With: Continuous Telemetry Monitoring    Transported By: NITESH Perez RN and Isiah. PCT    Medicines Sent: None.     Chart Sent with Patient: Yes.    Notified: DAVID Agustin RN.    Patient Reassessed at: 1045 on 02/09/2018    Upon arrival to floor: Cardiac monitor on, patient oriented to room, bed in lowest position, and call bell within reach.

## 2018-02-09 NOTE — PROGRESS NOTES
2-hr dialysis tx completed with net UF of 2L.Tolerated well. Rinsed back blood. Pulled out needles. Pressure held and hemostasis achieved. Positive thrill and bruit. Report given to primary RN.

## 2018-02-09 NOTE — NURSING
Report given to OR for pt's scheduled surgery.  Pt transported with OR nurses/techs.  On 2 L O2 NC.  Telemetry in place.  Pt in gown and heel offloading boots.  Pt elected to take neck pillow to surgery.

## 2018-02-09 NOTE — ASSESSMENT & PLAN NOTE
ID consult received for osteomyelitis of foot.  Currently on vancomycin and zosyn. Agree with this therapy for now; optimized dosing.   Full consult to follow

## 2018-02-10 PROBLEM — E11.621 DIABETIC FOOT ULCER: Status: ACTIVE | Noted: 2018-02-10

## 2018-02-10 PROBLEM — L97.509 DIABETIC FOOT ULCER: Status: ACTIVE | Noted: 2018-02-10

## 2018-02-10 LAB
ALBUMIN SERPL BCP-MCNC: 1.5 G/DL
ANION GAP SERPL CALC-SCNC: 11 MMOL/L
BASOPHILS # BLD AUTO: 0.03 K/UL
BASOPHILS NFR BLD: 0.2 %
BUN SERPL-MCNC: 46 MG/DL
CALCIUM SERPL-MCNC: 8.3 MG/DL
CHLORIDE SERPL-SCNC: 100 MMOL/L
CO2 SERPL-SCNC: 23 MMOL/L
CREAT SERPL-MCNC: 7 MG/DL
DIFFERENTIAL METHOD: ABNORMAL
EOSINOPHIL # BLD AUTO: 0.1 K/UL
EOSINOPHIL NFR BLD: 0.8 %
ERYTHROCYTE [DISTWIDTH] IN BLOOD BY AUTOMATED COUNT: 17.2 %
EST. GFR  (AFRICAN AMERICAN): 8.5 ML/MIN/1.73 M^2
EST. GFR  (NON AFRICAN AMERICAN): 7.3 ML/MIN/1.73 M^2
GLUCOSE SERPL-MCNC: 64 MG/DL
HCT VFR BLD AUTO: 23.1 %
HGB BLD-MCNC: 7.7 G/DL
IMM GRANULOCYTES # BLD AUTO: 0.23 K/UL
IMM GRANULOCYTES NFR BLD AUTO: 1.5 %
LYMPHOCYTES # BLD AUTO: 1.2 K/UL
LYMPHOCYTES NFR BLD: 8 %
MAGNESIUM SERPL-MCNC: 2.1 MG/DL
MCH RBC QN AUTO: 29.5 PG
MCHC RBC AUTO-ENTMCNC: 33.3 G/DL
MCV RBC AUTO: 89 FL
MONOCYTES # BLD AUTO: 1.2 K/UL
MONOCYTES NFR BLD: 8 %
NEUTROPHILS # BLD AUTO: 12.4 K/UL
NEUTROPHILS NFR BLD: 81.5 %
NRBC BLD-RTO: 0 /100 WBC
PHOSPHATE SERPL-MCNC: 5.1 MG/DL
PLATELET # BLD AUTO: 165 K/UL
PMV BLD AUTO: 9.8 FL
POTASSIUM SERPL-SCNC: 4.7 MMOL/L
RBC # BLD AUTO: 2.61 M/UL
SODIUM SERPL-SCNC: 134 MMOL/L
VANCOMYCIN SERPL-MCNC: 23.1 UG/ML
WBC # BLD AUTO: 15.17 K/UL

## 2018-02-10 PROCEDURE — 25000003 PHARM REV CODE 250: Performed by: INTERNAL MEDICINE

## 2018-02-10 PROCEDURE — 27000221 HC OXYGEN, UP TO 24 HOURS

## 2018-02-10 PROCEDURE — 99231 SBSQ HOSP IP/OBS SF/LOW 25: CPT | Mod: ,,, | Performed by: INTERNAL MEDICINE

## 2018-02-10 PROCEDURE — 63600175 PHARM REV CODE 636 W HCPCS: Performed by: INTERNAL MEDICINE

## 2018-02-10 PROCEDURE — 83735 ASSAY OF MAGNESIUM: CPT

## 2018-02-10 PROCEDURE — 80069 RENAL FUNCTION PANEL: CPT

## 2018-02-10 PROCEDURE — 80202 ASSAY OF VANCOMYCIN: CPT

## 2018-02-10 PROCEDURE — 85025 COMPLETE CBC W/AUTO DIFF WBC: CPT

## 2018-02-10 PROCEDURE — 11000001 HC ACUTE MED/SURG PRIVATE ROOM

## 2018-02-10 PROCEDURE — 36415 COLL VENOUS BLD VENIPUNCTURE: CPT

## 2018-02-10 PROCEDURE — 63600175 PHARM REV CODE 636 W HCPCS: Performed by: PHYSICIAN ASSISTANT

## 2018-02-10 RX ADMIN — Medication 2.25 G: at 12:02

## 2018-02-10 RX ADMIN — GABAPENTIN 100 MG: 100 CAPSULE ORAL at 02:02

## 2018-02-10 RX ADMIN — CINACALCET HYDROCHLORIDE 30 MG: 30 TABLET, COATED ORAL at 09:02

## 2018-02-10 RX ADMIN — MICONAZOLE NITRATE: 20 OINTMENT TOPICAL at 09:02

## 2018-02-10 RX ADMIN — ACETAMINOPHEN 1000 MG: 10 INJECTION, SOLUTION INTRAVENOUS at 06:02

## 2018-02-10 RX ADMIN — GABAPENTIN 100 MG: 100 CAPSULE ORAL at 09:02

## 2018-02-10 RX ADMIN — ACETAMINOPHEN 1000 MG: 10 INJECTION, SOLUTION INTRAVENOUS at 02:02

## 2018-02-10 RX ADMIN — CITALOPRAM HYDROBROMIDE 10 MG: 10 TABLET ORAL at 08:02

## 2018-02-10 RX ADMIN — SEVELAMER CARBONATE 1600 MG: 800 TABLET, FILM COATED ORAL at 11:02

## 2018-02-10 RX ADMIN — Medication 2.25 G: at 06:02

## 2018-02-10 RX ADMIN — GABAPENTIN 100 MG: 100 CAPSULE ORAL at 06:02

## 2018-02-10 RX ADMIN — ATORVASTATIN CALCIUM 20 MG: 20 TABLET, FILM COATED ORAL at 08:02

## 2018-02-10 RX ADMIN — SEVELAMER CARBONATE 1600 MG: 800 TABLET, FILM COATED ORAL at 08:02

## 2018-02-10 RX ADMIN — Medication 2.25 G: at 09:02

## 2018-02-10 RX ADMIN — ASPIRIN 81 MG: 81 TABLET, COATED ORAL at 08:02

## 2018-02-10 RX ADMIN — Medication: at 08:02

## 2018-02-10 RX ADMIN — MICONAZOLE NITRATE: 20 OINTMENT TOPICAL at 08:02

## 2018-02-10 RX ADMIN — SEVELAMER CARBONATE 1600 MG: 800 TABLET, FILM COATED ORAL at 05:02

## 2018-02-10 RX ADMIN — APIXABAN 5 MG: 5 TABLET, FILM COATED ORAL at 08:02

## 2018-02-10 RX ADMIN — OXYCODONE HYDROCHLORIDE 5 MG: 5 TABLET ORAL at 08:02

## 2018-02-10 RX ADMIN — APIXABAN 5 MG: 5 TABLET, FILM COATED ORAL at 09:02

## 2018-02-10 RX ADMIN — Medication: at 09:02

## 2018-02-10 RX ADMIN — STANDARDIZED SENNA CONCENTRATE AND DOCUSATE SODIUM 2 TABLET: 8.6; 5 TABLET, FILM COATED ORAL at 08:02

## 2018-02-10 NOTE — PROGRESS NOTES
[facility] Hospital Medicine  Progress Note    Patient Name: Wilder Carbajal  MRN: [id number]  Patient Class: IP- Inpatient   Admission Date: 2/4/2018  Length of Stay: 6 days  ESTUARDO: 2/15/2018  Attending Physician: Kanika Bradley MD  Primary Care Provider: Dio Roberson MD    [facility] Medicine Team: [facility] MED D Kanika Bradley MD    Subjective:     Principal Problem:Septic encephalopathy    Interval History: Patient with no events overnight, no new complaints. Pain controlled.    Review of Systems   Constitutional: Positive for fatigue.   Respiratory: Negative for shortness of breath.      Objective:     Vital Signs (Most Recent):  Temp: 97.8 °F (36.6 °C) (02/10/18 1257)  Pulse: (!) 52 (02/10/18 1458)  Resp: 18 (02/10/18 1257)  BP: (!) 106/56 (02/10/18 1257)  SpO2: 98 % (02/10/18 1257) Vital Signs (24h Range):  Temp:  [97.8 °F (36.6 °C)-99.4 °F (37.4 °C)] 97.8 °F (36.6 °C)  Pulse:  [51-66] 52  Resp:  [16-20] 18  SpO2:  [90 %-98 %] 98 %  BP: (101-109)/(54-59) 106/56     Weight: 49.4 kg (109 lb)  Body mass index is 16.57 kg/m².    Intake/Output Summary (Last 24 hours) at 02/10/18 1500  Last data filed at 02/09/18 1823   Gross per 24 hour   Intake              240 ml   Output                0 ml   Net              240 ml      Physical Exam   Constitutional: He appears well-developed.   HENT:   Head: Normocephalic.   Mouth/Throat: Mucous membranes are not pale and not cyanotic.   Eyes: Conjunctivae and lids are normal.   Neck: Neck supple.   Cardiovascular: S1 normal and S2 normal.    Murmur heard.  Pulmonary/Chest: Effort normal and breath sounds normal.   Abdominal: Soft. Bowel sounds are normal. There is no tenderness.   Musculoskeletal: He exhibits no edema.        Right foot: There is decreased range of motion (due to surgical dressing).   Neurological: He is alert. He is not disoriented.   Skin: Skin is warm and dry. He is not diaphoretic. No cyanosis. Nails show no clubbing.    Psychiatric: He has a normal mood and affect.       Significant Labs:   A1C:     Recent Labs  Lab 01/07/18  0008   HGBA1C 4.6     CBC:     Recent Labs  Lab 02/09/18  0406 02/10/18  0400   WBC 15.64* 15.17*   HGB 8.0* 7.7*   HCT 24.0* 23.1*    165     CMP:     Recent Labs  Lab 02/09/18  0406 02/10/18  0400    134*   K 4.4 4.7   CL 99 100   CO2 24 23   GLU 57* 64*   BUN 41* 46*   CREATININE 6.4* 7.0*   CALCIUM 8.4* 8.3*   ALBUMIN 1.8* 1.5*   ANIONGAP 13 11   EGFRNONAA 8.2* 7.3*     Magnesium:     Recent Labs  Lab 02/09/18  0406 02/10/18  0400   MG 2.1 2.1     Assessment/Plan:      Brief HPI:      Hospital Course Overview:     Current Hospital Problem List:    Active Hospital Problems    Diagnosis  POA    *Septic encephalopathy [G93.41]  Yes    Diabetic foot ulcer [E11.621, L97.509]  Yes    Osteomyelitis [M86.9]  Unknown    Foot ulceration, right, with fat layer exposed [L97.512]  Yes    Acute delirium [R41.0]  Yes    Protein-calorie malnutrition, severe [E43]  Yes    DVT (deep venous thrombosis) [I82.409]  Yes    DM type 2 with diabetic mixed hyperlipidemia [E11.69, E78.2]  Yes    Diabetic polyneuropathy associated with type 2 diabetes mellitus [E11.42]  Yes    Chronic kidney disease-mineral and bone disorder [N18.9, E83.9, M89.9]  Yes    Depression [F32.9]  Yes    Alteration in skin integrity [R23.9]  Yes    Infected skin ulcer with fat layer exposed [L98.492, L08.9]  Yes    Hyperkalemia [E87.5]  Yes    Hypervolemia [E87.70]  Yes    Type 2 diabetes mellitus with stage 5 chronic kidney disease and hypertension [E11.22, I12.0, N18.5]  Yes    ESRD (end stage renal disease) [N18.6]  Yes    Essential hypertension [I10]  Yes      Resolved Hospital Problems    Diagnosis Date Resolved POA   No resolved problems to display.       Medications for management of current problems:      ammonium lactate   Topical (Top) BID    apixaban  5 mg Oral BID    aspirin  81 mg Oral Daily    atorvastatin   20 mg Oral Daily    cinacalcet  30 mg Oral QHS    citalopram  10 mg Oral Daily    gabapentin  100 mg Oral TID    miconazole nitrate 2%   Topical (Top) BID    piperacillin-tazobactam (ZOSYN) IVPB  2.25 g Intravenous Q8H    senna-docusate 8.6-50 mg  2 tablet Oral BID    sevelamer carbonate  1,600 mg Oral TID WM       PRN:   sodium chloride 0.9%    acetaminophen    albumin human 25%    midodrine    ondansetron    oxyCODONE    sodium chloride 0.9%    sodium chloride 0.9%     IV Fluids: none    Problems addressed today:    Septic encephalopathy on possible dementia  Should improve with treatment of his infected foot. Patient currently lacked capacity to refuse surgery, consent obtained from his son as proxy.     Infected right dorsal foot, ischemic ulceration  Podiatry consulted - s/p I&D and probable 4th and 5th amputation  Vascular surgery consulted - no intervention required  Wound cultures pending  Continue vancomycin 1 g IV per random levels and Zosyn 2.25 g IV BID  Consider ID consult after surgery or when culture results return.  Wound culture reveals pan-sensitive CITROBACTER KOSERI.   To OR 02/09/18. Consulted ID.  Plan for wound vac and 6 weeks IV antibiotics.     Hypervolemia due to non-compliance with dialysis  Hyperkalemia, resolved  Shifted in ER with albuterol, insulin/destrose, bicarbonate. K 7.9--> 4  No respiratory compromise. Emergent dialysis 2/5 early AM  Daily weights  Withhold anti-hypertensives for volume removal      DVT  BLE ultrasound  apixaban 5 mg BID - ? Compliance at home     DM II with HTN and CKD V  HgbA1c 4.6%  Likely controlled with declining intake  No further intervention needed at this time     Protein calorie malnutrtion  Poor prognosis for wound healing  Nepro      Essential HTN - hold home hydralazine 50 mg TID and losartan 50 mg daily     DM II hyperlipidemia - atorvastatin 40 mg daily     DM II neuropathy - gabapentin 100 mg TID     ESRF HD TTS - Nephrology  consulted for dialysis.  Withholding anti-hypertensives and added midodrine as needed prior to dialysis for volume removal and reduce symptomatic dialysis-associated hypotension.   BP improving.    CKD MBD - continue cinacalcet 30 mg qhs and sevelamer 1600 mg daily     Depression - continue citalopram 10 mg daily. Consulted psychiatry. Rule out delirium and dementia.    Risk:  Patient is currently on drug therapy requiring intensive monitoring for toxicity: Vancomycin    Anticipated Disposition: Long Term Care  ESTUARDO 2/15/2018    Goals of Care:  General Prognosis: fair  Goals: Curative  Comfort Only: No  Hospice: No  Code Status: Full Code    VTE Risk Mitigation         Ordered     apixaban tablet 5 mg  2 times daily     Route:  Oral        02/04/18 1724     Medium Risk of VTE  Once      02/04/18 1725             Kanika Bradley MD  Department of Hospital Medicine   [facility]

## 2018-02-10 NOTE — PLAN OF CARE
Reviewed plan of care with patient, podiatry to change dressing to right foot which saturated through dressing and boot over night. Dressing reinforced per podiatry orders, no further seeping through. Patient tolerated treatments well with no acute events over night, see flowsheets for detailed assessment information. No questions or concerns at this time.

## 2018-02-10 NOTE — PLAN OF CARE
Problem: Patient Care Overview  Goal: Plan of Care Review  Outcome: Ongoing (interventions implemented as appropriate)  POC continued, pt went off to surgery this morning for amputation of the 4th & 5th toe of the right foot. Dressing is dry,iram and intact. Right foot elevated with pillows and boots in place. Medicated x1 for right foot pain. Respirations regular and unlabored. No acute distress noted. Safety precautions maintained. Bed in lowest position, call light within reached.

## 2018-02-11 PROBLEM — M86.9 OSTEOMYELITIS: Status: ACTIVE | Noted: 2018-02-11

## 2018-02-11 LAB
ALBUMIN SERPL BCP-MCNC: 1.5 G/DL
ANION GAP SERPL CALC-SCNC: 14 MMOL/L
ANION GAP SERPL CALC-SCNC: 14 MMOL/L
BASOPHILS # BLD AUTO: 0.03 K/UL
BASOPHILS NFR BLD: 0.2 %
BUN SERPL-MCNC: 52 MG/DL
BUN SERPL-MCNC: 54 MG/DL
CALCIUM SERPL-MCNC: 8.3 MG/DL
CALCIUM SERPL-MCNC: 8.5 MG/DL
CHLORIDE SERPL-SCNC: 96 MMOL/L
CHLORIDE SERPL-SCNC: 98 MMOL/L
CO2 SERPL-SCNC: 22 MMOL/L
CO2 SERPL-SCNC: 25 MMOL/L
CREAT SERPL-MCNC: 7.4 MG/DL
CREAT SERPL-MCNC: 7.6 MG/DL
DIFFERENTIAL METHOD: ABNORMAL
EOSINOPHIL # BLD AUTO: 0.1 K/UL
EOSINOPHIL NFR BLD: 0.8 %
ERYTHROCYTE [DISTWIDTH] IN BLOOD BY AUTOMATED COUNT: 17.6 %
EST. GFR  (AFRICAN AMERICAN): 7.7 ML/MIN/1.73 M^2
EST. GFR  (AFRICAN AMERICAN): 7.9 ML/MIN/1.73 M^2
EST. GFR  (NON AFRICAN AMERICAN): 6.6 ML/MIN/1.73 M^2
EST. GFR  (NON AFRICAN AMERICAN): 6.8 ML/MIN/1.73 M^2
GLUCOSE SERPL-MCNC: 72 MG/DL
GLUCOSE SERPL-MCNC: 73 MG/DL
HCT VFR BLD AUTO: 23.8 %
HGB BLD-MCNC: 8.1 G/DL
IMM GRANULOCYTES # BLD AUTO: 0.16 K/UL
IMM GRANULOCYTES NFR BLD AUTO: 1.1 %
LYMPHOCYTES # BLD AUTO: 1 K/UL
LYMPHOCYTES NFR BLD: 7.2 %
MAGNESIUM SERPL-MCNC: 2.5 MG/DL
MCH RBC QN AUTO: 30.3 PG
MCHC RBC AUTO-ENTMCNC: 34 G/DL
MCV RBC AUTO: 89 FL
MONOCYTES # BLD AUTO: 1.2 K/UL
MONOCYTES NFR BLD: 8.7 %
NEUTROPHILS # BLD AUTO: 11.5 K/UL
NEUTROPHILS NFR BLD: 82 %
NRBC BLD-RTO: 0 /100 WBC
PHOSPHATE SERPL-MCNC: 5.9 MG/DL
PLATELET # BLD AUTO: 165 K/UL
PMV BLD AUTO: 10.9 FL
POCT GLUCOSE: 72 MG/DL (ref 70–110)
POTASSIUM SERPL-SCNC: 4.7 MMOL/L
POTASSIUM SERPL-SCNC: 6.7 MMOL/L
RBC # BLD AUTO: 2.67 M/UL
SODIUM SERPL-SCNC: 134 MMOL/L
SODIUM SERPL-SCNC: 135 MMOL/L
VANCOMYCIN SERPL-MCNC: 20.6 UG/ML
WBC # BLD AUTO: 13.97 K/UL

## 2018-02-11 PROCEDURE — 25000003 PHARM REV CODE 250: Performed by: INTERNAL MEDICINE

## 2018-02-11 PROCEDURE — 99231 SBSQ HOSP IP/OBS SF/LOW 25: CPT | Mod: ,,, | Performed by: INTERNAL MEDICINE

## 2018-02-11 PROCEDURE — 80069 RENAL FUNCTION PANEL: CPT

## 2018-02-11 PROCEDURE — 83735 ASSAY OF MAGNESIUM: CPT

## 2018-02-11 PROCEDURE — 63600175 PHARM REV CODE 636 W HCPCS: Performed by: PHYSICIAN ASSISTANT

## 2018-02-11 PROCEDURE — 80202 ASSAY OF VANCOMYCIN: CPT

## 2018-02-11 PROCEDURE — 93005 ELECTROCARDIOGRAM TRACING: CPT

## 2018-02-11 PROCEDURE — 99499 UNLISTED E&M SERVICE: CPT | Mod: ,,, | Performed by: PHYSICIAN ASSISTANT

## 2018-02-11 PROCEDURE — 11000001 HC ACUTE MED/SURG PRIVATE ROOM

## 2018-02-11 PROCEDURE — 63600175 PHARM REV CODE 636 W HCPCS: Performed by: INTERNAL MEDICINE

## 2018-02-11 PROCEDURE — 36415 COLL VENOUS BLD VENIPUNCTURE: CPT

## 2018-02-11 PROCEDURE — 80048 BASIC METABOLIC PNL TOTAL CA: CPT

## 2018-02-11 PROCEDURE — 85025 COMPLETE CBC W/AUTO DIFF WBC: CPT

## 2018-02-11 PROCEDURE — 93010 ELECTROCARDIOGRAM REPORT: CPT | Mod: ,,, | Performed by: INTERNAL MEDICINE

## 2018-02-11 RX ORDER — SODIUM CHLORIDE 9 MG/ML
INJECTION, SOLUTION INTRAVENOUS ONCE
Status: COMPLETED | OUTPATIENT
Start: 2018-02-11 | End: 2018-02-11

## 2018-02-11 RX ORDER — SODIUM CHLORIDE 9 MG/ML
INJECTION, SOLUTION INTRAVENOUS
Status: DISCONTINUED | OUTPATIENT
Start: 2018-02-11 | End: 2018-02-15

## 2018-02-11 RX ADMIN — Medication: at 08:02

## 2018-02-11 RX ADMIN — MICONAZOLE NITRATE: 20 OINTMENT TOPICAL at 09:02

## 2018-02-11 RX ADMIN — STANDARDIZED SENNA CONCENTRATE AND DOCUSATE SODIUM 2 TABLET: 8.6; 5 TABLET, FILM COATED ORAL at 08:02

## 2018-02-11 RX ADMIN — APIXABAN 5 MG: 5 TABLET, FILM COATED ORAL at 08:02

## 2018-02-11 RX ADMIN — CINACALCET HYDROCHLORIDE 30 MG: 30 TABLET, COATED ORAL at 08:02

## 2018-02-11 RX ADMIN — OXYCODONE HYDROCHLORIDE 5 MG: 5 TABLET ORAL at 08:02

## 2018-02-11 RX ADMIN — GABAPENTIN 100 MG: 100 CAPSULE ORAL at 06:02

## 2018-02-11 RX ADMIN — SODIUM CHLORIDE: 0.9 INJECTION, SOLUTION INTRAVENOUS at 12:02

## 2018-02-11 RX ADMIN — Medication 2.25 G: at 04:02

## 2018-02-11 RX ADMIN — CITALOPRAM HYDROBROMIDE 10 MG: 10 TABLET ORAL at 09:02

## 2018-02-11 RX ADMIN — SEVELAMER CARBONATE 1600 MG: 800 TABLET, FILM COATED ORAL at 12:02

## 2018-02-11 RX ADMIN — Medication: at 12:02

## 2018-02-11 RX ADMIN — Medication 2.25 G: at 02:02

## 2018-02-11 RX ADMIN — ATORVASTATIN CALCIUM 20 MG: 20 TABLET, FILM COATED ORAL at 09:02

## 2018-02-11 RX ADMIN — STANDARDIZED SENNA CONCENTRATE AND DOCUSATE SODIUM 2 TABLET: 8.6; 5 TABLET, FILM COATED ORAL at 09:02

## 2018-02-11 RX ADMIN — GABAPENTIN 100 MG: 100 CAPSULE ORAL at 09:02

## 2018-02-11 RX ADMIN — MICONAZOLE NITRATE: 20 OINTMENT TOPICAL at 12:02

## 2018-02-11 RX ADMIN — SODIUM CHLORIDE: 9 INJECTION, SOLUTION INTRAVENOUS at 11:02

## 2018-02-11 RX ADMIN — APIXABAN 5 MG: 5 TABLET, FILM COATED ORAL at 09:02

## 2018-02-11 RX ADMIN — ASPIRIN 81 MG: 81 TABLET, COATED ORAL at 09:02

## 2018-02-11 RX ADMIN — OXYCODONE HYDROCHLORIDE 5 MG: 5 TABLET ORAL at 07:02

## 2018-02-11 NOTE — PROGRESS NOTES
[facility] Hospital Medicine  Progress Note    Patient Name: Wilder Carbajal  MRN: [id number]  Patient Class: IP- Inpatient   Admission Date: 2/4/2018  Length of Stay: 7 days  ESTUARDO: 2/15/2018  Attending Physician: Kanika Bradley MD  Primary Care Provider: Dio Roberson MD    [facility] Medicine Team: [facility] [place] Kanika Bradley MD    Subjective:     Principal Problem:Septic encephalopathy    Interval History: Patient with no events overnight, no new complaints. Pain controlled.    Review of Systems   Constitutional: Positive for fatigue.   Respiratory: Negative for shortness of breath.      Objective:     Vital Signs (Most Recent):  Temp: 98.9 °F (37.2 °C) (02/11/18 1340)  Pulse: (!) 50 (02/11/18 1340)  Resp: 16 (02/11/18 1340)  BP: (!) 121/58 (02/11/18 1340)  SpO2: 98 % (02/11/18 1340) Vital Signs (24h Range):  Temp:  [97 °F (36.1 °C)-98.9 °F (37.2 °C)] 98.9 °F (37.2 °C)  Pulse:  [48-58] 50  Resp:  [16-18] 16  SpO2:  [95 %-100 %] 98 %  BP: (108-121)/(55-61) 121/58     Weight: 49.4 kg (109 lb)  Body mass index is 16.57 kg/m².  No intake or output data in the 24 hours ending 02/11/18 1459   Physical Exam   Constitutional: He appears well-developed.   HENT:   Head: Normocephalic.   Mouth/Throat: Mucous membranes are not pale and not cyanotic.   Eyes: Conjunctivae and lids are normal.   Neck: Neck supple.   Cardiovascular: S1 normal and S2 normal.    Murmur heard.  Pulmonary/Chest: Effort normal and breath sounds normal.   Abdominal: Soft. Bowel sounds are normal. There is no tenderness.   Musculoskeletal: He exhibits no edema.        Right foot: There is decreased range of motion (due to surgical dressing).   Neurological: He is alert. He is not disoriented.   Skin: Skin is warm and dry. He is not diaphoretic. No cyanosis. Nails show no clubbing.   Psychiatric: He has a normal mood and affect.       Significant Labs:   A1C:     Recent Labs  Lab 01/07/18  0008   HGBA1C 4.6     CBC:     Recent  Labs  Lab 02/10/18  0400 02/11/18  0418   WBC 15.17* 13.97*   HGB 7.7* 8.1*   HCT 23.1* 23.8*    165     CMP:     Recent Labs  Lab 02/10/18  0400 02/11/18 0418 02/11/18  0809   * 134* 135*   K 4.7 6.7* 4.7    98 96   CO2 23 22* 25   GLU 64* 73 72   BUN 46* 52* 54*   CREATININE 7.0* 7.6* 7.4*   CALCIUM 8.3* 8.3* 8.5*   ALBUMIN 1.5* 1.5*  --    ANIONGAP 11 14 14   EGFRNONAA 7.3* 6.6* 6.8*     Magnesium:     Recent Labs  Lab 02/10/18  0400 02/11/18  0418   MG 2.1 2.5     Assessment/Plan:      Brief HPI:      Hospital Course Overview:     Current Hospital Problem List:    Active Hospital Problems    Diagnosis  POA    *Septic encephalopathy [G93.41]  Yes    Osteomyelitis [M86.9]  Yes    Diabetic foot ulcer [E11.621, L97.509]  Yes    Foot ulceration, right, with fat layer exposed [L97.512]  Yes    Acute delirium [R41.0]  Yes    Protein-calorie malnutrition, severe [E43]  Yes    DVT (deep venous thrombosis) [I82.409]  Yes    DM type 2 with diabetic mixed hyperlipidemia [E11.69, E78.2]  Yes    Diabetic polyneuropathy associated with type 2 diabetes mellitus [E11.42]  Yes    Chronic kidney disease-mineral and bone disorder [N18.9, E83.9, M89.9]  Yes    Depression [F32.9]  Yes    Alteration in skin integrity [R23.9]  Yes    Infected skin ulcer with fat layer exposed [L98.492, L08.9]  Yes    Hyperkalemia [E87.5]  Yes    Hypervolemia [E87.70]  Yes    Type 2 diabetes mellitus with stage 5 chronic kidney disease and hypertension [E11.22, I12.0, N18.5]  Yes    ESRD (end stage renal disease) [N18.6]  Yes    Essential hypertension [I10]  Yes      Resolved Hospital Problems    Diagnosis Date Resolved POA   No resolved problems to display.       Medications for management of current problems:      sodium chloride 0.9%   Intravenous Once    ammonium lactate   Topical (Top) BID    apixaban  5 mg Oral BID    aspirin  81 mg Oral Daily    atorvastatin  20 mg Oral Daily    cinacalcet  30 mg Oral  QHS    citalopram  10 mg Oral Daily    gabapentin  100 mg Oral TID    miconazole nitrate 2%   Topical (Top) BID    piperacillin-tazobactam (ZOSYN) IVPB  2.25 g Intravenous Q8H    senna-docusate 8.6-50 mg  2 tablet Oral BID    sevelamer carbonate  1,600 mg Oral TID WM       PRN:   sodium chloride 0.9%    sodium chloride 0.9%    acetaminophen    albumin human 25%    midodrine    ondansetron    oxyCODONE    sodium chloride 0.9%     IV Fluids: none    Problems addressed today:    Septic encephalopathy on possible dementia  Should improve with treatment of his infected foot. Patient currently lacked capacity to refuse surgery, consent obtained from his son as proxy.     Infected right dorsal foot, ischemic ulceration  Podiatry consulted - s/p I&D and probable 4th and 5th amputation  Vascular surgery consulted - no intervention required  Wound cultures pending  Continue vancomycin 1 g IV per random levels and Zosyn 2.25 g IV BID  Consider ID consult after surgery or when culture results return.  Wound culture reveals pan-sensitive CITROBACTER KOSERI.   To OR 02/09/18. Consulted ID.  Plan for wound vac and 6 weeks IV antibiotics.     Hypervolemia due to non-compliance with dialysis  Hyperkalemia, resolved  Shifted in ER with albuterol, insulin/destrose, bicarbonate. K 7.9--> 4  No respiratory compromise. Emergent dialysis 2/5 early AM  Daily weights  Withhold anti-hypertensives for volume removal      DVT  BLE ultrasound  apixaban 5 mg BID - ? Compliance at home     DM II with HTN and CKD V  HgbA1c 4.6%  Likely controlled with declining intake  No further intervention needed at this time     Protein calorie malnutrtion  Poor prognosis for wound healing  Nepro      Essential HTN - hold home hydralazine 50 mg TID and losartan 50 mg daily     DM II hyperlipidemia - atorvastatin 40 mg daily     DM II neuropathy - gabapentin 100 mg TID     ESRF HD TTS - Nephrology consulted for dialysis.  Withholding  anti-hypertensives and added midodrine as needed prior to dialysis for volume removal and reduce symptomatic dialysis-associated hypotension.   BP improving.    CKD MBD - continue cinacalcet 30 mg qhs and sevelamer 1600 mg daily     Depression - continue citalopram 10 mg daily. Consulted psychiatry.   Rule out delirium and dementia.  Back to baseline.    Risk:  Patient is currently on drug therapy requiring intensive monitoring for toxicity: Vancomycin    Anticipated Disposition: Long Term Care  ESTUARDO 2/15/2018    Goals of Care:  General Prognosis: fair  Goals: Curative  Comfort Only: No  Hospice: No  Code Status: Full Code    VTE Risk Mitigation         Ordered     apixaban tablet 5 mg  2 times daily     Route:  Oral        02/04/18 1724     Medium Risk of VTE  Once      02/04/18 1725             Kanika Bradley MD  Department of Hospital Medicine   [facility]

## 2018-02-11 NOTE — PLAN OF CARE
Problem: Patient Care Overview  Goal: Plan of Care Review  Outcome: Ongoing (interventions implemented as appropriate)  Pt ana maría lower extremities very swollen and painful to the touch. All dressing intact. Incontinent of stool. Monitoring closely

## 2018-02-11 NOTE — ASSESSMENT & PLAN NOTE
Wilder Carbajal is a 67 y.o. 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 Matamoros)  -Amputation site with active bleeding, hemostasis achieved with Silver nitrate and compression.  Amputation site with no further purulence.  -Heel bullae with fluctuance.  Bullae deroffed with purulent maloderous discharge, lavaged with saline.  No active bleeding.  -Plan to apply wound vac tomorrow.  -ID on board, appreciate recs  -Vascular on board, appreciate recs  -Podiatry will follow.    Weightbearing Status: NWB  Offloading Device: heel protector boot

## 2018-02-11 NOTE — ANESTHESIA POSTPROCEDURE EVALUATION
"Anesthesia Post Evaluation    Patient: Wilder Carbajal    Procedure(s) Performed: Procedure(s) (LRB):  AMPUTATION-TOES 4TH AND 5TH, I&D (Right)    Final Anesthesia Type: general  Patient location during evaluation: PACU  Patient participation: Yes- Able to Participate  Level of consciousness: awake and alert  Pain management: adequate  Airway patency: patent  PONV status at discharge: No PONV  Anesthetic complications: no      Cardiovascular status: blood pressure returned to baseline  Respiratory status: unassisted, spontaneous ventilation and room air  Hydration status: euvolemic  Follow-up not needed.        Visit Vitals  BP (!) 110/55 (BP Location: Right arm, Patient Position: Lying)   Pulse (!) 54   Temp 36.4 °C (97.6 °F) (Oral)   Resp 18   Ht 5' 8" (1.727 m)   Wt 49.4 kg (109 lb)   SpO2 96%   BMI 16.57 kg/m²       Pain/Ritesh Score: Pain Assessment Performed: Yes (2/11/2018  6:00 AM)  Presence of Pain: denies (2/11/2018  6:00 AM)  Pain Rating Prior to Med Admin: 10 (2/11/2018  7:59 AM)      "

## 2018-02-11 NOTE — PROGRESS NOTES
[facility]  Podiatry  Progress Note    Patient Name: Wilder Carbajal  MRN: [id number]  Admission Date: 2/4/2018  Hospital Length of Stay: 6 days  Attending Physician: Kanika Bradley MD  Primary Care Provider: Dio Roberson MD   Interval Hx: s/p Right 4th and 5th ray amputation (DOS: 1/9/18 per Dr. Matamoros).  Patient pain controlled.  Dressings with strike through.    Scheduled Meds:   ammonium lactate   Topical (Top) BID    apixaban  5 mg Oral BID    aspirin  81 mg Oral Daily    atorvastatin  20 mg Oral Daily    cinacalcet  30 mg Oral QHS    citalopram  10 mg Oral Daily    gabapentin  100 mg Oral TID    miconazole nitrate 2%   Topical (Top) BID    piperacillin-tazobactam (ZOSYN) IVPB  2.25 g Intravenous Q8H    senna-docusate 8.6-50 mg  2 tablet Oral BID    sevelamer carbonate  1,600 mg Oral TID WM     Continuous Infusions:  PRN Meds:sodium chloride 0.9%, acetaminophen, albumin human 25%, midodrine, ondansetron, oxyCODONE, sodium chloride 0.9%, sodium chloride 0.9%    Review of patient's allergies indicates:  No Known Allergies     Past Medical History:   Diagnosis Date    Acute pain of left shoulder 1/7/2017    Arthritis     Asthma     Benign neoplasm of eyelid     RUL    Blood clotting tendency     Blood transfusion     Cataract     Chronic kidney disease requiring chronic dialysis     Diabetes mellitus     Diabetic retinopathy     Fever blister     Hyperlipidemia     Hypertension     Infertility     Joint pain     Retinal detachment     Thyroid disease     Weakness 1/7/2017     Past Surgical History:   Procedure Laterality Date    CATARACT EXTRACTION      COLONOSCOPY N/A 6/23/2017    Procedure: COLONOSCOPY;  Surgeon: Vic Clark MD;  Location: [address];  Service: Endoscopy;  Laterality: N/A;    RETINAL DETACHMENT SURGERY         Family History     Problem Relation (Age of Onset)    Heart attack Mother    No Known Problems Father, Sister, Brother,  Maternal Aunt, Maternal Uncle, Paternal Aunt, Paternal Uncle, Maternal Grandmother, Maternal Grandfather, Paternal Grandmother, Paternal Grandfather        Social History Main Topics    Smoking status: Never Smoker    Smokeless tobacco: Never Used    Alcohol use No    Drug use: No    Sexual activity: Not on file     Review of Systems   Constitutional: Positive for activity change.   Respiratory: Negative for shortness of breath.    Cardiovascular: Negative for chest pain and leg swelling.   Gastrointestinal: Negative.  Negative for nausea and vomiting.   Genitourinary: Negative.    Musculoskeletal: Positive for arthralgias.   Skin: Positive for wound.   Neurological: Positive for numbness. Negative for weakness.     Objective:     Vital Signs (Most Recent):  Temp: 97.2 °F (36.2 °C) (02/10/18 1742)  Pulse: (!) 48 (02/10/18 1742)  Resp: 18 (02/10/18 1742)  BP: 110/61 (02/10/18 1742)  SpO2: 95 % (02/10/18 1742) Vital Signs (24h Range):  Temp:  [97.2 °F (36.2 °C)-99.4 °F (37.4 °C)] 97.2 °F (36.2 °C)  Pulse:  [48-66] 48  Resp:  [16-20] 18  SpO2:  [90 %-98 %] 95 %  BP: (101-110)/(54-61) 110/61     Weight: 49.4 kg (109 lb)  Body mass index is 16.57 kg/m².    Foot Exam    General  Orientation: disoriented       Right Foot/Ankle     Inspection and Palpation  Tenderness: none   Swelling: metatarsals   Skin Exam: blister, maceration, cellulitis, skin changes and ulcer;     Neurovascular  Dorsalis pedis: 1+  Posterior tibial: 1+  Saphenous nerve sensation: diminished  Tibial nerve sensation: diminished  Superficial peroneal nerve sensation: diminished  Deep peroneal nerve sensation: diminished  Sural nerve sensation: diminished      Left Foot/Ankle      Inspection and Palpation  Tenderness: none   Swelling: metatarsals   Skin Exam: skin changes and ulcer;     Neurovascular  Dorsalis pedis: 1+  Posterior tibial: 1+  Saphenous nerve sensation: diminished  Tibial nerve sensation: diminished  Superficial peroneal nerve  sensation: diminished  Deep peroneal nerve sensation: diminished  Sural nerve sensation: diminished        2/10/18    Wound 1: Right dorsal foot ulceration   Measurement:[id number]  Base: granular, necrotic tissue alond plantar 3rd ray  Periwound skin: viable  Drainage: sanguinous  Erythema: none  Probe probes to bone 4th and 5th metatarsal            Right heel bullae with fluctuance.  Post debridement measures approximately 4.0 x 4.0cm x superficial with purulent maloderous discharge.   No tracking, does not probe, no surrounding erythema.           Left foot ulceration noted to dorsal foot          Wound 1: Left dorsal foot   Measurement: 4moi3wui6.1cm.  Base: fibrous base  Periwound skin: mild erythema,   Drainage: none  Erythema: mild  Probe: none, no bone exposed                  Wound 1: Left heel ulceration   Measurement: 5ouc6zqz7.1cm.  Periwound skin: callus  Drainage: none  Erythema:mild   Probe: none, no bone exposed            Laboratory:  Xray: Right: There is DJD, soft tissue swelling, a calcaneal spur, and diabetic vascular calcifications. No fracture dislocation bone destruction seen.    Left: There is DJD, diabetic vascular calcifications, and Spurs on the calcaneus. No fracture dislocation bone destruction seen.    MRI: 1. Limited, incomplete nondiagnostic examination secondary to early termination at the of the request of the patient.    2. Linear T1 hypointensity traversing the third metacarpal head, concerning for possible nondisplaced, likely subacute fracture.  Correlation with point tenderness recommended.    PHILLIP: Impression:   Right Leg:Segmental pressures and PVR waveforms suggest mild to moderate peripheral arterial occlusive disease.     Left Leg:Segmental pressures and PVR waveforms suggest mild to moderate peripheral arterial occlusive disease.        Diagnostic Results:  X-Ray: I have reviewed all pertinent results/findings within the past 24 hours.  reviewed    Clinical  Findings:  Right heel bullae with fluctuance.  Amputation site with active bleeding, no signs of infection.  Left foot wounds stable.    Assessment/Plan:     Foot ulceration, right, with fat layer exposed    Wilder Carbajal is a 67 y.o. 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 Matamoros)  -Amputation site with active bleeding, hemostasis achieved with Silver nitrate and compression.  Amputation site with no further purulence.  -Heel bullae with fluctuance.  Bullae deroffed with purulent maloderous discharge, lavaged with saline.  No active bleeding.  -Plan to apply wound vac tomorrow.  Today dressed with betadine gauze dressing.    -Left foot wounds stable, nursing to do daily dressing changes on left.  -ID on board, appreciate recs  -Vascular on board, appreciate recs  -Podiatry will follow.    Weightbearing Status: NWB  Offloading Device: heel protector boot          ESRD (end stage renal disease)    Per Primary           Type 2 diabetes mellitus with stage 5 chronic kidney disease and hypertension    Per Primary               George Escamilla MD  Podiatry  [facility]

## 2018-02-11 NOTE — SUBJECTIVE & OBJECTIVE
Interval Hx: s/p Right 4th and 5th ray amputation (DOS: 1/9/18 per Dr. Matamoros).  Patient pain controlled.  Dressings with strike through.    Scheduled Meds:   ammonium lactate   Topical (Top) BID    apixaban  5 mg Oral BID    aspirin  81 mg Oral Daily    atorvastatin  20 mg Oral Daily    cinacalcet  30 mg Oral QHS    citalopram  10 mg Oral Daily    gabapentin  100 mg Oral TID    miconazole nitrate 2%   Topical (Top) BID    piperacillin-tazobactam (ZOSYN) IVPB  2.25 g Intravenous Q8H    senna-docusate 8.6-50 mg  2 tablet Oral BID    sevelamer carbonate  1,600 mg Oral TID WM     Continuous Infusions:  PRN Meds:sodium chloride 0.9%, acetaminophen, albumin human 25%, midodrine, ondansetron, oxyCODONE, sodium chloride 0.9%, sodium chloride 0.9%    Review of patient's allergies indicates:  No Known Allergies     Past Medical History:   Diagnosis Date    Acute pain of left shoulder 1/7/2017    Arthritis     Asthma     Benign neoplasm of eyelid     RUL    Blood clotting tendency     Blood transfusion     Cataract     Chronic kidney disease requiring chronic dialysis     Diabetes mellitus     Diabetic retinopathy     Fever blister     Hyperlipidemia     Hypertension     Infertility     Joint pain     Retinal detachment     Thyroid disease     Weakness 1/7/2017     Past Surgical History:   Procedure Laterality Date    CATARACT EXTRACTION      COLONOSCOPY N/A 6/23/2017    Procedure: COLONOSCOPY;  Surgeon: Vic Clark MD;  Location: [address];  Service: Endoscopy;  Laterality: N/A;    RETINAL DETACHMENT SURGERY         Family History     Problem Relation (Age of Onset)    Heart attack Mother    No Known Problems Father, Sister, Brother, Maternal Aunt, Maternal Uncle, Paternal Aunt, Paternal Uncle, Maternal Grandmother, Maternal Grandfather, Paternal Grandmother, Paternal Grandfather        Social History Main Topics    Smoking status: Never Smoker    Smokeless tobacco: Never Used     Alcohol use No    Drug use: No    Sexual activity: Not on file     Review of Systems   Constitutional: Positive for activity change.   Respiratory: Negative for shortness of breath.    Cardiovascular: Negative for chest pain and leg swelling.   Gastrointestinal: Negative.  Negative for nausea and vomiting.   Genitourinary: Negative.    Musculoskeletal: Positive for arthralgias.   Skin: Positive for wound.   Neurological: Positive for numbness. Negative for weakness.     Objective:     Vital Signs (Most Recent):  Temp: 97.2 °F (36.2 °C) (02/10/18 1742)  Pulse: (!) 48 (02/10/18 1742)  Resp: 18 (02/10/18 1742)  BP: 110/61 (02/10/18 1742)  SpO2: 95 % (02/10/18 1742) Vital Signs (24h Range):  Temp:  [97.2 °F (36.2 °C)-99.4 °F (37.4 °C)] 97.2 °F (36.2 °C)  Pulse:  [48-66] 48  Resp:  [16-20] 18  SpO2:  [90 %-98 %] 95 %  BP: (101-110)/(54-61) 110/61     Weight: 49.4 kg (109 lb)  Body mass index is 16.57 kg/m².    Foot Exam    General  Orientation: disoriented       Right Foot/Ankle     Inspection and Palpation  Tenderness: none   Swelling: metatarsals   Skin Exam: blister, maceration, cellulitis, skin changes and ulcer;     Neurovascular  Dorsalis pedis: 1+  Posterior tibial: 1+  Saphenous nerve sensation: diminished  Tibial nerve sensation: diminished  Superficial peroneal nerve sensation: diminished  Deep peroneal nerve sensation: diminished  Sural nerve sensation: diminished      Left Foot/Ankle      Inspection and Palpation  Tenderness: none   Swelling: metatarsals   Skin Exam: skin changes and ulcer;     Neurovascular  Dorsalis pedis: 1+  Posterior tibial: 1+  Saphenous nerve sensation: diminished  Tibial nerve sensation: diminished  Superficial peroneal nerve sensation: diminished  Deep peroneal nerve sensation: diminished  Sural nerve sensation: diminished        2/10/18    Wound 1: Right dorsal foot ulceration   Measurement:[id number]  Base: granular, necrotic tissue alond plantar 3rd ray  Periwound skin:  viable  Drainage: sanguinous  Erythema: none  Probe probes to bone 4th and 5th metatarsal            Right heel bullae with fluctuance.  Post debridement measures approximately 4.0 x 4.0cm x superficial with purulent maloderous discharge.   No tracking, does not probe, no surrounding erythema.           Left foot ulceration noted to dorsal foot          Wound 1: Left dorsal foot   Measurement: 7sev8pfz3.1cm.  Base: fibrous base  Periwound skin: mild erythema,   Drainage: none  Erythema: mild  Probe: none, no bone exposed                  Wound 1: Left heel ulceration   Measurement: 4ssg2xwc6.1cm.  Periwound skin: callus  Drainage: none  Erythema:mild   Probe: none, no bone exposed            Laboratory:  Xray: Right: There is DJD, soft tissue swelling, a calcaneal spur, and diabetic vascular calcifications. No fracture dislocation bone destruction seen.    Left: There is DJD, diabetic vascular calcifications, and Spurs on the calcaneus. No fracture dislocation bone destruction seen.    MRI: 1. Limited, incomplete nondiagnostic examination secondary to early termination at the of the request of the patient.    2. Linear T1 hypointensity traversing the third metacarpal head, concerning for possible nondisplaced, likely subacute fracture.  Correlation with point tenderness recommended.    PHILLIP: Impression:   Right Leg:Segmental pressures and PVR waveforms suggest mild to moderate peripheral arterial occlusive disease.     Left Leg:Segmental pressures and PVR waveforms suggest mild to moderate peripheral arterial occlusive disease.        Diagnostic Results:  X-Ray: I have reviewed all pertinent results/findings within the past 24 hours.  reviewed    Clinical Findings:  Right heel bullae with fluctuance.  Amputation site with active bleeding, no signs of infection.  Left foot wounds stable.

## 2018-02-11 NOTE — PLAN OF CARE
Cultures pending- GNR, lactose ; awaiting ID and susceptibilities.  Continue empiric vanc and zosyn for now until cultures final.  vanc level 20.6 today. Continue to hold. Check random vanc tomorrow a.m. And will re-start vanc when levels <20.

## 2018-02-12 LAB
ALBUMIN SERPL BCP-MCNC: 1.6 G/DL
ANION GAP SERPL CALC-SCNC: 13 MMOL/L
BACTERIA SPEC ANAEROBE CULT: NORMAL
BASOPHILS # BLD AUTO: 0.04 K/UL
BASOPHILS NFR BLD: 0.3 %
BUN SERPL-MCNC: 60 MG/DL
CALCIUM SERPL-MCNC: 8.3 MG/DL
CHLORIDE SERPL-SCNC: 94 MMOL/L
CO2 SERPL-SCNC: 25 MMOL/L
CREAT SERPL-MCNC: 7.8 MG/DL
DIFFERENTIAL METHOD: ABNORMAL
EOSINOPHIL # BLD AUTO: 0.2 K/UL
EOSINOPHIL NFR BLD: 1.1 %
ERYTHROCYTE [DISTWIDTH] IN BLOOD BY AUTOMATED COUNT: 17 %
EST. GFR  (AFRICAN AMERICAN): 7.4 ML/MIN/1.73 M^2
EST. GFR  (NON AFRICAN AMERICAN): 6.4 ML/MIN/1.73 M^2
GLUCOSE SERPL-MCNC: 58 MG/DL
HCT VFR BLD AUTO: 23.3 %
HGB BLD-MCNC: 7.6 G/DL
IMM GRANULOCYTES # BLD AUTO: 0.19 K/UL
IMM GRANULOCYTES NFR BLD AUTO: 1.4 %
LYMPHOCYTES # BLD AUTO: 1.1 K/UL
LYMPHOCYTES NFR BLD: 8.1 %
MAGNESIUM SERPL-MCNC: 2.3 MG/DL
MCH RBC QN AUTO: 28.8 PG
MCHC RBC AUTO-ENTMCNC: 32.6 G/DL
MCV RBC AUTO: 88 FL
MONOCYTES # BLD AUTO: 1.1 K/UL
MONOCYTES NFR BLD: 7.5 %
NEUTROPHILS # BLD AUTO: 11.4 K/UL
NEUTROPHILS NFR BLD: 81.6 %
NRBC BLD-RTO: 0 /100 WBC
PHOSPHATE SERPL-MCNC: 5.7 MG/DL
PLATELET # BLD AUTO: 173 K/UL
PMV BLD AUTO: 10.7 FL
POCT GLUCOSE: 121 MG/DL (ref 70–110)
POTASSIUM SERPL-SCNC: 4.8 MMOL/L
RBC # BLD AUTO: 2.64 M/UL
SODIUM SERPL-SCNC: 132 MMOL/L
VANCOMYCIN SERPL-MCNC: 20.8 UG/ML
WBC # BLD AUTO: 13.99 K/UL

## 2018-02-12 PROCEDURE — 93005 ELECTROCARDIOGRAM TRACING: CPT

## 2018-02-12 PROCEDURE — 93926 LOWER EXTREMITY STUDY: CPT | Performed by: SURGERY

## 2018-02-12 PROCEDURE — 99232 SBSQ HOSP IP/OBS MODERATE 35: CPT | Mod: ,,, | Performed by: INTERNAL MEDICINE

## 2018-02-12 PROCEDURE — 25000003 PHARM REV CODE 250: Performed by: INTERNAL MEDICINE

## 2018-02-12 PROCEDURE — 36415 COLL VENOUS BLD VENIPUNCTURE: CPT

## 2018-02-12 PROCEDURE — 90935 HEMODIALYSIS ONE EVALUATION: CPT | Mod: ,,, | Performed by: INTERNAL MEDICINE

## 2018-02-12 PROCEDURE — 85025 COMPLETE CBC W/AUTO DIFF WBC: CPT

## 2018-02-12 PROCEDURE — 80202 ASSAY OF VANCOMYCIN: CPT

## 2018-02-12 PROCEDURE — 83735 ASSAY OF MAGNESIUM: CPT

## 2018-02-12 PROCEDURE — 99233 SBSQ HOSP IP/OBS HIGH 50: CPT | Mod: ,,, | Performed by: PHYSICIAN ASSISTANT

## 2018-02-12 PROCEDURE — 99232 SBSQ HOSP IP/OBS MODERATE 35: CPT | Mod: GC,,, | Performed by: PSYCHIATRY & NEUROLOGY

## 2018-02-12 PROCEDURE — 93010 ELECTROCARDIOGRAM REPORT: CPT | Mod: ,,, | Performed by: INTERNAL MEDICINE

## 2018-02-12 PROCEDURE — 90935 HEMODIALYSIS ONE EVALUATION: CPT

## 2018-02-12 PROCEDURE — 63600175 PHARM REV CODE 636 W HCPCS: Performed by: INTERNAL MEDICINE

## 2018-02-12 PROCEDURE — 11000001 HC ACUTE MED/SURG PRIVATE ROOM

## 2018-02-12 PROCEDURE — 80069 RENAL FUNCTION PANEL: CPT

## 2018-02-12 RX ORDER — CIPROFLOXACIN 500 MG/1
500 TABLET ORAL EVERY 24 HOURS
Status: DISCONTINUED | OUTPATIENT
Start: 2018-02-13 | End: 2018-02-28

## 2018-02-12 RX ADMIN — SODIUM CHLORIDE: 9 INJECTION, SOLUTION INTRAVENOUS at 03:02

## 2018-02-12 RX ADMIN — OXYCODONE HYDROCHLORIDE 5 MG: 5 TABLET ORAL at 06:02

## 2018-02-12 RX ADMIN — GABAPENTIN 100 MG: 100 CAPSULE ORAL at 08:02

## 2018-02-12 RX ADMIN — OXYCODONE HYDROCHLORIDE 5 MG: 5 TABLET ORAL at 08:02

## 2018-02-12 RX ADMIN — ATORVASTATIN CALCIUM 20 MG: 20 TABLET, FILM COATED ORAL at 02:02

## 2018-02-12 RX ADMIN — Medication: at 08:02

## 2018-02-12 RX ADMIN — MIDODRINE HYDROCHLORIDE 10 MG: 5 TABLET ORAL at 09:02

## 2018-02-12 RX ADMIN — CINACALCET HYDROCHLORIDE 30 MG: 30 TABLET, COATED ORAL at 08:02

## 2018-02-12 RX ADMIN — GABAPENTIN 100 MG: 100 CAPSULE ORAL at 02:02

## 2018-02-12 RX ADMIN — SODIUM CHLORIDE 500 ML: 0.9 INJECTION, SOLUTION INTRAVENOUS at 08:02

## 2018-02-12 RX ADMIN — APIXABAN 5 MG: 5 TABLET, FILM COATED ORAL at 02:02

## 2018-02-12 RX ADMIN — SEVELAMER CARBONATE 1600 MG: 800 TABLET, FILM COATED ORAL at 05:02

## 2018-02-12 RX ADMIN — SODIUM CHLORIDE: 9 INJECTION, SOLUTION INTRAVENOUS at 05:02

## 2018-02-12 RX ADMIN — SEVELAMER CARBONATE 1600 MG: 800 TABLET, FILM COATED ORAL at 02:02

## 2018-02-12 RX ADMIN — STANDARDIZED SENNA CONCENTRATE AND DOCUSATE SODIUM 2 TABLET: 8.6; 5 TABLET, FILM COATED ORAL at 08:02

## 2018-02-12 RX ADMIN — Medication: at 03:02

## 2018-02-12 RX ADMIN — MICONAZOLE NITRATE: 20 OINTMENT TOPICAL at 03:02

## 2018-02-12 RX ADMIN — ASPIRIN 81 MG: 81 TABLET, COATED ORAL at 02:02

## 2018-02-12 RX ADMIN — STANDARDIZED SENNA CONCENTRATE AND DOCUSATE SODIUM 2 TABLET: 8.6; 5 TABLET, FILM COATED ORAL at 02:02

## 2018-02-12 RX ADMIN — CITALOPRAM HYDROBROMIDE 10 MG: 10 TABLET ORAL at 02:02

## 2018-02-12 RX ADMIN — MICONAZOLE NITRATE: 20 OINTMENT TOPICAL at 08:02

## 2018-02-12 RX ADMIN — GABAPENTIN 100 MG: 100 CAPSULE ORAL at 05:02

## 2018-02-12 NOTE — MEDICAL/APP STUDENT
2/12/2018 10:00 AM   Widler Carbajal   1950   [id number]        Psychiatry Progress Note     SUBJECTIVE:   Stopped by patient's room at 8:3 and 9:18 AM - he was at dialysis.     Stopped by the patient's room at 12:07 PM - he was at dialysis, as per the nurse they do not expect him back until 1300 or 1400.    Current Medications:   Scheduled Meds:    ammonium lactate   Topical (Top) BID    apixaban  5 mg Oral BID    aspirin  81 mg Oral Daily    atorvastatin  20 mg Oral Daily    cinacalcet  30 mg Oral QHS    citalopram  10 mg Oral Daily    gabapentin  100 mg Oral TID    miconazole nitrate 2%   Topical (Top) BID    custom IVPB builder   Intravenous Q8H    senna-docusate 8.6-50 mg  2 tablet Oral BID    sevelamer carbonate  1,600 mg Oral TID WM      PRN Meds: sodium chloride 0.9%, sodium chloride 0.9%, acetaminophen, albumin human 25%, midodrine, ondansetron, oxyCODONE, sodium chloride 0.9%   Psychotherapeutics     Start     Stop Route Frequency Ordered    02/05/18 0900  citalopram tablet 10 mg      -- Oral Daily 02/04/18 1724          Allergies:   Review of patient's allergies indicates:  No Known Allergies     OBJECTIVE:   Vitals   Vitals:    02/12/18 0945   BP: 131/67   Pulse: (!) 53   Resp: 18   Temp:         Labs/Imaging/Studies:   Recent Results (from the past 36 hour(s))   Renal function panel    Collection Time: 02/11/18  4:18 AM   Result Value Ref Range    Glucose 73 70 - 110 mg/dL    Sodium 134 (L) 136 - 145 mmol/L    Potassium 6.7 (HH) 3.5 - 5.1 mmol/L    Chloride 98 95 - 110 mmol/L    CO2 22 (L) 23 - 29 mmol/L    BUN, Bld 52 (H) 8 - 23 mg/dL    Calcium 8.3 (L) 8.7 - 10.5 mg/dL    Creatinine 7.6 (H) 0.5 - 1.4 mg/dL    Albumin 1.5 (L) 3.5 - 5.2 g/dL    Phosphorus 5.9 (H) 2.7 - 4.5 mg/dL    eGFR if  7.7 (A) >60 mL/min/1.73 m^2    eGFR if non African American 6.6 (A) >60 mL/min/1.73 m^2    Anion Gap 14 8 - 16 mmol/L   Magnesium    Collection Time: 02/11/18  4:18 AM   Result Value  Ref Range    Magnesium 2.5 1.6 - 2.6 mg/dL   CBC auto differential    Collection Time: 02/11/18  4:18 AM   Result Value Ref Range    WBC 13.97 (H) 3.90 - 12.70 K/uL    RBC 2.67 (L) 4.60 - 6.20 M/uL    Hemoglobin 8.1 (L) 14.0 - 18.0 g/dL    Hematocrit 23.8 (L) 40.0 - 54.0 %    MCV 89 82 - 98 fL    MCH 30.3 27.0 - 31.0 pg    MCHC 34.0 32.0 - 36.0 g/dL    RDW 17.6 (H) 11.5 - 14.5 %    Platelets 165 150 - 350 K/uL    MPV 10.9 9.2 - 12.9 fL    Immature Granulocytes 1.1 (H) 0.0 - 0.5 %    Gran # (ANC) 11.5 (H) 1.8 - 7.7 K/uL    Immature Grans (Abs) 0.16 (H) 0.00 - 0.04 K/uL    Lymph # 1.0 1.0 - 4.8 K/uL    Mono # 1.2 (H) 0.3 - 1.0 K/uL    Eos # 0.1 0.0 - 0.5 K/uL    Baso # 0.03 0.00 - 0.20 K/uL    nRBC 0 0 /100 WBC    Gran% 82.0 (H) 38.0 - 73.0 %    Lymph% 7.2 (L) 18.0 - 48.0 %    Mono% 8.7 4.0 - 15.0 %    Eosinophil% 0.8 0.0 - 8.0 %    Basophil% 0.2 0.0 - 1.9 %    Differential Method Automated    Vancomycin, random    Collection Time: 02/11/18  4:18 AM   Result Value Ref Range    Vancomycin, Random 20.6 Not established ug/mL   Basic metabolic panel    Collection Time: 02/11/18  8:09 AM   Result Value Ref Range    Sodium 135 (L) 136 - 145 mmol/L    Potassium 4.7 3.5 - 5.1 mmol/L    Chloride 96 95 - 110 mmol/L    CO2 25 23 - 29 mmol/L    Glucose 72 70 - 110 mg/dL    BUN, Bld 54 (H) 8 - 23 mg/dL    Creatinine 7.4 (H) 0.5 - 1.4 mg/dL    Calcium 8.5 (L) 8.7 - 10.5 mg/dL    Anion Gap 14 8 - 16 mmol/L    eGFR if African American 7.9 (A) >60 mL/min/1.73 m^2    eGFR if non  6.8 (A) >60 mL/min/1.73 m^2   POCT glucose    Collection Time: 02/11/18 11:17 PM   Result Value Ref Range    POCT Glucose 72 70 - 110 mg/dL   Renal function panel    Collection Time: 02/12/18  4:02 AM   Result Value Ref Range    Glucose 58 (L) 70 - 110 mg/dL    Sodium 132 (L) 136 - 145 mmol/L    Potassium 4.8 3.5 - 5.1 mmol/L    Chloride 94 (L) 95 - 110 mmol/L    CO2 25 23 - 29 mmol/L    BUN, Bld 60 (H) 8 - 23 mg/dL    Calcium 8.3 (L) 8.7 -  10.5 mg/dL    Creatinine 7.8 (H) 0.5 - 1.4 mg/dL    Albumin 1.6 (L) 3.5 - 5.2 g/dL    Phosphorus 5.7 (H) 2.7 - 4.5 mg/dL    eGFR if  7.4 (A) >60 mL/min/1.73 m^2    eGFR if non African American 6.4 (A) >60 mL/min/1.73 m^2    Anion Gap 13 8 - 16 mmol/L   Magnesium    Collection Time: 02/12/18  4:02 AM   Result Value Ref Range    Magnesium 2.3 1.6 - 2.6 mg/dL   CBC auto differential    Collection Time: 02/12/18  4:02 AM   Result Value Ref Range    WBC 13.99 (H) 3.90 - 12.70 K/uL    RBC 2.64 (L) 4.60 - 6.20 M/uL    Hemoglobin 7.6 (L) 14.0 - 18.0 g/dL    Hematocrit 23.3 (L) 40.0 - 54.0 %    MCV 88 82 - 98 fL    MCH 28.8 27.0 - 31.0 pg    MCHC 32.6 32.0 - 36.0 g/dL    RDW 17.0 (H) 11.5 - 14.5 %    Platelets 173 150 - 350 K/uL    MPV 10.7 9.2 - 12.9 fL    Immature Granulocytes 1.4 (H) 0.0 - 0.5 %    Gran # (ANC) 11.4 (H) 1.8 - 7.7 K/uL    Immature Grans (Abs) 0.19 (H) 0.00 - 0.04 K/uL    Lymph # 1.1 1.0 - 4.8 K/uL    Mono # 1.1 (H) 0.3 - 1.0 K/uL    Eos # 0.2 0.0 - 0.5 K/uL    Baso # 0.04 0.00 - 0.20 K/uL    nRBC 0 0 /100 WBC    Gran% 81.6 (H) 38.0 - 73.0 %    Lymph% 8.1 (L) 18.0 - 48.0 %    Mono% 7.5 4.0 - 15.0 %    Eosinophil% 1.1 0.0 - 8.0 %    Basophil% 0.3 0.0 - 1.9 %    Differential Method Automated    Vancomycin, random    Collection Time: 02/12/18  4:02 AM   Result Value Ref Range    Vancomycin, Random 20.8 Not established ug/mL        Mental Status Exam: Could not be assessed as patient was not in the room       ASSESSMENT/PLAN:   Delirium  1. Follow up with patient when he comes back from dialysis     Veronica Drew, Medical Student  2/12/2018

## 2018-02-12 NOTE — PROGRESS NOTES
"[facility]  Psychiatry  Progress Note    Patient Name: Wilder Carbajal  MRN: [id number]   Code Status: Full Code  Admission Date: 2/4/2018  Hospital Length of Stay: 8 days  Expected Discharge Date: 2/16/2018  Attending Physician: Kanika Bradley MD  Primary Care Provider: Dio Roberson MD    Current Legal Status: N/A    Patient information was obtained from patient.     Subjective:     Principal Problem:Septic encephalopathy    Chief Complaint: "I feel fine"    HPI: Mr Carbajal is a 67 year old black male with no psychiatric history and medical history of HTN, Dm, and ESRD on Hd. He was brought to the hospital by EMS when his son noticed fluid coming out of his legs, and he was admitted for treatment of sepsis. Psychiatry consulted for "depression, patient requested".    On interview, the patient denies that he requested a psychiatry consult but does feel that we could be helpful to him. States that he is depressed because of his worsening physical health and because his son does not really help him very much. Has been living with son for about two years now, but son works full-time and usually wants to be by himself when he is at home. Son also asks patient repeatedly why he does not get some home health care worker to help him rather than relying on him. He therefore feels sad most of the time and just sits around watching Tv, although says this is because he physically cannot do anything more. Denies feelings of hopelessness, SI/HI. Sleep has been poor for years, with difficulties in sleep onset; attributes this to pain and also to anxiety. When asked what he worries about, he replies "lots of things" but is unable to provide any examples. Appetite unchanging, and says that his usual diet is pudding, fruit cups, iced tea, and fruit punch. Says he is unable to cook because his vision is so bad. Troubles with concentration and memory just over the last few days. Endorses visual hallucinations of " "seeing "people of all colors and sizes" in his home - this has been occurring for about 1 year now and does not bother him. Endorses Ah of a voice telling him to do things, but then explains that these are his own thoughts. Denies paranoia, IOR. Is agreeable with having home health come to him or with going into a facility that could provide him more attention. Gave permission for me to speak with his son.    Regarding the current state of his right foot and recommendations for amputation: The patient states that he he is in severe pain and wants to something to help with it. After explanation of his current condition, the patient understood that "the food could take [his] life away" and that amputation now should prevent the need for more extensive amputation in the future. He states now that he is agreeable to having the amputation as soon as possible.    Spoke with son, Raymond Carbajal, on the phone. He states that the patient is usually sharp mentally. He just noticed troubles with memory yesterday. Is concerned the patient may not fully understand the situation with his toes/foot. He denies the patient being diagnosed with dementia or depression, and feels the patient was able to care for himself adequately before coming here. Is not aware of the patient taking an anti-depressant medication.     Per primary team, they are concerned that he does not have capacity to decide whether or not he will have amputation of toes. He was initially agreeing with this, but then refused after they took him to pre-op yesterday. He reported to them a belief that the surgery was un-necessary, and could not state the risks of refusing the procedure.    Hospital Course: No notes on file    Interval History: On interview, the patient initially states that he is doing well, but then says that he made a mistake by allowing his toes to be amputated. However, he could not then explain why this was a mistake or identify any complications " "from the surgical procedure (just stating that "it took too long" to actually perform the surgery). States that he does not want any more surgery. When risks of this infection (if untreated) were explained to him, he insists that he understands these things. However, when asked to repeat these things back, he was unable to do so. Again states that he will not allow any amputation on his foot, but cannot explain any further except to say "it's never a good idea to cut off part of your body".    Family History     Problem Relation (Age of Onset)    Depression Child    Heart attack Mother    No Known Problems Father, Sister, Brother, Maternal Aunt, Maternal Uncle, Paternal Aunt, Paternal Uncle, Maternal Grandmother, Maternal Grandfather, Paternal Grandmother, Paternal Grandfather        Social History Main Topics    Smoking status: Never Smoker    Smokeless tobacco: Never Used    Alcohol use No    Drug use: No    Sexual activity: Not on file     Psychotherapeutics     Start     Stop Route Frequency Ordered    02/05/18 0900  citalopram tablet 10 mg      -- Oral Daily 02/04/18 1724           Review of Systems  Objective:     Vital Signs (Most Recent):  Temp: 97 °F (36.1 °C) (02/12/18 0745)  Pulse: (!) 58 (02/12/18 1100)  Resp: 18 (02/12/18 1100)  BP: (!) 111/57 (02/12/18 1100)  SpO2: (!) 90 % (02/12/18 0731) Vital Signs (24h Range):  Temp:  [97 °F (36.1 °C)-98.9 °F (37.2 °C)] 97 °F (36.1 °C)  Pulse:  [49-63] 58  Resp:  [16-20] 18  SpO2:  [90 %-98 %] 90 %  BP: (105-140)/(57-68) 111/57     Height: 5' 8" (172.7 cm)  Weight: 49.4 kg (109 lb)  Body mass index is 16.57 kg/m².    No intake or output data in the 24 hours ending 02/12/18 1127    Physical Exam   Psychiatric:   Appearance: obese black male, appears stated age, skin on lower legs thickened and darkened, right foot bandaged.  Behavior: calm, cooperative, pleasant  Speech: normal rate, volume, and tone  Mood: "sad"  Affect: mildly dysthymic, constricted, " congruent to stated mood  Thought processes: slowed, mostly logical but then disorganized on targeted questioning  Thought content: +VH as described in initial consult. No Ah, no SI/HI  Insight: poor  Judgment: poor  Cognition: alert and oriented to self, place, time, situation. Attention impaired (unable to perform digit span forwards of backwards). Recent memory impaired, remote grossly intact. CAM-ICU positive         Significant Labs: All pertinent labs within the past 24 hours have been reviewed.    Significant Imaging: None    Assessment/Plan:     Acute delirium    - as evidenced by acute change from baseline, impaired attention, disorganized thinking  - most likely cause is sepsis, however anemia could also be contributing.  - things like depression and dementia cannot be reliably diagnosed in the setting of delirium, but it seems like most of what he is experiencing in terms of mood is related to his situation (failing health, limited social support, etc) rather than a clinical depression. Most appropriate intervention would seem to be optimizing physical health and getting him to a place where he can get better care and support (such as an [facility] or nursing home)  - regarding capacity, the patient has been inconsistent with making this decision with no legitimate explanation for why he is doing this. He is also not able to explain any of the risks of the infection if amputation is not performed. Therefore he does NOT have capacity to make this decision right now. If any more surgeries are needed, then please proceed with the use of a proxy decision-maker             Need for Continued Hospitalization:   No need for inpatient psychiatric hospitalization. Continue medical care as per the primary team.    Anticipated Disposition: Swing Bed     Total time:  25 with greater than 50% of this time spent in counseling and/or coordination of care.       Osbaldo Sauceda MD   Psychiatry  [facility]

## 2018-02-12 NOTE — PROGRESS NOTES
[facility]  Vascular Surgery  Progress Note    Patient Name: Wilder Carbajal  MRN: [id number]  Admission Date: 2/4/2018  Primary Care Provider: Dio Roberson MD    Subjective:      Interval History: since last seen patient has undergone debridement of Right foot and 4-5 ray amputations. Son now makes decisions.    Post-Op Info:  Procedure(s) (LRB):  AMPUTATION-TOES 4TH AND 5TH, I&D (Right)   3 Days Post-Op       Medications:  Continuous Infusions:  Scheduled Meds:   ammonium lactate   Topical (Top) BID    apixaban  5 mg Oral BID    aspirin  81 mg Oral Daily    atorvastatin  20 mg Oral Daily    cinacalcet  30 mg Oral QHS    citalopram  10 mg Oral Daily    gabapentin  100 mg Oral TID    miconazole nitrate 2%   Topical (Top) BID    custom IVPB builder   Intravenous Q8H    senna-docusate 8.6-50 mg  2 tablet Oral BID    sevelamer carbonate  1,600 mg Oral TID WM     PRN Meds:sodium chloride 0.9%, sodium chloride 0.9%, acetaminophen, albumin human 25%, midodrine, ondansetron, oxyCODONE, sodium chloride 0.9%     Objective:     Vital Signs (Most Recent):  Temp: 98 °F (36.7 °C) (02/12/18 1450)  Pulse: (!) 54 (02/12/18 1450)  Resp: 18 (02/12/18 1450)  BP: (!) 158/70 (02/12/18 1450)  SpO2: (!) 92 % (02/12/18 1450) Vital Signs (24h Range):  Temp:  [97 °F (36.1 °C)-98.3 °F (36.8 °C)] 98 °F (36.7 °C)  Pulse:  [49-63] 54  Resp:  [18-20] 18  SpO2:  [90 %-97 %] 92 %  BP: (105-158)/(55-70) 158/70       Date 02/12/18 0700 - 02/13/18 0659   Shift 7176-1451 4879-3019 7871-1694 24 Hour Total   I  N  T  A  K  E   Other 600   600    Shift Total  (mL/kg) 600  (12.1)   600  (12.1)   O  U  T  P  U  T   Other 3600   3600    Shift Total  (mL/kg) 3600  (72.8)   3600  (72.8)   Weight (kg) 49.4 49.4 49.4 49.4       Physical Exam   Cardiovascular: Normal rate and regular rhythm.    Pulses:       Femoral pulses are 2+ on the right side, and 2+ on the left side.  Biphasic AT and PT signals bilaterally   Pulmonary/Chest:  Effort normal. No respiratory distress.   Abdominal: Soft.   Musculoskeletal:   Right foot with 4-5 th ray amputations with evidence of 2nd foot duskiness and large right heel ulcer  Evidence of chronic venous stasis bilaterally with chronic skin changes   Neurological: He is alert.   Skin: Skin is warm and dry.       Significant Labs:  CBC:   Recent Labs  Lab 02/12/18  0402   WBC 13.99*   RBC 2.64*   HGB 7.6*   HCT 23.3*      MCV 88   MCH 28.8   MCHC 32.6     CMP:   Recent Labs  Lab 02/12/18  0402   GLU 58*   CALCIUM 8.3*   ALBUMIN 1.6*   *   K 4.8   CO2 25   CL 94*   BUN 60*   CREATININE 7.8*     Coagulation: No results for input(s): LABPROT, INR, APTT in the last 48 hours.    Significant Diagnostics:      Assessment/Plan:     Foot ulceration, right, with fat layer exposed    66 yo M with h/o ESRD on HD (TThS) via L RC AVF (with interposition graft), HTN, HLD, DM 2( Hgb A1c 4.7), HFrEF (45% 4/2017)  presenting with fluid overload after missing dialysis with evidence of right diabetic foot ulceration with wet gangrene s/p 4-5th ray amputations.    Patient with severe tissue loss to right foot; will obtain RLE US to assess flow; had PHILLIP with PVR with good waveforms distally to heal amputation  Tentatively schedule for RLE angiogram on Thursday; will assess patient with Dr. Diaz to evaluate for RLE angiogram vs BKA vs AKA; per son, patient was living alone with assistance prior and was able to transfer himself with minimal ambulation   Continue ASA, statin; please hold eliquis for possible OR Thursday  Vascular surgery to follow                  Alexandrea Johnston MD  Vascular Surgery  [facility]

## 2018-02-12 NOTE — PROGRESS NOTES
[facility] Hospital Medicine  Progress Note    Patient Name: Wilder Carbajal  MRN: [id number]  Patient Class: IP- Inpatient   Admission Date: 2/4/2018  Length of Stay: 8 days  ESTUARDO: 2/16/2018  Attending Physician: Kanika Bradley MD  Primary Care Provider: Dio Roberson MD    [facility] Medicine Team: [facility] HOSP MED D Kanika Bradley MD    Subjective:     Principal Problem:Septic encephalopathy    Interval History: Patient with no events overnight, no new complaints. Pain controlled.    Review of Systems   Constitutional: Positive for fatigue.   Respiratory: Negative for shortness of breath.      Objective:     Vital Signs (Most Recent):  Temp: 97 °F (36.1 °C) (02/12/18 0745)  Pulse: (!) 53 (02/12/18 1145)  Resp: 18 (02/12/18 1145)  BP: 124/61 (02/12/18 1145)  SpO2: (!) 90 % (02/12/18 0731) Vital Signs (24h Range):  Temp:  [97 °F (36.1 °C)-98.3 °F (36.8 °C)] 97 °F (36.1 °C)  Pulse:  [49-63] 53  Resp:  [18-20] 18  SpO2:  [90 %-97 %] 90 %  BP: (105-140)/(55-68) 124/61     Weight: 49.4 kg (109 lb)  Body mass index is 16.57 kg/m².    Intake/Output Summary (Last 24 hours) at 02/12/18 1344  Last data filed at 02/12/18 1145   Gross per 24 hour   Intake              600 ml   Output             3600 ml   Net            -3000 ml      Physical Exam   Constitutional: He appears well-developed.   HENT:   Head: Normocephalic.   Mouth/Throat: Mucous membranes are not pale and not cyanotic.   Eyes: Conjunctivae and lids are normal.   Neck: Neck supple.   Cardiovascular: S1 normal and S2 normal.    Murmur heard.  Pulmonary/Chest: Effort normal and breath sounds normal.   Abdominal: Soft. Bowel sounds are normal. There is no tenderness.   Musculoskeletal: He exhibits no edema.        Right foot: There is decreased range of motion (due to surgical dressing).   Neurological: He is alert. He is not disoriented.   Skin: Skin is warm and dry. He is not diaphoretic. No cyanosis. Nails show no clubbing.   Psychiatric: He  has a normal mood and affect.       Significant Labs:   A1C:     Recent Labs  Lab 01/07/18  0008   HGBA1C 4.6     CBC:     Recent Labs  Lab 02/11/18  0418 02/12/18  0402   WBC 13.97* 13.99*   HGB 8.1* 7.6*   HCT 23.8* 23.3*    173     CMP:     Recent Labs  Lab 02/11/18  0418 02/11/18  0809 02/12/18  0402   * 135* 132*   K 6.7* 4.7 4.8   CL 98 96 94*   CO2 22* 25 25   GLU 73 72 58*   BUN 52* 54* 60*   CREATININE 7.6* 7.4* 7.8*   CALCIUM 8.3* 8.5* 8.3*   ALBUMIN 1.5*  --  1.6*   ANIONGAP 14 14 13   EGFRNONAA 6.6* 6.8* 6.4*     Magnesium:     Recent Labs  Lab 02/11/18 0418 02/12/18  0402   MG 2.5 2.3     Assessment/Plan:      Brief HPI: 66 yo M with PMH of ESRD on HD, DM2, CHF presented with AMS, fluid overload and right necrotic heal ulcer with gangrene of 4th and 5th digits.      Hospital Course Overview:   Treated with empiric Vancomycin and Zosyn. Patient underwent washout and on 2/6/18 and Right 4th and 5th ray amputation 2/9/18. Podiatry and Vascular Surgery following. Patient with severe tissue loss to right foot and dusky appearance to 2nd and 3rd digits. ABIs ordered to assess. Vascular Surgery has tentatively scheduled RLE angiogram on Thursday.    Current Hospital Problem List:    Active Hospital Problems    Diagnosis  POA    *Septic encephalopathy [G93.41]  Yes    Osteomyelitis [M86.9]  Yes    Diabetic foot ulcer [E11.621, L97.509]  Yes    Foot ulceration, right, with fat layer exposed [L97.512]  Yes    Acute delirium [R41.0]  Yes    Protein-calorie malnutrition, severe [E43]  Yes    DVT (deep venous thrombosis) [I82.409]  Yes    DM type 2 with diabetic mixed hyperlipidemia [E11.69, E78.2]  Yes    Diabetic polyneuropathy associated with type 2 diabetes mellitus [E11.42]  Yes    Chronic kidney disease-mineral and bone disorder [N18.9, E83.9, M89.9]  Yes    Depression [F32.9]  Yes    Alteration in skin integrity [R23.9]  Yes    Infected skin ulcer with fat layer exposed [L98.492,  L08.9]  Yes    Hyperkalemia [E87.5]  Yes    Hypervolemia [E87.70]  Yes    Type 2 diabetes mellitus with stage 5 chronic kidney disease and hypertension [E11.22, I12.0, N18.5]  Yes    ESRD (end stage renal disease) [N18.6]  Yes    Essential hypertension [I10]  Yes      Resolved Hospital Problems    Diagnosis Date Resolved POA   No resolved problems to display.       Medications for management of current problems:      ammonium lactate   Topical (Top) BID    apixaban  5 mg Oral BID    aspirin  81 mg Oral Daily    atorvastatin  20 mg Oral Daily    cinacalcet  30 mg Oral QHS    citalopram  10 mg Oral Daily    gabapentin  100 mg Oral TID    miconazole nitrate 2%   Topical (Top) BID    custom IVPB builder   Intravenous Q8H    senna-docusate 8.6-50 mg  2 tablet Oral BID    sevelamer carbonate  1,600 mg Oral TID WM       PRN:   sodium chloride 0.9%    sodium chloride 0.9%    acetaminophen    albumin human 25%    midodrine    ondansetron    oxyCODONE    sodium chloride 0.9%     IV Fluids: none    Problems addressed today:    Septic encephalopathy on possible dementia  Improved with treatment of infected foot. Patient lacked capacity to refuse surgery, consent obtained from his son as proxy.     Infected right dorsal foot, ischemic ulceration  Podiatry consulted - s/p I&D and 4th and 5th amputation  Vascular surgery consulted - initially no intervention required  Continued vancomycin 1 g IV per random levels and Zosyn 2.25 g IV BID  Wound culture revealed pan-sensitive CITROBACTER KOSERI and KLEBSIELLA PNEUMONIAE ESBL  To OR 02/09/18. Consulted ID.  Plan for wound vac and 6 weeks IV antibiotics. ABIs ordered to rule out need for BKA. Angiogram considered.     Hypervolemia due to non-compliance with dialysis  Hyperkalemia, resolved  Shifted in ER with albuterol, insulin/destrose, bicarbonate. K 7.9--> 4  No respiratory compromise. Emergent dialysis 2/5/18 early AM  Daily weights  Withhold  anti-hypertensives for volume removal      DVT  BLE ultrasound  Apixaban 5 mg BID - ? Compliance at home  Apixaban held 2/12/2018 for possible angiogram on 2/15/18     DM II with HTN and CKD V  HgbA1c 4.6%  Likely controlled with declining intake  No further intervention needed at this time     Protein calorie malnutrtion  Poor prognosis for wound healing  Nepro      Essential HTN - hold home hydralazine 50 mg TID and losartan 50 mg daily     DM II hyperlipidemia - atorvastatin 40 mg daily     DM II neuropathy - gabapentin 100 mg TID     ESRF HD TTS - Nephrology consulted for dialysis.  Withholding anti-hypertensives and added midodrine as needed prior to dialysis for volume removal and reduce symptomatic dialysis-associated hypotension.   BP improving.    CKD MBD - continue cinacalcet 30 mg qhs and sevelamer 1600 mg daily     Depression - continue citalopram 10 mg daily. Consulted psychiatry.   Rule out delirium and dementia.  Back to baseline.    Risk:  Patient is currently on drug therapy requiring intensive monitoring for toxicity: Vancomycin (redose when level less than 20)    Anticipated Disposition: Long Term Care  ESTUARDO 2/16/2018    Goals of Care:  General Prognosis: fair  Goals: Curative  Comfort Only: No  Hospice: No  Code Status: Full Code    VTE Risk Mitigation         Ordered     apixaban tablet 5 mg  2 times daily     Route:  Oral        02/04/18 1724     Medium Risk of VTE  Once      02/04/18 1725             Kanika Bradley MD  Department of Hospital Medicine   [facility]

## 2018-02-12 NOTE — PROGRESS NOTES
[facility]  Podiatry  Progress Note    Patient Name: Wilder Carbajal  MRN: [id number]  Admission Date: 2/4/2018  Hospital Length of Stay: 7 days  Attending Physician: Kanika Bradley MD  Primary Care Provider: Dio Roberson MD   Interval Hx: s/p Right 4th and 5th ray amputation (DOS: 1/9/18 per Dr. Matamoros).  Patient pain controlled.  Dressings clean, dry, and intact.    Scheduled Meds:   ammonium lactate   Topical (Top) BID    apixaban  5 mg Oral BID    aspirin  81 mg Oral Daily    atorvastatin  20 mg Oral Daily    cinacalcet  30 mg Oral QHS    citalopram  10 mg Oral Daily    gabapentin  100 mg Oral TID    miconazole nitrate 2%   Topical (Top) BID    piperacillin-tazobactam (ZOSYN) IVPB  2.25 g Intravenous Q8H    senna-docusate 8.6-50 mg  2 tablet Oral BID    sevelamer carbonate  1,600 mg Oral TID WM     Continuous Infusions:  PRN Meds:sodium chloride 0.9%, sodium chloride 0.9%, acetaminophen, albumin human 25%, midodrine, ondansetron, oxyCODONE, sodium chloride 0.9%    Review of patient's allergies indicates:  No Known Allergies     Past Medical History:   Diagnosis Date    Acute pain of left shoulder 1/7/2017    Arthritis     Asthma     Benign neoplasm of eyelid     RUL    Blood clotting tendency     Blood transfusion     Cataract     Chronic kidney disease requiring chronic dialysis     Diabetes mellitus     Diabetic retinopathy     Fever blister     Hyperlipidemia     Hypertension     Infertility     Joint pain     Retinal detachment     Thyroid disease     Weakness 1/7/2017     Past Surgical History:   Procedure Laterality Date    CATARACT EXTRACTION      COLONOSCOPY N/A 6/23/2017    Procedure: COLONOSCOPY;  Surgeon: Vic Clark MD;  Location: [address];  Service: Endoscopy;  Laterality: N/A;    RETINAL DETACHMENT SURGERY         Family History     Problem Relation (Age of Onset)    Heart attack Mother    No Known Problems Father, Sister, Brother,  Maternal Aunt, Maternal Uncle, Paternal Aunt, Paternal Uncle, Maternal Grandmother, Maternal Grandfather, Paternal Grandmother, Paternal Grandfather        Social History Main Topics    Smoking status: Never Smoker    Smokeless tobacco: Never Used    Alcohol use No    Drug use: No    Sexual activity: Not on file     Review of Systems   Constitutional: Positive for activity change.   Respiratory: Negative for shortness of breath.    Cardiovascular: Negative for chest pain and leg swelling.   Gastrointestinal: Negative.  Negative for nausea and vomiting.   Genitourinary: Negative.    Musculoskeletal: Positive for arthralgias.   Skin: Positive for wound.   Neurological: Positive for numbness. Negative for weakness.     Objective:     Vital Signs (Most Recent):  Temp: 97.9 °F (36.6 °C) (02/11/18 1547)  Pulse: 62 (02/11/18 1547)  Resp: 18 (02/11/18 1547)  BP: (!) 121/57 (02/11/18 1547)  SpO2: 97 % (02/11/18 1547) Vital Signs (24h Range):  Temp:  [97 °F (36.1 °C)-98.9 °F (37.2 °C)] 97.9 °F (36.6 °C)  Pulse:  [49-62] 62  Resp:  [16-18] 18  SpO2:  [96 %-100 %] 97 %  BP: (108-121)/(55-61) 121/57     Weight: 49.4 kg (109 lb)  Body mass index is 16.57 kg/m².    Foot Exam    General  Orientation: disoriented       Right Foot/Ankle     Inspection and Palpation  Tenderness: none   Swelling: metatarsals   Skin Exam: blister, maceration, cellulitis, skin changes and ulcer;     Neurovascular  Dorsalis pedis: 1+  Posterior tibial: 1+  Saphenous nerve sensation: diminished  Tibial nerve sensation: diminished  Superficial peroneal nerve sensation: diminished  Deep peroneal nerve sensation: diminished  Sural nerve sensation: diminished      Left Foot/Ankle      Inspection and Palpation  Tenderness: none   Swelling: metatarsals   Skin Exam: skin changes and ulcer;     Neurovascular  Dorsalis pedis: 1+  Posterior tibial: 1+  Saphenous nerve sensation: diminished  Tibial nerve sensation: diminished  Superficial peroneal nerve  sensation: diminished  Deep peroneal nerve sensation: diminished  Sural nerve sensation: diminished        2/10/18    Wound 1: Right dorsal foot ulceration   Measurement:[id number]  Base: granular, necrotic tissue alond plantar 3rd ray  Periwound skin: viable  Drainage: sanguinous  Erythema: none  Probe probes to bone 4th and 5th metatarsal    2nd and 3rd digits appear dusky.                  Right heel bullae with fluctuance.  Post debridement measures approximately 4.0 x 4.0cm x superficial  With necrotic eschar.   No tracking, does not probe, no surrounding erythema.             Left foot ulceration noted to dorsal foot          Wound 1: Left dorsal foot   Measurement: 8hrk5ugm2.1cm.  Base: fibrous base  Periwound skin: mild erythema,   Drainage: none  Erythema: mild  Probe: none, no bone exposed                  Wound 1: Left heel ulceration   Measurement: 3qws7fip9.1cm.  Periwound skin: callus  Drainage: none  Erythema:mild   Probe: none, no bone exposed            Laboratory:  Xray: Right: There is DJD, soft tissue swelling, a calcaneal spur, and diabetic vascular calcifications. No fracture dislocation bone destruction seen.    Left: There is DJD, diabetic vascular calcifications, and Spurs on the calcaneus. No fracture dislocation bone destruction seen.    MRI: 1. Limited, incomplete nondiagnostic examination secondary to early termination at the of the request of the patient.    2. Linear T1 hypointensity traversing the third metacarpal head, concerning for possible nondisplaced, likely subacute fracture.  Correlation with point tenderness recommended.    PHILLIP: Impression:   Right Leg:Segmental pressures and PVR waveforms suggest mild to moderate peripheral arterial occlusive disease.     Left Leg:Segmental pressures and PVR waveforms suggest mild to moderate peripheral arterial occlusive disease.        Diagnostic Results:  X-Ray: I have reviewed all pertinent results/findings within the past 24 hours.   reviewed    Clinical Findings:  Right heel bullae with fluctuance.  Amputation site with active bleeding, no signs of infection.  Left foot wounds stable.    Assessment/Plan:     Foot ulceration, right, with fat layer exposed    Wilder Carbajal is a 67 y.o. 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 Matamoros), concern for dusky appearance of 2nd and 3rd digits  -applied wound vac 75mmHg continuous therapy.  Will change every 2-3 days with adaptic over exposed tendon's and bone.  -ID on board, appreciate recs  -Vascular on board, appreciate recs  -Podiatry will follow.    Weightbearing Status: NWB  Offloading Device: heel protector boot          Type 2 diabetes mellitus with stage 5 chronic kidney disease and hypertension    Per Primary               George Escamilla MD  Podiatry  [facility]

## 2018-02-12 NOTE — PLAN OF CARE
Problem: Patient Care Overview  Goal: Plan of Care Review  Outcome: Ongoing (interventions implemented as appropriate)  HD treatment complete. Duration of treatment 3.5 hours and 3 L removed. Treatment was tolerated well and no complications with access to left lower arm. Needles removed and hemostasis achieved. Dressing intact and no drainage noted. Thrill and bruit present.

## 2018-02-12 NOTE — PLAN OF CARE
Problem: Patient Care Overview  Goal: Plan of Care Review  Outcome: Ongoing (interventions implemented as appropriate)  POC reviewed with pt. AAO x4.  HD today- removed 3L. Vascular sx consult, R leg US pending. Pt placed on contact isolation for ESBL in foot. Pt remained free from falls. Questions and concerns addressed. Pt progressing towards goals. Will continue to monitor. See flow sheets for full assessment and VS

## 2018-02-12 NOTE — ASSESSMENT & PLAN NOTE
- as evidenced by acute change from baseline, impaired attention, disorganized thinking  - most likely cause is sepsis, however anemia could also be contributing.  - things like depression and dementia cannot be reliably diagnosed in the setting of delirium, but it seems like most of what he is experiencing in terms of mood is related to his situation (failing health, limited social support, etc) rather than a clinical depression. Most appropriate intervention would seem to be optimizing physical health and getting him to a place where he can get better care and support (such as an [facility] or nursing home)  - regarding capacity, the patient has been inconsistent with making this decision with no legitimate explanation for why he is doing this. He is also not able to explain any of the risks of the infection if amputation is not performed. Therefore he does NOT have capacity to make this decision right now. If any more surgeries are needed, then please proceed with the use of a proxy decision-maker

## 2018-02-12 NOTE — SUBJECTIVE & OBJECTIVE
Interval Hx: s/p Right 4th and 5th ray amputation (DOS: 1/9/18 per Dr. Matamoros).  Patient pain controlled.  Dressings clean, dry, and intact.    Scheduled Meds:   ammonium lactate   Topical (Top) BID    apixaban  5 mg Oral BID    aspirin  81 mg Oral Daily    atorvastatin  20 mg Oral Daily    cinacalcet  30 mg Oral QHS    citalopram  10 mg Oral Daily    gabapentin  100 mg Oral TID    miconazole nitrate 2%   Topical (Top) BID    piperacillin-tazobactam (ZOSYN) IVPB  2.25 g Intravenous Q8H    senna-docusate 8.6-50 mg  2 tablet Oral BID    sevelamer carbonate  1,600 mg Oral TID WM     Continuous Infusions:  PRN Meds:sodium chloride 0.9%, sodium chloride 0.9%, acetaminophen, albumin human 25%, midodrine, ondansetron, oxyCODONE, sodium chloride 0.9%    Review of patient's allergies indicates:  No Known Allergies     Past Medical History:   Diagnosis Date    Acute pain of left shoulder 1/7/2017    Arthritis     Asthma     Benign neoplasm of eyelid     RUL    Blood clotting tendency     Blood transfusion     Cataract     Chronic kidney disease requiring chronic dialysis     Diabetes mellitus     Diabetic retinopathy     Fever blister     Hyperlipidemia     Hypertension     Infertility     Joint pain     Retinal detachment     Thyroid disease     Weakness 1/7/2017     Past Surgical History:   Procedure Laterality Date    CATARACT EXTRACTION      COLONOSCOPY N/A 6/23/2017    Procedure: COLONOSCOPY;  Surgeon: Vic Clark MD;  Location: [address];  Service: Endoscopy;  Laterality: N/A;    RETINAL DETACHMENT SURGERY         Family History     Problem Relation (Age of Onset)    Heart attack Mother    No Known Problems Father, Sister, Brother, Maternal Aunt, Maternal Uncle, Paternal Aunt, Paternal Uncle, Maternal Grandmother, Maternal Grandfather, Paternal Grandmother, Paternal Grandfather        Social History Main Topics    Smoking status: Never Smoker    Smokeless tobacco: Never Used     Alcohol use No    Drug use: No    Sexual activity: Not on file     Review of Systems   Constitutional: Positive for activity change.   Respiratory: Negative for shortness of breath.    Cardiovascular: Negative for chest pain and leg swelling.   Gastrointestinal: Negative.  Negative for nausea and vomiting.   Genitourinary: Negative.    Musculoskeletal: Positive for arthralgias.   Skin: Positive for wound.   Neurological: Positive for numbness. Negative for weakness.     Objective:     Vital Signs (Most Recent):  Temp: 97.9 °F (36.6 °C) (02/11/18 1547)  Pulse: 62 (02/11/18 1547)  Resp: 18 (02/11/18 1547)  BP: (!) 121/57 (02/11/18 1547)  SpO2: 97 % (02/11/18 1547) Vital Signs (24h Range):  Temp:  [97 °F (36.1 °C)-98.9 °F (37.2 °C)] 97.9 °F (36.6 °C)  Pulse:  [49-62] 62  Resp:  [16-18] 18  SpO2:  [96 %-100 %] 97 %  BP: (108-121)/(55-61) 121/57     Weight: 49.4 kg (109 lb)  Body mass index is 16.57 kg/m².    Foot Exam    General  Orientation: disoriented       Right Foot/Ankle     Inspection and Palpation  Tenderness: none   Swelling: metatarsals   Skin Exam: blister, maceration, cellulitis, skin changes and ulcer;     Neurovascular  Dorsalis pedis: 1+  Posterior tibial: 1+  Saphenous nerve sensation: diminished  Tibial nerve sensation: diminished  Superficial peroneal nerve sensation: diminished  Deep peroneal nerve sensation: diminished  Sural nerve sensation: diminished      Left Foot/Ankle      Inspection and Palpation  Tenderness: none   Swelling: metatarsals   Skin Exam: skin changes and ulcer;     Neurovascular  Dorsalis pedis: 1+  Posterior tibial: 1+  Saphenous nerve sensation: diminished  Tibial nerve sensation: diminished  Superficial peroneal nerve sensation: diminished  Deep peroneal nerve sensation: diminished  Sural nerve sensation: diminished        2/10/18    Wound 1: Right dorsal foot ulceration   Measurement:[id number]  Base: granular, necrotic tissue alond plantar 3rd ray  Periwound skin:  viable  Drainage: sanguinous  Erythema: none  Probe probes to bone 4th and 5th metatarsal    2nd and 3rd digits appear dusky.                  Right heel bullae with fluctuance.  Post debridement measures approximately 4.0 x 4.0cm x superficial  With necrotic eschar.   No tracking, does not probe, no surrounding erythema.             Left foot ulceration noted to dorsal foot          Wound 1: Left dorsal foot   Measurement: 5sgt9emn2.1cm.  Base: fibrous base  Periwound skin: mild erythema,   Drainage: none  Erythema: mild  Probe: none, no bone exposed                  Wound 1: Left heel ulceration   Measurement: 5zqu6ozv8.1cm.  Periwound skin: callus  Drainage: none  Erythema:mild   Probe: none, no bone exposed            Laboratory:  Xray: Right: There is DJD, soft tissue swelling, a calcaneal spur, and diabetic vascular calcifications. No fracture dislocation bone destruction seen.    Left: There is DJD, diabetic vascular calcifications, and Spurs on the calcaneus. No fracture dislocation bone destruction seen.    MRI: 1. Limited, incomplete nondiagnostic examination secondary to early termination at the of the request of the patient.    2. Linear T1 hypointensity traversing the third metacarpal head, concerning for possible nondisplaced, likely subacute fracture.  Correlation with point tenderness recommended.    PHILLIP: Impression:   Right Leg:Segmental pressures and PVR waveforms suggest mild to moderate peripheral arterial occlusive disease.     Left Leg:Segmental pressures and PVR waveforms suggest mild to moderate peripheral arterial occlusive disease.        Diagnostic Results:  X-Ray: I have reviewed all pertinent results/findings within the past 24 hours.  reviewed    Clinical Findings:  Right heel bullae with fluctuance.  Amputation site with active bleeding, no signs of infection.  Left foot wounds stable.

## 2018-02-12 NOTE — SUBJECTIVE & OBJECTIVE
Medications:  Continuous Infusions:  Scheduled Meds:   ammonium lactate   Topical (Top) BID    apixaban  5 mg Oral BID    aspirin  81 mg Oral Daily    atorvastatin  20 mg Oral Daily    cinacalcet  30 mg Oral QHS    citalopram  10 mg Oral Daily    gabapentin  100 mg Oral TID    miconazole nitrate 2%   Topical (Top) BID    custom IVPB builder   Intravenous Q8H    senna-docusate 8.6-50 mg  2 tablet Oral BID    sevelamer carbonate  1,600 mg Oral TID WM     PRN Meds:sodium chloride 0.9%, sodium chloride 0.9%, acetaminophen, albumin human 25%, midodrine, ondansetron, oxyCODONE, sodium chloride 0.9%     Objective:     Vital Signs (Most Recent):  Temp: 98 °F (36.7 °C) (02/12/18 1450)  Pulse: (!) 54 (02/12/18 1450)  Resp: 18 (02/12/18 1450)  BP: (!) 158/70 (02/12/18 1450)  SpO2: (!) 92 % (02/12/18 1450) Vital Signs (24h Range):  Temp:  [97 °F (36.1 °C)-98.3 °F (36.8 °C)] 98 °F (36.7 °C)  Pulse:  [49-63] 54  Resp:  [18-20] 18  SpO2:  [90 %-97 %] 92 %  BP: (105-158)/(55-70) 158/70       Date 02/12/18 0700 - 02/13/18 0659   Shift 6691-4024 7498-7776 1307-5955 24 Hour Total   I  N  T  A  K  E   Other 600   600    Shift Total  (mL/kg) 600  (12.1)   600  (12.1)   O  U  T  P  U  T   Other 3600   3600    Shift Total  (mL/kg) 3600  (72.8)   3600  (72.8)   Weight (kg) 49.4 49.4 49.4 49.4       Physical Exam   Cardiovascular: Normal rate and regular rhythm.    Pulses:       Femoral pulses are 2+ on the right side, and 2+ on the left side.  Biphasic AT and PT signals bilaterally   Pulmonary/Chest: Effort normal. No respiratory distress.   Abdominal: Soft.   Musculoskeletal:   Right foot with 4-5 th ray amputations with evidence of 2nd foot duskiness and large right heel ulcer  Evidence of chronic venous stasis bilaterally with chronic skin changes   Neurological: He is alert.   Skin: Skin is warm and dry.       Significant Labs:  CBC:   Recent Labs  Lab 02/12/18  0402   WBC 13.99*   RBC 2.64*   HGB 7.6*   HCT 23.3*   PLT  173   MCV 88   MCH 28.8   MCHC 32.6     CMP:   Recent Labs  Lab 02/12/18  0402   GLU 58*   CALCIUM 8.3*   ALBUMIN 1.6*   *   K 4.8   CO2 25   CL 94*   BUN 60*   CREATININE 7.8*     Coagulation: No results for input(s): LABPROT, INR, APTT in the last 48 hours.    Significant Diagnostics:

## 2018-02-12 NOTE — ASSESSMENT & PLAN NOTE
Wilder Carbajal is a 67 y.o. 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 Matamoros), concern for dusky appearance of 2nd and 3rd digits Pt. At high risk for limb loss at this time.   - Wound VAC left intact to right lateral foot plan to change tomorrow.   - right heel ulceration with necrotic eschar base. .  -ID on board, appreciate recs  -Vascular on board, appreciate recs  -Podiatry will follow.    Weightbearing Status: NWB  Offloading Device: heel protector boot

## 2018-02-12 NOTE — PROGRESS NOTES
HD treatment started. No complications with access to left lower arm. Lines secured and telemetry in place. No complaints of discomfort at this time.

## 2018-02-12 NOTE — PROGRESS NOTES
[facility]  Podiatry  Progress Note    Patient Name: Wilder Carbajal  MRN: [id number]  Admission Date: 2/4/2018  Hospital Length of Stay: 8 days  Attending Physician: Kanika Bradley MD  Primary Care Provider: Dio Roberson MD   Interval Hx: s/p Right 4th and 5th ray amputation (DOS: 1/9/18 per Dr. Matamoros).  Patient pain controlled. Pt. Seen in dialysis today. Wound VAC intact right foot no leaks noted. Psychiatry present at the bedside.     Scheduled Meds:   ammonium lactate   Topical (Top) BID    apixaban  5 mg Oral BID    aspirin  81 mg Oral Daily    atorvastatin  20 mg Oral Daily    cinacalcet  30 mg Oral QHS    citalopram  10 mg Oral Daily    gabapentin  100 mg Oral TID    miconazole nitrate 2%   Topical (Top) BID    custom IVPB builder   Intravenous Q8H    senna-docusate 8.6-50 mg  2 tablet Oral BID    sevelamer carbonate  1,600 mg Oral TID WM     Continuous Infusions:  PRN Meds:sodium chloride 0.9%, sodium chloride 0.9%, acetaminophen, albumin human 25%, midodrine, ondansetron, oxyCODONE, sodium chloride 0.9%    Review of patient's allergies indicates:  No Known Allergies     Past Medical History:   Diagnosis Date    Acute pain of left shoulder 1/7/2017    Arthritis     Asthma     Benign neoplasm of eyelid     RUL    Blood clotting tendency     Blood transfusion     Cataract     Chronic kidney disease requiring chronic dialysis     Diabetes mellitus     Diabetic retinopathy     Fever blister     Hyperlipidemia     Hypertension     Infertility     Joint pain     Retinal detachment     Thyroid disease     Weakness 1/7/2017     Past Surgical History:   Procedure Laterality Date    CATARACT EXTRACTION      COLONOSCOPY N/A 6/23/2017    Procedure: COLONOSCOPY;  Surgeon: Vic Clark MD;  Location: [address];  Service: Endoscopy;  Laterality: N/A;    RETINAL DETACHMENT SURGERY         Family History     Problem Relation (Age of Onset)    Heart attack Mother     No Known Problems Father, Sister, Brother, Maternal Aunt, Maternal Uncle, Paternal Aunt, Paternal Uncle, Maternal Grandmother, Maternal Grandfather, Paternal Grandmother, Paternal Grandfather        Social History Main Topics    Smoking status: Never Smoker    Smokeless tobacco: Never Used    Alcohol use No    Drug use: No    Sexual activity: Not on file     Review of Systems   Constitutional: Positive for activity change.   Respiratory: Negative for shortness of breath.    Cardiovascular: Negative for chest pain and leg swelling.   Gastrointestinal: Negative.  Negative for nausea and vomiting.   Genitourinary: Negative.    Musculoskeletal: Positive for arthralgias.   Skin: Positive for wound.   Neurological: Positive for numbness. Negative for weakness.     Objective:     Vital Signs (Most Recent):  Temp: 97 °F (36.1 °C) (02/12/18 0745)  Pulse: (!) 53 (02/12/18 1145)  Resp: 18 (02/12/18 1145)  BP: 124/61 (02/12/18 1145)  SpO2: (!) 90 % (02/12/18 0731) Vital Signs (24h Range):  Temp:  [97 °F (36.1 °C)-98.9 °F (37.2 °C)] 97 °F (36.1 °C)  Pulse:  [49-63] 53  Resp:  [16-20] 18  SpO2:  [90 %-98 %] 90 %  BP: (105-140)/(55-68) 124/61     Weight: 49.4 kg (109 lb)  Body mass index is 16.57 kg/m².    Foot Exam    General  Orientation: disoriented       Right Foot/Ankle     Inspection and Palpation  Tenderness: none   Swelling: metatarsals   Skin Exam: blister, maceration, cellulitis, skin changes and ulcer;     Neurovascular  Dorsalis pedis: 1+  Posterior tibial: 1+  Saphenous nerve sensation: diminished  Tibial nerve sensation: diminished  Superficial peroneal nerve sensation: diminished  Deep peroneal nerve sensation: diminished  Sural nerve sensation: diminished      Left Foot/Ankle      Inspection and Palpation  Tenderness: none   Swelling: metatarsals   Skin Exam: skin changes and ulcer;     Neurovascular  Dorsalis pedis: 1+  Posterior tibial: 1+  Saphenous nerve sensation: diminished  Tibial nerve sensation:  diminished  Superficial peroneal nerve sensation: diminished  Deep peroneal nerve sensation: diminished  Sural nerve sensation: diminished        2/12/18: Wound VAC left intact to right lateral foot.         2nd and 3rd digits appear dusky.        Wound 1: Right heel ulceration   Measurement: [id number]  Base:  necrotic eschar base  Periwound skin: hyperkeratosis   Drainage:none  Probe: none, no bone exposed              2/12/18          Left foot ulceration noted to dorsal foot 2/11/18          Wound 1: Left dorsal foot   Measurement: [id number].  Base: fibrous base  Periwound skin: mild erythema,   Drainage: none  Erythema: mild  Probe: none, no bone exposed  2/11/18 2/11/18        Wound 1: Left heel ulceration   Measurement: 2myj0yoi1.1cm.  Periwound skin: callus  Drainage: none  Erythema:mild   Probe: none, no bone exposed            Laboratory:  Xray: Right: There is DJD, soft tissue swelling, a calcaneal spur, and diabetic vascular calcifications. No fracture dislocation bone destruction seen.    Left: There is DJD, diabetic vascular calcifications, and Spurs on the calcaneus. No fracture dislocation bone destruction seen.    MRI: 1. Limited, incomplete nondiagnostic examination secondary to early termination at the of the request of the patient.    2. Linear T1 hypointensity traversing the third metacarpal head, concerning for possible nondisplaced, likely subacute fracture.  Correlation with point tenderness recommended.    PHILLIP: Impression:   Right Leg:Segmental pressures and PVR waveforms suggest mild to moderate peripheral arterial occlusive disease.     Left Leg:Segmental pressures and PVR waveforms suggest mild to moderate peripheral arterial occlusive disease.        Diagnostic Results:  X-Ray: I have reviewed all pertinent results/findings within the past 24 hours.  reviewed    Clinical Findings:      Assessment/Plan:     Foot ulceration, right, with fat layer exposed    Wilder Carbajal  is a 67 y.o. 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 Matamoros), concern for dusky appearance of 2nd and 3rd digits Pt. At high risk for limb loss at this time.   - Wound VAC left intact to right lateral foot plan to change tomorrow.   - right heel ulceration with necrotic eschar base. .  -ID on board, appreciate recs  -Vascular on board, appreciate recs  -Podiatry will follow.    Weightbearing Status: NWB  Offloading Device: heel protector boot          ESRD (end stage renal disease)    Per Primary           Type 2 diabetes mellitus with stage 5 chronic kidney disease and hypertension    Per Primary               Ángela Marks MD  Podiatry  [facility]

## 2018-02-12 NOTE — SUBJECTIVE & OBJECTIVE
"Interval History: On interview, the patient initially states that he is doing well, but then says that he made a mistake by allowing his toes to be amputated. However, he could not then explain why this was a mistake or identify any complications from the surgical procedure (just stating that "it took too long" to actually perform the surgery). States that he does not want any more surgery. When risks of this infection (if untreated) were explained to him, he insists that he understands these things. However, when asked to repeat these things back, he was unable to do so. Again states that he will not allow any amputation on his foot, but cannot explain any further except to say "it's never a good idea to cut off part of your body".    Family History     Problem Relation (Age of Onset)    Depression Child    Heart attack Mother    No Known Problems Father, Sister, Brother, Maternal Aunt, Maternal Uncle, Paternal Aunt, Paternal Uncle, Maternal Grandmother, Maternal Grandfather, Paternal Grandmother, Paternal Grandfather        Social History Main Topics    Smoking status: Never Smoker    Smokeless tobacco: Never Used    Alcohol use No    Drug use: No    Sexual activity: Not on file     Psychotherapeutics     Start     Stop Route Frequency Ordered    02/05/18 0900  citalopram tablet 10 mg      -- Oral Daily 02/04/18 1724           Review of Systems  Objective:     Vital Signs (Most Recent):  Temp: 97 °F (36.1 °C) (02/12/18 0745)  Pulse: (!) 58 (02/12/18 1100)  Resp: 18 (02/12/18 1100)  BP: (!) 111/57 (02/12/18 1100)  SpO2: (!) 90 % (02/12/18 0731) Vital Signs (24h Range):  Temp:  [97 °F (36.1 °C)-98.9 °F (37.2 °C)] 97 °F (36.1 °C)  Pulse:  [49-63] 58  Resp:  [16-20] 18  SpO2:  [90 %-98 %] 90 %  BP: (105-140)/(57-68) 111/57     Height: 5' 8" (172.7 cm)  Weight: 49.4 kg (109 lb)  Body mass index is 16.57 kg/m².    No intake or output data in the 24 hours ending 02/12/18 1127    Physical Exam   Psychiatric: " "  Appearance: obese black male, appears stated age, skin on lower legs thickened and darkened, right foot bandaged.  Behavior: calm, cooperative, pleasant  Speech: normal rate, volume, and tone  Mood: "sad"  Affect: mildly dysthymic, constricted, congruent to stated mood  Thought processes: slowed, mostly logical but then disorganized on targeted questioning  Thought content: +VH as described in initial consult. No Ah, no SI/HI  Insight: poor  Judgment: poor  Cognition: alert and oriented to self, place, time, situation. Attention impaired (unable to perform digit span forwards of backwards). Recent memory impaired, remote grossly intact. CAM-ICU positive         Significant Labs: All pertinent labs within the past 24 hours have been reviewed.    Significant Imaging: None  "

## 2018-02-12 NOTE — SUBJECTIVE & OBJECTIVE
Interval Hx: s/p Right 4th and 5th ray amputation (DOS: 1/9/18 per Dr. Matamoros).  Patient pain controlled. Pt. Seen in dialysis today. Wound VAC intact right foot no leaks noted.     Scheduled Meds:   ammonium lactate   Topical (Top) BID    apixaban  5 mg Oral BID    aspirin  81 mg Oral Daily    atorvastatin  20 mg Oral Daily    cinacalcet  30 mg Oral QHS    citalopram  10 mg Oral Daily    gabapentin  100 mg Oral TID    miconazole nitrate 2%   Topical (Top) BID    custom IVPB builder   Intravenous Q8H    senna-docusate 8.6-50 mg  2 tablet Oral BID    sevelamer carbonate  1,600 mg Oral TID WM     Continuous Infusions:  PRN Meds:sodium chloride 0.9%, sodium chloride 0.9%, acetaminophen, albumin human 25%, midodrine, ondansetron, oxyCODONE, sodium chloride 0.9%    Review of patient's allergies indicates:  No Known Allergies     Past Medical History:   Diagnosis Date    Acute pain of left shoulder 1/7/2017    Arthritis     Asthma     Benign neoplasm of eyelid     RUL    Blood clotting tendency     Blood transfusion     Cataract     Chronic kidney disease requiring chronic dialysis     Diabetes mellitus     Diabetic retinopathy     Fever blister     Hyperlipidemia     Hypertension     Infertility     Joint pain     Retinal detachment     Thyroid disease     Weakness 1/7/2017     Past Surgical History:   Procedure Laterality Date    CATARACT EXTRACTION      COLONOSCOPY N/A 6/23/2017    Procedure: COLONOSCOPY;  Surgeon: Vic Clark MD;  Location: [address];  Service: Endoscopy;  Laterality: N/A;    RETINAL DETACHMENT SURGERY         Family History     Problem Relation (Age of Onset)    Heart attack Mother    No Known Problems Father, Sister, Brother, Maternal Aunt, Maternal Uncle, Paternal Aunt, Paternal Uncle, Maternal Grandmother, Maternal Grandfather, Paternal Grandmother, Paternal Grandfather        Social History Main Topics    Smoking status: Never Smoker    Smokeless  tobacco: Never Used    Alcohol use No    Drug use: No    Sexual activity: Not on file     Review of Systems   Constitutional: Positive for activity change.   Respiratory: Negative for shortness of breath.    Cardiovascular: Negative for chest pain and leg swelling.   Gastrointestinal: Negative.  Negative for nausea and vomiting.   Genitourinary: Negative.    Musculoskeletal: Positive for arthralgias.   Skin: Positive for wound.   Neurological: Positive for numbness. Negative for weakness.     Objective:     Vital Signs (Most Recent):  Temp: 97 °F (36.1 °C) (02/12/18 0745)  Pulse: (!) 53 (02/12/18 1145)  Resp: 18 (02/12/18 1145)  BP: 124/61 (02/12/18 1145)  SpO2: (!) 90 % (02/12/18 0731) Vital Signs (24h Range):  Temp:  [97 °F (36.1 °C)-98.9 °F (37.2 °C)] 97 °F (36.1 °C)  Pulse:  [49-63] 53  Resp:  [16-20] 18  SpO2:  [90 %-98 %] 90 %  BP: (105-140)/(55-68) 124/61     Weight: 49.4 kg (109 lb)  Body mass index is 16.57 kg/m².    Foot Exam    General  Orientation: disoriented       Right Foot/Ankle     Inspection and Palpation  Tenderness: none   Swelling: metatarsals   Skin Exam: blister, maceration, cellulitis, skin changes and ulcer;     Neurovascular  Dorsalis pedis: 1+  Posterior tibial: 1+  Saphenous nerve sensation: diminished  Tibial nerve sensation: diminished  Superficial peroneal nerve sensation: diminished  Deep peroneal nerve sensation: diminished  Sural nerve sensation: diminished      Left Foot/Ankle      Inspection and Palpation  Tenderness: none   Swelling: metatarsals   Skin Exam: skin changes and ulcer;     Neurovascular  Dorsalis pedis: 1+  Posterior tibial: 1+  Saphenous nerve sensation: diminished  Tibial nerve sensation: diminished  Superficial peroneal nerve sensation: diminished  Deep peroneal nerve sensation: diminished  Sural nerve sensation: diminished        2/12/18: Wound VAC left intact to right lateral foot.         2nd and 3rd digits appear dusky.        Wound 1: Right heel ulceration    Measurement: [id number]  Base:  necrotic eschar base  Periwound skin: hyperkeratosis   Drainage:none  Probe: none, no bone exposed              2/12/18          Left foot ulceration noted to dorsal foot 2/11/18          Wound 1: Left dorsal foot   Measurement: 1vnl0eoi9.1cm.  Base: fibrous base  Periwound skin: mild erythema,   Drainage: none  Erythema: mild  Probe: none, no bone exposed  2/11/18 2/11/18        Wound 1: Left heel ulceration   Measurement: 1ecq7sde6.1cm.  Periwound skin: callus  Drainage: none  Erythema:mild   Probe: none, no bone exposed            Laboratory:  Xray: Right: There is DJD, soft tissue swelling, a calcaneal spur, and diabetic vascular calcifications. No fracture dislocation bone destruction seen.    Left: There is DJD, diabetic vascular calcifications, and Spurs on the calcaneus. No fracture dislocation bone destruction seen.    MRI: 1. Limited, incomplete nondiagnostic examination secondary to early termination at the of the request of the patient.    2. Linear T1 hypointensity traversing the third metacarpal head, concerning for possible nondisplaced, likely subacute fracture.  Correlation with point tenderness recommended.    PHILLIP: Impression:   Right Leg:Segmental pressures and PVR waveforms suggest mild to moderate peripheral arterial occlusive disease.     Left Leg:Segmental pressures and PVR waveforms suggest mild to moderate peripheral arterial occlusive disease.        Diagnostic Results:  X-Ray: I have reviewed all pertinent results/findings within the past 24 hours.  reviewed    Clinical Findings:

## 2018-02-12 NOTE — ASSESSMENT & PLAN NOTE
68 yo M with h/o ESRD on HD (TThS) via L RC AVF (with interposition graft), HTN, HLD, DM 2( Hgb A1c 4.7), HFrEF (45% 4/2017)  presenting with fluid overload after missing dialysis with evidence of right diabetic foot ulceration with wet gangrene s/p 4-5th ray amputations.    Patient with severe tissue loss to right foot; will obtain RLE US to assess flow; had PHILLIP with PVR with good waveforms distally to heal amputation  Tentatively schedule for RLE angiogram on Thursday; will assess patient with Dr. Diaz to evaluate for RLE angiogram vs BKA vs AKA; per son, patient was living alone with assistance prior and was able to transfer himself with minimal ambulation   Continue ASA, statin; please hold eliquis for possible OR Thursday  Vascular surgery to follow

## 2018-02-13 LAB
ALBUMIN SERPL BCP-MCNC: 1.5 G/DL
ANION GAP SERPL CALC-SCNC: 12 MMOL/L
BASOPHILS # BLD AUTO: 0.04 K/UL
BASOPHILS NFR BLD: 0.3 %
BUN SERPL-MCNC: 38 MG/DL
CALCIUM SERPL-MCNC: 8.1 MG/DL
CHLORIDE SERPL-SCNC: 99 MMOL/L
CO2 SERPL-SCNC: 21 MMOL/L
CREAT SERPL-MCNC: 5.5 MG/DL
DIFFERENTIAL METHOD: ABNORMAL
EOSINOPHIL # BLD AUTO: 0.2 K/UL
EOSINOPHIL NFR BLD: 1.1 %
ERYTHROCYTE [DISTWIDTH] IN BLOOD BY AUTOMATED COUNT: 17.2 %
EST. GFR  (AFRICAN AMERICAN): 11.3 ML/MIN/1.73 M^2
EST. GFR  (NON AFRICAN AMERICAN): 9.8 ML/MIN/1.73 M^2
GLUCOSE SERPL-MCNC: 48 MG/DL
HCT VFR BLD AUTO: 23.3 %
HGB BLD-MCNC: 7.6 G/DL
IMM GRANULOCYTES # BLD AUTO: 0.22 K/UL
IMM GRANULOCYTES NFR BLD AUTO: 1.6 %
LYMPHOCYTES # BLD AUTO: 1.2 K/UL
LYMPHOCYTES NFR BLD: 8.5 %
MAGNESIUM SERPL-MCNC: 2.3 MG/DL
MCH RBC QN AUTO: 28.8 PG
MCHC RBC AUTO-ENTMCNC: 32.6 G/DL
MCV RBC AUTO: 88 FL
MONOCYTES # BLD AUTO: 1.2 K/UL
MONOCYTES NFR BLD: 8.6 %
NEUTROPHILS # BLD AUTO: 11.2 K/UL
NEUTROPHILS NFR BLD: 79.9 %
NRBC BLD-RTO: 0 /100 WBC
PHOSPHATE SERPL-MCNC: 4.6 MG/DL
PLATELET # BLD AUTO: 195 K/UL
PMV BLD AUTO: 10.5 FL
POCT GLUCOSE: 103 MG/DL (ref 70–110)
POCT GLUCOSE: 109 MG/DL (ref 70–110)
POCT GLUCOSE: 53 MG/DL (ref 70–110)
POCT GLUCOSE: 76 MG/DL (ref 70–110)
POCT GLUCOSE: 78 MG/DL (ref 70–110)
POCT GLUCOSE: 83 MG/DL (ref 70–110)
POTASSIUM SERPL-SCNC: 5 MMOL/L
RBC # BLD AUTO: 2.64 M/UL
SODIUM SERPL-SCNC: 132 MMOL/L
WBC # BLD AUTO: 13.94 K/UL

## 2018-02-13 PROCEDURE — 99232 SBSQ HOSP IP/OBS MODERATE 35: CPT | Mod: GC,,, | Performed by: SURGERY

## 2018-02-13 PROCEDURE — 11000001 HC ACUTE MED/SURG PRIVATE ROOM

## 2018-02-13 PROCEDURE — 36415 COLL VENOUS BLD VENIPUNCTURE: CPT

## 2018-02-13 PROCEDURE — 25000003 PHARM REV CODE 250: Performed by: INTERNAL MEDICINE

## 2018-02-13 PROCEDURE — 99232 SBSQ HOSP IP/OBS MODERATE 35: CPT | Mod: ,,, | Performed by: INTERNAL MEDICINE

## 2018-02-13 PROCEDURE — 25000003 PHARM REV CODE 250: Performed by: PHYSICIAN ASSISTANT

## 2018-02-13 PROCEDURE — 83735 ASSAY OF MAGNESIUM: CPT

## 2018-02-13 PROCEDURE — 80069 RENAL FUNCTION PANEL: CPT

## 2018-02-13 PROCEDURE — 85025 COMPLETE CBC W/AUTO DIFF WBC: CPT

## 2018-02-13 RX ORDER — GLUCAGON 1 MG
1 KIT INJECTION
Status: DISCONTINUED | OUTPATIENT
Start: 2018-02-13 | End: 2018-03-06 | Stop reason: HOSPADM

## 2018-02-13 RX ORDER — MIDODRINE HYDROCHLORIDE 5 MG/1
10 TABLET ORAL
Status: DISCONTINUED | OUTPATIENT
Start: 2018-02-14 | End: 2018-03-06 | Stop reason: HOSPADM

## 2018-02-13 RX ORDER — IBUPROFEN 200 MG
16 TABLET ORAL
Status: DISCONTINUED | OUTPATIENT
Start: 2018-02-13 | End: 2018-03-06 | Stop reason: HOSPADM

## 2018-02-13 RX ORDER — IBUPROFEN 200 MG
24 TABLET ORAL
Status: DISCONTINUED | OUTPATIENT
Start: 2018-02-13 | End: 2018-03-06 | Stop reason: HOSPADM

## 2018-02-13 RX ADMIN — CITALOPRAM HYDROBROMIDE 10 MG: 10 TABLET ORAL at 09:02

## 2018-02-13 RX ADMIN — CIPROFLOXACIN HYDROCHLORIDE 500 MG: 250 TABLET, FILM COATED ORAL at 08:02

## 2018-02-13 RX ADMIN — Medication: at 08:02

## 2018-02-13 RX ADMIN — MICONAZOLE NITRATE: 20 OINTMENT TOPICAL at 09:02

## 2018-02-13 RX ADMIN — OXYCODONE HYDROCHLORIDE 5 MG: 5 TABLET ORAL at 09:02

## 2018-02-13 RX ADMIN — SEVELAMER CARBONATE 1600 MG: 800 TABLET, FILM COATED ORAL at 11:02

## 2018-02-13 RX ADMIN — SEVELAMER CARBONATE 1600 MG: 800 TABLET, FILM COATED ORAL at 06:02

## 2018-02-13 RX ADMIN — GABAPENTIN 100 MG: 100 CAPSULE ORAL at 05:02

## 2018-02-13 RX ADMIN — SEVELAMER CARBONATE 1600 MG: 800 TABLET, FILM COATED ORAL at 08:02

## 2018-02-13 RX ADMIN — STANDARDIZED SENNA CONCENTRATE AND DOCUSATE SODIUM 2 TABLET: 8.6; 5 TABLET, FILM COATED ORAL at 08:02

## 2018-02-13 RX ADMIN — GABAPENTIN 100 MG: 100 CAPSULE ORAL at 09:02

## 2018-02-13 RX ADMIN — ATORVASTATIN CALCIUM 20 MG: 20 TABLET, FILM COATED ORAL at 08:02

## 2018-02-13 RX ADMIN — GABAPENTIN 100 MG: 100 CAPSULE ORAL at 03:02

## 2018-02-13 RX ADMIN — STANDARDIZED SENNA CONCENTRATE AND DOCUSATE SODIUM 2 TABLET: 8.6; 5 TABLET, FILM COATED ORAL at 09:02

## 2018-02-13 RX ADMIN — Medication: at 09:02

## 2018-02-13 RX ADMIN — MICONAZOLE NITRATE: 20 OINTMENT TOPICAL at 11:02

## 2018-02-13 RX ADMIN — OXYCODONE HYDROCHLORIDE 5 MG: 5 TABLET ORAL at 08:02

## 2018-02-13 RX ADMIN — ASPIRIN 81 MG: 81 TABLET, COATED ORAL at 08:02

## 2018-02-13 RX ADMIN — CINACALCET HYDROCHLORIDE 30 MG: 30 TABLET, COATED ORAL at 09:02

## 2018-02-13 NOTE — PROGRESS NOTES
[facility]  Vascular Surgery  Progress Note    Patient Name: Wilder Carbajal  MRN: [id number]  Admission Date: 2/4/2018  Primary Care Provider: Dio Roberson MD    Subjective:     Interval History: no acute events overnight.    Post-Op Info:  Procedure(s) (LRB):  AMPUTATION-TOES 4TH AND 5TH, I&D (Right)   4 Days Post-Op       Medications:  Continuous Infusions:  Scheduled Meds:   ammonium lactate   Topical (Top) BID    aspirin  81 mg Oral Daily    atorvastatin  20 mg Oral Daily    cinacalcet  30 mg Oral QHS    ciprofloxacin HCl  500 mg Oral Daily    citalopram  10 mg Oral Daily    gabapentin  100 mg Oral TID    miconazole nitrate 2%   Topical (Top) BID    senna-docusate 8.6-50 mg  2 tablet Oral BID    sevelamer carbonate  1,600 mg Oral TID WM     PRN Meds:sodium chloride 0.9%, sodium chloride 0.9%, acetaminophen, albumin human 25%, dextrose 50%, dextrose 50%, glucagon (human recombinant), glucose, glucose, midodrine, ondansetron, oxyCODONE, sodium chloride 0.9%     Objective:     Vital Signs (Most Recent):  Temp: 97.6 °F (36.4 °C) (02/13/18 0800)  Pulse: (!) 52 (02/13/18 0800)  Resp: 19 (02/13/18 0800)  BP: (!) 100/55 (02/13/18 0800)  SpO2: 97 % (02/13/18 0800) Vital Signs (24h Range):  Temp:  [97.6 °F (36.4 °C)-98.3 °F (36.8 °C)] 97.6 °F (36.4 °C)  Pulse:  [48-60] 52  Resp:  [17-19] 19  SpO2:  [92 %-98 %] 97 %  BP: (100-158)/(55-70) 100/55          Physical Exam   Constitutional: He appears well-developed and well-nourished.   HENT:   Head: Normocephalic and atraumatic.   Eyes: EOM are normal. Pupils are equal, round, and reactive to light.   Neck: Normal range of motion. Neck supple.   Cardiovascular: Normal rate.    Pulmonary/Chest: Effort normal and breath sounds normal.   Abdominal: Soft. Bowel sounds are normal.   Musculoskeletal:   Severe lipodermatosclerosis of bilateral lower legs. Right foot s/p 4/5th toe amputations, wound base with significant necrotic tissue   Neurological:  He is alert.   Nursing note and vitals reviewed.      Significant Labs:  BMP:   Recent Labs  Lab 02/13/18  0423   GLU 48*   *   K 5.0   CL 99   CO2 21*   BUN 38*   CREATININE 5.5*   CALCIUM 8.1*   MG 2.3     CBC:   Recent Labs  Lab 02/13/18  0423   WBC 13.94*   RBC 2.64*   HGB 7.6*   HCT 23.3*      MCV 88   MCH 28.8   MCHC 32.6       Significant Diagnostics:  none    Assessment/Plan:     Foot ulceration, right, with fat layer exposed    66 yo M with h/o ESRD on HD (TThS) via L RC AVF (with interposition graft), HTN, HLD, DM 2( Hgb A1c 4.7), HFrEF (45% 4/2017)  presenting with fluid overload after missing dialysis with evidence of right diabetic foot ulceration with wet gangrene s/p 4-5th ray amputations.    Patient with severe tissue loss to right foot; will obtain RLE US to assess flow; had PHILLIP with PVR with good waveforms distally to heal amputation  Wound VAC removed at bedside and wound evaluated, recommend replacement with wet to dry dressing change as it is unlikely for the patient to heal the wound despite aggressive limb salvage efforts. Due to significant lipodermatosclerosis, BKA is not good option. Patient likely needs AKA. Will discuss with Dr. Diaz.    Continue ASA, statin; please hold eliquis for possible OR Thursday  Vascular surgery to follow                  Gilbert Worley MD  Vascular Surgery  [facility]

## 2018-02-13 NOTE — PROGRESS NOTES
[facility]  Infectious Disease  Progress Note    Patient Name: Wilder Carbajal  MRN: [id number]  Admission Date: 2/4/2018  Length of Stay: 8 days  Attending Physician: Kanika Bradley MD  Primary Care Provider: Dio Roberson MD    Isolation Status: No active isolations  Assessment/Plan:      Osteomyelitis    67 year old male with hx of ESRD on HD, DM2, CHF admitted with AMS, fluid overload and right necrotic heal ulcer with gangrene of 4th and 5th digits. He was started on empiric Vanc and Zosyn. Patient underwent washout and amputation of 4th and 5th digits on 2/6 and again 2/9. Wound cultures grew citrobacter. Bone culture grew ESBL Klebsiella pneumoniae. Fevers resolved. Leukocytosis trending down. Podiatry and vascular surgery following. Patient with severe tissue loss to right foot and dusky appearance to 2nd and 3rd digits. Arterial US ordered to assess blow flow. Vascular surgery has tentatively scheduled RLE angiogram on Thursday. May need BKA.       Plan:  - Deescalate antibiotics to Ciprofloxacin 500 mg PO q 24 hours (HD dosing)  - Will F/U on arterial US of lower extremity  - Vascular surgery has tentatively scheduled RLE angiogram on Thursday. Patient may need BKA.   - Anticipate 6 weeks of antibiotics for osteomyelitis pending vascular surgery surgical plans.  - Will follow with you            Please call for any questions. Thank you.  Mary Moss PA-C  Phone: [phone]  Pager: [phone]    Subjective:     Principal Problem:Septic encephalopathy    HPI: Patient is a 66 y/o male with ESRD on dialysis TTS  who presented with leg swelling for two weeks. He states he has had associated blisters on his legs with fluid weeping out of them. Patient stated he missed dialysis yesterday (Saturday) because his [facility] transportation did not arrive. He denies fevers, chills, URI symptoms, or diarrhea. His son called EMS. Patient was admitted 1/7-1/14 for volume overload due to HD non-compliance.  He was still above dry weight and hypoxic when he left AMA. Patient maintaining that he has been complaint with dialysis since until yesterday.    Patient is a poor historian and falling asleep during my exam. Patient was found to have necrotic tissue to the right 4th and 5th digit. MRI was attempted but patient was unable to be still for the exam. He is now s/p amputation of 4th and 5th toes. OR cultures are pending. Wound cultures are positive for citrobacter koseri. He is currently on vancomycin and zosyn.  Interval History: NAEON.  Pain well controlled. Wound vac intact. Denies systemic signs of infection. Wound cx + Citrobacter. Bone cx + ESBL Klebsiella. On Zosyn. Afebrile.    Review of Systems   Constitutional: Positive for activity change. Negative for chills, diaphoresis and fever.   Respiratory: Negative for shortness of breath.    Cardiovascular: Negative for chest pain and leg swelling.   Gastrointestinal: Negative.  Negative for abdominal pain, diarrhea, nausea and vomiting.   Genitourinary: Negative.  Negative for flank pain and hematuria.   Musculoskeletal: Positive for arthralgias.   Skin: Positive for wound.   Neurological: Positive for numbness. Negative for weakness.     Objective:     Vital Signs (Most Recent):  Temp: 97.9 °F (36.6 °C) (02/12/18 1700)  Pulse: (!) 53 (02/12/18 1700)  Resp: 18 (02/12/18 1700)  BP: (!) 143/65 (02/12/18 1700)  SpO2: (!) 92 % (02/12/18 1700) Vital Signs (24h Range):  Temp:  [97 °F (36.1 °C)-98.3 °F (36.8 °C)] 97.9 °F (36.6 °C)  Pulse:  [49-63] 53  Resp:  [18-20] 18  SpO2:  [90 %-97 %] 92 %  BP: (105-158)/(55-70) 143/65     Weight: 49.4 kg (109 lb)  Body mass index is 16.57 kg/m².    Estimated Creatinine Clearance: 6.3 mL/min (based on SCr of 7.8 mg/dL (H)).    Physical Exam   Constitutional: He is oriented to person, place, and time. He appears well-developed and well-nourished. No distress.   Cardiovascular: Normal rate and regular rhythm.    Murmur  heard.  Pulmonary/Chest: Effort normal and breath sounds normal. No respiratory distress.   Abdominal: Soft. Bowel sounds are normal. He exhibits no distension.   Musculoskeletal: Normal range of motion. He exhibits no edema.   Right foot with wound vac in place.   Neurological: He is alert and oriented to person, place, and time.   Skin: Skin is warm and dry.   chronic venous stasis changes to BLE   Psychiatric: He has a normal mood and affect. His behavior is normal.       Significant Labs:   Blood Culture:   Recent Labs  Lab 02/04/18  1223 02/04/18  1423   LABBLOO No growth after 5 days. No growth after 5 days.     CBC:   Recent Labs  Lab 02/11/18  0418 02/12/18  0402   WBC 13.97* 13.99*   HGB 8.1* 7.6*   HCT 23.8* 23.3*    173     CMP:   Recent Labs  Lab 02/11/18  0418 02/11/18  0809 02/12/18  0402   * 135* 132*   K 6.7* 4.7 4.8   CL 98 96 94*   CO2 22* 25 25   GLU 73 72 58*   BUN 52* 54* 60*   CREATININE 7.6* 7.4* 7.8*   CALCIUM 8.3* 8.5* 8.3*   ALBUMIN 1.5*  --  1.6*   ANIONGAP 14 14 13   EGFRNONAA 6.6* 6.8* 6.4*     HIV Rapid: No results for input(s): HIVRAPID in the last 48 hours.  Lactic Acid: No results for input(s): LACTATE in the last 48 hours.  Procalcitonin: No results for input(s): PROCAL in the last 48 hours.  Quantiferon: No results for input(s): NIL, TBAG, TBAGNIL, MITOGENNIL, TBGOLD in the last 48 hours.  Respiratory Culture: No results for input(s): GSRESP, RESPIRATORYC in the last 4320 hours.  Urine Culture: No results for input(s): LABURIN in the last 4320 hours.  Urine Studies: No results for input(s): COLORU, APPEARANCEUA, PHUR, SPECGRAV, PROTEINUA, GLUCUA, KETONESU, BILIRUBINUA, OCCULTUA, NITRITE, UROBILINOGEN, LEUKOCYTESUR, RBCUA, WBCUA, BACTERIA, SQUAMEPITHEL, HYALINECASTS in the last 4320 hours.    Invalid input(s): SHASHA  Wound Culture:   Recent Labs  Lab 02/05/18  0820 02/06/18  1329 02/09/18  0908   LABAERO CITROBACTER NELDAERIIzzy CITROBACTER KOSERIModerate KLEBSIELLA  PNEUMONIAE ESBLFew       Significant Imaging: I have reviewed all pertinent imaging results/findings within the past 24 hours.

## 2018-02-13 NOTE — SUBJECTIVE & OBJECTIVE
Interval Hx: s/p Right 4th and 5th ray amputation (DOS: 1/9/18 per Dr. Matamoros).  Patient pain controlled. Pt seen at bedside with Vascular surgery. Wound VAC intact right foot no leaks noted.     Scheduled Meds:   ammonium lactate   Topical (Top) BID    aspirin  81 mg Oral Daily    atorvastatin  20 mg Oral Daily    cinacalcet  30 mg Oral QHS    ciprofloxacin HCl  500 mg Oral Daily    citalopram  10 mg Oral Daily    gabapentin  100 mg Oral TID    miconazole nitrate 2%   Topical (Top) BID    senna-docusate 8.6-50 mg  2 tablet Oral BID    sevelamer carbonate  1,600 mg Oral TID WM     Continuous Infusions:  PRN Meds:sodium chloride 0.9%, sodium chloride 0.9%, acetaminophen, albumin human 25%, dextrose 50%, dextrose 50%, glucagon (human recombinant), glucose, glucose, midodrine, ondansetron, oxyCODONE, sodium chloride 0.9%    Review of patient's allergies indicates:  No Known Allergies     Past Medical History:   Diagnosis Date    Acute pain of left shoulder 1/7/2017    Arthritis     Asthma     Benign neoplasm of eyelid     RUL    Blood clotting tendency     Blood transfusion     Cataract     Chronic kidney disease requiring chronic dialysis     Diabetes mellitus     Diabetic retinopathy     Fever blister     Hyperlipidemia     Hypertension     Infertility     Joint pain     Retinal detachment     Thyroid disease     Weakness 1/7/2017     Past Surgical History:   Procedure Laterality Date    CATARACT EXTRACTION      COLONOSCOPY N/A 6/23/2017    Procedure: COLONOSCOPY;  Surgeon: Vic Clark MD;  Location: [address];  Service: Endoscopy;  Laterality: N/A;    RETINAL DETACHMENT SURGERY         Family History     Problem Relation (Age of Onset)    Heart attack Mother    No Known Problems Father, Sister, Brother, Maternal Aunt, Maternal Uncle, Paternal Aunt, Paternal Uncle, Maternal Grandmother, Maternal Grandfather, Paternal Grandmother, Paternal Grandfather        Social History Main  Topics    Smoking status: Never Smoker    Smokeless tobacco: Never Used    Alcohol use No    Drug use: No    Sexual activity: Not on file     Review of Systems   Constitutional: Positive for activity change.   Respiratory: Negative for shortness of breath.    Cardiovascular: Negative for chest pain and leg swelling.   Gastrointestinal: Negative.  Negative for nausea and vomiting.   Genitourinary: Negative.    Musculoskeletal: Positive for arthralgias.   Skin: Positive for wound.   Neurological: Positive for numbness. Negative for weakness.     Objective:     Vital Signs (Most Recent):  Temp: 98 °F (36.7 °C) (02/13/18 1134)  Pulse: (!) 50 (02/13/18 1134)  Resp: 20 (02/13/18 1134)  BP: (!) 131/59 (02/13/18 1134)  SpO2: (!) 91 % (02/13/18 1134) Vital Signs (24h Range):  Temp:  [97.6 °F (36.4 °C)-98.3 °F (36.8 °C)] 98 °F (36.7 °C)  Pulse:  [48-60] 50  Resp:  [17-20] 20  SpO2:  [91 %-98 %] 91 %  BP: (100-158)/(55-70) 131/59     Weight: 95.1 kg (209 lb 10.5 oz)  Body mass index is 31.88 kg/m².    Foot Exam    General  Orientation: disoriented       Right Foot/Ankle     Inspection and Palpation  Tenderness: none   Swelling: metatarsals   Skin Exam: blister, maceration, cellulitis, skin changes and ulcer;     Neurovascular  Dorsalis pedis: 1+  Posterior tibial: 1+  Saphenous nerve sensation: diminished  Tibial nerve sensation: diminished  Superficial peroneal nerve sensation: diminished  Deep peroneal nerve sensation: diminished  Sural nerve sensation: diminished      Left Foot/Ankle      Inspection and Palpation  Tenderness: none   Swelling: metatarsals   Skin Exam: skin changes and ulcer;     Neurovascular  Dorsalis pedis: 1+  Posterior tibial: 1+  Saphenous nerve sensation: diminished  Tibial nerve sensation: diminished  Superficial peroneal nerve sensation: diminished  Deep peroneal nerve sensation: diminished  Sural nerve sensation: diminished        2/13/18: Dusky 2nd and 3rd digits.    Wound 1: Right dorsal foot  ulceration   Measurement:[id number]  Base: granular, necrotic tissue alond plantar 3rd ray  Periwound skin: viable  Drainage: serosanguinous  Erythema: none  Probe probes to bone 4th and 5th metatarsal, extensor tendons exposed            2nd and 3rd digits appear dusky.        Wound 2: Right heel ulceration   Measurement: 8gwj4mcc3.1cm  Base:  necrotic eschar base  Periwound skin: hyperkeratosis   Drainage:none  Probe: none, no bone exposed                  2/12/18          Left foot ulceration noted to dorsal foot 2/11/18          Wound 1: Left dorsal foot   Measurement: 0fpk2njj6.1cm.  Base: fibrous base  Periwound skin: mild erythema,   Drainage: none  Erythema: mild  Probe: none, no bone exposed  2/11/18 2/11/18        Wound 1: Left heel ulceration   Measurement: 1bav7cro7.1cm.  Periwound skin: callus  Drainage: none  Erythema:mild   Probe: none, no bone exposed            Laboratory:  Xray: Right: There is DJD, soft tissue swelling, a calcaneal spur, and diabetic vascular calcifications. No fracture dislocation bone destruction seen.    Left: There is DJD, diabetic vascular calcifications, and Spurs on the calcaneus. No fracture dislocation bone destruction seen.    MRI: 1. Limited, incomplete nondiagnostic examination secondary to early termination at the of the request of the patient.    2. Linear T1 hypointensity traversing the third metacarpal head, concerning for possible nondisplaced, likely subacute fracture.  Correlation with point tenderness recommended.    PHILLIP: Impression:   Right Leg:Segmental pressures and PVR waveforms suggest mild to moderate peripheral arterial occlusive disease.     Left Leg:Segmental pressures and PVR waveforms suggest mild to moderate peripheral arterial occlusive disease.        Diagnostic Results:  X-Ray: I have reviewed all pertinent results/findings within the past 24 hours.  reviewed    Clinical Findings:

## 2018-02-13 NOTE — ASSESSMENT & PLAN NOTE
66 yo M with h/o ESRD on HD (TThS) via L RC AVF (with interposition graft), HTN, HLD, DM 2( Hgb A1c 4.7), HFrEF (45% 4/2017)  presenting with fluid overload after missing dialysis with evidence of right diabetic foot ulceration with wet gangrene s/p 4-5th ray amputations.    Patient with severe tissue loss to right foot; will obtain RLE US to assess flow; had PHILLIP with PVR with good waveforms distally to heal amputation  Wound VAC removed at bedside and wound evaluated. Unlikely for the patient to heal the wound despite aggressive limb salvage efforts. Due to significant lipodermatosclerosis, BKA is not good option. Patient likely needs AKA. Will discuss with Dr. Diaz.    Continue ASA, statin; please hold eliquis for possible OR Thursday  Vascular surgery to follow

## 2018-02-13 NOTE — SUBJECTIVE & OBJECTIVE
Medications:  Continuous Infusions:  Scheduled Meds:   ammonium lactate   Topical (Top) BID    aspirin  81 mg Oral Daily    atorvastatin  20 mg Oral Daily    cinacalcet  30 mg Oral QHS    ciprofloxacin HCl  500 mg Oral Daily    citalopram  10 mg Oral Daily    gabapentin  100 mg Oral TID    miconazole nitrate 2%   Topical (Top) BID    senna-docusate 8.6-50 mg  2 tablet Oral BID    sevelamer carbonate  1,600 mg Oral TID WM     PRN Meds:sodium chloride 0.9%, sodium chloride 0.9%, acetaminophen, albumin human 25%, dextrose 50%, dextrose 50%, glucagon (human recombinant), glucose, glucose, midodrine, ondansetron, oxyCODONE, sodium chloride 0.9%     Objective:     Vital Signs (Most Recent):  Temp: 97.6 °F (36.4 °C) (02/13/18 0800)  Pulse: (!) 52 (02/13/18 0800)  Resp: 19 (02/13/18 0800)  BP: (!) 100/55 (02/13/18 0800)  SpO2: 97 % (02/13/18 0800) Vital Signs (24h Range):  Temp:  [97.6 °F (36.4 °C)-98.3 °F (36.8 °C)] 97.6 °F (36.4 °C)  Pulse:  [48-60] 52  Resp:  [17-19] 19  SpO2:  [92 %-98 %] 97 %  BP: (100-158)/(55-70) 100/55          Physical Exam   Constitutional: He appears well-developed and well-nourished.   HENT:   Head: Normocephalic and atraumatic.   Eyes: EOM are normal. Pupils are equal, round, and reactive to light.   Neck: Normal range of motion. Neck supple.   Cardiovascular: Normal rate.    Pulmonary/Chest: Effort normal and breath sounds normal.   Abdominal: Soft. Bowel sounds are normal.   Musculoskeletal:   Severe lipodermatosclerosis of bilateral lower legs. Right foot s/p 4/5th toe amputations, wound base with significant necrotic tissue   Neurological: He is alert.   Nursing note and vitals reviewed.      Significant Labs:  BMP:   Recent Labs  Lab 02/13/18  0423   GLU 48*   *   K 5.0   CL 99   CO2 21*   BUN 38*   CREATININE 5.5*   CALCIUM 8.1*   MG 2.3     CBC:   Recent Labs  Lab 02/13/18  0423   WBC 13.94*   RBC 2.64*   HGB 7.6*   HCT 23.3*      MCV 88   MCH 28.8   MCHC 32.6        Significant Diagnostics:  none

## 2018-02-13 NOTE — PROGRESS NOTES
"Paged HMD for patient v-tach 17 beats reported by luis fernando dhaliwal. @ 2107   IMD page at 8802 for critical glucose of 48 on patient's CMP this am.  Gave patient 2 apple juices with his gabapentin and rechecked glucose.  BG=53.  Will recheck in 30 min.  SAIDA Mcgovern to put in new orders for POCT glucose checks due to patient not wanting to eat.  Patient has stated that "they give me what they want me to eat, not what I want to eat."  Notified SAIDA Mcgovern that patient has medication at the bedside that he did not take.  Selvemer carbonate refused due to patient not eating, according to patient statement.  Recheck of BG=76.  Patient has had 375 ml of apple juice.  "

## 2018-02-13 NOTE — SUBJECTIVE & OBJECTIVE
Interval History: NAEON.  Pain well controlled. Wound vac intact. Denies systemic signs of infection. Wound cx + Citrobacter. Bone cx + ESBL Klebsiella. On Zosyn. Afebrile.    Review of Systems   Constitutional: Positive for activity change. Negative for chills, diaphoresis and fever.   Respiratory: Negative for shortness of breath.    Cardiovascular: Negative for chest pain and leg swelling.   Gastrointestinal: Negative.  Negative for abdominal pain, diarrhea, nausea and vomiting.   Genitourinary: Negative.  Negative for flank pain and hematuria.   Musculoskeletal: Positive for arthralgias.   Skin: Positive for wound.   Neurological: Positive for numbness. Negative for weakness.     Objective:     Vital Signs (Most Recent):  Temp: 97.9 °F (36.6 °C) (02/12/18 1700)  Pulse: (!) 53 (02/12/18 1700)  Resp: 18 (02/12/18 1700)  BP: (!) 143/65 (02/12/18 1700)  SpO2: (!) 92 % (02/12/18 1700) Vital Signs (24h Range):  Temp:  [97 °F (36.1 °C)-98.3 °F (36.8 °C)] 97.9 °F (36.6 °C)  Pulse:  [49-63] 53  Resp:  [18-20] 18  SpO2:  [90 %-97 %] 92 %  BP: (105-158)/(55-70) 143/65     Weight: 49.4 kg (109 lb)  Body mass index is 16.57 kg/m².    Estimated Creatinine Clearance: 6.3 mL/min (based on SCr of 7.8 mg/dL (H)).    Physical Exam   Constitutional: He is oriented to person, place, and time. He appears well-developed and well-nourished. No distress.   Cardiovascular: Normal rate and regular rhythm.    Murmur heard.  Pulmonary/Chest: Effort normal and breath sounds normal. No respiratory distress.   Abdominal: Soft. Bowel sounds are normal. He exhibits no distension.   Musculoskeletal: Normal range of motion. He exhibits no edema.   Right foot with wound vac in place.   Neurological: He is alert and oriented to person, place, and time.   Skin: Skin is warm and dry.   chronic venous stasis changes to BLE   Psychiatric: He has a normal mood and affect. His behavior is normal.       Significant Labs:   Blood Culture:   Recent Labs  Lab  02/04/18  1223 02/04/18  1423   LABBLOO No growth after 5 days. No growth after 5 days.     CBC:   Recent Labs  Lab 02/11/18  0418 02/12/18  0402   WBC 13.97* 13.99*   HGB 8.1* 7.6*   HCT 23.8* 23.3*    173     CMP:   Recent Labs  Lab 02/11/18  0418 02/11/18  0809 02/12/18  0402   * 135* 132*   K 6.7* 4.7 4.8   CL 98 96 94*   CO2 22* 25 25   GLU 73 72 58*   BUN 52* 54* 60*   CREATININE 7.6* 7.4* 7.8*   CALCIUM 8.3* 8.5* 8.3*   ALBUMIN 1.5*  --  1.6*   ANIONGAP 14 14 13   EGFRNONAA 6.6* 6.8* 6.4*     HIV Rapid: No results for input(s): HIVRAPID in the last 48 hours.  Lactic Acid: No results for input(s): LACTATE in the last 48 hours.  Procalcitonin: No results for input(s): PROCAL in the last 48 hours.  Quantiferon: No results for input(s): NIL, TBAG, TBAGNIL, MITOGENNIL, TBGOLD in the last 48 hours.  Respiratory Culture: No results for input(s): GSRESP, RESPIRATORYC in the last 4320 hours.  Urine Culture: No results for input(s): LABURIN in the last 4320 hours.  Urine Studies: No results for input(s): COLORU, APPEARANCEUA, PHUR, SPECGRAV, PROTEINUA, GLUCUA, KETONESU, BILIRUBINUA, OCCULTUA, NITRITE, UROBILINOGEN, LEUKOCYTESUR, RBCUA, WBCUA, BACTERIA, SQUAMEPITHEL, HYALINECASTS in the last 4320 hours.    Invalid input(s): WRIGHTSUR  Wound Culture:   Recent Labs  Lab 02/05/18  0820 02/06/18  1329 02/09/18  0908   LABAERO CITROBACTER KOSERIMany CITROBACTER KOSERIModerate KLEBSIELLA PNEUMONIAE ESBLFew       Significant Imaging: I have reviewed all pertinent imaging results/findings within the past 24 hours.

## 2018-02-13 NOTE — ASSESSMENT & PLAN NOTE
Wilder Carbajal is a 67 y.o. 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 Matamoros), concern for dusky appearance of 2nd and 3rd digits, and necrotic heel wound.  Pt. At high risk for limb loss at this time.   - Wound vac removed, seen with Vascular surgery.  They are recommending AKA, but will discuss with staff.  They recommended wet to dry dressings.  Discontinued vac for now, and Dressed with betadine, 4x4's, lightly dressed with kerlix and ace.    -ID on board, appreciate recs  -Podiatry will follow.    Weightbearing Status: NWB  Offloading Device: heel protector boot

## 2018-02-13 NOTE — PROGRESS NOTES
Progress Note   Hospital Medicine       Team: [facility] D Montserrat Lino MD  Admit Date: 2/4/2018  Length of Stay:  LOS: 9 days   ESTUARDO 2/16/2018  Principal Problem:  Septic encephalopathy    Interval hx / overnight events: Had NSVT due to hypoglycemia. Patient not eating.     Areas of concern/ handoff: Will likely need right BKA.     Review of Systems: Gen: no fever, no chills, Heart: no chest pain, palpitations; Resp: no SOB, no cough    Temp:  [97.5 °F (36.4 °C)-98.3 °F (36.8 °C)]   Pulse:  [48-60]   Resp:  [17-20]   BP: (100-139)/(55-67)   SpO2:  [91 %-98 %]       Physical Exam:  General appearance: NAD, conversant  Neck: FROM, supple   Lungs: crackles at bases, no accessory muscle use  CV: RRR, no heave  Abdomen: Soft, non-tender; no masses or HSM  Extremities: 4+ pitting edema up to his buttocks,  no digital cyanosis  Skin: no rash, lesions or ulcers  Psych: Alert and oriented to person, place and time      Recent Labs  Lab 02/11/18 0418 02/12/18 0402 02/13/18  0423   WBC 13.97* 13.99* 13.94*   HGB 8.1* 7.6* 7.6*   HCT 23.8* 23.3* 23.3*    173 195       Recent Labs  Lab 02/11/18 0418 02/11/18  0809 02/12/18  0402 02/13/18  0423   * 135* 132* 132*   K 6.7* 4.7 4.8 5.0   CL 98 96 94* 99   CO2 22* 25 25 21*   BUN 52* 54* 60* 38*   CREATININE 7.6* 7.4* 7.8* 5.5*   GLU 73 72 58* 48*   CALCIUM 8.3* 8.5* 8.3* 8.1*   MG 2.5  --  2.3 2.3   PHOS 5.9*  --  5.7* 4.6*       Recent Labs  Lab 02/11/18 0418 02/12/18  0402 02/13/18  0423   ALBUMIN 1.5* 1.6* 1.5*       Recent Labs  Lab 02/11/18  2317 02/13/18  0556 02/13/18  0635 02/13/18  0758 02/13/18  1151 02/13/18  1606   POCTGLUCOSE 72 53* 76 78 83 103         Assessment and Plan   Overview: 66 yo here for osteomyelitis of right foot. He underwent washout 2/6 and another washout 2/6 and amputation of 4th and 5th digits. Undergoing treatment course of antibiotics.     Problems Addressed today:     Septic encephalopathy on possible dementia  Improved with  "treatment of infected foot. Patient lacked capacity to refuse surgery, consent obtained from his son as proxy.     Infected right dorsal foot, ischemic ulceration  Podiatry consulted - s/p I&D and 4th and 5th amputation  Vascular surgery consulted - initially no intervention required  Continued vancomycin 1 g IV per random levels and Zosyn 2.25 g IV BID  Wound culture revealed pan-sensitive CITROBACTER KOSERI and KLEBSIELLA PNEUMONIAE ESBL  To OR 02/09/18. Consulted ID.  Plan for wound vac and 6 weeks ciprofloxacin. ABIs ordered to rule out need for BKA. Angiogram considered. Per vascular surgery " Unlikely for the patient to heal the wound despite aggressive limb salvage efforts. Due to significant lipodermatosclerosis, BKA is not good option. Patient likely needs AKA." BLE angiogram tentatively planned for 2/15.     Hypervolemia due to non-compliance with dialysis  Hyperkalemia, resolved  Shifted in ER with albuterol, insulin/destrose, bicarbonate. K 7.9--> 4  No respiratory compromise. Emergent dialysis 2/5/18 early AM and on 2/9  Daily weights - 18 kg weight loss since admission  Withhold anti-hypertensives for volume removal  Would recommend aggressive volume removal as it would be assist in wound healing from his BKA      DVT  BLE ultrasound  Apixaban 5 mg BID - ? Compliance at home  Apixaban held 2/12/2018 for possible angiogram on 2/15/18     DM II with HTN and CKD V  HgbA1c 4.6%  Likely controlled with declining intake  No further intervention needed at this time     Protein calorie malnutrtion  Hypoglycemia with associated NSVT  Poor prognosis for wound healing  Nepro with meals and at night  Will discuss with patient about his meal preferences and consider calorie dense bedtime snack.     Essential HTN - hold home hydralazine 50 mg TID and losartan 50 mg daily     DM II hyperlipidemia - atorvastatin 40 mg daily     DM II neuropathy - gabapentin 100 mg TID     ESRF HD TTS - Nephrology consulted for " dialysis.  Withholding anti-hypertensives and added midodrine as needed prior to dialysis for volume removal and reduce symptomatic dialysis-associated hypotension.   BP improving.     CKD MBD - continue cinacalcet 30 mg qhs and sevelamer 1600 mg TID daily     Depression - continue citalopram 10 mg daily. Consulted psychiatry.   Rule out delirium and dementia.  Back to baseline.    HIGH RISK CONDITION(S):   Patient has an abrupt change in neurologic status: delirium     DVT/ GI PPX: RODRI/SCDs. Apixaban on hold due to pending angiogram    Goals of Care: Curative    Disposition: long-term senior living care      Montserrat Lino MD

## 2018-02-13 NOTE — ASSESSMENT & PLAN NOTE
67 year old male with hx of ESRD on HD, DM2, CHF admitted with AMS, fluid overload and right necrotic heal ulcer with gangrene of 4th and 5th digits. He was started on empiric Vanc and Zosyn. Patient underwent washout and amputation of 4th and 5th digits on 2/6 and again 2/9. Wound cultures grew citrobacter. Bone culture grew ESBL Klebsiella pneumoniae. Fevers resolved. Leukocytosis trending down. Podiatry and vascular surgery following. Patient with severe tissue loss to right foot and dusky appearance to 2nd and 3rd digits. Arterial US ordered to assess blow flow. Vascular surgery has tentatively scheduled RLE angiogram on Thursday. May need BKA.       Plan:  - Deescalate antibiotics to Ciprofloxacin 500 mg PO q 24 hours (HD dosing)  - Will F/U on arterial US of lower extremity  - Vascular surgery has tentatively scheduled RLE angiogram on Thursday. Patient may need BKA.   - Anticipate 6 weeks of antibiotics for osteomyelitis pending vascular surgery surgical plans.  - Will follow with you

## 2018-02-13 NOTE — PROGRESS NOTES
[facility]  Podiatry  Progress Note    Patient Name: Wilder Carbajal  MRN: [id number]  Admission Date: 2/4/2018  Hospital Length of Stay: 9 days  Attending Physician: Montserrat Lino MD  Primary Care Provider: Dio Roberson MD   Interval Hx: s/p Right 4th and 5th ray amputation (DOS: 1/9/18 per Dr. Matamoros).  Patient pain controlled. Pt seen at bedside with Vascular surgery. Wound VAC intact right foot no leaks noted.     Scheduled Meds:   ammonium lactate   Topical (Top) BID    aspirin  81 mg Oral Daily    atorvastatin  20 mg Oral Daily    cinacalcet  30 mg Oral QHS    ciprofloxacin HCl  500 mg Oral Daily    citalopram  10 mg Oral Daily    gabapentin  100 mg Oral TID    miconazole nitrate 2%   Topical (Top) BID    senna-docusate 8.6-50 mg  2 tablet Oral BID    sevelamer carbonate  1,600 mg Oral TID WM     Continuous Infusions:  PRN Meds:sodium chloride 0.9%, sodium chloride 0.9%, acetaminophen, albumin human 25%, dextrose 50%, dextrose 50%, glucagon (human recombinant), glucose, glucose, midodrine, ondansetron, oxyCODONE, sodium chloride 0.9%    Review of patient's allergies indicates:  No Known Allergies     Past Medical History:   Diagnosis Date    Acute pain of left shoulder 1/7/2017    Arthritis     Asthma     Benign neoplasm of eyelid     RUL    Blood clotting tendency     Blood transfusion     Cataract     Chronic kidney disease requiring chronic dialysis     Diabetes mellitus     Diabetic retinopathy     Fever blister     Hyperlipidemia     Hypertension     Infertility     Joint pain     Retinal detachment     Thyroid disease     Weakness 1/7/2017     Past Surgical History:   Procedure Laterality Date    CATARACT EXTRACTION      COLONOSCOPY N/A 6/23/2017    Procedure: COLONOSCOPY;  Surgeon: Vic Clark MD;  Location: [address];  Service: Endoscopy;  Laterality: N/A;    RETINAL DETACHMENT SURGERY         Family History     Problem Relation (Age of Onset)     Heart attack Mother    No Known Problems Father, Sister, Brother, Maternal Aunt, Maternal Uncle, Paternal Aunt, Paternal Uncle, Maternal Grandmother, Maternal Grandfather, Paternal Grandmother, Paternal Grandfather        Social History Main Topics    Smoking status: Never Smoker    Smokeless tobacco: Never Used    Alcohol use No    Drug use: No    Sexual activity: Not on file     Review of Systems   Constitutional: Positive for activity change.   Respiratory: Negative for shortness of breath.    Cardiovascular: Negative for chest pain and leg swelling.   Gastrointestinal: Negative.  Negative for nausea and vomiting.   Genitourinary: Negative.    Musculoskeletal: Positive for arthralgias.   Skin: Positive for wound.   Neurological: Positive for numbness. Negative for weakness.     Objective:     Vital Signs (Most Recent):  Temp: 98 °F (36.7 °C) (02/13/18 1134)  Pulse: (!) 50 (02/13/18 1134)  Resp: 20 (02/13/18 1134)  BP: (!) 131/59 (02/13/18 1134)  SpO2: (!) 91 % (02/13/18 1134) Vital Signs (24h Range):  Temp:  [97.6 °F (36.4 °C)-98.3 °F (36.8 °C)] 98 °F (36.7 °C)  Pulse:  [48-60] 50  Resp:  [17-20] 20  SpO2:  [91 %-98 %] 91 %  BP: (100-158)/(55-70) 131/59     Weight: 95.1 kg (209 lb 10.5 oz)  Body mass index is 31.88 kg/m².    Foot Exam    General  Orientation: disoriented       Right Foot/Ankle     Inspection and Palpation  Tenderness: none   Swelling: metatarsals   Skin Exam: blister, maceration, cellulitis, skin changes and ulcer;     Neurovascular  Dorsalis pedis: 1+  Posterior tibial: 1+  Saphenous nerve sensation: diminished  Tibial nerve sensation: diminished  Superficial peroneal nerve sensation: diminished  Deep peroneal nerve sensation: diminished  Sural nerve sensation: diminished      Left Foot/Ankle      Inspection and Palpation  Tenderness: none   Swelling: metatarsals   Skin Exam: skin changes and ulcer;     Neurovascular  Dorsalis pedis: 1+  Posterior tibial: 1+  Saphenous nerve sensation:  diminished  Tibial nerve sensation: diminished  Superficial peroneal nerve sensation: diminished  Deep peroneal nerve sensation: diminished  Sural nerve sensation: diminished        2/13/18: Dusky 2nd and 3rd digits.    Wound 1: Right dorsal foot ulceration   Measurement:[id number]  Base: granular, necrotic tissue alond plantar 3rd ray  Periwound skin: viable  Drainage: serosanguinous  Erythema: none  Probe probes to bone 4th and 5th metatarsal, extensor tendons exposed            2nd and 3rd digits appear dusky.        Wound 2: Right heel ulceration   Measurement: [id number]  Base:  necrotic eschar base  Periwound skin: hyperkeratosis   Drainage:none  Probe: none, no bone exposed                  2/12/18          Left foot ulceration noted to dorsal foot 2/11/18          Wound 1: Left dorsal foot   Measurement: [id number].  Base: fibrous base  Periwound skin: mild erythema,   Drainage: none  Erythema: mild  Probe: none, no bone exposed  2/11/18 2/11/18        Wound 1: Left heel ulceration   Measurement: 4gsz6ado4.1cm.  Periwound skin: callus  Drainage: none  Erythema:mild   Probe: none, no bone exposed            Laboratory:  Xray: Right: There is DJD, soft tissue swelling, a calcaneal spur, and diabetic vascular calcifications. No fracture dislocation bone destruction seen.    Left: There is DJD, diabetic vascular calcifications, and Spurs on the calcaneus. No fracture dislocation bone destruction seen.    MRI: 1. Limited, incomplete nondiagnostic examination secondary to early termination at the of the request of the patient.    2. Linear T1 hypointensity traversing the third metacarpal head, concerning for possible nondisplaced, likely subacute fracture.  Correlation with point tenderness recommended.    PHILLIP: Impression:   Right Leg:Segmental pressures and PVR waveforms suggest mild to moderate peripheral arterial occlusive disease.     Left Leg:Segmental pressures and PVR waveforms suggest mild  to moderate peripheral arterial occlusive disease.        Diagnostic Results:  X-Ray: I have reviewed all pertinent results/findings within the past 24 hours.  reviewed    Clinical Findings:      Assessment/Plan:     Foot ulceration, right, with fat layer exposed    Wilder Carbajal is a 67 y.o. 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 Matamoros), concern for dusky appearance of 2nd and 3rd digits, and necrotic heel wound.  Pt. At high risk for limb loss at this time.   - Wound vac removed, seen with Vascular surgery.  They are recommending AKA, but will discuss with staff.  They recommended wet to dry dressings.  Discontinued vac for now, and Dressed with betadine, 4x4's, lightly dressed with kerlix and ace.    -ID on board, appreciate recs  -Podiatry will follow.    Weightbearing Status: NWB  Offloading Device: heel protector boot          Type 2 diabetes mellitus with stage 5 chronic kidney disease and hypertension    Per Primary               George Escamilla MD  Podiatry  [facility]

## 2018-02-14 ENCOUNTER — ANESTHESIA EVENT (OUTPATIENT)
Dept: SURGERY | Facility: HOSPITAL | Age: 68
End: 2018-02-14

## 2018-02-14 LAB
ALBUMIN SERPL BCP-MCNC: 1.5 G/DL
ANION GAP SERPL CALC-SCNC: 11 MMOL/L
BACTERIA SPEC AEROBE CULT: NORMAL
BACTERIA SPEC AEROBE CULT: NORMAL
BACTERIA SPEC ANAEROBE CULT: NORMAL
BASOPHILS # BLD AUTO: 0.06 K/UL
BASOPHILS NFR BLD: 0.4 %
BUN SERPL-MCNC: 45 MG/DL
CALCIUM SERPL-MCNC: 8.2 MG/DL
CHLORIDE SERPL-SCNC: 98 MMOL/L
CO2 SERPL-SCNC: 25 MMOL/L
CREAT SERPL-MCNC: 6 MG/DL
DIFFERENTIAL METHOD: ABNORMAL
EOSINOPHIL # BLD AUTO: 0.2 K/UL
EOSINOPHIL NFR BLD: 1.2 %
ERYTHROCYTE [DISTWIDTH] IN BLOOD BY AUTOMATED COUNT: 17.4 %
EST. GFR  (AFRICAN AMERICAN): 10.2 ML/MIN/1.73 M^2
EST. GFR  (NON AFRICAN AMERICAN): 8.8 ML/MIN/1.73 M^2
GLUCOSE SERPL-MCNC: 72 MG/DL
HCT VFR BLD AUTO: 22.9 %
HGB BLD-MCNC: 7.5 G/DL
IMM GRANULOCYTES # BLD AUTO: 0.2 K/UL
IMM GRANULOCYTES NFR BLD AUTO: 1.5 %
LYMPHOCYTES # BLD AUTO: 1.3 K/UL
LYMPHOCYTES NFR BLD: 9.7 %
MAGNESIUM SERPL-MCNC: 2.1 MG/DL
MCH RBC QN AUTO: 29.6 PG
MCHC RBC AUTO-ENTMCNC: 32.8 G/DL
MCV RBC AUTO: 91 FL
MONOCYTES # BLD AUTO: 1.2 K/UL
MONOCYTES NFR BLD: 9 %
NEUTROPHILS # BLD AUTO: 10.8 K/UL
NEUTROPHILS NFR BLD: 78.2 %
NRBC BLD-RTO: 0 /100 WBC
PHOSPHATE SERPL-MCNC: 4.6 MG/DL
PLATELET # BLD AUTO: 194 K/UL
PMV BLD AUTO: 9.8 FL
POCT GLUCOSE: 110 MG/DL (ref 70–110)
POCT GLUCOSE: 131 MG/DL (ref 70–110)
POCT GLUCOSE: 89 MG/DL (ref 70–110)
POCT GLUCOSE: 99 MG/DL (ref 70–110)
POTASSIUM SERPL-SCNC: 4.5 MMOL/L
RBC # BLD AUTO: 2.53 M/UL
SODIUM SERPL-SCNC: 134 MMOL/L
WBC # BLD AUTO: 13.77 K/UL

## 2018-02-14 PROCEDURE — 80100014 HC HEMODIALYSIS 1:1

## 2018-02-14 PROCEDURE — 85025 COMPLETE CBC W/AUTO DIFF WBC: CPT

## 2018-02-14 PROCEDURE — 25000003 PHARM REV CODE 250: Performed by: INTERNAL MEDICINE

## 2018-02-14 PROCEDURE — 11000001 HC ACUTE MED/SURG PRIVATE ROOM

## 2018-02-14 PROCEDURE — 99232 SBSQ HOSP IP/OBS MODERATE 35: CPT | Mod: ,,, | Performed by: INTERNAL MEDICINE

## 2018-02-14 PROCEDURE — 25000003 PHARM REV CODE 250: Performed by: PHYSICIAN ASSISTANT

## 2018-02-14 PROCEDURE — 80069 RENAL FUNCTION PANEL: CPT

## 2018-02-14 PROCEDURE — 36415 COLL VENOUS BLD VENIPUNCTURE: CPT

## 2018-02-14 PROCEDURE — 97802 MEDICAL NUTRITION INDIV IN: CPT

## 2018-02-14 PROCEDURE — 99233 SBSQ HOSP IP/OBS HIGH 50: CPT | Mod: ,,, | Performed by: PHYSICIAN ASSISTANT

## 2018-02-14 PROCEDURE — 99232 SBSQ HOSP IP/OBS MODERATE 35: CPT | Mod: GC,,, | Performed by: PSYCHIATRY & NEUROLOGY

## 2018-02-14 PROCEDURE — 90935 HEMODIALYSIS ONE EVALUATION: CPT | Mod: ,,, | Performed by: INTERNAL MEDICINE

## 2018-02-14 PROCEDURE — 83735 ASSAY OF MAGNESIUM: CPT

## 2018-02-14 RX ORDER — SODIUM CHLORIDE 9 MG/ML
INJECTION, SOLUTION INTRAVENOUS ONCE
Status: DISCONTINUED | OUTPATIENT
Start: 2018-02-14 | End: 2018-02-15

## 2018-02-14 RX ORDER — SEVELAMER CARBONATE 800 MG/1
800 TABLET, FILM COATED ORAL NIGHTLY
Status: DISCONTINUED | OUTPATIENT
Start: 2018-02-14 | End: 2018-03-06 | Stop reason: HOSPADM

## 2018-02-14 RX ORDER — SODIUM CHLORIDE 9 MG/ML
INJECTION, SOLUTION INTRAVENOUS
Status: DISCONTINUED | OUTPATIENT
Start: 2018-02-14 | End: 2018-02-15

## 2018-02-14 RX ADMIN — OXYCODONE HYDROCHLORIDE 5 MG: 5 TABLET ORAL at 08:02

## 2018-02-14 RX ADMIN — STANDARDIZED SENNA CONCENTRATE AND DOCUSATE SODIUM 2 TABLET: 8.6; 5 TABLET, FILM COATED ORAL at 09:02

## 2018-02-14 RX ADMIN — SEVELAMER CARBONATE 1600 MG: 800 TABLET, FILM COATED ORAL at 06:02

## 2018-02-14 RX ADMIN — STANDARDIZED SENNA CONCENTRATE AND DOCUSATE SODIUM 2 TABLET: 8.6; 5 TABLET, FILM COATED ORAL at 08:02

## 2018-02-14 RX ADMIN — MICONAZOLE NITRATE: 20 OINTMENT TOPICAL at 09:02

## 2018-02-14 RX ADMIN — CITALOPRAM HYDROBROMIDE 10 MG: 10 TABLET ORAL at 09:02

## 2018-02-14 RX ADMIN — GABAPENTIN 100 MG: 100 CAPSULE ORAL at 01:02

## 2018-02-14 RX ADMIN — SEVELAMER CARBONATE 1600 MG: 800 TABLET, FILM COATED ORAL at 12:02

## 2018-02-14 RX ADMIN — MIDODRINE HYDROCHLORIDE 10 MG: 5 TABLET ORAL at 09:02

## 2018-02-14 RX ADMIN — CIPROFLOXACIN HYDROCHLORIDE 500 MG: 250 TABLET, FILM COATED ORAL at 09:02

## 2018-02-14 RX ADMIN — SEVELAMER CARBONATE 800 MG: 800 TABLET, FILM COATED ORAL at 08:02

## 2018-02-14 RX ADMIN — ATORVASTATIN CALCIUM 20 MG: 20 TABLET, FILM COATED ORAL at 09:02

## 2018-02-14 RX ADMIN — CINACALCET HYDROCHLORIDE 30 MG: 30 TABLET, COATED ORAL at 08:02

## 2018-02-14 RX ADMIN — SEVELAMER CARBONATE 1600 MG: 800 TABLET, FILM COATED ORAL at 09:02

## 2018-02-14 RX ADMIN — Medication: at 09:02

## 2018-02-14 RX ADMIN — Medication: at 08:02

## 2018-02-14 RX ADMIN — GABAPENTIN 100 MG: 100 CAPSULE ORAL at 09:02

## 2018-02-14 RX ADMIN — ASPIRIN 81 MG: 81 TABLET, COATED ORAL at 09:02

## 2018-02-14 NOTE — ANESTHESIA PREPROCEDURE EVALUATION
[facility]  Anesthesia Pre-Operative Evaluation         Patient Name: Wilder Carbajal  YOB: 1950  MRN: [id number]    SUBJECTIVE:     Pre-operative evaluation for Procedure(s) (LRB):  Angiogram Extremity Unilateral (Right)     02/14/2018       Wilder Carbajal is a 67 y.o. male with a past medical history of ESRD on HD (TThS) via L RC AVF (with interposition graft), HTN, HLD, DM2 ( Hgb A1c 4.7), afib, GERD, depression, HFrEF (45% 4/2017)  presenting with fluid overload after missing dialysis. Found to have evidence of right diabetic foot ulceration with wet gangrene. S/p amputation of 4th and 5th toes on 2/9/18. Patient now presents for the above procedure(s).     Phone consent with son, Raymond Carbajal, obtained as patient has been evaluated by psych and determined pt does not have capacity.    LDA:        Peripheral IV - Single Lumen 02/04/18 1441 Right Forearm (Active)   Site Assessment No redness;No swelling 2/13/2018  8:58 AM   Line Status Saline locked;Flushed 2/13/2018  8:58 AM   Dressing Status Dry;Intact 2/13/2018  8:58 AM   Dressing Intervention Dressing reinforced 2/12/2018  7:25 AM   Dressing Change Due 02/08/18 2/12/2018  7:25 AM   Site Change Due 02/08/18 2/12/2018  7:25 AM   Reason Not Rotated Poor venous access 2/12/2018  8:28 PM   Number of days: 9            Hemodialysis AV Fistula Left upper arm (Active)   Needle Size 15ga 2/12/2018  7:50 AM   Site Assessment No redness;No swelling 2/13/2018  8:58 AM   Patency Thrill;Present 2/13/2018  8:58 AM   Status Deaccessed 2/12/2018  8:28 PM   Flows Good 2/9/2018  2:15 AM   Dressing Intervention Other (Comment) 2/13/2018  8:58 AM   Dressing Status Clean;Dry;Intact 2/12/2018  7:25 AM   Site Condition No complications 2/12/2018  7:50 AM   Dressing Gauze 2/12/2018  7:25 AM   Number of days:        Prev airway: None documented in Epic or legacy documents.     Drips: None documented.      Patient Active Problem List   Diagnosis     Traction detachment of both retinas    Diabetic macular edema of both eyes    NS (nuclear sclerosis)    Hypertensive retinopathy of both eyes    History of excision of epidermal inclusion cyst    Proliferative diabetic retinopathy of both eyes without macular edema associated with type 2 diabetes mellitus    Proliferative diabetic retinopathy, both eyes    Elevated PSA    Malignant hypertension    HLD (hyperlipidemia)    DM II (diabetes mellitus, type II), controlled    Essential hypertension    Hypothyroidism    GERD (gastroesophageal reflux disease)    Pseudoaneurysm of arteriovenous graft    Lumbar facet arthropathy    Renovascular hypertension, malignant    Lipoma    Type 2 diabetes mellitus with stage 5 chronic kidney disease and hypertension    ESRD (end stage renal disease)    Diabetes mellitus with ESRD (end-stage renal disease)    Lumbar spondylosis    Spinal stenosis of lumbar region    Bilateral low back pain    Complete heart block    Bicuspid aortic valve    AV block, complete    Typical angina    Fever    Coccygeal pain, acute    Sacrococcygeal pilonidal cyst    Second degree heart block    Vein stenosis    Weakness    Acute pain of left shoulder    Dependence on hemodialysis    Paroxysmal atrial fibrillation    Acute on chronic diastolic heart failure    Right lower quadrant abdominal pain    Hypoxia    Right femoral vein DVT    Anemia, chronic disease    Metabolic bone disease    Right leg pain    Hypervolemia    Pulmonary hypertension    Hyperkalemia    Acute delirium    Protein-calorie malnutrition, severe    DVT (deep venous thrombosis)    DM type 2 with diabetic mixed hyperlipidemia    Diabetic polyneuropathy associated with type 2 diabetes mellitus    Chronic kidney disease-mineral and bone disorder    Depression    Alteration in skin integrity    Foot ulceration, right, with fat layer exposed    Septic encephalopathy    Infected skin  ulcer with fat layer exposed    Osteomyelitis    Diabetic foot ulcer    PVD (peripheral vascular disease)       Review of patient's allergies indicates:  No Known Allergies    Current Inpatient Medications:   sodium chloride 0.9%   Intravenous Once    ammonium lactate   Topical (Top) BID    aspirin  81 mg Oral Daily    atorvastatin  20 mg Oral Daily    cinacalcet  30 mg Oral QHS    ciprofloxacin HCl  500 mg Oral Daily    citalopram  10 mg Oral Daily    gabapentin  100 mg Oral TID    miconazole nitrate 2%   Topical (Top) BID    midodrine  10 mg Oral Every Mon, Wed, Fri    senna-docusate 8.6-50 mg  2 tablet Oral BID    sevelamer carbonate  1,600 mg Oral TID WM       No current facility-administered medications on file prior to encounter.      Current Outpatient Prescriptions on File Prior to Encounter   Medication Sig Dispense Refill    ammonium lactate (LAC-HYDRIN) 12 % lotion Apply topically as needed for Dry Skin.      apixaban 5 mg Tab Take 1 tablet (5 mg total) by mouth 2 (two) times daily. 60 tablet 1    aspirin (ECOTRIN) 81 MG EC tablet Take 81 mg by mouth once daily.       atorvastatin (LIPITOR) 20 MG tablet Take 20 mg by mouth once daily.      cetirizine (ZYRTEC) 10 MG tablet Take 10 mg by mouth once daily.      cinacalcet (SENSIPAR) 30 MG Tab Take 30 mg by mouth every evening.      citalopram (CELEXA) 10 MG tablet Take 10 mg by mouth once daily.      diclofenac sodium (VOLTAREN) 1 % Gel Apply 2 g topically 4 (four) times daily as needed (for pain).      ergocalciferol (ERGOCALCIFEROL) 50,000 unit Cap Take 50,000 Units by mouth every 14 (fourteen) days.       furosemide (LASIX) 80 MG tablet Take 80 mg by mouth once daily.      gabapentin (NEURONTIN) 100 MG capsule Take 100 mg by mouth 3 (three) times daily.      hydrALAZINE (APRESOLINE) 50 MG tablet Take 50 mg by mouth 3 (three) times daily.      hydrocodone-acetaminophen 5-325mg (NORCO) 5-325 mg per tablet Take 1 tablet by mouth  every 6 (six) hours as needed for Pain.      ipratropium (ATROVENT HFA) 17 mcg/actuation inhaler Inhale 2 puffs into the lungs 4 (four) times daily. Do not exceed 12 puffs per day      losartan (COZAAR) 50 MG tablet Take 1 tablet (50 mg total) by mouth once daily. 30 tablet 3    mometasone (NASONEX) 50 mcg/actuation nasal spray 2 sprays by Nasal route once daily.      podofilox (CONDYLOX) 0.5 % external solution Apply a small amount to lesions twice daily for three days, then hold for four days and repeat regimen      sevelamer carbonate (RENVELA) 800 mg Tab Take 2 tablets (1,600 mg total) by mouth 3 (three) times daily with meals. Take two tablets by mouth four times daily with meals and snacks 180 tablet 1       Past Surgical History:   Procedure Laterality Date    CATARACT EXTRACTION      COLONOSCOPY N/A 6/23/2017    Procedure: COLONOSCOPY;  Surgeon: Vic Clark MD;  Location: [address];  Service: Endoscopy;  Laterality: N/A;    RETINAL DETACHMENT SURGERY         Social History     Social History    Marital status: Single     Spouse name: N/A    Number of children: 3    Years of education: N/A     Occupational History    Not on file.     Social History Main Topics    Smoking status: Never Smoker    Smokeless tobacco: Never Used    Alcohol use No    Drug use: No    Sexual activity: Not on file     Other Topics Concern    Not on file     Social History Narrative    Has lived with son for about 2 years now. 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       OBJECTIVE:     Vital Signs Range (Last 24H):  Temp:  [36.4 °C (97.5 °F)-36.7 °C (98 °F)]   Pulse:  [50-58]   Resp:  [18-20]   BP: (122-139)/(58-67)   SpO2:  [91 %-98 %]       CBC:   Recent Labs      02/13/18   0423  02/14/18   0608   WBC  13.94*  13.77*   RBC  2.64*  2.53*   HGB  7.6*  7.5*   HCT  23.3*  22.9*   PLT  195  194   MCV  88  91   MCH  28.8  29.6   MCHC  32.6  32.8       CMP:   Recent Labs       18   0402  18   0423   NA  132*  132*   K  4.8  5.0   CL  94*  99   CO2  25  21*   BUN  60*  38*   CREATININE  7.8*  5.5*   GLU  58*  48*   MG  2.3  2.3   PHOS  5.7*  4.6*   CALCIUM  8.3*  8.1*   ALBUMIN  1.6*  1.5*       INR:  No results for input(s): PT, INR, PROTIME, APTT in the last 72 hours.    Diagnostic Studies:     EK/12/18    Test Reason : R00.1  Vent. Rate : 053 BPM     Atrial Rate : 044 BPM     P-R Int : 000 ms          QRS Dur : 102 ms      QT Int : 464 ms       P-R-T Axes : 000 -63 104 degrees     QTc Int : 435 ms    Poor data quality  Atrial fibrillation  Left axis deviation  Low voltage QRS  Cannot rule out Anterior infarct (cited on or before 2018)  Nonspecific ST-t wave abnormality  Abnormal ECG  When compared with ECG of 2018 07:54,        2D ECHO:  Results for orders placed or performed during the hospital encounter of 17   2D echo with color flow doppler   Result Value Ref Range    EF 45 55 - 65    Aortic Valve Regurgitation TRIVIAL     Aortic Valve Stenosis MILD (A)     Est. PA Systolic Pressure 54.69 (A)     Mitral Valve Mobility NORMAL     Tricuspid Valve Regurgitation MILD          ASSESSMENT/PLAN:       Anesthesia Evaluation    I have reviewed the Patient Summary Reports.    I have reviewed the Nursing Notes.   I have reviewed the Medications.     Review of Systems  Anesthesia Hx:  No problems with previous Anesthesia  History of prior surgery of interest to airway management or planning: Denies Family Hx of Anesthesia complications.   Denies Personal Hx of Anesthesia complications.   Social:  Non-Smoker, No Alcohol Use    Hematology/Oncology:     Oncology Normal    -- Anemia: Denies Current/Recent Cancer   EENT/Dental:  EENT/Dental Normal denies chronic allergic rhinitis    Cardiovascular:   Hypertension, poorly controlled Denies MI.  Denies CAD.    Denies CABG/stent. Dysrhythmias atrial fibrillation Angina    Pulmonary:   Denies Pneumonia Asthma  Denies Shortness of breath.  Denies Recent URI.    Renal/:   Chronic Renal Disease, ESRD    Hepatic/GI:   Denies PUD. Denies Hiatal Hernia. GERD    Musculoskeletal:   Arthritis     Neurological:   Denies TIA. Denies CVA. Neuromuscular Disease,  Denies Seizures.   Peripheral Neuropathy    Endocrine:   Diabetes, poorly controlled, type 2 Hypothyroidism    Psych:   Psychiatric History depression          Physical Exam  General:  Obesity, Well nourished    Airway/Jaw/Neck:  Airway Findings: Mouth Opening: Normal Tongue: Normal  General Airway Assessment: Adult  Mallampati: III  Improves to III with phonation.  TM Distance: Normal, at least 6 cm  Jaw/Neck Findings:  Neck ROM: Normal ROM  Neck Findings: Normal    Eyes/Ears/Nose:  EYES/EARS/NOSE FINDINGS: Normal   Dental:  Dental Findings: Periodontal disease, Severe    Chest/Lungs:  Chest/Lungs Findings: Normal Respiratory Rate, Clear to auscultation     Heart/Vascular:  Heart Findings: Rate: Bradycardia  Rhythm: Regular Rhythm  Heart Murmur  Systolic  Systolic Heart Murmur Description: R Upper Sternal Border  Systolic Heart Murmur Grade: Grade II  Vascular Findings:  Dialysis Access: Left Forearm Graft     Abdomen:  Abdomen Findings: Normal    Musculoskeletal:  Musculoskeletal Findings:    Skin:  Skin Findings: Normal    Mental Status:  Mental Status Findings:  Cooperative, Alert and Oriented         Anesthesia Plan  Type of Anesthesia, risks & benefits discussed:  Anesthesia Type:  general, MAC  Patient's Preference:   Intra-op Monitoring Plan: standard ASA monitors  Intra-op Monitoring Plan Comments:   Post Op Pain Control Plan: multimodal analgesia, per primary service following discharge from PACU and IV/PO Opioids PRN  Post Op Pain Control Plan Comments:   Induction:   IV  Beta Blocker:  Patient is not currently on a Beta-Blocker (No further documentation required).       Informed Consent: Patient understands risks and agrees with Anesthesia plan.  Questions  answered. Anesthesia consent signed with patient.  ASA Score: 4     Day of Surgery Review of History & Physical:  There are no significant changes.  H&P update referred to the surgeon.         Ready For Surgery From Anesthesia Perspective.

## 2018-02-14 NOTE — PROGRESS NOTES
Progress Note   Hospital Medicine       Team: [facility] Montserrat Lino MD  Admit Date: 2/4/2018  Length of Stay:  LOS: 10 days   ESTUARDO 2/20/2018  Principal Problem:  Septic encephalopathy    Interval hx / overnight events: No new events.    Areas of concern/ handoff: Will likely need right BKA. Early morning hypoglycemia    Review of Systems: Gen: no fever, no chills, Heart: no chest pain, palpitations; Resp: no SOB, no cough    Temp:  [97.5 °F (36.4 °C)-97.8 °F (36.6 °C)]   Pulse:  [52-62]   Resp:  [17-20]   BP: (120-139)/(58-67)   SpO2:  [91 %-98 %]       Physical Exam:  General appearance: NAD, conversant  Neck: FROM, supple   Lungs: crackles at bases, no accessory muscle use  CV: RRR, no heave  Abdomen: Soft, non-tender; no masses or HSM  Extremities: 4+ pitting edema up to his buttocks,  no digital cyanosis  Skin: no rash, lesions or ulcers  Psych: Alert and oriented to person, place and time      Recent Labs  Lab 02/12/18  0402 02/13/18  0423 02/14/18  0608   WBC 13.99* 13.94* 13.77*   HGB 7.6* 7.6* 7.5*   HCT 23.3* 23.3* 22.9*    195 194       Recent Labs  Lab 02/12/18  0402 02/13/18  0423 02/14/18  0608   * 132* 134*   K 4.8 5.0 4.5   CL 94* 99 98   CO2 25 21* 25   BUN 60* 38* 45*   CREATININE 7.8* 5.5* 6.0*   GLU 58* 48* 72   CALCIUM 8.3* 8.1* 8.2*   MG 2.3 2.3 2.1   PHOS 5.7* 4.6* 4.6*       Recent Labs  Lab 02/12/18  0402 02/13/18  0423 02/14/18  0608   ALBUMIN 1.6* 1.5* 1.5*       Recent Labs  Lab 02/13/18  0758 02/13/18  1151 02/13/18  1606 02/13/18  2240 02/14/18  0854 02/14/18  1130   POCTGLUCOSE 78 83 103 109 110 131*         Assessment and Plan   Overview: 66 yo here for osteomyelitis of right foot. He underwent washout 2/6 and another washout 2/6 and amputation of 4th and 5th digits. Undergoing treatment course of antibiotics.     Problems Addressed today:     Septic encephalopathy on possible dementia  Improved with treatment of infected foot. Patient lacked capacity to refuse  "surgery, consent obtained from his son as proxy.     Infected right dorsal foot, ischemic ulceration  Podiatry consulted - s/p I&D and 4th and 5th amputation  Vascular surgery consulted - initially no intervention required  Continued vancomycin 1 g IV per random levels and Zosyn 2.25 g IV BID  Wound culture revealed pan-sensitive CITROBACTER KOSERI and KLEBSIELLA PNEUMONIAE ESBL  To OR 02/09/18. Consulted ID.  Plan for wound vac and 6 weeks ciprofloxacin. ABIs ordered to rule out need for BKA. Angiogram considered. Per vascular surgery " Unlikely for the patient to heal the wound despite aggressive limb salvage efforts. Due to significant lipodermatosclerosis, BKA is not good option. Patient likely needs AKA." BLE angiogram tentatively planned for 2/15.     Hypervolemia due to non-compliance with dialysis  Hyperkalemia, resolved  Shifted in ER with albuterol, insulin/destrose, bicarbonate. K 7.9--> 4  No respiratory compromise. Emergent dialysis 2/5/18 early AM and on 2/9  Daily weights - 18 kg weight loss since admission  Withhold anti-hypertensives for volume removal  Would recommend aggressive volume removal as it would be assist in wound healing from his BKA      DVT  BLE ultrasound  Apixaban 5 mg BID - ? Compliance at home  Apixaban held 2/12/2018 for possible angiogram on 2/15/18     DM II with HTN and CKD V  HgbA1c 4.6%  Likely controlled with declining intake  No further intervention needed at this time     Protein calorie malnutrtion  Hypoglycemia with associated NSVT  Poor prognosis for wound healing  Nepro with meals and at night  Ice cream at night with night time meds and sevelamer     Essential HTN - hold home hydralazine 50 mg TID and losartan 50 mg daily     DM II hyperlipidemia - atorvastatin 40 mg daily     DM II neuropathy - gabapentin 100 mg TID     ESRF HD TTS - Nephrology consulted for dialysis.  Withholding anti-hypertensives and added midodrine as needed prior to dialysis for volume removal " and reduce symptomatic dialysis-associated hypotension.   BP improving.     CKD MBD - continue cinacalcet 30 mg qhs and sevelamer 1600 mg TID daily     Depression - continue citalopram 10 mg daily. Consulted psychiatry.   Rule out delirium and dementia.  Back to baseline.    HIGH RISK CONDITION(S):   Patient has an abrupt change in neurologic status: delirium     DVT/ GI PPX: RODRI/SCDs. Apixaban on hold due to pending angiogram    Goals of Care: Curative    Disposition: long-term residential care      Montserrat Lnio MD

## 2018-02-14 NOTE — PROGRESS NOTES
Needled DIMAS AVF with 15g fistula needles x 2 without difficulties. Patient tolerated well, good blood return. Connected to dialysis machine and treatment initiated.

## 2018-02-14 NOTE — PHYSICIAN QUERY
PT Name: Wilder Carbajal  MR #: [id number]     Physician Query Form - Etiology of Condition Clarification      Terrence Raines RN CDS    Contact Information: [phone]  This form is a permanent document in the medical record.     Query Date: February 14, 2018    By submitting this query, we are merely seeking further clarification of documentation.  Please utilize your independent clinical judgment when addressing the question(s) below.     The medical record contains the following:    Findings Supporting Clinical Information Location in Medical Record   Hypervolemia   67-year-old male presenting to the ER with shortness of breath. Patient has a history of end-stage renal disease on hemodialysis. He missed &hemodialysis secondary to social issues. He is currently hypoxic which responds to nasal cannula  Independently Interpreted Readings:   Chest X-Ray: Cardiomegaly present. Increased vascular markings consistent with CHF are present.     He has had a productive cough for the past few days. He endorses chills,    In ED, CXR with signs of increased attenutation within the lung bases with depedent pleural fluid. Mild pulm vasc congestion    Oxygenation levels decrease to upper 80s with exertion- currently on 1 L O2 NC.   Additional lasix 40 mg IV given   - Daily weights, strict intake/output   - Continue PRN duonebs; Uses albuterol and atrovent at home   - Noncompliant with home meds   - Noted to have trouble sitting up in bed and lethargic on exam.     Pulmonary Hypertension  depressed left ventricular systolic function (EF 45-50%).   3 - Right ventricular enlargement with mildly depressed systolic function.   4 - Biatrial enlargement.   5 - Mild tricuspid regurgitation.   6 - Mild aortic stenosis, MARANDA = 1.4 cm2, peak velocity = 2.7 m/s, mean gradient = 15 mmHg.   7 - Pulmonary hypertension. The estimated PA systolic pressure is   ESRD(end stage renal disease on dialysis  Hypertension    Pt admitted to observation for  hypervolemia 2/2 noncompliance with outpatient HD. Nephrology consulted and appreciate assistance. HD 1/8 stopped prematurely per patient request. HD session again 1/9 and duonebs ordered for wheezing. 1/10: Repeated CXR (no change). HD 1/11 (-4.5L);   HD 1/13 (-4L). 6 min walk shows patient requiring oxygen.     H&P 1/7    ED MD note 1/6                  H&P 1/7                                                          DC summary 1/14       Please document your best medical opinion regarding the etiology of _Hypervolemia________________ for which the primary focus of treatment is/was directed.         [ x ] Hypervolemia associated with acute on chronic systolic heart failure    [  ] Hypervolemia associated with acute Pulmonary Edema with acute systolic heart failure    [  ] Fluid overload associated with acute systolic heart failure    [  ] Other hypervolemia (Specify)__________________________              [    ]  Clinically Undetermined    Please document in your progress notes daily for the duration of treatment, until resolved, and include in your discharge summary.

## 2018-02-14 NOTE — SUBJECTIVE & OBJECTIVE
"Interval History: On interview, the patient states that he is doing well and that his foot is not hurting much. Says that mood is good and that he now agrees that amputation was necessary (although he still did not want it). 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 Patient is sleeping well (approximately 8 hours), good appetite.     Family History     Problem Relation (Age of Onset)    Depression Child    Heart attack Mother    No Known Problems Father, Sister, Brother, Maternal Aunt, Maternal Uncle, Paternal Aunt, Paternal Uncle, Maternal Grandmother, Maternal Grandfather, Paternal Grandmother, Paternal Grandfather        Social History Main Topics    Smoking status: Never Smoker    Smokeless tobacco: Never Used    Alcohol use No    Drug use: No    Sexual activity: Not on file     Psychotherapeutics     Start     Stop Route Frequency Ordered    02/05/18 0900  citalopram tablet 10 mg      -- Oral Daily 02/04/18 1724           Review of Systems  Objective:     Vital Signs (Most Recent):  Temp: 97.8 °F (36.6 °C) (02/14/18 0859)  Pulse: (!) 56 (02/14/18 0859)  Resp: 17 (02/14/18 0859)  BP: (!) 120/59 (02/14/18 0859)  SpO2: 96 % (02/14/18 0859) Vital Signs (24h Range):  Temp:  [97.5 °F (36.4 °C)-98 °F (36.7 °C)] 97.8 °F (36.6 °C)  Pulse:  [50-58] 56  Resp:  [17-20] 17  SpO2:  [91 %-98 %] 96 %  BP: (120-139)/(58-67) 120/59     Height: 5' 8" (172.7 cm)  Weight: 100 kg (220 lb 7.4 oz)  Body mass index is 33.52 kg/m².      Intake/Output Summary (Last 24 hours) at 02/14/18 1030  Last data filed at 02/13/18 2112   Gross per 24 hour   Intake              280 ml   Output                0 ml   Net              280 ml       Physical Exam " "  Psychiatric:   Appearance:  male, appears older than stated age, obese, disheveled and bearded, dressed in hospital gown, eating breakfast  Behavior/Cooperation: friendly, cooperative, psychomotor retardation, lethargic, good eye contact   Speech: decreased rate, monotone, low volume, paucity of speech   Mood: "real good"  Affect: blunted affect, dysphoric  Thought Process: linear, coherent  Thought Content: hyper-Rastafarian, no visual/auditory hallucinations, no suicidal/homicidal ideations  Orientation: orientated to person, place and month/year  Memory: short term intact   Attention Span/Concentration:   Insight: impaired  Judgment: improving but still impaired         Significant Labs: All pertinent labs within the past 24 hours have been reviewed.    Significant Imaging: None  "

## 2018-02-14 NOTE — PROGRESS NOTES
Applied Kerlix and ace loosely up the leg, and elevated bed at leg.  Notified nurse to keep right leg elevated to help control edema.

## 2018-02-14 NOTE — ASSESSMENT & PLAN NOTE
Nutrition Problem  Increased nutrient needs    Related to (etiology):   Wound healing    Signs and Symptoms (as evidenced by):   Non-healing Infected lower extremities and ischemic ulceration    Interventions/Recommendations (treatment strategy):  See recs from RD note 2/14    Nutrition Diagnosis Status:   New

## 2018-02-14 NOTE — SUBJECTIVE & OBJECTIVE
Interval Hx: s/p Right 4th and 5th ray amputation (DOS: 1/9/18 per Dr. Matamoros).  Patient pain controlled. Dressing intact right foot. Pt. Requesting to go home.     Scheduled Meds:   sodium chloride 0.9%   Intravenous Once    ammonium lactate   Topical (Top) BID    aspirin  81 mg Oral Daily    atorvastatin  20 mg Oral Daily    cinacalcet  30 mg Oral QHS    ciprofloxacin HCl  500 mg Oral Daily    citalopram  10 mg Oral Daily    gabapentin  100 mg Oral TID    miconazole nitrate 2%   Topical (Top) BID    midodrine  10 mg Oral Every Mon, Wed, Fri    senna-docusate 8.6-50 mg  2 tablet Oral BID    sevelamer carbonate  1,600 mg Oral TID WM    sevelamer carbonate  800 mg Oral QHS     Continuous Infusions:  PRN Meds:sodium chloride 0.9%, sodium chloride 0.9%, sodium chloride 0.9%, acetaminophen, albumin human 25%, dextrose 50%, dextrose 50%, glucagon (human recombinant), glucose, glucose, ondansetron, oxyCODONE, sodium chloride 0.9%    Review of patient's allergies indicates:  No Known Allergies     Past Medical History:   Diagnosis Date    Acute pain of left shoulder 1/7/2017    Arthritis     Asthma     Benign neoplasm of eyelid     RUL    Blood clotting tendency     Blood transfusion     Cataract     Chronic kidney disease requiring chronic dialysis     Diabetes mellitus     Diabetic retinopathy     Fever blister     Hyperlipidemia     Hypertension     Infertility     Joint pain     Retinal detachment     Thyroid disease     Weakness 1/7/2017     Past Surgical History:   Procedure Laterality Date    CATARACT EXTRACTION      COLONOSCOPY N/A 6/23/2017    Procedure: COLONOSCOPY;  Surgeon: Vic Clark MD;  Location: [address];  Service: Endoscopy;  Laterality: N/A;    RETINAL DETACHMENT SURGERY         Family History     Problem Relation (Age of Onset)    Heart attack Mother    No Known Problems Father, Sister, Brother, Maternal Aunt, Maternal Uncle, Paternal Aunt, Paternal Uncle,  Maternal Grandmother, Maternal Grandfather, Paternal Grandmother, Paternal Grandfather        Social History Main Topics    Smoking status: Never Smoker    Smokeless tobacco: Never Used    Alcohol use No    Drug use: No    Sexual activity: Not on file     Review of Systems   Constitutional: Positive for activity change.   Respiratory: Negative for shortness of breath.    Cardiovascular: Negative for chest pain and leg swelling.   Gastrointestinal: Negative.  Negative for nausea and vomiting.   Genitourinary: Negative.    Musculoskeletal: Positive for arthralgias.   Skin: Positive for wound.   Neurological: Positive for numbness. Negative for weakness.     Objective:     Vital Signs (Most Recent):  Temp: 97.8 °F (36.6 °C) (02/14/18 0859)  Pulse: 62 (02/14/18 1100)  Resp: 17 (02/14/18 0859)  BP: (!) 120/59 (02/14/18 0859)  SpO2: 96 % (02/14/18 0859) Vital Signs (24h Range):  Temp:  [97.5 °F (36.4 °C)-97.8 °F (36.6 °C)] 97.8 °F (36.6 °C)  Pulse:  [52-62] 62  Resp:  [17-20] 17  SpO2:  [91 %-98 %] 96 %  BP: (120-139)/(58-67) 120/59     Weight: 100 kg (220 lb 7.4 oz)  Body mass index is 33.52 kg/m².    Foot Exam    General  Orientation: disoriented       Right Foot/Ankle     Inspection and Palpation  Tenderness: none   Swelling: metatarsals   Skin Exam: blister, maceration, cellulitis, skin changes and ulcer;     Neurovascular  Dorsalis pedis: 1+  Posterior tibial: 1+  Saphenous nerve sensation: diminished  Tibial nerve sensation: diminished  Superficial peroneal nerve sensation: diminished  Deep peroneal nerve sensation: diminished  Sural nerve sensation: diminished      Left Foot/Ankle      Inspection and Palpation  Tenderness: none   Swelling: metatarsals   Skin Exam: skin changes and ulcer;     Neurovascular  Dorsalis pedis: 1+  Posterior tibial: 1+  Saphenous nerve sensation: diminished  Tibial nerve sensation: diminished  Superficial peroneal nerve sensation: diminished  Deep peroneal nerve sensation:  diminished  Sural nerve sensation: diminished        2/14/18: Dusky 2nd and 3rd digits.    Wound 1: Right dorsal foot ulceration   Measurement:[id number]  Base: granular, necrotic tissue alond plantar 3rd ray  Periwound skin: viable  Drainage: serosanguinous  Erythema: none  Probe probes to bone 4th and 5th metatarsal, extensor tendons exposed                              Wound 2: Right heel ulceration   Measurement: 5eyp9kiz3.1cm  Base:  necrotic eschar base  Periwound skin: hyperkeratosis   Drainage:none  Probe: none, no bone exposed                          Left foot ulceration noted to dorsal foot 2/14/18              Wound 1: Left dorsal foot   Measurement: 5mpi0tjv0.1cm.  Base: fibrous base  Periwound skin: mild erythema,   Drainage: none  Erythema: mild  Probe: none, no bone exposed    2/14/18            Wound 1: Left heel ulceration   Measurement: 5clm9gdi2.1cm.  Periwound skin: callus  Drainage: none  Erythema:mild   Probe: none, no bone exposed            Laboratory:  Xray: Right: There is DJD, soft tissue swelling, a calcaneal spur, and diabetic vascular calcifications. No fracture dislocation bone destruction seen.    Left: There is DJD, diabetic vascular calcifications, and Spurs on the calcaneus. No fracture dislocation bone destruction seen.    MRI: 1. Limited, incomplete nondiagnostic examination secondary to early termination at the of the request of the patient.    2. Linear T1 hypointensity traversing the third metacarpal head, concerning for possible nondisplaced, likely subacute fracture.  Correlation with point tenderness recommended.    PHILLIP: Impression:   Right Leg:Segmental pressures and PVR waveforms suggest mild to moderate peripheral arterial occlusive disease.     Left Leg:Segmental pressures and PVR waveforms suggest mild to moderate peripheral arterial occlusive disease.        Diagnostic Results:  X-Ray: I have reviewed all pertinent results/findings within the past 24 hours.   reviewed    Clinical Findings:

## 2018-02-14 NOTE — PLAN OF CARE
Plan for RLE angio per vascular surgery on 2/15, d/c dispo will likely be skilled in NH once approved by [facility].  Updates sent, will follow.    Kristy Mallory RN  Case Management  [phone]

## 2018-02-14 NOTE — ASSESSMENT & PLAN NOTE
Wilder Carbajal is a 67 y.o. 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 Matamoros), concern for dusky appearance of 2nd and 3rd digits, and necrotic heel wound.  Pt. At high risk for limb loss at this time.   - Plan for RLE angio per vascular surgery on Thursday 2/15  -  Dressed with betadine, 4x4's, lightly dressed with kerlix and ace.    -ID on board, appreciate recs  -Podiatry will follow.    Weightbearing Status: NWB  Offloading Device: heel protector boot      Ángela Marks DPM PGY-3  Pager: [phone]

## 2018-02-14 NOTE — PROGRESS NOTES
[facility]  Infectious Disease  Progress Note    Patient Name: Wilder Carbajal  MRN: [id number]  Admission Date: 2/4/2018  Length of Stay: 10 days  Attending Physician: Montserrat Lino MD  Primary Care Provider: Dio Roberson MD    Isolation Status: Contact  Assessment/Plan:      Osteomyelitis    67 year old male with hx of ESRD on HD, DM2, CHF admitted with AMS, fluid overload and right necrotic heal ulcer with gangrene of 4th and 5th digits. He was started on empiric Vanc and Zosyn. Patient underwent washout and amputation of 4th and 5th digits on 2/6 and again 2/9. Bone culture grew Citrobacter and ESBL Klebsiella pneumoniae. Fevers resolved. Leukocytosis trending down. Podiatry and vascular surgery following. Patient with severe tissue loss to right foot with evidence of tissue necrosis and dusky appearance to digits. Vascular surgery has scheduled RLE angiogram tomorrow. He may require AKA.        Plan:  - Continue Ciprofloxacin 500 mg PO q 24 hours (HD dosing).  - ESR, CRP, CBC and CMP will need to be drawn weekly with results faxed to the ID Department at [phone]  - RLE angiogram scheduled for tomorrow. Patient may require AKA.  - Anticipate 6 weeks of antibiotics for osteomyelitis pending further surgical intervention. If patient goes for AKA, source control would be achieved and further antibiotic therapy would not be indicated. If this is the case, please re-consult ID for final antibiotic recommendations.  - ID will sign off.             Please call for any questions. Thank you.  Mary Moss PA-C  Phone: [phone]  Pager: [phone]    Subjective:     Principal Problem:Septic encephalopathy    HPI: Patient is a 66 y/o male with ESRD on dialysis TTS  who presented with leg swelling for two weeks. He states he has had associated blisters on his legs with fluid weeping out of them. Patient stated he missed dialysis yesterday (Saturday) because his [facility] transportation did not arrive. He  denies fevers, chills, URI symptoms, or diarrhea. His son called EMS. Patient was admitted 1/7-1/14 for volume overload due to HD non-compliance. He was still above dry weight and hypoxic when he left AMA. Patient maintaining that he has been complaint with dialysis since until yesterday.    Patient is a poor historian and falling asleep during my exam. Patient was found to have necrotic tissue to the right 4th and 5th digit. MRI was attempted but patient was unable to be still for the exam. He is now s/p amputation of 4th and 5th toes. OR cultures are pending. Wound cultures are positive for citrobacter koseri. He is currently on vancomycin and zosyn.  Interval History: Patient seen at bedside. No new complaints today. Pain well controlled. Denies systemic signs of infection. Wound cx + Citrobacter. Bone cx + ESBL Klebsiella and Citrobacter. Tolerating ciprofloxacin well. Scheduled for angiogram tomorrow.     Review of Systems   Constitutional: Negative for chills, diaphoresis and fever.   Respiratory: Negative for shortness of breath.    Cardiovascular: Negative for chest pain and leg swelling.   Gastrointestinal: Positive for diarrhea (2x overnight). Negative for abdominal pain, nausea and vomiting.   Genitourinary: Negative.  Negative for flank pain and hematuria.   Musculoskeletal: Positive for arthralgias.   Skin: Positive for wound.   Neurological: Positive for numbness. Negative for weakness.     Objective:     Vital Signs (Most Recent):  Temp: 97.8 °F (36.6 °C) (02/14/18 0859)  Pulse: 62 (02/14/18 1100)  Resp: 17 (02/14/18 0859)  BP: (!) 120/59 (02/14/18 0859)  SpO2: 96 % (02/14/18 0859) Vital Signs (24h Range):  Temp:  [97.5 °F (36.4 °C)-97.8 °F (36.6 °C)] 97.8 °F (36.6 °C)  Pulse:  [52-62] 62  Resp:  [17-20] 17  SpO2:  [91 %-98 %] 96 %  BP: (120-139)/(58-67) 120/59     Weight: 100 kg (220 lb 7.4 oz)  Body mass index is 33.52 kg/m².    Estimated Creatinine Clearance: 13.5 mL/min (based on SCr of 6 mg/dL  (H)).    Physical Exam   Constitutional: He is oriented to person, place, and time. He appears well-developed and well-nourished. No distress.   Cardiovascular: Normal rate and regular rhythm.    Murmur heard.  Pulmonary/Chest: Effort normal and breath sounds normal. No respiratory distress.   Abdominal: Soft. Bowel sounds are normal. He exhibits no distension.   Musculoskeletal: He exhibits no edema.   Neurological: He is alert and oriented to person, place, and time.   Skin: Skin is warm and dry.   Bilateral feet dressed. Dressing c/d/i. Chronic venous stasis changes to BLE   Psychiatric: He has a normal mood and affect. His behavior is normal.       Significant Labs:   Blood Culture:     Recent Labs  Lab 02/04/18  1223 02/04/18  1423   LABBLOO No growth after 5 days. No growth after 5 days.     CBC:     Recent Labs  Lab 02/13/18  0423 02/14/18  0608   WBC 13.94* 13.77*   HGB 7.6* 7.5*   HCT 23.3* 22.9*    194     CMP:     Recent Labs  Lab 02/13/18  0423 02/14/18  0608   * 134*   K 5.0 4.5   CL 99 98   CO2 21* 25   GLU 48* 72   BUN 38* 45*   CREATININE 5.5* 6.0*   CALCIUM 8.1* 8.2*   ALBUMIN 1.5* 1.5*   ANIONGAP 12 11   EGFRNONAA 9.8* 8.8*     HIV Rapid: No results for input(s): HIVRAPID in the last 48 hours.  Lactic Acid: No results for input(s): LACTATE in the last 48 hours.  Procalcitonin: No results for input(s): PROCAL in the last 48 hours.  Quantiferon: No results for input(s): NIL, TBAG, TBAGNIL, MITOGENNIL, TBGOLD in the last 48 hours.  Respiratory Culture: No results for input(s): GSRESP, RESPIRATORYC in the last 4320 hours.  Urine Culture: No results for input(s): LABURIN in the last 4320 hours.  Urine Studies: No results for input(s): COLORU, APPEARANCEUA, PHUR, SPECGRAV, PROTEINUA, GLUCUA, KETONESU, BILIRUBINUA, OCCULTUA, NITRITE, UROBILINOGEN, LEUKOCYTESUR, RBCUA, WBCUA, BACTERIA, SQUAMEPITHEL, HYALINECASTS in the last 4320 hours.    Invalid input(s): SHASHA  Wound Culture:     Recent  Labs  Lab 02/05/18  0820 02/06/18  1329 02/09/18  0908   LABAERO CITROBACTER KOSERIMany CITROBACTER KOSERIModerate KLEBSIELLA PNEUMONIAE ESBLFew  CITROBACTER KOSERIFew       Significant Imaging: I have reviewed all pertinent imaging results/findings within the past 24 hours.

## 2018-02-14 NOTE — PROGRESS NOTES
Removed 15g Cannula x 2 from DIMAS AVF. Pressure held with gauze x 7 minutes. Hemostasis achieved and dressed with fresh guaze and paper tape. Patient tolerated well.

## 2018-02-14 NOTE — PROGRESS NOTES
Patient left unit via stretcher and hospital transportation to return to his room. Safely in stretcher with side rails up. Report called to his nurse Grazyna.

## 2018-02-14 NOTE — CONSULTS
"  [facility]  Adult Nutrition  Consult Note    SUMMARY     Recommendations    Recommendation/Intervention:   1. Continue current renal diet, fluid-800 mL.   2. Encourage PO intake and nutrition supplement (Novasource Renal) to meet EEN & EPN.   3. RD to monitor and follow-up.     Goals: Adequate PO intake >85% EEN and EPN  Nutrition Goal Status: new  Communication of RD Recs: reviewed with RN    Reason for Assessment    Reason for Assessment: length of stay  Diagnosis: infection/sepsis (Septic encephalopathy)  Relavent Medical History: Diabetic retinopathy, T2DM, HTN, stage 5 CKD (requiring chronic dialysis), hyperlipidemia, thyroid disease, ESRD, possible dementia, hypervolemia     General Information Comments: Pt disoriented in room with nurse changing dressings on pt's feet. Pt reported appetite was "pretty good" and that he was eating about 75% of food on trays. Pt reported food from kitchen was "some really good and some not so good" meals. Pt stated Novasource Renal was "giving him diarrhea" so he was not drinking them. RD encouraged pt to have a few sips of the Novasource Renal supplement with meds at night to avoid morning hypoglycemia.    Nutrition Discharge Planning: Adequate PO intake >75%    Nutrition Prescription Ordered    Current Diet Order: Renal diet  Nutrition Order Comments: Fluid restriction-800mL  Oral Nutrition Supplement: Novasource Renal     Evaluation of Received Nutrients/Fluid Intake    I/O: -3.0 L  Comments: LBM 2/13     % Intake of Estimated Energy Needs: 50 - 75 %  % Meal Intake: 50%     Nutrition Risk Screen     Nutrition Risk Screen: no indicators present    Nutrition/Diet History    Patient Reported Diet/Restrictions/Preferences: general                               Labs/Tests/Procedures/Meds       Pertinent Labs Reviewed: reviewed  Pertinent Labs Comments: Hbg 7.5, Hct 22.9, Na 132, CO2 21, BUN 38, Crt 5.5, eGFR 11.3, Glu 48, Ca 8.1, Alb 1.5  Pertinent " "Medications Reviewed: reviewed  Pertinent Medications Comments: Statin, ciprofloxacin    Physical Findings    Overall Physical Appearance: obese        Skin: non-healing wound(s)    Anthropometrics    Temp: 97.8 °F (36.6 °C)     Height: 5' 8" (172.7 cm)  Weight Method: Bed Scale  Weight: 100 kg (220 lb 7.4 oz)  Ideal Body Weight (IBW), Male: 154 lb     % Ideal Body Weight, Male (lb): 143.16 lb     BMI (Calculated): 33.6  BMI Grade: 30 - 34.9- obesity - grade I      Estimated/Assessed Needs    Weight Used For Calorie Calculations: 100 kg (220 lb 7.4 oz)   Energy Calorie Requirements (kcal): 2181 kcal/d (1.25 PAL)  Energy Need Method: [place]-St Jeor  RMR ([place]-St. Jeor Equation): 1744.5  Weight Used For Protein Calculations: 100 kg (220 lb 7.4 oz)  Protein Requirements: 110 g/d (1.1 g/kg)  Fluid Requirements (mL): 1 mL/kcal or per MD  RDA Method (mL): 2181    Assessment and Plan    * Septic encephalopathy    Nutrition Problem  Increased nutrient needs    Related to (etiology):   Wound healing    Signs and Symptoms (as evidenced by):   Non-healing infected lower extremities and ischemic ulceration    Interventions/Recommendations (treatment strategy):  See recs from RD note 2/14    Nutrition Diagnosis Status:   New        Monitor and Evaluation    Food and Nutrient Intake: energy intake, food and beverage intake  Food and Nutrient Adminstration: diet order  Physical Activity and Function: nutrition-related ADLs and IADLs  Anthropometric Measurements: weight, weight change  Biochemical Data, Medical Tests and Procedures: electrolyte and renal panel, gastrointestinal profile, glucose/endocrine profile, inflammatory profile, lipid profile  Nutrition-Focused Physical Findings: overall appearance    Nutrition Risk    Level of Risk:  (1x/week)    Nutrition Follow-Up    RD Follow-up?: Yes    I certify that I directed the dietetic intern in service delivery and guided them using my skilled judgment. As the cosigning " dietitian, I have reviewed the dietetic interns documentation and am responsible for the treatment, assessment, and plan.  BRITTANY Lebron

## 2018-02-14 NOTE — ASSESSMENT & PLAN NOTE
- as evidenced by acute change from baseline, impaired attention, disorganized thinking  - most likely cause is sepsis, however anemia could also be contributing.  - things like depression and dementia cannot be reliably diagnosed in the setting of delirium, but it seems like most of what he is experiencing in terms of mood is related to his situation (failing health, limited social support, etc) rather than a clinical depression. Most appropriate intervention would seem to be optimizing physical health and getting him to a place where he can get better care and support (such as an [facility] or nursing home)  - regarding capacity, the patient has been inconsistent with making this decision with no legitimate explanation for why he is doing this. He is also not able to explain any of the risks of the infection if amputation is not performed, and demonstrated different levels of understanding to our different team members today. Therefore he does NOT have capacity to make this decision right now. If any more surgeries are needed, then please proceed with the use of a proxy decision-maker

## 2018-02-14 NOTE — PROGRESS NOTES
[facility]  Podiatry  Progress Note    Patient Name: Wilder Carbajal  MRN: [id number]  Admission Date: 2/4/2018  Hospital Length of Stay: 10 days  Attending Physician: Montserrat Lino MD  Primary Care Provider: Dio Roberson MD   Interval Hx: s/p Right 4th and 5th ray amputation (DOS: 1/9/18 per Dr. Matamoros).  Patient pain controlled. Dressing intact right foot. Pt. Requesting to go home.     Scheduled Meds:   sodium chloride 0.9%   Intravenous Once    ammonium lactate   Topical (Top) BID    aspirin  81 mg Oral Daily    atorvastatin  20 mg Oral Daily    cinacalcet  30 mg Oral QHS    ciprofloxacin HCl  500 mg Oral Daily    citalopram  10 mg Oral Daily    gabapentin  100 mg Oral TID    miconazole nitrate 2%   Topical (Top) BID    midodrine  10 mg Oral Every Mon, Wed, Fri    senna-docusate 8.6-50 mg  2 tablet Oral BID    sevelamer carbonate  1,600 mg Oral TID WM    sevelamer carbonate  800 mg Oral QHS     Continuous Infusions:  PRN Meds:sodium chloride 0.9%, sodium chloride 0.9%, sodium chloride 0.9%, acetaminophen, albumin human 25%, dextrose 50%, dextrose 50%, glucagon (human recombinant), glucose, glucose, ondansetron, oxyCODONE, sodium chloride 0.9%    Review of patient's allergies indicates:  No Known Allergies     Past Medical History:   Diagnosis Date    Acute pain of left shoulder 1/7/2017    Arthritis     Asthma     Benign neoplasm of eyelid     RUL    Blood clotting tendency     Blood transfusion     Cataract     Chronic kidney disease requiring chronic dialysis     Diabetes mellitus     Diabetic retinopathy     Fever blister     Hyperlipidemia     Hypertension     Infertility     Joint pain     Retinal detachment     Thyroid disease     Weakness 1/7/2017     Past Surgical History:   Procedure Laterality Date    CATARACT EXTRACTION      COLONOSCOPY N/A 6/23/2017    Procedure: COLONOSCOPY;  Surgeon: Vic Clark MD;  Location: [address]);  Service:  Endoscopy;  Laterality: N/A;    RETINAL DETACHMENT SURGERY         Family History     Problem Relation (Age of Onset)    Heart attack Mother    No Known Problems Father, Sister, Brother, Maternal Aunt, Maternal Uncle, Paternal Aunt, Paternal Uncle, Maternal Grandmother, Maternal Grandfather, Paternal Grandmother, Paternal Grandfather        Social History Main Topics    Smoking status: Never Smoker    Smokeless tobacco: Never Used    Alcohol use No    Drug use: No    Sexual activity: Not on file     Review of Systems   Constitutional: Positive for activity change.   Respiratory: Negative for shortness of breath.    Cardiovascular: Negative for chest pain and leg swelling.   Gastrointestinal: Negative.  Negative for nausea and vomiting.   Genitourinary: Negative.    Musculoskeletal: Positive for arthralgias.   Skin: Positive for wound.   Neurological: Positive for numbness. Negative for weakness.     Objective:     Vital Signs (Most Recent):  Temp: 97.8 °F (36.6 °C) (02/14/18 0859)  Pulse: 62 (02/14/18 1100)  Resp: 17 (02/14/18 0859)  BP: (!) 120/59 (02/14/18 0859)  SpO2: 96 % (02/14/18 0859) Vital Signs (24h Range):  Temp:  [97.5 °F (36.4 °C)-97.8 °F (36.6 °C)] 97.8 °F (36.6 °C)  Pulse:  [52-62] 62  Resp:  [17-20] 17  SpO2:  [91 %-98 %] 96 %  BP: (120-139)/(58-67) 120/59     Weight: 100 kg (220 lb 7.4 oz)  Body mass index is 33.52 kg/m².    Foot Exam    General  Orientation: disoriented       Right Foot/Ankle     Inspection and Palpation  Tenderness: none   Swelling: metatarsals   Skin Exam: blister, maceration, cellulitis, skin changes and ulcer;     Neurovascular  Dorsalis pedis: 1+  Posterior tibial: 1+  Saphenous nerve sensation: diminished  Tibial nerve sensation: diminished  Superficial peroneal nerve sensation: diminished  Deep peroneal nerve sensation: diminished  Sural nerve sensation: diminished      Left Foot/Ankle      Inspection and Palpation  Tenderness: none   Swelling: metatarsals   Skin Exam:  skin changes and ulcer;     Neurovascular  Dorsalis pedis: 1+  Posterior tibial: 1+  Saphenous nerve sensation: diminished  Tibial nerve sensation: diminished  Superficial peroneal nerve sensation: diminished  Deep peroneal nerve sensation: diminished  Sural nerve sensation: diminished        2/14/18: Dusky 2nd and 3rd digits.    Wound 1: Right dorsal foot ulceration   Measurement:[id number]  Base: granular, necrotic tissue alond plantar 3rd ray  Periwound skin: viable  Drainage: serosanguinous  Erythema: none  Probe probes to bone 4th and 5th metatarsal, extensor tendons exposed                              Wound 2: Right heel ulceration   Measurement: 2aty3aoh8.1cm  Base:  necrotic eschar base  Periwound skin: hyperkeratosis   Drainage:none  Probe: none, no bone exposed                          Left foot ulceration noted to dorsal foot 2/14/18              Wound 1: Left dorsal foot   Measurement: 6piv6zpf7.1cm.  Base: fibrous base  Periwound skin: mild erythema,   Drainage: none  Erythema: mild  Probe: none, no bone exposed    2/14/18            Wound 1: Left heel ulceration   Measurement: 2kdb6bor0.1cm.  Periwound skin: callus  Drainage: none  Erythema:mild   Probe: none, no bone exposed            Laboratory:  Xray: Right: There is DJD, soft tissue swelling, a calcaneal spur, and diabetic vascular calcifications. No fracture dislocation bone destruction seen.    Left: There is DJD, diabetic vascular calcifications, and Spurs on the calcaneus. No fracture dislocation bone destruction seen.    MRI: 1. Limited, incomplete nondiagnostic examination secondary to early termination at the of the request of the patient.    2. Linear T1 hypointensity traversing the third metacarpal head, concerning for possible nondisplaced, likely subacute fracture.  Correlation with point tenderness recommended.    PHILLIP: Impression:   Right Leg:Segmental pressures and PVR waveforms suggest mild to moderate peripheral arterial  occlusive disease.     Left Leg:Segmental pressures and PVR waveforms suggest mild to moderate peripheral arterial occlusive disease.        Diagnostic Results:  X-Ray: I have reviewed all pertinent results/findings within the past 24 hours.  reviewed    Clinical Findings:      Assessment/Plan:     Foot ulceration, right, with fat layer exposed    Wilder Carbajal is a 67 y.o. 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 Matamoros), concern for dusky appearance of 2nd and 3rd digits, and necrotic heel wound.  Pt. At high risk for limb loss at this time.   - Plan for RLE angio per vascular surgery on Thursday 2/15  -  Dressed with betadine, 4x4's, lightly dressed with kerlix and ace.    -ID on board, appreciate recs  -Podiatry will follow.    Weightbearing Status: NWB  Offloading Device: heel protector boot      Ángela Marks DPM PGY-3  Pager: [phone]          ESRD (end stage renal disease)    Per Primary           Type 2 diabetes mellitus with stage 5 chronic kidney disease and hypertension    Per Primary               Ángela Marks MD  Podiatry  [facility]

## 2018-02-14 NOTE — ASSESSMENT & PLAN NOTE
67 year old male with hx of ESRD on HD, DM2, CHF admitted with AMS, fluid overload and right necrotic heal ulcer with gangrene of 4th and 5th digits. He was started on empiric Vanc and Zosyn. Patient underwent washout and amputation of 4th and 5th digits on 2/6 and again 2/9. Bone culture grew Citrobacter and ESBL Klebsiella pneumoniae. Fevers resolved. Leukocytosis trending down. Podiatry and vascular surgery following. Patient with severe tissue loss to right foot with evidence of tissue necrosis and dusky appearance to digits. Vascular surgery has scheduled RLE angiogram tomorrow. He may require AKA.        Plan:  - Continue Ciprofloxacin 500 mg PO q 24 hours (HD dosing).  - RLE angiogram scheduled for tomorrow. Patient may require AKA.  - Anticipate 6 weeks of antibiotics for osteomyelitis pending further surgical intervention. If patient goes for AKA, source control would be achieved and further antibiotic therapy would not be indicated.   - ID will sign off. Please re-consult ID as needed.

## 2018-02-14 NOTE — PLAN OF CARE
Problem: Diabetes, Type 2 (Adult)  Goal: Signs and Symptoms of Listed Potential Problems Will be Absent, Minimized or Managed (Diabetes, Type 2)  Signs and symptoms of listed potential problems will be absent, minimized or managed by discharge/transition of care (reference Diabetes, Type 2 (Adult) CPG).   Outcome: Ongoing (interventions implemented as appropriate)   02/14/18 8769   Diabetes, Type 2   Problems Assessed (Type 2 Diabetes) all   Problems Present (Type 2 Diabetes) situational response       Problem: Patient Care Overview  Goal: Plan of Care Review  Outcome: Ongoing (interventions implemented as appropriate)  Pt escorted to dialysis via stretcher, woundcare to buttocks performed, woundcare to BLE performed by podiatry, no distress noted, awaiting pt's return from dialysis

## 2018-02-14 NOTE — MEDICAL/APP STUDENT
"2/14/2018 8:35 AM   Wilder Carbajal   1950   [id number]        Psychiatry Progress Note     SUBJECTIVE:   Patient seen and examined in the patient's room by Veronica Drew.     The patient was lying down in bed, had his eyes closed. Started to eat breakfast during the interview.     He was orientated to person, place and month/year. Stated he was sleepy and drowsy lately, most likely due to the medications, but is feeling "real good." Reported no pain in his foot. Patient is sleeping well (approximately 8 hours), good appetite.     He stated he initially felt discouraged about the surgery, but he thought about it and what God do/want and he is coming to terms with the outcomes. He said that he understands that if he didn't proceed with the amputation of the toes he could have lost his whole leg. He is praying and hears God telling him to let the doctors cut up to the knee as "God will look after his children."        Pain: ***/10     Current Medications:   Scheduled Meds:    sodium chloride 0.9%   Intravenous Once    ammonium lactate   Topical (Top) BID    aspirin  81 mg Oral Daily    atorvastatin  20 mg Oral Daily    cinacalcet  30 mg Oral QHS    ciprofloxacin HCl  500 mg Oral Daily    citalopram  10 mg Oral Daily    gabapentin  100 mg Oral TID    miconazole nitrate 2%   Topical (Top) BID    midodrine  10 mg Oral Every Mon, Wed, Fri    senna-docusate 8.6-50 mg  2 tablet Oral BID    sevelamer carbonate  1,600 mg Oral TID WM      PRN Meds: sodium chloride 0.9%, sodium chloride 0.9%, sodium chloride 0.9%, acetaminophen, albumin human 25%, dextrose 50%, dextrose 50%, glucagon (human recombinant), glucose, glucose, ondansetron, oxyCODONE, sodium chloride 0.9%   Psychotherapeutics     Start     Stop Route Frequency Ordered    02/05/18 0900  citalopram tablet 10 mg      -- Oral Daily 02/04/18 1724          Allergies:   Review of patient's allergies indicates:  No Known Allergies     OBJECTIVE:   Vitals "   Vitals:    02/14/18 0859   BP: (!) 120/59   Pulse: (!) 56   Resp: 17   Temp: 97.8 °F (36.6 °C)        Labs/Imaging/Studies:   Recent Results (from the past 36 hour(s))   Renal function panel    Collection Time: 02/13/18  4:23 AM   Result Value Ref Range    Glucose 48 (LL) 70 - 110 mg/dL    Sodium 132 (L) 136 - 145 mmol/L    Potassium 5.0 3.5 - 5.1 mmol/L    Chloride 99 95 - 110 mmol/L    CO2 21 (L) 23 - 29 mmol/L    BUN, Bld 38 (H) 8 - 23 mg/dL    Calcium 8.1 (L) 8.7 - 10.5 mg/dL    Creatinine 5.5 (H) 0.5 - 1.4 mg/dL    Albumin 1.5 (L) 3.5 - 5.2 g/dL    Phosphorus 4.6 (H) 2.7 - 4.5 mg/dL    eGFR if  11.3 (A) >60 mL/min/1.73 m^2    eGFR if non  9.8 (A) >60 mL/min/1.73 m^2    Anion Gap 12 8 - 16 mmol/L   Magnesium    Collection Time: 02/13/18  4:23 AM   Result Value Ref Range    Magnesium 2.3 1.6 - 2.6 mg/dL   CBC auto differential    Collection Time: 02/13/18  4:23 AM   Result Value Ref Range    WBC 13.94 (H) 3.90 - 12.70 K/uL    RBC 2.64 (L) 4.60 - 6.20 M/uL    Hemoglobin 7.6 (L) 14.0 - 18.0 g/dL    Hematocrit 23.3 (L) 40.0 - 54.0 %    MCV 88 82 - 98 fL    MCH 28.8 27.0 - 31.0 pg    MCHC 32.6 32.0 - 36.0 g/dL    RDW 17.2 (H) 11.5 - 14.5 %    Platelets 195 150 - 350 K/uL    MPV 10.5 9.2 - 12.9 fL    Immature Granulocytes 1.6 (H) 0.0 - 0.5 %    Gran # (ANC) 11.2 (H) 1.8 - 7.7 K/uL    Immature Grans (Abs) 0.22 (H) 0.00 - 0.04 K/uL    Lymph # 1.2 1.0 - 4.8 K/uL    Mono # 1.2 (H) 0.3 - 1.0 K/uL    Eos # 0.2 0.0 - 0.5 K/uL    Baso # 0.04 0.00 - 0.20 K/uL    nRBC 0 0 /100 WBC    Gran% 79.9 (H) 38.0 - 73.0 %    Lymph% 8.5 (L) 18.0 - 48.0 %    Mono% 8.6 4.0 - 15.0 %    Eosinophil% 1.1 0.0 - 8.0 %    Basophil% 0.3 0.0 - 1.9 %    Differential Method Automated    POCT glucose    Collection Time: 02/13/18  5:56 AM   Result Value Ref Range    POCT Glucose 53 (L) 70 - 110 mg/dL   POCT glucose    Collection Time: 02/13/18  6:35 AM   Result Value Ref Range    POCT Glucose 76 70 - 110 mg/dL   POCT  glucose    Collection Time: 02/13/18  7:58 AM   Result Value Ref Range    POCT Glucose 78 70 - 110 mg/dL   POCT glucose    Collection Time: 02/13/18 11:51 AM   Result Value Ref Range    POCT Glucose 83 70 - 110 mg/dL   POCT glucose    Collection Time: 02/13/18  4:06 PM   Result Value Ref Range    POCT Glucose 103 70 - 110 mg/dL   POCT glucose    Collection Time: 02/13/18 10:40 PM   Result Value Ref Range    POCT Glucose 109 70 - 110 mg/dL   Renal function panel    Collection Time: 02/14/18  6:08 AM   Result Value Ref Range    Glucose 72 70 - 110 mg/dL    Sodium 134 (L) 136 - 145 mmol/L    Potassium 4.5 3.5 - 5.1 mmol/L    Chloride 98 95 - 110 mmol/L    CO2 25 23 - 29 mmol/L    BUN, Bld 45 (H) 8 - 23 mg/dL    Calcium 8.2 (L) 8.7 - 10.5 mg/dL    Creatinine 6.0 (H) 0.5 - 1.4 mg/dL    Albumin 1.5 (L) 3.5 - 5.2 g/dL    Phosphorus 4.6 (H) 2.7 - 4.5 mg/dL    eGFR if  10.2 (A) >60 mL/min/1.73 m^2    eGFR if non  8.8 (A) >60 mL/min/1.73 m^2    Anion Gap 11 8 - 16 mmol/L   Magnesium    Collection Time: 02/14/18  6:08 AM   Result Value Ref Range    Magnesium 2.1 1.6 - 2.6 mg/dL   CBC auto differential    Collection Time: 02/14/18  6:08 AM   Result Value Ref Range    WBC 13.77 (H) 3.90 - 12.70 K/uL    RBC 2.53 (L) 4.60 - 6.20 M/uL    Hemoglobin 7.5 (L) 14.0 - 18.0 g/dL    Hematocrit 22.9 (L) 40.0 - 54.0 %    MCV 91 82 - 98 fL    MCH 29.6 27.0 - 31.0 pg    MCHC 32.8 32.0 - 36.0 g/dL    RDW 17.4 (H) 11.5 - 14.5 %    Platelets 194 150 - 350 K/uL    MPV 9.8 9.2 - 12.9 fL    Immature Granulocytes 1.5 (H) 0.0 - 0.5 %    Gran # (ANC) 10.8 (H) 1.8 - 7.7 K/uL    Immature Grans (Abs) 0.20 (H) 0.00 - 0.04 K/uL    Lymph # 1.3 1.0 - 4.8 K/uL    Mono # 1.2 (H) 0.3 - 1.0 K/uL    Eos # 0.2 0.0 - 0.5 K/uL    Baso # 0.06 0.00 - 0.20 K/uL    nRBC 0 0 /100 WBC    Gran% 78.2 (H) 38.0 - 73.0 %    Lymph% 9.7 (L) 18.0 - 48.0 %    Mono% 9.0 4.0 - 15.0 %    Eosinophil% 1.2 0.0 - 8.0 %    Basophil% 0.4 0.0 - 1.9 %     "Differential Method Automated         Mental Status Exam:  Appearance:  male, appears older than stated age, obese, disheveled and bearded, dressed in hospital gown, eating breakfast  Behavior/Cooperation: friendly, cooperative, psychomotor retardation, lethargic, good eye contact   Speech: decreased rate, monotone, low volume, paucity of speech   Mood: "real good"  Affect: blunted affect, dysphoric  Thought Process: linear, coherent  Thought Content: hyper-Faith, no visual/auditory hallucinations, no suicidal/homicidal ideations  Orientation: orientated to person, place and month/year  Memory: short term intact   Attention Span/Concentration:   Insight: fair - stated that he is coming to terms with the procedure  Judgment: fair       ASSESSMENT/PLAN:   1. Delirium   67 year old  male. Consulted for capacity assessment for surgical procedure to amputate 4th and 5th digits on his Right foot. Patient is orientated to person, place and month/year. Patient stated he was initially discouraged but is praying and now coming to terms with the situation.   Plan: Continue to monitor    Veronica Drew, Medical Student   2/14/2018  "

## 2018-02-14 NOTE — SUBJECTIVE & OBJECTIVE
Interval History: Patient seen at bedside. No new complaints today. Pain well controlled. Denies systemic signs of infection. Wound cx + Citrobacter. Bone cx + ESBL Klebsiella and Citrobacter. Tolerating ciprofloxacin well. Scheduled for angiogram tomorrow.     Review of Systems   Constitutional: Negative for chills, diaphoresis and fever.   Respiratory: Negative for shortness of breath.    Cardiovascular: Negative for chest pain and leg swelling.   Gastrointestinal: Positive for diarrhea (2x overnight). Negative for abdominal pain, nausea and vomiting.   Genitourinary: Negative.  Negative for flank pain and hematuria.   Musculoskeletal: Positive for arthralgias.   Skin: Positive for wound.   Neurological: Positive for numbness. Negative for weakness.     Objective:     Vital Signs (Most Recent):  Temp: 97.8 °F (36.6 °C) (02/14/18 0859)  Pulse: 62 (02/14/18 1100)  Resp: 17 (02/14/18 0859)  BP: (!) 120/59 (02/14/18 0859)  SpO2: 96 % (02/14/18 0859) Vital Signs (24h Range):  Temp:  [97.5 °F (36.4 °C)-97.8 °F (36.6 °C)] 97.8 °F (36.6 °C)  Pulse:  [52-62] 62  Resp:  [17-20] 17  SpO2:  [91 %-98 %] 96 %  BP: (120-139)/(58-67) 120/59     Weight: 100 kg (220 lb 7.4 oz)  Body mass index is 33.52 kg/m².    Estimated Creatinine Clearance: 13.5 mL/min (based on SCr of 6 mg/dL (H)).    Physical Exam   Constitutional: He is oriented to person, place, and time. He appears well-developed and well-nourished. No distress.   Cardiovascular: Normal rate and regular rhythm.    Murmur heard.  Pulmonary/Chest: Effort normal and breath sounds normal. No respiratory distress.   Abdominal: Soft. Bowel sounds are normal. He exhibits no distension.   Musculoskeletal: He exhibits no edema.   Neurological: He is alert and oriented to person, place, and time.   Skin: Skin is warm and dry.   Bilateral feet dressed. Dressing c/d/i. Chronic venous stasis changes to BLE   Psychiatric: He has a normal mood and affect. His behavior is normal.        Significant Labs:   Blood Culture:     Recent Labs  Lab 02/04/18  1223 02/04/18  1423   LABBLOO No growth after 5 days. No growth after 5 days.     CBC:     Recent Labs  Lab 02/13/18  0423 02/14/18  0608   WBC 13.94* 13.77*   HGB 7.6* 7.5*   HCT 23.3* 22.9*    194     CMP:     Recent Labs  Lab 02/13/18  0423 02/14/18  0608   * 134*   K 5.0 4.5   CL 99 98   CO2 21* 25   GLU 48* 72   BUN 38* 45*   CREATININE 5.5* 6.0*   CALCIUM 8.1* 8.2*   ALBUMIN 1.5* 1.5*   ANIONGAP 12 11   EGFRNONAA 9.8* 8.8*     HIV Rapid: No results for input(s): HIVRAPID in the last 48 hours.  Lactic Acid: No results for input(s): LACTATE in the last 48 hours.  Procalcitonin: No results for input(s): PROCAL in the last 48 hours.  Quantiferon: No results for input(s): NIL, TBAG, TBAGNIL, MITOGENNIL, TBGOLD in the last 48 hours.  Respiratory Culture: No results for input(s): GSRESP, RESPIRATORYC in the last 4320 hours.  Urine Culture: No results for input(s): LABURIN in the last 4320 hours.  Urine Studies: No results for input(s): COLORU, APPEARANCEUA, PHUR, SPECGRAV, PROTEINUA, GLUCUA, KETONESU, BILIRUBINUA, OCCULTUA, NITRITE, UROBILINOGEN, LEUKOCYTESUR, RBCUA, WBCUA, BACTERIA, SQUAMEPITHEL, HYALINECASTS in the last 4320 hours.    Invalid input(s): WRIGHTSUR  Wound Culture:     Recent Labs  Lab 02/05/18  0820 02/06/18  1329 02/09/18  0908   LABAERO CITROBACTER KOSERIMany CITROBACTER KOSERIModerate KLEBSIELLA PNEUMONIAE ESBLFew  CITROBACTER KOSERIFew       Significant Imaging: I have reviewed all pertinent imaging results/findings within the past 24 hours.

## 2018-02-15 ENCOUNTER — ANESTHESIA (OUTPATIENT)
Dept: SURGERY | Facility: HOSPITAL | Age: 68
End: 2018-02-15

## 2018-02-15 PROBLEM — L97.529: Status: ACTIVE | Noted: 2018-02-15

## 2018-02-15 LAB
ALBUMIN SERPL BCP-MCNC: 1.6 G/DL
ANION GAP SERPL CALC-SCNC: 9 MMOL/L
BASOPHILS # BLD AUTO: 0.04 K/UL
BASOPHILS NFR BLD: 0.3 %
BUN SERPL-MCNC: 36 MG/DL
CALCIUM SERPL-MCNC: 8.5 MG/DL
CHLORIDE SERPL-SCNC: 99 MMOL/L
CO2 SERPL-SCNC: 27 MMOL/L
CREAT SERPL-MCNC: 5.4 MG/DL
DIFFERENTIAL METHOD: ABNORMAL
EOSINOPHIL # BLD AUTO: 0.2 K/UL
EOSINOPHIL NFR BLD: 1.4 %
ERYTHROCYTE [DISTWIDTH] IN BLOOD BY AUTOMATED COUNT: 17.2 %
EST. GFR  (AFRICAN AMERICAN): 11.6 ML/MIN/1.73 M^2
EST. GFR  (NON AFRICAN AMERICAN): 10 ML/MIN/1.73 M^2
GLUCOSE SERPL-MCNC: 76 MG/DL
HCT VFR BLD AUTO: 22.5 %
HGB BLD-MCNC: 7.5 G/DL
IMM GRANULOCYTES # BLD AUTO: 0.18 K/UL
IMM GRANULOCYTES NFR BLD AUTO: 1.5 %
LYMPHOCYTES # BLD AUTO: 1.1 K/UL
LYMPHOCYTES NFR BLD: 9.2 %
MAGNESIUM SERPL-MCNC: 2.1 MG/DL
MCH RBC QN AUTO: 29.8 PG
MCHC RBC AUTO-ENTMCNC: 33.3 G/DL
MCV RBC AUTO: 89 FL
MONOCYTES # BLD AUTO: 1.2 K/UL
MONOCYTES NFR BLD: 9.5 %
NEUTROPHILS # BLD AUTO: 9.7 K/UL
NEUTROPHILS NFR BLD: 78.1 %
NRBC BLD-RTO: 0 /100 WBC
PHOSPHATE SERPL-MCNC: 4 MG/DL
PLATELET # BLD AUTO: 202 K/UL
PMV BLD AUTO: 10.1 FL
POCT GLUCOSE: 103 MG/DL (ref 70–110)
POCT GLUCOSE: 121 MG/DL (ref 70–110)
POCT GLUCOSE: 92 MG/DL (ref 70–110)
POCT GLUCOSE: 96 MG/DL (ref 70–110)
POTASSIUM SERPL-SCNC: 4.3 MMOL/L
RBC # BLD AUTO: 2.52 M/UL
SODIUM SERPL-SCNC: 135 MMOL/L
WBC # BLD AUTO: 12.37 K/UL

## 2018-02-15 PROCEDURE — 80069 RENAL FUNCTION PANEL: CPT

## 2018-02-15 PROCEDURE — 25000003 PHARM REV CODE 250: Performed by: INTERNAL MEDICINE

## 2018-02-15 PROCEDURE — 25000003 PHARM REV CODE 250: Performed by: PHYSICIAN ASSISTANT

## 2018-02-15 PROCEDURE — 36415 COLL VENOUS BLD VENIPUNCTURE: CPT

## 2018-02-15 PROCEDURE — 99232 SBSQ HOSP IP/OBS MODERATE 35: CPT | Mod: ,,, | Performed by: INTERNAL MEDICINE

## 2018-02-15 PROCEDURE — 83735 ASSAY OF MAGNESIUM: CPT

## 2018-02-15 PROCEDURE — 85025 COMPLETE CBC W/AUTO DIFF WBC: CPT

## 2018-02-15 PROCEDURE — 11000001 HC ACUTE MED/SURG PRIVATE ROOM

## 2018-02-15 RX ADMIN — MICONAZOLE NITRATE: 20 OINTMENT TOPICAL at 10:02

## 2018-02-15 RX ADMIN — GABAPENTIN 100 MG: 100 CAPSULE ORAL at 10:02

## 2018-02-15 RX ADMIN — SEVELAMER CARBONATE 1600 MG: 800 TABLET, FILM COATED ORAL at 11:02

## 2018-02-15 RX ADMIN — ASPIRIN 81 MG: 81 TABLET, COATED ORAL at 09:02

## 2018-02-15 RX ADMIN — OXYCODONE HYDROCHLORIDE 5 MG: 5 TABLET ORAL at 11:02

## 2018-02-15 RX ADMIN — GABAPENTIN 100 MG: 100 CAPSULE ORAL at 05:02

## 2018-02-15 RX ADMIN — GABAPENTIN 100 MG: 100 CAPSULE ORAL at 01:02

## 2018-02-15 RX ADMIN — CINACALCET HYDROCHLORIDE 30 MG: 30 TABLET, COATED ORAL at 10:02

## 2018-02-15 RX ADMIN — STANDARDIZED SENNA CONCENTRATE AND DOCUSATE SODIUM 2 TABLET: 8.6; 5 TABLET, FILM COATED ORAL at 10:02

## 2018-02-15 RX ADMIN — ATORVASTATIN CALCIUM 20 MG: 20 TABLET, FILM COATED ORAL at 09:02

## 2018-02-15 RX ADMIN — CITALOPRAM HYDROBROMIDE 10 MG: 10 TABLET ORAL at 09:02

## 2018-02-15 RX ADMIN — SEVELAMER CARBONATE 800 MG: 800 TABLET, FILM COATED ORAL at 10:02

## 2018-02-15 RX ADMIN — SEVELAMER CARBONATE 1600 MG: 800 TABLET, FILM COATED ORAL at 05:02

## 2018-02-15 RX ADMIN — CIPROFLOXACIN HYDROCHLORIDE 500 MG: 250 TABLET, FILM COATED ORAL at 09:02

## 2018-02-15 RX ADMIN — STANDARDIZED SENNA CONCENTRATE AND DOCUSATE SODIUM 2 TABLET: 8.6; 5 TABLET, FILM COATED ORAL at 09:02

## 2018-02-15 RX ADMIN — MICONAZOLE NITRATE: 20 OINTMENT TOPICAL at 09:02

## 2018-02-15 RX ADMIN — Medication: at 09:02

## 2018-02-15 RX ADMIN — Medication: at 10:02

## 2018-02-15 RX ADMIN — SEVELAMER CARBONATE 1600 MG: 800 TABLET, FILM COATED ORAL at 09:02

## 2018-02-15 NOTE — PLAN OF CARE
Problem: Fall Risk (Pediatric)  Goal: Absence of Fall  Patient will demonstrate the desired outcomes by discharge/transition of care.   Outcome: Ongoing (interventions implemented as appropriate)   02/15/18 0313   Fall Risk (Pediatric)   Absence of Fall making progress toward outcome   Pt remains free of falls during shift; siderails x3; call light within reach; frequent patient checks; q2 turns; no s/sx of distress. Will continue to monitor.

## 2018-02-15 NOTE — PROGRESS NOTES
"[facility]  Psychiatry  Progress Note    Patient Name: Wilder Carbajal  MRN: [id number]   Code Status: Full Code  Admission Date: 2/4/2018  Hospital Length of Stay: 10 days  Expected Discharge Date: 2/20/2018  Attending Physician: Montserrat Lino MD  Primary Care Provider: Dio Roberson MD    Current Legal Status: N/A    Patient information was obtained from patient.     Subjective:     Principal Problem:Septic encephalopathy    Chief Complaint: "i'm doing OK"    HPI: Mr Carbajal is a 67 year old black male with no psychiatric history and medical history of HTN, Dm, and ESRD on Hd. He was brought to the hospital by EMS when his son noticed fluid coming out of his legs, and he was admitted for treatment of sepsis. Psychiatry consulted for "depression, patient requested".    On interview, the patient denies that he requested a psychiatry consult but does feel that we could be helpful to him. States that he is depressed because of his worsening physical health and because his son does not really help him very much. Has been living with son for about two years now, but son works full-time and usually wants to be by himself when he is at home. Son also asks patient repeatedly why he does not get some home health care worker to help him rather than relying on him. He therefore feels sad most of the time and just sits around watching Tv, although says this is because he physically cannot do anything more. Denies feelings of hopelessness, SI/HI. Sleep has been poor for years, with difficulties in sleep onset; attributes this to pain and also to anxiety. When asked what he worries about, he replies "lots of things" but is unable to provide any examples. Appetite unchanging, and says that his usual diet is pudding, fruit cups, iced tea, and fruit punch. Says he is unable to cook because his vision is so bad. Troubles with concentration and memory just over the last few days. Endorses visual hallucinations of " "seeing "people of all colors and sizes" in his home - this has been occurring for about 1 year now and does not bother him. Endorses Ah of a voice telling him to do things, but then explains that these are his own thoughts. Denies paranoia, IOR. Is agreeable with having home health come to him or with going into a facility that could provide him more attention. Gave permission for me to speak with his son.    Regarding the current state of his right foot and recommendations for amputation: The patient states that he he is in severe pain and wants to something to help with it. After explanation of his current condition, the patient understood that "the food could take [his] life away" and that amputation now should prevent the need for more extensive amputation in the future. He states now that he is agreeable to having the amputation as soon as possible.    Spoke with son, Raymond Carbajal, on the phone. He states that the patient is usually sharp mentally. He just noticed troubles with memory yesterday. Is concerned the patient may not fully understand the situation with his toes/foot. He denies the patient being diagnosed with dementia or depression, and feels the patient was able to care for himself adequately before coming here. Is not aware of the patient taking an anti-depressant medication.     Per primary team, they are concerned that he does not have capacity to decide whether or not he will have amputation of toes. He was initially agreeing with this, but then refused after they took him to pre-op yesterday. He reported to them a belief that the surgery was un-necessary, and could not state the risks of refusing the procedure.    Hospital Course: No notes on file    Interval History: On interview, the patient states that he is doing well and that his foot is not hurting much. Says that mood is good and that he now agrees that amputation was necessary (although he still did not want it). Denies being told of a " "need for any further surgery on legs, but then told medical student that he hears God telling him to let the doctors cut up to the knee as "God will look after his children." Stated he was sleepy and drowsy lately, most likely due to the medications, but is feeling "real good." Reported no pain in his foot. Patient is sleeping well (approximately 8 hours), good appetite.     Family History     Problem Relation (Age of Onset)    Depression Child    Heart attack Mother    No Known Problems Father, Sister, Brother, Maternal Aunt, Maternal Uncle, Paternal Aunt, Paternal Uncle, Maternal Grandmother, Maternal Grandfather, Paternal Grandmother, Paternal Grandfather        Social History Main Topics    Smoking status: Never Smoker    Smokeless tobacco: Never Used    Alcohol use No    Drug use: No    Sexual activity: Not on file     Psychotherapeutics     Start     Stop Route Frequency Ordered    02/05/18 0900  citalopram tablet 10 mg      -- Oral Daily 02/04/18 1724           Review of Systems  Objective:     Vital Signs (Most Recent):  Temp: 97.8 °F (36.6 °C) (02/14/18 0859)  Pulse: (!) 56 (02/14/18 0859)  Resp: 17 (02/14/18 0859)  BP: (!) 120/59 (02/14/18 0859)  SpO2: 96 % (02/14/18 0859) Vital Signs (24h Range):  Temp:  [97.5 °F (36.4 °C)-98 °F (36.7 °C)] 97.8 °F (36.6 °C)  Pulse:  [50-58] 56  Resp:  [17-20] 17  SpO2:  [91 %-98 %] 96 %  BP: (120-139)/(58-67) 120/59     Height: 5' 8" (172.7 cm)  Weight: 100 kg (220 lb 7.4 oz)  Body mass index is 33.52 kg/m².      Intake/Output Summary (Last 24 hours) at 02/14/18 1030  Last data filed at 02/13/18 2112   Gross per 24 hour   Intake              280 ml   Output                0 ml   Net              280 ml       Physical Exam   Psychiatric:   Appearance:  male, appears older than stated age, obese, disheveled and bearded, dressed in hospital gown, eating breakfast  Behavior/Cooperation: friendly, cooperative, psychomotor retardation, lethargic, good eye " "contact   Speech: decreased rate, monotone, low volume, paucity of speech   Mood: "real good"  Affect: blunted affect, dysphoric  Thought Process: linear, coherent  Thought Content: hyper-Oriental orthodox, no visual/auditory hallucinations, no suicidal/homicidal ideations  Orientation: orientated to person, place and month/year  Memory: short term intact   Attention Span/Concentration:   Insight: impaired  Judgment: improving but still impaired         Significant Labs: All pertinent labs within the past 24 hours have been reviewed.    Significant Imaging: None    Assessment/Plan:     Acute delirium    - as evidenced by acute change from baseline, impaired attention, disorganized thinking  - most likely cause is sepsis, however anemia could also be contributing.  - things like depression and dementia cannot be reliably diagnosed in the setting of delirium, but it seems like most of what he is experiencing in terms of mood is related to his situation (failing health, limited social support, etc) rather than a clinical depression. Most appropriate intervention would seem to be optimizing physical health and getting him to a place where he can get better care and support (such as an [facility] or nursing home)  - regarding capacity, the patient has been inconsistent with making this decision with no legitimate explanation for why he is doing this. He is also not able to explain any of the risks of the infection if amputation is not performed, and demonstrated different levels of understanding to our different team members today. Therefore he does NOT have capacity to make this decision right now. If any more surgeries are needed, then please proceed with the use of a proxy decision-maker             Need for Continued Hospitalization:   No need for inpatient psychiatric hospitalization. Continue medical care as per the primary team.    Anticipated Disposition: Still a Patient     Total time:  15 with greater than 50% of this time " spent in counseling and/or coordination of care.       Osbaldo Sauceda MD   Psychiatry  [facility]

## 2018-02-15 NOTE — PLAN OF CARE
CM contacted by Yahaira SILVA, aware pt will need skilled nursing at d/c.  Will present to the PACE board tomorrow for placement options.    Kristy Mallory RN  Case Management  [phone]

## 2018-02-15 NOTE — SUBJECTIVE & OBJECTIVE
Interval Hx: s/p Right 4th and 5th ray amputation (DOS: 1/9/18 per Dr. Matamoros).  Patient pain controlled. Dressing intact right foot. Pt. Requesting to go home.     Scheduled Meds:   sodium chloride 0.9%   Intravenous Once    ammonium lactate   Topical (Top) BID    aspirin  81 mg Oral Daily    atorvastatin  20 mg Oral Daily    cinacalcet  30 mg Oral QHS    ciprofloxacin HCl  500 mg Oral Daily    citalopram  10 mg Oral Daily    gabapentin  100 mg Oral TID    miconazole nitrate 2%   Topical (Top) BID    midodrine  10 mg Oral Every Mon, Wed, Fri    senna-docusate 8.6-50 mg  2 tablet Oral BID    sevelamer carbonate  1,600 mg Oral TID WM    sevelamer carbonate  800 mg Oral QHS     Continuous Infusions:  PRN Meds:sodium chloride 0.9%, sodium chloride 0.9%, sodium chloride 0.9%, acetaminophen, albumin human 25%, dextrose 50%, dextrose 50%, glucagon (human recombinant), glucose, glucose, ondansetron, oxyCODONE, sodium chloride 0.9%    Review of patient's allergies indicates:  No Known Allergies     Past Medical History:   Diagnosis Date    Acute pain of left shoulder 1/7/2017    Arthritis     Asthma     Benign neoplasm of eyelid     RUL    Blood clotting tendency     Blood transfusion     Cataract     Chronic kidney disease requiring chronic dialysis     Diabetes mellitus     Diabetic retinopathy     Fever blister     Hyperlipidemia     Hypertension     Infertility     Joint pain     Retinal detachment     Thyroid disease     Weakness 1/7/2017     Past Surgical History:   Procedure Laterality Date    CATARACT EXTRACTION      COLONOSCOPY N/A 6/23/2017    Procedure: COLONOSCOPY;  Surgeon: Vic Clark MD;  Location: [address];  Service: Endoscopy;  Laterality: N/A;    RETINAL DETACHMENT SURGERY         Family History     Problem Relation (Age of Onset)    Heart attack Mother    No Known Problems Father, Sister, Brother, Maternal Aunt, Maternal Uncle, Paternal Aunt, Paternal Uncle,  Maternal Grandmother, Maternal Grandfather, Paternal Grandmother, Paternal Grandfather        Social History Main Topics    Smoking status: Never Smoker    Smokeless tobacco: Never Used    Alcohol use No    Drug use: No    Sexual activity: Not on file     Review of Systems   Constitutional: Positive for activity change.   Respiratory: Negative for shortness of breath.    Cardiovascular: Negative for chest pain and leg swelling.   Gastrointestinal: Negative.  Negative for nausea and vomiting.   Genitourinary: Negative.    Musculoskeletal: Positive for arthralgias.   Skin: Positive for wound.   Neurological: Positive for numbness. Negative for weakness.     Objective:     Vital Signs (Most Recent):  Temp: 98 °F (36.7 °C) (02/15/18 0755)  Pulse: (!) 48 (02/15/18 0825)  Resp: 17 (02/15/18 0755)  BP: 129/61 (02/15/18 0755)  SpO2: (!) 94 % (02/15/18 0755) Vital Signs (24h Range):  Temp:  [97.7 °F (36.5 °C)-98.7 °F (37.1 °C)] 98 °F (36.7 °C)  Pulse:  [48-62] 48  Resp:  [16-18] 17  SpO2:  [90 %-99 %] 94 %  BP: (110-143)/(56-74) 129/61     Weight: 100 kg (220 lb 7.4 oz)  Body mass index is 33.52 kg/m².    Foot Exam    General  Orientation: disoriented       Right Foot/Ankle     Inspection and Palpation  Tenderness: none   Swelling: metatarsals   Skin Exam: blister, maceration, cellulitis, skin changes and ulcer;     Neurovascular  Dorsalis pedis: 1+  Posterior tibial: 1+  Saphenous nerve sensation: diminished  Tibial nerve sensation: diminished  Superficial peroneal nerve sensation: diminished  Deep peroneal nerve sensation: diminished  Sural nerve sensation: diminished      Left Foot/Ankle      Inspection and Palpation  Tenderness: none   Swelling: metatarsals   Skin Exam: skin changes and ulcer;     Neurovascular  Dorsalis pedis: 1+  Posterior tibial: 1+  Saphenous nerve sensation: diminished  Tibial nerve sensation: diminished  Superficial peroneal nerve sensation: diminished  Deep peroneal nerve sensation:  diminished  Sural nerve sensation: diminished        2/15/18: Dusky 2nd and 3rd digits.    Wound 1: Right dorsal foot ulceration   Measurement:[id number]  Base: granular, necrotic tissue alond plantar 3rd ray  Periwound skin: viable  Drainage: serosanguinous  Erythema: none  Probe probes to bone 4th and 5th metatarsal, extensor tendons exposed    2/15/18      2/15/18              2/13/18                    Wound 2: Right heel ulceration   Measurement: 0ofp2pqy2.1cm  Base:  necrotic eschar base  Periwound skin: hyperkeratosis   Drainage:none  Probe: none, no bone exposed                          Left foot ulceration noted to dorsal foot 2/14/18              Wound 1: Left dorsal foot   Measurement: 8cqi9dqw8.1cm.  Base: fibrous base  Periwound skin: mild erythema,   Drainage: none  Erythema: mild  Probe: none, no bone exposed    2/14/18            Wound 1: Left heel ulceration   Measurement: 0tlu6gpg5.1cm.  Periwound skin: callus  Drainage: none  Erythema:mild   Probe: none, no bone exposed            Laboratory:  Xray: Right: There is DJD, soft tissue swelling, a calcaneal spur, and diabetic vascular calcifications. No fracture dislocation bone destruction seen.    Left: There is DJD, diabetic vascular calcifications, and Spurs on the calcaneus. No fracture dislocation bone destruction seen.    MRI: 1. Limited, incomplete nondiagnostic examination secondary to early termination at the of the request of the patient.    2. Linear T1 hypointensity traversing the third metacarpal head, concerning for possible nondisplaced, likely subacute fracture.  Correlation with point tenderness recommended.    PHILLIP: Impression:   Right Leg:Segmental pressures and PVR waveforms suggest mild to moderate peripheral arterial occlusive disease.     Left Leg:Segmental pressures and PVR waveforms suggest mild to moderate peripheral arterial occlusive disease.        Diagnostic Results:  X-Ray: I have reviewed all pertinent  results/findings within the past 24 hours.  reviewed    Clinical Findings:

## 2018-02-15 NOTE — ASSESSMENT & PLAN NOTE
Wilder Carbajal is a 67 y.o. 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 Matamoros),  dusky appearance of 2nd and 3rd digits, and necrotic heel wound.  Pt. At high risk for limb loss at this time.   - Plan for RLE angio per vascular surgery on today  -  Dressed with betadine, 4x4's, lightly dressed with kerlix and ace.    -ID on board, appreciate recs  -Podiatry will follow.    Weightbearing Status: NWB  Offloading Device: heel protector boot      Ángela Marks DPM PGY-3  Pager: [phone]

## 2018-02-15 NOTE — PROGRESS NOTES
[facility]  Podiatry  Progress Note    Patient Name: Wilder Carbajal  MRN: [id number]  Admission Date: 2/4/2018  Hospital Length of Stay: 11 days  Attending Physician: Montserrat Lino MD  Primary Care Provider: Dio Roberson MD   Interval Hx: s/p Right 4th and 5th ray amputation (DOS: 1/9/18 per Dr. Matamoros).  Patient pain controlled. Dressing intact right foot. Pt. Requesting to go home.     Scheduled Meds:   sodium chloride 0.9%   Intravenous Once    ammonium lactate   Topical (Top) BID    aspirin  81 mg Oral Daily    atorvastatin  20 mg Oral Daily    cinacalcet  30 mg Oral QHS    ciprofloxacin HCl  500 mg Oral Daily    citalopram  10 mg Oral Daily    gabapentin  100 mg Oral TID    miconazole nitrate 2%   Topical (Top) BID    midodrine  10 mg Oral Every Mon, Wed, Fri    senna-docusate 8.6-50 mg  2 tablet Oral BID    sevelamer carbonate  1,600 mg Oral TID WM    sevelamer carbonate  800 mg Oral QHS     Continuous Infusions:  PRN Meds:sodium chloride 0.9%, sodium chloride 0.9%, sodium chloride 0.9%, acetaminophen, albumin human 25%, dextrose 50%, dextrose 50%, glucagon (human recombinant), glucose, glucose, ondansetron, oxyCODONE, sodium chloride 0.9%    Review of patient's allergies indicates:  No Known Allergies     Past Medical History:   Diagnosis Date    Acute pain of left shoulder 1/7/2017    Arthritis     Asthma     Benign neoplasm of eyelid     RUL    Blood clotting tendency     Blood transfusion     Cataract     Chronic kidney disease requiring chronic dialysis     Diabetes mellitus     Diabetic retinopathy     Fever blister     Hyperlipidemia     Hypertension     Infertility     Joint pain     Retinal detachment     Thyroid disease     Weakness 1/7/2017     Past Surgical History:   Procedure Laterality Date    CATARACT EXTRACTION      COLONOSCOPY N/A 6/23/2017    Procedure: COLONOSCOPY;  Surgeon: Vic Clark MD;  Location: [address]);  Service:  Endoscopy;  Laterality: N/A;    RETINAL DETACHMENT SURGERY         Family History     Problem Relation (Age of Onset)    Heart attack Mother    No Known Problems Father, Sister, Brother, Maternal Aunt, Maternal Uncle, Paternal Aunt, Paternal Uncle, Maternal Grandmother, Maternal Grandfather, Paternal Grandmother, Paternal Grandfather        Social History Main Topics    Smoking status: Never Smoker    Smokeless tobacco: Never Used    Alcohol use No    Drug use: No    Sexual activity: Not on file     Review of Systems   Constitutional: Positive for activity change.   Respiratory: Negative for shortness of breath.    Cardiovascular: Negative for chest pain and leg swelling.   Gastrointestinal: Negative.  Negative for nausea and vomiting.   Genitourinary: Negative.    Musculoskeletal: Positive for arthralgias.   Skin: Positive for wound.   Neurological: Positive for numbness. Negative for weakness.     Objective:     Vital Signs (Most Recent):  Temp: 98 °F (36.7 °C) (02/15/18 0755)  Pulse: (!) 48 (02/15/18 0825)  Resp: 17 (02/15/18 0755)  BP: 129/61 (02/15/18 0755)  SpO2: (!) 94 % (02/15/18 0755) Vital Signs (24h Range):  Temp:  [97.7 °F (36.5 °C)-98.7 °F (37.1 °C)] 98 °F (36.7 °C)  Pulse:  [48-62] 48  Resp:  [16-18] 17  SpO2:  [90 %-99 %] 94 %  BP: (110-143)/(56-74) 129/61     Weight: 100 kg (220 lb 7.4 oz)  Body mass index is 33.52 kg/m².    Foot Exam    General  Orientation: disoriented       Right Foot/Ankle     Inspection and Palpation  Tenderness: none   Swelling: metatarsals   Skin Exam: blister, maceration, cellulitis, skin changes and ulcer;     Neurovascular  Dorsalis pedis: 1+  Posterior tibial: 1+  Saphenous nerve sensation: diminished  Tibial nerve sensation: diminished  Superficial peroneal nerve sensation: diminished  Deep peroneal nerve sensation: diminished  Sural nerve sensation: diminished      Left Foot/Ankle      Inspection and Palpation  Tenderness: none   Swelling: metatarsals   Skin Exam:  skin changes and ulcer;     Neurovascular  Dorsalis pedis: 1+  Posterior tibial: 1+  Saphenous nerve sensation: diminished  Tibial nerve sensation: diminished  Superficial peroneal nerve sensation: diminished  Deep peroneal nerve sensation: diminished  Sural nerve sensation: diminished        2/15/18: Dusky 2nd and 3rd digits.    Wound 1: Right dorsal foot ulceration   Measurement:[id number]  Base: granular, necrotic tissue alond plantar 3rd ray  Periwound skin: viable  Drainage: serosanguinous  Erythema: none  Probe probes to bone 4th and 5th metatarsal, extensor tendons exposed    2/15/18      2/15/18              2/13/18                    Wound 2: Right heel ulceration   Measurement: 9bca1dwo5.1cm  Base:  necrotic eschar base  Periwound skin: hyperkeratosis   Drainage:none  Probe: none, no bone exposed                          Left foot ulceration noted to dorsal foot 2/14/18              Wound 1: Left dorsal foot   Measurement: 9ndq4ibp1.1cm.  Base: fibrous base  Periwound skin: mild erythema,   Drainage: none  Erythema: mild  Probe: none, no bone exposed    2/14/18            Wound 1: Left heel ulceration   Measurement: 8iiq1jbp4.1cm.  Periwound skin: callus  Drainage: none  Erythema:mild   Probe: none, no bone exposed            Laboratory:  Xray: Right: There is DJD, soft tissue swelling, a calcaneal spur, and diabetic vascular calcifications. No fracture dislocation bone destruction seen.    Left: There is DJD, diabetic vascular calcifications, and Spurs on the calcaneus. No fracture dislocation bone destruction seen.    MRI: 1. Limited, incomplete nondiagnostic examination secondary to early termination at the of the request of the patient.    2. Linear T1 hypointensity traversing the third metacarpal head, concerning for possible nondisplaced, likely subacute fracture.  Correlation with point tenderness recommended.    PHILLIP: Impression:   Right Leg:Segmental pressures and PVR waveforms suggest mild to  moderate peripheral arterial occlusive disease.     Left Leg:Segmental pressures and PVR waveforms suggest mild to moderate peripheral arterial occlusive disease.        Diagnostic Results:  X-Ray: I have reviewed all pertinent results/findings within the past 24 hours.  reviewed    Clinical Findings:      Assessment/Plan:     Foot ulceration, left, with unspecified severity    Stable ulceration to left dorsal foot and heel. Painted with betadine covered with mepilex border.         Foot ulceration, right, with fat layer exposed    Wilder Cabrajal is a 67 y.o. 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 Matamoros),  dusky appearance of 2nd and 3rd digits, and necrotic heel wound.  Pt. At high risk for limb loss at this time.   - Plan for RLE angio per vascular surgery on today  -  Dressed with betadine, 4x4's, lightly dressed with kerlix and ace.    -ID on board, appreciate recs  -Podiatry will follow.    Weightbearing Status: NWB  Offloading Device: heel protector boot      Ángela Marks DPM PGY-3  Pager: [phone]          ESRD (end stage renal disease)    Per Primary           Type 2 diabetes mellitus with stage 5 chronic kidney disease and hypertension    Per Primary               Ángela Marks MD  Podiatry  [facility]

## 2018-02-15 NOTE — ASSESSMENT & PLAN NOTE
Stable ulceration to left dorsal foot and heel. Painted with betadine covered with mepilex border.

## 2018-02-15 NOTE — PROGRESS NOTES
Progress Note   Hospital Medicine       [place]: [facility] Montserrat Lino MD  Admit Date: 2/4/2018  Length of Stay:  LOS: 11 days   ESTUARDO 2/20/2018  Principal Problem:  Septic encephalopathy    Interval hx / overnight events: No new events.    Areas of concern/ handoff: Will likely need right BKA. Early morning hypoglycemia    Review of Systems: Gen: no fever, no chills, Heart: no chest pain, palpitations; Resp: no SOB, no cough    Temp:  [97.7 °F (36.5 °C)-98.7 °F (37.1 °C)]   Pulse:  [48-62]   Resp:  [15-18]   BP: (120-141)/(58-66)   SpO2:  [92 %-99 %]       Physical Exam:  General appearance: NAD, conversant  Neck: FROM, supple   Lungs: crackles at bases, no accessory muscle use  CV: RRR, no heave  Abdomen: Soft, non-tender; no masses or HSM  Extremities: 4+ pitting edema up to his buttocks,  no digital cyanosis  Skin: no rash, lesions or ulcers  Psych: Alert and oriented to person, place and time      Recent Labs  Lab 02/13/18  0423 02/14/18  0608 02/15/18  0457   WBC 13.94* 13.77* 12.37   HGB 7.6* 7.5* 7.5*   HCT 23.3* 22.9* 22.5*    194 202       Recent Labs  Lab 02/13/18  0423 02/14/18  0608 02/15/18  0457   * 134* 135*   K 5.0 4.5 4.3   CL 99 98 99   CO2 21* 25 27   BUN 38* 45* 36*   CREATININE 5.5* 6.0* 5.4*   GLU 48* 72 76   CALCIUM 8.1* 8.2* 8.5*   MG 2.3 2.1 2.1   PHOS 4.6* 4.6* 4.0       Recent Labs  Lab 02/13/18  0423 02/14/18  0608 02/15/18  0457   ALBUMIN 1.5* 1.5* 1.6*       Recent Labs  Lab 02/14/18  0854 02/14/18  1130 02/14/18  1726 02/14/18  2253 02/15/18  0735 02/15/18  1108   POCTGLUCOSE 110 131* 89 99 92 96         Assessment and Plan   Overview: 66 yo here for osteomyelitis of right foot. He underwent washout 2/6 and another washout 2/6 and amputation of 4th and 5th digits. Undergoing treatment course of antibiotics.     Problems Addressed today:     Septic encephalopathy on possible dementia  Improved with treatment of infected foot. Patient lacked capacity to refuse surgery per  "psychiatry, consent obtained from his son as proxy.     Infected right dorsal foot, ischemic ulceration  Podiatry consulted - s/p I&D and 4th and 5th amputation  Vascular surgery consulted - initially no intervention required  Continued vancomycin 1 g IV per random levels and Zosyn 2.25 g IV BID  Wound culture revealed pan-sensitive CITROBACTER KOSERI and KLEBSIELLA PNEUMONIAE ESBL  To OR 02/09/18. Consulted ID.  Plan for wound vac and 6 weeks ciprofloxacin. ABIs ordered to rule out need for BKA. Angiogram considered. Per vascular surgery " Unlikely for the patient to heal the wound despite aggressive limb salvage efforts. Due to significant lipodermatosclerosis, BKA is not good option. Patient likely needs AKA." Vascular surgery to discuss BKA with patient and son.     Hypervolemia due to non-compliance with dialysis  Hyperkalemia, resolved  Shifted in ER with albuterol, insulin/destrose, bicarbonate. K 7.9--> 4  No respiratory compromise. Emergent dialysis 2/5/18 early AM and on 2/9  Daily weights - 18 kg weight loss since admission  Withhold anti-hypertensives for volume removal  Would recommend aggressive volume removal as it would be assist in wound healing from his BKA      DVT  BLE ultrasound  Apixaban 5 mg BID - ? Compliance at home  Apixaban held 2/12/2018 for probable BKA     DM II with HTN and CKD V  HgbA1c 4.6%  Likely controlled with declining intake  No further intervention needed at this time     Protein calorie malnutrtion  Hypoglycemia with associated NSVT  Poor prognosis for wound healing  Nepro with meals and at night  Ice cream at night with night time meds and sevelamer     Essential HTN - hold home hydralazine 50 mg TID and losartan 50 mg daily     DM II hyperlipidemia - atorvastatin 40 mg daily     DM II neuropathy - gabapentin 100 mg TID     ESRF HD TTS - Nephrology consulted for dialysis.  Withholding anti-hypertensives and added midodrine as needed prior to dialysis for volume removal and " reduce symptomatic dialysis-associated hypotension.   BP improving.     CKD MBD - continue cinacalcet 30 mg qhs and sevelamer 1600 mg TID daily     Depression - continue citalopram 10 mg daily. Consulted psychiatry.   Rule out delirium and dementia.  Back to baseline.    HIGH RISK CONDITION(S):   Patient has an abrupt change in neurologic status: delirium     DVT/ GI PPX: RODRI/SCDs. Apixaban on hold due to pending angiogram    Goals of Care: Curative    Disposition: long-term correction care      Montserrat Lino MD

## 2018-02-16 LAB
ALBUMIN SERPL BCP-MCNC: 1.6 G/DL
ANION GAP SERPL CALC-SCNC: 9 MMOL/L
BASOPHILS # BLD AUTO: 0.04 K/UL
BASOPHILS NFR BLD: 0.3 %
BUN SERPL-MCNC: 39 MG/DL
CALCIUM SERPL-MCNC: 8.6 MG/DL
CHLORIDE SERPL-SCNC: 98 MMOL/L
CO2 SERPL-SCNC: 26 MMOL/L
CREAT SERPL-MCNC: 6 MG/DL
DIFFERENTIAL METHOD: ABNORMAL
EOSINOPHIL # BLD AUTO: 0.2 K/UL
EOSINOPHIL NFR BLD: 1.5 %
ERYTHROCYTE [DISTWIDTH] IN BLOOD BY AUTOMATED COUNT: 17.2 %
EST. GFR  (AFRICAN AMERICAN): 10.2 ML/MIN/1.73 M^2
EST. GFR  (NON AFRICAN AMERICAN): 8.8 ML/MIN/1.73 M^2
GLUCOSE SERPL-MCNC: 74 MG/DL
HCT VFR BLD AUTO: 22.2 %
HGB BLD-MCNC: 7.4 G/DL
IMM GRANULOCYTES # BLD AUTO: 0.13 K/UL
IMM GRANULOCYTES NFR BLD AUTO: 1.1 %
LYMPHOCYTES # BLD AUTO: 1.3 K/UL
LYMPHOCYTES NFR BLD: 10.3 %
MAGNESIUM SERPL-MCNC: 2.3 MG/DL
MCH RBC QN AUTO: 29.8 PG
MCHC RBC AUTO-ENTMCNC: 33.3 G/DL
MCV RBC AUTO: 90 FL
MONOCYTES # BLD AUTO: 1.2 K/UL
MONOCYTES NFR BLD: 9.9 %
NEUTROPHILS # BLD AUTO: 9.4 K/UL
NEUTROPHILS NFR BLD: 76.9 %
NRBC BLD-RTO: 0 /100 WBC
PHOSPHATE SERPL-MCNC: 4.6 MG/DL
PLATELET # BLD AUTO: 204 K/UL
PMV BLD AUTO: 9.9 FL
POCT GLUCOSE: 105 MG/DL (ref 70–110)
POCT GLUCOSE: 111 MG/DL (ref 70–110)
POCT GLUCOSE: 118 MG/DL (ref 70–110)
POCT GLUCOSE: 72 MG/DL (ref 70–110)
POTASSIUM SERPL-SCNC: 4.5 MMOL/L
PTH-INTACT SERPL-MCNC: 295 PG/ML
RBC # BLD AUTO: 2.48 M/UL
SODIUM SERPL-SCNC: 133 MMOL/L
WBC # BLD AUTO: 12.16 K/UL

## 2018-02-16 PROCEDURE — 25000003 PHARM REV CODE 250: Performed by: INTERNAL MEDICINE

## 2018-02-16 PROCEDURE — 83970 ASSAY OF PARATHORMONE: CPT

## 2018-02-16 PROCEDURE — 90935 HEMODIALYSIS ONE EVALUATION: CPT

## 2018-02-16 PROCEDURE — 36415 COLL VENOUS BLD VENIPUNCTURE: CPT

## 2018-02-16 PROCEDURE — 80069 RENAL FUNCTION PANEL: CPT

## 2018-02-16 PROCEDURE — 90935 HEMODIALYSIS ONE EVALUATION: CPT | Mod: ,,, | Performed by: INTERNAL MEDICINE

## 2018-02-16 PROCEDURE — 99232 SBSQ HOSP IP/OBS MODERATE 35: CPT | Mod: ,,, | Performed by: INTERNAL MEDICINE

## 2018-02-16 PROCEDURE — 11000001 HC ACUTE MED/SURG PRIVATE ROOM

## 2018-02-16 PROCEDURE — 27000207 HC ISOLATION

## 2018-02-16 PROCEDURE — 25000003 PHARM REV CODE 250: Performed by: PHYSICIAN ASSISTANT

## 2018-02-16 PROCEDURE — 99024 POSTOP FOLLOW-UP VISIT: CPT | Mod: ,,, | Performed by: PODIATRIST

## 2018-02-16 PROCEDURE — 83735 ASSAY OF MAGNESIUM: CPT

## 2018-02-16 PROCEDURE — 85025 COMPLETE CBC W/AUTO DIFF WBC: CPT

## 2018-02-16 RX ORDER — SODIUM CHLORIDE 9 MG/ML
INJECTION, SOLUTION INTRAVENOUS ONCE
Status: COMPLETED | OUTPATIENT
Start: 2018-02-16 | End: 2018-02-16

## 2018-02-16 RX ORDER — SODIUM CHLORIDE 9 MG/ML
INJECTION, SOLUTION INTRAVENOUS
Status: DISCONTINUED | OUTPATIENT
Start: 2018-02-16 | End: 2018-02-20

## 2018-02-16 RX ADMIN — MICONAZOLE NITRATE: 20 OINTMENT TOPICAL at 12:02

## 2018-02-16 RX ADMIN — ONDANSETRON 8 MG: 4 TABLET, ORALLY DISINTEGRATING ORAL at 05:02

## 2018-02-16 RX ADMIN — SEVELAMER CARBONATE 1600 MG: 800 TABLET, FILM COATED ORAL at 09:02

## 2018-02-16 RX ADMIN — GABAPENTIN 100 MG: 100 CAPSULE ORAL at 05:02

## 2018-02-16 RX ADMIN — CINACALCET HYDROCHLORIDE 30 MG: 30 TABLET, COATED ORAL at 09:02

## 2018-02-16 RX ADMIN — SODIUM CHLORIDE 300 ML: 0.9 INJECTION, SOLUTION INTRAVENOUS at 04:02

## 2018-02-16 RX ADMIN — GABAPENTIN 100 MG: 100 CAPSULE ORAL at 06:02

## 2018-02-16 RX ADMIN — STANDARDIZED SENNA CONCENTRATE AND DOCUSATE SODIUM 2 TABLET: 8.6; 5 TABLET, FILM COATED ORAL at 09:02

## 2018-02-16 RX ADMIN — CIPROFLOXACIN HYDROCHLORIDE 500 MG: 250 TABLET, FILM COATED ORAL at 09:02

## 2018-02-16 RX ADMIN — Medication: at 12:02

## 2018-02-16 RX ADMIN — ASPIRIN 81 MG: 81 TABLET, COATED ORAL at 09:02

## 2018-02-16 RX ADMIN — MIDODRINE HYDROCHLORIDE 10 MG: 5 TABLET ORAL at 09:02

## 2018-02-16 RX ADMIN — GABAPENTIN 100 MG: 100 CAPSULE ORAL at 09:02

## 2018-02-16 RX ADMIN — MICONAZOLE NITRATE: 20 OINTMENT TOPICAL at 09:02

## 2018-02-16 RX ADMIN — SEVELAMER CARBONATE 1600 MG: 800 TABLET, FILM COATED ORAL at 12:02

## 2018-02-16 RX ADMIN — Medication: at 09:02

## 2018-02-16 RX ADMIN — ATORVASTATIN CALCIUM 20 MG: 20 TABLET, FILM COATED ORAL at 09:02

## 2018-02-16 RX ADMIN — CITALOPRAM HYDROBROMIDE 10 MG: 10 TABLET ORAL at 09:02

## 2018-02-16 RX ADMIN — SEVELAMER CARBONATE 1600 MG: 800 TABLET, FILM COATED ORAL at 05:02

## 2018-02-16 RX ADMIN — SEVELAMER CARBONATE 800 MG: 800 TABLET, FILM COATED ORAL at 09:02

## 2018-02-16 NOTE — PROGRESS NOTES
Patient received from floor with report from Erlinda Geronimo. Maintenance isolation dialysis began per orders via 15 gauge fistula needles to DIMAS fistula.

## 2018-02-16 NOTE — ASSESSMENT & PLAN NOTE
- seems to be resolved  - regarding capacity, the patient continues to be inconsistent in making decision about surgical interventions. He otherwise demonstrated a good understanding of the situation today, but is now refusing to undergo any further surgery (with no explanation why he changed mind since our last assessment). Therefore, he continues to lack capacity to decide on need for surgery - proxy decision-maker should be involved and encouraged to use substituted judgment.  - Will now sign off. If he begins to voice a more consistent opinion or his capacity is in question for any other reason, then please contact us again.

## 2018-02-16 NOTE — HPI
Wilder Carbajal is a 67 year old male with ESRd on HD (TTS) presents with leg swelling for two weeks. He states he has had associated blisters on his legs with fluid weeping out of them. During hospital presented diagnosis of osteomyelitis of right foot. He underwent washout 2/6 and another washout 2/6 and amputation of 4th and 5th digits. Undergoing treatment course of antibiotics. Nephrology was consulted for inpatient dialysis treatment.iHD TTS at [facility], duration of 4h 15 min, , Left AVF.

## 2018-02-16 NOTE — SUBJECTIVE & OBJECTIVE
Interval History:   Patient evaluated at bedside in [facility], no respiratory distress, blood pressures have been stable, no complaints during treatment.     Review of patient's allergies indicates:  No Known Allergies  Current Facility-Administered Medications   Medication Frequency    0.9%  NaCl infusion PRN    0.9%  NaCl infusion Once    acetaminophen tablet 650 mg Q6H PRN    albumin human 25% bottle 25 g PRN    ammonium lactate 12 % lotion BID    aspirin EC tablet 81 mg Daily    atorvastatin tablet 20 mg Daily    cinacalcet tablet 30 mg QHS    ciprofloxacin HCl tablet 500 mg Daily    citalopram tablet 10 mg Daily    dextrose 50% injection 12.5 g PRN    dextrose 50% injection 25 g PRN    gabapentin capsule 100 mg TID    glucagon (human recombinant) injection 1 mg PRN    glucose chewable tablet 16 g PRN    glucose chewable tablet 24 g PRN    miconazole nitrate 2% ointment BID    midodrine tablet 10 mg Every Mon, Wed, Fri    ondansetron disintegrating tablet 8 mg Q8H PRN    oxyCODONE immediate release tablet 5 mg Q6H PRN    senna-docusate 8.6-50 mg per tablet 2 tablet BID    sevelamer carbonate tablet 1,600 mg TID WM    sevelamer carbonate tablet 800 mg QHS    sodium chloride 0.9% flush 5 mL PRN       Objective:     Vital Signs (Most Recent):  Temp: 97.8 °F (36.6 °C) (02/16/18 1335)  Pulse: (!) 58 (02/16/18 1400)  Resp: 18 (02/16/18 1400)  BP: (!) 133/58 (02/16/18 1400)  SpO2: (!) 94 % (02/16/18 1335)  O2 Device (Oxygen Therapy): room air (02/16/18 1335) Vital Signs (24h Range):  Temp:  [97.6 °F (36.4 °C)-98.1 °F (36.7 °C)] 97.8 °F (36.6 °C)  Pulse:  [51-62] 58  Resp:  [15-20] 18  SpO2:  [90 %-96 %] 94 %  BP: (106-140)/(58-66) 133/58     Weight: 102 kg (224 lb 13.9 oz) (02/15/18 0900)  Body mass index is 34.19 kg/m².  Body surface area is 2.21 meters squared.    I/O last 3 completed shifts:  In: 680 [P.O.:680]  Out: -     Physical Exam   Constitutional: He is oriented to person, place, and time.  He appears well-developed and well-nourished. No distress.   Cardiovascular: Normal rate and regular rhythm.    Murmur heard.  Sacral edema    Pulmonary/Chest: Effort normal and breath sounds normal. No respiratory distress.   Abdominal: Soft. Bowel sounds are normal. He exhibits no distension.   Musculoskeletal: He exhibits no edema.   Neurological: He is alert and oriented to person, place, and time.   Skin: Skin is warm and dry.   Bilateral feet dressed. Dressing c/d/i. Chronic venous stasis changes to BLE   Psychiatric: He has a normal mood and affect. His behavior is normal.       Significant Labs:  ABGs: No results for input(s): PH, PCO2, HCO3, POCSATURATED, BE in the last 168 hours.  BMP:   Recent Labs  Lab 02/16/18  0456   GLU 74   CL 98   CO2 26   BUN 39*   CREATININE 6.0*   CALCIUM 8.6*   MG 2.3     CBC:   Recent Labs  Lab 02/16/18  0456   WBC 12.16   RBC 2.48*   HGB 7.4*   HCT 22.2*      MCV 90   MCH 29.8   MCHC 33.3     CMP:   Recent Labs  Lab 02/16/18  0456   GLU 74   CALCIUM 8.6*   ALBUMIN 1.6*   *   K 4.5   CO2 26   CL 98   BUN 39*   CREATININE 6.0*     All labs within the past 24 hours have been reviewed.

## 2018-02-16 NOTE — PLAN OF CARE
Problem: Diabetes, Type 2 (Adult)  Goal: Signs and Symptoms of Listed Potential Problems Will be Absent, Minimized or Managed (Diabetes, Type 2)  Signs and symptoms of listed potential problems will be absent, minimized or managed by discharge/transition of care (reference Diabetes, Type 2 (Adult) CPG).   Outcome: Ongoing (interventions implemented as appropriate)   02/16/18 1547   Diabetes, Type 2   Problems Assessed (Type 2 Diabetes) all   Problems Present (Type 2 Diabetes) situational response       Problem: Patient Care Overview  Goal: Plan of Care Review  Outcome: Ongoing (interventions implemented as appropriate)  Pt BS WNL, pt escorted to dialysis, awaiting return, woundcare performed as ordered

## 2018-02-16 NOTE — PROGRESS NOTES
"[facility]  Psychiatry  Progress Note    Patient Name: Wilder Carbajal  MRN: [id number]   Code Status: Full Code  Admission Date: 2/4/2018  Hospital Length of Stay: 12 days  Expected Discharge Date: 2/20/2018  Attending Physician: Montserrat Lino MD  Primary Care Provider: Dio Roberson MD    Current Legal Status: N/A    Patient information was obtained from patient.     Subjective:     Principal Problem:Septic encephalopathy    Chief Complaint: "i'm fine, doc"    HPI: Mr Carbajal is a 67 year old black male with no psychiatric history and medical history of HTN, Dm, and ESRD on Hd. He was brought to the hospital by EMS when his son noticed fluid coming out of his legs, and he was admitted for treatment of sepsis. Psychiatry consulted for "depression, patient requested".    On interview, the patient denies that he requested a psychiatry consult but does feel that we could be helpful to him. States that he is depressed because of his worsening physical health and because his son does not really help him very much. Has been living with son for about two years now, but son works full-time and usually wants to be by himself when he is at home. Son also asks patient repeatedly why he does not get some home health care worker to help him rather than relying on him. He therefore feels sad most of the time and just sits around watching Tv, although says this is because he physically cannot do anything more. Denies feelings of hopelessness, SI/HI. Sleep has been poor for years, with difficulties in sleep onset; attributes this to pain and also to anxiety. When asked what he worries about, he replies "lots of things" but is unable to provide any examples. Appetite unchanging, and says that his usual diet is pudding, fruit cups, iced tea, and fruit punch. Says he is unable to cook because his vision is so bad. Troubles with concentration and memory just over the last few days. Endorses visual hallucinations of " "seeing "people of all colors and sizes" in his home - this has been occurring for about 1 year now and does not bother him. Endorses Ah of a voice telling him to do things, but then explains that these are his own thoughts. Denies paranoia, IOR. Is agreeable with having home health come to him or with going into a facility that could provide him more attention. Gave permission for me to speak with his son.    Regarding the current state of his right foot and recommendations for amputation: The patient states that he he is in severe pain and wants to something to help with it. After explanation of his current condition, the patient understood that "the food could take [his] life away" and that amputation now should prevent the need for more extensive amputation in the future. He states now that he is agreeable to having the amputation as soon as possible.    Spoke with son, Raymond Carbajal, on the phone. He states that the patient is usually sharp mentally. He just noticed troubles with memory yesterday. Is concerned the patient may not fully understand the situation with his toes/foot. He denies the patient being diagnosed with dementia or depression, and feels the patient was able to care for himself adequately before coming here. Is not aware of the patient taking an anti-depressant medication.     Per primary team, they are concerned that he does not have capacity to decide whether or not he will have amputation of toes. He was initially agreeing with this, but then refused after they took him to pre-op yesterday. He reported to them a belief that the surgery was un-necessary, and could not state the risks of refusing the procedure.    Hospital Course: No notes on file    Interval History: On interview, the patient states that he is doing well and that his foot is not causing much pain. Sleeping well, eating well. Denies any feelings of confusion, paranoia, AVH, SI/HI. States that his mood is "good". Denies being " "told that he may need another surgery on his RLE, but says that he would not want to have it. When asked why he refuses this he replies, "no one is going to cut on me anymore...they already cut more than they were supposed to". Understands that if amputation is not performed then infection might continue to spread up his leg and potentially even lead to his death, and then says "i'm not afraid to die". When asked about his plan if surgery is not performed he says he would like to "go home and just get help changing the bandages twice a week".    Family History     Problem Relation (Age of Onset)    Depression Child    Heart attack Mother    No Known Problems Father, Sister, Brother, Maternal Aunt, Maternal Uncle, Paternal Aunt, Paternal Uncle, Maternal Grandmother, Maternal Grandfather, Paternal Grandmother, Paternal Grandfather        Social History Main Topics    Smoking status: Never Smoker    Smokeless tobacco: Never Used    Alcohol use No    Drug use: No    Sexual activity: Not on file     Psychotherapeutics     Start     Stop Route Frequency Ordered    02/05/18 0900  citalopram tablet 10 mg      -- Oral Daily 02/04/18 1724           Review of Systems  Objective:     Vital Signs (Most Recent):  Temp: 97.6 °F (36.4 °C) (02/16/18 1138)  Pulse: (!) 56 (02/16/18 1138)  Resp: 20 (02/16/18 1138)  BP: (!) 106/59 (02/16/18 1138)  SpO2: (!) 90 % (02/16/18 1138) Vital Signs (24h Range):  Temp:  [97.6 °F (36.4 °C)-98.1 °F (36.7 °C)] 97.6 °F (36.4 °C)  Pulse:  [51-62] 56  Resp:  [15-20] 20  SpO2:  [90 %-96 %] 90 %  BP: (106-138)/(59-66) 106/59     Height: 5' 8" (172.7 cm)  Weight: 102 kg (224 lb 13.9 oz)  Body mass index is 34.19 kg/m².      Intake/Output Summary (Last 24 hours) at 02/16/18 1226  Last data filed at 02/15/18 1352   Gross per 24 hour   Intake              240 ml   Output                0 ml   Net              240 ml       Physical Exam   Psychiatric:   Appearance:  male, appears older " "than stated age, obese, bearded, dressed in hospital gown, eating breakfast  Behavior/Cooperation: friendly, cooperative, good eye contact   Speech: normal rate, volume, tone  Mood: "pretty good, doc"  Affect: euthymic, reactive  Thought Process: linear, coherent  Thought Content: no suicidal/homicidal ideations, no AVH  Orientation: orientated to person, place and month/year  Memory: short term intact   Attention Span/Concentration: grossly intact  Insight: good  Judgment: improving        Significant Labs: All pertinent labs within the past 24 hours have been reviewed.    Significant Imaging: None    Assessment/Plan:     Acute delirium    - seems to be resolved  - regarding capacity, the patient continues to be inconsistent in making decision about surgical interventions. He otherwise demonstrated a good understanding of the situation today, but is now refusing to undergo any further surgery (with no explanation why he changed mind since our last assessment). Therefore, he continues to lack capacity to decide on need for surgery - proxy decision-maker should be involved and encouraged to use substituted judgment.  - Will now sign off. If he begins to voice a more consistent opinion or his capacity is in question for any other reason, then please contact us again.             Need for Continued Hospitalization:   No need for inpatient psychiatric hospitalization. Continue medical care as per the primary team.    Anticipated Disposition: Still a Patient     Total time:  25 with greater than 50% of this time spent in counseling and/or coordination of care.       Osbaldo Sauceda MD   Psychiatry  [facility]  "

## 2018-02-16 NOTE — ASSESSMENT & PLAN NOTE
Wilder Carbajal is a 67 y.o. 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 Matamoros),  Concerns of progressive ischemic changes to right foot wounds and toes.  Pt. At high risk for limb loss at this time.   - Vascular following, angio cancelled yesterday.    -  Right foot Dressed with betadine, 4x4's, lightly dressed with kerlix and ace.  Left foot paitned with betadine and dressed with island dressings  -ID on board, appreciate recs  -Podiatry will follow.    Weightbearing Status: NWB  Offloading Device: heel protector boot

## 2018-02-16 NOTE — SUBJECTIVE & OBJECTIVE
"Interval History: On interview, the patient states that he is doing well and that his foot is not causing much pain. Sleeping well, eating well. Denies any feelings of confusion, paranoia, AVH, SI/HI. States that his mood is "good". Denies being told that he may need another surgery on his RLE, but says that he would not want to have it. When asked why he refuses this he replies, "no one is going to cut on me anymore...they already cut more than they were supposed to". Understands that if amputation is not performed then infection might continue to spread up his leg and potentially even lead to his death, and then says "i'm not afraid to die". When asked about his plan if surgery is not performed he says he would like to "go home and just get help changing the bandages twice a week".    Family History     Problem Relation (Age of Onset)    Depression Child    Heart attack Mother    No Known Problems Father, Sister, Brother, Maternal Aunt, Maternal Uncle, Paternal Aunt, Paternal Uncle, Maternal Grandmother, Maternal Grandfather, Paternal Grandmother, Paternal Grandfather        Social History Main Topics    Smoking status: Never Smoker    Smokeless tobacco: Never Used    Alcohol use No    Drug use: No    Sexual activity: Not on file     Psychotherapeutics     Start     Stop Route Frequency Ordered    02/05/18 0900  citalopram tablet 10 mg      -- Oral Daily 02/04/18 1724           Review of Systems  Objective:     Vital Signs (Most Recent):  Temp: 97.6 °F (36.4 °C) (02/16/18 1138)  Pulse: (!) 56 (02/16/18 1138)  Resp: 20 (02/16/18 1138)  BP: (!) 106/59 (02/16/18 1138)  SpO2: (!) 90 % (02/16/18 1138) Vital Signs (24h Range):  Temp:  [97.6 °F (36.4 °C)-98.1 °F (36.7 °C)] 97.6 °F (36.4 °C)  Pulse:  [51-62] 56  Resp:  [15-20] 20  SpO2:  [90 %-96 %] 90 %  BP: (106-138)/(59-66) 106/59     Height: 5' 8" (172.7 cm)  Weight: 102 kg (224 lb 13.9 oz)  Body mass index is 34.19 kg/m².      Intake/Output Summary (Last 24 " "hours) at 02/16/18 1226  Last data filed at 02/15/18 1352   Gross per 24 hour   Intake              240 ml   Output                0 ml   Net              240 ml       Physical Exam   Psychiatric:   Appearance:  male, appears older than stated age, obese, bearded, dressed in hospital gown, eating breakfast  Behavior/Cooperation: friendly, cooperative, good eye contact   Speech: normal rate, volume, tone  Mood: "pretty good, doc"  Affect: euthymic, reactive  Thought Process: linear, coherent  Thought Content: no suicidal/homicidal ideations, no AVH  Orientation: orientated to person, place and month/year  Memory: short term intact   Attention Span/Concentration: grossly intact  Insight: good  Judgment: improving        Significant Labs: All pertinent labs within the past 24 hours have been reviewed.    Significant Imaging: None  "

## 2018-02-16 NOTE — PROGRESS NOTES
[facility]  Podiatry  Progress Note    Patient Name: Wilder Carbajal  MRN: [id number]  Admission Date: 2/4/2018  Hospital Length of Stay: 12 days  Attending Physician: Montserrat Lino MD  Primary Care Provider: Dio Roberson MD   Interval Hx: s/p Right 4th and 5th ray amputation (DOS: 1/9/18 per Dr. Matamoros).  Patient pain controlled. Dressing intact right foot.      Scheduled Meds:   sodium chloride 0.9%   Intravenous Once    ammonium lactate   Topical (Top) BID    aspirin  81 mg Oral Daily    atorvastatin  20 mg Oral Daily    cinacalcet  30 mg Oral QHS    ciprofloxacin HCl  500 mg Oral Daily    citalopram  10 mg Oral Daily    gabapentin  100 mg Oral TID    miconazole nitrate 2%   Topical (Top) BID    midodrine  10 mg Oral Every Mon, Wed, Fri    senna-docusate 8.6-50 mg  2 tablet Oral BID    sevelamer carbonate  1,600 mg Oral TID WM    sevelamer carbonate  800 mg Oral QHS     Continuous Infusions:  PRN Meds:sodium chloride 0.9%, acetaminophen, albumin human 25%, dextrose 50%, dextrose 50%, glucagon (human recombinant), glucose, glucose, ondansetron, oxyCODONE, sodium chloride 0.9%    Review of patient's allergies indicates:  No Known Allergies     Past Medical History:   Diagnosis Date    Acute pain of left shoulder 1/7/2017    Arthritis     Asthma     Benign neoplasm of eyelid     RUL    Blood clotting tendency     Blood transfusion     Cataract     Chronic kidney disease requiring chronic dialysis     Diabetes mellitus     Diabetic retinopathy     Fever blister     Hyperlipidemia     Hypertension     Infertility     Joint pain     Retinal detachment     Thyroid disease     Weakness 1/7/2017     Past Surgical History:   Procedure Laterality Date    CATARACT EXTRACTION      COLONOSCOPY N/A 6/23/2017    Procedure: COLONOSCOPY;  Surgeon: Vic Clark MD;  Location: [address];  Service: Endoscopy;  Laterality: N/A;    RETINAL DETACHMENT SURGERY         Family  History     Problem Relation (Age of Onset)    Heart attack Mother    No Known Problems Father, Sister, Brother, Maternal Aunt, Maternal Uncle, Paternal Aunt, Paternal Uncle, Maternal Grandmother, Maternal Grandfather, Paternal Grandmother, Paternal Grandfather        Social History Main Topics    Smoking status: Never Smoker    Smokeless tobacco: Never Used    Alcohol use No    Drug use: No    Sexual activity: Not on file     Review of Systems   Constitutional: Positive for activity change.   Respiratory: Negative for shortness of breath.    Cardiovascular: Negative for chest pain and leg swelling.   Gastrointestinal: Negative.  Negative for nausea and vomiting.   Genitourinary: Negative.    Musculoskeletal: Positive for arthralgias.   Skin: Positive for wound.   Neurological: Positive for numbness. Negative for weakness.     Objective:     Vital Signs (Most Recent):  Temp: 97.9 °F (36.6 °C) (02/16/18 0833)  Pulse: (!) 54 (02/16/18 0833)  Resp: 15 (02/16/18 0833)  BP: 124/62 (02/16/18 0833)  SpO2: 96 % (02/16/18 0833) Vital Signs (24h Range):  Temp:  [97.7 °F (36.5 °C)-98.1 °F (36.7 °C)] 97.9 °F (36.6 °C)  Pulse:  [51-62] 54  Resp:  [15-16] 15  SpO2:  [91 %-96 %] 96 %  BP: (120-138)/(58-66) 124/62     Weight: 102 kg (224 lb 13.9 oz)  Body mass index is 34.19 kg/m².    Foot Exam    General  Orientation: disoriented       Right Foot/Ankle     Inspection and Palpation  Tenderness: none   Swelling: metatarsals   Skin Exam: blister, maceration, cellulitis, skin changes and ulcer;     Neurovascular  Dorsalis pedis: 1+  Posterior tibial: 1+  Saphenous nerve sensation: diminished  Tibial nerve sensation: diminished  Superficial peroneal nerve sensation: diminished  Deep peroneal nerve sensation: diminished  Sural nerve sensation: diminished      Left Foot/Ankle      Inspection and Palpation  Tenderness: none   Swelling: metatarsals   Skin Exam: skin changes and ulcer;     Neurovascular  Dorsalis pedis: 1+  Posterior  tibial: 1+  Saphenous nerve sensation: diminished  Tibial nerve sensation: diminished  Superficial peroneal nerve sensation: diminished  Deep peroneal nerve sensation: diminished  Sural nerve sensation: diminished        2/15/18: Dusky 2nd and 3rd digits.    Wound 1: Right dorsal foot ulceration   Measurement:[id number]  Base: granular, necrotic tissue alond plantar 3rd ray  Periwound skin: viable  Drainage: serosanguinous  Erythema: none  Probe probes to bone 4th and 5th metatarsal, extensor tendons exposed    2/16 right foot                  2/15/18      2/15/18                Wound 2: Right heel ulceration   Measurement: 1nnm0tpv1.1cm  Base:  necrotic eschar base  Periwound skin: hyperkeratosis   Drainage:none  Probe: none, no bone exposed      Left foot ulceration noted to dorsal foot 2/16/18        Wound 1: Left dorsal foot   Measurement: 4wiv1ivr6.1cm.  Base: fibrous base  Periwound skin: mild erythema,   Drainage: none  Erythema: mild  Probe: none, no bone exposed    2/16/18      Wound 1: Left heel ulceration   Measurement: 7niu6roo0.1cm.  Periwound skin: callus  Drainage: none  Erythema:mild   Probe: none, no bone exposed      Laboratory:  Xray: Right: There is DJD, soft tissue swelling, a calcaneal spur, and diabetic vascular calcifications. No fracture dislocation bone destruction seen.    Left: There is DJD, diabetic vascular calcifications, and Spurs on the calcaneus. No fracture dislocation bone destruction seen.    MRI: 1. Limited, incomplete nondiagnostic examination secondary to early termination at the of the request of the patient.    2. Linear T1 hypointensity traversing the third metacarpal head, concerning for possible nondisplaced, likely subacute fracture.  Correlation with point tenderness recommended.    PHILLIP: Impression:   Right Leg:Segmental pressures and PVR waveforms suggest mild to moderate peripheral arterial occlusive disease.     Left Leg:Segmental pressures and PVR waveforms  suggest mild to moderate peripheral arterial occlusive disease.        Diagnostic Results:  X-Ray: I have reviewed all pertinent results/findings within the past 24 hours.  reviewed    Clinical Findings:  Concerns for worsening necrosis on right forefoot and heel wounds, worsening ischemia of toes.    Assessment/Plan:     Foot ulceration, right, with fat layer exposed    Wilder Carbajal is a 67 y.o. 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 Matamoros),  Concerns of progressive ischemic changes to right foot wounds and toes.  Pt. At high risk for limb loss at this time.   - Vascular following, angio cancelled yesterday.    -  Right foot Dressed with betadine, 4x4's, lightly dressed with kerlix and ace.  Left foot paitned with betadine and dressed with island dressings  -ID on board, appreciate recs  -Podiatry will follow.    Weightbearing Status: NWB  Offloading Device: heel protector boot                ESRD (end stage renal disease)    Per Primary           Type 2 diabetes mellitus with stage 5 chronic kidney disease and hypertension    Per Primary               George Escamilla MD  Podiatry  [facility]

## 2018-02-16 NOTE — PROGRESS NOTES
Progress Note   Hospital Medicine       [place]: [facility] Montserrat Lino MD  Admit Date: 2/4/2018  Length of Stay:  LOS: 12 days   ESTUARDO 2/20/2018  Principal Problem:  Septic encephalopathy    Interval hx / overnight events: No new events.    Areas of concern/ handoff: Will likely need right BKA. Early morning hypoglycemia    Review of Systems: Gen: no fever, no chills, Heart: no chest pain, palpitations; Resp: no SOB, no cough    Temp:  [97.6 °F (36.4 °C)-98.1 °F (36.7 °C)]   Pulse:  [51-61]   Resp:  [15-20]   BP: (106-147)/(58-78)   SpO2:  [90 %-96 %]       Physical Exam:  General appearance: NAD, conversant  Neck: FROM, supple   Lungs: crackles at bases, no accessory muscle use  CV: RRR, no heave  Abdomen: Soft, non-tender; no masses or HSM  Extremities: 2+ pitting edema up to his mid thighs and 3+ edema to his buttocks,  no digital cyanosis  Skin: no rash, lesions or ulcers  Psych: Alert and oriented to person, place and time      Recent Labs  Lab 02/14/18  0608 02/15/18  0457 02/16/18  0456   WBC 13.77* 12.37 12.16   HGB 7.5* 7.5* 7.4*   HCT 22.9* 22.5* 22.2*    202 204       Recent Labs  Lab 02/14/18  0608 02/15/18  0457 02/16/18  0456   * 135* 133*   K 4.5 4.3 4.5   CL 98 99 98   CO2 25 27 26   BUN 45* 36* 39*   CREATININE 6.0* 5.4* 6.0*   GLU 72 76 74   CALCIUM 8.2* 8.5* 8.6*   MG 2.1 2.1 2.3   PHOS 4.6* 4.0 4.6*       Recent Labs  Lab 02/14/18  0608 02/15/18  0457 02/16/18  0456   ALBUMIN 1.5* 1.6* 1.6*       Recent Labs  Lab 02/14/18  2253 02/15/18  0735 02/15/18  1108 02/15/18  1747 02/15/18  2100 02/16/18  0839   POCTGLUCOSE 99 92 96 121* 103 72         Assessment and Plan   Overview: 66 yo here for osteomyelitis of right foot. He underwent washout 2/6 and another washout 2/6 and amputation of 4th and 5th digits. Undergoing treatment course of antibiotics.     Problems Addressed today:     Septic encephalopathy on possible dementia  Improved with treatment of infected foot. Patient lacked  "capacity to refuse surgery per psychiatry, consent obtained from his son as proxy.     Infected right dorsal foot, ischemic ulceration  Podiatry consulted - s/p I&D and 4th and 5th amputation  Vascular surgery consulted - initially no intervention required  Continued vancomycin 1 g IV per random levels and Zosyn 2.25 g IV BID  Wound culture revealed pan-sensitive CITROBACTER KOSERI and KLEBSIELLA PNEUMONIAE ESBL  To OR 02/09/18. Consulted ID.  Plan for wound vac and 6 weeks ciprofloxacin. ABIs ordered to rule out need for BKA. Angiogram considered. Per vascular surgery " Unlikely for the patient to heal the wound despite aggressive limb salvage efforts. Due to significant lipodermatosclerosis, BKA is not good option. Patient likely needs AKA." Vascular surgery to discuss BKA with patient and son.     Hypervolemia due to non-compliance with dialysis  Hyperkalemia, resolved  Shifted in ER with albuterol, insulin/destrose, bicarbonate. K 7.9--> 4  No respiratory compromise. Emergent dialysis 2/5/18 early AM and on 2/9  Daily weights - 18 kg weight loss since admission  Withhold anti-hypertensives for volume removal  Would recommend aggressive volume removal as it would be assist in wound healing from his BKA  2/16 11kg weight loss thus far      DVT  BLE ultrasound  Apixaban 5 mg BID - ? Compliance at home  Apixaban held 2/12/2018 for probable BKA     DM II with HTN and CKD V  HgbA1c 4.6%  Likely controlled with declining intake  No further intervention needed at this time     Protein calorie malnutrtion  Hypoglycemia with associated NSVT  Poor prognosis for wound healing  Nepro with meals and at night  Ice cream at night with night time meds and sevelamer     Essential HTN - hold home hydralazine 50 mg TID and losartan 50 mg daily     DM II hyperlipidemia - atorvastatin 40 mg daily     DM II neuropathy - gabapentin 100 mg TID     ESRF HD TTS - Nephrology consulted for dialysis.  Withholding anti-hypertensives and added " midodrine as needed prior to dialysis for volume removal and reduce symptomatic dialysis-associated hypotension.   BP improving.     CKD MBD - continue cinacalcet 30 mg qhs and sevelamer 1600 mg TID daily     Depression - continue citalopram 10 mg daily. Consulted psychiatry.   Rule out delirium and dementia.  Back to baseline.    HIGH RISK CONDITION(S):   Patient has an abrupt change in neurologic status: delirium     DVT/ GI PPX: RODRI/SCDs. Apixaban on hold due to pending angiogram    Goals of Care: Curative    Disposition: long-term senior living care      Montserrat Lino MD

## 2018-02-16 NOTE — PROGRESS NOTES
[facility]  Nephrology  Progress Note    Patient Name: Wilder Carbajal  MRN: [id number]  Admission Date: 2/4/2018  Hospital Length of Stay: 12 days  Attending Provider: Montserrat Lino MD   Primary Care Physician: Dio Roberson MD  Principal Problem:Septic encephalopathy    Subjective:     HPI: Wilder Carbajal is a 67 year old male with ESRd on HD (TTS) presents with leg swelling for two weeks. He states he has had associated blisters on his legs with fluid weeping out of them. During hospital presented diagnosis of osteomyelitis of right foot. He underwent washout 2/6 and another washout 2/6 and amputation of 4th and 5th digits. Undergoing treatment course of antibiotics. Nephrology was consulted for inpatient dialysis treatment.[facility]HD TTS at [facility], duration of 4h 15 min, , Left AVF.     Interval History:   Patient evaluated at bedside in [facility], no respiratory distress, blood pressures have been stable, no complaints during treatment.     Review of patient's allergies indicates:  No Known Allergies  Current Facility-Administered Medications   Medication Frequency    0.9%  NaCl infusion PRN    0.9%  NaCl infusion Once    acetaminophen tablet 650 mg Q6H PRN    albumin human 25% bottle 25 g PRN    ammonium lactate 12 % lotion BID    aspirin EC tablet 81 mg Daily    atorvastatin tablet 20 mg Daily    cinacalcet tablet 30 mg QHS    ciprofloxacin HCl tablet 500 mg Daily    citalopram tablet 10 mg Daily    dextrose 50% injection 12.5 g PRN    dextrose 50% injection 25 g PRN    gabapentin capsule 100 mg TID    glucagon (human recombinant) injection 1 mg PRN    glucose chewable tablet 16 g PRN    glucose chewable tablet 24 g PRN    miconazole nitrate 2% ointment BID    midodrine tablet 10 mg Every Mon, Wed, Fri    ondansetron disintegrating tablet 8 mg Q8H PRN    oxyCODONE immediate release tablet 5 mg Q6H PRN    senna-docusate 8.6-50 mg per tablet 2 tablet BID     sevelamer carbonate tablet 1,600 mg TID WM    sevelamer carbonate tablet 800 mg QHS    sodium chloride 0.9% flush 5 mL PRN       Objective:     Vital Signs (Most Recent):  Temp: 97.8 °F (36.6 °C) (02/16/18 1335)  Pulse: (!) 58 (02/16/18 1400)  Resp: 18 (02/16/18 1400)  BP: (!) 133/58 (02/16/18 1400)  SpO2: (!) 94 % (02/16/18 1335)  O2 Device (Oxygen Therapy): room air (02/16/18 1335) Vital Signs (24h Range):  Temp:  [97.6 °F (36.4 °C)-98.1 °F (36.7 °C)] 97.8 °F (36.6 °C)  Pulse:  [51-62] 58  Resp:  [15-20] 18  SpO2:  [90 %-96 %] 94 %  BP: (106-140)/(58-66) 133/58     Weight: 102 kg (224 lb 13.9 oz) (02/15/18 0900)  Body mass index is 34.19 kg/m².  Body surface area is 2.21 meters squared.    I/O last 3 completed shifts:  In: 680 [P.O.:680]  Out: -     Physical Exam   Constitutional: He is oriented to person, place, and time. He appears well-developed and well-nourished. No distress.   Cardiovascular: Normal rate and regular rhythm.    Murmur heard.  Sacral edema    Pulmonary/Chest: Effort normal and breath sounds normal. No respiratory distress.   Abdominal: Soft. Bowel sounds are normal. He exhibits no distension.   Musculoskeletal: He exhibits no edema.   Neurological: He is alert and oriented to person, place, and time.   Skin: Skin is warm and dry.   Bilateral feet dressed. Dressing c/d/i. Chronic venous stasis changes to BLE   Psychiatric: He has a normal mood and affect. His behavior is normal.       Significant Labs:  ABGs: No results for input(s): PH, PCO2, HCO3, POCSATURATED, BE in the last 168 hours.  BMP:   Recent Labs  Lab 02/16/18  0456   GLU 74   CL 98   CO2 26   BUN 39*   CREATININE 6.0*   CALCIUM 8.6*   MG 2.3     CBC:   Recent Labs  Lab 02/16/18 0456   WBC 12.16   RBC 2.48*   HGB 7.4*   HCT 22.2*      MCV 90   MCH 29.8   MCHC 33.3     CMP:   Recent Labs  Lab 02/16/18 0456   GLU 74   CALCIUM 8.6*   ALBUMIN 1.6*   *   K 4.5   CO2 26   CL 98   BUN 39*   CREATININE 6.0*     All labs  within the past 24 hours have been reviewed.     Assessment/Plan:     ESRD (end stage renal disease)    Wilder Carbajal is a 67 year old male with ESRD on Hd. iHD TTS at [facility], duration of 4h 15 min, , Left AVF.    Plan:  - Patient will have dialysis today for toxic and volume removal  - Blood pressures have been stable  - Mineral bone disease: continue with phosphate binders, continue with cinacalcet, ordered PTH   - Diet: Renal (low sodium, low phosphate and low potassium)          Luther Martinez  Nephrology  Fellow  [facility]    Pager [phone]

## 2018-02-16 NOTE — ASSESSMENT & PLAN NOTE
Wilder Carbajal is a 67 year old male with ESRD on Hd. iHD TTS at [facility], duration of 4h 15 min, , Left AVF.    Plan:  - Patient will have dialysis today for toxic and volume removal  - Blood pressures have been stable  - Mineral bone disease: continue with phosphate binders, continue with cinacalcet, ordered PTH   - Diet: Renal (low sodium, low phosphate and low potassium)

## 2018-02-16 NOTE — MEDICAL/APP STUDENT
"2/16/2018 8:25 AM   Wilder Carbajal   1950   [id number]        Psychiatry Progress Note     SUBJECTIVE:   Patient seen and examined in the patient's room by Veronica Drew. The patient was watching TV when I entered. Orientated to person, time and place. Stated he slept well (5-6hours), energy is high (like "normal"), however his appetite was been on/off. The patient expressed his desire to go home.     He has R arm stiffness - lifted his arm and started to move his fingers to get the "blood circulating."        Current Medications:   Scheduled Meds:    sodium chloride 0.9%   Intravenous Once    ammonium lactate   Topical (Top) BID    aspirin  81 mg Oral Daily    atorvastatin  20 mg Oral Daily    cinacalcet  30 mg Oral QHS    ciprofloxacin HCl  500 mg Oral Daily    citalopram  10 mg Oral Daily    gabapentin  100 mg Oral TID    miconazole nitrate 2%   Topical (Top) BID    midodrine  10 mg Oral Every Mon, Wed, Fri    senna-docusate 8.6-50 mg  2 tablet Oral BID    sevelamer carbonate  1,600 mg Oral TID WM    sevelamer carbonate  800 mg Oral QHS      PRN Meds: sodium chloride 0.9%, acetaminophen, albumin human 25%, dextrose 50%, dextrose 50%, glucagon (human recombinant), glucose, glucose, ondansetron, oxyCODONE, sodium chloride 0.9%   Psychotherapeutics     Start     Stop Route Frequency Ordered    02/05/18 0900  citalopram tablet 10 mg      -- Oral Daily 02/04/18 1724          Allergies:   Review of patient's allergies indicates:  No Known Allergies     OBJECTIVE:   Vitals   Vitals:    02/16/18 0833   BP: 124/62   Pulse: (!) 54   Resp: 15   Temp: 97.9 °F (36.6 °C)        Labs/Imaging/Studies:   Recent Results (from the past 36 hour(s))   POCT glucose    Collection Time: 02/14/18 10:53 PM   Result Value Ref Range    POCT Glucose 99 70 - 110 mg/dL   Renal function panel    Collection Time: 02/15/18  4:57 AM   Result Value Ref Range    Glucose 76 70 - 110 mg/dL    Sodium 135 (L) 136 - 145 mmol/L    " Potassium 4.3 3.5 - 5.1 mmol/L    Chloride 99 95 - 110 mmol/L    CO2 27 23 - 29 mmol/L    BUN, Bld 36 (H) 8 - 23 mg/dL    Calcium 8.5 (L) 8.7 - 10.5 mg/dL    Creatinine 5.4 (H) 0.5 - 1.4 mg/dL    Albumin 1.6 (L) 3.5 - 5.2 g/dL    Phosphorus 4.0 2.7 - 4.5 mg/dL    eGFR if  11.6 (A) >60 mL/min/1.73 m^2    eGFR if non African American 10.0 (A) >60 mL/min/1.73 m^2    Anion Gap 9 8 - 16 mmol/L   Magnesium    Collection Time: 02/15/18  4:57 AM   Result Value Ref Range    Magnesium 2.1 1.6 - 2.6 mg/dL   CBC auto differential    Collection Time: 02/15/18  4:57 AM   Result Value Ref Range    WBC 12.37 3.90 - 12.70 K/uL    RBC 2.52 (L) 4.60 - 6.20 M/uL    Hemoglobin 7.5 (L) 14.0 - 18.0 g/dL    Hematocrit 22.5 (L) 40.0 - 54.0 %    MCV 89 82 - 98 fL    MCH 29.8 27.0 - 31.0 pg    MCHC 33.3 32.0 - 36.0 g/dL    RDW 17.2 (H) 11.5 - 14.5 %    Platelets 202 150 - 350 K/uL    MPV 10.1 9.2 - 12.9 fL    Immature Granulocytes 1.5 (H) 0.0 - 0.5 %    Gran # (ANC) 9.7 (H) 1.8 - 7.7 K/uL    Immature Grans (Abs) 0.18 (H) 0.00 - 0.04 K/uL    Lymph # 1.1 1.0 - 4.8 K/uL    Mono # 1.2 (H) 0.3 - 1.0 K/uL    Eos # 0.2 0.0 - 0.5 K/uL    Baso # 0.04 0.00 - 0.20 K/uL    nRBC 0 0 /100 WBC    Gran% 78.1 (H) 38.0 - 73.0 %    Lymph% 9.2 (L) 18.0 - 48.0 %    Mono% 9.5 4.0 - 15.0 %    Eosinophil% 1.4 0.0 - 8.0 %    Basophil% 0.3 0.0 - 1.9 %    Differential Method Automated    POCT glucose    Collection Time: 02/15/18  7:35 AM   Result Value Ref Range    POCT Glucose 92 70 - 110 mg/dL   POCT glucose    Collection Time: 02/15/18 11:08 AM   Result Value Ref Range    POCT Glucose 96 70 - 110 mg/dL   POCT glucose    Collection Time: 02/15/18  5:47 PM   Result Value Ref Range    POCT Glucose 121 (H) 70 - 110 mg/dL   POCT glucose    Collection Time: 02/15/18  9:00 PM   Result Value Ref Range    POCT Glucose 103 70 - 110 mg/dL   Renal function panel    Collection Time: 02/16/18  4:56 AM   Result Value Ref Range    Glucose 74 70 - 110 mg/dL    Sodium  "133 (L) 136 - 145 mmol/L    Potassium 4.5 3.5 - 5.1 mmol/L    Chloride 98 95 - 110 mmol/L    CO2 26 23 - 29 mmol/L    BUN, Bld 39 (H) 8 - 23 mg/dL    Calcium 8.6 (L) 8.7 - 10.5 mg/dL    Creatinine 6.0 (H) 0.5 - 1.4 mg/dL    Albumin 1.6 (L) 3.5 - 5.2 g/dL    Phosphorus 4.6 (H) 2.7 - 4.5 mg/dL    eGFR if  10.2 (A) >60 mL/min/1.73 m^2    eGFR if non  8.8 (A) >60 mL/min/1.73 m^2    Anion Gap 9 8 - 16 mmol/L   Magnesium    Collection Time: 02/16/18  4:56 AM   Result Value Ref Range    Magnesium 2.3 1.6 - 2.6 mg/dL   CBC auto differential    Collection Time: 02/16/18  4:56 AM   Result Value Ref Range    WBC 12.16 3.90 - 12.70 K/uL    RBC 2.48 (L) 4.60 - 6.20 M/uL    Hemoglobin 7.4 (L) 14.0 - 18.0 g/dL    Hematocrit 22.2 (L) 40.0 - 54.0 %    MCV 90 82 - 98 fL    MCH 29.8 27.0 - 31.0 pg    MCHC 33.3 32.0 - 36.0 g/dL    RDW 17.2 (H) 11.5 - 14.5 %    Platelets 204 150 - 350 K/uL    MPV 9.9 9.2 - 12.9 fL    Immature Granulocytes 1.1 (H) 0.0 - 0.5 %    Gran # (ANC) 9.4 (H) 1.8 - 7.7 K/uL    Immature Grans (Abs) 0.13 (H) 0.00 - 0.04 K/uL    Lymph # 1.3 1.0 - 4.8 K/uL    Mono # 1.2 (H) 0.3 - 1.0 K/uL    Eos # 0.2 0.0 - 0.5 K/uL    Baso # 0.04 0.00 - 0.20 K/uL    nRBC 0 0 /100 WBC    Gran% 76.9 (H) 38.0 - 73.0 %    Lymph% 10.3 (L) 18.0 - 48.0 %    Mono% 9.9 4.0 - 15.0 %    Eosinophil% 1.5 0.0 - 8.0 %    Basophil% 0.3 0.0 - 1.9 %    Differential Method Automated    POCT glucose    Collection Time: 02/16/18  8:39 AM   Result Value Ref Range    POCT Glucose 72 70 - 110 mg/dL        Mental Status Exam:  Appearance:  male, obese, dressed in hospital gown, watching tv, bearded, disheveled   Behavior/Cooperation: friendly, cooperative, minimal eye contact, psychomotor retardation  Speech: decreased rate, low volume, monotone, paucity of speech   Mood: "pretty good"   Affect: dysphoric, blunted affect, incongruent with stated mood   Thought Process: logical, linear, coherent   Thought " Content:hyper-Caodaism  Orientation: orientated to person, place, time   Attention Span/Concentration: intact - passed SAVEAHAART (missed 1)  Insight: fair  Judgment: fair       ASSESSMENT/PLAN:   Delirium  Post amputation of 4th and 5th digits on R foot. Patient is orientated to person, time, place; attention is intact.   Plan: continue to monitor     Veronica Drew, Medical Student   2/16/2018

## 2018-02-16 NOTE — SUBJECTIVE & OBJECTIVE
Interval Hx: s/p Right 4th and 5th ray amputation (DOS: 1/9/18 per Dr. Matamoros).  Patient pain controlled. Dressing intact right foot.      Scheduled Meds:   sodium chloride 0.9%   Intravenous Once    ammonium lactate   Topical (Top) BID    aspirin  81 mg Oral Daily    atorvastatin  20 mg Oral Daily    cinacalcet  30 mg Oral QHS    ciprofloxacin HCl  500 mg Oral Daily    citalopram  10 mg Oral Daily    gabapentin  100 mg Oral TID    miconazole nitrate 2%   Topical (Top) BID    midodrine  10 mg Oral Every Mon, Wed, Fri    senna-docusate 8.6-50 mg  2 tablet Oral BID    sevelamer carbonate  1,600 mg Oral TID WM    sevelamer carbonate  800 mg Oral QHS     Continuous Infusions:  PRN Meds:sodium chloride 0.9%, acetaminophen, albumin human 25%, dextrose 50%, dextrose 50%, glucagon (human recombinant), glucose, glucose, ondansetron, oxyCODONE, sodium chloride 0.9%    Review of patient's allergies indicates:  No Known Allergies     Past Medical History:   Diagnosis Date    Acute pain of left shoulder 1/7/2017    Arthritis     Asthma     Benign neoplasm of eyelid     RUL    Blood clotting tendency     Blood transfusion     Cataract     Chronic kidney disease requiring chronic dialysis     Diabetes mellitus     Diabetic retinopathy     Fever blister     Hyperlipidemia     Hypertension     Infertility     Joint pain     Retinal detachment     Thyroid disease     Weakness 1/7/2017     Past Surgical History:   Procedure Laterality Date    CATARACT EXTRACTION      COLONOSCOPY N/A 6/23/2017    Procedure: COLONOSCOPY;  Surgeon: Vic Clark MD;  Location: [address]);  Service: Endoscopy;  Laterality: N/A;    RETINAL DETACHMENT SURGERY         Family History     Problem Relation (Age of Onset)    Heart attack Mother    No Known Problems Father, Sister, Brother, Maternal Aunt, Maternal Uncle, Paternal Aunt, Paternal Uncle, Maternal Grandmother, Maternal Grandfather, Paternal Grandmother,  Paternal Grandfather        Social History Main Topics    Smoking status: Never Smoker    Smokeless tobacco: Never Used    Alcohol use No    Drug use: No    Sexual activity: Not on file     Review of Systems   Constitutional: Positive for activity change.   Respiratory: Negative for shortness of breath.    Cardiovascular: Negative for chest pain and leg swelling.   Gastrointestinal: Negative.  Negative for nausea and vomiting.   Genitourinary: Negative.    Musculoskeletal: Positive for arthralgias.   Skin: Positive for wound.   Neurological: Positive for numbness. Negative for weakness.     Objective:     Vital Signs (Most Recent):  Temp: 97.9 °F (36.6 °C) (02/16/18 0833)  Pulse: (!) 54 (02/16/18 0833)  Resp: 15 (02/16/18 0833)  BP: 124/62 (02/16/18 0833)  SpO2: 96 % (02/16/18 0833) Vital Signs (24h Range):  Temp:  [97.7 °F (36.5 °C)-98.1 °F (36.7 °C)] 97.9 °F (36.6 °C)  Pulse:  [51-62] 54  Resp:  [15-16] 15  SpO2:  [91 %-96 %] 96 %  BP: (120-138)/(58-66) 124/62     Weight: 102 kg (224 lb 13.9 oz)  Body mass index is 34.19 kg/m².    Foot Exam    General  Orientation: disoriented       Right Foot/Ankle     Inspection and Palpation  Tenderness: none   Swelling: metatarsals   Skin Exam: blister, maceration, cellulitis, skin changes and ulcer;     Neurovascular  Dorsalis pedis: 1+  Posterior tibial: 1+  Saphenous nerve sensation: diminished  Tibial nerve sensation: diminished  Superficial peroneal nerve sensation: diminished  Deep peroneal nerve sensation: diminished  Sural nerve sensation: diminished      Left Foot/Ankle      Inspection and Palpation  Tenderness: none   Swelling: metatarsals   Skin Exam: skin changes and ulcer;     Neurovascular  Dorsalis pedis: 1+  Posterior tibial: 1+  Saphenous nerve sensation: diminished  Tibial nerve sensation: diminished  Superficial peroneal nerve sensation: diminished  Deep peroneal nerve sensation: diminished  Sural nerve sensation: diminished        2/15/18: Dusky 2nd  and 3rd digits.    Wound 1: Right dorsal foot ulceration   Measurement:[id number]  Base: granular, necrotic tissue alond plantar 3rd ray  Periwound skin: viable  Drainage: serosanguinous  Erythema: none  Probe probes to bone 4th and 5th metatarsal, extensor tendons exposed    2/16 right foot                  2/15/18      2/15/18                Wound 2: Right heel ulceration   Measurement: 8bok3faj1.1cm  Base:  necrotic eschar base  Periwound skin: hyperkeratosis   Drainage:none  Probe: none, no bone exposed      Left foot ulceration noted to dorsal foot 2/16/18        Wound 1: Left dorsal foot   Measurement: 5hok7hgk2.1cm.  Base: fibrous base  Periwound skin: mild erythema,   Drainage: none  Erythema: mild  Probe: none, no bone exposed    2/16/18      Wound 1: Left heel ulceration   Measurement: 2rev7nlx5.1cm.  Periwound skin: callus  Drainage: none  Erythema:mild   Probe: none, no bone exposed      Laboratory:  Xray: Right: There is DJD, soft tissue swelling, a calcaneal spur, and diabetic vascular calcifications. No fracture dislocation bone destruction seen.    Left: There is DJD, diabetic vascular calcifications, and Spurs on the calcaneus. No fracture dislocation bone destruction seen.    MRI: 1. Limited, incomplete nondiagnostic examination secondary to early termination at the of the request of the patient.    2. Linear T1 hypointensity traversing the third metacarpal head, concerning for possible nondisplaced, likely subacute fracture.  Correlation with point tenderness recommended.    PHILLIP: Impression:   Right Leg:Segmental pressures and PVR waveforms suggest mild to moderate peripheral arterial occlusive disease.     Left Leg:Segmental pressures and PVR waveforms suggest mild to moderate peripheral arterial occlusive disease.        Diagnostic Results:  X-Ray: I have reviewed all pertinent results/findings within the past 24 hours.  reviewed    Clinical Findings:  Concerns for worsening necrosis on right  forefoot and heel wounds, worsening ischemia of toes.

## 2018-02-17 LAB
ALBUMIN SERPL BCP-MCNC: 1.6 G/DL
ANION GAP SERPL CALC-SCNC: 10 MMOL/L
BASOPHILS # BLD AUTO: 0.03 K/UL
BASOPHILS NFR BLD: 0.3 %
BUN SERPL-MCNC: 28 MG/DL
CALCIUM SERPL-MCNC: 8.3 MG/DL
CHLORIDE SERPL-SCNC: 103 MMOL/L
CO2 SERPL-SCNC: 21 MMOL/L
CREAT SERPL-MCNC: 4.6 MG/DL
DIFFERENTIAL METHOD: ABNORMAL
EOSINOPHIL # BLD AUTO: 0.1 K/UL
EOSINOPHIL NFR BLD: 1.2 %
ERYTHROCYTE [DISTWIDTH] IN BLOOD BY AUTOMATED COUNT: 17.3 %
EST. GFR  (AFRICAN AMERICAN): 14.1 ML/MIN/1.73 M^2
EST. GFR  (NON AFRICAN AMERICAN): 12.2 ML/MIN/1.73 M^2
GLUCOSE SERPL-MCNC: 72 MG/DL
HCT VFR BLD AUTO: 23.7 %
HGB BLD-MCNC: 7.5 G/DL
IMM GRANULOCYTES # BLD AUTO: 0.09 K/UL
IMM GRANULOCYTES NFR BLD AUTO: 0.8 %
LYMPHOCYTES # BLD AUTO: 1.1 K/UL
LYMPHOCYTES NFR BLD: 9.7 %
MAGNESIUM SERPL-MCNC: 2.1 MG/DL
MCH RBC QN AUTO: 29 PG
MCHC RBC AUTO-ENTMCNC: 31.6 G/DL
MCV RBC AUTO: 92 FL
MONOCYTES # BLD AUTO: 1.4 K/UL
MONOCYTES NFR BLD: 12.2 %
NEUTROPHILS # BLD AUTO: 8.6 K/UL
NEUTROPHILS NFR BLD: 75.8 %
NRBC BLD-RTO: 0 /100 WBC
PHOSPHATE SERPL-MCNC: 4.3 MG/DL
PLATELET # BLD AUTO: 213 K/UL
PMV BLD AUTO: 9.4 FL
POCT GLUCOSE: 123 MG/DL (ref 70–110)
POCT GLUCOSE: 156 MG/DL (ref 70–110)
POCT GLUCOSE: 87 MG/DL (ref 70–110)
POTASSIUM SERPL-SCNC: 4.4 MMOL/L
RBC # BLD AUTO: 2.59 M/UL
SODIUM SERPL-SCNC: 134 MMOL/L
WBC # BLD AUTO: 11.34 K/UL

## 2018-02-17 PROCEDURE — 85025 COMPLETE CBC W/AUTO DIFF WBC: CPT

## 2018-02-17 PROCEDURE — 83735 ASSAY OF MAGNESIUM: CPT

## 2018-02-17 PROCEDURE — 99232 SBSQ HOSP IP/OBS MODERATE 35: CPT | Mod: ,,, | Performed by: INTERNAL MEDICINE

## 2018-02-17 PROCEDURE — 36415 COLL VENOUS BLD VENIPUNCTURE: CPT

## 2018-02-17 PROCEDURE — 11000001 HC ACUTE MED/SURG PRIVATE ROOM

## 2018-02-17 PROCEDURE — 80069 RENAL FUNCTION PANEL: CPT

## 2018-02-17 PROCEDURE — 25000003 PHARM REV CODE 250: Performed by: PHYSICIAN ASSISTANT

## 2018-02-17 PROCEDURE — 25000003 PHARM REV CODE 250: Performed by: INTERNAL MEDICINE

## 2018-02-17 RX ADMIN — ASPIRIN 81 MG: 81 TABLET, COATED ORAL at 08:02

## 2018-02-17 RX ADMIN — GABAPENTIN 100 MG: 100 CAPSULE ORAL at 05:02

## 2018-02-17 RX ADMIN — CIPROFLOXACIN HYDROCHLORIDE 500 MG: 250 TABLET, FILM COATED ORAL at 08:02

## 2018-02-17 RX ADMIN — SEVELAMER CARBONATE 1600 MG: 800 TABLET, FILM COATED ORAL at 06:02

## 2018-02-17 RX ADMIN — CINACALCET HYDROCHLORIDE 30 MG: 30 TABLET, COATED ORAL at 09:02

## 2018-02-17 RX ADMIN — MICONAZOLE NITRATE: 20 OINTMENT TOPICAL at 08:02

## 2018-02-17 RX ADMIN — SEVELAMER CARBONATE 1600 MG: 800 TABLET, FILM COATED ORAL at 01:02

## 2018-02-17 RX ADMIN — OXYCODONE HYDROCHLORIDE 5 MG: 5 TABLET ORAL at 09:02

## 2018-02-17 RX ADMIN — GABAPENTIN 100 MG: 100 CAPSULE ORAL at 01:02

## 2018-02-17 RX ADMIN — Medication: at 08:02

## 2018-02-17 RX ADMIN — MICONAZOLE NITRATE: 20 OINTMENT TOPICAL at 09:02

## 2018-02-17 RX ADMIN — Medication: at 09:02

## 2018-02-17 RX ADMIN — GABAPENTIN 100 MG: 100 CAPSULE ORAL at 10:02

## 2018-02-17 NOTE — PROGRESS NOTES
Dialysis completed. Needles removed from left upper arm fistula with pressure held to sites for 10 minutes each with hemostasis achieved. Gauze and tape to sites. Patient dialyzed for 3 hours with fluid removal of 2 liters.Tolerated well with stable vital signs.

## 2018-02-17 NOTE — PLAN OF CARE
Problem: Patient Care Overview  Goal: Plan of Care Review  Outcome: Ongoing (interventions implemented as appropriate)  Reviewed plan of care, fluid restriction, elevation of right leg, need for turning, change of IV site, increased protein/calorie intake. Patient agreed to plan of care and had no questions or concerns. No acute events thus far, see flowsheets for detailed assessment information, will continue to monitor.

## 2018-02-17 NOTE — NURSING
Attempted peripheral IV insertion, no access site found and patient refused attempts to replace. Current IV flushing well but is 12 days old. PICC team consult placed.

## 2018-02-17 NOTE — PROGRESS NOTES
Progress Note   Hospital Medicine       Team: [facility] MED D Montserrat Lino MD  Admit Date: 2/4/2018  Length of Stay:  LOS: 13 days   ESTUARDO 2/20/2018  Principal Problem:  Septic encephalopathy    Interval hx / overnight events: No new events. Patient is refusing BKA. Clearly states that he would rather die than have an amputation. Son and his brother feel that this aligns with his wishes when he is in normal mental status.     Areas of concern/ handoff: Will likely need right BKA. Early morning hypoglycemia    Review of Systems: Gen: no fever, no chills, Heart: no chest pain, palpitations; Resp: no SOB, no cough    Temp:  [97.4 °F (36.3 °C)-98.3 °F (36.8 °C)]   Pulse:  [50-64]   Resp:  [16-20]   BP: (109-166)/(58-78)   SpO2:  [86 %-96 %]       Physical Exam:  General appearance: NAD, conversant  Neck: FROM, supple   Lungs: crackles at bases, no accessory muscle use  CV: RRR, no heave  Abdomen: Soft, non-tender; no masses or HSM  Extremities: 2+ pitting edema up to his mid thighs and 3+ edema to his buttocks,  no digital cyanosis  Skin: no rash, lesions or ulcers  Psych: Alert and oriented to person, place and time      Recent Labs  Lab 02/15/18  0457 02/16/18  0456 02/17/18  0449   WBC 12.37 12.16 11.34   HGB 7.5* 7.4* 7.5*   HCT 22.5* 22.2* 23.7*    204 213       Recent Labs  Lab 02/15/18  0457 02/16/18  0456 02/17/18  0449   * 133* 134*   K 4.3 4.5 4.4   CL 99 98 103   CO2 27 26 21*   BUN 36* 39* 28*   CREATININE 5.4* 6.0* 4.6*   GLU 76 74 72   CALCIUM 8.5* 8.6* 8.3*   MG 2.1 2.3 2.1   PHOS 4.0 4.6* 4.3       Recent Labs  Lab 02/15/18  0457 02/16/18  0456 02/17/18  0449   ALBUMIN 1.6* 1.6* 1.6*       Recent Labs  Lab 02/16/18  0839 02/16/18  1221 02/16/18  1618 02/16/18  2126 02/17/18  0803 02/17/18  1327   POCTGLUCOSE 72 118* 111* 105 87 123*         Assessment and Plan   Overview: 66 yo here for osteomyelitis of right foot. He underwent washout 2/6 and another washout 2/6 and amputation of 4th and 5th  "digits. Undergoing treatment course of antibiotics.     Problems Addressed today:     Septic encephalopathy on possible dementia  Improved with treatment of infected foot. Patient lacked capacity to refuse surgery per psychiatry, consent obtained from his son as proxy.     Infected right dorsal foot, ischemic ulceration  Podiatry consulted - s/p I&D and 4th and 5th amputation  Vascular surgery consulted - initially no intervention required  Continued vancomycin 1 g IV per random levels and Zosyn 2.25 g IV BID  Wound culture revealed pan-sensitive CITROBACTER KOSERI and KLEBSIELLA PNEUMONIAE ESBL  To OR 02/09/18. Consulted ID.  Plan for wound vac and 6 weeks ciprofloxacin. ABIs ordered to rule out need for BKA. Angiogram considered. Per vascular surgery " Unlikely for the patient to heal the wound despite aggressive limb salvage efforts. Due to significant lipodermatosclerosis, BKA is not good option. Patient likely needs AKA." AKA or BKA not an option with patient. Son and brother in agreement with patient as this aligns with his preference when in his normal mental state. All are aware of the risk of recurrence of sepsis syndrome and death.      Hypervolemia due to non-compliance with dialysis  Hyperkalemia, resolved  Shifted in ER with albuterol, insulin/destrose, bicarbonate. K 7.9--> 4  No respiratory compromise. Emergent dialysis 2/5/18 early AM and on 2/9  Daily weights - 18 kg weight loss since admission  Withhold anti-hypertensives for volume removal  Would recommend aggressive volume removal as it would be assist in wound healing from his BKA  2/16 11kg weight loss thus far      DVT  BLE ultrasound  Apixaban 5 mg BID - ? Compliance at home  Apixaban held 2/12/2018 for probable BKA - will resume at discharge     DM II with HTN and CKD V  HgbA1c 4.6%  Likely controlled with declining intake  No further intervention needed at this time     Protein calorie malnutrtion  Hypoglycemia with associated NSVT  Poor " prognosis for wound healing  Nepro with meals and at night  Ice cream at night with night time meds and sevelamer     Essential HTN - hold home hydralazine 50 mg TID and losartan 50 mg daily     DM II hyperlipidemia - atorvastatin 40 mg daily     DM II neuropathy - gabapentin 100 mg TID     ESRF HD TTS - Nephrology consulted for dialysis.  Withholding anti-hypertensives and added midodrine as needed prior to dialysis for volume removal and reduce symptomatic dialysis-associated hypotension.   BP improving.     CKD MBD - continue cinacalcet 30 mg qhs and sevelamer 1600 mg TID daily     Depression - continue citalopram 10 mg daily. Consulted psychiatry.   Rule out delirium and dementia.  Back to baseline.    HIGH RISK CONDITION(S):   Patient has an abrupt change in neurologic status: delirium     DVT/ GI PPX: RODRI/SCDs. Apixaban on hold due to pending surgery    Goals of Care: Curative    Disposition: long-term half-way care      Montserrat Lino MD

## 2018-02-18 LAB
ALBUMIN SERPL BCP-MCNC: 1.8 G/DL
ANION GAP SERPL CALC-SCNC: 9 MMOL/L
BASOPHILS # BLD AUTO: 0.03 K/UL
BASOPHILS NFR BLD: 0.3 %
BUN SERPL-MCNC: 34 MG/DL
CALCIUM SERPL-MCNC: 8.8 MG/DL
CHLORIDE SERPL-SCNC: 101 MMOL/L
CO2 SERPL-SCNC: 25 MMOL/L
CREAT SERPL-MCNC: 5.3 MG/DL
DIFFERENTIAL METHOD: ABNORMAL
EOSINOPHIL # BLD AUTO: 0.2 K/UL
EOSINOPHIL NFR BLD: 1.6 %
ERYTHROCYTE [DISTWIDTH] IN BLOOD BY AUTOMATED COUNT: 17.2 %
EST. GFR  (AFRICAN AMERICAN): 11.9 ML/MIN/1.73 M^2
EST. GFR  (NON AFRICAN AMERICAN): 10.3 ML/MIN/1.73 M^2
GLUCOSE SERPL-MCNC: 63 MG/DL
HCT VFR BLD AUTO: 24.3 %
HGB BLD-MCNC: 7.7 G/DL
IMM GRANULOCYTES # BLD AUTO: 0.13 K/UL
IMM GRANULOCYTES NFR BLD AUTO: 1.1 %
LYMPHOCYTES # BLD AUTO: 1.3 K/UL
LYMPHOCYTES NFR BLD: 10.7 %
MAGNESIUM SERPL-MCNC: 2.3 MG/DL
MCH RBC QN AUTO: 28.9 PG
MCHC RBC AUTO-ENTMCNC: 31.7 G/DL
MCV RBC AUTO: 91 FL
MONOCYTES # BLD AUTO: 1.3 K/UL
MONOCYTES NFR BLD: 10.9 %
NEUTROPHILS # BLD AUTO: 8.9 K/UL
NEUTROPHILS NFR BLD: 75.4 %
NRBC BLD-RTO: 0 /100 WBC
PHOSPHATE SERPL-MCNC: 4.7 MG/DL
PLATELET # BLD AUTO: 213 K/UL
PMV BLD AUTO: 9.5 FL
POCT GLUCOSE: 104 MG/DL (ref 70–110)
POCT GLUCOSE: 154 MG/DL (ref 70–110)
POCT GLUCOSE: 173 MG/DL (ref 70–110)
POCT GLUCOSE: 89 MG/DL (ref 70–110)
POCT GLUCOSE: 97 MG/DL (ref 70–110)
POTASSIUM SERPL-SCNC: 5 MMOL/L
RBC # BLD AUTO: 2.66 M/UL
SODIUM SERPL-SCNC: 135 MMOL/L
WBC # BLD AUTO: 11.84 K/UL

## 2018-02-18 PROCEDURE — 99232 SBSQ HOSP IP/OBS MODERATE 35: CPT | Mod: ,,, | Performed by: INTERNAL MEDICINE

## 2018-02-18 PROCEDURE — 25000003 PHARM REV CODE 250: Performed by: INTERNAL MEDICINE

## 2018-02-18 PROCEDURE — 83735 ASSAY OF MAGNESIUM: CPT

## 2018-02-18 PROCEDURE — 36415 COLL VENOUS BLD VENIPUNCTURE: CPT

## 2018-02-18 PROCEDURE — 25000003 PHARM REV CODE 250: Performed by: PHYSICIAN ASSISTANT

## 2018-02-18 PROCEDURE — 11000001 HC ACUTE MED/SURG PRIVATE ROOM

## 2018-02-18 PROCEDURE — 80069 RENAL FUNCTION PANEL: CPT

## 2018-02-18 PROCEDURE — 85025 COMPLETE CBC W/AUTO DIFF WBC: CPT

## 2018-02-18 RX ORDER — ALBUMIN HUMAN 250 G/1000ML
12.5 SOLUTION INTRAVENOUS
Status: DISCONTINUED | OUTPATIENT
Start: 2018-02-18 | End: 2018-03-06 | Stop reason: HOSPADM

## 2018-02-18 RX ORDER — SODIUM CHLORIDE 9 MG/ML
INJECTION, SOLUTION INTRAVENOUS
Status: DISCONTINUED | OUTPATIENT
Start: 2018-02-18 | End: 2018-02-20

## 2018-02-18 RX ORDER — SODIUM CHLORIDE 9 MG/ML
INJECTION, SOLUTION INTRAVENOUS ONCE
Status: COMPLETED | OUTPATIENT
Start: 2018-02-18 | End: 2018-02-19

## 2018-02-18 RX ADMIN — Medication: at 08:02

## 2018-02-18 RX ADMIN — GABAPENTIN 100 MG: 100 CAPSULE ORAL at 02:02

## 2018-02-18 RX ADMIN — CIPROFLOXACIN HYDROCHLORIDE 500 MG: 250 TABLET, FILM COATED ORAL at 08:02

## 2018-02-18 RX ADMIN — MICONAZOLE NITRATE: 20 OINTMENT TOPICAL at 08:02

## 2018-02-18 RX ADMIN — Medication: at 09:02

## 2018-02-18 RX ADMIN — GABAPENTIN 100 MG: 100 CAPSULE ORAL at 06:02

## 2018-02-18 RX ADMIN — SEVELAMER CARBONATE 1600 MG: 800 TABLET, FILM COATED ORAL at 08:02

## 2018-02-18 RX ADMIN — CITALOPRAM HYDROBROMIDE 10 MG: 10 TABLET ORAL at 08:02

## 2018-02-18 RX ADMIN — SEVELAMER CARBONATE 1600 MG: 800 TABLET, FILM COATED ORAL at 12:02

## 2018-02-18 RX ADMIN — STANDARDIZED SENNA CONCENTRATE AND DOCUSATE SODIUM 2 TABLET: 8.6; 5 TABLET, FILM COATED ORAL at 08:02

## 2018-02-18 RX ADMIN — ATORVASTATIN CALCIUM 20 MG: 20 TABLET, FILM COATED ORAL at 08:02

## 2018-02-18 RX ADMIN — ASPIRIN 81 MG: 81 TABLET, COATED ORAL at 08:02

## 2018-02-18 RX ADMIN — SEVELAMER CARBONATE 1600 MG: 800 TABLET, FILM COATED ORAL at 06:02

## 2018-02-18 RX ADMIN — SEVELAMER CARBONATE 800 MG: 800 TABLET, FILM COATED ORAL at 09:02

## 2018-02-18 RX ADMIN — CINACALCET HYDROCHLORIDE 30 MG: 30 TABLET, COATED ORAL at 09:02

## 2018-02-18 RX ADMIN — GABAPENTIN 100 MG: 100 CAPSULE ORAL at 09:02

## 2018-02-18 NOTE — PLAN OF CARE
Problem: Patient Care Overview  Goal: Plan of Care Review  Outcome: Ongoing (interventions implemented as appropriate)  Reviewed plan of care with patient, wound care daily, elevation of legs, importance of fluid restriction, increased caloric intake, and elevation of legs. Wound care provided to bilateral feet per new orders, elevation maintained to legs, oral care provided, patient with one bowel movement, refused evening sevelemir and stool softener but no acute events overnight, see flowsheets for detailed assessment information, will continue to monitor.

## 2018-02-18 NOTE — PROGRESS NOTES
[facility]  Vascular Surgery  Progress Note    Patient Name: Wilder Carbajal  MRN: [id number]  Admission Date: 2/4/2018  Primary Care Provider: Dio Roberson MD    Subjective:     Interval History: no acute changes. Patient oriented to person and time, not place.    Post-Op Info:  Procedure(s) (LRB):  AMPUTATION-TOES 4TH AND 5TH, I&D (Right)   9 Days Post-Op       Medications:  Continuous Infusions:  Scheduled Meds:   ammonium lactate   Topical (Top) BID    aspirin  81 mg Oral Daily    atorvastatin  20 mg Oral Daily    cinacalcet  30 mg Oral QHS    ciprofloxacin HCl  500 mg Oral Daily    citalopram  10 mg Oral Daily    gabapentin  100 mg Oral TID    miconazole nitrate 2%   Topical (Top) BID    midodrine  10 mg Oral Every Mon, Wed, Fri    senna-docusate 8.6-50 mg  2 tablet Oral BID    sevelamer carbonate  1,600 mg Oral TID WM    sevelamer carbonate  800 mg Oral QHS     PRN Meds:sodium chloride 0.9%, acetaminophen, albumin human 25%, dextrose 50%, dextrose 50%, glucagon (human recombinant), glucose, glucose, ondansetron, oxyCODONE, sodium chloride 0.9%     Objective:     Vital Signs (Most Recent):  Temp: 98.5 °F (36.9 °C) (02/18/18 0827)  Pulse: (!) 56 (02/18/18 0827)  Resp: 18 (02/18/18 0827)  BP: 129/64 (02/18/18 0827)  SpO2: 98 % (02/18/18 0827) Vital Signs (24h Range):  Temp:  [97.4 °F (36.3 °C)-98.5 °F (36.9 °C)] 98.5 °F (36.9 °C)  Pulse:  [48-60] 56  Resp:  [16-20] 18  SpO2:  [90 %-100 %] 98 %  BP: (109-138)/(59-65) 129/64          Physical Exam   Constitutional: He appears well-developed.   Cardiovascular: Normal rate.    Femoral 2+ bilaterally  No palpable distal pulses  Right foot s/p 4-5 ray amp with exposed bone and open wound, dry gangrene, heel ulcer also dry gangrene   Pulmonary/Chest: Effort normal. No respiratory distress.   Abdominal: Soft. He exhibits no distension.   Musculoskeletal: Normal range of motion.   Venous stasis skin changes to thigh bilaterally   Skin: Skin  is warm and dry.       Significant Labs:  CBC:   Recent Labs  Lab 02/18/18  0527   WBC 11.84   RBC 2.66*   HGB 7.7*   HCT 24.3*      MCV 91   MCH 28.9   MCHC 31.7*     CMP:   Recent Labs  Lab 02/18/18  0527   GLU 63*   CALCIUM 8.8   ALBUMIN 1.8*   *   K 5.0   CO2 25      BUN 34*   CREATININE 5.3*     Coagulation: No results for input(s): LABPROT, INR, APTT in the last 48 hours.    Significant Diagnostics:      Assessment/Plan:     Foot ulceration, right, with fat layer exposed    66 yo M with h/o ESRD on HD (TThS) via L RC AVF (with interposition graft), HTN, HLD, DM 2( Hgb A1c 4.7), HFrEF (45% 4/2017)  presenting with fluid overload after missing dialysis with evidence of right diabetic foot ulceration with wet gangrene s/p 4-5th ray amputations.    Unlikely for the patient to heal the wound despite aggressive limb salvage efforts. Due to significant lipodermatosclerosis, BKA is not good option. Patient likely needs AKA. Patient currently refusing this; will d/w son.  Continue ASA, statin  Vascular surgery to follow                  Alexandrea Johnston MD  Vascular Surgery  [facility]

## 2018-02-18 NOTE — PROGRESS NOTES
Progress Note   Hospital Medicine       Team: [facility] D Montserrat Lino MD  Admit Date: 2/4/2018  Length of Stay:  LOS: 14 days   ESTUARDO 2/20/2018  Principal Problem:  Septic encephalopathy    Interval hx / overnight events: No new events. Patient is refusing BKA. Clearly states that he would rather die than have an amputation. Son and his brother feel that this aligns with his wishes when he is in normal mental status.     Areas of concern/ handoff: Will likely need right BKA. Early morning hypoglycemia    Review of Systems: Gen: no fever, no chills, Heart: no chest pain, palpitations; Resp: no SOB, no cough    Temp:  [97.4 °F (36.3 °C)-98.5 °F (36.9 °C)]   Pulse:  [47-60]   Resp:  [16-20]   BP: (124-138)/(58-65)   SpO2:  [90 %-100 %]       Physical Exam:  General appearance: NAD, conversant  Neck: FROM, supple   Lungs: crackles at bases, no accessory muscle use  CV: RRR, no heave  Abdomen: Soft, non-tender; no masses or HSM  Extremities: 2+ pitting edema up to his mid thighs and 3+ edema to his buttocks,  no digital cyanosis  Skin: no rash, lesions or ulcers  Psych: Alert and oriented to person, place and time      Recent Labs  Lab 02/16/18 0456 02/17/18 0449 02/18/18  0527   WBC 12.16 11.34 11.84   HGB 7.4* 7.5* 7.7*   HCT 22.2* 23.7* 24.3*    213 213       Recent Labs  Lab 02/16/18 0456 02/17/18  0449 02/18/18  0527   * 134* 135*   K 4.5 4.4 5.0   CL 98 103 101   CO2 26 21* 25   BUN 39* 28* 34*   CREATININE 6.0* 4.6* 5.3*   GLU 74 72 63*   CALCIUM 8.6* 8.3* 8.8   MG 2.3 2.1 2.3   PHOS 4.6* 4.3 4.7*       Recent Labs  Lab 02/16/18 0456 02/17/18 0449 02/18/18  0527   ALBUMIN 1.6* 1.6* 1.8*       Recent Labs  Lab 02/17/18  0803 02/17/18  1327 02/17/18  1837 02/17/18  2149 02/18/18  0824 02/18/18  1247   POCTGLUCOSE 87 123* 156* 104 97 173*         Assessment and Plan   Overview: 66 yo here for osteomyelitis of right foot. He underwent washout 2/6 and another washout 2/6 and amputation of 4th and  "5th digits. Undergoing treatment course of antibiotics.     Problems Addressed today:     Septic encephalopathy on possible dementia  Improved with treatment of infected foot. Patient lacked capacity to refuse surgery per psychiatry, consent obtained from his son as proxy.     Infected right dorsal foot, ischemic ulceration  Podiatry consulted - s/p I&D and 4th and 5th amputation  Vascular surgery consulted - initially no intervention required  Continued vancomycin 1 g IV per random levels and Zosyn 2.25 g IV BID  Wound culture revealed pan-sensitive CITROBACTER KOSERI and KLEBSIELLA PNEUMONIAE ESBL  To OR 02/09/18. Consulted ID.  Plan for wound vac and 6 weeks ciprofloxacin. ABIs ordered to rule out need for BKA. Angiogram considered. Per vascular surgery " Unlikely for the patient to heal the wound despite aggressive limb salvage efforts. Due to significant lipodermatosclerosis, BKA is not good option. Patient likely needs AKA." AKA or BKA not an option with patient. Son and brother in agreement with patient as this aligns with his preference when in his normal mental state. All are aware of the risk of recurrence of sepsis syndrome and death.      Hypervolemia due to non-compliance with dialysis  Hyperkalemia, resolved  Shifted in ER with albuterol, insulin/destrose, bicarbonate. K 7.9--> 4  No respiratory compromise. Emergent dialysis 2/5/18 early AM and on 2/9  Daily weights - 18 kg weight loss since admission  Withhold anti-hypertensives for volume removal  Would recommend aggressive volume removal as it would be assist in wound healing from his BKA  2/16 11kg weight loss thus far      DVT  BLE ultrasound  Apixaban 5 mg BID - ? Compliance at home  Apixaban held 2/12/2018 for probable BKA - will resume at discharge     DM II with HTN and CKD V  HgbA1c 4.6%  Likely controlled with declining intake  No further intervention needed at this time     Protein calorie malnutrtion  Hypoglycemia with associated " NSVT  Poor prognosis for wound healing  Nepro with meals and at night  Ice cream at night with night time meds and sevelamer     Essential HTN - hold home hydralazine 50 mg TID and losartan 50 mg daily     DM II hyperlipidemia - atorvastatin 40 mg daily     DM II neuropathy - gabapentin 100 mg TID     ESRF HD TTS - Nephrology consulted for dialysis.  Withholding anti-hypertensives and added midodrine as needed prior to dialysis for volume removal and reduce symptomatic dialysis-associated hypotension.   BP improving.     CKD MBD - continue cinacalcet 30 mg qhs and sevelamer 1600 mg TID daily     Depression - continue citalopram 10 mg daily. Consulted psychiatry.   Rule out delirium and dementia.  Back to baseline.    HIGH RISK CONDITION(S):   Patient has an abrupt change in neurologic status: delirium     DVT/ GI PPX: RODRI/SCDs. Apixaban on hold due to pending surgery    Goals of Care: Curative    Disposition: long-term half-way care      Montserrat Lino MD

## 2018-02-18 NOTE — SUBJECTIVE & OBJECTIVE
Interval Hx: S/p right 4th and 5th ray amputation (DOS: 1/9/18 per Dr. Matamoros).  Patient pain controlled. Dressing intact right foot.      Scheduled Meds:   ammonium lactate   Topical (Top) BID    aspirin  81 mg Oral Daily    atorvastatin  20 mg Oral Daily    cinacalcet  30 mg Oral QHS    ciprofloxacin HCl  500 mg Oral Daily    citalopram  10 mg Oral Daily    gabapentin  100 mg Oral TID    miconazole nitrate 2%   Topical (Top) BID    midodrine  10 mg Oral Every Mon, Wed, Fri    senna-docusate 8.6-50 mg  2 tablet Oral BID    sevelamer carbonate  1,600 mg Oral TID WM    sevelamer carbonate  800 mg Oral QHS     Continuous Infusions:  PRN Meds:sodium chloride 0.9%, acetaminophen, albumin human 25%, dextrose 50%, dextrose 50%, glucagon (human recombinant), glucose, glucose, ondansetron, oxyCODONE, sodium chloride 0.9%    Review of patient's allergies indicates:  No Known Allergies     Past Medical History:   Diagnosis Date    Acute pain of left shoulder 1/7/2017    Arthritis     Asthma     Benign neoplasm of eyelid     RUL    Blood clotting tendency     Blood transfusion     Cataract     Chronic kidney disease requiring chronic dialysis     Diabetes mellitus     Diabetic retinopathy     Fever blister     Hyperlipidemia     Hypertension     Infertility     Joint pain     Retinal detachment     Thyroid disease     Weakness 1/7/2017     Past Surgical History:   Procedure Laterality Date    CATARACT EXTRACTION      COLONOSCOPY N/A 6/23/2017    Procedure: COLONOSCOPY;  Surgeon: Vic Clark MD;  Location: [address];  Service: Endoscopy;  Laterality: N/A;    RETINAL DETACHMENT SURGERY         Family History     Problem Relation (Age of Onset)    Heart attack Mother    No Known Problems Father, Sister, Brother, Maternal Aunt, Maternal Uncle, Paternal Aunt, Paternal Uncle, Maternal Grandmother, Maternal Grandfather, Paternal Grandmother, Paternal Grandfather        Social History Main  Topics    Smoking status: Never Smoker    Smokeless tobacco: Never Used    Alcohol use No    Drug use: No    Sexual activity: Not on file     Review of Systems   Constitutional: Positive for activity change.   Respiratory: Negative for shortness of breath.    Cardiovascular: Negative for chest pain and leg swelling.   Gastrointestinal: Negative.  Negative for nausea and vomiting.   Genitourinary: Negative.    Musculoskeletal: Positive for arthralgias.   Skin: Positive for wound.   Neurological: Positive for numbness. Negative for weakness.     Objective:     Vital Signs (Most Recent):  Temp: 98.2 °F (36.8 °C) (02/17/18 1531)  Pulse: (!) 54 (02/17/18 1800)  Resp: 18 (02/17/18 1531)  BP: 138/63 (02/17/18 1531)  SpO2: (!) 92 % (02/17/18 1207) Vital Signs (24h Range):  Temp:  [97.4 °F (36.3 °C)-98.3 °F (36.8 °C)] 98.2 °F (36.8 °C)  Pulse:  [48-62] 54  Resp:  [16-20] 18  SpO2:  [86 %-93 %] 92 %  BP: (109-138)/(59-71) 138/63     Weight: 107.1 kg (236 lb 1.8 oz)  Body mass index is 35.9 kg/m².    Foot Exam    General  Orientation: disoriented       Right Foot/Ankle     Inspection and Palpation  Tenderness: none   Swelling: metatarsals   Skin Exam: blister, maceration, cellulitis, skin changes and ulcer;     Neurovascular  Dorsalis pedis: 1+  Posterior tibial: 1+  Saphenous nerve sensation: diminished  Tibial nerve sensation: diminished  Superficial peroneal nerve sensation: diminished  Deep peroneal nerve sensation: diminished  Sural nerve sensation: diminished      Left Foot/Ankle      Inspection and Palpation  Tenderness: none   Swelling: metatarsals   Skin Exam: skin changes and ulcer;     Neurovascular  Dorsalis pedis: 1+  Posterior tibial: 1+  Saphenous nerve sensation: diminished  Tibial nerve sensation: diminished  Superficial peroneal nerve sensation: diminished  Deep peroneal nerve sensation: diminished  Sural nerve sensation: diminished          Wound 1: Right dorsal foot ulceration    Measurement:[id number]  Base: granular, necrotic tissue alond plantar 3rd ray  Periwound skin: viable  Drainage: serosanguinous  Erythema: none  Probe probes to bone 4th and 5th metatarsal, extensor tendons exposed                            2/15/18                Wound 2: Right heel ulceration   Measurement: 2jwl9xch8.1cm  Base:  necrotic eschar base  Periwound skin: hyperkeratosis   Drainage:none  Probe: none, no bone exposed      Left foot ulceration noted to dorsal foot 2/16/18        Wound 1: Left dorsal foot   Measurement: 0opj1eex8.1cm.  Base: fibrous base  Periwound skin: mild erythema,   Drainage: none  Erythema: mild  Probe: none, no bone exposed    2/16/18      Wound 1: Left heel ulceration   Measurement: 7reg4ysq8.1cm.  Periwound skin: callus  Drainage: none  Erythema:mild   Probe: none, no bone exposed      Laboratory:  Xray: Right: There is DJD, soft tissue swelling, a calcaneal spur, and diabetic vascular calcifications. No fracture dislocation bone destruction seen.    Left: There is DJD, diabetic vascular calcifications, and Spurs on the calcaneus. No fracture dislocation bone destruction seen.    MRI: 1. Limited, incomplete nondiagnostic examination secondary to early termination at the of the request of the patient.    2. Linear T1 hypointensity traversing the third metacarpal head, concerning for possible nondisplaced, likely subacute fracture.  Correlation with point tenderness recommended.    PHILLIP: Impression:   Right Leg:Segmental pressures and PVR waveforms suggest mild to moderate peripheral arterial occlusive disease.     Left Leg:Segmental pressures and PVR waveforms suggest mild to moderate peripheral arterial occlusive disease.        Diagnostic Results:  X-Ray: I have reviewed all pertinent results/findings within the past 24 hours.  reviewed    Clinical Findings:  Concerns for worsening necrosis on right forefoot and heel wounds, worsening ischemia of toes.

## 2018-02-18 NOTE — PLAN OF CARE
Problem: Patient Care Overview  Goal: Plan of Care Review  Outcome: Ongoing (interventions implemented as appropriate)  Vital signs stable. No falls throughout shift. Oxycodone administered for pain. Blood glucose monitored. New IV placed by PICC team.

## 2018-02-18 NOTE — PROGRESS NOTES
[facility]  Podiatry  Progress Note    Patient Name: Wilder Carbajal  MRN: [id number]  Admission Date: 2/4/2018  Hospital Length of Stay: 13 days  Attending Physician: Montserrat Lino MD  Primary Care Provider: Dio Roberson MD   Interval Hx: S/p right 4th and 5th ray amputation (DOS: 1/9/18 per Dr. Matamoros).  Patient pain controlled. Dressing intact right foot.      Scheduled Meds:   ammonium lactate   Topical (Top) BID    aspirin  81 mg Oral Daily    atorvastatin  20 mg Oral Daily    cinacalcet  30 mg Oral QHS    ciprofloxacin HCl  500 mg Oral Daily    citalopram  10 mg Oral Daily    gabapentin  100 mg Oral TID    miconazole nitrate 2%   Topical (Top) BID    midodrine  10 mg Oral Every Mon, Wed, Fri    senna-docusate 8.6-50 mg  2 tablet Oral BID    sevelamer carbonate  1,600 mg Oral TID WM    sevelamer carbonate  800 mg Oral QHS     Continuous Infusions:  PRN Meds:sodium chloride 0.9%, acetaminophen, albumin human 25%, dextrose 50%, dextrose 50%, glucagon (human recombinant), glucose, glucose, ondansetron, oxyCODONE, sodium chloride 0.9%    Review of patient's allergies indicates:  No Known Allergies     Past Medical History:   Diagnosis Date    Acute pain of left shoulder 1/7/2017    Arthritis     Asthma     Benign neoplasm of eyelid     RUL    Blood clotting tendency     Blood transfusion     Cataract     Chronic kidney disease requiring chronic dialysis     Diabetes mellitus     Diabetic retinopathy     Fever blister     Hyperlipidemia     Hypertension     Infertility     Joint pain     Retinal detachment     Thyroid disease     Weakness 1/7/2017     Past Surgical History:   Procedure Laterality Date    CATARACT EXTRACTION      COLONOSCOPY N/A 6/23/2017    Procedure: COLONOSCOPY;  Surgeon: Vic Clark MD;  Location: [address];  Service: Endoscopy;  Laterality: N/A;    RETINAL DETACHMENT SURGERY         Family History     Problem Relation (Age of Onset)     Heart attack Mother    No Known Problems Father, Sister, Brother, Maternal Aunt, Maternal Uncle, Paternal Aunt, Paternal Uncle, Maternal Grandmother, Maternal Grandfather, Paternal Grandmother, Paternal Grandfather        Social History Main Topics    Smoking status: Never Smoker    Smokeless tobacco: Never Used    Alcohol use No    Drug use: No    Sexual activity: Not on file     Review of Systems   Constitutional: Positive for activity change.   Respiratory: Negative for shortness of breath.    Cardiovascular: Negative for chest pain and leg swelling.   Gastrointestinal: Negative.  Negative for nausea and vomiting.   Genitourinary: Negative.    Musculoskeletal: Positive for arthralgias.   Skin: Positive for wound.   Neurological: Positive for numbness. Negative for weakness.     Objective:     Vital Signs (Most Recent):  Temp: 98.2 °F (36.8 °C) (02/17/18 1531)  Pulse: (!) 54 (02/17/18 1800)  Resp: 18 (02/17/18 1531)  BP: 138/63 (02/17/18 1531)  SpO2: (!) 92 % (02/17/18 1207) Vital Signs (24h Range):  Temp:  [97.4 °F (36.3 °C)-98.3 °F (36.8 °C)] 98.2 °F (36.8 °C)  Pulse:  [48-62] 54  Resp:  [16-20] 18  SpO2:  [86 %-93 %] 92 %  BP: (109-138)/(59-71) 138/63     Weight: 107.1 kg (236 lb 1.8 oz)  Body mass index is 35.9 kg/m².    Foot Exam    General  Orientation: disoriented       Right Foot/Ankle     Inspection and Palpation  Tenderness: none   Swelling: metatarsals   Skin Exam: blister, maceration, cellulitis, skin changes and ulcer;     Neurovascular  Dorsalis pedis: 1+  Posterior tibial: 1+  Saphenous nerve sensation: diminished  Tibial nerve sensation: diminished  Superficial peroneal nerve sensation: diminished  Deep peroneal nerve sensation: diminished  Sural nerve sensation: diminished      Left Foot/Ankle      Inspection and Palpation  Tenderness: none   Swelling: metatarsals   Skin Exam: skin changes and ulcer;     Neurovascular  Dorsalis pedis: 1+  Posterior tibial: 1+  Saphenous nerve sensation:  diminished  Tibial nerve sensation: diminished  Superficial peroneal nerve sensation: diminished  Deep peroneal nerve sensation: diminished  Sural nerve sensation: diminished          Wound 1: Right dorsal foot ulceration   Measurement:[id number]  Base: granular, necrotic tissue alond plantar 3rd ray  Periwound skin: viable  Drainage: serosanguinous  Erythema: none  Probe probes to bone 4th and 5th metatarsal, extensor tendons exposed                            2/15/18                Wound 2: Right heel ulceration   Measurement: 3low0hsd9.1cm  Base:  necrotic eschar base  Periwound skin: hyperkeratosis   Drainage:none  Probe: none, no bone exposed      Left foot ulceration noted to dorsal foot 2/16/18        Wound 1: Left dorsal foot   Measurement: 3abg1eud1.1cm.  Base: fibrous base  Periwound skin: mild erythema,   Drainage: none  Erythema: mild  Probe: none, no bone exposed    2/16/18      Wound 1: Left heel ulceration   Measurement: 5dkd8sgs0.1cm.  Periwound skin: callus  Drainage: none  Erythema:mild   Probe: none, no bone exposed      Laboratory:  Xray: Right: There is DJD, soft tissue swelling, a calcaneal spur, and diabetic vascular calcifications. No fracture dislocation bone destruction seen.    Left: There is DJD, diabetic vascular calcifications, and Spurs on the calcaneus. No fracture dislocation bone destruction seen.    MRI: 1. Limited, incomplete nondiagnostic examination secondary to early termination at the of the request of the patient.    2. Linear T1 hypointensity traversing the third metacarpal head, concerning for possible nondisplaced, likely subacute fracture.  Correlation with point tenderness recommended.    PHILLIP: Impression:   Right Leg:Segmental pressures and PVR waveforms suggest mild to moderate peripheral arterial occlusive disease.     Left Leg:Segmental pressures and PVR waveforms suggest mild to moderate peripheral arterial occlusive disease.        Diagnostic Results:  X-Ray: I  have reviewed all pertinent results/findings within the past 24 hours.  reviewed    Clinical Findings:  Concerns for worsening necrosis on right forefoot and heel wounds, worsening ischemia of toes.    Assessment/Plan:     Foot ulceration, right, with fat layer exposed     Wilder Carbajal is a 67 y.o. 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 Matamoros),  Concerns of progressive ischemic changes to right foot wounds and toes.  Pt. At high risk for limb loss at this time.   -Vascular following, angio cancelled yesterday, consider AKA    -Right foot Dressed with betadine, 4x4's, lightly dressed with kerlix and ace.   -Nursing orders in for daily dressing changes  -Podiatry will follow.     Weightbearing Status: NWB  Offloading Device: heel protector boot                   ESRD (end stage renal disease)     Per Primary           Type 2 diabetes mellitus with stage 5 chronic kidney disease and hypertension     Per Primary            Marlen Figueroa MD  Podiatry  [facility]

## 2018-02-18 NOTE — ASSESSMENT & PLAN NOTE
68 yo M with h/o ESRD on HD (TThS) via L RC AVF (with interposition graft), HTN, HLD, DM 2( Hgb A1c 4.7), HFrEF (45% 4/2017)  presenting with fluid overload after missing dialysis with evidence of right diabetic foot ulceration with wet gangrene s/p 4-5th ray amputations.    Unlikely for the patient to heal the wound despite aggressive limb salvage efforts. Due to significant lipodermatosclerosis, BKA is not good option. Patient likely needs AKA. Patient currently refusing this; will d/w son.  Continue ASA, statin  Vascular surgery to follow

## 2018-02-18 NOTE — SUBJECTIVE & OBJECTIVE
Medications:  Continuous Infusions:  Scheduled Meds:   ammonium lactate   Topical (Top) BID    aspirin  81 mg Oral Daily    atorvastatin  20 mg Oral Daily    cinacalcet  30 mg Oral QHS    ciprofloxacin HCl  500 mg Oral Daily    citalopram  10 mg Oral Daily    gabapentin  100 mg Oral TID    miconazole nitrate 2%   Topical (Top) BID    midodrine  10 mg Oral Every Mon, Wed, Fri    senna-docusate 8.6-50 mg  2 tablet Oral BID    sevelamer carbonate  1,600 mg Oral TID WM    sevelamer carbonate  800 mg Oral QHS     PRN Meds:sodium chloride 0.9%, acetaminophen, albumin human 25%, dextrose 50%, dextrose 50%, glucagon (human recombinant), glucose, glucose, ondansetron, oxyCODONE, sodium chloride 0.9%     Objective:     Vital Signs (Most Recent):  Temp: 98.5 °F (36.9 °C) (02/18/18 0827)  Pulse: (!) 56 (02/18/18 0827)  Resp: 18 (02/18/18 0827)  BP: 129/64 (02/18/18 0827)  SpO2: 98 % (02/18/18 0827) Vital Signs (24h Range):  Temp:  [97.4 °F (36.3 °C)-98.5 °F (36.9 °C)] 98.5 °F (36.9 °C)  Pulse:  [48-60] 56  Resp:  [16-20] 18  SpO2:  [90 %-100 %] 98 %  BP: (109-138)/(59-65) 129/64          Physical Exam   Constitutional: He appears well-developed.   Cardiovascular: Normal rate.    Femoral 2+ bilaterally  No palpable distal pulses  Right foot s/p 4-5 ray amp with exposed bone and open wound, dry gangrene, heel ulcer also dry gangrene   Pulmonary/Chest: Effort normal. No respiratory distress.   Abdominal: Soft. He exhibits no distension.   Musculoskeletal: Normal range of motion.   Venous stasis skin changes to thigh bilaterally   Skin: Skin is warm and dry.       Significant Labs:  CBC:   Recent Labs  Lab 02/18/18 0527   WBC 11.84   RBC 2.66*   HGB 7.7*   HCT 24.3*      MCV 91   MCH 28.9   MCHC 31.7*     CMP:   Recent Labs  Lab 02/18/18 0527   GLU 63*   CALCIUM 8.8   ALBUMIN 1.8*   *   K 5.0   CO2 25      BUN 34*   CREATININE 5.3*     Coagulation: No results for input(s): LABPROT, INR, APTT in  the last 48 hours.    Significant Diagnostics:

## 2018-02-19 ENCOUNTER — TELEPHONE (OUTPATIENT)
Dept: PULMONOLOGY | Facility: CLINIC | Age: 68
End: 2018-02-19

## 2018-02-19 DIAGNOSIS — R09.02 HYPOXIA: Primary | ICD-10-CM

## 2018-02-19 LAB
ALBUMIN SERPL BCP-MCNC: 1.7 G/DL
ANION GAP SERPL CALC-SCNC: 10 MMOL/L
BASOPHILS # BLD AUTO: 0.04 K/UL
BASOPHILS NFR BLD: 0.4 %
BUN SERPL-MCNC: 40 MG/DL
CALCIUM SERPL-MCNC: 8.2 MG/DL
CHLORIDE SERPL-SCNC: 101 MMOL/L
CO2 SERPL-SCNC: 23 MMOL/L
CREAT SERPL-MCNC: 5.8 MG/DL
DIFFERENTIAL METHOD: ABNORMAL
EOSINOPHIL # BLD AUTO: 0.2 K/UL
EOSINOPHIL NFR BLD: 1.7 %
ERYTHROCYTE [DISTWIDTH] IN BLOOD BY AUTOMATED COUNT: 16.8 %
EST. GFR  (AFRICAN AMERICAN): 10.6 ML/MIN/1.73 M^2
EST. GFR  (NON AFRICAN AMERICAN): 9.2 ML/MIN/1.73 M^2
GLUCOSE SERPL-MCNC: 61 MG/DL
HCT VFR BLD AUTO: 21.8 %
HGB BLD-MCNC: 7 G/DL
IMM GRANULOCYTES # BLD AUTO: 0.08 K/UL
IMM GRANULOCYTES NFR BLD AUTO: 0.7 %
LYMPHOCYTES # BLD AUTO: 1.3 K/UL
LYMPHOCYTES NFR BLD: 11.4 %
MAGNESIUM SERPL-MCNC: 2.3 MG/DL
MCH RBC QN AUTO: 28.7 PG
MCHC RBC AUTO-ENTMCNC: 32.1 G/DL
MCV RBC AUTO: 89 FL
MONOCYTES # BLD AUTO: 1.4 K/UL
MONOCYTES NFR BLD: 12.2 %
NEUTROPHILS # BLD AUTO: 8.3 K/UL
NEUTROPHILS NFR BLD: 73.6 %
NRBC BLD-RTO: 0 /100 WBC
PHOSPHATE SERPL-MCNC: 5.2 MG/DL
PLATELET # BLD AUTO: 203 K/UL
PMV BLD AUTO: 9.7 FL
POCT GLUCOSE: 106 MG/DL (ref 70–110)
POCT GLUCOSE: 121 MG/DL (ref 70–110)
POCT GLUCOSE: 137 MG/DL (ref 70–110)
POTASSIUM SERPL-SCNC: 5.3 MMOL/L
RBC # BLD AUTO: 2.44 M/UL
SODIUM SERPL-SCNC: 134 MMOL/L
WBC # BLD AUTO: 11.28 K/UL

## 2018-02-19 PROCEDURE — 36415 COLL VENOUS BLD VENIPUNCTURE: CPT

## 2018-02-19 PROCEDURE — 99233 SBSQ HOSP IP/OBS HIGH 50: CPT | Mod: ,,, | Performed by: PODIATRIST

## 2018-02-19 PROCEDURE — 25000003 PHARM REV CODE 250: Performed by: INTERNAL MEDICINE

## 2018-02-19 PROCEDURE — 99232 SBSQ HOSP IP/OBS MODERATE 35: CPT | Mod: ,,, | Performed by: INTERNAL MEDICINE

## 2018-02-19 PROCEDURE — 90935 HEMODIALYSIS ONE EVALUATION: CPT | Mod: ,,, | Performed by: INTERNAL MEDICINE

## 2018-02-19 PROCEDURE — 27000207 HC ISOLATION

## 2018-02-19 PROCEDURE — 25000003 PHARM REV CODE 250: Performed by: PHYSICIAN ASSISTANT

## 2018-02-19 PROCEDURE — 63600175 PHARM REV CODE 636 W HCPCS: Mod: JG | Performed by: INTERNAL MEDICINE

## 2018-02-19 PROCEDURE — 11000001 HC ACUTE MED/SURG PRIVATE ROOM

## 2018-02-19 PROCEDURE — P9047 ALBUMIN (HUMAN), 25%, 50ML: HCPCS | Mod: JG | Performed by: INTERNAL MEDICINE

## 2018-02-19 PROCEDURE — 85025 COMPLETE CBC W/AUTO DIFF WBC: CPT

## 2018-02-19 PROCEDURE — 99232 SBSQ HOSP IP/OBS MODERATE 35: CPT | Mod: GC,,, | Performed by: SURGERY

## 2018-02-19 PROCEDURE — 80069 RENAL FUNCTION PANEL: CPT

## 2018-02-19 PROCEDURE — 83735 ASSAY OF MAGNESIUM: CPT

## 2018-02-19 PROCEDURE — 90935 HEMODIALYSIS ONE EVALUATION: CPT

## 2018-02-19 RX ORDER — IPRATROPIUM BROMIDE AND ALBUTEROL SULFATE 2.5; .5 MG/3ML; MG/3ML
3 SOLUTION RESPIRATORY (INHALATION) EVERY 4 HOURS PRN
Status: DISCONTINUED | OUTPATIENT
Start: 2018-02-19 | End: 2018-03-06 | Stop reason: HOSPADM

## 2018-02-19 RX ADMIN — Medication: at 02:02

## 2018-02-19 RX ADMIN — CITALOPRAM HYDROBROMIDE 10 MG: 10 TABLET ORAL at 02:02

## 2018-02-19 RX ADMIN — GABAPENTIN 100 MG: 100 CAPSULE ORAL at 05:02

## 2018-02-19 RX ADMIN — MIDODRINE HYDROCHLORIDE 10 MG: 5 TABLET ORAL at 08:02

## 2018-02-19 RX ADMIN — OXYCODONE HYDROCHLORIDE 5 MG: 5 TABLET ORAL at 05:02

## 2018-02-19 RX ADMIN — SEVELAMER CARBONATE 1600 MG: 800 TABLET, FILM COATED ORAL at 05:02

## 2018-02-19 RX ADMIN — GABAPENTIN 100 MG: 100 CAPSULE ORAL at 02:02

## 2018-02-19 RX ADMIN — CINACALCET HYDROCHLORIDE 30 MG: 30 TABLET, COATED ORAL at 09:02

## 2018-02-19 RX ADMIN — GABAPENTIN 100 MG: 100 CAPSULE ORAL at 09:02

## 2018-02-19 RX ADMIN — MICONAZOLE NITRATE: 20 OINTMENT TOPICAL at 09:02

## 2018-02-19 RX ADMIN — ASPIRIN 81 MG: 81 TABLET, COATED ORAL at 02:02

## 2018-02-19 RX ADMIN — SEVELAMER CARBONATE 1600 MG: 800 TABLET, FILM COATED ORAL at 02:02

## 2018-02-19 RX ADMIN — Medication: at 09:02

## 2018-02-19 RX ADMIN — OXYCODONE HYDROCHLORIDE 5 MG: 5 TABLET ORAL at 10:02

## 2018-02-19 RX ADMIN — SODIUM CHLORIDE: 0.9 INJECTION, SOLUTION INTRAVENOUS at 09:02

## 2018-02-19 RX ADMIN — STANDARDIZED SENNA CONCENTRATE AND DOCUSATE SODIUM 2 TABLET: 8.6; 5 TABLET, FILM COATED ORAL at 09:02

## 2018-02-19 RX ADMIN — CIPROFLOXACIN HYDROCHLORIDE 500 MG: 250 TABLET, FILM COATED ORAL at 02:02

## 2018-02-19 RX ADMIN — SEVELAMER CARBONATE 800 MG: 800 TABLET, FILM COATED ORAL at 09:02

## 2018-02-19 RX ADMIN — MICONAZOLE NITRATE: 20 OINTMENT TOPICAL at 02:02

## 2018-02-19 RX ADMIN — OXYCODONE HYDROCHLORIDE 5 MG: 5 TABLET ORAL at 09:02

## 2018-02-19 RX ADMIN — ALBUMIN (HUMAN) 25 G: 12.5 SOLUTION INTRAVENOUS at 08:02

## 2018-02-19 RX ADMIN — ATORVASTATIN CALCIUM 20 MG: 20 TABLET, FILM COATED ORAL at 02:02

## 2018-02-19 RX ADMIN — SEVELAMER CARBONATE 1600 MG: 800 TABLET, FILM COATED ORAL at 09:02

## 2018-02-19 NOTE — ASSESSMENT & PLAN NOTE
Stable ulceration to left dorsal foot and heel. Painted with betadine wrapped with kerlix and ACE.

## 2018-02-19 NOTE — PROGRESS NOTES
[facility]  Podiatry  Progress Note    Patient Name: Wilder Carbajal  MRN: [id number]  Admission Date: 2/4/2018  Hospital Length of Stay: 15 days  Attending Physician: Montserrat Lino MD  Primary Care Provider: Dio Roberson MD   Interval Hx: S/p right 4th and 5th ray amputation (DOS: 1/9/18 per Dr. Matamoros). Pt. Seen in dialysis.     Scheduled Meds:   ammonium lactate   Topical (Top) BID    aspirin  81 mg Oral Daily    atorvastatin  20 mg Oral Daily    cinacalcet  30 mg Oral QHS    ciprofloxacin HCl  500 mg Oral Daily    citalopram  10 mg Oral Daily    gabapentin  100 mg Oral TID    miconazole nitrate 2%   Topical (Top) BID    midodrine  10 mg Oral Every Mon, Wed, Fri    senna-docusate 8.6-50 mg  2 tablet Oral BID    sevelamer carbonate  1,600 mg Oral TID WM    sevelamer carbonate  800 mg Oral QHS     Continuous Infusions:  PRN Meds:sodium chloride 0.9%, sodium chloride 0.9%, acetaminophen, albumin human 25%, albumin human 25%, albuterol-ipratropium 2.5mg-0.5mg/3mL, dextrose 50%, dextrose 50%, glucagon (human recombinant), glucose, glucose, ondansetron, oxyCODONE, sodium chloride 0.9%    Review of patient's allergies indicates:  No Known Allergies     Past Medical History:   Diagnosis Date    Acute pain of left shoulder 1/7/2017    Arthritis     Asthma     Benign neoplasm of eyelid     RUL    Blood clotting tendency     Blood transfusion     Cataract     Chronic kidney disease requiring chronic dialysis     Diabetes mellitus     Diabetic retinopathy     Fever blister     Hyperlipidemia     Hypertension     Infertility     Joint pain     Retinal detachment     Thyroid disease     Weakness 1/7/2017     Past Surgical History:   Procedure Laterality Date    CATARACT EXTRACTION      COLONOSCOPY N/A 6/23/2017    Procedure: COLONOSCOPY;  Surgeon: Vic Clark MD;  Location: [address]);  Service: Endoscopy;  Laterality: N/A;    RETINAL DETACHMENT SURGERY          Family History     Problem Relation (Age of Onset)    Heart attack Mother    No Known Problems Father, Sister, Brother, Maternal Aunt, Maternal Uncle, Paternal Aunt, Paternal Uncle, Maternal Grandmother, Maternal Grandfather, Paternal Grandmother, Paternal Grandfather        Social History Main Topics    Smoking status: Never Smoker    Smokeless tobacco: Never Used    Alcohol use No    Drug use: No    Sexual activity: Not on file     Review of Systems   Constitutional: Positive for activity change.   Respiratory: Negative for shortness of breath.    Cardiovascular: Negative for chest pain and leg swelling.   Gastrointestinal: Negative.  Negative for nausea and vomiting.   Genitourinary: Negative.    Musculoskeletal: Positive for arthralgias.   Skin: Positive for wound.   Neurological: Positive for numbness. Negative for weakness.     Objective:     Vital Signs (Most Recent):  Temp: 98.5 °F (36.9 °C) (02/19/18 1516)  Pulse: 61 (02/19/18 1516)  Resp: 20 (02/19/18 1516)  BP: (!) 169/82 (02/19/18 1516)  SpO2: (!) 91 % (02/19/18 1516) Vital Signs (24h Range):  Temp:  [97.2 °F (36.2 °C)-99 °F (37.2 °C)] 98.5 °F (36.9 °C)  Pulse:  [54-66] 61  Resp:  [16-20] 20  SpO2:  [87 %-95 %] 91 %  BP: (104-169)/(45-82) 169/82     Weight: 105.6 kg (232 lb 12.9 oz)  Body mass index is 35.4 kg/m².    Foot Exam    General  Orientation: disoriented       Right Foot/Ankle     Inspection and Palpation  Tenderness: none   Swelling: metatarsals   Skin Exam: blister, maceration, cellulitis, skin changes and ulcer;     Neurovascular  Dorsalis pedis: 1+  Posterior tibial: 1+  Saphenous nerve sensation: diminished  Tibial nerve sensation: diminished  Superficial peroneal nerve sensation: diminished  Deep peroneal nerve sensation: diminished  Sural nerve sensation: diminished      Left Foot/Ankle      Inspection and Palpation  Tenderness: none   Swelling: metatarsals   Skin Exam: skin changes and ulcer;     Neurovascular  Dorsalis pedis:  1+  Posterior tibial: 1+  Saphenous nerve sensation: diminished  Tibial nerve sensation: diminished  Superficial peroneal nerve sensation: diminished  Deep peroneal nerve sensation: diminished  Sural nerve sensation: diminished          Wound 1: Right dorsal foot ulceration   Measurement:[id number]  Base: granular, necrotic tissue alond plantar 3rd ray  Periwound skin: viable  Drainage: serosanguinous  Erythema: none  Probe probes to bone 4th and 5th metatarsal, extensor tendons exposed                                2/19/18                    Wound 2: Right heel ulceration   Measurement: 0ayp3pyd3.1cm  Base:  necrotic eschar base  Periwound skin: hyperkeratosis   Drainage:none  Probe: none, no bone exposed      Left foot ulceration noted to dorsal foot 2/19/18            Wound 1: Left dorsal foot   Measurement: 1whm0jqj4.1cm.  Base: fibrous base  Periwound skin: mild erythema,   Drainage: none  Erythema: mild  Probe: none, no bone exposed    2/19/18          Wound 1: Left heel ulceration   Measurement: 2pfc0vqc3.1cm.  Periwound skin: callus  Drainage: none  Erythema:mild   Probe: none, no bone exposed      Laboratory:  Xray: Right: There is DJD, soft tissue swelling, a calcaneal spur, and diabetic vascular calcifications. No fracture dislocation bone destruction seen.    Left: There is DJD, diabetic vascular calcifications, and Spurs on the calcaneus. No fracture dislocation bone destruction seen.    MRI: 1. Limited, incomplete nondiagnostic examination secondary to early termination at the of the request of the patient.    2. Linear T1 hypointensity traversing the third metacarpal head, concerning for possible nondisplaced, likely subacute fracture.  Correlation with point tenderness recommended.    PHILLIP: Impression:   Right Leg:Segmental pressures and PVR waveforms suggest mild to moderate peripheral arterial occlusive disease.     Left Leg:Segmental pressures and PVR waveforms suggest mild to moderate  peripheral arterial occlusive disease.        Diagnostic Results:  X-Ray: I have reviewed all pertinent results/findings within the past 24 hours.  reviewed    Clinical Findings:  Concerns for worsening necrosis on right forefoot and heel wounds, worsening ischemia of toes.    Assessment/Plan:     Foot ulceration, left, with unspecified severity    Stable ulceration to left dorsal foot and heel. Painted with betadine wrapped with kerlix and ACE.         Foot ulceration, right, with fat layer exposed    Wilder Carbajal is a 67 y.o. 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 Matamoros),  Concerns of progressive ischemic changes to right foot wounds and toes.  Pt. At high risk for limb loss at this time.   - Vascular following, angio cancelled yesterday, plan for R BKA vs. AKA pending consent from son   -  Right foot Dressed with betadine, 4x4's, lightly dressed with kerlix and ace.  Left foot paitned with betadine and dressed with island dressings  Nursing orders placed for daily dressing changes.   -ID on board, appreciate recs  -Podiatry will follow.    Weightbearing Status: NWB  Offloading Device: heel protector boot                ESRD (end stage renal disease)    Per Primary           Type 2 diabetes mellitus with stage 5 chronic kidney disease and hypertension    Per Primary               Ángela Marks MD  Podiatry  [facility]

## 2018-02-19 NOTE — PT/OT/SLP PROGRESS
Occupational Therapy      Patient Name:  Wilder Carbajal   MRN:  [id number]    Patient not seen today secondary to pt off floor to Dialysis for 2 attempts. Will follow-up on next scheduled visit.    Karo Castrejon OT  2/19/2018

## 2018-02-19 NOTE — PLAN OF CARE
Sw did upload 4 SNF referrals per RICARDO's contracts to [facility], [facility], [facility] and [facility]. Sw to follow with referrals.

## 2018-02-19 NOTE — PROGRESS NOTES
Progress Note   Hospital Medicine       Team: [facility] MED D Montserrat Lino MD  Admit Date: 2/4/2018  Length of Stay:  LOS: 15 days   ESTUARDO 2/21/2018  Principal Problem:  Septic encephalopathy    Interval hx / overnight events: No new events. Patient is refusing BKA. Clearly states that he would rather die than have an amputation. Son and his brother feel that this aligns with his wishes when he is in normal mental status.     Areas of concern/ handoff:  Early morning hypoglycemia    Review of Systems: Gen: no fever, no chills, Heart: no chest pain, palpitations; Resp: no SOB, no cough    Temp:  [97.2 °F (36.2 °C)-99 °F (37.2 °C)]   Pulse:  [52-66]   Resp:  [16-20]   BP: (104-165)/(45-77)   SpO2:  [87 %-95 %]       Physical Exam:  General appearance: NAD, conversant  Neck: FROM, supple   Lungs: crackles at bases, no accessory muscle use  CV: RRR, no heave  Abdomen: Soft, non-tender; no masses or HSM  Extremities: 2+ pitting edema up to his mid thighs and 3+ edema to his buttocks,  no digital cyanosis  Skin: no rash, lesions or ulcers  Psych: Alert and oriented to person, place and time      Recent Labs  Lab 02/17/18 0449 02/18/18  0527 02/19/18  0408   WBC 11.34 11.84 11.28   HGB 7.5* 7.7* 7.0*   HCT 23.7* 24.3* 21.8*    213 203       Recent Labs  Lab 02/17/18 0449 02/18/18  0527 02/19/18  0408   * 135* 134*   K 4.4 5.0 5.3*    101 101   CO2 21* 25 23   BUN 28* 34* 40*   CREATININE 4.6* 5.3* 5.8*   GLU 72 63* 61*   CALCIUM 8.3* 8.8 8.2*   MG 2.1 2.3 2.3   PHOS 4.3 4.7* 5.2*       Recent Labs  Lab 02/17/18 0449 02/18/18  0527 02/19/18  0408   ALBUMIN 1.6* 1.8* 1.7*       Recent Labs  Lab 02/17/18  2149 02/18/18  0824 02/18/18  1247 02/18/18  1805 02/18/18  2153 02/19/18  1219   POCTGLUCOSE 104 97 173* 154* 89 106         Assessment and Plan   Overview: 66 yo here for osteomyelitis of right foot. He underwent washout 2/6 and another washout 2/6 and amputation of 4th and 5th digits. Undergoing  "treatment course of antibiotics.     Problems Addressed today:     Septic encephalopathy on possible dementia  Improved with treatment of infected foot. Patient lacked capacity to refuse surgery per psychiatry, consent obtained from his son as proxy.     Infected right dorsal foot, ischemic ulceration  Podiatry consulted - s/p I&D and 4th and 5th amputation  Vascular surgery consulted - initially no intervention required  Continued vancomycin 1 g IV per random levels and Zosyn 2.25 g IV BID  Wound culture revealed pan-sensitive CITROBACTER KOSERI and KLEBSIELLA PNEUMONIAE ESBL  To OR 02/09/18. Consulted ID.  Plan for wound vac and 6 weeks ciprofloxacin. ABIs ordered to rule out need for BKA. Angiogram considered. Per vascular surgery " Unlikely for the patient to heal the wound despite aggressive limb salvage efforts. Due to significant lipodermatosclerosis, BKA is not good option. Patient likely needs AKA." AKA or BKA not an option with patient. Son and brother in agreement with patient as this aligns with his preference when in his normal mental state. All are aware of the risk of recurrence of sepsis syndrome and death.      Hypervolemia due to non-compliance with dialysis  Hyperkalemia, resolved  Shifted in ER with albuterol, insulin/destrose, bicarbonate. K 7.9--> 4  No respiratory compromise. Emergent dialysis 2/5/18 early AM and on 2/9  Withhold anti-hypertensives for volume removal      DVT  BLE ultrasound  Apixaban 5 mg BID - ? Compliance at home  Apixaban held 2/12/2018 for probable BKA - will resume at discharge     DM II with HTN and CKD V  HgbA1c 4.6%  Likely controlled with declining intake  No further intervention needed at this time     Protein calorie malnutrtion  Hypoglycemia with associated NSVT  Poor prognosis for wound healing  Nepro with meals and at night  Ice cream at night with night time meds and sevelamer     Essential HTN - hold home hydralazine 50 mg TID and losartan 50 mg daily. Blood " pressure fairly well-controlled during stay without antihypertensives.     DM II hyperlipidemia - atorvastatin 40 mg daily     DM II neuropathy - gabapentin 100 mg TID     ESRF HD TTS - Nephrology consulted for dialysis.  Withholding anti-hypertensives and added midodrine as needed prior to dialysis for volume removal and reduce symptomatic dialysis-associated hypotension.   BP improving.     CKD MBD - continue cinacalcet 30 mg qhs and sevelamer 1600 mg TID daily     Depression - continue citalopram 10 mg daily. Consulted psychiatry.   Rule out delirium and dementia.  Back to baseline.    HIGH RISK CONDITION(S):   Patient has an abrupt change in neurologic status: delirium     DVT/ GI PPX: RODRI/SCDs. Apixaban on hold due to pending surgery    Goals of Care: Curative    Disposition: long-term care home care      Montserrat Lino MD

## 2018-02-19 NOTE — PT/OT/SLP PROGRESS
Physical Therapy      Patient Name:  Wilder Carbajal   MRN:  [id number]    Patient not seen today secondary to Dialysis. Pt off unit for 2 attempts.  Will follow-up on next scheduled visit.    Nani Cota, PT, DPT  2/19/2018

## 2018-02-19 NOTE — ASSESSMENT & PLAN NOTE
Wilder Carbajal is a 67 y.o. 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 Matamoros),  Concerns of progressive ischemic changes to right foot wounds and toes.  Pt. At high risk for limb loss at this time.   - Vascular following, angio cancelled yesterday, plan for R BKA vs. AKA pending consent from son   -  Right foot Dressed with betadine, 4x4's, lightly dressed with kerlix and ace.  Left foot paitned with betadine and dressed with island dressings  Nursing orders placed for daily dressing changes.   -ID on board, appreciate recs  -Podiatry will follow.    Weightbearing Status: NWB  Offloading Device: heel protector boot

## 2018-02-19 NOTE — ASSESSMENT & PLAN NOTE
Wilder Carbajal is a 67 year old male with ESRD on Hd. iHD TTS at [facility], duration of 4h 15 min, , Left AVF.    Plan:  - Patient will have dialysis today for clearance and volume removal, duration of 4 hrs, UF 2-2.5 L as tolerated by patient, maintain MAP>65, bath will be adjusted to chem.  - Had albumin given during dialysis, Blood pressures have been stable  - Mineral bone disease: continue with phosphate binders, continue with cinacalcet,  SZE=962, Ca =8.2   - Diet: Renal (low sodium, low phosphate and low potassium)

## 2018-02-19 NOTE — PROGRESS NOTES
Pt refusing amputation yesterday.  Multiple attempts to reach son unsuccessful.    Will attempt to contact son again today.    Pt will have a non-functional extremity given severe tissue loss  Only option is AKA which is also a high risk procedure given significant edema throughout entire extremity      Continue to elevate and keep wrapped to hip with ace wrap as much as possible    Vascular will follow    Vish Mora MD  PGY-III  [phone]    Addendum: Again attempted to contact patient's son, Flavio Carbajal today, unable to reach.   Patient appears to be more capacitated with ability to understand that his current foot wound has high likelihood of ascending infection, septic shock and death.  Will defer amputation at this time per patient's wishes.  Please call vascular surgery with any changes.    Alexandrea Johnston,   PGY-7, Vascular fellow   [phone]

## 2018-02-19 NOTE — PLAN OF CARE
POD # 9 S/P Wash Out right foot.   Podiatry and Vascular recommending BKA vs AKA. Pt. Is refusing. If amputation is not done, pt. will need wound vac with 6 weeks Ciprofloxin. Medical team working with son and brother with decision making. SW/CM will continue to follow. D/C needs to be determined.       02/19/18 1684   Right Care Assessment   Can the patient answer the patient profile reliably? No, cognitively impaired   How often would a person be available to care for the patient? Infrequently   Describe the patient's ability to walk at the present time. Major restrictions/daily assistance from another person   How does the patient rate their overall health at the present time? Poor   Number of comorbid conditions (as recorded on the chart) Two

## 2018-02-19 NOTE — SUBJECTIVE & OBJECTIVE
Interval Hx: S/p right 4th and 5th ray amputation (DOS: 1/9/18 per Dr. Matamoros). Pt. Seen in dialysis.     Scheduled Meds:   ammonium lactate   Topical (Top) BID    aspirin  81 mg Oral Daily    atorvastatin  20 mg Oral Daily    cinacalcet  30 mg Oral QHS    ciprofloxacin HCl  500 mg Oral Daily    citalopram  10 mg Oral Daily    gabapentin  100 mg Oral TID    miconazole nitrate 2%   Topical (Top) BID    midodrine  10 mg Oral Every Mon, Wed, Fri    senna-docusate 8.6-50 mg  2 tablet Oral BID    sevelamer carbonate  1,600 mg Oral TID WM    sevelamer carbonate  800 mg Oral QHS     Continuous Infusions:  PRN Meds:sodium chloride 0.9%, sodium chloride 0.9%, acetaminophen, albumin human 25%, albumin human 25%, albuterol-ipratropium 2.5mg-0.5mg/3mL, dextrose 50%, dextrose 50%, glucagon (human recombinant), glucose, glucose, ondansetron, oxyCODONE, sodium chloride 0.9%    Review of patient's allergies indicates:  No Known Allergies     Past Medical History:   Diagnosis Date    Acute pain of left shoulder 1/7/2017    Arthritis     Asthma     Benign neoplasm of eyelid     RUL    Blood clotting tendency     Blood transfusion     Cataract     Chronic kidney disease requiring chronic dialysis     Diabetes mellitus     Diabetic retinopathy     Fever blister     Hyperlipidemia     Hypertension     Infertility     Joint pain     Retinal detachment     Thyroid disease     Weakness 1/7/2017     Past Surgical History:   Procedure Laterality Date    CATARACT EXTRACTION      COLONOSCOPY N/A 6/23/2017    Procedure: COLONOSCOPY;  Surgeon: Vic Clark MD;  Location: [address];  Service: Endoscopy;  Laterality: N/A;    RETINAL DETACHMENT SURGERY         Family History     Problem Relation (Age of Onset)    Heart attack Mother    No Known Problems Father, Sister, Brother, Maternal Aunt, Maternal Uncle, Paternal Aunt, Paternal Uncle, Maternal Grandmother, Maternal Grandfather, Paternal Grandmother,  Paternal Grandfather        Social History Main Topics    Smoking status: Never Smoker    Smokeless tobacco: Never Used    Alcohol use No    Drug use: No    Sexual activity: Not on file     Review of Systems   Constitutional: Positive for activity change.   Respiratory: Negative for shortness of breath.    Cardiovascular: Negative for chest pain and leg swelling.   Gastrointestinal: Negative.  Negative for nausea and vomiting.   Genitourinary: Negative.    Musculoskeletal: Positive for arthralgias.   Skin: Positive for wound.   Neurological: Positive for numbness. Negative for weakness.     Objective:     Vital Signs (Most Recent):  Temp: 98.5 °F (36.9 °C) (02/19/18 1516)  Pulse: 61 (02/19/18 1516)  Resp: 20 (02/19/18 1516)  BP: (!) 169/82 (02/19/18 1516)  SpO2: (!) 91 % (02/19/18 1516) Vital Signs (24h Range):  Temp:  [97.2 °F (36.2 °C)-99 °F (37.2 °C)] 98.5 °F (36.9 °C)  Pulse:  [54-66] 61  Resp:  [16-20] 20  SpO2:  [87 %-95 %] 91 %  BP: (104-169)/(45-82) 169/82     Weight: 105.6 kg (232 lb 12.9 oz)  Body mass index is 35.4 kg/m².    Foot Exam    General  Orientation: disoriented       Right Foot/Ankle     Inspection and Palpation  Tenderness: none   Swelling: metatarsals   Skin Exam: blister, maceration, cellulitis, skin changes and ulcer;     Neurovascular  Dorsalis pedis: 1+  Posterior tibial: 1+  Saphenous nerve sensation: diminished  Tibial nerve sensation: diminished  Superficial peroneal nerve sensation: diminished  Deep peroneal nerve sensation: diminished  Sural nerve sensation: diminished      Left Foot/Ankle      Inspection and Palpation  Tenderness: none   Swelling: metatarsals   Skin Exam: skin changes and ulcer;     Neurovascular  Dorsalis pedis: 1+  Posterior tibial: 1+  Saphenous nerve sensation: diminished  Tibial nerve sensation: diminished  Superficial peroneal nerve sensation: diminished  Deep peroneal nerve sensation: diminished  Sural nerve sensation: diminished          Wound 1: Right  dorsal foot ulceration   Measurement:[id number]  Base: granular, necrotic tissue alond plantar 3rd ray  Periwound skin: viable  Drainage: serosanguinous  Erythema: none  Probe probes to bone 4th and 5th metatarsal, extensor tendons exposed                                2/19/18                    Wound 2: Right heel ulceration   Measurement: 3ovk9kry2.1cm  Base:  necrotic eschar base  Periwound skin: hyperkeratosis   Drainage:none  Probe: none, no bone exposed      Left foot ulceration noted to dorsal foot 2/19/18            Wound 1: Left dorsal foot   Measurement: 6fkt8lbo7.1cm.  Base: fibrous base  Periwound skin: mild erythema,   Drainage: none  Erythema: mild  Probe: none, no bone exposed    2/19/18          Wound 1: Left heel ulceration   Measurement: 3vax3sbm7.1cm.  Periwound skin: callus  Drainage: none  Erythema:mild   Probe: none, no bone exposed      Laboratory:  Xray: Right: There is DJD, soft tissue swelling, a calcaneal spur, and diabetic vascular calcifications. No fracture dislocation bone destruction seen.    Left: There is DJD, diabetic vascular calcifications, and Spurs on the calcaneus. No fracture dislocation bone destruction seen.    MRI: 1. Limited, incomplete nondiagnostic examination secondary to early termination at the of the request of the patient.    2. Linear T1 hypointensity traversing the third metacarpal head, concerning for possible nondisplaced, likely subacute fracture.  Correlation with point tenderness recommended.    PHILLIP: Impression:   Right Leg:Segmental pressures and PVR waveforms suggest mild to moderate peripheral arterial occlusive disease.     Left Leg:Segmental pressures and PVR waveforms suggest mild to moderate peripheral arterial occlusive disease.        Diagnostic Results:  X-Ray: I have reviewed all pertinent results/findings within the past 24 hours.  reviewed    Clinical Findings:  Concerns for worsening necrosis on right forefoot and heel wounds, worsening  ischemia of toes.

## 2018-02-19 NOTE — PLAN OF CARE
Pt stable for SNF/NH placement per staff and family refusing any more medical intervention. Sw to follow with [facility] approved facilities.

## 2018-02-19 NOTE — PLAN OF CARE
Problem: Patient Care Overview  Goal: Plan of Care Review  Outcome: Ongoing (interventions implemented as appropriate)  Patient is AAOx4. Vital signs stable. No falls throughout shift. No complaints of pain. Blood glucose monitored.

## 2018-02-19 NOTE — SUBJECTIVE & OBJECTIVE
Interval History:   Patient evaluated at bedside, no significant event overnight, blood pressures have been stable during dialysis, no distress.     Review of patient's allergies indicates:  No Known Allergies  Current Facility-Administered Medications   Medication Frequency    0.9%  NaCl infusion PRN    0.9%  NaCl infusion PRN    0.9%  NaCl infusion Once    acetaminophen tablet 650 mg Q6H PRN    albumin human 25% bottle 12.5 g PRN    albumin human 25% bottle 25 g PRN    albuterol-ipratropium 2.5mg-0.5mg/3mL nebulizer solution 3 mL Q4H PRN    ammonium lactate 12 % lotion BID    aspirin EC tablet 81 mg Daily    atorvastatin tablet 20 mg Daily    cinacalcet tablet 30 mg QHS    ciprofloxacin HCl tablet 500 mg Daily    citalopram tablet 10 mg Daily    dextrose 50% injection 12.5 g PRN    dextrose 50% injection 25 g PRN    gabapentin capsule 100 mg TID    glucagon (human recombinant) injection 1 mg PRN    glucose chewable tablet 16 g PRN    glucose chewable tablet 24 g PRN    miconazole nitrate 2% ointment BID    midodrine tablet 10 mg Every Mon, Wed, Fri    ondansetron disintegrating tablet 8 mg Q8H PRN    oxyCODONE immediate release tablet 5 mg Q6H PRN    senna-docusate 8.6-50 mg per tablet 2 tablet BID    sevelamer carbonate tablet 1,600 mg TID WM    sevelamer carbonate tablet 800 mg QHS    sodium chloride 0.9% flush 5 mL PRN       Objective:     Vital Signs (Most Recent):  Temp: 98.1 °F (36.7 °C) (02/19/18 0720)  Pulse: 63 (02/19/18 0720)  Resp: 20 (02/19/18 0720)  BP: (!) 115/58 (02/19/18 0720)  SpO2: (!) 91 % (02/19/18 0720)  O2 Device (Oxygen Therapy): room air (02/19/18 0720) Vital Signs (24h Range):  Temp:  [97.5 °F (36.4 °C)-99 °F (37.2 °C)] 98.1 °F (36.7 °C)  Pulse:  [47-64] 63  Resp:  [16-20] 20  SpO2:  [87 %-95 %] 91 %  BP: (115-128)/(58-66) 115/58     Weight: 105.6 kg (232 lb 12.9 oz) (02/18/18 0837)  Body mass index is 35.4 kg/m².  Body surface area is 2.25 meters squared.    I/O  last 3 completed shifts:  In: 870 [P.O.:870]  Out: -     Physical Exam   Constitutional: He is oriented to person, place, and time. He appears well-developed and well-nourished. No distress.   Cardiovascular: Normal rate and regular rhythm.    Murmur heard.  Sacral edema    Pulmonary/Chest: Effort normal and breath sounds normal. No respiratory distress.   Abdominal: Soft. Bowel sounds are normal. He exhibits no distension.   Musculoskeletal: He exhibits no edema.   Neurological: He is alert and oriented to person, place, and time.   Skin: Skin is warm and dry.   Bilateral feet dressed. Dressing c/d/i. Chronic venous stasis changes to BLE   Psychiatric: He has a normal mood and affect. His behavior is normal.       Significant Labs:  ABGs: No results for input(s): PH, PCO2, HCO3, POCSATURATED, BE in the last 168 hours.  BMP:   Recent Labs  Lab 02/19/18  0408   GLU 61*      CO2 23   BUN 40*   CREATININE 5.8*   CALCIUM 8.2*   MG 2.3     CBC:   Recent Labs  Lab 02/19/18  0408   WBC 11.28   RBC 2.44*   HGB 7.0*   HCT 21.8*      MCV 89   MCH 28.7   MCHC 32.1     CMP:   Recent Labs  Lab 02/19/18  0408   GLU 61*   CALCIUM 8.2*   ALBUMIN 1.7*   *   K 5.3*   CO2 23      BUN 40*   CREATININE 5.8*     All labs within the past 24 hours have been reviewed.

## 2018-02-19 NOTE — PLAN OF CARE
Problem: Patient Care Overview  Goal: Plan of Care Review  Outcome: Ongoing (interventions implemented as appropriate)  HD 4 hours, 2.5 Liters UF removed and tolerated well. Albumin 25g and Midodrine given for BP support. Tx ended with NS rinseback, needles removed and pressure held until hemostasis achieved.

## 2018-02-19 NOTE — PROGRESS NOTES
[facility]  Nephrology  Progress Note    Patient Name: Wilder Carbajal  MRN: [id number]  Admission Date: 2/4/2018  Hospital Length of Stay: 15 days  Attending Provider: Montserrat Lino MD   Primary Care Physician: Dio Roberson MD  Principal Problem:Septic encephalopathy    Subjective:     HPI: Wilder Carbajal is a 67 year old male with ESRd on HD (TTS) presents with leg swelling for two weeks. He states he has had associated blisters on his legs with fluid weeping out of them. During hospital presented diagnosis of osteomyelitis of right foot. He underwent washout 2/6 and another washout 2/6 and amputation of 4th and 5th digits. Undergoing treatment course of antibiotics. Nephrology was consulted for inpatient dialysis treatment.iHD TTS at [facility], duration of 4h 15 min, , Left AVF.     Interval History:   Patient evaluated at bedside, no significant event overnight, blood pressures have been stable during dialysis, no distress.     Review of patient's allergies indicates:  No Known Allergies  Current Facility-Administered Medications   Medication Frequency    0.9%  NaCl infusion PRN    0.9%  NaCl infusion PRN    0.9%  NaCl infusion Once    acetaminophen tablet 650 mg Q6H PRN    albumin human 25% bottle 12.5 g PRN    albumin human 25% bottle 25 g PRN    albuterol-ipratropium 2.5mg-0.5mg/3mL nebulizer solution 3 mL Q4H PRN    ammonium lactate 12 % lotion BID    aspirin EC tablet 81 mg Daily    atorvastatin tablet 20 mg Daily    cinacalcet tablet 30 mg QHS    ciprofloxacin HCl tablet 500 mg Daily    citalopram tablet 10 mg Daily    dextrose 50% injection 12.5 g PRN    dextrose 50% injection 25 g PRN    gabapentin capsule 100 mg TID    glucagon (human recombinant) injection 1 mg PRN    glucose chewable tablet 16 g PRN    glucose chewable tablet 24 g PRN    miconazole nitrate 2% ointment BID    midodrine tablet 10 mg Every Mon, Wed, Fri    ondansetron  disintegrating tablet 8 mg Q8H PRN    oxyCODONE immediate release tablet 5 mg Q6H PRN    senna-docusate 8.6-50 mg per tablet 2 tablet BID    sevelamer carbonate tablet 1,600 mg TID WM    sevelamer carbonate tablet 800 mg QHS    sodium chloride 0.9% flush 5 mL PRN       Objective:     Vital Signs (Most Recent):  Temp: 98.1 °F (36.7 °C) (02/19/18 0720)  Pulse: 63 (02/19/18 0720)  Resp: 20 (02/19/18 0720)  BP: (!) 115/58 (02/19/18 0720)  SpO2: (!) 91 % (02/19/18 0720)  O2 Device (Oxygen Therapy): room air (02/19/18 0720) Vital Signs (24h Range):  Temp:  [97.5 °F (36.4 °C)-99 °F (37.2 °C)] 98.1 °F (36.7 °C)  Pulse:  [47-64] 63  Resp:  [16-20] 20  SpO2:  [87 %-95 %] 91 %  BP: (115-128)/(58-66) 115/58     Weight: 105.6 kg (232 lb 12.9 oz) (02/18/18 0837)  Body mass index is 35.4 kg/m².  Body surface area is 2.25 meters squared.    I/O last 3 completed shifts:  In: 870 [P.O.:870]  Out: -     Physical Exam   Constitutional: He is oriented to person, place, and time. He appears well-developed and well-nourished. No distress.   Cardiovascular: Normal rate and regular rhythm.    Murmur heard.  Sacral edema    Pulmonary/Chest: Effort normal and breath sounds normal. No respiratory distress.   Abdominal: Soft. Bowel sounds are normal. He exhibits no distension.   Musculoskeletal: He exhibits no edema.   Neurological: He is alert and oriented to person, place, and time.   Skin: Skin is warm and dry.   Bilateral feet dressed. Dressing c/d/i. Chronic venous stasis changes to BLE   Psychiatric: He has a normal mood and affect. His behavior is normal.       Significant Labs:  ABGs: No results for input(s): PH, PCO2, HCO3, POCSATURATED, BE in the last 168 hours.  BMP:   Recent Labs  Lab 02/19/18  0408   GLU 61*      CO2 23   BUN 40*   CREATININE 5.8*   CALCIUM 8.2*   MG 2.3     CBC:   Recent Labs  Lab 02/19/18  0408   WBC 11.28   RBC 2.44*   HGB 7.0*   HCT 21.8*      MCV 89   MCH 28.7   MCHC 32.1     CMP:   Recent  Labs  Lab 02/19/18  0408   GLU 61*   CALCIUM 8.2*   ALBUMIN 1.7*   *   K 5.3*   CO2 23      BUN 40*   CREATININE 5.8*     All labs within the past 24 hours have been reviewed.     Assessment/Plan:     ESRD (end stage renal disease)    Wilder Carbajal is a 67 year old male with ESRD on Hd. iHD TTS at [facility], duration of 4h 15 min, , Left AVF.    Plan:  - Patient will have dialysis today for clearance and volume removal, duration of 4 hrs, UF 2-2.5 L as tolerated by patient, maintain MAP>65, bath will be adjusted to chem.  - Had albumin given during dialysis, Blood pressures have been stable  - Mineral bone disease: continue with phosphate binders, continue with cinacalcet,  MMZ=199, Ca =8.2   - Diet: Renal (low sodium, low phosphate and low potassium)          Luther Martinez  Nephrology  Fellow  [facility]    Pager [phone]    I have reviewed and concur with the fellow's history, physical, assessment, and plan. I have personally interviewed and examined the patient at bedside.

## 2018-02-19 NOTE — PLAN OF CARE
Problem: Patient Care Overview  Goal: Plan of Care Review  Outcome: Ongoing (interventions implemented as appropriate)  Reviewed plan of care with patient, patient to go to dialysis today, wound care daily - provided by day shift on 2/18, will pass along to day shift on 2/19 - current dressing CDI. Patient with some difficulty maintaining oxygen sats around 0000 vitals, hovered between 86-88, some crackles to RUL noted, oxygen applied, O2 increased to 92% on 1.5lpm. Weaned off around 0400 vitals and oxygen maintained at 90% on room air, O2 removed at this time - PRN order on file. No other acute events over night, see flowsheets for detailed assessment information, will continue to monitor.

## 2018-02-19 NOTE — PLAN OF CARE
Spoke with Derik Syed @ [facility]. Informed her of need for NH placement. [facility] contracts  with [facility], [facility], and [facility]. Current progress notes faxed to Yahaira @ [phone]. She will start the NH placement. DERIK/INOCENTE will continue to follow and assist with placement.

## 2018-02-19 NOTE — NURSING
Maintenance HD treatment initiated via Left upper arm fistula using 15 g needles, lines secured, POC reviewed

## 2018-02-20 PROBLEM — Z51.5 PALLIATIVE CARE ENCOUNTER: Status: ACTIVE | Noted: 2018-02-20

## 2018-02-20 LAB
ALBUMIN SERPL BCP-MCNC: 1.8 G/DL
ANION GAP SERPL CALC-SCNC: 10 MMOL/L
BASOPHILS # BLD AUTO: 0.05 K/UL
BASOPHILS NFR BLD: 0.5 %
BUN SERPL-MCNC: 27 MG/DL
CALCIUM SERPL-MCNC: 8.3 MG/DL
CHLORIDE SERPL-SCNC: 105 MMOL/L
CO2 SERPL-SCNC: 22 MMOL/L
CREAT SERPL-MCNC: 4.2 MG/DL
DIFFERENTIAL METHOD: ABNORMAL
EOSINOPHIL # BLD AUTO: 0.2 K/UL
EOSINOPHIL NFR BLD: 1.8 %
ERYTHROCYTE [DISTWIDTH] IN BLOOD BY AUTOMATED COUNT: 16.9 %
EST. GFR  (AFRICAN AMERICAN): 15.7 ML/MIN/1.73 M^2
EST. GFR  (NON AFRICAN AMERICAN): 13.6 ML/MIN/1.73 M^2
GLUCOSE SERPL-MCNC: 64 MG/DL
HCT VFR BLD AUTO: 21.7 %
HGB BLD-MCNC: 7 G/DL
IMM GRANULOCYTES # BLD AUTO: 0.07 K/UL
IMM GRANULOCYTES NFR BLD AUTO: 0.7 %
LYMPHOCYTES # BLD AUTO: 1 K/UL
LYMPHOCYTES NFR BLD: 11 %
MAGNESIUM SERPL-MCNC: 2.2 MG/DL
MCH RBC QN AUTO: 29 PG
MCHC RBC AUTO-ENTMCNC: 32.3 G/DL
MCV RBC AUTO: 90 FL
MONOCYTES # BLD AUTO: 1.2 K/UL
MONOCYTES NFR BLD: 12.8 %
NEUTROPHILS # BLD AUTO: 6.9 K/UL
NEUTROPHILS NFR BLD: 73.2 %
NRBC BLD-RTO: 0 /100 WBC
PHOSPHATE SERPL-MCNC: 4.1 MG/DL
PLATELET # BLD AUTO: 190 K/UL
PMV BLD AUTO: 9.7 FL
POCT GLUCOSE: 103 MG/DL (ref 70–110)
POCT GLUCOSE: 130 MG/DL (ref 70–110)
POCT GLUCOSE: 77 MG/DL (ref 70–110)
POCT GLUCOSE: 99 MG/DL (ref 70–110)
POTASSIUM SERPL-SCNC: 4.6 MMOL/L
RBC # BLD AUTO: 2.41 M/UL
SODIUM SERPL-SCNC: 137 MMOL/L
WBC # BLD AUTO: 9.43 K/UL

## 2018-02-20 PROCEDURE — 83735 ASSAY OF MAGNESIUM: CPT

## 2018-02-20 PROCEDURE — 97168 OT RE-EVAL EST PLAN CARE: CPT

## 2018-02-20 PROCEDURE — 99232 SBSQ HOSP IP/OBS MODERATE 35: CPT | Mod: ,,, | Performed by: INTERNAL MEDICINE

## 2018-02-20 PROCEDURE — 25000003 PHARM REV CODE 250: Performed by: INTERNAL MEDICINE

## 2018-02-20 PROCEDURE — 11000001 HC ACUTE MED/SURG PRIVATE ROOM

## 2018-02-20 PROCEDURE — 25000003 PHARM REV CODE 250: Performed by: PHYSICIAN ASSISTANT

## 2018-02-20 PROCEDURE — 80069 RENAL FUNCTION PANEL: CPT

## 2018-02-20 PROCEDURE — 97535 SELF CARE MNGMENT TRAINING: CPT

## 2018-02-20 PROCEDURE — 97530 THERAPEUTIC ACTIVITIES: CPT

## 2018-02-20 PROCEDURE — G8987 SELF CARE CURRENT STATUS: HCPCS | Mod: CL

## 2018-02-20 PROCEDURE — 97165 OT EVAL LOW COMPLEX 30 MIN: CPT

## 2018-02-20 PROCEDURE — 97110 THERAPEUTIC EXERCISES: CPT

## 2018-02-20 PROCEDURE — 85025 COMPLETE CBC W/AUTO DIFF WBC: CPT

## 2018-02-20 PROCEDURE — G8988 SELF CARE GOAL STATUS: HCPCS | Mod: CJ

## 2018-02-20 PROCEDURE — 97164 PT RE-EVAL EST PLAN CARE: CPT

## 2018-02-20 PROCEDURE — 36415 COLL VENOUS BLD VENIPUNCTURE: CPT

## 2018-02-20 RX ORDER — SODIUM CHLORIDE 9 MG/ML
INJECTION, SOLUTION INTRAVENOUS
Status: DISCONTINUED | OUTPATIENT
Start: 2018-02-21 | End: 2018-02-22

## 2018-02-20 RX ORDER — SODIUM CHLORIDE 9 MG/ML
INJECTION, SOLUTION INTRAVENOUS ONCE
Status: COMPLETED | OUTPATIENT
Start: 2018-02-21 | End: 2018-02-21

## 2018-02-20 RX ADMIN — MICONAZOLE NITRATE: 20 OINTMENT TOPICAL at 09:02

## 2018-02-20 RX ADMIN — Medication: at 09:02

## 2018-02-20 RX ADMIN — ATORVASTATIN CALCIUM 20 MG: 20 TABLET, FILM COATED ORAL at 09:02

## 2018-02-20 RX ADMIN — GABAPENTIN 100 MG: 100 CAPSULE ORAL at 01:02

## 2018-02-20 RX ADMIN — SEVELAMER CARBONATE 800 MG: 800 TABLET, FILM COATED ORAL at 09:02

## 2018-02-20 RX ADMIN — GABAPENTIN 100 MG: 100 CAPSULE ORAL at 10:02

## 2018-02-20 RX ADMIN — OXYCODONE HYDROCHLORIDE 5 MG: 5 TABLET ORAL at 06:02

## 2018-02-20 RX ADMIN — SEVELAMER CARBONATE 1600 MG: 800 TABLET, FILM COATED ORAL at 09:02

## 2018-02-20 RX ADMIN — STANDARDIZED SENNA CONCENTRATE AND DOCUSATE SODIUM 2 TABLET: 8.6; 5 TABLET, FILM COATED ORAL at 09:02

## 2018-02-20 RX ADMIN — ASPIRIN 81 MG: 81 TABLET, COATED ORAL at 09:02

## 2018-02-20 RX ADMIN — GABAPENTIN 100 MG: 100 CAPSULE ORAL at 06:02

## 2018-02-20 RX ADMIN — CIPROFLOXACIN HYDROCHLORIDE 500 MG: 250 TABLET, FILM COATED ORAL at 09:02

## 2018-02-20 RX ADMIN — CINACALCET HYDROCHLORIDE 30 MG: 30 TABLET, COATED ORAL at 09:02

## 2018-02-20 RX ADMIN — SEVELAMER CARBONATE 1600 MG: 800 TABLET, FILM COATED ORAL at 01:02

## 2018-02-20 RX ADMIN — CITALOPRAM HYDROBROMIDE 10 MG: 10 TABLET ORAL at 09:02

## 2018-02-20 RX ADMIN — SEVELAMER CARBONATE 1600 MG: 800 TABLET, FILM COATED ORAL at 05:02

## 2018-02-20 NOTE — CONSULTS
"[facility]  Palliative Medicine  Consult Note    Patient Name: Wilder Carbajal  MRN: [id number]  Admission Date: 2/4/2018  Hospital Length of Stay: 16 days  Code Status: Full Code   Attending Provider: Devante Bradley MD  Consulting Provider: ANTWON Khan  Primary Care Physician: Dio Roberson MD  Principal Problem:Septic encephalopathy    Patient information was obtained from patient and ER records.      Inpatient consult to Palliative Care  Consult performed by: JEN MANUEL  Consult ordered by: DEVANTE BRADLEY        Assessment/Plan:     Palliative care encounter    Spoke to Dr. Bradley concerning consult for goals of care. MD would like [facility] care to reach out to pt's sons to make sure they understand pt's medical problems since amputation was refused buy pt and son. Sw planning for NH placement.    Impression: Pt is a 66 y/o male with ESRD on dialysis with osteomyelitis of right foot. He underwent washout 2/6 and another washout 2/6 and amputation of 4th and 5th digits. Undergoing treatment course of antibiotics. Pt is awake, alert, oriented to person, place, and situation.  Per pt, he is aware of his infections o=to his foot and that amputation was offered. Per pt, he will "never have an amputations. He would rather die". Pt reports he want to continue with dialysis and he is planning for NH placement.   Talked to pt about how serious his foot infection. Per pt, If "Dami wants to heal it, it will be healed".    Called and spoke to pt's son, Raymond concerning understanding of pt's medical condition issues. Per Flavio Andrade, pt other son "takes care of medical stuff" and I should call Flavio. Left message on Flavio's voicemail to call palliative care.  Will await call-back.                    Thank you for your consult. I will follow-up with patient. Please contact us if you have any additional questions.    Subjective:     HPI:   Patient is a 66 y/o male with ESRF on dialysis " TTS presents with leg swelling two weeks. Per chart review, he states he has had associated blisters on his legs with fluid weeping out of them. Patient stated he missed dialysis yesterday (Saturday) because his [facility] transportation did not arrive. He denies fevers, chills, URI symptoms, or diarrhea. His son called EMS. Patient was admitted 1/7-1/14 for volume overload due to HD non-compliance. He was still above dry weight and hypoxic when he left AMA. Patient maintaining that he has been complaint with dialysis since until yesterday.  Patient is not a good historian and is oriented only to name, city and the street the hospital is located. He could not tell me name of hospital nor the the year. He was intermittently falling asleep during the interview. Spoke with son who confirms that transport for dialysis did not come yesterday. Patient has otherwise been complaint with dialysis as far as son knows. However patient did admit to taking himself off of dialysis Thursday due to profound weakness and fatigue. He states patient has been confused and more lethargic since four days ago. Son call EMS because of progressively worsening BLE swelling and the development of fluid coming out of his feet this morning.     Presently, pt and sons have refused BKA. Pt to remain on Cipro. Pt receiving HD.  Per psychiatry note, pt lacks decision-making capacity.          Hospital Course:  No notes on file        Past Medical History:   Diagnosis Date    Acute pain of left shoulder 1/7/2017    Arthritis     Asthma     Benign neoplasm of eyelid     RUL    Blood clotting tendency     Blood transfusion     Cataract     Chronic kidney disease requiring chronic dialysis     Diabetes mellitus     Diabetic retinopathy     Fever blister     Hyperlipidemia     Hypertension     Infertility     Joint pain     Retinal detachment     Thyroid disease     Weakness 1/7/2017       Past Surgical History:   Procedure Laterality Date     CATARACT EXTRACTION      COLONOSCOPY N/A 6/23/2017    Procedure: COLONOSCOPY;  Surgeon: Vic Clark MD;  Location: [facility] ([address] [place] [address]);  Service: Endoscopy;  Laterality: N/A;    RETINAL DETACHMENT SURGERY         Review of patient's allergies indicates:  No Known Allergies    Medications:  Continuous Infusions:  Scheduled Meds:   ammonium lactate   Topical (Top) BID    aspirin  81 mg Oral Daily    atorvastatin  20 mg Oral Daily    cinacalcet  30 mg Oral QHS    ciprofloxacin HCl  500 mg Oral Daily    citalopram  10 mg Oral Daily    gabapentin  100 mg Oral TID    miconazole nitrate 2%   Topical (Top) BID    midodrine  10 mg Oral Every Mon, Wed, Fri    senna-docusate 8.6-50 mg  2 tablet Oral BID    sevelamer carbonate  1,600 mg Oral TID WM    sevelamer carbonate  800 mg Oral QHS     PRN Meds:sodium chloride 0.9%, sodium chloride 0.9%, acetaminophen, albumin human 25%, albumin human 25%, albuterol-ipratropium 2.5mg-0.5mg/3mL, dextrose 50%, dextrose 50%, glucagon (human recombinant), glucose, glucose, ondansetron, oxyCODONE, sodium chloride 0.9%    Family History     Problem Relation (Age of Onset)    Depression Child    Heart attack Mother    No Known Problems Father, Sister, Brother, Maternal Aunt, Maternal Uncle, Paternal Aunt, Paternal Uncle, Maternal Grandmother, Maternal Grandfather, Paternal Grandmother, Paternal Grandfather        Social History Main Topics    Smoking status: Never Smoker    Smokeless tobacco: Never Used    Alcohol use No    Drug use: No    Sexual activity: Not on file       Review of Systems   Constitutional: Positive for activity change and fatigue.   HENT: Negative.    Eyes: Negative.    Respiratory: Negative.    Cardiovascular: Positive for leg swelling.   Gastrointestinal: Negative.    Neurological: Positive for weakness.     Objective:     Vital Signs (Most Recent):  Temp: 98 °F (36.7 °C) (02/20/18 0806)  Pulse: 62 (02/20/18 1054)  Resp: 16 (02/20/18 0806)  BP: (!)  112/55 (02/20/18 0806)  SpO2: 96 % (02/20/18 0327) Vital Signs (24h Range):  Temp:  [97.2 °F (36.2 °C)-98.8 °F (37.1 °C)] 98 °F (36.7 °C)  Pulse:  [53-69] 62  Resp:  [14-20] 16  SpO2:  [91 %-96 %] 96 %  BP: (106-169)/(46-82) 112/55     Weight: 107.8 kg (237 lb 10.5 oz)  Body mass index is 36.14 kg/m².    Review of Symptoms  Symptom Assessment (ESAS 0-10 scale)   ESAS 0 1 2 3 4 5 6 7 8 9 10   Pain X             Dyspnea X             Anxiety X             Nausea X             Depression  X             Anorexia X             Fatigue    X          Insomnia X             Restlessness  X             Agitation X             CAM / Delirium __ --  ___+   Constipation     __ --  ___+   Diarrhea           __ --  ___+  Bowel Management Plan (BMP): No      ECOG Performance Status Grade: 4 - Completely disabled    Physical Exam   Constitutional: He is oriented to person, place, and time. He appears well-developed. He is cooperative.   Cardiovascular: Normal rate, regular rhythm, S1 normal and normal heart sounds.    Pulmonary/Chest: Effort normal.   Musculoskeletal:   Right foot leg has bandage on which is clean, dry, intact.   Neurological: He is alert and oriented to person, place, and time.       Significant Labs: All pertinent labs within the past 24 hours have been reviewed.  CBC:     Recent Labs  Lab 02/20/18  0435   WBC 9.43   HGB 7.0*   HCT 21.7*   MCV 90        BMP:    Recent Labs  Lab 02/20/18  0435   GLU 64*      K 4.6      CO2 22*   BUN 27*   CREATININE 4.2*   CALCIUM 8.3*   MG 2.2     LFT:  Lab Results   Component Value Date    AST 14 02/04/2018    ALKPHOS 138 (H) 02/04/2018    BILITOT 1.3 (H) 02/04/2018     Albumin:   Albumin   Date Value Ref Range Status   02/20/2018 1.8 (L) 3.5 - 5.2 g/dL Final     Protein:   Total Protein   Date Value Ref Range Status   02/04/2018 7.6 6.0 - 8.4 g/dL Final     Lactic acid:   Lab Results   Component Value Date    LACTATE 3.2 (H) 02/04/2018    LACTATE 1.4  07/09/2017       Significant Imaging: I have reviewed all pertinent imaging results/findings within the past 24 hours.    Advanced Directives::  Living Will: No  LaPOST: No  Do Not Resuscitate Status: No  Medical Power of : No MPOA. Per pt, he has six kids. Muniz most involved in pt's care per son Raymond.     Decision-Making Capacity: Patient answered questions  See psych notes.     Living Arrangements: Lives with family    Psychosocial/Cultural:  Pt not . Pt reports he has six children. 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     Spiritual: yes- very    F- Joy and Belief: Rastafarian    I - Importance: yes  .  C - Community: yes    A - Address in Care:  Will have  visit.       > 50% of  70 min visit spent in chart review, face to face discussion of goals of care,  symptom assessment, coordination of care and emotional support.    Kristal Wayne, CNS  Palliative Medicine  [facility]

## 2018-02-20 NOTE — PLAN OF CARE
"Palliative Care Social Work   Assessment  Name: Wilder Carbajal  MRN: [id number]  Date of Birth/Age:  1950  Sex: male  Ethnicity:     Primary Language:English   Needed: no    Attending Physician: Dr. Bradley  Reason for Referral: assistance with clarification of goals of care  Consult Order Date: 2/20/18 0717  Primary CM/SW: Marquise Ortega LMSW    Palliative Care Provider: MALDONADO Deutsch    Present during Interview: pt, [facility] Care APRN and this SW    Past & Current Medical Situation:   Diagnosis: Septic encephalopathy  PMH:   Past Medical History:   Diagnosis Date    Acute pain of left shoulder 1/7/2017    Arthritis     Asthma     Benign neoplasm of eyelid     RUL    Blood clotting tendency     Blood transfusion     Cataract     Chronic kidney disease requiring chronic dialysis     Diabetes mellitus     Diabetic retinopathy     Fever blister     Hyperlipidemia     Hypertension     Infertility     Joint pain     Retinal detachment     Thyroid disease     Weakness 1/7/2017     Mental Health/Substance Use History: n/a  Non-traditional Health practices:     Understanding of diagnosis and prognosis:   Experience/Comfort level with health care system:    Patients Mental Status: Alert and Oriented.    Socio-Economic Factors/Resources:  Address: [address] [place] [address]  Phone Number: [phone] (home)     Marital Status: Single  Household Composition: Lives with son  Children: 6 children  Relationships with Family: Despite having 6 children, pt is lacking family support. His son Flavio is the primary person to help with decisions. Pt stated that Raymond and himself had a "fallen out" regarding pt's wishes. Pt has 1 brother and 2 sisters. His brother helps out on occasion as well. Pt stated that he has several grandchildren and great grandchildren but has not had contact with them for awhile.     Emergency Contacts:   Son: Flavio Carbajal: [phone]  Son: " "Raymond Carbajal: [phone]  Brother: Jimmy Carbajal: [phone]; [phone]    Activities of Daily Living: Independent with adls. Needed some assistance with   Support Systems-Family & Community (Home Health, HME etc):  [facility] patient. [facility] on  and . Uses [facility] transportation those days and [facility] transportation on Saturday. [facility]- Tuesday, Thursday and Saturday. [facility] : Y[facility]a Zhou: [phone] . Rolling walker.     Transportation:  [facility] bus and [facility] on .     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    History: no    Financial Resources:[facility], MEDICAID      Advanced Care Planning & Legal Concerns:   Advanced Directives/Living Will: no   Planning:  no    Power of : no  Surrogate Decision Maker: 6 children. Muniz Raven is the primary person.       Spirituality, Culture & Coping Mechanisms:  F- Joy and Belief: Tucker     I - Importance: Strong Joy. Attends Judaism Weekly.    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      A - Address in Care: Pt stated that he would like a  visit while he was here. SW informed  Mona by telephone regarding pt's request. Chaplain Bailey stated that someone should visit with pt today.-       Strengths/Coping Strategies: strong joy.  Self-Care Activities/Hobbies: Enjoys coloring and painting. Likes to go to [employer].     Goals/Hopes/Expectations: Pt's goal is to go to SNF and continue aggressive treatments such as wound vac and IV antibiotics. Pt stated that he believes that "Dami will heal his foot".   Fears/Anxiety/Concerns: Pt does not want to have his foot amputated. He stated that he was upset with some of his children who made the decisions to cut off some of his toes. He stated that " he believes that this is his body and he should have a right to choose what happens to it.         Preferences about EOL Environment: (own bed, family nearby, pets, music, etc)  Not discussed.     Complicated Bereavement Risk Assessment Tool (CBRAT)  Reference:  [facility] Clinical Practice Group (May 2016). Bereavement Risk Screening and Management Guidelines.  Retrieved from: http://www.Stocardcc.com.au/wp-content/uploads//PBWVN-Tvgohqmjbhb-Qbokcpkbo-and-Management-Guideline-2016.pdf      Client Characteristics (Bereaved Client)  ? Under 18      no  ? Was a Twin   no  ? Young Spouse   no  ? Elderly Spouse    no  ? Isolated    no  ? Lacks Meaningful Social Support   no  ? Dissatisfied with help available during illness   no  ? New to Financial Branch no  ? New to Decision-Making   no   History of Loss (Bereaved Client)  ? Cumulative Multiple Losses   no  ? Previous Mental Health Illnesses   no  ? Current Mental Health Illness   no  ? Other Significant Health Issues   no   ? Migrant/Refugee   no    Illness  ? Inherited Disorder   no  ? Stigmatized Disease in the   no  ?  Family/Community   yes  ? Lengthy/Burdensome   yes Relationship with   ? Profound Lifelong Partner   no  ? Highly Dependent    no  ? Antagonistic   yes  ? Ambivalent   yes  ? Deeply Connected   no  ? Culturally Defined   no   Death  ? Sudden or Unexpected   no  ? Traumatic Circumstances Associated with Death   no  ? Significant Cultural/Social Burdens as a result of Death   no   Risk Factors Scores  0-2  Low  3-5  Moderate  5+  High  All persons scoring moderate to high presume to be at risk**    (** It is acknowledged that protective factors and resilience may outweigh apparent risk factors.      Total Risk Factors Score:   Moderate    Despite pt having multiple children and grandchildren, he has no additional support to help him at home. Unsure the dynamics of the children and their  "strained relationship. This will produce higher risk for complex grief and would benefit from continued follow up and resources.       Discharge Planning Needs/Plan of Care:       Visit made to bedside with [facility] Care APRMARITO Wayne. Met with pt to discuss goals of care. Pt alert and oriented ot person, place, and situation.  Pt underwent surgery of amputations of his 4th and 5th digits. Pt verbalized being unhappy with one of his sons for making this decision. Pt stated that he is aware of his infections. He stated that he would "rather die" then have his foot cut off. Pt stated that he is not interested in an amputation and believes that "Dami Felix will heal his foot".     Pt's plan is to go to SNF and continue all aggressive treatments including: wound vac, IV abx, and HD.        SW provided pt with coloring sheets and colors as pt verbalized enjoying this type of coping mechanism. Pt voiced appreciation.       Laure Minor, ANURAG, ACHP-SW  "

## 2018-02-20 NOTE — ASSESSMENT & PLAN NOTE
"Spoke to Dr. Bradley concerning consult for goals of care. MD would like [facility] to reach out to pt's sons to make sure they understand pt's medical problems since amputation was refused buy pt and son. Sw planning for NH placement.    Impression: Pt is a 66 y/o male with ESRD on dialysis with osteomyelitis of right foot. He underwent washout 2/6 and another washout 2/6 and amputation of 4th and 5th digits. Undergoing treatment course of antibiotics. Pt is awake, alert, oriented to person, place, and situation.  Per pt, he is aware of his infections o=to his foot and that amputation was offered. Per pt, he will "never have an amputations. He would rather die". Pt reports he want to continue with dialysis and he is planning for NH placement.   Talked to pt about how serious his foot infection. Per pt, If "Dami wants to heal it, it will be healed".    Called and spoke to pt's son, Raymond concerning understanding of pt's medical condition issues. Per Flavio Andrade, pt other son "takes care of medical stuff" and I should call Flavio. Left message on Flavio's voicemail to call palliative care.  Will await call-back.          "

## 2018-02-20 NOTE — PLAN OF CARE
"Pt was denied by [facility] due "to high risk of infection," also [facility] "no male beds and [facility] "as cannot meet needs." Sw to follow with only last [facility] provider [facility]. Pt accepted to [facility], staff aware, Sw to follow.   "

## 2018-02-20 NOTE — SUBJECTIVE & OBJECTIVE
Past Medical History:   Diagnosis Date    Acute pain of left shoulder 1/7/2017    Arthritis     Asthma     Benign neoplasm of eyelid     RUL    Blood clotting tendency     Blood transfusion     Cataract     Chronic kidney disease requiring chronic dialysis     Diabetes mellitus     Diabetic retinopathy     Fever blister     Hyperlipidemia     Hypertension     Infertility     Joint pain     Retinal detachment     Thyroid disease     Weakness 1/7/2017       Past Surgical History:   Procedure Laterality Date    CATARACT EXTRACTION      COLONOSCOPY N/A 6/23/2017    Procedure: COLONOSCOPY;  Surgeon: Vic Clark MD;  Location: [address]);  Service: Endoscopy;  Laterality: N/A;    RETINAL DETACHMENT SURGERY         Review of patient's allergies indicates:  No Known Allergies    Medications:  Continuous Infusions:  Scheduled Meds:   ammonium lactate   Topical (Top) BID    aspirin  81 mg Oral Daily    atorvastatin  20 mg Oral Daily    cinacalcet  30 mg Oral QHS    ciprofloxacin HCl  500 mg Oral Daily    citalopram  10 mg Oral Daily    gabapentin  100 mg Oral TID    miconazole nitrate 2%   Topical (Top) BID    midodrine  10 mg Oral Every Mon, Wed, Fri    senna-docusate 8.6-50 mg  2 tablet Oral BID    sevelamer carbonate  1,600 mg Oral TID WM    sevelamer carbonate  800 mg Oral QHS     PRN Meds:sodium chloride 0.9%, sodium chloride 0.9%, acetaminophen, albumin human 25%, albumin human 25%, albuterol-ipratropium 2.5mg-0.5mg/3mL, dextrose 50%, dextrose 50%, glucagon (human recombinant), glucose, glucose, ondansetron, oxyCODONE, sodium chloride 0.9%    Family History     Problem Relation (Age of Onset)    Depression Child    Heart attack Mother    No Known Problems Father, Sister, Brother, Maternal Aunt, Maternal Uncle, Paternal Aunt, Paternal Uncle, Maternal Grandmother, Maternal Grandfather, Paternal Grandmother, Paternal Grandfather        Social History Main Topics    Smoking  status: Never Smoker    Smokeless tobacco: Never Used    Alcohol use No    Drug use: No    Sexual activity: Not on file       Review of Systems   Constitutional: Positive for activity change and fatigue.   HENT: Negative.    Eyes: Negative.    Respiratory: Negative.    Cardiovascular: Positive for leg swelling.   Gastrointestinal: Negative.    Neurological: Positive for weakness.     Objective:     Vital Signs (Most Recent):  Temp: 98 °F (36.7 °C) (02/20/18 0806)  Pulse: 62 (02/20/18 1054)  Resp: 16 (02/20/18 0806)  BP: (!) 112/55 (02/20/18 0806)  SpO2: 96 % (02/20/18 0327) Vital Signs (24h Range):  Temp:  [97.2 °F (36.2 °C)-98.8 °F (37.1 °C)] 98 °F (36.7 °C)  Pulse:  [53-69] 62  Resp:  [14-20] 16  SpO2:  [91 %-96 %] 96 %  BP: (106-169)/(46-82) 112/55     Weight: 107.8 kg (237 lb 10.5 oz)  Body mass index is 36.14 kg/m².    Review of Symptoms  Symptom Assessment (ESAS 0-10 scale)   ESAS 0 1 2 3 4 5 6 7 8 9 10   Pain X             Dyspnea X             Anxiety X             Nausea X             Depression  X             Anorexia X             Fatigue    X          Insomnia X             Restlessness  X             Agitation X             CAM / Delirium __ --  ___+   Constipation     __ --  ___+   Diarrhea           __ --  ___+  Bowel Management Plan (BMP): No      ECOG Performance Status Grade: 4 - Completely disabled    Physical Exam   Constitutional: He is oriented to person, place, and time. He appears well-developed. He is cooperative.   Cardiovascular: Normal rate, regular rhythm, S1 normal and normal heart sounds.    Pulmonary/Chest: Effort normal.   Musculoskeletal:   Right foot leg has bandage on which is clean, dry, intact.   Neurological: He is alert and oriented to person, place, and time.       Significant Labs: All pertinent labs within the past 24 hours have been reviewed.  CBC:     Recent Labs  Lab 02/20/18  0435   WBC 9.43   HGB 7.0*   HCT 21.7*   MCV 90        BMP:    Recent Labs  Lab  02/20/18  0435   GLU 64*      K 4.6      CO2 22*   BUN 27*   CREATININE 4.2*   CALCIUM 8.3*   MG 2.2     LFT:  Lab Results   Component Value Date    AST 14 02/04/2018    ALKPHOS 138 (H) 02/04/2018    BILITOT 1.3 (H) 02/04/2018     Albumin:   Albumin   Date Value Ref Range Status   02/20/2018 1.8 (L) 3.5 - 5.2 g/dL Final     Protein:   Total Protein   Date Value Ref Range Status   02/04/2018 7.6 6.0 - 8.4 g/dL Final     Lactic acid:   Lab Results   Component Value Date    LACTATE 3.2 (H) 02/04/2018    LACTATE 1.4 07/09/2017       Significant Imaging: I have reviewed all pertinent imaging results/findings within the past 24 hours.    Advanced Directives::  Living Will: No  LaPOST: No  Do Not Resuscitate Status: No  Medical Power of : No MPOA. Per pt, he has six kids. [place] most involved in pt's care per son Raymond.     Decision-Making Capacity: Patient answered questions  See psych notes.     Living Arrangements: Lives with family    Psychosocial/Cultural:  Pt not . Pt reports he has six children. 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     Spiritual: yes- very    F- Joy and Belief: Christianity    I - Importance: yes  .  C - Community: yes    A - Address in Care:  Will have  visit.

## 2018-02-20 NOTE — HPI
Patient is a 68 y/o male with ESRF on dialysis TTS presents with leg swelling two weeks. Per chart review, he states he has had associated blisters on his legs with fluid weeping out of them. Patient stated he missed dialysis yesterday (Saturday) because his [facility] transportation did not arrive. He denies fevers, chills, URI symptoms, or diarrhea. His son called EMS. Patient was admitted 1/7-1/14 for volume overload due to HD non-compliance. He was still above dry weight and hypoxic when he left AMA. Patient maintaining that he has been complaint with dialysis since until yesterday.  Patient is not a good historian and is oriented only to name, city and the street the hospital is located. He could not tell me name of hospital nor the the year. He was intermittently falling asleep during the interview. Spoke with son who confirms that transport for dialysis did not come yesterday. Patient has otherwise been complaint with dialysis as far as son knows. However patient did admit to taking himself off of dialysis Thursday due to profound weakness and fatigue. He states patient has been confused and more lethargic since four days ago. Son call EMS because of progressively worsening BLE swelling and the development of fluid coming out of his feet this morning.     Presently, pt and sons have refused BKA. Pt to remain on Cipro. Pt receiving HD.  Per psychiatry note, pt lacks decision-making capacity.

## 2018-02-20 NOTE — PT/OT/SLP RE-EVAL
Physical Therapy Re-evaluation    Patient Name:  Wilder Carbajal   MRN:  [id number]    Recommendations:     Discharge Recommendations:  nursing facility, skilled   Discharge Equipment Recommendations: none   Barriers to discharge: Decreased caregiver support; required level of assistance needed for mobility at this time    Assessment:     Wilder Carbajal is a 67 y.o. male admitted with a medical diagnosis of Septic encephalopathy.  He presents with the following impairments/functional limitations:  weakness, impaired endurance, impaired self care skills, impaired functional mobilty, gait instability, impaired balance, decreased lower extremity function, decreased ROM, decreased safety awareness, edema. Pt very motivated to sit EOB and to participate in therapy session. Pt presents with dizziness upon sitting along with increased posterior lean requiring assistance and verbal cues to correct. With increased time, pt was able to demonstrate good static and dynamic sitting balance, but show decreased endurance with activity while showing signs of fatigue. OOB activity not performed due to decreased ability to bear weight in BLEs. Pt will continue to benefit from acute PT to address deficits and increase functional independence. Upon d/c, PT recommends SNF placement due to decreased caregiver support along with the current level of assistance required at this time for PT intervention.     Rehab Prognosis: fair; patient would benefit from acute skilled PT services to address these deficits and reach maximum level of function.      Recent Surgery: Procedure(s) (LRB):  AMPUTATION-TOES 4TH AND 5TH, I&D (Right) 11 Days Post-Op    Plan:     During this hospitalization, patient to be seen 3 x/week to address the above listed problems via gait training, therapeutic activities, therapeutic exercises  · Plan of Care Expires:  03/22/18   Plan of Care Reviewed with: patient    Subjective     Communicated with RN prior to session.   Patient found supine upon PT entry to room, agreeable to evaluation.      Chief Complaint: Pain in LLE  Patient comments/goals: wants to move and sit up  Pain: no rating given    Patients cultural, spiritual, Hoahaoism conflicts given the current situation: none states      Objective:     Patient found with: telemetry     General Precautions: Standard, fall   Orthopedic Precautions: R LE NWB  Braces: N/A     Exams:  · Cognitive Exam:  Patient is oriented to Person, Place, Time and Situation and follows 100% of 2-step commands   · Postural Exam:  Patient presented with the following abnormalities:    · -       Rounded shoulders  · -       Forward head  · -       Posterior pelvic tilt  · Sensation:    · -       Impaired  light/touch bilaterally  · Skin Integrity/Edema:      · -       Wound B feet, thin and dry  · -    Edema:moderate BLE  · RLE ROM: WFL  · RLE Strength: WFL  · LLE ROM: WFL  · LLE Strength: WFL  · *ankle Strength and ROM not assessed due to B LE dressing     Functional Mobility:  · Bed Mobility:     · Rolling Left:  moderate assistance  · Scooting: moderate assistance  · Supine to Sit: moderate assistance  · Sit to Supine: minimum assistance  · Balance: Sitting EOB x 20 min   · Initially required min A due to posterior lean; pt unable to move hands  From support surface of bed due to instability.  · Once pt scooted forward towards EOB to get feet on ground. Pt able to place UEs in lap and maintain sitting with CGA-standby assistance for 15 min  · Dynamic sitting: Pt able to reach forward x2 trails with CGA with no LOB; pt showed hesitance to lean trunk forward but was able to perform with tactile cue  · Pt verbally cued on sitting posture; responds well, but needs constant cueing to maintain      AM-PAC 6 CLICK MOBILITY  Total Score:9       Patient left with bed in chair position with all lines intact, call button in reach and bed alarm on.    GOALS:    Physical Therapy Goals        Problem: Physical  Therapy Goal    Goal Priority Disciplines Outcome Goal Variances Interventions   Physical Therapy Goal     PT/OT, PT Ongoing (interventions implemented as appropriate)     Description:  Goals to be met by: 3/2/18 (10 days)    Patient will increase functional independence with mobility by performin. Supine to sit with Contact Guard Assist  2. Sit to supine with Contact Guard Assist  3. Sit to stand transfer with Minimal Assistance  4. Bed to chair transfer with sliding board with Contact Guard assistance.   6. Navigate  feet with supervision.   6. Lower extremity exercise program x 30 reps per handout, with independence                         History:     Past Medical History:   Diagnosis Date    Acute pain of left shoulder 2017    Arthritis     Asthma     Benign neoplasm of eyelid     RUL    Blood clotting tendency     Blood transfusion     Cataract     Chronic kidney disease requiring chronic dialysis     Diabetes mellitus     Diabetic retinopathy     Fever blister     Hyperlipidemia     Hypertension     Infertility     Joint pain     Retinal detachment     Thyroid disease     Weakness 2017       Past Surgical History:   Procedure Laterality Date    CATARACT EXTRACTION      COLONOSCOPY N/A 2017    Procedure: COLONOSCOPY;  Surgeon: Vic Clark MD;  Location: [address];  Service: Endoscopy;  Laterality: N/A;    RETINAL DETACHMENT SURGERY         Clinical Decision Making:     History  Co-morbidities and personal factors that may impact the plan of care Examination  Body Structures and Functions, activity limitations and participation restrictions that may impact the plan of care Clinical Presentation   Decision Making/ Complexity Score   Co-morbidities:   [] Time since onset of injury / illness / exacerbation  [x] Status of current condition  []Patient's cognitive status and safety concerns    [x] Multiple Medical Problems (see med hx)  Personal Factors:   []  Patient's age  [] Prior Level of function   [x] Patient's home situation (environment and family support)  [] Patient's level of motivation  [x] Expected progression of patient      HISTORY:(criteria)    [] 94437 - no personal factors/history    [] 99143 - has 1-2 personal factor/comorbidity     [x] 36461 - has >3 personal factor/comorbidity     Body Regions:  [] Objective examination findings  [] Head     []  Neck  [] Trunk   [] Upper Extremity  [x] Lower Extremity    Body Systems:  [] For communication ability, affect, cognition, language, and learning style: the assessment of the ability to make needs known, consciousness, orientation (person, place, and time), expected emotional /behavioral responses, and learning preferences (eg, learning barriers, education  needs)  [x] For the neuromuscular system: a general assessment of gross coordinated movement (eg, balance, gait, locomotion, transfers, and transitions) and motor function  (motor control and motor learning)  [x] For the musculoskeletal system: the assessment of gross symmetry, gross range of motion, gross strength, height, and weight  [x] For the integumentary system: the assessment of pliability(texture), presence of scar formation, skin color, and skin integrity  [] For cardiovascular/pulmonary system: the assessment of heart rate, respiratory rate, blood pressure, and edema     Activity limitations:    [] Patient's cognitive status and saf ety concerns          [x] Status of current condition      [x] Weight bearing restriction  [] Cardiopulmunary Restriction    Participation Restrictions:   [] Goals and goal agreement with the patient     [x] Rehab potential (prognosis) and probable outcome      Examination of Body System: (criteria)    [] 57309 - addressing 1-2 elements    [x] 13311 - addressing a total of 3 or more elements     [] 80143 -  Addressing a total of 4 or more elements         Clinical Presentation: (criteria)  Evolving - 25021     On  examination of body system using standardized tests and measures patient presents with 3 or more elements from any of the following: body structures and functions, activity limitations, and/or participation restrictions.  Leading to a clinical presentation that is considered evolving with changing characteristics                              Clinical Decision Making  (Eval Complexity):  Moderate - 86565     Time Tracking:     PT Received On: 02/20/18  PT Start Time: 0741     PT Stop Time: 0805  PT Total Time (min): 24 min     Billable Minutes: Re-eval 8 and Therapeutic Activity 16      RENAE Haley  02/20/2018

## 2018-02-20 NOTE — PLAN OF CARE
Problem: Pain, Acute (Adult)  Intervention: Mutually Develop/Implement Acute Pain Management Plan   02/19/18 9356   Pain/Comfort/Sleep Interventions   Pain Management Interventions care clustered;medication;pillow support provided   Cognitive Interventions   Sensory Stimulation Regulation quiet environment promoted;music/television provided for relaxation       Patient alert and oriented x 4 and able to make needs known.  Patient c.o L foot pain, rated pain a 9/10 and medicated with oxycodone IR 5 mg every 6 hours prn.  Patient resting with eyes close but arouses to verbal stimuli.  Patient presents with a ESRD and HD -[MWF], DM II and blood glucose at 2200  A reading of 121 requiring no insulin coverage.   Podiatry following and  providing  changing bandages  Per orders.  Remains in contact isolation for klebsiella pneumoniae ESBL to R foot.  HOB elevated and requires  Turn q2  hrs  and prn with assist of 2. Patient prefers to sleep with overhead light.  NADN , will cont to monitor closely.  Discharge date TBD

## 2018-02-20 NOTE — PLAN OF CARE
Problem: Patient Care Overview  Goal: Plan of Care Review  Outcome: Ongoing (interventions implemented as appropriate)  POC reviewed with pt. AAO x4.  HD today, removed 2.5L. Telemetry monitoring. Isolation status continued.  Pt remained free from falls. Questions and concerns addressed. Pt progressing towards goals. Will continue to monitor. See flow sheets for full assessment and VS

## 2018-02-20 NOTE — PROGRESS NOTES
Progress Note   Hospital Medicine         Team: [facility] MED D Kanika Bradley MD  Admit Date: 2/4/2018  Length of Stay:  LOS: 16 days   ESTUARDO 2/23/2018  Principal Problem: Septic encephalopathy    Interval history / overnight events: Patient with no events overnight, no new complaints.    Areas of concern / handoff: poor response to therapy    Review of Systems   Constitutional: Negative for fever.   Respiratory: Negative for shortness of breath.    Cardiovascular: Negative for chest pain.       Temp: 98 °F (36.7 °C) (02/20/18 0806)  Pulse: 62 (02/20/18 0851)  Resp: 16 (02/20/18 0806)  BP: (!) 112/55 (02/20/18 0806)  SpO2: 96 % (02/20/18 0327)     Temp:  [97.2 °F (36.2 °C)-98.8 °F (37.1 °C)]   Pulse:  [53-69]   Resp:  [14-20]   BP: (106-169)/(45-82)   SpO2:  [91 %-96 %]     Intake/Output Summary (Last 24 hours) at 02/20/18 0953  Last data filed at 02/19/18 2119   Gross per 24 hour   Intake              800 ml   Output             3300 ml   Net            -2500 ml     Weight: 107.8 kg (237 lb 10.5 oz)   Body mass index is 36.14 kg/m².    Physical Exam   Constitutional: He is well-developed, well-nourished, and in no distress.  Non-toxic appearance.   HENT:   Head: Normocephalic.   Eyes: Conjunctivae and lids are normal. Pupils are equal.   Neck: Neck supple.   Cardiovascular: Normal rate, regular rhythm, S1 normal and S2 normal.    Pulmonary/Chest: Effort normal and breath sounds normal.   Abdominal: Soft. Normal appearance and bowel sounds are normal. There is no tenderness.   Musculoskeletal: He exhibits edema.   Neurological: He is alert. Gait abnormal.   Skin: He is not diaphoretic. No cyanosis.   Psychiatric: Mood and affect normal.       Data Review: Results recorded below were reviewed 2/20/2018.    Significant Labs:     Recent Labs  Lab 02/15/18  0457 02/16/18  0456 02/17/18  0449 02/18/18  0527 02/19/18  0408 02/20/18  0435   WBC 12.37 12.16 11.34 11.84 11.28 9.43   HGB 7.5* 7.4* 7.5* 7.7* 7.0* 7.0*   HCT  "22.5* 22.2* 23.7* 24.3* 21.8* 21.7*    204 213 213 203 190       Recent Labs  Lab 02/18/18  0527 02/19/18  0408 02/20/18  0435   * 134* 137   K 5.0 5.3* 4.6    101 105   CO2 25 23 22*   BUN 34* 40* 27*   CREATININE 5.3* 5.8* 4.2*   GLU 63* 61* 64*   CALCIUM 8.8 8.2* 8.3*   MG 2.3 2.3 2.2   PHOS 4.7* 5.2* 4.1       Recent Labs  Lab 02/18/18  0527 02/19/18  0408 02/20/18  0435   ALBUMIN 1.8* 1.7* 1.8*     BNP   Date Value Ref Range Status   04/21/2017 4,230 (H) 0 - 99 pg/mL Final     Comment:     Values of less than 100 pg/ml are consistent with non-CHF populations.   04/10/2017 4,684 (H) 0 - 99 pg/mL Final     Comment:     Values of less than 100 pg/ml are consistent with non-CHF populations.   08/03/2016 4,184 (H) 0 - 99 pg/mL Final     Comment:     Values of less than 100 pg/ml are consistent with non-CHF populations.   03/21/2016 3,532 (H) 0 - 99 pg/mL Final     Comment:     Values of less than 100 pg/ml are consistent with non-CHF populations.     No results for input(s): CPK, TROPONINI in the last 168 hours.  Recent Labs      02/17/18   2149  02/18/18   0824  02/18/18   1247  02/18/18   1805  02/18/18   2153  02/19/18   1219  02/19/18   1707  02/19/18   2109   POCTGLUCOSE  104  97  173*  154*  89  106  137*  121*     A1C:   Recent Labs  Lab 01/07/18  0008   HGBA1C 4.6     TSH:   Recent Labs  Lab 02/08/18  0509   TSH 3.536     Assessment and Plan     Overview: "68 yo here for osteomyelitis of right foot. He underwent washout 2/6 and another washout 2/6 and amputation of 4th and 5th digits. Undergoing treatment course of antibiotics."    Current Hospital Problem List:    Active Hospital Problems    Diagnosis  POA    *Septic encephalopathy [G93.41]  Yes    Gangrene of right foot [I96]  Unknown    Foot ulceration, left, with unspecified severity [L97.529]  Unknown    PVD (peripheral vascular disease) [I73.9]  Unknown    Pyogenic inflammation of bone [M86.9]  Yes    Diabetic foot ulcer " [E11.621, L97.509]  Yes    Foot ulceration, right, with fat layer exposed [L97.512]  Yes    Acute delirium [R41.0]  Yes    Protein-calorie malnutrition, severe [E43]  Yes    DVT (deep venous thrombosis) [I82.409]  Yes    DM type 2 with diabetic mixed hyperlipidemia [E11.69, E78.2]  Yes    Diabetic polyneuropathy associated with type 2 diabetes mellitus [E11.42]  Yes    Chronic kidney disease-mineral and bone disorder [N18.9, E83.9, M89.9]  Yes    Depression [F32.9]  Yes    Alteration in skin integrity [R23.9]  Yes    Infected skin ulcer with fat layer exposed [L98.492, L08.9]  Yes    Hyperkalemia [E87.5]  Yes    Hypervolemia [E87.70]  Yes    Type 2 diabetes mellitus with stage 5 chronic kidney disease and hypertension [E11.22, I12.0, N18.5]  Yes    ESRD (end stage renal disease) [N18.6]  Yes    Essential hypertension [I10]  Yes      Resolved Hospital Problems    Diagnosis Date Resolved POA   No resolved problems to display.       Medications for management of current problems:      [START ON 2/21/2018] sodium chloride 0.9%   Intravenous Once    ammonium lactate   Topical (Top) BID    aspirin  81 mg Oral Daily    atorvastatin  20 mg Oral Daily    cinacalcet  30 mg Oral QHS    ciprofloxacin HCl  500 mg Oral Daily    citalopram  10 mg Oral Daily    gabapentin  100 mg Oral TID    miconazole nitrate 2%   Topical (Top) BID    midodrine  10 mg Oral Every Mon, Wed, Fri    senna-docusate 8.6-50 mg  2 tablet Oral BID    sevelamer carbonate  1,600 mg Oral TID WM    sevelamer carbonate  800 mg Oral QHS       PRN:   sodium chloride 0.9%    sodium chloride 0.9%    acetaminophen    albumin human 25%    albumin human 25%    albuterol-ipratropium 2.5mg-0.5mg/3mL    dextrose 50%    dextrose 50%    glucagon (human recombinant)    glucose    glucose    ondansetron    oxyCODONE    sodium chloride 0.9%     Anticoagulants   Medication Route Frequency     IV Fluids: none    Problems Addressed  "2/20/2018:    Septic encephalopathy on possible dementia  Improved somewhat with treatment of infected foot. Patient lacked capacity to refuse surgery per psychiatry, consent obtained from his son as proxy.     Infected right dorsal foot, ischemic ulceration  Podiatry consulted - s/p I&D and 4th and 5th amputation  Vascular surgery consulted - initially no intervention required  Continued vancomycin 1 g IV per random levels and Zosyn 2.25 g IV BID  Wound culture revealed pan-sensitive CITROBACTER KOSERI and KLEBSIELLA PNEUMONIAE ESBL  To OR 02/09/18. Consulted ID.  Plan for wound vac and 6 weeks ciprofloxacin. ABIs ordered to rule out need for BKA. Angiogram considered. Per vascular surgery " Unlikely for the patient to heal the wound despite aggressive limb salvage efforts. Due to significant lipodermatosclerosis, BKA is not good option. Patient likely needs AKA." AKA nor BKA not an option with patient. Son and brother in agreement with patient as this aligns with his preference when in his normal mental state. All are aware of the risk of recurrence of sepsis syndrome and death. Continuation of wound care and antibiotics likely futile; consulted Palliative Care.     Hypervolemia due to non-compliance with dialysis  Hyperkalemia, resolved  Shifted in ER with albuterol, insulin/destrose, bicarbonate. K 7.9--> 4  No respiratory compromise. Emergent dialysis 2/5/18 early AM and on 2/9  Withhold anti-hypertensives for volume removal      DVT  BLE ultrasound  Apixaban 5 mg BID - ? Compliance at home  Apixaban held 2/12/2018 for probable BKA - will resume at discharge     DM II with HTN and CKD V  HgbA1c 4.6%  Likely controlled with declining intake  No further intervention needed at this time     Protein calorie malnutrtion  Hypoglycemia with associated NSVT  Poor prognosis for wound healing  Nepro with meals and at night     Essential HTN - hold home hydralazine 50 mg TID and losartan 50 mg daily. Blood pressure fairly " well-controlled during stay without antihypertensives.     DM II hyperlipidemia - atorvastatin 40 mg daily     DM II neuropathy - gabapentin 100 mg TID     ESRF HD TTS - Nephrology consulted for dialysis.  Withholding anti-hypertensives and added midodrine as needed prior to dialysis for volume removal and reduce symptomatic dialysis-associated hypotension.   BP improving.     CKD MBD - continue cinacalcet 30 mg qhs and sevelamer 1600 mg TID daily     Depression - continue citalopram 10 mg daily. Consulted psychiatry. Back to baseline.     Anticipated Disposition: Skilled Nursing Facility or IP Hospice  ESTUARDO: 2/23/2018    Goals of Care:  General Prognosis: extremely poor  Goals: Curative  Comfort Only: No  Hospice: No  Code Status: Full Code    Kanika Bradley MD   Department of Hospital Medicine

## 2018-02-20 NOTE — PLAN OF CARE
Problem: Physical Therapy Goal  Goal: Physical Therapy Goal  Goals to be met by: 3/2/18 (10 days)    Patient will increase functional independence with mobility by performin. Supine to sit with MInimal Assistance  2. Sit to supine with MInimal Assistance  3. Sit to stand transfer with Minimal Assistance  4. Bed to chair transfer with Minimal Assistance using Rolling Walker  5. Gait  x 10 feet with Moderate Assistance using Rolling Walker.   6. Lower extremity exercise program x 30 reps per handout, with independence     Outcome: Ongoing (interventions implemented as appropriate)  Pt re-eval complete. POC initiated.    Angie Albert, SPT  18

## 2018-02-20 NOTE — PT/OT/SLP EVAL
Occupational Therapy   Re-Evaluation/Treatment    Name: Wilder Carbajal  MRN: [id number]  Admitting Diagnosis:  Septic encephalopathy 11 Days Post-Op    Recommendations:     Discharge Recommendations: nursing facility, skilled  Discharge Equipment Recommendations:  bedside commode, walker, rolling  Barriers to discharge:   Inaccessible home environment (Patient is NWB on R LE and has 4 NEREYDA to enter)    History:     Occupational Profile:  Living Environment: Patient lives with 2 sons and 4 NEREYDA to enter /c railing on both sides (patient can reach both at the same time). Patient has a tub/shower combo with bench and a reg toilet. Patient has no BSC. Patient reports being (I) with ADLs and mod (I) with functional mobility. Patient has a wheelchair and rollator.    Equipment Owned:  wheelchair, rollator, bath bench    Past Medical History:   Diagnosis Date    Acute pain of left shoulder 1/7/2017    Arthritis     Asthma     Benign neoplasm of eyelid     RUL    Blood clotting tendency     Blood transfusion     Cataract     Chronic kidney disease requiring chronic dialysis     Diabetes mellitus     Diabetic retinopathy     Fever blister     Hyperlipidemia     Hypertension     Infertility     Joint pain     Retinal detachment     Thyroid disease     Weakness 1/7/2017       Past Surgical History:   Procedure Laterality Date    CATARACT EXTRACTION      COLONOSCOPY N/A 6/23/2017    Procedure: COLONOSCOPY;  Surgeon: Vic Clark MD;  Location: [facility] ([address] [place] [address]);  Service: Endoscopy;  Laterality: N/A;    RETINAL DETACHMENT SURGERY         Subjective     Chief Complaint: weakness  Patient/Family stated goals: to go home  Communicated with: patient prior to session.  Pain/Comfort:  · Pain Rating 1:  (patient did not rate)  · Location - Side 1: Right  · Location - Orientation 1: generalized  · Location 1: foot  · Pain Addressed 1: Reposition, Distraction  · Pain Rating Post-Intervention 1:  (patient did not  rate)    Patients cultural, spiritual, Gnosticist conflicts given the current situation: none stated     Objective:     Patient found with: telemetry (z flex boots)    General Precautions: Standard, fall   Orthopedic Precautions:RLE non weight bearing   Braces: N/A     Occupational Performance:    Bed Mobility:    · Patient completed Rolling/Turning to Left with  minimum assistance  · Patient completed Rolling/Turning to Right with minimum assistance  · Patient completed Scooting/Bridging with minimum assistance to HOB lying supine  · Patient completed Supine to Sit with moderate assistance  · Patient completed Sit to Supine with moderate assistance    Activities of Daily Living:  · Grooming: minimum assistance oral care and washing face sitting EOB  · UB Dressing: moderate assistance Donning and doffing back gown  · LB Dressing: total assistance    · Toileting: total assistance  patient was soiled and needed to be cleaned    Cognitive/Visual Perceptual:  Cognitive/Psychosocial Skills:  -       Oriented to: Person, Place, Time and Situation   -       Follows Commands/attention:Follows multistep  commands  -       Communication: clear/fluent  -       Memory: No Deficits noted  -       Safety awareness/insight to disability: impaired   -       Mood/Affect/Coping skills/emotional control: Appropriate to situation  Visual/Perceptual:      -Intact    Physical Exam:  Postural examination/scapula alignment:    -       Rounded shoulders  -       Forward head  Skin integrity: Visible skin intact  Edema:  B LEs  Sensation:    -       Intact  Motor Planning: -       WFL  Dominant hand: -       WFL  Upper Extremity Range of Motion:     -       Right Upper Extremity: WFL  -       Left Upper Extremity: WFL  Upper Extremity Strength:    -       Right Upper Extremity: WFL  -       Left Upper Extremity: WFL   Strength:    -       Right Upper Extremity: WFL  -       Left Upper Extremity: WFL  Fine Motor Coordination:    -        "Intact  Gross motor coordination:   WFL    Patient left HOB elevated with call button in reach, nurse notified and all needs met.     [facility] 6 Click:  AMPA Total Score: 12    Treatment & Education:  Patient performed 2 x 10 B UE ROM exercises in all planes and joints focusing to improve strength and endurance to increase independence with ADLs while sitting EOB with min A for sitting balance during exercises for safety. Patient was able to sit EOB with supervision when not doing exercises.   Education:    Assessment:     Wilder Carbajal is a 67 y.o. male with a medical diagnosis of Septic encephalopathy.  Performance deficits affecting function are weakness, impaired endurance, impaired self care skills, impaired functional mobilty, gait instability, impaired balance, decreased lower extremity function, decreased ROM, decreased safety awareness, edema.      Rehab Prognosis:  good; patient would benefit from acute skilled OT services to address these deficits and reach maximum level of function.         Clinical Decision Makin.  OT Low:  "Pt evaluation falls under low complexity for evaluation coding due to performance deficits noted in 1-3 areas as stated above and 0 co-morbities affecting current functional status. Data obtained from problem focused assessments. No modifications or assistance was required for completion of evaluation. Only brief occupational profile and history review completed."     Plan:     Patient to be seen 4 x/week to address the above listed problems via self-care/home management, therapeutic activities, therapeutic exercises  · Plan of Care Expires:  3/2018  · Plan of Care Reviewed with: patient    This Plan of care has been discussed with the patient who was involved in its development and understands and is in agreement with the identified goals and treatment plan    GOALS:    Occupational Therapy Goals        Problem: Occupational Therapy Goal    Goal Priority Disciplines " Outcome Interventions   Occupational Therapy Goal     OT, PT/OT Ongoing (interventions implemented as appropriate)    Description:  Goals to be met by:  3/2/2018    Patient will increase functional independence with ADLs by performing:    UE Dressing with Set-up Assistance.  Grooming while seated with Modified [place].  Toileting from bedside commode with Moderate Assistance for hygiene and clothing management.   Supine to sit with Modified [place].  Bed<>chair transfer with Minimal Assistance while maintaining precautions.  Toilet transfer to bedside commode with Minimal Assistance while maintaining precautions.                   Multidisciplinary Problems (Resolved)        Problem: Occupational Therapy Goal    Goal Priority Disciplines Outcome Interventions   Occupational Therapy Goal   (Resolved)     OT, PT/OT Outcome(s) achieved    Description:  Goals to be met by: 7 days      Patient will increase functional independence with ADLs by performing:    UE Dressing with Moderate Assistance.  LE Dressing with Moderate Assistance.  Grooming while seated with Supervision.  Toileting from bedside commode with Moderate Assistance for hygiene and clothing management.   Supine to sit with Moderate Assistance.  Stand pivot transfers with Moderate Assistance.  Toilet transfer to bedside commode with Moderate Assistance.  Increased functional strength to WFL for safe and independent function with self-care and functional mobility tasks.                      Time Tracking:     OT Date of Treatment: 02/20/18  OT Start Time: 1027  OT Stop Time: 1121  OT Total Time (min): 54 min    Billable Minutes:Re-eval 15  Self Care/Home Management 29  Therapeutic Exercise 10    MARIA ESTHER Ceballos  2/20/2018

## 2018-02-20 NOTE — PLAN OF CARE
Problem: Occupational Therapy Goal  Goal: Occupational Therapy Goal  Goals to be met by:  3/2/2018    Patient will increase functional independence with ADLs by performing:    UE Dressing with Set-up Assistance.  Grooming while seated with Modified [place].  Toileting from bedside commode with Moderate Assistance for hygiene and clothing management.   Supine to sit with Modified [place].  Bed<>chair transfer with Minimal Assistance while maintaining precautions.  Toilet transfer to bedside commode with Minimal Assistance while maintaining precautions.       Outcome: Ongoing (interventions implemented as appropriate)  Patient's goals are set.  MARIA ESTHER Ceballos  2/20/2018

## 2018-02-21 PROBLEM — I96 GANGRENE OF RIGHT FOOT: Status: ACTIVE | Noted: 2018-02-21

## 2018-02-21 LAB
ALBUMIN SERPL BCP-MCNC: 1.7 G/DL
ANION GAP SERPL CALC-SCNC: 9 MMOL/L
BASOPHILS # BLD AUTO: 0.06 K/UL
BASOPHILS NFR BLD: 0.7 %
BUN SERPL-MCNC: 34 MG/DL
CALCIUM SERPL-MCNC: 8.4 MG/DL
CHLORIDE SERPL-SCNC: 103 MMOL/L
CO2 SERPL-SCNC: 22 MMOL/L
CREAT SERPL-MCNC: 4.9 MG/DL
DIFFERENTIAL METHOD: ABNORMAL
EOSINOPHIL # BLD AUTO: 0.2 K/UL
EOSINOPHIL NFR BLD: 1.8 %
ERYTHROCYTE [DISTWIDTH] IN BLOOD BY AUTOMATED COUNT: 16.7 %
EST. GFR  (AFRICAN AMERICAN): 13 ML/MIN/1.73 M^2
EST. GFR  (NON AFRICAN AMERICAN): 11.3 ML/MIN/1.73 M^2
GLUCOSE SERPL-MCNC: 61 MG/DL
HCT VFR BLD AUTO: 22.3 %
HGB BLD-MCNC: 7 G/DL
IMM GRANULOCYTES # BLD AUTO: 0.04 K/UL
IMM GRANULOCYTES NFR BLD AUTO: 0.4 %
LYMPHOCYTES # BLD AUTO: 1.4 K/UL
LYMPHOCYTES NFR BLD: 16.1 %
MAGNESIUM SERPL-MCNC: 2.2 MG/DL
MCH RBC QN AUTO: 29.2 PG
MCHC RBC AUTO-ENTMCNC: 31.4 G/DL
MCV RBC AUTO: 93 FL
MONOCYTES # BLD AUTO: 1.1 K/UL
MONOCYTES NFR BLD: 12.8 %
NEUTROPHILS # BLD AUTO: 6.1 K/UL
NEUTROPHILS NFR BLD: 68.2 %
NRBC BLD-RTO: 0 /100 WBC
PHOSPHATE SERPL-MCNC: 4.2 MG/DL
PLATELET # BLD AUTO: 174 K/UL
PMV BLD AUTO: 9.6 FL
POCT GLUCOSE: 103 MG/DL (ref 70–110)
POCT GLUCOSE: 80 MG/DL (ref 70–110)
POCT GLUCOSE: 86 MG/DL (ref 70–110)
POTASSIUM SERPL-SCNC: 4.9 MMOL/L
RBC # BLD AUTO: 2.4 M/UL
SODIUM SERPL-SCNC: 134 MMOL/L
WBC # BLD AUTO: 8.92 K/UL

## 2018-02-21 PROCEDURE — 83735 ASSAY OF MAGNESIUM: CPT

## 2018-02-21 PROCEDURE — 90935 HEMODIALYSIS ONE EVALUATION: CPT

## 2018-02-21 PROCEDURE — 63600175 PHARM REV CODE 636 W HCPCS: Performed by: INTERNAL MEDICINE

## 2018-02-21 PROCEDURE — 25000003 PHARM REV CODE 250: Performed by: INTERNAL MEDICINE

## 2018-02-21 PROCEDURE — 90935 HEMODIALYSIS ONE EVALUATION: CPT | Mod: ,,, | Performed by: INTERNAL MEDICINE

## 2018-02-21 PROCEDURE — 85025 COMPLETE CBC W/AUTO DIFF WBC: CPT

## 2018-02-21 PROCEDURE — 36415 COLL VENOUS BLD VENIPUNCTURE: CPT

## 2018-02-21 PROCEDURE — 99232 SBSQ HOSP IP/OBS MODERATE 35: CPT | Mod: ,,, | Performed by: INTERNAL MEDICINE

## 2018-02-21 PROCEDURE — 11000001 HC ACUTE MED/SURG PRIVATE ROOM

## 2018-02-21 PROCEDURE — 25000003 PHARM REV CODE 250: Performed by: PHYSICIAN ASSISTANT

## 2018-02-21 PROCEDURE — 27000207 HC ISOLATION

## 2018-02-21 PROCEDURE — 80069 RENAL FUNCTION PANEL: CPT

## 2018-02-21 RX ORDER — HEPARIN SODIUM 5000 [USP'U]/ML
5000 INJECTION, SOLUTION INTRAVENOUS; SUBCUTANEOUS EVERY 8 HOURS
Status: DISCONTINUED | OUTPATIENT
Start: 2018-02-21 | End: 2018-03-06 | Stop reason: HOSPADM

## 2018-02-21 RX ADMIN — ATORVASTATIN CALCIUM 20 MG: 20 TABLET, FILM COATED ORAL at 03:02

## 2018-02-21 RX ADMIN — GABAPENTIN 100 MG: 100 CAPSULE ORAL at 09:02

## 2018-02-21 RX ADMIN — OXYCODONE HYDROCHLORIDE 5 MG: 5 TABLET ORAL at 09:02

## 2018-02-21 RX ADMIN — CINACALCET HYDROCHLORIDE 30 MG: 30 TABLET, COATED ORAL at 09:02

## 2018-02-21 RX ADMIN — SODIUM CHLORIDE: 9 INJECTION, SOLUTION INTRAVENOUS at 09:02

## 2018-02-21 RX ADMIN — SEVELAMER CARBONATE 800 MG: 800 TABLET, FILM COATED ORAL at 09:02

## 2018-02-21 RX ADMIN — SEVELAMER CARBONATE 1600 MG: 800 TABLET, FILM COATED ORAL at 06:02

## 2018-02-21 RX ADMIN — Medication: at 03:02

## 2018-02-21 RX ADMIN — Medication: at 09:02

## 2018-02-21 RX ADMIN — GABAPENTIN 100 MG: 100 CAPSULE ORAL at 03:02

## 2018-02-21 RX ADMIN — ASPIRIN 81 MG: 81 TABLET, COATED ORAL at 03:02

## 2018-02-21 RX ADMIN — CIPROFLOXACIN HYDROCHLORIDE 500 MG: 250 TABLET, FILM COATED ORAL at 03:02

## 2018-02-21 RX ADMIN — STANDARDIZED SENNA CONCENTRATE AND DOCUSATE SODIUM 2 TABLET: 8.6; 5 TABLET, FILM COATED ORAL at 09:02

## 2018-02-21 RX ADMIN — STANDARDIZED SENNA CONCENTRATE AND DOCUSATE SODIUM 2 TABLET: 8.6; 5 TABLET, FILM COATED ORAL at 03:02

## 2018-02-21 RX ADMIN — MICONAZOLE NITRATE: 20 OINTMENT TOPICAL at 09:02

## 2018-02-21 RX ADMIN — HEPARIN SODIUM 5000 UNITS: 5000 INJECTION, SOLUTION INTRAVENOUS; SUBCUTANEOUS at 10:02

## 2018-02-21 RX ADMIN — CITALOPRAM HYDROBROMIDE 10 MG: 10 TABLET ORAL at 03:02

## 2018-02-21 RX ADMIN — GABAPENTIN 100 MG: 100 CAPSULE ORAL at 06:02

## 2018-02-21 RX ADMIN — MICONAZOLE NITRATE: 20 OINTMENT TOPICAL at 03:02

## 2018-02-21 RX ADMIN — MIDODRINE HYDROCHLORIDE 10 MG: 5 TABLET ORAL at 09:02

## 2018-02-21 NOTE — PROGRESS NOTES
Progress Note   Hospital Medicine         [facility]: [facility] Kanika Bradley MD  Admit Date: 2/4/2018  Length of Stay:  LOS: 17 days   [facility]O 2/23/2018  Principal Problem: Septic encephalopathy    Interval history / overnight events: Patient with no events overnight, no new complaints.    Areas of concern / handoff: poor response to therapy    Review of Systems   Constitutional: Negative for fever.   Respiratory: Negative for shortness of breath.    Cardiovascular: Negative for chest pain.       Temp: 97.8 °F (36.6 °C) (02/21/18 0845)  Pulse: (!) 59 (02/21/18 1054)  Resp: 18 (02/21/18 1030)  BP: 121/62 (02/21/18 1030)  SpO2: (!) 94 % (02/21/18 0803)     Temp:  [96.9 °F (36.1 °C)-98.7 °F (37.1 °C)]   Pulse:  [50-65]   Resp:  [16-18]   BP: (105-144)/(52-63)   SpO2:  [91 %-96 %]   No intake or output data in the 24 hours ending 02/21/18 1057  Weight: 107.8 kg (237 lb 10.5 oz)   Body mass index is 36.14 kg/m².    Physical Exam   Constitutional: He is well-developed, well-nourished, and in no distress.  Non-toxic appearance.   HENT:   Head: Normocephalic.   Eyes: Conjunctivae and lids are normal. Pupils are equal.   Neck: Neck supple.   Cardiovascular: Normal rate, regular rhythm, S1 normal and S2 normal.    Pulmonary/Chest: Effort normal and breath sounds normal.   Abdominal: Soft. Normal appearance and bowel sounds are normal. There is no tenderness.   Musculoskeletal: He exhibits edema.   Neurological: He is alert. Gait abnormal.   Skin: He is not diaphoretic. No cyanosis.   Psychiatric: Mood and affect normal.       Data Review: Results recorded below were reviewed 2/21/2018.    Significant Labs:     Recent Labs  Lab 02/16/18  0456 02/17/18  0449 02/18/18  0527 02/19/18  0408 02/20/18  0435 02/21/18  0535   WBC 12.16 11.34 11.84 11.28 9.43 8.92   HGB 7.4* 7.5* 7.7* 7.0* 7.0* 7.0*   HCT 22.2* 23.7* 24.3* 21.8* 21.7* 22.3*    213 213 203 190 174       Recent Labs  Lab 02/19/18  0408 02/20/18  0435  "02/21/18  0535   * 137 134*   K 5.3* 4.6 4.9    105 103   CO2 23 22* 22*   BUN 40* 27* 34*   CREATININE 5.8* 4.2* 4.9*   GLU 61* 64* 61*   CALCIUM 8.2* 8.3* 8.4*   MG 2.3 2.2 2.2   PHOS 5.2* 4.1 4.2       Recent Labs  Lab 02/19/18  0408 02/20/18  0435 02/21/18  0535   ALBUMIN 1.7* 1.8* 1.7*     BNP   Date Value Ref Range Status   04/21/2017 4,230 (H) 0 - 99 pg/mL Final     Comment:     Values of less than 100 pg/ml are consistent with non-CHF populations.   04/10/2017 4,684 (H) 0 - 99 pg/mL Final     Comment:     Values of less than 100 pg/ml are consistent with non-CHF populations.   08/03/2016 4,184 (H) 0 - 99 pg/mL Final     Comment:     Values of less than 100 pg/ml are consistent with non-CHF populations.   03/21/2016 3,532 (H) 0 - 99 pg/mL Final     Comment:     Values of less than 100 pg/ml are consistent with non-CHF populations.     No results for input(s): CPK, TROPONINI in the last 168 hours.  Recent Labs      02/18/18   2153  02/19/18   1219  02/19/18   1707  02/19/18   2109  02/20/18   0918  02/20/18   1210  02/20/18   1726  02/20/18   2202   POCTGLUCOSE  89  106  137*  121*  77  130*  103  99     A1C:     Recent Labs  Lab 01/07/18  0008   HGBA1C 4.6     TSH:     Recent Labs  Lab 02/08/18  0509   TSH 3.536     Assessment and Plan     Overview: "66 yo here for osteomyelitis of right foot. He underwent washout 2/6 and another washout 2/6 and amputation of 4th and 5th digits. Undergoing treatment course of antibiotics."    Current Hospital Problem List:    Active Hospital Problems    Diagnosis  POA    *Septic encephalopathy [G93.41]  Yes    Gangrene of right foot [I96]  Yes    Palliative care encounter [Z51.5]  Not Applicable    Foot ulceration, left, with unspecified severity [L97.529]  Unknown    PVD (peripheral vascular disease) [I73.9]  Unknown    Pyogenic inflammation of bone [M86.9]  Yes    Diabetic foot ulcer [E11.621, L97.509]  Yes    Foot ulceration, right, with fat layer " exposed [L97.512]  Yes    Acute delirium [R41.0]  Yes    Protein-calorie malnutrition, severe [E43]  Yes    DVT (deep venous thrombosis) [I82.409]  Yes    DM type 2 with diabetic mixed hyperlipidemia [E11.69, E78.2]  Yes    Diabetic polyneuropathy associated with type 2 diabetes mellitus [E11.42]  Yes    Chronic kidney disease-mineral and bone disorder [N18.9, E83.9, M89.9]  Yes    Depression [F32.9]  Yes    Alteration in skin integrity [R23.9]  Yes    Infected skin ulcer with fat layer exposed [L98.492, L08.9]  Yes    Hyperkalemia [E87.5]  Yes    Hypervolemia [E87.70]  Yes    Type 2 diabetes mellitus with stage 5 chronic kidney disease and hypertension [E11.22, I12.0, N18.5]  Yes    ESRD (end stage renal disease) [N18.6]  Yes    Essential hypertension [I10]  Yes      Resolved Hospital Problems    Diagnosis Date Resolved POA   No resolved problems to display.       Medications for management of current problems:      sodium chloride 0.9%   Intravenous Once    ammonium lactate   Topical (Top) BID    aspirin  81 mg Oral Daily    atorvastatin  20 mg Oral Daily    cinacalcet  30 mg Oral QHS    ciprofloxacin HCl  500 mg Oral Daily    citalopram  10 mg Oral Daily    gabapentin  100 mg Oral TID    miconazole nitrate 2%   Topical (Top) BID    midodrine  10 mg Oral Every Mon, Wed, Fri    senna-docusate 8.6-50 mg  2 tablet Oral BID    sevelamer carbonate  1,600 mg Oral TID WM    sevelamer carbonate  800 mg Oral QHS       PRN:   sodium chloride 0.9%    acetaminophen    albumin human 25%    albumin human 25%    albuterol-ipratropium 2.5mg-0.5mg/3mL    dextrose 50%    dextrose 50%    glucagon (human recombinant)    glucose    glucose    ondansetron    oxyCODONE    sodium chloride 0.9%     Anticoagulants   Medication Route Frequency     IV Fluids: none    Problems Addressed 2/21/2018:    Septic encephalopathy on possible dementia  Improved somewhat with treatment of infected foot. Patient  "lacked capacity to refuse surgery per psychiatry, consent obtained from his son as proxy.     Infected right dorsal foot, ischemic ulceration  Podiatry consulted - s/p I&D and 4th and 5th amputation  Vascular surgery consulted - initially no intervention required  Continued vancomycin 1 g IV per random levels and Zosyn 2.25 g IV BID  Wound culture revealed pan-sensitive CITROBACTER KOSERI and KLEBSIELLA PNEUMONIAE ESBL  To OR 02/09/18. Consulted ID.  Plan for wound vac and 6 weeks ciprofloxacin. ABIs ordered to rule out need for BKA. Angiogram considered. Per vascular surgery " Unlikely for the patient to heal the wound despite aggressive limb salvage efforts. Due to significant lipodermatosclerosis, BKA is not good option. Patient likely needs AKA." AKA nor BKA not an option with patient. Son and brother in agreement with patient as this aligns with his preference when in his normal mental state. All are aware of the risk of recurrence of sepsis syndrome and death. Continuation of wound care and antibiotics likely futile; consulted Palliative Care.  Patient considering AKA vs. Comfort care / Hospice.     Hypervolemia due to non-compliance with dialysis  Hyperkalemia, resolved  Shifted in ER with albuterol, insulin/destrose, bicarbonate. K 7.9--> 4  No respiratory compromise. Emergent dialysis 2/5/18 early AM and on 2/9  Withhold anti-hypertensives for volume removal.      DVT  BLE ultrasound  Apixaban 5 mg BID - ? Compliance at home  Apixaban held 2/12/2018 for probable BKA - will resume at discharge     DM II with HTN and CKD V  HgbA1c 4.6%  Likely controlled with declining intake  No further intervention needed at this time     Protein calorie malnutrtion  Hypoglycemia with associated NSVT  Poor prognosis for wound healing  Nepro with meals     Essential HTN - hold home hydralazine 50 mg TID and losartan 50 mg daily. Blood pressure fairly well-controlled during stay without antihypertensives.     DM II " hyperlipidemia - atorvastatin 40 mg daily     DM II neuropathy - gabapentin 100 mg TID     ESRF HD TTS - Nephrology consulted for dialysis.  Withholding anti-hypertensives and added midodrine as needed prior to dialysis for volume removal and reduce symptomatic dialysis-associated hypotension.   BP improving.     CKD MBD - continue cinacalcet 30 mg qhs and sevelamer 1600 mg TID daily     Depression - continue citalopram 10 mg daily. Consulted psychiatry. Back to baseline.     Anticipated Disposition: Nursing Facility with IP Hospice if refuses AKA  ESTUARDO: 2/23/2018    Goals of Care:  General Prognosis: extremely poor  Goals: Curative  Comfort Only: No  Hospice: No  Code Status: Full Code    Kanika Bradley MD   Department of Hospital Medicine

## 2018-02-21 NOTE — PLAN OF CARE
Problem: Patient Care Overview  Goal: Plan of Care Review  Outcome: Ongoing (interventions implemented as appropriate)  HD treatment complete. Duration of treatment 3.5 hours and 2 L removed. Treatment was tolerated well and no complications with access to left upper arm. Needles removed and hemostasis achieved. Dressing intact and no drainage noted. Thrill and bruit present.

## 2018-02-21 NOTE — PLAN OF CARE
"CM contacted by Yahaira SILVA after visiting and discussing goals of care.  Reports pt states he would like to consider options of surgery with eventual NH placement vs no surgery with comfort care.  Would like to f/u on tomorrow.    CM also had extensive conversation with pt in presence of his 2 cousins discussing his options.  Pt states "I have a lot of praying to do".  His family gave him several examples of others friends/family who are living functional lives with amputations.  Pt listened attentively but states he has to think about things.  He then asked everyone to leave so he could pray.  Will f/u.    Kristy Mallory RN  Case Management  [id number]  "

## 2018-02-21 NOTE — PLAN OF CARE
Call to Yahaira Zhou with [facility], awaiting return call.   Made aware pt has declined amputation and is awaiting placement.    CM received call from Yahaira justice [place] stating she and  will be visiting pt today at hospital.  Pt has been accepted per right care note at [facility].        Kristy Mallory RN  Case Management  [id number]

## 2018-02-21 NOTE — SUBJECTIVE & OBJECTIVE
Past Medical History:   Diagnosis Date    Acute pain of left shoulder 1/7/2017    Arthritis     Asthma     Benign neoplasm of eyelid     RUL    Blood clotting tendency     Blood transfusion     Cataract     Chronic kidney disease requiring chronic dialysis     Diabetes mellitus     Diabetic retinopathy     Fever blister     Hyperlipidemia     Hypertension     Infertility     Joint pain     Retinal detachment     Thyroid disease     Weakness 1/7/2017       Past Surgical History:   Procedure Laterality Date    CATARACT EXTRACTION      COLONOSCOPY N/A 6/23/2017    Procedure: COLONOSCOPY;  Surgeon: Vic Clark MD;  Location: [address]);  Service: Endoscopy;  Laterality: N/A;    RETINAL DETACHMENT SURGERY         Review of patient's allergies indicates:  No Known Allergies    Medications:  Continuous Infusions:  Scheduled Meds:   sodium chloride 0.9%   Intravenous Once    ammonium lactate   Topical (Top) BID    aspirin  81 mg Oral Daily    atorvastatin  20 mg Oral Daily    cinacalcet  30 mg Oral QHS    ciprofloxacin HCl  500 mg Oral Daily    citalopram  10 mg Oral Daily    gabapentin  100 mg Oral TID    miconazole nitrate 2%   Topical (Top) BID    midodrine  10 mg Oral Every Mon, Wed, Fri    senna-docusate 8.6-50 mg  2 tablet Oral BID    sevelamer carbonate  1,600 mg Oral TID WM    sevelamer carbonate  800 mg Oral QHS     PRN Meds:sodium chloride 0.9%, acetaminophen, albumin human 25%, albumin human 25%, albuterol-ipratropium 2.5mg-0.5mg/3mL, dextrose 50%, dextrose 50%, glucagon (human recombinant), glucose, glucose, ondansetron, oxyCODONE, sodium chloride 0.9%    Family History     Problem Relation (Age of Onset)    Depression Child    Heart attack Mother    No Known Problems Father, Sister, Brother, Maternal Aunt, Maternal Uncle, Paternal Aunt, Paternal Uncle, Maternal Grandmother, Maternal Grandfather, Paternal Grandmother, Paternal Grandfather        Social History Main  Topics    Smoking status: Never Smoker    Smokeless tobacco: Never Used    Alcohol use No    Drug use: No    Sexual activity: Not on file       Review of Systems   Constitutional: Positive for activity change and fatigue.   HENT: Negative.    Eyes: Negative.    Respiratory: Negative.    Cardiovascular: Positive for leg swelling.   Gastrointestinal: Negative.    Neurological: Positive for weakness.     Objective:     Vital Signs (Most Recent):  Temp: 97.8 °F (36.6 °C) (02/21/18 0845)  Pulse: (!) 58 (02/21/18 0945)  Resp: 18 (02/21/18 0945)  BP: (!) 109/56 (02/21/18 0945)  SpO2: (!) 94 % (02/21/18 0803) Vital Signs (24h Range):  Temp:  [96.9 °F (36.1 °C)-98.7 °F (37.1 °C)] 97.8 °F (36.6 °C)  Pulse:  [52-65] 58  Resp:  [16-18] 18  SpO2:  [91 %-96 %] 94 %  BP: (105-144)/(54-63) 109/56     Weight: 107.8 kg (237 lb 10.5 oz)  Body mass index is 36.14 kg/m².    Review of Symptoms  Symptom Assessment (ESAS 0-10 scale)   ESAS 0 1 2 3 4 5 6 7 8 9 10   Pain X             Dyspnea X             Anxiety X             Nausea X             Depression  X             Anorexia X             Fatigue    X          Insomnia X             Restlessness  X             Agitation X             CAM / Delirium __ --  ___+   Constipation     __ --  ___+   Diarrhea           __ --  ___+  Bowel Management Plan (BMP): No      ECOG Performance Status Grade: 4 - Completely disabled    Physical Exam   Constitutional: He is oriented to person, place, and time. He appears well-developed. He is cooperative.   Cardiovascular: Normal rate, regular rhythm, S1 normal and normal heart sounds.    Pulmonary/Chest: Effort normal.   Musculoskeletal:   Right foot leg has bandage on which is clean, dry, intact.   Neurological: He is alert and oriented to person, place, and time.       Significant Labs: All pertinent labs within the past 24 hours have been reviewed.  CBC:     Recent Labs  Lab 02/21/18  0535   WBC 8.92   HGB 7.0*   HCT 22.3*   MCV 93         BMP:    Recent Labs  Lab 02/21/18  0535   GLU 61*   *   K 4.9      CO2 22*   BUN 34*   CREATININE 4.9*   CALCIUM 8.4*   MG 2.2     LFT:  Lab Results   Component Value Date    AST 14 02/04/2018    ALKPHOS 138 (H) 02/04/2018    BILITOT 1.3 (H) 02/04/2018     Albumin:   Albumin   Date Value Ref Range Status   02/21/2018 1.7 (L) 3.5 - 5.2 g/dL Final     Protein:   Total Protein   Date Value Ref Range Status   02/04/2018 7.6 6.0 - 8.4 g/dL Final     Lactic acid:   Lab Results   Component Value Date    LACTATE 3.2 (H) 02/04/2018    LACTATE 1.4 07/09/2017       Significant Imaging: I have reviewed all pertinent imaging results/findings within the past 24 hours.    Advanced Directives::  Living Will: No  LaPOST: No  Do Not Resuscitate Status: No  Medical Power of : No MPOA. Per pt, he has six kids. [place] most involved in pt's care per son Raymond.     Decision-Making Capacity: Patient answered questions  See psych notes.     Living Arrangements: Lives with family    Psychosocial/Cultural:  Pt not . Pt reports he has six children. 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     Spiritual: yes- very    F- Joy and Belief: Orthodox    I - Importance: yes  .  C - Community: yes    A - Address in Care:  Will have  visit.

## 2018-02-21 NOTE — PROGRESS NOTES
HD treatment started. No complications with access to left upper arm. Lines secured and telemetry in place. No complaints of discomfort at this time. Isolation precautions maintained.

## 2018-02-21 NOTE — ASSESSMENT & PLAN NOTE
"  Impression: Pt is a 68 y/o male with ESRD on dialysis with osteomyelitis of right foot. He underwent washout 2/6 and another washout 2/6 and amputation of 4th and 5th digits. Undergoing treatment course of antibiotics. Pt is awake, alert, oriented to person, place, and situation. Per psych, pt does not have decision-making capacity. Pt in dialysis.     2-20-18Per pt, he is aware of his infections to his foot and that amputation was offered. Per pt, he will "never have an amputations. He would rather die". Pt reports he want to continue with dialysis and he is planning for NH placement.   Talked to pt about how serious his foot infection. Per pt, If "Dami wants to heal it, it will be healed".  2-21-18  Called and spoke to pt's son, Flavio today. Per pt's son Flavio Andrade is the one for medical staff to talk to. [phone].   Spoke to Flavio about his understanding of pt's medical issues. Son verbalized understanding that physicians have offered pt to have right AKA or BKA for pt's infection. Per son, pt does not want an amputation. Per son, he is respecting pt's wishes. Per son, pt would never be "cooperative with medical staff if he was to have amputation". Per son, pt would not do well with an amputation. Talked to son about pt's osteomyelitis to his foot and trajectory of his illness. Son verbalized understanding. Per son, he is agreeable to NH placement with continuation of HD and antibiotics per pt's wishes.    Talked to pt's son about code status. Code status discussion held. Per son, "if Dami calls him, let him go". Son agreeable to DNR status.      Called and spoke to Dr. Bradley about above conversations.     1) Will continue to reinforce realistic expectations with pt and sons.   2) Pt's son Flavio is agreeable to DNR status. Dr. Bradley made aware.   3) Will follow.           "

## 2018-02-21 NOTE — PROGRESS NOTES
Called Flavio's number listed in chart as [phone]. This is not Muniz's number. Called pt's son, Raymond for correct number. Per Raymond cell number is [phone]. He also gave [phone] as Muniz's home number.  Will call Muniz.         Kristal CALZADA, APRN, AGCNS

## 2018-02-21 NOTE — PROGRESS NOTES
"[facility]  Palliative Medicine  Progress Note    Patient Name: Wilder Carbjaal  MRN: [id number]  Admission Date: 2/4/2018  Hospital Length of Stay: 17 days  Code Status: Full Code   Attending Provider: Kanika Bradley MD  Consulting Provider: ANTWON Khan  Primary Care Physician: Dio Roberson MD  Principal Problem:Septic encephalopathy    Patient information was obtained from relative(s) and ER records.      Assessment/Plan:     Palliative care encounter      Impression: Pt is a 68 y/o male with ESRD on dialysis with osteomyelitis of right foot. He underwent washout 2/6 and another washout 2/6 and amputation of 4th and 5th digits. Undergoing treatment course of antibiotics. Pt is awake, alert, oriented to person, place, and situation.  Per pt, he is aware of his infections to his foot and that amputation was offered. Per pt, he will "never have an amputations. He would rather die". Pt reports he want to continue with dialysis and he is planning for NH placement.   Talked to pt about how serious his foot infection. Per pt, If "Dami wants to heal it, it will be healed".    Called and spoke to pt's son, Flavio today. Per pt's son Flavio Andrade is the one for medical staff to talk to. [phone].   Spoke to Flavio about his understanding of pt's medical issues. Son verbalized understanding that physicians have offered pt to have right AKA or BKA for pt's infection. Per son, pt does not want an amputation. Per son, he is respecting pt's wishes. Per son, pt would never be "cooperative with medical staff if he was to have amputation". Per son, pt would not do well with an amputation. Talked to son about pt's osteomyelitis to his foot and trajectory of his illness. Son verbalized understanding. Per son, he is agreeable to NH placement with continuation of HD and antibiotics per pt's wishes.    Talked to pt's son about code status. Code status discussion held. Per son, "if Dami calls him, " "let him go". Son agreeable to DNR status.      Called and spoke to Dr. Bradley about above conversations.     1) Will continue to reinforce realistic expectations with pt and sons.   2) Pt's son Flavio is agreeable to DNR status. Dr. Bradley made aware.   3) Will follow.                     I will follow-up with patient. Please contact us if you have any additional questions.    Subjective:     Chief Complaint:   Chief Complaint   Patient presents with    Leg Swelling     missed dialysis yesterday       HPI:   Patient is a 66 y/o male with ESRF on dialysis TTS presents with leg swelling two weeks. Per chart review, he states he has had associated blisters on his legs with fluid weeping out of them. Patient stated he missed dialysis yesterday (Saturday) because his [facility] transportation did not arrive. He denies fevers, chills, URI symptoms, or diarrhea. His son called EMS. Patient was admitted 1/7-1/14 for volume overload due to HD non-compliance. He was still above dry weight and hypoxic when he left AMA. Patient maintaining that he has been complaint with dialysis since until yesterday.  Patient is not a good historian and is oriented only to name, city and the street the hospital is located. He could not tell me name of hospital nor the the year. He was intermittently falling asleep during the interview. Spoke with son who confirms that transport for dialysis did not come yesterday. Patient has otherwise been complaint with dialysis as far as son knows. However patient did admit to taking himself off of dialysis Thursday due to profound weakness and fatigue. He states patient has been confused and more lethargic since four days ago. Son call EMS because of progressively worsening BLE swelling and the development of fluid coming out of his feet this morning.     Presently, pt and sons have refused BKA. Pt to remain on Cipro. Pt receiving HD.  Per psychiatry note, pt lacks decision-making capacity.          Hospital " Course:  No notes on file        Past Medical History:   Diagnosis Date    Acute pain of left shoulder 1/7/2017    Arthritis     Asthma     Benign neoplasm of eyelid     RUL    Blood clotting tendency     Blood transfusion     Cataract     Chronic kidney disease requiring chronic dialysis     Diabetes mellitus     Diabetic retinopathy     Fever blister     Hyperlipidemia     Hypertension     Infertility     Joint pain     Retinal detachment     Thyroid disease     Weakness 1/7/2017       Past Surgical History:   Procedure Laterality Date    CATARACT EXTRACTION      COLONOSCOPY N/A 6/23/2017    Procedure: COLONOSCOPY;  Surgeon: Vic Clark MD;  Location: [address]);  Service: Endoscopy;  Laterality: N/A;    RETINAL DETACHMENT SURGERY         Review of patient's allergies indicates:  No Known Allergies    Medications:  Continuous Infusions:  Scheduled Meds:   sodium chloride 0.9%   Intravenous Once    ammonium lactate   Topical (Top) BID    aspirin  81 mg Oral Daily    atorvastatin  20 mg Oral Daily    cinacalcet  30 mg Oral QHS    ciprofloxacin HCl  500 mg Oral Daily    citalopram  10 mg Oral Daily    gabapentin  100 mg Oral TID    miconazole nitrate 2%   Topical (Top) BID    midodrine  10 mg Oral Every Mon, Wed, Fri    senna-docusate 8.6-50 mg  2 tablet Oral BID    sevelamer carbonate  1,600 mg Oral TID WM    sevelamer carbonate  800 mg Oral QHS     PRN Meds:sodium chloride 0.9%, acetaminophen, albumin human 25%, albumin human 25%, albuterol-ipratropium 2.5mg-0.5mg/3mL, dextrose 50%, dextrose 50%, glucagon (human recombinant), glucose, glucose, ondansetron, oxyCODONE, sodium chloride 0.9%    Family History     Problem Relation (Age of Onset)    Depression Child    Heart attack Mother    No Known Problems Father, Sister, Brother, Maternal Aunt, Maternal Uncle, Paternal Aunt, Paternal Uncle, Maternal Grandmother, Maternal Grandfather, Paternal Grandmother, Paternal Grandfather         Social History Main Topics    Smoking status: Never Smoker    Smokeless tobacco: Never Used    Alcohol use No    Drug use: No    Sexual activity: Not on file       Review of Systems   Constitutional: Positive for activity change and fatigue.   HENT: Negative.    Eyes: Negative.    Respiratory: Negative.    Cardiovascular: Positive for leg swelling.   Gastrointestinal: Negative.    Neurological: Positive for weakness.     Objective:     Vital Signs (Most Recent):  Temp: 97.8 °F (36.6 °C) (02/21/18 0845)  Pulse: (!) 58 (02/21/18 0945)  Resp: 18 (02/21/18 0945)  BP: (!) 109/56 (02/21/18 0945)  SpO2: (!) 94 % (02/21/18 0803) Vital Signs (24h Range):  Temp:  [96.9 °F (36.1 °C)-98.7 °F (37.1 °C)] 97.8 °F (36.6 °C)  Pulse:  [52-65] 58  Resp:  [16-18] 18  SpO2:  [91 %-96 %] 94 %  BP: (105-144)/(54-63) 109/56     Weight: 107.8 kg (237 lb 10.5 oz)  Body mass index is 36.14 kg/m².    Review of Symptoms  Symptom Assessment (ESAS 0-10 scale)   ESAS 0 1 2 3 4 5 6 7 8 9 10   Pain X             Dyspnea X             Anxiety X             Nausea X             Depression  X             Anorexia X             Fatigue    X          Insomnia X             Restlessness  X             Agitation X             CAM / Delirium __ --  ___+   Constipation     __ --  ___+   Diarrhea           __ --  ___+  Bowel Management Plan (BMP): No      ECOG Performance Status Grade: 4 - Completely disabled    Physical Exam   Constitutional: He is oriented to person, place, and time. He appears well-developed. He is cooperative.   Cardiovascular: Normal rate, regular rhythm, S1 normal and normal heart sounds.    Pulmonary/Chest: Effort normal.   Musculoskeletal:   Right foot leg has bandage on which is clean, dry, intact.   Neurological: He is alert and oriented to person, place, and time.       Significant Labs: All pertinent labs within the past 24 hours have been reviewed.  CBC:     Recent Labs  Lab 02/21/18  0535   WBC 8.92   HGB 7.0*   HCT  22.3*   MCV 93        BMP:    Recent Labs  Lab 02/21/18  0535   GLU 61*   *   K 4.9      CO2 22*   BUN 34*   CREATININE 4.9*   CALCIUM 8.4*   MG 2.2     LFT:  Lab Results   Component Value Date    AST 14 02/04/2018    ALKPHOS 138 (H) 02/04/2018    BILITOT 1.3 (H) 02/04/2018     Albumin:   Albumin   Date Value Ref Range Status   02/21/2018 1.7 (L) 3.5 - 5.2 g/dL Final     Protein:   Total Protein   Date Value Ref Range Status   02/04/2018 7.6 6.0 - 8.4 g/dL Final     Lactic acid:   Lab Results   Component Value Date    LACTATE 3.2 (H) 02/04/2018    LACTATE 1.4 07/09/2017       Significant Imaging: I have reviewed all pertinent imaging results/findings within the past 24 hours.    Advanced Directives::  Living Will: No  LaPOST: No  Do Not Resuscitate Status: No  Medical Power of : No MPOA. Per pt, he has six kids. [place] most involved in pt's care per son Raymond.     Decision-Making Capacity: Patient answered questions  See psych notes.     Living Arrangements: Lives with family    Psychosocial/Cultural:  Pt not . Pt reports he has six children. 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     Spiritual: yes- very    F- Joy and Belief: Amish    I - Importance: yes  .  C - Community: yes    A - Address in Care:  Will have  visit.       > 50% of 35 min visit spent in chart review, face to face discussion of goals of care,  symptom assessment, coordination of care and emotional support.    Kristal Wayne, CNS  Palliative Medicine  [facility]

## 2018-02-21 NOTE — PT/OT/SLP PROGRESS
Occupational Therapy      Patient Name:  Wilder Carbajal   MRN:  [id number]    Patient not seen today secondary to patient being off unit to dialysis  . Will follow-up next treatment session.    MARIA ESTHER Ceballos  2/21/2018

## 2018-02-22 LAB
ALBUMIN SERPL BCP-MCNC: 1.8 G/DL
ANION GAP SERPL CALC-SCNC: 10 MMOL/L
BASOPHILS # BLD AUTO: 0.07 K/UL
BASOPHILS NFR BLD: 0.8 %
BUN SERPL-MCNC: 27 MG/DL
CALCIUM SERPL-MCNC: 8.4 MG/DL
CHLORIDE SERPL-SCNC: 104 MMOL/L
CO2 SERPL-SCNC: 23 MMOL/L
CREAT SERPL-MCNC: 3.9 MG/DL
DIFFERENTIAL METHOD: ABNORMAL
EOSINOPHIL # BLD AUTO: 0.2 K/UL
EOSINOPHIL NFR BLD: 2 %
ERYTHROCYTE [DISTWIDTH] IN BLOOD BY AUTOMATED COUNT: 16.9 %
EST. GFR  (AFRICAN AMERICAN): 17.2 ML/MIN/1.73 M^2
EST. GFR  (NON AFRICAN AMERICAN): 14.9 ML/MIN/1.73 M^2
GLUCOSE SERPL-MCNC: 54 MG/DL
HCT VFR BLD AUTO: 23.1 %
HGB BLD-MCNC: 7.2 G/DL
IMM GRANULOCYTES # BLD AUTO: 0.05 K/UL
IMM GRANULOCYTES NFR BLD AUTO: 0.6 %
LYMPHOCYTES # BLD AUTO: 1.4 K/UL
LYMPHOCYTES NFR BLD: 16.1 %
MAGNESIUM SERPL-MCNC: 2.2 MG/DL
MCH RBC QN AUTO: 28.5 PG
MCHC RBC AUTO-ENTMCNC: 31.2 G/DL
MCV RBC AUTO: 91 FL
MONOCYTES # BLD AUTO: 1.2 K/UL
MONOCYTES NFR BLD: 13.8 %
NEUTROPHILS # BLD AUTO: 5.9 K/UL
NEUTROPHILS NFR BLD: 66.7 %
NRBC BLD-RTO: 0 /100 WBC
PHOSPHATE SERPL-MCNC: 3.8 MG/DL
PLATELET # BLD AUTO: 182 K/UL
PMV BLD AUTO: 10 FL
POCT GLUCOSE: 100 MG/DL (ref 70–110)
POCT GLUCOSE: 100 MG/DL (ref 70–110)
POCT GLUCOSE: 86 MG/DL (ref 70–110)
POCT GLUCOSE: 93 MG/DL (ref 70–110)
POTASSIUM SERPL-SCNC: 4.8 MMOL/L
RBC # BLD AUTO: 2.53 M/UL
SODIUM SERPL-SCNC: 137 MMOL/L
WBC # BLD AUTO: 8.82 K/UL

## 2018-02-22 PROCEDURE — 97530 THERAPEUTIC ACTIVITIES: CPT

## 2018-02-22 PROCEDURE — 36415 COLL VENOUS BLD VENIPUNCTURE: CPT

## 2018-02-22 PROCEDURE — 11000001 HC ACUTE MED/SURG PRIVATE ROOM

## 2018-02-22 PROCEDURE — 85025 COMPLETE CBC W/AUTO DIFF WBC: CPT

## 2018-02-22 PROCEDURE — 97110 THERAPEUTIC EXERCISES: CPT

## 2018-02-22 PROCEDURE — 83735 ASSAY OF MAGNESIUM: CPT

## 2018-02-22 PROCEDURE — 25000003 PHARM REV CODE 250: Performed by: PHYSICIAN ASSISTANT

## 2018-02-22 PROCEDURE — 80069 RENAL FUNCTION PANEL: CPT

## 2018-02-22 PROCEDURE — 25000003 PHARM REV CODE 250: Performed by: INTERNAL MEDICINE

## 2018-02-22 PROCEDURE — 63600175 PHARM REV CODE 636 W HCPCS: Performed by: INTERNAL MEDICINE

## 2018-02-22 PROCEDURE — 99232 SBSQ HOSP IP/OBS MODERATE 35: CPT | Mod: ,,, | Performed by: INTERNAL MEDICINE

## 2018-02-22 RX ORDER — SODIUM CHLORIDE 9 MG/ML
INJECTION, SOLUTION INTRAVENOUS ONCE
Status: COMPLETED | OUTPATIENT
Start: 2018-02-23 | End: 2018-02-23

## 2018-02-22 RX ORDER — SODIUM CHLORIDE 9 MG/ML
INJECTION, SOLUTION INTRAVENOUS
Status: DISCONTINUED | OUTPATIENT
Start: 2018-02-23 | End: 2018-02-25

## 2018-02-22 RX ADMIN — ASPIRIN 81 MG: 81 TABLET, COATED ORAL at 08:02

## 2018-02-22 RX ADMIN — GABAPENTIN 100 MG: 100 CAPSULE ORAL at 01:02

## 2018-02-22 RX ADMIN — GABAPENTIN 100 MG: 100 CAPSULE ORAL at 09:02

## 2018-02-22 RX ADMIN — MICONAZOLE NITRATE: 20 OINTMENT TOPICAL at 08:02

## 2018-02-22 RX ADMIN — Medication: at 08:02

## 2018-02-22 RX ADMIN — ATORVASTATIN CALCIUM 20 MG: 20 TABLET, FILM COATED ORAL at 08:02

## 2018-02-22 RX ADMIN — HEPARIN SODIUM 5000 UNITS: 5000 INJECTION, SOLUTION INTRAVENOUS; SUBCUTANEOUS at 06:02

## 2018-02-22 RX ADMIN — SEVELAMER CARBONATE 1600 MG: 800 TABLET, FILM COATED ORAL at 08:02

## 2018-02-22 RX ADMIN — CINACALCET HYDROCHLORIDE 30 MG: 30 TABLET, COATED ORAL at 09:02

## 2018-02-22 RX ADMIN — CIPROFLOXACIN HYDROCHLORIDE 500 MG: 250 TABLET, FILM COATED ORAL at 08:02

## 2018-02-22 RX ADMIN — SEVELAMER CARBONATE 1600 MG: 800 TABLET, FILM COATED ORAL at 01:02

## 2018-02-22 RX ADMIN — STANDARDIZED SENNA CONCENTRATE AND DOCUSATE SODIUM 2 TABLET: 8.6; 5 TABLET, FILM COATED ORAL at 08:02

## 2018-02-22 RX ADMIN — GABAPENTIN 100 MG: 100 CAPSULE ORAL at 06:02

## 2018-02-22 RX ADMIN — SEVELAMER CARBONATE 1600 MG: 800 TABLET, FILM COATED ORAL at 05:02

## 2018-02-22 RX ADMIN — CITALOPRAM HYDROBROMIDE 10 MG: 10 TABLET ORAL at 08:02

## 2018-02-22 NOTE — PT/OT/SLP PROGRESS
Physical Therapy Treatment    Patient Name:  Wilder Carbajal   MRN:  [id number]    Recommendations:     Discharge Recommendations:  nursing facility, skilled   Discharge Equipment Recommendations: bedside commode, walker, rolling   Barriers to discharge: Decreased caregiver support    Assessment:     Wilder Carbajal is a 67 y.o. male admitted with a medical diagnosis of Septic encephalopathy.  He presents with the following impairments/functional limitations:  weakness, impaired endurance, impaired self care skills, impaired functional mobilty, gait instability, impaired balance, decreased lower extremity function, decreased ROM, decreased safety awareness, edema. Pt tolerated treatment well, and will continue to benefit from skilled PT services at this time to improve all deficits noted above. Continue with PT POC as indicated.     Rehab Prognosis:  fair; patient would benefit from acute skilled PT services to address these deficits and reach maximum level of function.      Recent Surgery: Procedure(s) (LRB):  AMPUTATION-TOES 4TH AND 5TH, I&D (Right) 13 Days Post-Op    Plan:     During this hospitalization, patient to be seen 3 x/week to address the above listed problems via gait training, therapeutic activities, therapeutic exercises  · Plan of Care Expires:  03/22/18   Plan of Care Reviewed with: patient    Subjective     Communicated with nursing prior to session.  Patient found supine upon PT entry to room, agreeable to treatment.      Chief Complaint: none stated  Patient comments/goals: none stated  Pain/Comfort:  · Pain Rating 1: 0/10  · Pain Rating Post-Intervention 1: 0/10    Patients cultural, spiritual, Temple conflicts given the current situation: none stated    Objective:     Patient found with: pressure relief boots, telemetry     General Precautions: Standard, fall   Orthopedic Precautions:RLE non weight bearing   Braces: N/A     Functional Mobility:  · Bed Mobility:  Scooting: minimum  assistance  · Supine to Sit: moderate assistance  · Sit to Supine: minimum assistance      AM-PAC 6 CLICK MOBILITY  Turning over in bed (including adjusting bedclothes, sheets and blankets)?: 3  Sitting down on and standing up from a chair with arms (e.g., wheelchair, bedside commode, etc.): 1  Moving from lying on back to sitting on the side of the bed?: 2  Moving to and from a bed to a chair (including a wheelchair)?: 1  Need to walk in hospital room?: 1  Climbing 3-5 steps with a railing?: 1  Total Score: 9       Therapeutic Activities and Exercises:   -Pt sat EOB ~20 min with stand by-CGA for pt safety.    -Pt performed seated B LE therex 2x15 reps with assistance as needed: LAQ, Hip Flexion, and GS.    Patient left supine with all lines intact, call button in reach and PCT present..    GOALS:    Physical Therapy Goals        Problem: Physical Therapy Goal    Goal Priority Disciplines Outcome Goal Variances Interventions   Physical Therapy Goal     PT/OT, PT Ongoing (interventions implemented as appropriate)     Description:  Goals to be met by: 3/2/18 (10 days)    Patient will increase functional independence with mobility by performin. Supine to sit with Contact Guard Assist  2. Sit to supine with Contact Guard Assist  3. Sit to stand transfer with Minimal Assistance  4. Bed to chair transfer with sliding board with Contact Guard assistance.   6. Navigate  feet with supervision.   6. Lower extremity exercise program x 30 reps per handout, with independence                         Time Tracking:     PT Received On: 18  PT Start Time: 1046     PT Stop Time: 1109  PT Total Time (min): 23 min     Billable Minutes: Therapeutic Activity 15 and Therapeutic Exercise 8    Treatment Type: Treatment  PT/PTA: PTA     PTA Visit Number: 1     Laure Hassan, NOEMI  2018

## 2018-02-22 NOTE — PLAN OF CARE
Gregoria had spoke with Juan Daniel with [facility], informed of Pt's d.c plan and that CM is working with RICARDO to see if Pt will be skilled or assisted level of care.Gregoria to update Juan Daniel when Sw aware of [facility]'s plan for Pt and [place].

## 2018-02-22 NOTE — SUBJECTIVE & OBJECTIVE
Past Medical History:   Diagnosis Date    Acute pain of left shoulder 1/7/2017    Arthritis     Asthma     Benign neoplasm of eyelid     RUL    Blood clotting tendency     Blood transfusion     Cataract     Chronic kidney disease requiring chronic dialysis     Diabetes mellitus     Diabetic retinopathy     Fever blister     Hyperlipidemia     Hypertension     Infertility     Joint pain     Retinal detachment     Thyroid disease     Weakness 1/7/2017       Past Surgical History:   Procedure Laterality Date    CATARACT EXTRACTION      COLONOSCOPY N/A 6/23/2017    Procedure: COLONOSCOPY;  Surgeon: Vic Clark MD;  Location: [address]);  Service: Endoscopy;  Laterality: N/A;    RETINAL DETACHMENT SURGERY         Review of patient's allergies indicates:  No Known Allergies    Medications:  Continuous Infusions:  Scheduled Meds:   ammonium lactate   Topical (Top) BID    aspirin  81 mg Oral Daily    atorvastatin  20 mg Oral Daily    cinacalcet  30 mg Oral QHS    ciprofloxacin HCl  500 mg Oral Daily    citalopram  10 mg Oral Daily    gabapentin  100 mg Oral TID    heparin (porcine)  5,000 Units Subcutaneous Q8H    miconazole nitrate 2%   Topical (Top) BID    midodrine  10 mg Oral Every Mon, Wed, Fri    senna-docusate 8.6-50 mg  2 tablet Oral BID    sevelamer carbonate  1,600 mg Oral TID WM    sevelamer carbonate  800 mg Oral QHS     PRN Meds:sodium chloride 0.9%, acetaminophen, albumin human 25%, albumin human 25%, albuterol-ipratropium 2.5mg-0.5mg/3mL, dextrose 50%, dextrose 50%, glucagon (human recombinant), glucose, glucose, ondansetron, oxyCODONE, sodium chloride 0.9%    Family History     Problem Relation (Age of Onset)    Depression Child    Heart attack Mother    No Known Problems Father, Sister, Brother, Maternal Aunt, Maternal Uncle, Paternal Aunt, Paternal Uncle, Maternal Grandmother, Maternal Grandfather, Paternal Grandmother, Paternal Grandfather        Social  History Main Topics    Smoking status: Never Smoker    Smokeless tobacco: Never Used    Alcohol use No    Drug use: No    Sexual activity: Not on file       Review of Systems   Constitutional: Positive for activity change and fatigue.   HENT: Negative.    Eyes: Negative.    Respiratory: Negative.    Cardiovascular: Positive for leg swelling.   Gastrointestinal: Negative.    Neurological: Positive for weakness.     Objective:     Vital Signs (Most Recent):  Temp: 98 °F (36.7 °C) (02/22/18 0733)  Pulse: (!) 53 (02/22/18 0820)  Resp: 13 (02/22/18 0733)  BP: (!) 112/59 (02/22/18 0733)  SpO2: (!) 90 % (02/22/18 0733) Vital Signs (24h Range):  Temp:  [97.6 °F (36.4 °C)-99 °F (37.2 °C)] 98 °F (36.7 °C)  Pulse:  [50-67] 53  Resp:  [13-18] 13  SpO2:  [90 %-96 %] 90 %  BP: ()/(46-69) 112/59     Weight: 107.8 kg (237 lb 10.5 oz)  Body mass index is 36.14 kg/m².    Review of Symptoms  Symptom Assessment (ESAS 0-10 scale)   ESAS 0 1 2 3 4 5 6 7 8 9 10   Pain X             Dyspnea X             Anxiety X             Nausea X             Depression  X             Anorexia  x            Fatigue    X          Insomnia X             Restlessness  X             Agitation X             CAM / Delirium __ --  ___+   Constipation     __ --  ___+   Diarrhea           __ --  ___+  Bowel Management Plan (BMP): No      ECOG Performance Status Grade: 4 - Completely disabled    Physical Exam   Constitutional: He is oriented to person, place, and time. He appears well-developed. He is cooperative.   Cardiovascular: Normal rate, regular rhythm, S1 normal and normal heart sounds.    Pulmonary/Chest: Effort normal.   Musculoskeletal:   Right foot leg has bandage on which is clean, dry, intact.   Neurological: He is alert and oriented to person, place, and time.       Significant Labs: All pertinent labs within the past 24 hours have been reviewed.  CBC:     Recent Labs  Lab 02/22/18  0419   WBC 8.82   HGB 7.2*   HCT 23.1*   MCV 91   PLT  182     BMP:    Recent Labs  Lab 02/22/18  0419   GLU 54*      K 4.8      CO2 23   BUN 27*   CREATININE 3.9*   CALCIUM 8.4*   MG 2.2     LFT:  Lab Results   Component Value Date    AST 14 02/04/2018    ALKPHOS 138 (H) 02/04/2018    BILITOT 1.3 (H) 02/04/2018     Albumin:   Albumin   Date Value Ref Range Status   02/22/2018 1.8 (L) 3.5 - 5.2 g/dL Final     Protein:   Total Protein   Date Value Ref Range Status   02/04/2018 7.6 6.0 - 8.4 g/dL Final     Lactic acid:   Lab Results   Component Value Date    LACTATE 3.2 (H) 02/04/2018    LACTATE 1.4 07/09/2017       Significant Imaging: I have reviewed all pertinent imaging results/findings within the past 24 hours.    Advanced Directives::  Living Will: No  LaPOST: No  Do Not Resuscitate Status: No  Medical Power of : No MPOA. Per pt, he has six kids. [place] most involved in pt's care per son Raymond.     Decision-Making Capacity: Patient answered questions  See psych notes.     Living Arrangements: Lives with family    Psychosocial/Cultural:  Pt not . Pt reports he has six children. 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     Spiritual: yes- very    F- Joy and Belief: Gnosticism    I - Importance: yes  .  C - Community: yes    A - Address in Care:  Will have  visit.

## 2018-02-22 NOTE — PROGRESS NOTES
Progress Note   Hospital Medicine         Team: [facility] HOSP MED D Kanika Bradley MD  Admit Date: 2/4/2018  Length of Stay:  LOS: 18 days   ESTUARDO 2/28/2018  Principal Problem: Septic encephalopathy    Interval history / overnight events: Patient with no events overnight, no new complaints.    Areas of concern / handoff: anemia, may need pre-op transfusion with HD    Review of Systems   Constitutional: Negative for fever.   Respiratory: Negative for shortness of breath.    Cardiovascular: Negative for chest pain.       Temp: 98.3 °F (36.8 °C) (02/22/18 1131)  Pulse: (!) 56 (02/22/18 1529)  Resp: 17 (02/22/18 1131)  BP: 126/64 (02/22/18 1131)  SpO2: (!) 90 % (02/22/18 1131)     Temp:  [97.6 °F (36.4 °C)-99 °F (37.2 °C)]   Pulse:  [50-67]   Resp:  [13-18]   BP: (112-144)/(58-69)   SpO2:  [90 %-96 %]   No intake or output data in the 24 hours ending 02/22/18 1531  Weight: 107.8 kg (237 lb 10.5 oz)   Body mass index is 36.14 kg/m².    Physical Exam   Constitutional: He is oriented to person, place, and time and well-developed, well-nourished, and in no distress.  Non-toxic appearance.   HENT:   Head: Normocephalic.   Eyes: Conjunctivae and lids are normal. Pupils are equal.   Neck: Neck supple.   Cardiovascular: Normal rate, regular rhythm, S1 normal and S2 normal.    Pulmonary/Chest: Effort normal and breath sounds normal.   Abdominal: Soft. Normal appearance and bowel sounds are normal. There is no tenderness.   Musculoskeletal: He exhibits edema.        Right foot: There is swelling and deformity (necrosis and gangrene).   Neurological: He is alert and oriented to person, place, and time.   Skin: He is not diaphoretic. No cyanosis.   Psychiatric: Mood and affect normal.       Data Review: Results recorded below were reviewed 2/22/2018.    Significant Labs:     Recent Labs  Lab 02/17/18  0449 02/18/18  0527 02/19/18  0408 02/20/18  0435 02/21/18  0535 02/22/18  0419   WBC 11.34 11.84 11.28 9.43 8.92 8.82   HGB 7.5* 7.7*  "7.0* 7.0* 7.0* 7.2*   HCT 23.7* 24.3* 21.8* 21.7* 22.3* 23.1*    213 203 190 174 182       Recent Labs  Lab 02/20/18  0435 02/21/18  0535 02/22/18  0419    134* 137   K 4.6 4.9 4.8    103 104   CO2 22* 22* 23   BUN 27* 34* 27*   CREATININE 4.2* 4.9* 3.9*   GLU 64* 61* 54*   CALCIUM 8.3* 8.4* 8.4*   MG 2.2 2.2 2.2   PHOS 4.1 4.2 3.8       Recent Labs  Lab 02/20/18  0435 02/21/18  0535 02/22/18  0419   ALBUMIN 1.8* 1.7* 1.8*     BNP   Date Value Ref Range Status   04/21/2017 4,230 (H) 0 - 99 pg/mL Final     Comment:     Values of less than 100 pg/ml are consistent with non-CHF populations.   04/10/2017 4,684 (H) 0 - 99 pg/mL Final     Comment:     Values of less than 100 pg/ml are consistent with non-CHF populations.   08/03/2016 4,184 (H) 0 - 99 pg/mL Final     Comment:     Values of less than 100 pg/ml are consistent with non-CHF populations.   03/21/2016 3,532 (H) 0 - 99 pg/mL Final     Comment:     Values of less than 100 pg/ml are consistent with non-CHF populations.     No results for input(s): CPK, TROPONINI in the last 168 hours.  Recent Labs      02/20/18   1210  02/20/18   1726  02/20/18   2202  02/21/18   1222  02/21/18   1808  02/21/18   2132  02/22/18   0907  02/22/18   1317   POCTGLUCOSE  130*  103  99  103  80  86  86  100     A1C:     Recent Labs  Lab 01/07/18  0008   HGBA1C 4.6     TSH:     Recent Labs  Lab 02/08/18  0509   TSH 3.536     Assessment and Plan     Overview: "68 yo here for osteomyelitis of right foot. He underwent washout 2/6 and another washout 2/6 and amputation of 4th and 5th digits. Undergoing treatment course of antibiotics."    Current Hospital Problem List:    Active Hospital Problems    Diagnosis  POA    *Septic encephalopathy [G93.41]  Yes    Gangrene of right foot [I96]  Yes    Palliative care encounter [Z51.5]  Not Applicable    Foot ulceration, left, with unspecified severity [L97.529]  Unknown    PVD (peripheral vascular disease) [I73.9]  Unknown    " Pyogenic inflammation of bone [M86.9]  Yes    Diabetic foot ulcer [E11.621, L97.509]  Yes    Foot ulceration, right, with fat layer exposed [L97.512]  Yes    Acute delirium [R41.0]  Yes    Protein-calorie malnutrition, severe [E43]  Yes    DVT (deep venous thrombosis) [I82.409]  Yes    DM type 2 with diabetic mixed hyperlipidemia [E11.69, E78.2]  Yes    Diabetic polyneuropathy associated with type 2 diabetes mellitus [E11.42]  Yes    Chronic kidney disease-mineral and bone disorder [N18.9, E83.9, M89.9]  Yes    Depression [F32.9]  Yes    Alteration in skin integrity [R23.9]  Yes    Infected skin ulcer with fat layer exposed [L98.492, L08.9]  Yes    Hyperkalemia [E87.5]  Yes    Hypervolemia [E87.70]  Yes    Type 2 diabetes mellitus with stage 5 chronic kidney disease and hypertension [E11.22, I12.0, N18.5]  Yes    ESRD (end stage renal disease) [N18.6]  Yes    Essential hypertension [I10]  Yes      Resolved Hospital Problems    Diagnosis Date Resolved POA   No resolved problems to display.       Medications for management of current problems:      [START ON 2/23/2018] sodium chloride 0.9%   Intravenous Once    ammonium lactate   Topical (Top) BID    aspirin  81 mg Oral Daily    atorvastatin  20 mg Oral Daily    cinacalcet  30 mg Oral QHS    ciprofloxacin HCl  500 mg Oral Daily    citalopram  10 mg Oral Daily    gabapentin  100 mg Oral TID    heparin (porcine)  5,000 Units Subcutaneous Q8H    miconazole nitrate 2%   Topical (Top) BID    midodrine  10 mg Oral Every Mon, Wed, Fri    senna-docusate 8.6-50 mg  2 tablet Oral BID    sevelamer carbonate  1,600 mg Oral TID WM    sevelamer carbonate  800 mg Oral QHS       PRN:   [START ON 2/23/2018] sodium chloride 0.9%    acetaminophen    albumin human 25%    albumin human 25%    albuterol-ipratropium 2.5mg-0.5mg/3mL    dextrose 50%    dextrose 50%    glucagon (human recombinant)    glucose    glucose    ondansetron    oxyCODONE     "sodium chloride 0.9%     Anticoagulants   Medication Route Frequency    heparin (porcine) injection 5,000 Units Subcutaneous Q8H     IV Fluids: none    Problems Addressed 2/22/2018:    Septic encephalopathy  Patient initially lacked capacity to refuse surgery per psychiatry, consent obtained from his son as proxy. Mental status has improved. He now agrees to to right AKA; son in agreement as well.     Infected right dorsal foot, ischemic ulceration  Podiatry consulted - s/p I&D and 4th and 5th amputation  Vascular surgery consulted - initially no intervention required  Continued vancomycin 1 g IV per random levels and Zosyn 2.25 g IV BID  Wound culture revealed pan-sensitive CITROBACTER KOSERI and KLEBSIELLA PNEUMONIAE ESBL  To OR 02/09/18. Consulted ID.  Planned for wound vac and 6 weeks ciprofloxacin. ABIs ordered to rule out need for BKA. Angiogram considered. Per vascular surgery " Unlikely for the patient to heal the wound despite aggressive limb salvage efforts. Due to significant lipodermatosclerosis, BKA is not good option. Patient likely needs AKA." Patient refused AKA. One Son and Brother in agreement with patient as this aligns with his preference when in his normal mental state. All aware of the risk of recurrence of sepsis syndrome and death. Continuation of wound care and antibiotics likely futile; consulted Palliative Care. Patient considering AKA vs. Comfort care / Hospice.  Patient and his eldest Son now wish to proceed with AKA; Vascular Surgery notified.     Hypervolemia due to non-compliance with dialysis  Hyperkalemia, resolved  Shifted in ER with albuterol, insulin/destrose, bicarbonate. K 7.9 => 4  No respiratory compromise. Emergent dialysis 2/5/18 early AM and on 2/9  Withhold anti-hypertensives for volume removal.      DVT  BLE ultrasound  Apixaban 5 mg BID - ? Compliance at home  Apixaban held 2/12/2018 for probable BKA - plan to resume at discharge.  Remains on hold for possible " AKA.     DM II with HTN and CKD V  HgbA1c 4.6%  Likely controlled with declining intake  No further intervention needed at this time     Protein calorie malnutrtion  Hypoglycemia with associated NSVT  Poor prognosis for wound healing  Nepro with meals     Essential HTN - held home hydralazine 50 mg TID and losartan 50 mg daily. Blood pressure fairly well-controlled during stay without antihypertensives.     DM II hyperlipidemia - atorvastatin 40 mg daily     DM II neuropathy - gabapentin 100 mg TID     ESRF HD TTS - Nephrology consulted for dialysis.  Withholding anti-hypertensives and added midodrine as needed prior to dialysis for volume removal and reduce symptomatic dialysis-associated hypotension.   BP improving.     CKD MBD - continue cinacalcet 30 mg qhs and sevelamer 1600 mg TID daily     Depression - continue citalopram 10 mg daily. Consulted psychiatry. Back to baseline.     Anticipated Disposition: Nursing Facility   ESTUARDO: 2/28/2018    Goals of Care:  General Prognosis: fair if has AKA  Goals: Curative  Comfort Only: No  Hospice: No  Code Status: Full Code    Kanika Bradley MD   Department of Hospital Medicine

## 2018-02-22 NOTE — PLAN OF CARE
"Cm met with pt in room to discuss plan of care accompanied by ANTWON Giraldo with Palliative Medicine.  Pt again reiterated options provided by [facility] for treatment with clarity.  Pt admits he is "afraid" of surgery, attempted to reassure pt, concerns addressed to extent of CM's ability.  Pt states he also is not in favor of stopping HD and verbalizes that infection will eventually lead to death.  CM contacted Yahaira at [facility] ([phone]) to discuss further possibility of [facility] nursing home placement with HD and eventual progression to hospice at appropriate time.  Yahaira states she will discuss with her director and contact  with answer.  It appears the barrier is [facility] coverage for [facility] care as skilled will not be an option.      Palliative Medicine provided with contact number for Dr Roberson at [facility] ([phone]).    Kristy Mallory, RN  Case Management  [phone]  "

## 2018-02-22 NOTE — ASSESSMENT & PLAN NOTE
"  Impression: Pt is a 68 y/o male with ESRD on dialysis with osteomyelitis of right foot. He underwent washout 2/6 and another washout 2/6 and amputation of 4th and 5th digits. Undergoing treatment course of antibiotics. Pt is awake, alert, oriented to person, place, and situation.     Met with pt along with , Kristy Gaffney.    Per pt. RICARDO ALVARADO and Yahaira from [facility] met with pt yesterday. Per pt, RICARDO ALVARADO gave him option of having surgery for amputation of right foot and continuing HD or going to in hospice without dialysis.    Pt verbalized that he was not given option of continuing HD and not having surgery.  Per pt, he verbalized that his infection to his foot will most likely get worse. Pt verbalized that he "does not want to die from stopping dialysis but okay with dying from from his foot infection as  It progresses.       Spoke to Dr. Velazquez [facility] sreekanth ALVARADO concerning pt's goals of care and barriers to d/c.   MD to call RICARDO physician to discuss case.   Called Kristy  to see who is MD for pt.  Will have marie stack MD reach out to RICARDO ALVARADO.    Will follow.         "

## 2018-02-22 NOTE — ASSESSMENT & PLAN NOTE
Poor prognosis for wound healing per MD noted.   Pt having Nepro with meals.   Encouraged pt to continue eating his meals.   Today pt had 90% of his meal. Pt also drank nephro.
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
## 2018-02-22 NOTE — PROGRESS NOTES
"[facility]  Palliative Medicine  Progress Note    Patient Name: Wilder Carbajal  MRN: [id number]  Admission Date: 2/4/2018  Hospital Length of Stay: 18 days  Code Status: Full Code   Attending Provider: Kanika Bradley MD  Consulting Provider: ANTWON Khan  Primary Care Physician: Dio Roberson MD  Principal Problem:Septic encephalopathy    Patient information was obtained from patient and ER records.      Assessment/Plan:     Palliative care encounter      Impression: Pt is a 66 y/o male with ESRD on dialysis with osteomyelitis of right foot. He underwent washout 2/6 and another washout 2/6 and amputation of 4th and 5th digits. Undergoing treatment course of antibiotics. Pt is awake, alert, oriented to person, place, and situation.     Met with pt along with , Kristy Gaffney.    Per pt. RICARDO ALVARADO and Yahaira from [facility] met with pt yesterday. Per pt, RICARDO ALVARADO gave him option of having surgery for amputation of right foot and continuing HD or going to inpt hospice without dialysis.    Pt verbalized that he was not given option of continuing HD and not having surgery.  Per pt, he verbalized that his infection to his foot will most likely get worse. Pt verbalized that he "does not want to die from stopping dialysis but okay with dying from from his foot infection as  It progresses.       Spoke to Marie Gaviria MD concerning pt's goals of care and barriers to d/c.   MD to call RICARDO physician to discuss case.   Called Kristy  to see who is MD for pt.  Will have marie stack MD reach out to RICARDO ALVARADO.    Will follow.               Protein-calorie malnutrition, severe    Poor prognosis for wound healing per MD noted.   Pt having Nepro with meals.   Encouraged pt to continue eating his meals.   Today pt had 90% of his meal. Pt also drank nephro.                  I will follow-up with patient. Please contact us if you have any additional questions.    Subjective:     Chief " Complaint:   Chief Complaint   Patient presents with    Leg Swelling     missed dialysis yesterday       HPI:   Patient is a 66 y/o male with ESRF on dialysis TTS presents with leg swelling two weeks. Per chart review, he states he has had associated blisters on his legs with fluid weeping out of them. Patient stated he missed dialysis yesterday (Saturday) because his [facility] transportation did not arrive. He denies fevers, chills, URI symptoms, or diarrhea. His son called EMS. Patient was admitted 1/7-1/14 for volume overload due to HD non-compliance. He was still above dry weight and hypoxic when he left AMA. Patient maintaining that he has been complaint with dialysis since until yesterday.  Patient is not a good historian and is oriented only to name, city and the street the hospital is located. He could not tell me name of hospital nor the the year. He was intermittently falling asleep during the interview. Spoke with son who confirms that transport for dialysis did not come yesterday. Patient has otherwise been complaint with dialysis as far as son knows. However patient did admit to taking himself off of dialysis Thursday due to profound weakness and fatigue. He states patient has been confused and more lethargic since four days ago. Son call EMS because of progressively worsening BLE swelling and the development of fluid coming out of his feet this morning.     Presently, pt and sons have refused BKA. Pt to remain on Cipro. Pt receiving HD.  Per psychiatry note, pt lacks decision-making capacity.          Hospital Course:  No notes on file        Past Medical History:   Diagnosis Date    Acute pain of left shoulder 1/7/2017    Arthritis     Asthma     Benign neoplasm of eyelid     RUL    Blood clotting tendency     Blood transfusion     Cataract     Chronic kidney disease requiring chronic dialysis     Diabetes mellitus     Diabetic retinopathy     Fever blister     Hyperlipidemia     Hypertension      Infertility     Joint pain     Retinal detachment     Thyroid disease     Weakness 1/7/2017       Past Surgical History:   Procedure Laterality Date    CATARACT EXTRACTION      COLONOSCOPY N/A 6/23/2017    Procedure: COLONOSCOPY;  Surgeon: Vic Clark MD;  Location: [address]);  Service: Endoscopy;  Laterality: N/A;    RETINAL DETACHMENT SURGERY         Review of patient's allergies indicates:  No Known Allergies    Medications:  Continuous Infusions:  Scheduled Meds:   ammonium lactate   Topical (Top) BID    aspirin  81 mg Oral Daily    atorvastatin  20 mg Oral Daily    cinacalcet  30 mg Oral QHS    ciprofloxacin HCl  500 mg Oral Daily    citalopram  10 mg Oral Daily    gabapentin  100 mg Oral TID    heparin (porcine)  5,000 Units Subcutaneous Q8H    miconazole nitrate 2%   Topical (Top) BID    midodrine  10 mg Oral Every Mon, Wed, Fri    senna-docusate 8.6-50 mg  2 tablet Oral BID    sevelamer carbonate  1,600 mg Oral TID WM    sevelamer carbonate  800 mg Oral QHS     PRN Meds:sodium chloride 0.9%, acetaminophen, albumin human 25%, albumin human 25%, albuterol-ipratropium 2.5mg-0.5mg/3mL, dextrose 50%, dextrose 50%, glucagon (human recombinant), glucose, glucose, ondansetron, oxyCODONE, sodium chloride 0.9%    Family History     Problem Relation (Age of Onset)    Depression Child    Heart attack Mother    No Known Problems Father, Sister, Brother, Maternal Aunt, Maternal Uncle, Paternal Aunt, Paternal Uncle, Maternal Grandmother, Maternal Grandfather, Paternal Grandmother, Paternal Grandfather        Social History Main Topics    Smoking status: Never Smoker    Smokeless tobacco: Never Used    Alcohol use No    Drug use: No    Sexual activity: Not on file       Review of Systems   Constitutional: Positive for activity change and fatigue.   HENT: Negative.    Eyes: Negative.    Respiratory: Negative.    Cardiovascular: Positive for leg swelling.   Gastrointestinal: Negative.     Neurological: Positive for weakness.     Objective:     Vital Signs (Most Recent):  Temp: 98 °F (36.7 °C) (02/22/18 0733)  Pulse: (!) 53 (02/22/18 0820)  Resp: 13 (02/22/18 0733)  BP: (!) 112/59 (02/22/18 0733)  SpO2: (!) 90 % (02/22/18 0733) Vital Signs (24h Range):  Temp:  [97.6 °F (36.4 °C)-99 °F (37.2 °C)] 98 °F (36.7 °C)  Pulse:  [50-67] 53  Resp:  [13-18] 13  SpO2:  [90 %-96 %] 90 %  BP: ()/(46-69) 112/59     Weight: 107.8 kg (237 lb 10.5 oz)  Body mass index is 36.14 kg/m².    Review of Symptoms  Symptom Assessment (ESAS 0-10 scale)   ESAS 0 1 2 3 4 5 6 7 8 9 10   Pain X             Dyspnea X             Anxiety X             Nausea X             Depression  X             Anorexia  x            Fatigue    X          Insomnia X             Restlessness  X             Agitation X             CAM / Delirium __ --  ___+   Constipation     __ --  ___+   Diarrhea           __ --  ___+  Bowel Management Plan (BMP): No      ECOG Performance Status Grade: 4 - Completely disabled    Physical Exam   Constitutional: He is oriented to person, place, and time. He appears well-developed. He is cooperative.   Cardiovascular: Normal rate, regular rhythm, S1 normal and normal heart sounds.    Pulmonary/Chest: Effort normal.   Musculoskeletal:   Right foot leg has bandage on which is clean, dry, intact.   Neurological: He is alert and oriented to person, place, and time.       Significant Labs: All pertinent labs within the past 24 hours have been reviewed.  CBC:     Recent Labs  Lab 02/22/18 0419   WBC 8.82   HGB 7.2*   HCT 23.1*   MCV 91        BMP:    Recent Labs  Lab 02/22/18 0419   GLU 54*      K 4.8      CO2 23   BUN 27*   CREATININE 3.9*   CALCIUM 8.4*   MG 2.2     LFT:  Lab Results   Component Value Date    AST 14 02/04/2018    ALKPHOS 138 (H) 02/04/2018    BILITOT 1.3 (H) 02/04/2018     Albumin:   Albumin   Date Value Ref Range Status   02/22/2018 1.8 (L) 3.5 - 5.2 g/dL Final     Protein:    Total Protein   Date Value Ref Range Status   02/04/2018 7.6 6.0 - 8.4 g/dL Final     Lactic acid:   Lab Results   Component Value Date    LACTATE 3.2 (H) 02/04/2018    LACTATE 1.4 07/09/2017       Significant Imaging: I have reviewed all pertinent imaging results/findings within the past 24 hours.    Advanced Directives::  Living Will: No  LaPOST: No  Do Not Resuscitate Status: No  Medical Power of : No MPOA. Per pt, he has six kids. Jenner most involved in pt's care per son Raymond.     Decision-Making Capacity: Patient answered questions  See psych notes.     Living Arrangements: Lives with family    Psychosocial/Cultural:  Pt not . Pt reports he has six children. 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     Spiritual: yes- very    F- Joy and Belief: Christian    I - Importance: yes  .  C - Community: yes    A - Address in Care:  Will have  visit.       > 50% of 60 min visit spent in chart review, face to face discussion of goals of care,  symptom assessment, coordination of care and emotional support.    Kristal Wayne, CNS  Palliative Medicine  [facility]

## 2018-02-22 NOTE — PROGRESS NOTES
Family meeting with pt's son, Wilder (Nicknamcarter Muniz), pt, and daughter- in-law. This СВЕТЛАНА and Laure Minor LCSW present. Per pt, he wants to now have amputation done to right foot. Per pt, he has prayed about it and even though he is scared of surgery, he wants to try so his life may be extended.     Called and spoke to Dr. Bradley about pt's decision.     Kristal CALZADA, APRN, AGCNS

## 2018-02-23 LAB
ALBUMIN SERPL BCP-MCNC: 1.8 G/DL
ANION GAP SERPL CALC-SCNC: 9 MMOL/L
BASOPHILS # BLD AUTO: 0.09 K/UL
BASOPHILS NFR BLD: 1.1 %
BUN SERPL-MCNC: 33 MG/DL
CALCIUM SERPL-MCNC: 8.2 MG/DL
CHLORIDE SERPL-SCNC: 103 MMOL/L
CO2 SERPL-SCNC: 21 MMOL/L
CREAT SERPL-MCNC: 4.5 MG/DL
DIFFERENTIAL METHOD: ABNORMAL
EOSINOPHIL # BLD AUTO: 0.2 K/UL
EOSINOPHIL NFR BLD: 2.2 %
ERYTHROCYTE [DISTWIDTH] IN BLOOD BY AUTOMATED COUNT: 16.4 %
EST. GFR  (AFRICAN AMERICAN): 14.4 ML/MIN/1.73 M^2
EST. GFR  (NON AFRICAN AMERICAN): 12.5 ML/MIN/1.73 M^2
GLUCOSE SERPL-MCNC: 68 MG/DL
HCT VFR BLD AUTO: 22.2 %
HGB BLD-MCNC: 7.1 G/DL
IMM GRANULOCYTES # BLD AUTO: 0.03 K/UL
IMM GRANULOCYTES NFR BLD AUTO: 0.4 %
LYMPHOCYTES # BLD AUTO: 1.2 K/UL
LYMPHOCYTES NFR BLD: 14.6 %
MAGNESIUM SERPL-MCNC: 2.4 MG/DL
MCH RBC QN AUTO: 29.2 PG
MCHC RBC AUTO-ENTMCNC: 32 G/DL
MCV RBC AUTO: 91 FL
MONOCYTES # BLD AUTO: 1.1 K/UL
MONOCYTES NFR BLD: 13.6 %
NEUTROPHILS # BLD AUTO: 5.6 K/UL
NEUTROPHILS NFR BLD: 68.1 %
NRBC BLD-RTO: 0 /100 WBC
PHOSPHATE SERPL-MCNC: 4.2 MG/DL
PLATELET # BLD AUTO: 165 K/UL
PMV BLD AUTO: 10 FL
POCT GLUCOSE: 70 MG/DL (ref 70–110)
POCT GLUCOSE: 81 MG/DL (ref 70–110)
POCT GLUCOSE: 85 MG/DL (ref 70–110)
POCT GLUCOSE: 86 MG/DL (ref 70–110)
POCT GLUCOSE: 94 MG/DL (ref 70–110)
POTASSIUM SERPL-SCNC: 4.8 MMOL/L
RBC # BLD AUTO: 2.43 M/UL
SODIUM SERPL-SCNC: 133 MMOL/L
WBC # BLD AUTO: 8.16 K/UL

## 2018-02-23 PROCEDURE — 83735 ASSAY OF MAGNESIUM: CPT

## 2018-02-23 PROCEDURE — 25000003 PHARM REV CODE 250: Performed by: INTERNAL MEDICINE

## 2018-02-23 PROCEDURE — 85025 COMPLETE CBC W/AUTO DIFF WBC: CPT

## 2018-02-23 PROCEDURE — 80069 RENAL FUNCTION PANEL: CPT

## 2018-02-23 PROCEDURE — 25000003 PHARM REV CODE 250: Performed by: PHYSICIAN ASSISTANT

## 2018-02-23 PROCEDURE — 11000001 HC ACUTE MED/SURG PRIVATE ROOM

## 2018-02-23 PROCEDURE — 90935 HEMODIALYSIS ONE EVALUATION: CPT | Mod: ,,, | Performed by: INTERNAL MEDICINE

## 2018-02-23 PROCEDURE — 99231 SBSQ HOSP IP/OBS SF/LOW 25: CPT | Mod: ,,, | Performed by: INTERNAL MEDICINE

## 2018-02-23 PROCEDURE — 90935 HEMODIALYSIS ONE EVALUATION: CPT

## 2018-02-23 PROCEDURE — 36415 COLL VENOUS BLD VENIPUNCTURE: CPT

## 2018-02-23 PROCEDURE — 27000207 HC ISOLATION

## 2018-02-23 PROCEDURE — 99233 SBSQ HOSP IP/OBS HIGH 50: CPT | Mod: 79,,, | Performed by: PODIATRIST

## 2018-02-23 RX ADMIN — GABAPENTIN 100 MG: 100 CAPSULE ORAL at 09:02

## 2018-02-23 RX ADMIN — MICONAZOLE NITRATE: 20 OINTMENT TOPICAL at 11:02

## 2018-02-23 RX ADMIN — CIPROFLOXACIN HYDROCHLORIDE 500 MG: 250 TABLET, FILM COATED ORAL at 12:02

## 2018-02-23 RX ADMIN — CINACALCET HYDROCHLORIDE 30 MG: 30 TABLET, COATED ORAL at 09:02

## 2018-02-23 RX ADMIN — MICONAZOLE NITRATE: 20 OINTMENT TOPICAL at 01:02

## 2018-02-23 RX ADMIN — Medication: at 11:02

## 2018-02-23 RX ADMIN — ASPIRIN 81 MG: 81 TABLET, COATED ORAL at 12:02

## 2018-02-23 RX ADMIN — Medication: at 01:02

## 2018-02-23 RX ADMIN — OXYCODONE HYDROCHLORIDE 5 MG: 5 TABLET ORAL at 09:02

## 2018-02-23 RX ADMIN — GABAPENTIN 100 MG: 100 CAPSULE ORAL at 01:02

## 2018-02-23 RX ADMIN — SEVELAMER CARBONATE 800 MG: 800 TABLET, FILM COATED ORAL at 09:02

## 2018-02-23 RX ADMIN — SODIUM CHLORIDE: 9 INJECTION, SOLUTION INTRAVENOUS at 08:02

## 2018-02-23 RX ADMIN — MIDODRINE HYDROCHLORIDE 10 MG: 5 TABLET ORAL at 08:02

## 2018-02-23 RX ADMIN — CITALOPRAM HYDROBROMIDE 10 MG: 10 TABLET ORAL at 12:02

## 2018-02-23 RX ADMIN — SEVELAMER CARBONATE 1600 MG: 800 TABLET, FILM COATED ORAL at 06:02

## 2018-02-23 RX ADMIN — ATORVASTATIN CALCIUM 20 MG: 20 TABLET, FILM COATED ORAL at 12:02

## 2018-02-23 RX ADMIN — STANDARDIZED SENNA CONCENTRATE AND DOCUSATE SODIUM 2 TABLET: 8.6; 5 TABLET, FILM COATED ORAL at 09:02

## 2018-02-23 RX ADMIN — SEVELAMER CARBONATE 1600 MG: 800 TABLET, FILM COATED ORAL at 12:02

## 2018-02-23 NOTE — PLAN OF CARE
Problem: Diabetes, Type 2 (Adult)  Goal: Signs and Symptoms of Listed Potential Problems Will be Absent, Minimized or Managed (Diabetes, Type 2)  Signs and symptoms of listed potential problems will be absent, minimized or managed by discharge/transition of care (reference Diabetes, Type 2 (Adult) CPG).   Outcome: Ongoing (interventions implemented as appropriate)  Blood sugar monitored as ordered, no insulin required.    Problem: Patient Care Overview  Goal: Plan of Care Review  Outcome: Ongoing (interventions implemented as appropriate)  Discussed plan of care with the patient, including medications given.  Dialyzed today for 3.5 hours.   Patient remained stable throughout the shift.

## 2018-02-23 NOTE — PROGRESS NOTES
[facility]  Nephrology  Progress Note    Patient Name: Wilder Carbajal  MRN: [id number]  Admission Date: 2/4/2018  Hospital Length of Stay: 19 days  Attending Provider: Kanika Bradley MD   Primary Care Physician: Dio Roberson MD  Principal Problem:Septic encephalopathy    Subjective:     HPI: Wilder Carbajal is a 67 year old male with ESRd on HD (TTS) presents with leg swelling for two weeks. He states he has had associated blisters on his legs with fluid weeping out of them. During hospital presented diagnosis of osteomyelitis of right foot. He underwent washout 2/6 and another washout 2/6 and amputation of 4th and 5th digits. Undergoing treatment course of antibiotics. Nephrology was consulted for inpatient dialysis treatment.iHD TTS at [facility], duration of 4h 15 min, , Left AVF.     Interval History:   Patient evaluated at bedside in [facility], no significant events overnight, still require oxygen via nasal canula, blood pressures have been stable.     Review of patient's allergies indicates:  No Known Allergies  Current Facility-Administered Medications   Medication Frequency    0.9%  NaCl infusion PRN    0.9%  NaCl infusion Once    acetaminophen tablet 650 mg Q6H PRN    albumin human 25% bottle 12.5 g PRN    albumin human 25% bottle 25 g PRN    albuterol-ipratropium 2.5mg-0.5mg/3mL nebulizer solution 3 mL Q4H PRN    ammonium lactate 12 % lotion BID    aspirin EC tablet 81 mg Daily    atorvastatin tablet 20 mg Daily    cinacalcet tablet 30 mg QHS    ciprofloxacin HCl tablet 500 mg Daily    citalopram tablet 10 mg Daily    dextrose 50% injection 12.5 g PRN    dextrose 50% injection 25 g PRN    gabapentin capsule 100 mg TID    glucagon (human recombinant) injection 1 mg PRN    glucose chewable tablet 16 g PRN    glucose chewable tablet 24 g PRN    heparin (porcine) injection 5,000 Units Q8H    miconazole nitrate 2% ointment BID    midodrine tablet 10 mg Every  Mon, Wed, Fri    ondansetron disintegrating tablet 8 mg Q8H PRN    oxyCODONE immediate release tablet 5 mg Q6H PRN    senna-docusate 8.6-50 mg per tablet 2 tablet BID    sevelamer carbonate tablet 1,600 mg TID WM    sevelamer carbonate tablet 800 mg QHS    sodium chloride 0.9% flush 5 mL PRN       Objective:     Vital Signs (Most Recent):  Temp: 98.4 °F (36.9 °C) (02/23/18 0743)  Pulse: 62 (02/23/18 0743)  Resp: (!) 22 (02/23/18 0743)  BP: 129/61 (02/23/18 0743)  SpO2: (!) 92 % (02/23/18 0743)  O2 Device (Oxygen Therapy): room air (02/23/18 0743) Vital Signs (24h Range):  Temp:  [97.5 °F (36.4 °C)-99.1 °F (37.3 °C)] 98.4 °F (36.9 °C)  Pulse:  [52-62] 62  Resp:  [16-22] 22  SpO2:  [90 %-93 %] 92 %  BP: (126-136)/(61-68) 129/61     Weight: 107 kg (235 lb 14.3 oz) (02/23/18 0500)  Body mass index is 35.87 kg/m².  Body surface area is 2.27 meters squared.    I/O last 3 completed shifts:  In: 120 [P.O.:120]  Out: -     Physical Exam   Constitutional: He is oriented to person, place, and time. He appears well-developed and well-nourished. No distress.   Cardiovascular: Normal rate and regular rhythm.    Murmur heard.  Sacral edema    Pulmonary/Chest: Effort normal and breath sounds normal. No respiratory distress.   Abdominal: Soft. Bowel sounds are normal. He exhibits no distension.   Musculoskeletal: He exhibits no edema.   Neurological: He is alert and oriented to person, place, and time.   Skin: Skin is warm and dry.   Bilateral feet dressed. Dressing c/d/i. Chronic venous stasis changes to BLE   Psychiatric: He has a normal mood and affect. His behavior is normal.       Significant Labs:  ABGs: No results for input(s): PH, PCO2, HCO3, POCSATURATED, BE in the last 168 hours.  BMP:   Recent Labs  Lab 02/22/18 0419   GLU 54*      CO2 23   BUN 27*   CREATININE 3.9*   CALCIUM 8.4*   MG 2.2     CBC:   Recent Labs  Lab 02/22/18 0419   WBC 8.82   RBC 2.53*   HGB 7.2*   HCT 23.1*      MCV 91   MCH 28.5    [facility] 31.2*     CMP:   Recent Labs  Lab 02/22/18  0419   GLU 54*   CALCIUM 8.4*   ALBUMIN 1.8*      K 4.8   CO2 23      BUN 27*   CREATININE 3.9*     All labs within the past 24 hours have been reviewed.       Assessment/Plan:     ESRD (end stage renal disease)    Wilder Carbajal is a 67 year old male with ESRD on Hd. iHD TTS at [facility], duration of 4h 15 min, , Left AVF.    Plan:  - Patient will have dialysis today for clearance and volume removal, duration of 3.5 hrs, UF 1-2 L as tolerated by patient, maintain MAP>65, bath will be adjusted to chem.  - Blood pressures stable today  - Mineral bone disease: continue with phosphate binders, continue with cinacalcet  - Diet: Renal (low sodium, low phosphate and low potassium)          Luther Martinez  Nephrology  Fellow  [facility]    Pager [phone] have reviewed and concur with the fellow's history, physical, assessment, and plan. I have personally interviewed and examined the patient at bedside.   1

## 2018-02-23 NOTE — ASSESSMENT & PLAN NOTE
Wilder Carbajal is a 67 y.o. 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 Matamoros),  Concerns of progressive ischemic changes to right foot wounds and toes.  Pt. At high risk for limb loss at this time.   - Vascular following, Tentative plan for Right BKA on Monday  -  Right foot Dressed with betadine, 4x4's, lightly dressed with kerlix and ace.  Left foot paitned with betadine and dressed with island dressings  Nursing orders placed for daily dressing changes.   -ID on board, appreciate recs  -Podiatry will follow.    Weightbearing Status: NWB  Offloading Device: heel protector boot

## 2018-02-23 NOTE — SUBJECTIVE & OBJECTIVE
Interval History:   Patient evaluated at bedside in [facility], no significant events overnight, still require oxygen via nasal canula, blood pressures have been stable.     Review of patient's allergies indicates:  No Known Allergies  Current Facility-Administered Medications   Medication Frequency    0.9%  NaCl infusion PRN    0.9%  NaCl infusion Once    acetaminophen tablet 650 mg Q6H PRN    albumin human 25% bottle 12.5 g PRN    albumin human 25% bottle 25 g PRN    albuterol-ipratropium 2.5mg-0.5mg/3mL nebulizer solution 3 mL Q4H PRN    ammonium lactate 12 % lotion BID    aspirin EC tablet 81 mg Daily    atorvastatin tablet 20 mg Daily    cinacalcet tablet 30 mg QHS    ciprofloxacin HCl tablet 500 mg Daily    citalopram tablet 10 mg Daily    dextrose 50% injection 12.5 g PRN    dextrose 50% injection 25 g PRN    gabapentin capsule 100 mg TID    glucagon (human recombinant) injection 1 mg PRN    glucose chewable tablet 16 g PRN    glucose chewable tablet 24 g PRN    heparin (porcine) injection 5,000 Units Q8H    miconazole nitrate 2% ointment BID    midodrine tablet 10 mg Every Mon, Wed, Fri    ondansetron disintegrating tablet 8 mg Q8H PRN    oxyCODONE immediate release tablet 5 mg Q6H PRN    senna-docusate 8.6-50 mg per tablet 2 tablet BID    sevelamer carbonate tablet 1,600 mg TID WM    sevelamer carbonate tablet 800 mg QHS    sodium chloride 0.9% flush 5 mL PRN       Objective:     Vital Signs (Most Recent):  Temp: 98.4 °F (36.9 °C) (02/23/18 0743)  Pulse: 62 (02/23/18 0743)  Resp: (!) 22 (02/23/18 0743)  BP: 129/61 (02/23/18 0743)  SpO2: (!) 92 % (02/23/18 0743)  O2 Device (Oxygen Therapy): room air (02/23/18 0743) Vital Signs (24h Range):  Temp:  [97.5 °F (36.4 °C)-99.1 °F (37.3 °C)] 98.4 °F (36.9 °C)  Pulse:  [52-62] 62  Resp:  [16-22] 22  SpO2:  [90 %-93 %] 92 %  BP: (126-136)/(61-68) 129/61     Weight: 107 kg (235 lb 14.3 oz) (02/23/18 0500)  Body mass index is 35.87 kg/m².  Body  surface area is 2.27 meters squared.    I/O last 3 completed shifts:  In: 120 [P.O.:120]  Out: -     Physical Exam   Constitutional: He is oriented to person, place, and time. He appears well-developed and well-nourished. No distress.   Cardiovascular: Normal rate and regular rhythm.    Murmur heard.  Sacral edema    Pulmonary/Chest: Effort normal and breath sounds normal. No respiratory distress.   Abdominal: Soft. Bowel sounds are normal. He exhibits no distension.   Musculoskeletal: He exhibits no edema.   Neurological: He is alert and oriented to person, place, and time.   Skin: Skin is warm and dry.   Bilateral feet dressed. Dressing c/d/i. Chronic venous stasis changes to BLE   Psychiatric: He has a normal mood and affect. His behavior is normal.       Significant Labs:  ABGs: No results for input(s): PH, PCO2, HCO3, POCSATURATED, BE in the last 168 hours.  BMP:   Recent Labs  Lab 02/22/18 0419   GLU 54*      CO2 23   BUN 27*   CREATININE 3.9*   CALCIUM 8.4*   MG 2.2     CBC:   Recent Labs  Lab 02/22/18 0419   WBC 8.82   RBC 2.53*   HGB 7.2*   HCT 23.1*      MCV 91   MCH 28.5   MCHC 31.2*     CMP:   Recent Labs  Lab 02/22/18 0419   GLU 54*   CALCIUM 8.4*   ALBUMIN 1.8*      K 4.8   CO2 23      BUN 27*   CREATININE 3.9*     All labs within the past 24 hours have been reviewed.

## 2018-02-23 NOTE — SUBJECTIVE & OBJECTIVE
Interval Hx: S/p right 4th and 5th ray amputation (DOS: 1/9/18 per Dr. Matamoros). Pt. Seen in dialysis.     Scheduled Meds:   ammonium lactate   Topical (Top) BID    aspirin  81 mg Oral Daily    atorvastatin  20 mg Oral Daily    cinacalcet  30 mg Oral QHS    ciprofloxacin HCl  500 mg Oral Daily    citalopram  10 mg Oral Daily    gabapentin  100 mg Oral TID    heparin (porcine)  5,000 Units Subcutaneous Q8H    miconazole nitrate 2%   Topical (Top) BID    midodrine  10 mg Oral Every Mon, Wed, Fri    senna-docusate 8.6-50 mg  2 tablet Oral BID    sevelamer carbonate  1,600 mg Oral TID WM    sevelamer carbonate  800 mg Oral QHS     Continuous Infusions:  PRN Meds:sodium chloride 0.9%, acetaminophen, albumin human 25%, albumin human 25%, albuterol-ipratropium 2.5mg-0.5mg/3mL, dextrose 50%, dextrose 50%, glucagon (human recombinant), glucose, glucose, ondansetron, oxyCODONE, sodium chloride 0.9%    Review of patient's allergies indicates:  No Known Allergies     Past Medical History:   Diagnosis Date    Acute pain of left shoulder 1/7/2017    Arthritis     Asthma     Benign neoplasm of eyelid     RUL    Blood clotting tendency     Blood transfusion     Cataract     Chronic kidney disease requiring chronic dialysis     Diabetes mellitus     Diabetic retinopathy     Fever blister     Hyperlipidemia     Hypertension     Infertility     Joint pain     Retinal detachment     Thyroid disease     Weakness 1/7/2017     Past Surgical History:   Procedure Laterality Date    CATARACT EXTRACTION      COLONOSCOPY N/A 6/23/2017    Procedure: COLONOSCOPY;  Surgeon: Vic Clark MD;  Location: [address];  Service: Endoscopy;  Laterality: N/A;    RETINAL DETACHMENT SURGERY         Family History     Problem Relation (Age of Onset)    Heart attack Mother    No Known Problems Father, Sister, Brother, Maternal Aunt, Maternal Uncle, Paternal Aunt, Paternal Uncle, Maternal Grandmother, Maternal  Grandfather, Paternal Grandmother, Paternal Grandfather        Social History Main Topics    Smoking status: Never Smoker    Smokeless tobacco: Never Used    Alcohol use No    Drug use: No    Sexual activity: Not on file     Review of Systems   Constitutional: Positive for activity change.   Respiratory: Negative for shortness of breath.    Cardiovascular: Negative for chest pain and leg swelling.   Gastrointestinal: Negative.  Negative for nausea and vomiting.   Genitourinary: Negative.    Musculoskeletal: Positive for arthralgias.   Skin: Positive for wound.   Neurological: Positive for numbness. Negative for weakness.     Objective:     Vital Signs (Most Recent):  Temp: 96.7 °F (35.9 °C) (02/23/18 1145)  Pulse: 61 (02/23/18 1500)  Resp: 18 (02/23/18 1145)  BP: 137/72 (02/23/18 1145)  SpO2: (!) 92 % (02/23/18 0743) Vital Signs (24h Range):  Temp:  [96.6 °F (35.9 °C)-99.1 °F (37.3 °C)] 96.7 °F (35.9 °C)  Pulse:  [51-67] 61  Resp:  [16-22] 18  SpO2:  [92 %-93 %] 92 %  BP: (127-159)/(59-76) 137/72     Weight: 107 kg (235 lb 14.3 oz)  Body mass index is 35.87 kg/m².    Foot Exam    General  Orientation: disoriented       Right Foot/Ankle     Inspection and Palpation  Tenderness: none   Swelling: metatarsals   Skin Exam: blister, maceration, cellulitis, skin changes and ulcer;     Neurovascular  Dorsalis pedis: 1+  Posterior tibial: 1+  Saphenous nerve sensation: diminished  Tibial nerve sensation: diminished  Superficial peroneal nerve sensation: diminished  Deep peroneal nerve sensation: diminished  Sural nerve sensation: diminished      Left Foot/Ankle      Inspection and Palpation  Tenderness: none   Swelling: metatarsals   Skin Exam: skin changes and ulcer;     Neurovascular  Dorsalis pedis: 1+  Posterior tibial: 1+  Saphenous nerve sensation: diminished  Tibial nerve sensation: diminished  Superficial peroneal nerve sensation: diminished  Deep peroneal nerve sensation: diminished  Sural nerve sensation:  diminished          Wound 1: Right dorsal foot ulceration   Measurement:[id number]  Base: granular, necrotic tissue alond plantar 3rd ray  Periwound skin: viable  Drainage: serosanguinous  Erythema: none  Probe probes to bone 4th and 5th metatarsal, extensor tendons exposed  2/23 2/19/18                    Wound 2: Right heel ulceration   Measurement: 6fbh6xwe5.1cm  Base:  necrotic eschar base  Periwound skin: hyperkeratosis   Drainage:none  Probe: none, no bone exposed      Left foot ulceration noted to dorsal foot 2/23/18                Wound 1: Left dorsal foot   Measurement: 7zvw4isx4.1cm.  Base: fibrous base  Periwound skin: mild erythema,   Drainage: none  Erythema: mild  Probe: none, no bone exposed    2/23/18              Wound 1: Left heel ulceration   Measurement: 1tim6kiv5.1cm.  Periwound skin: callus  Drainage: none  Erythema:mild   Probe: none, no bone exposed      Laboratory:  Xray: Right: There is DJD, soft tissue swelling, a calcaneal spur, and diabetic vascular calcifications. No fracture dislocation bone destruction seen.    Left: There is DJD, diabetic vascular calcifications, and Spurs on the calcaneus. No fracture dislocation bone destruction seen.    MRI: 1. Limited, incomplete nondiagnostic examination secondary to early termination at the of the request of the patient.    2. Linear T1 hypointensity traversing the third metacarpal head, concerning for possible nondisplaced, likely subacute fracture.  Correlation with point tenderness recommended.    PHILLIP: Impression:   Right Leg:Segmental pressures and PVR waveforms suggest mild to moderate peripheral arterial occlusive disease.     Left Leg:Segmental pressures and PVR waveforms suggest mild to moderate peripheral arterial occlusive disease.        Diagnostic Results:  X-Ray: I have reviewed all pertinent results/findings within the past 24 hours.  reviewed    Clinical Findings:  Concerns for worsening  necrosis on right forefoot and heel wounds, worsening ischemia of toes.  Right foot continues to have necrotic, yet stable, wounds

## 2018-02-23 NOTE — PROGRESS NOTES
Progress Note   Hospital Medicine         Team: [facility] HOSP MED D Kanika Bradley MD  Admit Date: 2/4/2018  Length of Stay:  LOS: 19 days   ESTUARDO 2/28/2018  Principal Problem: Septic encephalopathy    Interval history / overnight events: Patient with no events overnight, no new complaints.    Areas of concern / handoff: anemia, may need pre-op transfusion with HD    Review of Systems   Constitutional: Negative for fever.   Respiratory: Negative for shortness of breath.    Cardiovascular: Negative for chest pain.       Temp: 96.7 °F (35.9 °C) (02/23/18 1145)  Pulse: (!) 54 (02/23/18 1145)  Resp: 18 (02/23/18 1145)  BP: 137/72 (02/23/18 1145)  SpO2: (!) 92 % (02/23/18 0743)     Temp:  [96.6 °F (35.9 °C)-99.1 °F (37.3 °C)]   Pulse:  [51-67]   Resp:  [16-22]   BP: (127-159)/(59-76)   SpO2:  [92 %-93 %]     Intake/Output Summary (Last 24 hours) at 02/23/18 1355  Last data filed at 02/23/18 1145   Gross per 24 hour   Intake              720 ml   Output             2600 ml   Net            -1880 ml     Weight: 107 kg (235 lb 14.3 oz)   Body mass index is 35.87 kg/m².    Physical Exam   Constitutional: He is oriented to person, place, and time and well-developed, well-nourished, and in no distress.  Non-toxic appearance.   HENT:   Head: Normocephalic.   Eyes: Conjunctivae and lids are normal. Pupils are equal.   Neck: Neck supple.   Cardiovascular: Normal rate, regular rhythm, S1 normal and S2 normal.    Pulmonary/Chest: Effort normal and breath sounds normal.   Abdominal: Soft. Normal appearance and bowel sounds are normal. There is no tenderness.   Musculoskeletal: He exhibits edema.        Right foot: There is swelling and deformity (necrosis and gangrene).   Neurological: He is alert and oriented to person, place, and time.   Skin: He is not diaphoretic.   Psychiatric: Mood and affect normal.       Data Review: Results recorded below were reviewed 2/23/2018.    Significant Labs:     Recent Labs  Lab 02/18/18  0527  "02/19/18  0408 02/20/18  0435 02/21/18  0535 02/22/18  0419 02/23/18  0830   WBC 11.84 11.28 9.43 8.92 8.82 8.16   HGB 7.7* 7.0* 7.0* 7.0* 7.2* 7.1*   HCT 24.3* 21.8* 21.7* 22.3* 23.1* 22.2*    203 190 174 182 165       Recent Labs  Lab 02/21/18  0535 02/22/18  0419 02/23/18  0830   * 137 133*   K 4.9 4.8 4.8    104 103   CO2 22* 23 21*   BUN 34* 27* 33*   CREATININE 4.9* 3.9* 4.5*   GLU 61* 54* 68*   CALCIUM 8.4* 8.4* 8.2*   MG 2.2 2.2 2.4   PHOS 4.2 3.8 4.2       Recent Labs  Lab 02/21/18  0535 02/22/18  0419 02/23/18  0830   ALBUMIN 1.7* 1.8* 1.8*     BNP   Date Value Ref Range Status   04/21/2017 4,230 (H) 0 - 99 pg/mL Final     Comment:     Values of less than 100 pg/ml are consistent with non-CHF populations.   04/10/2017 4,684 (H) 0 - 99 pg/mL Final     Comment:     Values of less than 100 pg/ml are consistent with non-CHF populations.   08/03/2016 4,184 (H) 0 - 99 pg/mL Final     Comment:     Values of less than 100 pg/ml are consistent with non-CHF populations.   03/21/2016 3,532 (H) 0 - 99 pg/mL Final     Comment:     Values of less than 100 pg/ml are consistent with non-CHF populations.     No results for input(s): CPK, TROPONINI in the last 168 hours.  Recent Labs      02/21/18   2132  02/22/18   0907  02/22/18   1317  02/22/18   1750  02/22/18   2054  02/23/18   0834  02/23/18   1208  02/23/18   1319   POCTGLUCOSE  86  86  100  93  100  70  81  85     A1C:     Recent Labs  Lab 01/07/18  0008   HGBA1C 4.6     TSH:     Recent Labs  Lab 02/08/18  0509   TSH 3.536     Assessment and Plan     Overview: "66 yo here for osteomyelitis of right foot. He underwent washout 2/6 and another washout 2/6 and amputation of 4th and 5th digits. Undergoing treatment course of antibiotics."    Current Hospital Problem List:    Active Hospital Problems    Diagnosis  POA    *Septic encephalopathy [G93.41]  Yes    Gangrene of right foot [I96]  Yes    Palliative care encounter [Z51.5]  Not Applicable    " Foot ulceration, left, with unspecified severity [L97.529]  Unknown    PVD (peripheral vascular disease) [I73.9]  Unknown    Pyogenic inflammation of bone [M86.9]  Yes    Diabetic foot ulcer [E11.621, L97.509]  Yes    Foot ulceration, right, with fat layer exposed [L97.512]  Yes    Acute delirium [R41.0]  Yes    Protein-calorie malnutrition, severe [E43]  Yes    DVT (deep venous thrombosis) [I82.409]  Yes    DM type 2 with diabetic mixed hyperlipidemia [E11.69, E78.2]  Yes    Diabetic polyneuropathy associated with type 2 diabetes mellitus [E11.42]  Yes    Chronic kidney disease-mineral and bone disorder [N18.9, E83.9, M89.9]  Yes    Depression [F32.9]  Yes    Alteration in skin integrity [R23.9]  Yes    Infected skin ulcer with fat layer exposed [L98.492, L08.9]  Yes    Hyperkalemia [E87.5]  Yes    Hypervolemia [E87.70]  Yes    Type 2 diabetes mellitus with stage 5 chronic kidney disease and hypertension [E11.22, I12.0, N18.5]  Yes    ESRD (end stage renal disease) [N18.6]  Yes    Essential hypertension [I10]  Yes      Resolved Hospital Problems    Diagnosis Date Resolved POA   No resolved problems to display.       Medications for management of current problems:      ammonium lactate   Topical (Top) BID    aspirin  81 mg Oral Daily    atorvastatin  20 mg Oral Daily    cinacalcet  30 mg Oral QHS    ciprofloxacin HCl  500 mg Oral Daily    citalopram  10 mg Oral Daily    gabapentin  100 mg Oral TID    heparin (porcine)  5,000 Units Subcutaneous Q8H    miconazole nitrate 2%   Topical (Top) BID    midodrine  10 mg Oral Every Mon, Wed, Fri    senna-docusate 8.6-50 mg  2 tablet Oral BID    sevelamer carbonate  1,600 mg Oral TID WM    sevelamer carbonate  800 mg Oral QHS       PRN:   sodium chloride 0.9%    acetaminophen    albumin human 25%    albumin human 25%    albuterol-ipratropium 2.5mg-0.5mg/3mL    dextrose 50%    dextrose 50%    glucagon (human recombinant)    glucose     "glucose    ondansetron    oxyCODONE    sodium chloride 0.9%     Anticoagulants   Medication Route Frequency    heparin (porcine) injection 5,000 Units Subcutaneous Q8H     IV Fluids: none    Problems Addressed 2/23/2018:    Septic encephalopathy  Patient initially lacked capacity to refuse surgery per psychiatry, consent obtained from his son as proxy. Mental status has improved. He now agrees to to right AKA; son in agreement as well.     Infected right dorsal foot, ischemic ulceration  Podiatry consulted - s/p I&D and 4th and 5th amputation  Vascular surgery consulted - initially no intervention required  Continued vancomycin 1 g IV per random levels and Zosyn 2.25 g IV BID  Wound culture revealed pan-sensitive CITROBACTER KOSERI and KLEBSIELLA PNEUMONIAE ESBL  To OR 02/09/18. Consulted ID.  Planned for wound vac and 6 weeks ciprofloxacin. ABIs ordered to rule out need for BKA. Angiogram considered. Per vascular surgery " Unlikely for the patient to heal the wound despite aggressive limb salvage efforts. Due to significant lipodermatosclerosis, BKA is not good option. Patient likely needs AKA." Patient refused AKA. One Son and Brother in agreement with patient as this aligns with his preference when in his normal mental state. All aware of the risk of recurrence of sepsis syndrome and death. Continuation of wound care and antibiotics likely futile; consulted Palliative Care. Patient considering AKA vs. Comfort care / Hospice.  Patient and his eldest Son now wish to proceed with AKA; Vascular Surgery notified.  Awaiting surgical date.     Hypervolemia due to non-compliance with dialysis  Hyperkalemia, resolved  Shifted in ER with albuterol, insulin/destrose, bicarbonate. K 7.9 => 4  No respiratory compromise. Emergent dialysis 2/5/18 early AM and on 2/9  Withhold anti-hypertensives for volume removal.      DVT  BLE ultrasound  Apixaban 5 mg BID - ? Compliance at home  Apixaban held 2/12/2018 for probable BKA - " plan to resume at discharge.  Remains on hold for possible AKA.     DM II with HTN and CKD V  HgbA1c 4.6%  Likely controlled with declining intake  No further intervention needed at this time     Protein calorie malnutrtion  Hypoglycemia with associated NSVT  Poor prognosis for wound healing  Nepro with meals     Essential HTN - held home hydralazine 50 mg TID and losartan 50 mg daily. Blood pressure fairly well-controlled during stay without antihypertensives.     DM II hyperlipidemia - atorvastatin 40 mg daily     DM II neuropathy - gabapentin 100 mg TID     ESRF HD TTS - Nephrology consulted for dialysis.  Withholding anti-hypertensives and added midodrine as needed prior to dialysis for volume removal and reduce symptomatic dialysis-associated hypotension.   BP improving.     CKD MBD - continue cinacalcet 30 mg qhs and sevelamer 1600 mg TID daily     Depression - continue citalopram 10 mg daily. Consulted psychiatry. Back to baseline.     Anticipated Disposition: Nursing Facility   ESTUARDO: 2/28/2018    Goals of Care:  General Prognosis: fair if has AKA  Goals: Curative  Comfort Only: No  Hospice: No  Code Status: Full Code    Kanika Bradley MD   Department of Hospital Medicine

## 2018-02-23 NOTE — ASSESSMENT & PLAN NOTE
Wilder Carbajal is a 67 year old male with ESRD on Hd. iHD TTS at [facility], duration of 4h 15 min, , Left AVF.    Plan:  - Patient will have dialysis today for clearance and volume removal, duration of 3.5 hrs, UF 1-2 L as tolerated by patient, maintain MAP>65, bath will be adjusted to chem.  - Blood pressures stable today  - Mineral bone disease: continue with phosphate binders, continue with cinacalcet  - Diet: Renal (low sodium, low phosphate and low potassium)

## 2018-02-23 NOTE — CHAPLAIN
visited patient in order to respond to palliative care consult.   provided a compassionate presence, reflective listening, readings and prayer support.  Patient expressed that he is experiencing fear about what will happen with him in the future regarding his health situation but says that he is still trusting in God's power.   had conversation with patient about how it is his human side that is experiencing fear but he is a human being that trust in the divine or trust in God to take care of him no matter what his taking place in his life.   also read scripture passages about having fear but also trusting in God that provided the patient with comfort.

## 2018-02-23 NOTE — PT/OT/SLP PROGRESS
Occupational Therapy      Patient Name:  Wilder Carbajal   MRN:  [id number]    Patient not seen today secondary to pt off floor for dialysis. OT unable to return. Will follow-up at next scheduled visit per OT POC.    Karo Castrejon, OT  2/23/2018

## 2018-02-23 NOTE — PROGRESS NOTES
[facility]  Podiatry  Progress Note    Patient Name: Wilder Carbajal  MRN: [id number]  Admission Date: 2/4/2018  Hospital Length of Stay: 19 days  Attending Physician: Kanika Bradley MD  Primary Care Provider: Dio Roberson MD   Interval Hx: S/p right 4th and 5th ray amputation (DOS: 1/9/18 per Dr. Matamoros). Pt. Seen in dialysis.     Scheduled Meds:   ammonium lactate   Topical (Top) BID    aspirin  81 mg Oral Daily    atorvastatin  20 mg Oral Daily    cinacalcet  30 mg Oral QHS    ciprofloxacin HCl  500 mg Oral Daily    citalopram  10 mg Oral Daily    gabapentin  100 mg Oral TID    heparin (porcine)  5,000 Units Subcutaneous Q8H    miconazole nitrate 2%   Topical (Top) BID    midodrine  10 mg Oral Every Mon, Wed, Fri    senna-docusate 8.6-50 mg  2 tablet Oral BID    sevelamer carbonate  1,600 mg Oral TID WM    sevelamer carbonate  800 mg Oral QHS     Continuous Infusions:  PRN Meds:sodium chloride 0.9%, acetaminophen, albumin human 25%, albumin human 25%, albuterol-ipratropium 2.5mg-0.5mg/3mL, dextrose 50%, dextrose 50%, glucagon (human recombinant), glucose, glucose, ondansetron, oxyCODONE, sodium chloride 0.9%    Review of patient's allergies indicates:  No Known Allergies     Past Medical History:   Diagnosis Date    Acute pain of left shoulder 1/7/2017    Arthritis     Asthma     Benign neoplasm of eyelid     RUL    Blood clotting tendency     Blood transfusion     Cataract     Chronic kidney disease requiring chronic dialysis     Diabetes mellitus     Diabetic retinopathy     Fever blister     Hyperlipidemia     Hypertension     Infertility     Joint pain     Retinal detachment     Thyroid disease     Weakness 1/7/2017     Past Surgical History:   Procedure Laterality Date    CATARACT EXTRACTION      COLONOSCOPY N/A 6/23/2017    Procedure: COLONOSCOPY;  Surgeon: Vic Clark MD;  Location: [facility] ([address] [place] [address]);  Service: Endoscopy;  Laterality: N/A;    RETINAL  DETACHMENT SURGERY         Family History     Problem Relation (Age of Onset)    Heart attack Mother    No Known Problems Father, Sister, Brother, Maternal Aunt, Maternal Uncle, Paternal Aunt, Paternal Uncle, Maternal Grandmother, Maternal Grandfather, Paternal Grandmother, Paternal Grandfather        Social History Main Topics    Smoking status: Never Smoker    Smokeless tobacco: Never Used    Alcohol use No    Drug use: No    Sexual activity: Not on file     Review of Systems   Constitutional: Positive for activity change.   Respiratory: Negative for shortness of breath.    Cardiovascular: Negative for chest pain and leg swelling.   Gastrointestinal: Negative.  Negative for nausea and vomiting.   Genitourinary: Negative.    Musculoskeletal: Positive for arthralgias.   Skin: Positive for wound.   Neurological: Positive for numbness. Negative for weakness.     Objective:     Vital Signs (Most Recent):  Temp: 96.7 °F (35.9 °C) (02/23/18 1145)  Pulse: 61 (02/23/18 1500)  Resp: 18 (02/23/18 1145)  BP: 137/72 (02/23/18 1145)  SpO2: (!) 92 % (02/23/18 0743) Vital Signs (24h Range):  Temp:  [96.6 °F (35.9 °C)-99.1 °F (37.3 °C)] 96.7 °F (35.9 °C)  Pulse:  [51-67] 61  Resp:  [16-22] 18  SpO2:  [92 %-93 %] 92 %  BP: (127-159)/(59-76) 137/72     Weight: 107 kg (235 lb 14.3 oz)  Body mass index is 35.87 kg/m².    Foot Exam    General  Orientation: disoriented       Right Foot/Ankle     Inspection and Palpation  Tenderness: none   Swelling: metatarsals   Skin Exam: blister, maceration, cellulitis, skin changes and ulcer;     Neurovascular  Dorsalis pedis: 1+  Posterior tibial: 1+  Saphenous nerve sensation: diminished  Tibial nerve sensation: diminished  Superficial peroneal nerve sensation: diminished  Deep peroneal nerve sensation: diminished  Sural nerve sensation: diminished      Left Foot/Ankle      Inspection and Palpation  Tenderness: none   Swelling: metatarsals   Skin Exam: skin changes and ulcer;      Neurovascular  Dorsalis pedis: 1+  Posterior tibial: 1+  Saphenous nerve sensation: diminished  Tibial nerve sensation: diminished  Superficial peroneal nerve sensation: diminished  Deep peroneal nerve sensation: diminished  Sural nerve sensation: diminished          Wound 1: Right dorsal foot ulceration   Measurement:[id number]  Base: granular, necrotic tissue alond plantar 3rd ray  Periwound skin: viable  Drainage: serosanguinous  Erythema: none  Probe probes to bone 4th and 5th metatarsal, extensor tendons exposed  2/23 2/19/18                    Wound 2: Right heel ulceration   Measurement: 3cxh7kar8.1cm  Base:  necrotic eschar base  Periwound skin: hyperkeratosis   Drainage:none  Probe: none, no bone exposed      Left foot ulceration noted to dorsal foot 2/23/18                Wound 1: Left dorsal foot   Measurement: 0nid4fbp0.1cm.  Base: fibrous base  Periwound skin: mild erythema,   Drainage: none  Erythema: mild  Probe: none, no bone exposed    2/23/18              Wound 1: Left heel ulceration   Measurement: 8wfg5hvp3.1cm.  Periwound skin: callus  Drainage: none  Erythema:mild   Probe: none, no bone exposed      Laboratory:  Xray: Right: There is DJD, soft tissue swelling, a calcaneal spur, and diabetic vascular calcifications. No fracture dislocation bone destruction seen.    Left: There is DJD, diabetic vascular calcifications, and Spurs on the calcaneus. No fracture dislocation bone destruction seen.    MRI: 1. Limited, incomplete nondiagnostic examination secondary to early termination at the of the request of the patient.    2. Linear T1 hypointensity traversing the third metacarpal head, concerning for possible nondisplaced, likely subacute fracture.  Correlation with point tenderness recommended.    PHILLIP: Impression:   Right Leg:Segmental pressures and PVR waveforms suggest mild to moderate peripheral arterial occlusive disease.     Left Leg:Segmental pressures  and PVR waveforms suggest mild to moderate peripheral arterial occlusive disease.        Diagnostic Results:  X-Ray: I have reviewed all pertinent results/findings within the past 24 hours.  reviewed    Clinical Findings:  Concerns for worsening necrosis on right forefoot and heel wounds, worsening ischemia of toes.  Right foot continues to have necrotic, yet stable, wounds    Assessment/Plan:     Foot ulceration, left, with unspecified severity    Stable ulceration to left dorsal foot and heel. Painted with betadine wrapped with kerlix and ACE.         Foot ulceration, right, with fat layer exposed    Wilder Carbajal is a 67 y.o. 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 Matamoros),  Concerns of progressive ischemic changes to right foot wounds and toes.  Pt. At high risk for limb loss at this time.   - Vascular following, Tentative plan for Right BKA on Monday  -  Right foot Dressed with betadine, 4x4's, lightly dressed with kerlix and ace.  Left foot paitned with betadine and dressed with island dressings  Nursing orders placed for daily dressing changes.   -ID on board, appreciate recs  -Podiatry will follow.    Weightbearing Status: NWB  Offloading Device: heel protector boot                ESRD (end stage renal disease)    Per Primary           Type 2 diabetes mellitus with stage 5 chronic kidney disease and hypertension    Per Primary               George Escamilla MD  Podiatry  [facility]

## 2018-02-24 LAB
POCT GLUCOSE: 112 MG/DL (ref 70–110)
POCT GLUCOSE: 76 MG/DL (ref 70–110)
POCT GLUCOSE: 80 MG/DL (ref 70–110)
POCT GLUCOSE: 91 MG/DL (ref 70–110)

## 2018-02-24 PROCEDURE — 97110 THERAPEUTIC EXERCISES: CPT

## 2018-02-24 PROCEDURE — 11000001 HC ACUTE MED/SURG PRIVATE ROOM

## 2018-02-24 PROCEDURE — 25000003 PHARM REV CODE 250: Performed by: INTERNAL MEDICINE

## 2018-02-24 PROCEDURE — 99231 SBSQ HOSP IP/OBS SF/LOW 25: CPT | Mod: ,,, | Performed by: INTERNAL MEDICINE

## 2018-02-24 PROCEDURE — 97530 THERAPEUTIC ACTIVITIES: CPT

## 2018-02-24 PROCEDURE — 25000003 PHARM REV CODE 250: Performed by: PHYSICIAN ASSISTANT

## 2018-02-24 PROCEDURE — 63600175 PHARM REV CODE 636 W HCPCS: Performed by: INTERNAL MEDICINE

## 2018-02-24 RX ADMIN — HEPARIN SODIUM 5000 UNITS: 5000 INJECTION, SOLUTION INTRAVENOUS; SUBCUTANEOUS at 09:02

## 2018-02-24 RX ADMIN — SEVELAMER CARBONATE 1600 MG: 800 TABLET, FILM COATED ORAL at 01:02

## 2018-02-24 RX ADMIN — SEVELAMER CARBONATE 800 MG: 800 TABLET, FILM COATED ORAL at 08:02

## 2018-02-24 RX ADMIN — GABAPENTIN 100 MG: 100 CAPSULE ORAL at 05:02

## 2018-02-24 RX ADMIN — SEVELAMER CARBONATE 1600 MG: 800 TABLET, FILM COATED ORAL at 09:02

## 2018-02-24 RX ADMIN — GABAPENTIN 100 MG: 100 CAPSULE ORAL at 09:02

## 2018-02-24 RX ADMIN — CITALOPRAM HYDROBROMIDE 10 MG: 10 TABLET ORAL at 09:02

## 2018-02-24 RX ADMIN — GABAPENTIN 100 MG: 100 CAPSULE ORAL at 01:02

## 2018-02-24 RX ADMIN — MICONAZOLE NITRATE: 20 OINTMENT TOPICAL at 09:02

## 2018-02-24 RX ADMIN — Medication: at 09:02

## 2018-02-24 RX ADMIN — CINACALCET HYDROCHLORIDE 30 MG: 30 TABLET, COATED ORAL at 08:02

## 2018-02-24 RX ADMIN — CIPROFLOXACIN HYDROCHLORIDE 500 MG: 250 TABLET, FILM COATED ORAL at 09:02

## 2018-02-24 RX ADMIN — STANDARDIZED SENNA CONCENTRATE AND DOCUSATE SODIUM 2 TABLET: 8.6; 5 TABLET, FILM COATED ORAL at 09:02

## 2018-02-24 RX ADMIN — STANDARDIZED SENNA CONCENTRATE AND DOCUSATE SODIUM 2 TABLET: 8.6; 5 TABLET, FILM COATED ORAL at 08:02

## 2018-02-24 RX ADMIN — ASPIRIN 81 MG: 81 TABLET, COATED ORAL at 09:02

## 2018-02-24 RX ADMIN — OXYCODONE HYDROCHLORIDE 5 MG: 5 TABLET ORAL at 09:02

## 2018-02-24 RX ADMIN — ATORVASTATIN CALCIUM 20 MG: 20 TABLET, FILM COATED ORAL at 09:02

## 2018-02-24 NOTE — ASSESSMENT & PLAN NOTE
Nutrition Problem  Increased nutrient needs    Related to (etiology):   Wound healing    Signs and Symptoms (as evidenced by):   Non-healing Infected lower extremities and ischemic ulceration    Interventions/Recommendations (treatment strategy):  See recs from RD note 2/14    Nutrition Diagnosis Status:   Continues

## 2018-02-24 NOTE — PROGRESS NOTES
Progress Note   Hospital Medicine         Team: [facility] HOSP MED D Kanika Bradley MD  Admit Date: 2/4/2018  Length of Stay:  LOS: 20 days   ESTUARDO 2/28/2018  Principal Problem: Septic encephalopathy    Interval history / overnight events: Patient with no events overnight, no new complaints.    Areas of concern / handoff: anemia, may need pre-op transfusion with HD    Review of Systems   Constitutional: Negative for fever.   Respiratory: Negative for shortness of breath.    Cardiovascular: Negative for chest pain.       Temp: 98.2 °F (36.8 °C) (02/24/18 1148)  Pulse: (!) 53 (02/24/18 1148)  Resp: 18 (02/24/18 1148)  BP: 139/68 (02/24/18 1148)  SpO2: 95 % (02/24/18 1148)     Temp:  [97.9 °F (36.6 °C)-98.5 °F (36.9 °C)]   Pulse:  [50-72]   Resp:  [16-20]   BP: (133-157)/(62-74)   SpO2:  [90 %-99 %]     Intake/Output Summary (Last 24 hours) at 02/24/18 1331  Last data filed at 02/24/18 0600   Gross per 24 hour   Intake              480 ml   Output                0 ml   Net              480 ml     Weight: 107 kg (235 lb 14.3 oz)   Body mass index is 35.87 kg/m².    Physical Exam   Constitutional: He is oriented to person, place, and time and well-developed, well-nourished, and in no distress.  Non-toxic appearance.   HENT:   Head: Normocephalic.   Eyes: Conjunctivae and lids are normal. Pupils are equal.   Neck: Neck supple.   Cardiovascular: Normal rate, regular rhythm, S1 normal and S2 normal.    Pulmonary/Chest: Effort normal and breath sounds normal.   Abdominal: Soft. Normal appearance and bowel sounds are normal. There is no tenderness.   Musculoskeletal: He exhibits edema.        Right foot: There is swelling and deformity (necrosis and gangrene).   Neurological: He is alert and oriented to person, place, and time.   Skin: He is not diaphoretic.   Psychiatric: Mood and affect normal.       Data Review: Results recorded below were reviewed 2/24/2018.    Significant Labs:     Recent Labs  Lab 02/18/18  3358  "02/19/18  0408 02/20/18  0435 02/21/18  0535 02/22/18  0419 02/23/18  0830   WBC 11.84 11.28 9.43 8.92 8.82 8.16   HGB 7.7* 7.0* 7.0* 7.0* 7.2* 7.1*   HCT 24.3* 21.8* 21.7* 22.3* 23.1* 22.2*    203 190 174 182 165       Recent Labs  Lab 02/21/18  0535 02/22/18  0419 02/23/18  0830   * 137 133*   K 4.9 4.8 4.8    104 103   CO2 22* 23 21*   BUN 34* 27* 33*   CREATININE 4.9* 3.9* 4.5*   GLU 61* 54* 68*   CALCIUM 8.4* 8.4* 8.2*   MG 2.2 2.2 2.4   PHOS 4.2 3.8 4.2       Recent Labs  Lab 02/21/18  0535 02/22/18  0419 02/23/18  0830   ALBUMIN 1.7* 1.8* 1.8*     Recent Labs      02/22/18   1750  02/22/18   2054  02/23/18   0834  02/23/18   1208  02/23/18   1319  02/23/18   1804  02/23/18   2327  02/24/18   0908   POCTGLUCOSE  93  100  70  81  85  86  94  80     A1C:     Recent Labs  Lab 01/07/18  0008   HGBA1C 4.6     TSH:     Recent Labs  Lab 02/08/18  0509   TSH 3.536     Assessment and Plan     Overview: "66 yo here for osteomyelitis of right foot. He underwent washout 2/6 and another washout 2/6 and amputation of 4th and 5th digits. Undergoing treatment course of antibiotics."    Current Hospital Problem List:    Active Hospital Problems    Diagnosis  POA    *Septic encephalopathy [G93.41]  Yes    Gangrene of right foot [I96]  Yes    Palliative care encounter [Z51.5]  Not Applicable    Foot ulceration, left, with unspecified severity [L97.529]  Unknown    PVD (peripheral vascular disease) [I73.9]  Unknown    Pyogenic inflammation of bone [M86.9]  Yes    Diabetic foot ulcer [E11.621, L97.509]  Yes    Foot ulceration, right, with fat layer exposed [L97.512]  Yes    Acute delirium [R41.0]  Yes    Protein-calorie malnutrition, severe [E43]  Yes    DVT (deep venous thrombosis) [I82.409]  Yes    DM type 2 with diabetic mixed hyperlipidemia [E11.69, E78.2]  Yes    Diabetic polyneuropathy associated with type 2 diabetes mellitus [E11.42]  Yes    Chronic kidney disease-mineral and bone disorder " [N18.9, E83.9, M89.9]  Yes    Depression [F32.9]  Yes    Alteration in skin integrity [R23.9]  Yes    Infected skin ulcer with fat layer exposed [L98.492, L08.9]  Yes    Hyperkalemia [E87.5]  Yes    Hypervolemia [E87.70]  Yes    Type 2 diabetes mellitus with stage 5 chronic kidney disease and hypertension [E11.22, I12.0, N18.5]  Yes    ESRD (end stage renal disease) [N18.6]  Yes    Essential hypertension [I10]  Yes      Resolved Hospital Problems    Diagnosis Date Resolved POA   No resolved problems to display.       Medications for management of current problems:      ammonium lactate   Topical (Top) BID    aspirin  81 mg Oral Daily    atorvastatin  20 mg Oral Daily    cinacalcet  30 mg Oral QHS    ciprofloxacin HCl  500 mg Oral Daily    citalopram  10 mg Oral Daily    gabapentin  100 mg Oral TID    heparin (porcine)  5,000 Units Subcutaneous Q8H    miconazole nitrate 2%   Topical (Top) BID    midodrine  10 mg Oral Every Mon, Wed, Fri    senna-docusate 8.6-50 mg  2 tablet Oral BID    sevelamer carbonate  1,600 mg Oral TID WM    sevelamer carbonate  800 mg Oral QHS     PRN:   sodium chloride 0.9%    acetaminophen    albumin human 25%    albumin human 25%    albuterol-ipratropium 2.5mg-0.5mg/3mL    dextrose 50%    dextrose 50%    glucagon (human recombinant)    glucose    glucose    ondansetron    oxyCODONE    sodium chloride 0.9%     Anticoagulants   Medication Route Frequency    heparin (porcine) injection 5,000 Units Subcutaneous Q8H     IV Fluids: none    Problems Addressed 2/24/2018:    Septic encephalopathy  Patient initially lacked capacity to refuse surgery per psychiatry, consent obtained from his son as proxy. Mental status has improved. He now agrees to to right AKA; son in agreement as well.     Infected right dorsal foot, ischemic ulceration  Podiatry consulted - s/p I&D and 4th and 5th amputation  Vascular surgery consulted - initially no intervention  "required  Continued vancomycin 1 g IV per random levels and Zosyn 2.25 g IV BID  Wound culture revealed pan-sensitive CITROBACTER KOSERI and KLEBSIELLA PNEUMONIAE ESBL  To OR 02/09/18. Consulted ID.  Planned for wound vac and 6 weeks ciprofloxacin. ABIs ordered to rule out need for BKA. Angiogram considered. Per vascular surgery " Unlikely for the patient to heal the wound despite aggressive limb salvage efforts. Due to significant lipodermatosclerosis, BKA is not good option. Patient likely needs AKA." Patient refused AKA. One Son and Brother in agreement with patient as this aligns with his preference when in his normal mental state. All aware of the risk of recurrence of sepsis syndrome and death. Continuation of wound care and antibiotics likely futile; consulted Palliative Care. Patient considering AKA vs. Comfort care / Hospice.  Patient and his eldest Son now wish to proceed with AKA; Vascular Surgery notified.  Awaiting surgical date.     Hypervolemia due to non-compliance with dialysis  Hyperkalemia, resolved  Shifted in ER with albuterol, insulin/destrose, bicarbonate. K 7.9 => 4  No respiratory compromise. Emergent dialysis 2/5/18 early AM and on 2/9  Withhold anti-hypertensives for volume removal.      DVT  BLE ultrasound  Apixaban 5 mg BID - ? Compliance at home  Apixaban held 2/12/2018 for probable BKA - plan to resume at discharge.  Remains on hold for possible AKA.     DM II with HTN and CKD V  HgbA1c 4.6%  Likely controlled with declining intake  No further intervention needed at this time     Protein calorie malnutrtion  Hypoglycemia with associated NSVT  Poor prognosis for wound healing  Nepro with meals     Essential HTN - held home hydralazine 50 mg TID and losartan 50 mg daily. Blood pressure fairly well-controlled during stay without antihypertensives.     DM II hyperlipidemia - atorvastatin 40 mg daily     DM II neuropathy - gabapentin 100 mg TID     ESRF HD TTS - Nephrology consulted for " dialysis.  Withholding anti-hypertensives and added midodrine as needed prior to dialysis for volume removal and reduce symptomatic dialysis-associated hypotension.   BP improving.     CKD MBD - continue cinacalcet 30 mg qhs and sevelamer 1600 mg TID daily     Depression - continue citalopram 10 mg daily. Consulted psychiatry. Back to baseline.     Anticipated Disposition: Skilled Nursing Facility post op   ESTUARDO: 2/28/2018    Goals of Care:  General Prognosis: fair if has AKA  Goals: Curative  Comfort Only: No  Hospice: No  Code Status: Full Code    Kanika Bradley MD   Department of Hospital Medicine

## 2018-02-24 NOTE — PLAN OF CARE
Problem: Occupational Therapy Goal  Goal: Occupational Therapy Goal  Goals to be met by:  3/2/2018    Patient will increase functional independence with ADLs by performing:    UE Dressing with Set-up Assistance.  Grooming while seated with Modified [place].  Toileting from bedside commode with Moderate Assistance for hygiene and clothing management.   Supine to sit with Modified [place].  Bed<>chair transfer with Minimal Assistance while maintaining precautions.  Toilet transfer to bedside commode with Minimal Assistance while maintaining precautions.        Outcome: Ongoing (interventions implemented as appropriate)  Goals remain appropriate. Cont POC.    MARIA ESTHER Worley  2/24/2018  Pager: [phone]

## 2018-02-24 NOTE — PT/OT/SLP PROGRESS
Occupational Therapy   Treatment    Name: Wilder Carbajal  MRN: [id number]  Admitting Diagnosis:  Septic encephalopathy  15 Days Post-Op    Recommendations:     Discharge Recommendations: nursing facility, skilled  Discharge Equipment Recommendations:  bedside commode, walker, rolling  Barriers to discharge:  Inaccessible home environment, Decreased caregiver support    Subjective     Communicated with: RN prior to session.  Pain/Comfort:  · Pain Rating 1: 0/10  · Pain Rating Post-Intervention 1: 0/10    Patients cultural, spiritual, Mu-ism conflicts given the current situation: none stated    Objective:     Patient found with: pressure relief boots, telemetry    General Precautions: Standard, fall   Orthopedic Precautions: (RLE NWB)   Braces: N/A     Occupational Performance:    Bed Mobility:    · Patient completed Scooting/Bridging with minimum assistance / HHA to scoot to EOB; trendelenburg and Min A to scoot to HOB with use of B rails  · Patient completed Supine to Sit with minimum assistance  · Patient completed Sit to Supine with minimum assistance     Functional Mobility/Transfers:  · Did not occur  · Functional Mobility: did not occur    Activities of Daily Living:  · Did not occur    Patient left HOB elevated with all lines intact and call button in reach    [facility] 6 Click:  [facility] Total Score: 12    Treatment & Education:  Pt ed re OT role and POC. Pt performed supine to sit with use of side rail, Min A and increased time. Pt required Min A and HHA to scoot to EOB. Pt performed therex of leg extensions and seated marches, with 3 second hold at end range of movement, 2x10 reps each LE. Pt rested between each set. Pt performed L scoot along EOB with SBA and cues for WB precautions. Pt required ed re L elbow WB, for reverse log roll technique to perform sit to supine (ultimately with Min A for LEs). Pt required use of B side rails, trendelenburg, and Min A to scoot to HOB.  Education:    Assessment:      Wilder Carbajal is a 67 y.o. male with a medical diagnosis of Septic encephalopathy.  He presents with the following performance deficits affecting function are weakness, impaired endurance, impaired self care skills, impaired functional mobilty, impaired balance, decreased lower extremity function, impaired skin, orthopedic precautions, impaired joint extensibility.  Pt participates well and is motivated to regain functional independence. Pt demo improved performance of therex and tolerating the exercises well. Pt was able to brush his teeth today with setup A, and his family member assisted with shaving his face. Pt reported interest in attempting to shave next visit and demo good motivation for improved (I) in self care.    Rehab Prognosis:  Good; patient would benefit from acute skilled OT services to address these deficits and reach maximum level of function.       Plan:     Patient to be seen 4 x/week to address the above listed problems via self-care/home management, therapeutic exercises, therapeutic activities  · Plan of Care Expires:    · Plan of Care Reviewed with: patient    This Plan of care has been discussed with the patient who was involved in its development and understands and is in agreement with the identified goals and treatment plan    GOALS:    Occupational Therapy Goals        Problem: Occupational Therapy Goal    Goal Priority Disciplines Outcome Interventions   Occupational Therapy Goal     OT, PT/OT Ongoing (interventions implemented as appropriate)    Description:  Goals to be met by:  3/2/2018    Patient will increase functional independence with ADLs by performing:    UE Dressing with Set-up Assistance.  Grooming while seated with Modified [place].  Toileting from bedside commode with Moderate Assistance for hygiene and clothing management.   Supine to sit with Modified [place].  Bed<>chair transfer with Minimal Assistance while maintaining precautions.  Toilet transfer  to bedside commode with Minimal Assistance while maintaining precautions.                   Multidisciplinary Problems (Resolved)        Problem: Occupational Therapy Goal    Goal Priority Disciplines Outcome Interventions   Occupational Therapy Goal   (Resolved)     OT, PT/OT Outcome(s) achieved    Description:  Goals to be met by: 7 days      Patient will increase functional independence with ADLs by performing:    UE Dressing with Moderate Assistance.  LE Dressing with Moderate Assistance.  Grooming while seated with Supervision.  Toileting from bedside commode with Moderate Assistance for hygiene and clothing management.   Supine to sit with Moderate Assistance.  Stand pivot transfers with Moderate Assistance.  Toilet transfer to bedside commode with Moderate Assistance.  Increased functional strength to WFL for safe and independent function with self-care and functional mobility tasks.                      Time Tracking:     OT Date of Treatment: 02/24/18  OT Start Time: 1250  OT Stop Time: 1313  OT Total Time (min): 23 min    Billable Minutes:Therapeutic Activity 15 minutes  Therapeutic Exercise 8 minutes    MARIA ESTHER Worley  2/24/2018  Pager: [phone]

## 2018-02-24 NOTE — PROGRESS NOTES
[facility]  Adult Nutrition  Progress Note    SUMMARY     Recommendations    Recommendation/Intervention: 1. Continue current renal diet, fluid-800 mL, low potassium diet. Encourage PO intake and nutrition supplement (Novasource Renal) to meet EEN & EPN. 3. RD to monitor and follow-up.   Goals: Adequate PO intake >85% EEN and EPN  Nutrition Goal Status: progressing towards goal  Communication of RD Recs: reviewed with RN    Reason for Assessment    Reason for Assessment: RD follow-up  Diagnosis: infection/sepsis (Septic encephalopathy)  Relevent Medical History: Diabetic retinopathy, T2DM, HTN, stage 5 CKD (requiring chronic dialysis), hyperlipidemia, thyroid disease, ESRD, possible dementia, hypervolemia    General Information Comments: Pt has decided to continue treatment and proceed w/ left leg abx. PO intake is 50-75% most meals  Nutrition Discharge Planning: Adequate PO intake >75%    Nutrition Prescription Ordered    Current Diet Order: Renal diet  Nutrition Order Comments: Fluid restriction-800mL; Low potassium  Oral Nutrition Supplement: Novasource Renal     Evaluation of Received Nutrients/Fluid Intake   Energy Calories Required: not meeting needs   Protein Required: not meeting needs  I/O: -9.8l since admit    Intake/Output Summary (Last 24 hours) at 02/24/18 1332  Last data filed at 02/24/18 0600   Gross per 24 hour   Intake              480 ml   Output                0 ml   Net              480 ml      Fluid Required: not meeting needs  Comments: LBM:2/23/18     % Intake of Estimated Energy Needs: 50 - 75 %  % Meal Intake: 50%     Nutrition Risk Screen     Nutrition Risk Screen: no indicators present    Nutrition/Diet History    Patient Reported Diet/Restrictions/Preferences: general     Labs/Tests/Procedures/Meds       Pertinent Labs Reviewed: reviewed  Pertinent Labs Comments:   Lab Results   Component Value Date    WBC 8.16 02/23/2018    HGB 7.1 (L) 02/23/2018    HCT 22.2 (L)  "02/23/2018    MCV 91 02/23/2018     02/23/2018     BMP  Lab Results   Component Value Date     (L) 02/23/2018    K 4.8 02/23/2018     02/23/2018    CO2 21 (L) 02/23/2018    BUN 33 (H) 02/23/2018    CREATININE 4.5 (H) 02/23/2018    CALCIUM 8.2 (L) 02/23/2018    ANIONGAP 9 02/23/2018    ESTGFRAFRICA 14.4 (A) 02/23/2018    EGFRNONAA 12.5 (A) 02/23/2018     Lab Results   Component Value Date    ALT 13 02/04/2018    AST 14 02/04/2018    ALKPHOS 138 (H) 02/04/2018    BILITOT 1.3 (H) 02/04/2018     Lab Results   Component Value Date    CALCIUM 8.2 (L) 02/23/2018    PHOS 4.2 02/23/2018     Lab Results   Component Value Date    ALBUMIN 1.8 (L) 02/23/2018       Pertinent Medications Reviewed: reviewed  Pertinent Medications Comments:    ammonium lactate   Topical (Top) BID    aspirin  81 mg Oral Daily    atorvastatin  20 mg Oral Daily    cinacalcet  30 mg Oral QHS    ciprofloxacin HCl  500 mg Oral Daily    citalopram  10 mg Oral Daily    gabapentin  100 mg Oral TID    heparin (porcine)  5,000 Units Subcutaneous Q8H    miconazole nitrate 2%   Topical (Top) BID    midodrine  10 mg Oral Every Mon, Wed, Fri    senna-docusate 8.6-50 mg  2 tablet Oral BID    sevelamer carbonate  1,600 mg Oral TID WM    sevelamer carbonate  800 mg Oral QHS         Physical Findings    Overall Physical Appearance: obese        Skin: non-healing wound(s)    Anthropometrics    Temp: 98.2 °F (36.8 °C)     Height: 5' 8" (172.7 cm)  Weight Method: Bed Scale  Weight: 107 kg (235 lb 14.3 oz)  Ideal Body Weight (IBW), Male: 154 lb     % Ideal Body Weight, Male (lb): 143.16 lb     BMI (Calculated): 33.6  BMI Grade: 30 - 34.9- obesity - grade I        Estimated/Assessed Needs    Weight Used For Calorie Calculations: 100 kg (220 lb 7.4 oz)   Energy Calorie Requirements (kcal): 2181 kcal/d (1.25 PAL)  Energy Need Method: [facility] Equation): 1744.5   Weight Used For Protein Calculations: 100 kg (220 " lb 7.4 oz)  Protein Requirements: 110 g/d (1.1 g/kg)  Fluid Requirements (mL): 1 mL/kcal or per MD   RDA Method (mL): 2181     Assessment and Plan    * Septic encephalopathy    Nutrition Problem  Increased nutrient needs    Related to (etiology):   Wound healing    Signs and Symptoms (as evidenced by):   Non-healing Infected lower extremities and ischemic ulceration    Interventions/Recommendations (treatment strategy):  See recs from RD note 2/14    Nutrition Diagnosis Status:   Continues                  Monitor and Evaluation    Food and Nutrient Intake: energy intake, food and beverage intake  Food and Nutrient Adminstration: diet order     Physical Activity and Function: nutrition-related ADLs and IADLs  Anthropometric Measurements: weight, weight change  Biochemical Data, Medical Tests and Procedures: electrolyte and renal panel, gastrointestinal profile, glucose/endocrine profile, inflammatory profile, lipid profile  Nutrition-Focused Physical Findings: overall appearance    Nutrition Risk    Level of Risk:  (1x/week)    Nutrition Follow-Up    RD Follow-up?: Yes

## 2018-02-25 ENCOUNTER — ANESTHESIA EVENT (OUTPATIENT)
Dept: SURGERY | Facility: HOSPITAL | Age: 68
DRG: 853 | End: 2018-02-25
Payer: COMMERCIAL

## 2018-02-25 LAB
ABO + RH BLD: NORMAL
ABO GROUP BLD: NORMAL
ALBUMIN SERPL BCP-MCNC: 2 G/DL
ALP SERPL-CCNC: 101 U/L
ALT SERPL W/O P-5'-P-CCNC: 10 U/L
ANION GAP SERPL CALC-SCNC: 9 MMOL/L
APTT BLDCRRT: 27.9 SEC
AST SERPL-CCNC: 15 U/L
BASOPHILS # BLD AUTO: 0.07 K/UL
BASOPHILS NFR BLD: 1 %
BILIRUB SERPL-MCNC: 0.7 MG/DL
BLD GP AB SCN CELLS X3 SERPL QL: NORMAL
BUN SERPL-MCNC: 30 MG/DL
CALCIUM SERPL-MCNC: 8.5 MG/DL
CHLORIDE SERPL-SCNC: 103 MMOL/L
CO2 SERPL-SCNC: 23 MMOL/L
CREAT SERPL-MCNC: 4.5 MG/DL
DIFFERENTIAL METHOD: ABNORMAL
EOSINOPHIL # BLD AUTO: 0.2 K/UL
EOSINOPHIL NFR BLD: 3.3 %
ERYTHROCYTE [DISTWIDTH] IN BLOOD BY AUTOMATED COUNT: 16.7 %
EST. GFR  (AFRICAN AMERICAN): 14.4 ML/MIN/1.73 M^2
EST. GFR  (NON AFRICAN AMERICAN): 12.5 ML/MIN/1.73 M^2
GLUCOSE SERPL-MCNC: 87 MG/DL
HCT VFR BLD AUTO: 24.4 %
HGB BLD-MCNC: 7.5 G/DL
IMM GRANULOCYTES # BLD AUTO: 0.03 K/UL
IMM GRANULOCYTES NFR BLD AUTO: 0.4 %
INR PPP: 1.3
LYMPHOCYTES # BLD AUTO: 1.2 K/UL
LYMPHOCYTES NFR BLD: 16.6 %
MAGNESIUM SERPL-MCNC: 2.4 MG/DL
MCH RBC QN AUTO: 29 PG
MCHC RBC AUTO-ENTMCNC: 30.7 G/DL
MCV RBC AUTO: 94 FL
MONOCYTES # BLD AUTO: 0.8 K/UL
MONOCYTES NFR BLD: 10.3 %
NEUTROPHILS # BLD AUTO: 5 K/UL
NEUTROPHILS NFR BLD: 68.4 %
NRBC BLD-RTO: 0 /100 WBC
PHOSPHATE SERPL-MCNC: 4.1 MG/DL
PLATELET # BLD AUTO: 157 K/UL
PMV BLD AUTO: 9.6 FL
POCT GLUCOSE: 110 MG/DL (ref 70–110)
POCT GLUCOSE: 85 MG/DL (ref 70–110)
POCT GLUCOSE: 85 MG/DL (ref 70–110)
POCT GLUCOSE: 99 MG/DL (ref 70–110)
POTASSIUM SERPL-SCNC: 4.6 MMOL/L
PROT SERPL-MCNC: 7.6 G/DL
PROTHROMBIN TIME: 13.3 SEC
RBC # BLD AUTO: 2.59 M/UL
RH BLD: NORMAL
SODIUM SERPL-SCNC: 135 MMOL/L
WBC # BLD AUTO: 7.27 K/UL

## 2018-02-25 PROCEDURE — 63600175 PHARM REV CODE 636 W HCPCS: Performed by: INTERNAL MEDICINE

## 2018-02-25 PROCEDURE — 86901 BLOOD TYPING SEROLOGIC RH(D): CPT

## 2018-02-25 PROCEDURE — 85025 COMPLETE CBC W/AUTO DIFF WBC: CPT

## 2018-02-25 PROCEDURE — 83735 ASSAY OF MAGNESIUM: CPT

## 2018-02-25 PROCEDURE — 86901 BLOOD TYPING SEROLOGIC RH(D): CPT | Mod: 91

## 2018-02-25 PROCEDURE — 99231 SBSQ HOSP IP/OBS SF/LOW 25: CPT | Mod: ,,, | Performed by: INTERNAL MEDICINE

## 2018-02-25 PROCEDURE — 85610 PROTHROMBIN TIME: CPT

## 2018-02-25 PROCEDURE — 86900 BLOOD TYPING SEROLOGIC ABO: CPT

## 2018-02-25 PROCEDURE — 25000003 PHARM REV CODE 250: Performed by: INTERNAL MEDICINE

## 2018-02-25 PROCEDURE — 84100 ASSAY OF PHOSPHORUS: CPT

## 2018-02-25 PROCEDURE — 86920 COMPATIBILITY TEST SPIN: CPT

## 2018-02-25 PROCEDURE — 80053 COMPREHEN METABOLIC PANEL: CPT

## 2018-02-25 PROCEDURE — 25000003 PHARM REV CODE 250: Performed by: PHYSICIAN ASSISTANT

## 2018-02-25 PROCEDURE — 85730 THROMBOPLASTIN TIME PARTIAL: CPT

## 2018-02-25 PROCEDURE — 11000001 HC ACUTE MED/SURG PRIVATE ROOM

## 2018-02-25 RX ORDER — SODIUM CHLORIDE 9 MG/ML
INJECTION, SOLUTION INTRAVENOUS
Status: DISCONTINUED | OUTPATIENT
Start: 2018-02-26 | End: 2018-02-27

## 2018-02-25 RX ORDER — SODIUM CHLORIDE 9 MG/ML
INJECTION, SOLUTION INTRAVENOUS ONCE
Status: COMPLETED | OUTPATIENT
Start: 2018-02-26 | End: 2018-02-26

## 2018-02-25 RX ADMIN — MICONAZOLE NITRATE: 20 OINTMENT TOPICAL at 10:02

## 2018-02-25 RX ADMIN — HEPARIN SODIUM 5000 UNITS: 5000 INJECTION, SOLUTION INTRAVENOUS; SUBCUTANEOUS at 10:02

## 2018-02-25 RX ADMIN — ATORVASTATIN CALCIUM 20 MG: 20 TABLET, FILM COATED ORAL at 08:02

## 2018-02-25 RX ADMIN — Medication: at 08:02

## 2018-02-25 RX ADMIN — GABAPENTIN 100 MG: 100 CAPSULE ORAL at 01:02

## 2018-02-25 RX ADMIN — OXYCODONE HYDROCHLORIDE 5 MG: 5 TABLET ORAL at 05:02

## 2018-02-25 RX ADMIN — ONDANSETRON 8 MG: 4 TABLET, ORALLY DISINTEGRATING ORAL at 12:02

## 2018-02-25 RX ADMIN — SEVELAMER CARBONATE 1600 MG: 800 TABLET, FILM COATED ORAL at 08:02

## 2018-02-25 RX ADMIN — SEVELAMER CARBONATE 1600 MG: 800 TABLET, FILM COATED ORAL at 05:02

## 2018-02-25 RX ADMIN — HEPARIN SODIUM 5000 UNITS: 5000 INJECTION, SOLUTION INTRAVENOUS; SUBCUTANEOUS at 01:02

## 2018-02-25 RX ADMIN — CINACALCET HYDROCHLORIDE 30 MG: 30 TABLET, COATED ORAL at 09:02

## 2018-02-25 RX ADMIN — Medication: at 10:02

## 2018-02-25 RX ADMIN — SEVELAMER CARBONATE 1600 MG: 800 TABLET, FILM COATED ORAL at 11:02

## 2018-02-25 RX ADMIN — CITALOPRAM HYDROBROMIDE 10 MG: 10 TABLET ORAL at 08:02

## 2018-02-25 RX ADMIN — MICONAZOLE NITRATE: 20 OINTMENT TOPICAL at 08:02

## 2018-02-25 RX ADMIN — STANDARDIZED SENNA CONCENTRATE AND DOCUSATE SODIUM 2 TABLET: 8.6; 5 TABLET, FILM COATED ORAL at 08:02

## 2018-02-25 RX ADMIN — SEVELAMER CARBONATE 800 MG: 800 TABLET, FILM COATED ORAL at 09:02

## 2018-02-25 RX ADMIN — GABAPENTIN 100 MG: 100 CAPSULE ORAL at 06:02

## 2018-02-25 RX ADMIN — CIPROFLOXACIN HYDROCHLORIDE 500 MG: 250 TABLET, FILM COATED ORAL at 08:02

## 2018-02-25 RX ADMIN — GABAPENTIN 100 MG: 100 CAPSULE ORAL at 10:02

## 2018-02-25 RX ADMIN — HEPARIN SODIUM 5000 UNITS: 5000 INJECTION, SOLUTION INTRAVENOUS; SUBCUTANEOUS at 06:02

## 2018-02-25 RX ADMIN — STANDARDIZED SENNA CONCENTRATE AND DOCUSATE SODIUM 2 TABLET: 8.6; 5 TABLET, FILM COATED ORAL at 09:02

## 2018-02-25 RX ADMIN — ACETAMINOPHEN 650 MG: 325 TABLET, FILM COATED ORAL at 09:02

## 2018-02-25 RX ADMIN — ASPIRIN 81 MG: 81 TABLET, COATED ORAL at 08:02

## 2018-02-25 NOTE — PLAN OF CARE
Problem: Diabetes, Type 2 (Adult)  Goal: Signs and Symptoms of Listed Potential Problems Will be Absent, Minimized or Managed (Diabetes, Type 2)  Signs and symptoms of listed potential problems will be absent, minimized or managed by discharge/transition of care (reference Diabetes, Type 2 (Adult) CPG).   Outcome: Ongoing (interventions implemented as appropriate)  Blood sugar monitored as ordered, no insulin required.    Problem: Patient Care Overview  Goal: Plan of Care Review  Outcome: Ongoing (interventions implemented as appropriate)  Discussed plan of care with the patient including medications given.  Patient remained stable throughout the shift.

## 2018-02-25 NOTE — PROGRESS NOTES
Progress Note   Hospital Medicine         Team: [facility] HOSP MED D Kanika Bradley MD  Admit Date: 2/4/2018  Length of Stay:  LOS: 21 days   ESTUARDO 2/28/2018  Principal Problem: Septic encephalopathy    Interval history / overnight events: Patient with no events overnight, no new complaints.    Areas of concern / handoff: PLAN IS FOR SURGERY BEFORE HD 2/26/18    Review of Systems   Constitutional: Negative for fever.   Respiratory: Negative for shortness of breath.    Cardiovascular: Negative for chest pain.       Temp: 97.8 °F (36.6 °C) (02/25/18 1132)  Pulse: (!) 52 (02/25/18 1132)  Resp: 16 (02/25/18 1132)  BP: 127/61 (02/25/18 1132)  SpO2: 100 % (02/25/18 1132)     Temp:  [97.2 °F (36.2 °C)-98.7 °F (37.1 °C)]   Pulse:  [51-72]   Resp:  [16-20]   BP: (125-151)/(60-68)   SpO2:  [93 %-100 %]     Intake/Output Summary (Last 24 hours) at 02/25/18 1228  Last data filed at 02/24/18 2100   Gross per 24 hour   Intake              120 ml   Output                0 ml   Net              120 ml     Weight: 107 kg (235 lb 14.3 oz)   Body mass index is 35.87 kg/m².    Physical Exam   Constitutional: No distress.   HENT:   Head: Normocephalic.   Eyes: Conjunctivae and lids are normal. Pupils are equal.   Neck: Neck supple.   Cardiovascular: Normal rate, regular rhythm, S1 normal and S2 normal.    Pulmonary/Chest: Effort normal and breath sounds normal.   Abdominal: Soft. Normal appearance and bowel sounds are normal. There is no tenderness.   Musculoskeletal: He exhibits edema.        Right foot: There is swelling and deformity (necrosis and gangrene).   Neurological: He is alert. He is not disoriented.   Skin: He is not diaphoretic.   Psychiatric: Mood and affect normal.     Data Review: Results recorded below were reviewed 2/25/2018.    Significant Labs:     Recent Labs  Lab 02/19/18  0408 02/20/18  0435 02/21/18  0535 02/22/18  0419 02/23/18  0830 02/25/18  0951   WBC 11.28 9.43 8.92 8.82 8.16 7.27   HGB 7.0* 7.0* 7.0* 7.2* 7.1*  "7.5*   HCT 21.8* 21.7* 22.3* 23.1* 22.2* 24.4*    190 174 182 165 157       Recent Labs  Lab 02/22/18  0419 02/23/18  0830 02/25/18  0951    133* 135*   K 4.8 4.8 4.6    103 103   CO2 23 21* 23   BUN 27* 33* 30*   CREATININE 3.9* 4.5* 4.5*   GLU 54* 68* 87   CALCIUM 8.4* 8.2* 8.5*   MG 2.2 2.4 2.4   PHOS 3.8 4.2 4.1       Recent Labs  Lab 02/22/18  0419 02/23/18  0830 02/25/18  0951   ALKPHOS  --   --  101   ALT  --   --  10   AST  --   --  15   ALBUMIN 1.8* 1.8* 2.0*   PROT  --   --  7.6   BILITOT  --   --  0.7   INR  --   --  1.3*       Recent Labs  Lab 02/25/18  0951   INR 1.3*   APTT 27.9     Recent Labs      02/23/18   1804  02/23/18   2327  02/24/18   0908  02/24/18   1345  02/24/18   1826  02/24/18   2057  02/25/18   0731  02/25/18   1219   POCTGLUCOSE  86  94  80  76  91  112*  85  85     A1C:     Recent Labs  Lab 01/07/18  0008   HGBA1C 4.6     TSH:     Recent Labs  Lab 02/08/18  0509   TSH 3.536     Assessment and Plan     Overview: "66 yo here for osteomyelitis of right foot. He underwent washout 2/6 and another washout 2/6 and amputation of 4th and 5th digits. Undergoing treatment course of antibiotics." Plan for AKA 2/26/18.    Current Hospital Problem List:    Active Hospital Problems    Diagnosis  POA    *Septic encephalopathy [G93.41]  Yes    Gangrene of right foot [I96]  Yes    Palliative care encounter [Z51.5]  Not Applicable    Foot ulceration, left, with unspecified severity [L97.529]  Unknown    PVD (peripheral vascular disease) [I73.9]  Unknown    Pyogenic inflammation of bone [M86.9]  Yes    Diabetic foot ulcer [E11.621, L97.509]  Yes    Foot ulceration, right, with fat layer exposed [L97.512]  Yes    Acute delirium [R41.0]  Yes    Protein-calorie malnutrition, severe [E43]  Yes    DVT (deep venous thrombosis) [I82.409]  Yes    DM type 2 with diabetic mixed hyperlipidemia [E11.69, E78.2]  Yes    Diabetic polyneuropathy associated with type 2 diabetes mellitus " [E11.42]  Yes    Chronic kidney disease-mineral and bone disorder [N18.9, E83.9, M89.9]  Yes    Depression [F32.9]  Yes    Alteration in skin integrity [R23.9]  Yes    Infected skin ulcer with fat layer exposed [L98.492, L08.9]  Yes    Hyperkalemia [E87.5]  Yes    Hypervolemia [E87.70]  Yes    Type 2 diabetes mellitus with stage 5 chronic kidney disease and hypertension [E11.22, I12.0, N18.5]  Yes    ESRD (end stage renal disease) [N18.6]  Yes    Essential hypertension [I10]  Yes      Resolved Hospital Problems    Diagnosis Date Resolved POA   No resolved problems to display.       Medications for management of current problems:      ammonium lactate   Topical (Top) BID    aspirin  81 mg Oral Daily    atorvastatin  20 mg Oral Daily    cinacalcet  30 mg Oral QHS    ciprofloxacin HCl  500 mg Oral Daily    citalopram  10 mg Oral Daily    gabapentin  100 mg Oral TID    heparin (porcine)  5,000 Units Subcutaneous Q8H    miconazole nitrate 2%   Topical (Top) BID    midodrine  10 mg Oral Every Mon, Wed, Fri    senna-docusate 8.6-50 mg  2 tablet Oral BID    sevelamer carbonate  1,600 mg Oral TID WM    sevelamer carbonate  800 mg Oral QHS     PRN:   sodium chloride 0.9%    acetaminophen    albumin human 25%    albumin human 25%    albuterol-ipratropium 2.5mg-0.5mg/3mL    dextrose 50%    dextrose 50%    glucagon (human recombinant)    glucose    glucose    ondansetron    oxyCODONE    sodium chloride 0.9%     Anticoagulants   Medication Route Frequency    heparin (porcine) injection 5,000 Units Subcutaneous Q8H     IV Fluids: none    Problems Addressed 2/25/2018:    Septic encephalopathy, resolved  Patient initially lacked capacity to refuse surgery per psychiatry, consent obtained from his son as proxy. Mental status has improved. He now agrees to to right AKA; son in agreement as well.     Infected right dorsal foot, ischemic ulceration  Podiatry consulted - s/p I&D and 4th and 5th  "amputation  Vascular surgery consulted - initially no intervention required  Continued vancomycin 1 g IV per random levels and Zosyn 2.25 g IV BID  Wound culture revealed pan-sensitive CITROBACTER KOSERI and KLEBSIELLA PNEUMONIAE ESBL  To OR 02/09/18. Consulted ID.  Planned for wound vac and 6 weeks ciprofloxacin. ABIs ordered to rule out need for BKA. Angiogram considered. Per vascular surgery " Unlikely for the patient to heal the wound despite aggressive limb salvage efforts. Due to significant lipodermatosclerosis, BKA is not good option. Patient likely needs AKA." Patient refused AKA. One Son and Brother in agreement with patient as this aligns with his preference when in his normal mental state. All aware of the risk of recurrence of sepsis syndrome and death. Continuation of wound care and antibiotics likely futile; consulted Palliative Care. Patient considering AKA vs. Comfort care / Hospice.  Patient and his eldest Son now wish to proceed with AKA; Vascular Surgery notified.  Surgical date 02/26/18. Vascular Surgery requests that HD be done after surgery. 2 units PRBCs prepped.     Hypervolemia due to non-compliance with dialysis  Hyperkalemia, resolved  Shifted in ER with albuterol, insulin/destrose, bicarbonate. K 7.9 => 4  No respiratory compromise. Emergent dialysis 2/5/18 early AM and on 2/9  Withhold anti-hypertensives for volume removal.      DVT  BLE ultrasound  Apixaban 5 mg BID - ? Compliance at home  Apixaban held 2/12/2018 for probable BKA - plan to resume at discharge.  Remains on hold for possible AKA. Will need to resume when Ok with Vascular Surgery post-op.     DM II with HTN and CKD V  HgbA1c 4.6%  Likely controlled with declining intake  No further intervention needed at this time     Protein calorie malnutrtion  Hypoglycemia with associated NSVT  Poor prognosis for wound healing  Nepro with meals     Essential HTN - held home hydralazine 50 mg TID and losartan 50 mg daily. Blood pressure " fairly well-controlled during stay without antihypertensives.     DM II hyperlipidemia - atorvastatin 40 mg daily     DM II neuropathy - gabapentin 100 mg TID     ESRF HD TTS - Nephrology consulted for dialysis.  Withholding anti-hypertensives and added midodrine as needed prior to dialysis for volume removal and reduce symptomatic dialysis-associated hypotension.   BP improving.     CKD MBD - continue cinacalcet 30 mg qhs and sevelamer 1600 mg TID daily     Depression - continue citalopram 10 mg daily. Consulted psychiatry. Back to baseline.     Anticipated Disposition: Skilled Nursing Facility post-op   ESTUARDO: 2/28/2018    Goals of Care:  General Prognosis: fair if has AKA  Goals: Curative  Comfort Only: No  Hospice: No  Code Status: Full Code    Kanika Bradley MD   Department of Hospital Medicine

## 2018-02-25 NOTE — PLAN OF CARE
"Problem: Pain, Acute (Adult)  Intervention: Mutually Develop/Implement Acute Pain Management Plan   02/25/18 0116   Pain/Comfort/Sleep Interventions   Pain Management Interventions medication;pillow support provided;position adjusted;quiet environment facilitated   Cognitive Interventions   Sensory Stimulation Regulation quiet environment promoted       Patient alert and oriented and able to make needs known.  Patient c/.o R lower foot pain, with dressing changes daily and prn.  Patient   Rated pain 7/10 and received oxycodone IR 5 mg x 1 dose.  Patient repositioned and all needs addressed   POCT 112, no  SSI needed for coverage.  Patient HOB elevated and patent has agreed to have surgical procedure of R foot.   Patient stated, " I know God is going to take care of me and I still have my everton."   Call ight within reach, bed in low position and patient remains on 800 ml fluid restrictions and contact isolation for ESBL        "

## 2018-02-26 ENCOUNTER — ANESTHESIA (OUTPATIENT)
Dept: SURGERY | Facility: HOSPITAL | Age: 68
DRG: 853 | End: 2018-02-26
Payer: COMMERCIAL

## 2018-02-26 LAB
ALBUMIN SERPL BCP-MCNC: 1.7 G/DL
ANION GAP SERPL CALC-SCNC: 10 MMOL/L
BASOPHILS # BLD AUTO: 0.04 K/UL
BASOPHILS NFR BLD: 0.6 %
BUN SERPL-MCNC: 35 MG/DL
CALCIUM SERPL-MCNC: 8.2 MG/DL
CHLORIDE SERPL-SCNC: 104 MMOL/L
CO2 SERPL-SCNC: 21 MMOL/L
CREAT SERPL-MCNC: 4.8 MG/DL
DIFFERENTIAL METHOD: ABNORMAL
EOSINOPHIL # BLD AUTO: 0.3 K/UL
EOSINOPHIL NFR BLD: 3.7 %
ERYTHROCYTE [DISTWIDTH] IN BLOOD BY AUTOMATED COUNT: 16.7 %
EST. GFR  (AFRICAN AMERICAN): 13.4 ML/MIN/1.73 M^2
EST. GFR  (NON AFRICAN AMERICAN): 11.6 ML/MIN/1.73 M^2
GLUCOSE SERPL-MCNC: 60 MG/DL
HCT VFR BLD AUTO: 21.7 %
HGB BLD-MCNC: 6.6 G/DL
IMM GRANULOCYTES # BLD AUTO: 0.03 K/UL
IMM GRANULOCYTES NFR BLD AUTO: 0.4 %
LYMPHOCYTES # BLD AUTO: 1.2 K/UL
LYMPHOCYTES NFR BLD: 17.4 %
MAGNESIUM SERPL-MCNC: 2.2 MG/DL
MCH RBC QN AUTO: 27.8 PG
MCHC RBC AUTO-ENTMCNC: 30.4 G/DL
MCV RBC AUTO: 92 FL
MONOCYTES # BLD AUTO: 0.9 K/UL
MONOCYTES NFR BLD: 13.1 %
NEUTROPHILS # BLD AUTO: 4.6 K/UL
NEUTROPHILS NFR BLD: 64.8 %
NRBC BLD-RTO: 0 /100 WBC
PHOSPHATE SERPL-MCNC: 4.5 MG/DL
PLATELET # BLD AUTO: 138 K/UL
PMV BLD AUTO: 10.2 FL
POCT GLUCOSE: 69 MG/DL (ref 70–110)
POCT GLUCOSE: 84 MG/DL (ref 70–110)
POCT GLUCOSE: 97 MG/DL (ref 70–110)
POTASSIUM SERPL-SCNC: 5.1 MMOL/L
RBC # BLD AUTO: 2.37 M/UL
SODIUM SERPL-SCNC: 135 MMOL/L
WBC # BLD AUTO: 7.11 K/UL

## 2018-02-26 PROCEDURE — 99232 SBSQ HOSP IP/OBS MODERATE 35: CPT | Mod: 57,,, | Performed by: SURGERY

## 2018-02-26 PROCEDURE — 63600175 PHARM REV CODE 636 W HCPCS: Performed by: INTERNAL MEDICINE

## 2018-02-26 PROCEDURE — 80069 RENAL FUNCTION PANEL: CPT

## 2018-02-26 PROCEDURE — P9016 RBC LEUKOCYTES REDUCED: HCPCS

## 2018-02-26 PROCEDURE — 25000003 PHARM REV CODE 250: Performed by: INTERNAL MEDICINE

## 2018-02-26 PROCEDURE — 90935 HEMODIALYSIS ONE EVALUATION: CPT

## 2018-02-26 PROCEDURE — 90935 HEMODIALYSIS ONE EVALUATION: CPT | Mod: ,,, | Performed by: INTERNAL MEDICINE

## 2018-02-26 PROCEDURE — 11000001 HC ACUTE MED/SURG PRIVATE ROOM

## 2018-02-26 PROCEDURE — 27000207 HC ISOLATION

## 2018-02-26 PROCEDURE — 99231 SBSQ HOSP IP/OBS SF/LOW 25: CPT | Mod: ,,, | Performed by: INTERNAL MEDICINE

## 2018-02-26 PROCEDURE — 36415 COLL VENOUS BLD VENIPUNCTURE: CPT

## 2018-02-26 PROCEDURE — 25000003 PHARM REV CODE 250: Performed by: NURSE PRACTITIONER

## 2018-02-26 PROCEDURE — 25000003 PHARM REV CODE 250: Performed by: PHYSICIAN ASSISTANT

## 2018-02-26 PROCEDURE — 85025 COMPLETE CBC W/AUTO DIFF WBC: CPT

## 2018-02-26 PROCEDURE — 83735 ASSAY OF MAGNESIUM: CPT

## 2018-02-26 RX ORDER — HYDROCODONE BITARTRATE AND ACETAMINOPHEN 500; 5 MG/1; MG/1
TABLET ORAL
Status: DISCONTINUED | OUTPATIENT
Start: 2018-02-26 | End: 2018-03-06 | Stop reason: HOSPADM

## 2018-02-26 RX ADMIN — STANDARDIZED SENNA CONCENTRATE AND DOCUSATE SODIUM 2 TABLET: 8.6; 5 TABLET, FILM COATED ORAL at 07:02

## 2018-02-26 RX ADMIN — GABAPENTIN 100 MG: 100 CAPSULE ORAL at 05:02

## 2018-02-26 RX ADMIN — SEVELAMER CARBONATE 800 MG: 800 TABLET, FILM COATED ORAL at 09:02

## 2018-02-26 RX ADMIN — OXYCODONE HYDROCHLORIDE 5 MG: 5 TABLET ORAL at 03:02

## 2018-02-26 RX ADMIN — GABAPENTIN 100 MG: 100 CAPSULE ORAL at 09:02

## 2018-02-26 RX ADMIN — ATORVASTATIN CALCIUM 20 MG: 20 TABLET, FILM COATED ORAL at 07:02

## 2018-02-26 RX ADMIN — CINACALCET HYDROCHLORIDE 30 MG: 30 TABLET, COATED ORAL at 09:02

## 2018-02-26 RX ADMIN — Medication: at 09:02

## 2018-02-26 RX ADMIN — GABAPENTIN 100 MG: 100 CAPSULE ORAL at 03:02

## 2018-02-26 RX ADMIN — MIDODRINE HYDROCHLORIDE 10 MG: 5 TABLET ORAL at 07:02

## 2018-02-26 RX ADMIN — ASPIRIN 81 MG: 81 TABLET, COATED ORAL at 07:02

## 2018-02-26 RX ADMIN — STANDARDIZED SENNA CONCENTRATE AND DOCUSATE SODIUM 2 TABLET: 8.6; 5 TABLET, FILM COATED ORAL at 09:02

## 2018-02-26 RX ADMIN — MICONAZOLE NITRATE: 20 OINTMENT TOPICAL at 03:02

## 2018-02-26 RX ADMIN — MICONAZOLE NITRATE: 20 OINTMENT TOPICAL at 09:02

## 2018-02-26 RX ADMIN — SODIUM CHLORIDE: 0.9 INJECTION, SOLUTION INTRAVENOUS at 09:02

## 2018-02-26 RX ADMIN — SEVELAMER CARBONATE 1600 MG: 800 TABLET, FILM COATED ORAL at 03:02

## 2018-02-26 RX ADMIN — ACETAMINOPHEN 650 MG: 325 TABLET, FILM COATED ORAL at 11:02

## 2018-02-26 RX ADMIN — HEPARIN SODIUM 5000 UNITS: 5000 INJECTION, SOLUTION INTRAVENOUS; SUBCUTANEOUS at 09:02

## 2018-02-26 RX ADMIN — CITALOPRAM HYDROBROMIDE 10 MG: 10 TABLET ORAL at 07:02

## 2018-02-26 RX ADMIN — CIPROFLOXACIN HYDROCHLORIDE 500 MG: 250 TABLET, FILM COATED ORAL at 07:02

## 2018-02-26 RX ADMIN — OXYCODONE HYDROCHLORIDE 5 MG: 5 TABLET ORAL at 09:02

## 2018-02-26 RX ADMIN — Medication 16 G: at 08:02

## 2018-02-26 NOTE — PROGRESS NOTES
Pt has chosen to have AKA. Surgery will be tomorrow. Pal care will sign off at this time. Please re-consult if plan of care changes.     Kristal CALZADA, APRN, [facility]  [phone]

## 2018-02-26 NOTE — PROGRESS NOTES
[facility]  Nephrology  Progress Note    Patient Name: Wilder Carbajal  MRN: [id number]  Admission Date: 2/4/2018  Hospital Length of Stay: 22 days  Attending Provider: Kanika Bradley MD   Primary Care Physician: Dio Roberson MD  Principal Problem:Septic encephalopathy    Subjective:     HPI: Wilder Carbajal is a 67 year old male with ESRd on HD (TTS) presents with leg swelling for two weeks. He states he has had associated blisters on his legs with fluid weeping out of them. During hospital presented diagnosis of osteomyelitis of right foot. He underwent washout 2/6 and another washout 2/6 and amputation of 4th and 5th digits. Undergoing treatment course of antibiotics. Nephrology was consulted for inpatient dialysis treatment.iHD TTS at [facility], duration of 4h 15 min, , Left AVF.     Interval History:   Patient evaluated at bedside in the [facility], no significant event overnight. No respiratory distress. Blood pressures have been stable, continues with asymptomatic bradycardia.    Review of patient's allergies indicates:  No Known Allergies  Current Facility-Administered Medications   Medication Frequency    0.9%  NaCl infusion PRN    0.9%  NaCl infusion Once    acetaminophen tablet 650 mg Q6H PRN    albumin human 25% bottle 12.5 g PRN    albumin human 25% bottle 25 g PRN    albuterol-ipratropium 2.5mg-0.5mg/3mL nebulizer solution 3 mL Q4H PRN    ammonium lactate 12 % lotion BID    aspirin EC tablet 81 mg Daily    atorvastatin tablet 20 mg Daily    cinacalcet tablet 30 mg QHS    ciprofloxacin HCl tablet 500 mg Daily    citalopram tablet 10 mg Daily    dextrose 50% injection 12.5 g PRN    dextrose 50% injection 25 g PRN    gabapentin capsule 100 mg TID    glucagon (human recombinant) injection 1 mg PRN    glucose chewable tablet 16 g PRN    glucose chewable tablet 24 g PRN    heparin (porcine) injection 5,000 Units Q8H    miconazole nitrate 2% ointment BID     midodrine tablet 10 mg Every Mon, Wed, Fri    ondansetron disintegrating tablet 8 mg Q8H PRN    oxyCODONE immediate release tablet 5 mg Q6H PRN    senna-docusate 8.6-50 mg per tablet 2 tablet BID    sevelamer carbonate tablet 1,600 mg TID WM    sevelamer carbonate tablet 800 mg QHS    sodium chloride 0.9% flush 5 mL PRN       Objective:     Vital Signs (Most Recent):  Temp: 97.4 °F (36.3 °C) (02/26/18 0733)  Pulse: (!) 48 (02/26/18 0801)  Resp: 14 (02/26/18 0733)  BP: (!) 119/59 (02/26/18 0733)  SpO2: (!) 94 % (02/26/18 0733)  O2 Device (Oxygen Therapy): nasal cannula (02/26/18 0733) Vital Signs (24h Range):  Temp:  [97.4 °F (36.3 °C)-98 °F (36.7 °C)] 97.4 °F (36.3 °C)  Pulse:  [47-68] 48  Resp:  [14-18] 14  SpO2:  [94 %-100 %] 94 %  BP: (119-147)/(59-72) 119/59     Weight: 104 kg (229 lb 4.5 oz) (02/25/18 1717)  Body mass index is 34.86 kg/m².  Body surface area is 2.23 meters squared.    I/O last 3 completed shifts:  In: 1200 [P.O.:1200]  Out: -     Physical Exam   Constitutional: He is oriented to person, place, and time. He appears well-developed and well-nourished. No distress.   Cardiovascular: Normal rate and regular rhythm.    Murmur heard.  Sacral edema    Pulmonary/Chest: Effort normal and breath sounds normal. No respiratory distress.   Abdominal: Soft. Bowel sounds are normal. He exhibits no distension.   Musculoskeletal: He exhibits no edema.   Neurological: He is alert and oriented to person, place, and time.   Skin: Skin is warm and dry.   Psychiatric: He has a normal mood and affect. His behavior is normal.       Significant Labs:  ABGs: No results for input(s): PH, PCO2, HCO3, POCSATURATED, BE in the last 168 hours.  BMP:   Recent Labs  Lab 02/26/18  0433   GLU 60*      CO2 21*   BUN 35*   CREATININE 4.8*   CALCIUM 8.2*   MG 2.2     CBC:   Recent Labs  Lab 02/26/18 0434   WBC 7.11   RBC 2.37*   HGB 6.6*   HCT 21.7*   *   MCV 92   MCH 27.8   MCHC 30.4*     CMP:   Recent Labs  Lab  02/25/18  0951 02/26/18  0433   GLU 87 60*   CALCIUM 8.5* 8.2*   ALBUMIN 2.0* 1.7*   PROT 7.6  --    * 135*   K 4.6 5.1   CO2 23 21*    104   BUN 30* 35*   CREATININE 4.5* 4.8*   ALKPHOS 101  --    ALT 10  --    AST 15  --    BILITOT 0.7  --      All labs within the past 24 hours have been reviewed.     Assessment/Plan:     ESRD (end stage renal disease)    Wilder Carbajal is a 67 year old male with ESRD on Hd. [facility]HD TTS at [facility], duration of 4h 15 min, , Left AVF.    Plan:  - Patient will have dialysis today for clearance and volume removal, duration of 3.5 hrs, UF 2-3 L as tolerated by patient, maintain MAP>65, bath will be adjusted to chem.  - Blood pressures stable today  - Mineral bone disease: continue with phosphate binders, continue with cinacalcet  - Diet: Renal (low sodium, low phosphate and low potassium)  - Continue with daily weight               - Decrease in Hgb will require blood transfusion since patient will mostly have AKA today or tomorrow.       Luther Martinez  Nephrology  Fellow  [facility]    Pager [phone]   I have reviewed and concur with the fellow's history, physical, assessment, and plan. I have personally interviewed and examined the patient at bedside.

## 2018-02-26 NOTE — ASSESSMENT & PLAN NOTE
66 yo M with h/o ESRD on HD (TThS) via L RC AVF (with interposition graft), HTN, HLD, DM 2( Hgb A1c 4.7), HFrEF (45% 4/2017)  presenting with fluid overload after missing dialysis with evidence of right diabetic foot ulceration with wet gangrene s/p 4-5th ray amputations.    -Unlikely for the patient to heal the wound despite aggressive limb salvage efforts. Due to significant lipodermatosclerosis, BKA is not good option. Patient likely needs AKA.   -Patient and son agree to AKA, plan for AKA possible today or tomorrow.  Keep NPO  -Continue ASA, statin  Vascular surgery to follow

## 2018-02-26 NOTE — PROGRESS NOTES
Vascular Surgery Staff  I have reviewed the patients history, physical exam, pertinent labs and imaging. I agree with the management plan as outlined in the resident note.  Surgery will be rescheduled for tomorrow to accomidate Mr Carbajal's dialysis today.    Discussed with Dr Bradley.    Liz Zhou MD FACS [facility]  Vascular & Endovascular Surgery

## 2018-02-26 NOTE — PROGRESS NOTES
Progress Note   Hospital Medicine         Team: [facility] MED D Kanika Bradley MD  Admit Date: 2/4/2018  Length of Stay:  LOS: 22 days   ESTUARDO 3/2/2018  Principal Problem: Septic encephalopathy    Interval history / overnight events: Patient with no events overnight, no new complaints.    Areas of concern / handoff: 1 unit PRBCs tonight in prep for surgery 2/27/18    Review of Systems   Constitutional: Negative for fever.   Respiratory: Negative for shortness of breath.    Cardiovascular: Negative for chest pain.       Temp: 97.4 °F (36.3 °C) (02/26/18 1300)  Pulse: (!) 57 (02/26/18 1505)  Resp: 14 (02/26/18 0855)  BP: 124/61 (02/26/18 1300)  SpO2: (!) 94 % (02/26/18 0733)     Temp:  [97.4 °F (36.3 °C)-98 °F (36.7 °C)]   Pulse:  [46-68]   Resp:  [14-18]   BP: (101-143)/(52-72)   SpO2:  [94 %-96 %]     Intake/Output Summary (Last 24 hours) at 02/26/18 1614  Last data filed at 02/26/18 1245   Gross per 24 hour   Intake             1130 ml   Output             3050 ml   Net            -1920 ml     Weight: 104 kg (229 lb 4.5 oz)   Body mass index is 34.86 kg/m².    Physical Exam   Constitutional: No distress.   HENT:   Head: Normocephalic.   Eyes: Conjunctivae and lids are normal. Pupils are equal.   Neck: Neck supple.   Cardiovascular: Normal rate, regular rhythm, S1 normal and S2 normal.    Pulmonary/Chest: Effort normal and breath sounds normal.   Abdominal: Soft. Normal appearance and bowel sounds are normal. There is no tenderness.   Musculoskeletal: He exhibits edema.        Right foot: There is swelling and deformity (necrosis and gangrene).   Neurological: He is alert. He is not disoriented.   Skin: He is not diaphoretic.   Psychiatric: Mood and affect normal.     Data Review: Results recorded below were reviewed 2/26/2018.    Significant Labs:     Recent Labs  Lab 02/20/18  0435 02/21/18  0535 02/22/18  0419 02/23/18  0830 02/25/18  0951 02/26/18  0434   WBC 9.43 8.92 8.82 8.16 7.27 7.11   HGB 7.0* 7.0* 7.2*  "7.1* 7.5* 6.6*   HCT 21.7* 22.3* 23.1* 22.2* 24.4* 21.7*    174 182 165 157 138*       Recent Labs  Lab 02/23/18  0830 02/25/18  0951 02/26/18  0433   * 135* 135*   K 4.8 4.6 5.1    103 104   CO2 21* 23 21*   BUN 33* 30* 35*   CREATININE 4.5* 4.5* 4.8*   GLU 68* 87 60*   CALCIUM 8.2* 8.5* 8.2*   MG 2.4 2.4 2.2   PHOS 4.2 4.1 4.5       Recent Labs  Lab 02/23/18  0830 02/25/18  0951 02/26/18  0433   ALKPHOS  --  101  --    ALT  --  10  --    AST  --  15  --    ALBUMIN 1.8* 2.0* 1.7*   PROT  --  7.6  --    BILITOT  --  0.7  --    INR  --  1.3*  --      Recent Labs      02/24/18   2057  02/25/18   0731  02/25/18   1219  02/25/18   1743  02/25/18   2215  02/26/18   0802  02/26/18   0952  02/26/18   1538   POCTGLUCOSE  112*  85  85  110  99  69*  84  97     A1C:     Recent Labs  Lab 01/07/18  0008   HGBA1C 4.6     TSH:     Recent Labs  Lab 02/08/18  0509   TSH 3.536     Assessment and Plan     Overview: "68 yo here for osteomyelitis of right foot. He underwent washout 2/6 and another washout 2/6 and amputation of 4th and 5th digits." Underwent treatment course of antibiotics without improvement due to vascular compromise. Plan for AKA 2/27/18.    Current Hospital Problem List:    Active Hospital Problems    Diagnosis  POA    *Septic encephalopathy [G93.41]  Yes    Gangrene of right foot [I96]  Yes    Palliative care encounter [Z51.5]  Not Applicable    Foot ulceration, left, with unspecified severity [L97.529]  Unknown    PVD (peripheral vascular disease) [I73.9]  Unknown    Pyogenic inflammation of bone [M86.9]  Yes    Diabetic foot ulcer [E11.621, L97.509]  Yes    Foot ulceration, right, with fat layer exposed [L97.512]  Yes    Acute delirium [R41.0]  Yes    Protein-calorie malnutrition, severe [E43]  Yes    DVT (deep venous thrombosis) [I82.409]  Yes    DM type 2 with diabetic mixed hyperlipidemia [E11.69, E78.2]  Yes    Diabetic polyneuropathy associated with type 2 diabetes mellitus " [E11.42]  Yes    Chronic kidney disease-mineral and bone disorder [N18.9, E83.9, M89.9]  Yes    Depression [F32.9]  Yes    Alteration in skin integrity [R23.9]  Yes    Infected skin ulcer with fat layer exposed [L98.492, L08.9]  Yes    Hyperkalemia [E87.5]  Yes    Hypervolemia [E87.70]  Yes    Type 2 diabetes mellitus with stage 5 chronic kidney disease and hypertension [E11.22, I12.0, N18.5]  Yes    ESRD (end stage renal disease) [N18.6]  Yes    Essential hypertension [I10]  Yes      Resolved Hospital Problems    Diagnosis Date Resolved POA   No resolved problems to display.       Medications for management of current problems:      ammonium lactate   Topical (Top) BID    aspirin  81 mg Oral Daily    atorvastatin  20 mg Oral Daily    cinacalcet  30 mg Oral QHS    ciprofloxacin HCl  500 mg Oral Daily    citalopram  10 mg Oral Daily    gabapentin  100 mg Oral TID    heparin (porcine)  5,000 Units Subcutaneous Q8H    miconazole nitrate 2%   Topical (Top) BID    midodrine  10 mg Oral Every Mon, Wed, Fri    senna-docusate 8.6-50 mg  2 tablet Oral BID    sevelamer carbonate  1,600 mg Oral TID WM    sevelamer carbonate  800 mg Oral QHS     PRN:   sodium chloride 0.9%    acetaminophen    albumin human 25%    albumin human 25%    albuterol-ipratropium 2.5mg-0.5mg/3mL    dextrose 50%    dextrose 50%    glucagon (human recombinant)    glucose    glucose    ondansetron    oxyCODONE    sodium chloride 0.9%     Anticoagulants   Medication Route Frequency    heparin (porcine) injection 5,000 Units Subcutaneous Q8H     IV Fluids: none    Problems Addressed 2/26/2018:    Septic encephalopathy, resolved  Patient initially lacked capacity to refuse surgery per psychiatry, consent obtained from his son as proxy. Mental status has improved significantly. He now agrees to to right AKA; son in agreement as well.     Infected right dorsal foot, ischemic ulceration  Podiatry consulted - s/p I&D and 4th  "and 5th amputation  Vascular surgery consulted - initially no intervention required  Continued vancomycin 1 g IV per random levels and Zosyn 2.25 g IV BID  Wound culture revealed pan-sensitive CITROBACTER KOSERI and KLEBSIELLA PNEUMONIAE ESBL  To OR 02/09/18. Consulted ID.  Planned for wound vac and 6 weeks ciprofloxacin. ABIs ordered to rule out need for BKA. Angiogram considered. Per vascular surgery " Unlikely for the patient to heal the wound despite aggressive limb salvage efforts. Due to significant lipodermatosclerosis, BKA is not good option. Patient likely needs AKA." Patient refused AKA. One Son and Brother in agreement with patient as this aligns with his preference when in his normal mental state. All aware of the risk of recurrence of sepsis syndrome and death.   Continuation of wound care and antibiotics likely futile; consulted Palliative Care. Patient considering AKA vs. Comfort care / Hospice.  Patient and his eldest Son now wish to proceed with AKA; Vascular Surgery notified.  Surgical date initially 2/26/18. Vascular Surgery requested that HD be done after surgery. 2 units PRBCs prepped. HD started before patient taken to OR. Rescheduled for 2/27/18. Transfuse 1 unit PRBC after HD 2/26/2018.     Hypervolemia due to non-compliance with dialysis  Hyperkalemia, resolved  Shifted in ER with albuterol, insulin/destrose, bicarbonate. K 7.9 => 4  No respiratory compromise. Emergent dialysis 2/5/18 early AM and on 2/9  Withhold anti-hypertensives for volume removal.      DVT  BLE ultrasound  Apixaban 5 mg BID - ? Compliance at home  Apixaban held 2/12/2018 for probable BKA - plan to resume at discharge.  Remains on hold for AKA. Will need to resume when OK with Vascular Surgery post-op.     DM II with HTN and CKD V  HgbA1c 4.6%  Likely controlled with declining intake  No further intervention needed at this time     Protein calorie malnutrtion  Hypoglycemia with associated NSVT  Poor prognosis for wound " healing  Nepro with meals     Essential HTN - held home hydralazine 50 mg TID and losartan 50 mg daily. Blood pressure fairly well-controlled during stay without antihypertensives.     DM II hyperlipidemia - atorvastatin 40 mg daily     DM II neuropathy - gabapentin 100 mg TID     ESRF HD TTS - Nephrology consulted for dialysis.  Withholding anti-hypertensives and added midodrine as needed prior to dialysis for volume removal and reduce symptomatic dialysis-associated hypotension.   BP improving.     CKD MBD - continue cinacalcet 30 mg qhs and sevelamer 1600 mg TID daily     Depression - continue citalopram 10 mg daily. Consulted psychiatry. Back to baseline.     Anticipated Disposition: Skilled Nursing Facility post-op   ESTUARDO: 3/2/2018    Goals of Care:  General Prognosis: fair   Goals: Curative  Comfort Only: No  Hospice: No  Code Status: Full Code    Kanika Bradley MD   Department of Hospital Medicine

## 2018-02-26 NOTE — PLAN OF CARE
Updated Yahaira at [facility] with plan for surgery today.  Will f/u on tomorrow.    Kristy Mallory RN  Case Management  [phone]

## 2018-02-26 NOTE — SUBJECTIVE & OBJECTIVE
Medications:  Continuous Infusions:  Scheduled Meds:   sodium chloride 0.9%   Intravenous Once    ammonium lactate   Topical (Top) BID    aspirin  81 mg Oral Daily    atorvastatin  20 mg Oral Daily    cinacalcet  30 mg Oral QHS    ciprofloxacin HCl  500 mg Oral Daily    citalopram  10 mg Oral Daily    gabapentin  100 mg Oral TID    heparin (porcine)  5,000 Units Subcutaneous Q8H    miconazole nitrate 2%   Topical (Top) BID    midodrine  10 mg Oral Every Mon, Wed, Fri    senna-docusate 8.6-50 mg  2 tablet Oral BID    sevelamer carbonate  1,600 mg Oral TID WM    sevelamer carbonate  800 mg Oral QHS     PRN Meds:sodium chloride 0.9%, acetaminophen, albumin human 25%, albumin human 25%, albuterol-ipratropium 2.5mg-0.5mg/3mL, dextrose 50%, dextrose 50%, glucagon (human recombinant), glucose, glucose, ondansetron, oxyCODONE, sodium chloride 0.9%     Objective:     Vital Signs (Most Recent):  Temp: 97.4 °F (36.3 °C) (02/26/18 0733)  Pulse: (!) 52 (02/26/18 0733)  Resp: 14 (02/26/18 0733)  BP: (!) 119/59 (02/26/18 0733)  SpO2: (!) 94 % (02/26/18 0733) Vital Signs (24h Range):  Temp:  [97.4 °F (36.3 °C)-98 °F (36.7 °C)] 97.4 °F (36.3 °C)  Pulse:  [47-68] 52  Resp:  [14-18] 14  SpO2:  [94 %-100 %] 94 %  BP: (119-147)/(59-72) 119/59          Physical Exam   Constitutional: No distress.   Cardiovascular: Normal rate.    Femoral 2+ bilaterally  No palpable distal pulses  Right foot s/p 4-5 ray amp with exposed bone and open wound, dry gangrene, heel ulcer also dry gangrene   Pulmonary/Chest: Effort normal. No respiratory distress.   Abdominal: Soft. He exhibits no distension.   Musculoskeletal: Normal range of motion.   Venous stasis skin changes to thigh bilaterally   Skin: Skin is warm and dry.       Significant Labs:  CBC:     Recent Labs  Lab 02/26/18  0434   WBC 7.11   RBC 2.37*   HGB 6.6*   HCT 21.7*   *   MCV 92   MCH 27.8   MCHC 30.4*     CMP:     Recent Labs  Lab 02/25/18  0951 02/26/18  0432    GLU 87 60*   CALCIUM 8.5* 8.2*   ALBUMIN 2.0* 1.7*   PROT 7.6  --    * 135*   K 4.6 5.1   CO2 23 21*    104   BUN 30* 35*   CREATININE 4.5* 4.8*   ALKPHOS 101  --    ALT 10  --    AST 15  --    BILITOT 0.7  --      Coagulation:     Recent Labs  Lab 02/25/18  0951   LABPROT 13.3*   INR 1.3*   APTT 27.9       Significant Diagnostics:

## 2018-02-26 NOTE — PROGRESS NOTES
[facility]  Vascular Surgery  Progress Note    Patient Name: Wilder Carbajal  MRN: [id number]  Admission Date: 2/4/2018  Primary Care Provider: Dio Roberson MD    Subjective:     Interval History: Patient Seen at bedside.  Patient NPO for AKA today.  Afebrile, no complaints      Post-Op Info:  Procedure(s) (LRB):  AMPUTATION-TOES 4TH AND 5TH, I&D (Right)   17 Days Post-Op       Medications:  Continuous Infusions:  Scheduled Meds:   sodium chloride 0.9%   Intravenous Once    ammonium lactate   Topical (Top) BID    aspirin  81 mg Oral Daily    atorvastatin  20 mg Oral Daily    cinacalcet  30 mg Oral QHS    ciprofloxacin HCl  500 mg Oral Daily    citalopram  10 mg Oral Daily    gabapentin  100 mg Oral TID    heparin (porcine)  5,000 Units Subcutaneous Q8H    miconazole nitrate 2%   Topical (Top) BID    midodrine  10 mg Oral Every Mon, Wed, Fri    senna-docusate 8.6-50 mg  2 tablet Oral BID    sevelamer carbonate  1,600 mg Oral TID WM    sevelamer carbonate  800 mg Oral QHS     PRN Meds:sodium chloride 0.9%, acetaminophen, albumin human 25%, albumin human 25%, albuterol-ipratropium 2.5mg-0.5mg/3mL, dextrose 50%, dextrose 50%, glucagon (human recombinant), glucose, glucose, ondansetron, oxyCODONE, sodium chloride 0.9%     Objective:     Vital Signs (Most Recent):  Temp: 97.4 °F (36.3 °C) (02/26/18 0733)  Pulse: (!) 52 (02/26/18 0733)  Resp: 14 (02/26/18 0733)  BP: (!) 119/59 (02/26/18 0733)  SpO2: (!) 94 % (02/26/18 0733) Vital Signs (24h Range):  Temp:  [97.4 °F (36.3 °C)-98 °F (36.7 °C)] 97.4 °F (36.3 °C)  Pulse:  [47-68] 52  Resp:  [14-18] 14  SpO2:  [94 %-100 %] 94 %  BP: (119-147)/(59-72) 119/59          Physical Exam   Constitutional: No distress.   Cardiovascular: Normal rate.    Femoral 2+ bilaterally  No palpable distal pulses  Right foot s/p 4-5 ray amp with exposed bone and open wound, dry gangrene, heel ulcer also dry gangrene   Pulmonary/Chest: Effort normal. No respiratory  distress.   Abdominal: Soft. He exhibits no distension.   Musculoskeletal: Normal range of motion.   Venous stasis skin changes to thigh bilaterally   Skin: Skin is warm and dry.       Significant Labs:  CBC:     Recent Labs  Lab 02/26/18  0434   WBC 7.11   RBC 2.37*   HGB 6.6*   HCT 21.7*   *   MCV 92   MCH 27.8   MCHC 30.4*     CMP:     Recent Labs  Lab 02/25/18  0951 02/26/18  0433   GLU 87 60*   CALCIUM 8.5* 8.2*   ALBUMIN 2.0* 1.7*   PROT 7.6  --    * 135*   K 4.6 5.1   CO2 23 21*    104   BUN 30* 35*   CREATININE 4.5* 4.8*   ALKPHOS 101  --    ALT 10  --    AST 15  --    BILITOT 0.7  --      Coagulation:     Recent Labs  Lab 02/25/18  0951   LABPROT 13.3*   INR 1.3*   APTT 27.9       Significant Diagnostics:      Assessment/Plan:     Foot ulceration, right, with fat layer exposed    68 yo M with h/o ESRD on HD (TThS) via L RC AVF (with interposition graft), HTN, HLD, DM 2( Hgb A1c 4.7), HFrEF (45% 4/2017)  presenting with fluid overload after missing dialysis with evidence of right diabetic foot ulceration with wet gangrene s/p 4-5th ray amputations.    -Unlikely for the patient to heal the wound despite aggressive limb salvage efforts. Due to significant lipodermatosclerosis, BKA is not good option. Patient likely needs AKA.   -Patient and son agree to AKA, plan for AKA possible today or tomorrow.  Keep NPO  -Continue ASA, statin  Vascular surgery to follow                  George Escamilla MD  Vascular Surgery  [facility]

## 2018-02-26 NOTE — PT/OT/SLP PROGRESS
Occupational Therapy      Patient Name:  Wilder Carbajal   MRN:  [id number]    Patient not seen today secondary to Dialysis. Will follow-up tomorrow.    MARIA ESTHER Hernandez  2/26/2018

## 2018-02-26 NOTE — PROGRESS NOTES
Notified by Génesis in the O.R. that surgery is cancelled today. Pt can go back to room after dialysis.

## 2018-02-26 NOTE — SUBJECTIVE & OBJECTIVE
Interval History:   Patient evaluated at bedside in the [facility], no significant event overnight. No respiratory distress. Blood pressures have been stable, continues with asymptomatic bradycardia.    Review of patient's allergies indicates:  No Known Allergies  Current Facility-Administered Medications   Medication Frequency    0.9%  NaCl infusion PRN    0.9%  NaCl infusion Once    acetaminophen tablet 650 mg Q6H PRN    albumin human 25% bottle 12.5 g PRN    albumin human 25% bottle 25 g PRN    albuterol-ipratropium 2.5mg-0.5mg/3mL nebulizer solution 3 mL Q4H PRN    ammonium lactate 12 % lotion BID    aspirin EC tablet 81 mg Daily    atorvastatin tablet 20 mg Daily    cinacalcet tablet 30 mg QHS    ciprofloxacin HCl tablet 500 mg Daily    citalopram tablet 10 mg Daily    dextrose 50% injection 12.5 g PRN    dextrose 50% injection 25 g PRN    gabapentin capsule 100 mg TID    glucagon (human recombinant) injection 1 mg PRN    glucose chewable tablet 16 g PRN    glucose chewable tablet 24 g PRN    heparin (porcine) injection 5,000 Units Q8H    miconazole nitrate 2% ointment BID    midodrine tablet 10 mg Every Mon, Wed, Fri    ondansetron disintegrating tablet 8 mg Q8H PRN    oxyCODONE immediate release tablet 5 mg Q6H PRN    senna-docusate 8.6-50 mg per tablet 2 tablet BID    sevelamer carbonate tablet 1,600 mg TID WM    sevelamer carbonate tablet 800 mg QHS    sodium chloride 0.9% flush 5 mL PRN       Objective:     Vital Signs (Most Recent):  Temp: 97.4 °F (36.3 °C) (02/26/18 0733)  Pulse: (!) 48 (02/26/18 0801)  Resp: 14 (02/26/18 0733)  BP: (!) 119/59 (02/26/18 0733)  SpO2: (!) 94 % (02/26/18 0733)  O2 Device (Oxygen Therapy): nasal cannula (02/26/18 0733) Vital Signs (24h Range):  Temp:  [97.4 °F (36.3 °C)-98 °F (36.7 °C)] 97.4 °F (36.3 °C)  Pulse:  [47-68] 48  Resp:  [14-18] 14  SpO2:  [94 %-100 %] 94 %  BP: (119-147)/(59-72) 119/59     Weight: 104 kg (229 lb 4.5 oz) (02/25/18 7647)  Body  mass index is 34.86 kg/m².  Body surface area is 2.23 meters squared.    I/O last 3 completed shifts:  In: 1200 [P.O.:1200]  Out: -     Physical Exam   Constitutional: He is oriented to person, place, and time. He appears well-developed and well-nourished. No distress.   Cardiovascular: Normal rate and regular rhythm.    Murmur heard.  Sacral edema    Pulmonary/Chest: Effort normal and breath sounds normal. No respiratory distress.   Abdominal: Soft. Bowel sounds are normal. He exhibits no distension.   Musculoskeletal: He exhibits no edema.   Neurological: He is alert and oriented to person, place, and time.   Skin: Skin is warm and dry.   Psychiatric: He has a normal mood and affect. His behavior is normal.       Significant Labs:  ABGs: No results for input(s): PH, PCO2, HCO3, POCSATURATED, BE in the last 168 hours.  BMP:   Recent Labs  Lab 02/26/18  0433   GLU 60*      CO2 21*   BUN 35*   CREATININE 4.8*   CALCIUM 8.2*   MG 2.2     CBC:   Recent Labs  Lab 02/26/18  0434   WBC 7.11   RBC 2.37*   HGB 6.6*   HCT 21.7*   *   MCV 92   MCH 27.8   MCHC 30.4*     CMP:   Recent Labs  Lab 02/25/18  0951 02/26/18  0433   GLU 87 60*   CALCIUM 8.5* 8.2*   ALBUMIN 2.0* 1.7*   PROT 7.6  --    * 135*   K 4.6 5.1   CO2 23 21*    104   BUN 30* 35*   CREATININE 4.5* 4.8*   ALKPHOS 101  --    ALT 10  --    AST 15  --    BILITOT 0.7  --      All labs within the past 24 hours have been reviewed.

## 2018-02-26 NOTE — ANESTHESIA PREPROCEDURE EVALUATION
[facility]  Anesthesia Pre-Operative Evaluation         Patient Name: Wilder Carbajal  YOB: 1950  MRN: [id number]    SUBJECTIVE:     Pre-operative evaluation for Procedure(s) (LRB):  AMPUTATION-ABOVE KNEE (Right)  Scheduled for 2/26/2018    HPI 02/25/2018:  Wilder Carbajal is a 67 y.o. male with hx of ESRD on HD (TThS) via L RC AVF (with interposition graft), HTN, HLD, DM2 ( Hgb A1c 4.7), afib, GERD, depression, HFrEF (45% 4/2017)  presenting with fluid overload after missing dialysis. Found to have evidence of right diabetic foot ulceration with wet gangrene. S/p amputation of 4th and 5th toes on 2/9/18.    2/25/2018 2345: Consent signed via phone consent with son, Wilder Carbajal III.    Patient presents for the above procedure(s).    Prev airway:   No prior records in Epic or Legacy Documents.    Oxygen/Ventilation Requirements:  On NC 1.5L/min  Oxygen Concentration (%):  [28] 28    Current LDA:   PIV R forearm 20G       Peripheral IV - Single Lumen 01/07/18 0009 Right Forearm (Active)   Number of days: 49            Peripheral IV - Single Lumen 02/24/18 1115 Right Forearm (Active)   Site Assessment Dry;Intact 2/25/2018  8:04 PM   Line Status Saline locked 2/25/2018  8:04 PM   Dressing Status Clean;Dry;Intact 2/25/2018  8:00 AM   Dressing Intervention New dressing 2/24/2018 12:00 PM   Dressing Change Due 02/28/18 2/25/2018  8:04 PM   Site Change Due 02/28/18 2/25/2018  8:04 PM   Number of days: 1            Hemodialysis AV Fistula Left upper arm (Active)   Needle Size 15ga 2/23/2018 11:45 AM   Site Assessment Intact;Dry 2/25/2018  8:04 PM   Patency Present;Thrill;Bruit 2/25/2018  8:04 PM   Status Deaccessed 2/25/2018  8:00 AM   Flows Good 2/19/2018 12:30 PM   Dressing Intervention New dressing 2/19/2018 12:30 PM   Dressing Status Clean;Dry;Intact 2/24/2018  9:08 AM   Site Condition No complications 2/25/2018  8:00 AM   Dressing Gauze 2/19/2018  9:19 PM   Number of days:         Current Drips:  None documented.      Patient Active Problem List   Diagnosis    Traction detachment of both retinas    Diabetic macular edema of both eyes    NS (nuclear sclerosis)    Hypertensive retinopathy of both eyes    History of excision of epidermal inclusion cyst    Proliferative diabetic retinopathy of both eyes without macular edema associated with type 2 diabetes mellitus    Proliferative diabetic retinopathy, both eyes    Elevated PSA    Malignant hypertension    HLD (hyperlipidemia)    DM II (diabetes mellitus, type II), controlled    Essential hypertension    Hypothyroidism    GERD (gastroesophageal reflux disease)    Pseudoaneurysm of arteriovenous graft    Lumbar facet arthropathy    Renovascular hypertension, malignant    Lipoma    Type 2 diabetes mellitus with stage 5 chronic kidney disease and hypertension    ESRD (end stage renal disease)    Diabetes mellitus with ESRD (end-stage renal disease)    Lumbar spondylosis    Spinal stenosis of lumbar region    Bilateral low back pain    Complete heart block    Bicuspid aortic valve    AV block, complete    Typical angina    Fever    Coccygeal pain, acute    Sacrococcygeal pilonidal cyst    Second degree heart block    Vein stenosis    Weakness    Acute pain of left shoulder    Dependence on hemodialysis    Paroxysmal atrial fibrillation    Acute on chronic diastolic heart failure    Right lower quadrant abdominal pain    Hypoxia    Right femoral vein DVT    Anemia, chronic disease    Metabolic bone disease    Right leg pain    Hypervolemia    Pulmonary hypertension    Hyperkalemia    Acute delirium    Protein-calorie malnutrition, severe    DVT (deep venous thrombosis)    DM type 2 with diabetic mixed hyperlipidemia    Diabetic polyneuropathy associated with type 2 diabetes mellitus    Chronic kidney disease-mineral and bone disorder    Depression    Alteration in skin integrity    Foot  ulceration, right, with fat layer exposed    Septic encephalopathy    Infected skin ulcer with fat layer exposed    Pyogenic inflammation of bone    Diabetic foot ulcer    PVD (peripheral vascular disease)    Foot ulceration, left, with unspecified severity    Gangrene of right foot    Palliative care encounter       Review of patient's allergies indicates:  No Known Allergies    Outpatient Medications:  No current facility-administered medications on file prior to encounter.      Current Outpatient Prescriptions on File Prior to Encounter   Medication Sig Dispense Refill    ammonium lactate (LAC-HYDRIN) 12 % lotion Apply topically as needed for Dry Skin.      apixaban 5 mg Tab Take 1 tablet (5 mg total) by mouth 2 (two) times daily. 60 tablet 1    aspirin (ECOTRIN) 81 MG EC tablet Take 81 mg by mouth once daily.       atorvastatin (LIPITOR) 20 MG tablet Take 20 mg by mouth once daily.      cetirizine (ZYRTEC) 10 MG tablet Take 10 mg by mouth once daily.      cinacalcet (SENSIPAR) 30 MG Tab Take 30 mg by mouth every evening.      citalopram (CELEXA) 10 MG tablet Take 10 mg by mouth once daily.      diclofenac sodium (VOLTAREN) 1 % Gel Apply 2 g topically 4 (four) times daily as needed (for pain).      ergocalciferol (ERGOCALCIFEROL) 50,000 unit Cap Take 50,000 Units by mouth every 14 (fourteen) days.       furosemide (LASIX) 80 MG tablet Take 80 mg by mouth once daily.      gabapentin (NEURONTIN) 100 MG capsule Take 100 mg by mouth 3 (three) times daily.      hydrALAZINE (APRESOLINE) 50 MG tablet Take 50 mg by mouth 3 (three) times daily.      hydrocodone-acetaminophen 5-325mg (NORCO) 5-325 mg per tablet Take 1 tablet by mouth every 6 (six) hours as needed for Pain.      ipratropium (ATROVENT HFA) 17 mcg/actuation inhaler Inhale 2 puffs into the lungs 4 (four) times daily. Do not exceed 12 puffs per day      losartan (COZAAR) 50 MG tablet Take 1 tablet (50 mg total) by mouth once daily. 30  tablet 3    mometasone (NASONEX) 50 mcg/actuation nasal spray 2 sprays by Nasal route once daily.      podofilox (CONDYLOX) 0.5 % external solution Apply a small amount to lesions twice daily for three days, then hold for four days and repeat regimen      sevelamer carbonate (RENVELA) 800 mg Tab Take 2 tablets (1,600 mg total) by mouth 3 (three) times daily with meals. Take two tablets by mouth four times daily with meals and snacks 180 tablet 1        Current Inpatient Medications:   [START ON 2/26/2018] sodium chloride 0.9%   Intravenous Once    ammonium lactate   Topical (Top) BID    aspirin  81 mg Oral Daily    atorvastatin  20 mg Oral Daily    cinacalcet  30 mg Oral QHS    ciprofloxacin HCl  500 mg Oral Daily    citalopram  10 mg Oral Daily    gabapentin  100 mg Oral TID    heparin (porcine)  5,000 Units Subcutaneous Q8H    miconazole nitrate 2%   Topical (Top) BID    midodrine  10 mg Oral Every Mon, Wed, Fri    senna-docusate 8.6-50 mg  2 tablet Oral BID    sevelamer carbonate  1,600 mg Oral TID WM    sevelamer carbonate  800 mg Oral QHS       Past Surgical History:   Procedure Laterality Date    CATARACT EXTRACTION      COLONOSCOPY N/A 6/23/2017    Procedure: COLONOSCOPY;  Surgeon: Vic Clark MD;  Location: [address];  Service: Endoscopy;  Laterality: N/A;    RETINAL DETACHMENT SURGERY         Social History     Social History    Marital status: Single     Spouse name: N/A    Number of children: 3    Years of education: N/A     Occupational History    Not on file.     Social History Main Topics    Smoking status: Never Smoker    Smokeless tobacco: Never Used    Alcohol use No    Drug use: No    Sexual activity: Not on file     Other Topics Concern    Not on file     Social History Narrative    Has lived with son for about 2 years now. Formerly worked as a [employer] but quit in 1992 to become member of a [employer].       OBJECTIVE:   Weight:  Wt Readings from Last 4 Encounters:    18 104 kg (229 lb 4.5 oz)   18 106.2 kg (234 lb 2.1 oz)   17 108 kg (238 lb)   17 108 kg (238 lb)       Vital Signs Range (Last 24H):  Temp:  [36.2 °C (97.2 °F)-36.7 °C (98.1 °F)]   Pulse:  [50-66]   Resp:  [16-20]   BP: (127-147)/(60-70)   SpO2:  [96 %-100 %]       CBC:   Recent Labs      18   0830  18   0951   WBC  8.16  7.27   RBC  2.43*  2.59*   HGB  7.1*  7.5*   HCT  22.2*  24.4*   PLT  165  157   MCV  91  94   MCH  29.2  29.0   MCHC  32.0  30.7*       CMP:   Recent Labs      18   0830  18   0951   NA  133*  135*   K  4.8  4.6   CL  103  103   CO2  21*  23   BUN  33*  30*   CREATININE  4.5*  4.5*   GLU  68*  87   MG  2.4  2.4   PHOS  4.2  4.1   CALCIUM  8.2*  8.5*   ALBUMIN  1.8*  2.0*   PROT   --   7.6   ALKPHOS   --   101   ALT   --   10   AST   --   15   BILITOT   --   0.7       INR:  Recent Labs      18   0951   INR  1.3*   APTT  27.9       Diagnostic Studies:  VAS US Arterial Leg 2018  Color flow duplex reveals trickle flow at the level of the right DPA, which is suggestive of a possible occlusion.  Dampened monophasic waveforms are noted throughout the PTA and MADELINE.  The remaining arteries appear to be patent with no evidence of a   hemodynamically significant stenosis and/or occlusion.    EK2018  Vent. Rate : 053 BPM     Atrial Rate : 044 BPM     P-R Int : 000 ms          QRS Dur : 102 ms      QT Int : 464 ms       P-R-T Axes : 000 -63 104 degrees     QTc Int : 435 ms    Poor data quality  Atrial fibrillation  Left axis deviation  Low voltage QRS  Cannot rule out Anterior infarct (cited on or before 2018)  Nonspecific ST-t wave abnormality  Abnormal ECG     2D Echo:  2017    1 - Eccentric hypertrophy.     2 - Mildly depressed left ventricular systolic function (EF 45-50%).     3 - Right ventricular enlargement with mildly depressed systolic function.     4 - Biatrial enlargement.     5 - Mild tricuspid regurgitation.     6 -  Mild aortic stenosis, MARANDA = 1.4 cm2, peak velocity = 2.7 m/s, mean gradient = 15 mmHg.     7 - Pulmonary hypertension. The estimated PA systolic pressure is 55 mmHg.     8 - Increased central venous pressure.     Results for orders placed or performed during the hospital encounter of 04/21/17   2D echo with color flow doppler   Result Value Ref Range    EF 45 55 - 65    Aortic Valve Regurgitation TRIVIAL     Aortic Valve Stenosis MILD (A)     Est. PA Systolic Pressure 54.69 (A)     Mitral Valve Mobility NORMAL     Tricuspid Valve Regurgitation MILD          ASSESSMENT/PLAN:         Anesthesia Evaluation    I have reviewed the Patient Summary Reports.    I have reviewed the Nursing Notes.   I have reviewed the Medications.     Review of Systems  Anesthesia Hx:  No problems with previous Anesthesia  History of prior surgery of interest to airway management or planning: Denies Family Hx of Anesthesia complications.   Denies Personal Hx of Anesthesia complications.   Social:  Non-Smoker, No Alcohol Use    Hematology/Oncology:     Oncology Normal    -- Anemia: Denies Current/Recent Cancer   EENT/Dental:  EENT/Dental Normal denies chronic allergic rhinitis    Cardiovascular:   Hypertension, poorly controlled Denies MI.  Denies CAD.    Denies CABG/stent. Dysrhythmias atrial fibrillation Angina    Pulmonary:   Denies Pneumonia Asthma Denies Shortness of breath.  Denies Recent URI.    Renal/:   Chronic Renal Disease, ESRD    Hepatic/GI:   Denies PUD. Denies Hiatal Hernia. GERD    Musculoskeletal:   Arthritis     Neurological:   Denies TIA. Denies CVA. Neuromuscular Disease,  Denies Seizures.   Peripheral Neuropathy    Endocrine:   Diabetes, poorly controlled, type 2 Hypothyroidism    Psych:   Psychiatric History depression          Physical Exam  General:  Obesity, Well nourished    Airway/Jaw/Neck:  Airway Findings: Mouth Opening: Normal Tongue: Normal  General Airway Assessment: Adult  Mallampati: III  Improves to III  with phonation.  TM Distance: Normal, at least 6 cm  Jaw/Neck Findings:  Neck ROM: Normal ROM  Neck Findings: Normal    Eyes/Ears/Nose:  EYES/EARS/NOSE FINDINGS: Normal   Dental:  Dental Findings: Periodontal disease, Severe    Chest/Lungs:  Chest/Lungs Findings: Normal Respiratory Rate, Clear to auscultation     Heart/Vascular:  Heart Findings: Rate: Bradycardia  Rhythm: Regular Rhythm  Heart Murmur  Systolic  Systolic Heart Murmur Description: R Upper Sternal Border  Systolic Heart Murmur Grade: Grade II  Vascular Findings:  Dialysis Access: Left Forearm Graft     Abdomen:  Abdomen Findings: Normal    Musculoskeletal:  Musculoskeletal Findings:    Skin:  Skin Findings: Normal    Mental Status:  Mental Status Findings:  Cooperative, Alert and Oriented         Anesthesia Plan  Type of Anesthesia, risks & benefits discussed:  Anesthesia Type:  general, MAC  Patient's Preference:   Intra-op Monitoring Plan: standard ASA monitors  Intra-op Monitoring Plan Comments:   Post Op Pain Control Plan: multimodal analgesia, per primary service following discharge from PACU and IV/PO Opioids PRN  Post Op Pain Control Plan Comments:   Induction:   IV  Beta Blocker:  Patient is not currently on a Beta-Blocker (No further documentation required).       Informed Consent: Patient representative understands risks and agrees with Anesthesia plan.  Questions answered. Anesthesia consent signed with patient representative.  ASA Score: 4     Day of Surgery Review of History & Physical: I have interviewed and examined the patient. I have reviewed the patient's H&P dated:  There are no significant changes.  H&P update referred to the surgeon.         Ready For Surgery From Anesthesia Perspective.

## 2018-02-26 NOTE — NURSING
Patient wound care provided to R foot per orders, BLE elevated and medicated with tylenol 650 mg for pain, pain rated 4/10.  Patient repositioned and HOB elevated.  NADN, will cont to monitor closely.

## 2018-02-26 NOTE — NURSING
Pt escorted to dialysis via stretcher per transport, BS 69 16G of glucose administered as ordered, pt asymptomatic, no distress noted.

## 2018-02-26 NOTE — PLAN OF CARE
Problem: Patient Care Overview  Goal: Plan of Care Review  3.5 hr hd completed, net fluid dadudem=5837 mls, target goal achieved, pt tolerated well.  AVF deaccessed, , pressure held to the site 10mins and 10 mins, A/V respectively, hemostasis achieved, bruit/thrill present post tx.  Report given to ZENON Ozuna.

## 2018-02-26 NOTE — ASSESSMENT & PLAN NOTE
Wilder Carbajal is a 67 year old male with ESRD on Hd. iHD TTS at [facility], duration of 4h 15 min, , Left AVF.    Plan:  - Patient will have dialysis today for clearance and volume removal, duration of 3.5 hrs, UF 2-3 L as tolerated by patient, maintain MAP>65, bath will be adjusted to chem.  - Blood pressures stable today  - Mineral bone disease: continue with phosphate binders, continue with cinacalcet  - Diet: Renal (low sodium, low phosphate and low potassium)  - Continue with daily weight

## 2018-02-26 NOTE — PLAN OF CARE
Problem: Diabetes, Type 2 (Adult)  Goal: Signs and Symptoms of Listed Potential Problems Will be Absent, Minimized or Managed (Diabetes, Type 2)  Signs and symptoms of listed potential problems will be absent, minimized or managed by discharge/transition of care (reference Diabetes, Type 2 (Adult) CPG).   Outcome: Ongoing (interventions implemented as appropriate)   02/26/18 1503   Diabetes, Type 2   Problems Assessed (Type 2 Diabetes) all   Problems Present (Type 2 Diabetes) hypoglycemia       Problem: Patient Care Overview  Goal: Plan of Care Review  Outcome: Ongoing (interventions implemented as appropriate)  Pt escorted to dialysis, ran for 3 hours with 2.5L removed, surgery postponed until further notice, no distress noted, will continue to monitor.

## 2018-02-27 LAB
ALBUMIN SERPL BCP-MCNC: 1.7 G/DL
ALBUMIN SERPL BCP-MCNC: 1.8 G/DL
ALP SERPL-CCNC: 97 U/L
ALT SERPL W/O P-5'-P-CCNC: 9 U/L
ANION GAP SERPL CALC-SCNC: 8 MMOL/L
ANION GAP SERPL CALC-SCNC: 8 MMOL/L
AST SERPL-CCNC: 15 U/L
BASOPHILS # BLD AUTO: 0.05 K/UL
BASOPHILS # BLD AUTO: 0.05 K/UL
BASOPHILS NFR BLD: 0.6 %
BASOPHILS NFR BLD: 0.7 %
BILIRUB SERPL-MCNC: 1 MG/DL
BUN SERPL-MCNC: 25 MG/DL
BUN SERPL-MCNC: 27 MG/DL
CALCIUM SERPL-MCNC: 7.6 MG/DL
CALCIUM SERPL-MCNC: 7.9 MG/DL
CHLORIDE SERPL-SCNC: 101 MMOL/L
CHLORIDE SERPL-SCNC: 102 MMOL/L
CO2 SERPL-SCNC: 25 MMOL/L
CO2 SERPL-SCNC: 25 MMOL/L
CREAT SERPL-MCNC: 3.8 MG/DL
CREAT SERPL-MCNC: 4.1 MG/DL
DIFFERENTIAL METHOD: ABNORMAL
DIFFERENTIAL METHOD: ABNORMAL
EOSINOPHIL # BLD AUTO: 0.1 K/UL
EOSINOPHIL # BLD AUTO: 0.2 K/UL
EOSINOPHIL NFR BLD: 1.4 %
EOSINOPHIL NFR BLD: 2.8 %
ERYTHROCYTE [DISTWIDTH] IN BLOOD BY AUTOMATED COUNT: 16.9 %
ERYTHROCYTE [DISTWIDTH] IN BLOOD BY AUTOMATED COUNT: 17.6 %
EST. GFR  (AFRICAN AMERICAN): 16.2 ML/MIN/1.73 M^2
EST. GFR  (AFRICAN AMERICAN): 17.7 ML/MIN/1.73 M^2
EST. GFR  (NON AFRICAN AMERICAN): 14 ML/MIN/1.73 M^2
EST. GFR  (NON AFRICAN AMERICAN): 15.3 ML/MIN/1.73 M^2
GLUCOSE SERPL-MCNC: 63 MG/DL
GLUCOSE SERPL-MCNC: 95 MG/DL
HCT VFR BLD AUTO: 23.9 %
HCT VFR BLD AUTO: 24.1 %
HGB BLD-MCNC: 7.5 G/DL
HGB BLD-MCNC: 7.5 G/DL
IMM GRANULOCYTES # BLD AUTO: 0.03 K/UL
IMM GRANULOCYTES # BLD AUTO: 0.04 K/UL
IMM GRANULOCYTES NFR BLD AUTO: 0.4 %
IMM GRANULOCYTES NFR BLD AUTO: 0.5 %
LYMPHOCYTES # BLD AUTO: 1.2 K/UL
LYMPHOCYTES # BLD AUTO: 1.3 K/UL
LYMPHOCYTES NFR BLD: 13.9 %
LYMPHOCYTES NFR BLD: 18.5 %
MAGNESIUM SERPL-MCNC: 2.3 MG/DL
MCH RBC QN AUTO: 28.1 PG
MCH RBC QN AUTO: 28.6 PG
MCHC RBC AUTO-ENTMCNC: 31.1 G/DL
MCHC RBC AUTO-ENTMCNC: 31.4 G/DL
MCV RBC AUTO: 90 FL
MCV RBC AUTO: 92 FL
MONOCYTES # BLD AUTO: 0.8 K/UL
MONOCYTES # BLD AUTO: 0.9 K/UL
MONOCYTES NFR BLD: 12.5 %
MONOCYTES NFR BLD: 9.1 %
NEUTROPHILS # BLD AUTO: 4.7 K/UL
NEUTROPHILS # BLD AUTO: 6.2 K/UL
NEUTROPHILS NFR BLD: 65.1 %
NEUTROPHILS NFR BLD: 74.5 %
NRBC BLD-RTO: 0 /100 WBC
NRBC BLD-RTO: 0 /100 WBC
PHOSPHATE SERPL-MCNC: 3.8 MG/DL
PLATELET # BLD AUTO: 135 K/UL
PLATELET # BLD AUTO: 135 K/UL
PMV BLD AUTO: 10.1 FL
PMV BLD AUTO: 9.8 FL
POC PTINR: 1.4 (ref 0.9–1.2)
POC PTWBT: 16.2 SEC (ref 9.7–14.3)
POCT GLUCOSE: 106 MG/DL (ref 70–110)
POCT GLUCOSE: 65 MG/DL (ref 70–110)
POCT GLUCOSE: 65 MG/DL (ref 70–110)
POCT GLUCOSE: 73 MG/DL (ref 70–110)
POCT GLUCOSE: 88 MG/DL (ref 70–110)
POTASSIUM SERPL-SCNC: 4.3 MMOL/L
POTASSIUM SERPL-SCNC: 4.5 MMOL/L
PROT SERPL-MCNC: 6.5 G/DL
RBC # BLD AUTO: 2.62 M/UL
RBC # BLD AUTO: 2.67 M/UL
SAMPLE: ABNORMAL
SODIUM SERPL-SCNC: 134 MMOL/L
SODIUM SERPL-SCNC: 135 MMOL/L
WBC # BLD AUTO: 7.18 K/UL
WBC # BLD AUTO: 8.28 K/UL

## 2018-02-27 PROCEDURE — 25000003 PHARM REV CODE 250: Performed by: NURSE PRACTITIONER

## 2018-02-27 PROCEDURE — 71000039 HC RECOVERY, EACH ADD'L HOUR: Performed by: SURGERY

## 2018-02-27 PROCEDURE — 71000033 HC RECOVERY, INTIAL HOUR: Performed by: SURGERY

## 2018-02-27 PROCEDURE — 76942 ECHO GUIDE FOR BIOPSY: CPT | Performed by: ANESTHESIOLOGY

## 2018-02-27 PROCEDURE — 37000008 HC ANESTHESIA 1ST 15 MINUTES: Performed by: SURGERY

## 2018-02-27 PROCEDURE — 63600175 PHARM REV CODE 636 W HCPCS: Performed by: NURSE ANESTHETIST, CERTIFIED REGISTERED

## 2018-02-27 PROCEDURE — 76942 ECHO GUIDE FOR BIOPSY: CPT | Mod: 26,,, | Performed by: ANESTHESIOLOGY

## 2018-02-27 PROCEDURE — 27201037 HC PRESSURE MONITORING SET UP

## 2018-02-27 PROCEDURE — P9021 RED BLOOD CELLS UNIT: HCPCS

## 2018-02-27 PROCEDURE — 36415 COLL VENOUS BLD VENIPUNCTURE: CPT

## 2018-02-27 PROCEDURE — 88307 TISSUE EXAM BY PATHOLOGIST: CPT | Performed by: PATHOLOGY

## 2018-02-27 PROCEDURE — 25000003 PHARM REV CODE 250: Performed by: INTERNAL MEDICINE

## 2018-02-27 PROCEDURE — 63600175 PHARM REV CODE 636 W HCPCS: Performed by: ANESTHESIOLOGY

## 2018-02-27 PROCEDURE — 25000003 PHARM REV CODE 250: Performed by: NURSE ANESTHETIST, CERTIFIED REGISTERED

## 2018-02-27 PROCEDURE — 0Y6C0Z3 DETACHMENT AT RIGHT UPPER LEG, LOW, OPEN APPROACH: ICD-10-PCS | Performed by: SURGERY

## 2018-02-27 PROCEDURE — 99232 SBSQ HOSP IP/OBS MODERATE 35: CPT | Mod: ,,, | Performed by: INTERNAL MEDICINE

## 2018-02-27 PROCEDURE — 27000221 HC OXYGEN, UP TO 24 HOURS

## 2018-02-27 PROCEDURE — 27590 AMPUTATE LEG AT THIGH: CPT | Mod: RT,,, | Performed by: SURGERY

## 2018-02-27 PROCEDURE — 88311 DECALCIFY TISSUE: CPT | Mod: 26,,, | Performed by: PATHOLOGY

## 2018-02-27 PROCEDURE — D9220A PRA ANESTHESIA: Mod: CRNA,,, | Performed by: NURSE ANESTHETIST, CERTIFIED REGISTERED

## 2018-02-27 PROCEDURE — 63600175 PHARM REV CODE 636 W HCPCS: Performed by: INTERNAL MEDICINE

## 2018-02-27 PROCEDURE — 80053 COMPREHEN METABOLIC PANEL: CPT

## 2018-02-27 PROCEDURE — 64446 NJX AA&/STRD SC NRV NFS IMG: CPT | Mod: 59,RT,, | Performed by: ANESTHESIOLOGY

## 2018-02-27 PROCEDURE — 94761 N-INVAS EAR/PLS OXIMETRY MLT: CPT

## 2018-02-27 PROCEDURE — 63600175 PHARM REV CODE 636 W HCPCS: Performed by: STUDENT IN AN ORGANIZED HEALTH CARE EDUCATION/TRAINING PROGRAM

## 2018-02-27 PROCEDURE — 80069 RENAL FUNCTION PANEL: CPT

## 2018-02-27 PROCEDURE — 83735 ASSAY OF MAGNESIUM: CPT

## 2018-02-27 PROCEDURE — 36000710: Performed by: SURGERY

## 2018-02-27 PROCEDURE — 11000001 HC ACUTE MED/SURG PRIVATE ROOM

## 2018-02-27 PROCEDURE — 37000009 HC ANESTHESIA EA ADD 15 MINS: Performed by: SURGERY

## 2018-02-27 PROCEDURE — 64448 NJX AA&/STRD FEM NRV NFS IMG: CPT | Mod: 59,RT,, | Performed by: ANESTHESIOLOGY

## 2018-02-27 PROCEDURE — 85025 COMPLETE CBC W/AUTO DIFF WBC: CPT

## 2018-02-27 PROCEDURE — D9220A PRA ANESTHESIA: Mod: ANES,,, | Performed by: ANESTHESIOLOGY

## 2018-02-27 PROCEDURE — 25000003 PHARM REV CODE 250: Performed by: STUDENT IN AN ORGANIZED HEALTH CARE EDUCATION/TRAINING PROGRAM

## 2018-02-27 PROCEDURE — 36000711: Performed by: SURGERY

## 2018-02-27 PROCEDURE — 88307 TISSUE EXAM BY PATHOLOGIST: CPT | Mod: 26,,, | Performed by: PATHOLOGY

## 2018-02-27 PROCEDURE — 64446 NJX AA&/STRD SC NRV NFS IMG: CPT | Performed by: ANESTHESIOLOGY

## 2018-02-27 PROCEDURE — 82962 GLUCOSE BLOOD TEST: CPT | Performed by: SURGERY

## 2018-02-27 RX ORDER — ACETAMINOPHEN 10 MG/ML
1000 INJECTION, SOLUTION INTRAVENOUS ONCE
Status: COMPLETED | OUTPATIENT
Start: 2018-02-27 | End: 2018-02-27

## 2018-02-27 RX ORDER — SODIUM CHLORIDE 9 MG/ML
INJECTION, SOLUTION INTRAVENOUS CONTINUOUS PRN
Status: DISCONTINUED | OUTPATIENT
Start: 2018-02-27 | End: 2018-02-27

## 2018-02-27 RX ORDER — OXYCODONE HYDROCHLORIDE 5 MG/1
5 TABLET ORAL
Status: DISCONTINUED | OUTPATIENT
Start: 2018-02-27 | End: 2018-02-28

## 2018-02-27 RX ORDER — SODIUM CHLORIDE 9 MG/ML
INJECTION, SOLUTION INTRAVENOUS ONCE
Status: COMPLETED | OUTPATIENT
Start: 2018-02-28 | End: 2018-02-28

## 2018-02-27 RX ORDER — ROPIVACAINE HYDROCHLORIDE 2 MG/ML
10 INJECTION, SOLUTION EPIDURAL; INFILTRATION; PERINEURAL CONTINUOUS
Status: DISCONTINUED | OUTPATIENT
Start: 2018-02-27 | End: 2018-03-05

## 2018-02-27 RX ORDER — MIDAZOLAM HYDROCHLORIDE 1 MG/ML
0.5 INJECTION INTRAMUSCULAR; INTRAVENOUS EVERY 5 MIN PRN
Status: DISCONTINUED | OUTPATIENT
Start: 2018-02-27 | End: 2018-02-27

## 2018-02-27 RX ORDER — OXYCODONE HYDROCHLORIDE 5 MG/1
10 TABLET ORAL
Status: DISCONTINUED | OUTPATIENT
Start: 2018-02-27 | End: 2018-02-28

## 2018-02-27 RX ORDER — SODIUM CHLORIDE 9 MG/ML
INJECTION, SOLUTION INTRAVENOUS
Status: DISCONTINUED | OUTPATIENT
Start: 2018-02-28 | End: 2018-03-01

## 2018-02-27 RX ORDER — FENTANYL CITRATE 50 UG/ML
25 INJECTION, SOLUTION INTRAMUSCULAR; INTRAVENOUS EVERY 5 MIN PRN
Status: DISCONTINUED | OUTPATIENT
Start: 2018-02-27 | End: 2018-02-27

## 2018-02-27 RX ORDER — ACETAMINOPHEN 500 MG
1000 TABLET ORAL EVERY 6 HOURS
Status: DISPENSED | OUTPATIENT
Start: 2018-02-28 | End: 2018-02-28

## 2018-02-27 RX ORDER — OXYCODONE HYDROCHLORIDE 5 MG/1
15 TABLET ORAL
Status: DISCONTINUED | OUTPATIENT
Start: 2018-02-27 | End: 2018-02-28

## 2018-02-27 RX ORDER — FENTANYL CITRATE 50 UG/ML
25 INJECTION, SOLUTION INTRAMUSCULAR; INTRAVENOUS EVERY 5 MIN PRN
Status: DISCONTINUED | OUTPATIENT
Start: 2018-02-27 | End: 2018-02-27 | Stop reason: HOSPADM

## 2018-02-27 RX ORDER — ONDANSETRON 2 MG/ML
4 INJECTION INTRAMUSCULAR; INTRAVENOUS EVERY 8 HOURS PRN
Status: DISCONTINUED | OUTPATIENT
Start: 2018-02-27 | End: 2018-03-06 | Stop reason: HOSPADM

## 2018-02-27 RX ORDER — SODIUM CHLORIDE 0.9 % (FLUSH) 0.9 %
3 SYRINGE (ML) INJECTION
Status: DISCONTINUED | OUTPATIENT
Start: 2018-02-27 | End: 2018-02-27 | Stop reason: HOSPADM

## 2018-02-27 RX ORDER — ROPIVACAINE HYDROCHLORIDE 2 MG/ML
8 INJECTION, SOLUTION EPIDURAL; INFILTRATION; PERINEURAL CONTINUOUS
Status: DISCONTINUED | OUTPATIENT
Start: 2018-02-27 | End: 2018-03-05

## 2018-02-27 RX ADMIN — SEVELAMER CARBONATE 800 MG: 800 TABLET, FILM COATED ORAL at 10:02

## 2018-02-27 RX ADMIN — KETAMINE HYDROCHLORIDE 10 MG: 50 INJECTION INTRAMUSCULAR; INTRAVENOUS at 02:02

## 2018-02-27 RX ADMIN — ROPIVACAINE HYDROCHLORIDE 8 ML/HR: 10 INJECTION, SOLUTION EPIDURAL at 03:02

## 2018-02-27 RX ADMIN — FENTANYL CITRATE 50 MCG: 50 INJECTION, SOLUTION INTRAMUSCULAR; INTRAVENOUS at 12:02

## 2018-02-27 RX ADMIN — KETAMINE HYDROCHLORIDE 20 MG: 50 INJECTION INTRAMUSCULAR; INTRAVENOUS at 02:02

## 2018-02-27 RX ADMIN — GABAPENTIN 100 MG: 100 CAPSULE ORAL at 10:02

## 2018-02-27 RX ADMIN — ROPIVACAINE HYDROCHLORIDE 8 ML/HR: 10 INJECTION, SOLUTION EPIDURAL at 04:02

## 2018-02-27 RX ADMIN — FENTANYL CITRATE 25 MCG: 50 INJECTION, SOLUTION INTRAMUSCULAR; INTRAVENOUS at 12:02

## 2018-02-27 RX ADMIN — STANDARDIZED SENNA CONCENTRATE AND DOCUSATE SODIUM 2 TABLET: 8.6; 5 TABLET, FILM COATED ORAL at 10:02

## 2018-02-27 RX ADMIN — ACETAMINOPHEN 1000 MG: 10 INJECTION, SOLUTION INTRAVENOUS at 04:02

## 2018-02-27 RX ADMIN — OXYCODONE HYDROCHLORIDE 10 MG: 5 TABLET ORAL at 10:02

## 2018-02-27 RX ADMIN — HEPARIN SODIUM 5000 UNITS: 5000 INJECTION, SOLUTION INTRAVENOUS; SUBCUTANEOUS at 10:02

## 2018-02-27 RX ADMIN — DEXTROSE 2 G: 50 INJECTION, SOLUTION INTRAVENOUS at 01:02

## 2018-02-27 RX ADMIN — DEXTROSE MONOHYDRATE: 25 INJECTION, SOLUTION INTRAVENOUS at 08:02

## 2018-02-27 RX ADMIN — SODIUM CHLORIDE: 0.9 INJECTION, SOLUTION INTRAVENOUS at 01:02

## 2018-02-27 RX ADMIN — CINACALCET HYDROCHLORIDE 30 MG: 30 TABLET, COATED ORAL at 10:02

## 2018-02-27 NOTE — TRANSFER OF CARE
"Anesthesia Transfer of Care Note    Patient: Wilder Carbajal    Procedure(s) Performed: Procedure(s) (LRB):  AMPUTATION-ABOVE KNEE (Right)    Patient location: PACU    Anesthesia Type: general    Transport from OR: Transported from OR on 6-10 L/min O2 by face mask with adequate spontaneous ventilation    Post pain: adequate analgesia    Post assessment: no apparent anesthetic complications    Post vital signs: stable    Level of consciousness: awake and alert    Nausea/Vomiting: no nausea/vomiting    Complications: none    Transfer of care protocol was followed      Last vitals:   Visit Vitals  /62 (BP Location: Right arm, Patient Position: Lying)   Pulse (!) 59   Temp 36.6 °C (97.8 °F) (Oral)   Resp 11   Ht 5' 8" (1.727 m)   Wt 104 kg (229 lb 4.5 oz)   SpO2 98%   BMI 34.86 kg/m²     "

## 2018-02-27 NOTE — ANESTHESIA PROCEDURE NOTES
Sciatic Nerve Catheter    Patient location during procedure: pre-op   Block not for primary anesthetic.  Reason for block: at surgeon's request and post-op pain management   Post-op Pain Location: R foot pain  Start time: 2/27/2018 12:50 PM  Timeout: 2/27/2018 12:45 PM   End time: 2/27/2018 1:05 PM  Staffing  Anesthesiologist: YOUNG DANIELS  Resident/CRNA: VALENTIN BARBOSA  Performed: resident/CRNA   Preanesthetic Checklist  Completed: patient identified, site marked, surgical consent, pre-op evaluation, timeout performed, IV checked, risks and benefits discussed and monitors and equipment checked  Peripheral Block  Prep: ChloraPrep and site prepped and draped  Patient monitoring: heart rate, cardiac monitor, continuous pulse ox, continuous capnometry and frequent blood pressure checks  Block type: sciatic  Laterality: right  Injection technique: continuous  Needle  Needle type: Stimuplex   Needle gauge: 18 G  Needle length: 4 in  Needle localization: anatomical landmarks and nerve stimulator  Catheter type: non-stimulating  Catheter size: 20 G  Test dose: lidocaine 1.5% with Epi 1-to-200,000 and negative     Assessment  Injection assessment: negative aspiration and negative parasthesia  Paresthesia pain: none  Heart rate change: no  Slow fractionated injection: yes  Medications:  Bolus administered: 30 mL of 0.5 bupivacaine  Epinephrine added: 3.75 mcg/mL (1/300,000)  Additional Notes  Performed Labat location.  Roel test with appropriate loss of twitch with injection of local anesthetic.  VSS.  DOS[facility] RN monitoring vitals throughout procedure.  Patient tolerated procedure well.

## 2018-02-27 NOTE — OP NOTE
[facility]  Vascular Surgery  Operative Note    SUMMARY     Date of Procedure: 2/27/2018     Procedure: Right above knee amputation    Surgeon(s) and Role:     * Liz Zhou MD - Primary     * Alexandrea Johnston MD - Fellow    Assisting Surgeon: None    Pre-Operative Diagnosis: Foot ulceration, right, with fat layer exposed [L97.512]; Right foot gangrene; ESRD on dialysis; Morbid Obesity    Post-Operative Diagnosis: same    Anesthesia: General/MAC    Indication for operation: 66 yo M with ESRD on HD, DM2 with extensive right foot tissue loss and gangrenous ulceration.    Description of the Findings of the Procedure: excessive blood loss due to significant venous hypertension and innumerous large superficial venous branches      Complications: No    Estimated Blood Loss (EBL): 400 mL           Implants: * No implants in log *    Specimens:   Specimen (12h ago through future)    Start     Ordered    02/27/18 9244  Specimen to Pathology - Surgery  Once     Comments:  Specimen to Pathology: 1) Right Above the Knee Amputated Leg  - Gross Only, no preservatives , to lab via ORA      02/27/18 6233                  Condition: Good    Disposition: PACU - hemodynamically stable.    Operation in detail: After appropriate informed consent obtained, the patient was taken to the operating room and placed in the supine position. A block with perineural catheters was performed by anesthesia in the preoperative period. The right lower extremity was prepped and draped in usual sterile fashion.  The anterior and posterior skin flaps were outlined using a marking pen. A fish-mouth incision was made at the level of distal femur, just proximal to the patella, and a longer posterior flap was created. It was deepened through the subcutaneous tissues and anterior muscle groups were divided at the skin level using sharp dissection. Multiple large venous branches were identified and controlled with silk ties.   There was evidence of venous hypertension as well as edema in leg.  The periosteum of the femur was elevated proximally and circumferentially along with the femoral shaft.  The femur was divided with a bone saw.  The SFA, femoral vein were isolated controlled.  The posterior muscle compartment was divided with bovie electrocautery. The leg was then passed off of the field.  The vessels were suture ligated with 3-0 prolene suture. The popliteal artery was suture ligated using prolene and silk suture. The popliteal vein was subsequently identified, isolated and ligated with prolene suture ligation and silk suture. The bypass, noted to be thrombosed, suture ligated with prolene and silk sutures.  The sciatic nerve was identified and divided with silk sutures. Hemostasis was obtained with electrocautery. The deep investing fascia of the anterior and posterior muscle flaps were approximated interrupted 2-0 PDS sutures.  The subcutaneous tissue was then approximated using interrupted vicryl suture. The skin was approximated with staples. Secondary to patient's edema we elected to place an incisional wound vac. The patient was transferred to the recovery room in stable condition.

## 2018-02-27 NOTE — PROGRESS NOTES
[facility]  Vascular Surgery  Progress Note    Patient Name: Wilder Carbajal  MRN: [id number]  Admission Date: 2/4/2018  Primary Care Provider: Dio Roberson MD    Subjective:     Interval History: Patient seen at bedside.  Family present.  Plan for AKA today.    Post-Op Info:  Procedure(s) (LRB):  AMPUTATION-TOES 4TH AND 5TH, I&D (Right)   18 Days Post-Op       Medications:  Continuous Infusions:  Scheduled Meds:   ammonium lactate   Topical (Top) BID    aspirin  81 mg Oral Daily    atorvastatin  20 mg Oral Daily    cinacalcet  30 mg Oral QHS    ciprofloxacin HCl  500 mg Oral Daily    citalopram  10 mg Oral Daily    gabapentin  100 mg Oral TID    heparin (porcine)  5,000 Units Subcutaneous Q8H    miconazole nitrate 2%   Topical (Top) BID    midodrine  10 mg Oral Every Mon, Wed, Fri    senna-docusate 8.6-50 mg  2 tablet Oral BID    sevelamer carbonate  1,600 mg Oral TID WM    sevelamer carbonate  800 mg Oral QHS     PRN Meds:sodium chloride, sodium chloride 0.9%, acetaminophen, albumin human 25%, albumin human 25%, albuterol-ipratropium 2.5mg-0.5mg/3mL, dextrose 50%, dextrose 50%, glucagon (human recombinant), glucose, glucose, ondansetron, oxyCODONE, sodium chloride 0.9%     Objective:     Vital Signs (Most Recent):  Temp: 97.8 °F (36.6 °C) (02/27/18 0742)  Pulse: (!) 51 (02/27/18 0742)  Resp: 16 (02/27/18 0742)  BP: (!) 113/58 (02/27/18 0742)  SpO2: (!) 90 % (02/27/18 0742) Vital Signs (24h Range):  Temp:  [97.1 °F (36.2 °C)-98.4 °F (36.9 °C)] 97.8 °F (36.6 °C)  Pulse:  [46-64] 51  Resp:  [14-20] 16  SpO2:  [90 %-100 %] 90 %  BP: (101-138)/(52-82) 113/58          Physical Exam   Constitutional: No distress.   Cardiovascular: Normal rate.    Femoral 2+ bilaterally  No palpable distal pulses  Right foot s/p 4-5 ray amp with exposed bone and open wound, dry gangrene, heel ulcer also dry gangrene   Pulmonary/Chest: Effort normal. No respiratory distress.   Abdominal: Soft. He exhibits no  distension.   Musculoskeletal: Normal range of motion.   Venous stasis skin changes to thigh bilaterally   Skin: Skin is warm and dry.       Significant Labs:  CBC:     Recent Labs  Lab 02/27/18  0351   WBC 7.18   RBC 2.67*   HGB 7.5*   HCT 23.9*   *   MCV 90   MCH 28.1   MCHC 31.4*     CMP:     Recent Labs  Lab 02/25/18  0951  02/27/18  0351   GLU 87  < > 63*   CALCIUM 8.5*  < > 7.9*   ALBUMIN 2.0*  < > 1.8*   PROT 7.6  --   --    *  < > 134*   K 4.6  < > 4.3   CO2 23  < > 25     < > 101   BUN 30*  < > 25*   CREATININE 4.5*  < > 3.8*   ALKPHOS 101  --   --    ALT 10  --   --    AST 15  --   --    BILITOT 0.7  --   --    < > = values in this interval not displayed.  Coagulation:     Recent Labs  Lab 02/25/18  0951   LABPROT 13.3*   INR 1.3*   APTT 27.9       Significant Diagnostics:      Assessment/Plan:     Foot ulceration, right, with fat layer exposed    66 yo M with h/o ESRD on HD (TThS) via L RC AVF (with interposition graft), HTN, HLD, DM 2( Hgb A1c 4.7), HFrEF (45% 4/2017)  presenting with fluid overload after missing dialysis with evidence of right diabetic foot ulceration with wet gangrene s/p 4-5th ray amputations.    -Unlikely for the patient to heal the wound despite aggressive limb salvage efforts. Due to significant lipodermatosclerosis, BKA is not good option. Patient likely needs AKA.   -Patient and son agree to AKA, plan for AKA TODAY.  Keep NPO  -Continue ASA, statin  Vascular surgery to follow                  George Escamilla MD  Vascular Surgery  [facility]

## 2018-02-27 NOTE — ANESTHESIA PROCEDURE NOTES
Femoral Nerve Catheter    Patient location during procedure: pre-op   Block not for primary anesthetic.  Reason for block: at surgeon's request and post-op pain management   Post-op Pain Location: R foot pain  Start time: 2/27/2018 12:10 PM  Timeout: 2/27/2018 12:04 PM   End time: 2/27/2018 12:25 PM  Staffing  Anesthesiologist: YOUNG DANIELS  Resident/CRNA: VALENTIN BARBOSA  Performed: resident/CRNA   Preanesthetic Checklist  Completed: patient identified, site marked, surgical consent, pre-op evaluation, timeout performed, IV checked, risks and benefits discussed and monitors and equipment checked  Peripheral Block  Patient position: supine  Prep: ChloraPrep and site prepped and draped  Patient monitoring: heart rate, cardiac monitor, continuous pulse ox, continuous capnometry and frequent blood pressure checks  Block type: femoral  Laterality: right  Injection technique: continuous  Needle  Needle type: Tuohy   Needle gauge: 17 G  Needle length: 3.5 in  Needle localization: anatomical landmarks and ultrasound guidance  Catheter type: spring wound  Catheter size: 19 G  Test dose: lidocaine 1.5% with Epi 1-to-200,000 and negative   -ultrasound image captured on disc.  Assessment  Injection assessment: negative aspiration, negative parasthesia and local visualized surrounding nerve  Paresthesia pain: none  Heart rate change: no  Slow fractionated injection: yes  Medications:  Bolus administered: 20 mL of 0.5 bupivacaine  Epinephrine added: 3.75 mcg/mL (1/300,000)  Additional Notes  VSS.  DOS[facility] RN monitoring vitals throughout procedure.  Patient tolerated procedure well.

## 2018-02-27 NOTE — CONSULTS
Midline placed in right brachial , size [id number] Removal date on or before 3/27/18. Needle advanced into vessel with real time ultrasound guidance. Image recorded and saved.

## 2018-02-27 NOTE — PROGRESS NOTES
Regional anesthesia team made aware of pt's positive blood tinged aspirate from femoral catheter. MD came to bedside to assess catheter, OK to use per MD. Will continue to monitor.

## 2018-02-27 NOTE — NURSING
MD notified of patient's CBG 65, patient is asymptomatic and has no complaints at this time.  Patient given 1/2amp D50, with CBG recheck 88.  Will continue to monitor.

## 2018-02-27 NOTE — PT/OT/SLP DISCHARGE
Occupational Therapy Discharge Summary    Wilder Carbajal  MRN: [id number]   Principal Problem: Septic encephalopathy      Patient Discharged from acute Occupational Therapy on 2/27/2018.  Please refer to prior OT note dated 2/24/2018 for functional status.    Assessment:      Patient has not met goals. Pt off the floor for AKA surgery. Will need new orders post-op when appropriate.    Objective:     GOALS:    Occupational Therapy Goals        Problem: Occupational Therapy Goal    Goal Priority Disciplines Outcome Interventions   Occupational Therapy Goal     OT, PT/OT Ongoing (interventions implemented as appropriate)    Description:  Goals to be met by:  3/2/2018    Patient will increase functional independence with ADLs by performing:    UE Dressing with Set-up Assistance.  Grooming while seated with Modified [place].  Toileting from bedside commode with Moderate Assistance for hygiene and clothing management.   Supine to sit with Modified [place].  Bed<>chair transfer with Minimal Assistance while maintaining precautions.  Toilet transfer to bedside commode with Minimal Assistance while maintaining precautions.                   Multidisciplinary Problems (Resolved)        Problem: Occupational Therapy Goal    Goal Priority Disciplines Outcome Interventions   Occupational Therapy Goal   (Resolved)     OT, PT/OT Outcome(s) achieved    Description:  Goals to be met by: 7 days      Patient will increase functional independence with ADLs by performing:    UE Dressing with Moderate Assistance.  LE Dressing with Moderate Assistance.  Grooming while seated with Supervision.  Toileting from bedside commode with Moderate Assistance for hygiene and clothing management.   Supine to sit with Moderate Assistance.  Stand pivot transfers with Moderate Assistance.  Toilet transfer to bedside commode with Moderate Assistance.  Increased functional strength to WFL for safe and independent function with self-care and  functional mobility tasks.                      Reasons for Discontinuation of Therapy Services  surgical procedure      Plan:     Patient Discharged to: Remains in hospital     Karo Castrejon, OT  2/27/2018

## 2018-02-27 NOTE — BRIEF OP NOTE
[facility]  Brief Operative Note    SUMMARY     Surgery Date: 2/27/2018     Surgeon(s) and Role:     * Liz Zhou MD - Primary     * Alexandrea Johnston MD - Fellow    Assisting Surgeon: None    Pre-op Diagnosis:   Foot ulceration, right, with fat layer exposed [L97.512]     Right foot gangrene     ESRD on dialysis     Morbid Obesity    Post-op Diagnosis:  Same    Procedure(s) (LRB):  AMPUTATION-ABOVE KNEE (Right)    Anesthesia: General/MAC    Description of Procedure: right AKA for gangrene    Description of the findings of the procedure: excessive blood loss due to significant venous hypertension and innumerous large superficial venous branches    Estimated Blood Loss: 400 mL         Specimens:   Specimen (12h ago through future)    Start     Ordered    02/27/18 1421  Specimen to Pathology - Surgery  Once     Comments:  Specimen to Pathology: 1) Right Above the Knee Amputated Leg  - Gross Only, no preservatives , to lab via ORA      02/27/18 5094

## 2018-02-27 NOTE — PROGRESS NOTES
Progress Note   Hospital Medicine       Team: [facility] MED D Montserrat Lino MD  Admit Date: 2/4/2018  Length of Stay:  LOS: 23 days   ESTUARDO 3/2/2018  Principal Problem:  Septic encephalopathy    Interval hx / overnight events: No new events. Patient is refusing BKA. Clearly states that he would rather die than have an amputation. Son and his brother feel that this aligns with his wishes when he is in normal mental status.     Areas of concern/ handoff:  Early morning hypoglycemia    Review of Systems: Gen: no fever, no chills, Heart: no chest pain, palpitations; Resp: no SOB, no cough    Temp:  [97.1 °F (36.2 °C)-98.4 °F (36.9 °C)]   Pulse:  [47-64]   Resp:  [9-20]   BP: ()/(44-82)   SpO2:  [90 %-100 %]       Physical Exam:  General appearance: NAD, conversant  Neck: FROM, supple   Lungs: crackles at bases, no accessory muscle use  CV: RRR, no heave  Abdomen: Soft, non-tender; no masses or HSM  Extremities: 2+ pitting edema up to his mid thighs and 3+ edema to his buttocks,  no digital cyanosis  Skin: no rash, lesions or ulcers  Psych: Alert and oriented to person, place and time      Recent Labs  Lab 02/25/18 0951 02/26/18  0434 02/27/18  0351   WBC 7.27 7.11 7.18   HGB 7.5* 6.6* 7.5*   HCT 24.4* 21.7* 23.9*    138* 135*       Recent Labs  Lab 02/25/18  0951 02/26/18  0433 02/27/18  0351   * 135* 134*   K 4.6 5.1 4.3    104 101   CO2 23 21* 25   BUN 30* 35* 25*   CREATININE 4.5* 4.8* 3.8*   GLU 87 60* 63*   CALCIUM 8.5* 8.2* 7.9*   MG 2.4 2.2 2.3   PHOS 4.1 4.5 3.8       Recent Labs  Lab 02/25/18  0951 02/26/18  0433 02/27/18  0351   ALKPHOS 101  --   --    ALT 10  --   --    AST 15  --   --    ALBUMIN 2.0* 1.7* 1.8*   PROT 7.6  --   --    BILITOT 0.7  --   --    INR 1.3*  --   --        Recent Labs  Lab 02/26/18  0802 02/26/18  0952 02/26/18  1538 02/26/18  2144 02/27/18  0744 02/27/18  0932   POCTGLUCOSE 69* 84 97 106 65* 88         Assessment and Plan   Overview: 66 yo here for  "osteomyelitis of right foot. He underwent washout 2/6 and another washout 2/6 and amputation of 4th and 5th digits. Undergoing treatment course of antibiotics.     Problems Addressed today:     Septic encephalopathy, resolved  Patient initially lacked capacity to refuse surgery per psychiatry, consent obtained from his son as proxy. Mental status has improved significantly. He now agrees to to right AKA; son in agreement as well.     Infected right dorsal foot, ischemic ulceration  Podiatry consulted - s/p I&D and 4th and 5th amputation 2/9  Vascular surgery consulted - ultimately recommended AKA of right leg.   Continued vancomycin 1 g IV per random levels and Zosyn 2.25 g IV BID  Wound culture revealed pan-sensitive CITROBACTER KOSERI and KLEBSIELLA PNEUMONIAE ESBL.  To OR 02/09/18. Consulted ID.Recommended 6 weeks of ciprofloxacin. Will reconsult for need of antibiotics after AKA.   ABIs ordered to rule out need for BKA. Angiogram considered. Per vascular surgery "Unlikely for the patient to heal the wound despite aggressive limb salvage efforts. Due to significant lipodermatosclerosis, BKA is not good option. Patient likely needs AKA." Patient initially refused AKA stating he would rather die of infection then have his leg amputated. One Son and Brother in agreement with patient as this aligns with his preference when in his normal mental state. All aware of the risk of recurrence of sepsis syndrome and death. Continuation of wound care and antibiotics likely futile; consulted Palliative Care. After multiple discussions about hospice. Patient stated he wanted to live longer and agreed with AKA. Patient and his eldest Son now wish to proceed with AKA. AKA planned for today.      Hypervolemia due to non-compliance with dialysis  Hyperkalemia, resolved  Shifted in ER with albuterol, insulin/destrose, bicarbonate. K 7.9 => 4  No respiratory compromise. Emergent dialysis 2/5/18 early AM and on 2/9  Withhold " anti-hypertensives for volume removal.      DVT  BLE ultrasound  Apixaban 5 mg BID - ? Compliance at home  Apixaban held 2/12/2018 for probable BKA - plan to resume at discharge.  Remains on hold for AKA. Will need to resume when OK with Vascular Surgery post-op.     DM II with HTN and CKD V  HgbA1c 4.6%  Likely controlled with declining intake  No further intervention needed at this time    Protein calorie malnutrtion  Hypoglycemia with associated NSVT  Poor prognosis for wound healing  Nepro with meals     Essential HTN - held home hydralazine 50 mg TID and losartan 50 mg daily. Blood pressure fairly well-controlled during stay without antihypertensives.     DM II hyperlipidemia - atorvastatin 40 mg daily     DM II neuropathy - gabapentin 100 mg TID     ESRF HD TTS - Nephrology consulted for dialysis.  Withholding anti-hypertensives and added midodrine as needed prior to dialysis for volume removal and reduce symptomatic dialysis-associated hypotension.   BP improving.     CKD MBD - continue cinacalcet 30 mg qhs and sevelamer 1600 mg TID daily     Depression - continue citalopram 10 mg daily. Consulted psychiatry. Back to baseline.      Anticipated Disposition: Skilled Nursing Facility post-op   ESTUARDO: 3/2/2018     Goals of Care:  General Prognosis: fair   Goals: Curative  Comfort Only: No  Hospice: No  Code Status: Full Code    Montserrat Lino MD

## 2018-02-27 NOTE — ASSESSMENT & PLAN NOTE
68 yo M with h/o ESRD on HD (TThS) via L RC AVF (with interposition graft), HTN, HLD, DM 2( Hgb A1c 4.7), HFrEF (45% 4/2017)  presenting with fluid overload after missing dialysis with evidence of right diabetic foot ulceration with wet gangrene s/p 4-5th ray amputations.    -Unlikely for the patient to heal the wound despite aggressive limb salvage efforts. Due to significant lipodermatosclerosis, BKA is not good option. Patient likely needs AKA.   -Patient and son agree to AKA, plan for AKA TODAY.  Keep NPO  -Continue ASA, statin  Vascular surgery to follow

## 2018-02-27 NOTE — PT/OT/SLP PROGRESS
Physical Therapy      Patient Name:  Wilder Carbajal   MRN:  [id number]    Patient not seen today secondary to pt off the floor for AKA today. D/c Summary to follow.  Will need new orders s/p to resume mobility when appropriate.    Melanie Vargas, PT   02/27/2018

## 2018-02-27 NOTE — PT/OT/SLP DISCHARGE
Physical Therapy Discharge Summary    Name: Wilder Carbajal  MRN: [id number]   Principal Problem: Septic encephalopathy     Patient Discharged from acute Physical Therapy on 18.  Please refer to prior PT noted date on 18 for functional status.     Assessment:     Patient has not met goals. Pt off the floor for AKA surgery. Will need new orders post-op when appropriate.     Objective:     GOALS:    Physical Therapy Goals        Problem: Physical Therapy Goal    Goal Priority Disciplines Outcome Goal Variances Interventions   Physical Therapy Goal     PT/OT, PT Ongoing (interventions implemented as appropriate)     Description:  Goals to be met by: 3/2/18 (10 days)    Patient will increase functional independence with mobility by performin. Supine to sit with Contact Guard Assist  2. Sit to supine with Contact Guard Assist  3. Sit to stand transfer with Minimal Assistance  4. Bed to chair transfer with sliding board with Contact Guard assistance.   6. Navigate  feet with supervision.   6. Lower extremity exercise program x 30 reps per handout, with independence                         Reasons for Discontinuation of Therapy Services  Surgical procedure      Plan:     Patient Discharged to: remains in hospital. .    Melanie Vargas, PT  2018

## 2018-02-27 NOTE — ANESTHESIA POSTPROCEDURE EVALUATION
"Anesthesia Post Evaluation    Patient: Wilder Carbajal    Procedure(s) Performed: Procedure(s) (LRB):  AMPUTATION-ABOVE KNEE (Right)    Final Anesthesia Type: regional  Patient location during evaluation: PACU  Patient participation: Yes- Able to Participate  Level of consciousness: awake and alert and oriented  Post-procedure vital signs: reviewed and stable  Pain management: adequate  Airway patency: patent  PONV status at discharge: No PONV  Anesthetic complications: no      Cardiovascular status: blood pressure returned to baseline  Respiratory status: unassisted, room air and spontaneous ventilation  Hydration status: euvolemic  Follow-up not needed.        Visit Vitals  BP (!) 124/56 (BP Location: Right arm, Patient Position: Lying)   Pulse 65   Temp 36.6 °C (97.8 °F) (Oral)   Resp 19   Ht 5' 8" (1.727 m)   Wt 104 kg (229 lb 4.5 oz)   SpO2 100%   BMI 34.86 kg/m²       Pain/Ritesh Score: Pain Assessment Performed: Yes (2/27/2018  1:10 PM)  Presence of Pain: non-verbal indicators absent (2/27/2018  1:10 PM)  Pain Rating Prior to Med Admin: 0 (2/27/2018 12:45 PM)  Pain Rating Post Med Admin: 3 (2/27/2018 12:27 AM)  Ritesh Score: 10 (2/27/2018  1:10 PM)      "

## 2018-02-27 NOTE — SUBJECTIVE & OBJECTIVE
Medications:  Continuous Infusions:  Scheduled Meds:   ammonium lactate   Topical (Top) BID    aspirin  81 mg Oral Daily    atorvastatin  20 mg Oral Daily    cinacalcet  30 mg Oral QHS    ciprofloxacin HCl  500 mg Oral Daily    citalopram  10 mg Oral Daily    gabapentin  100 mg Oral TID    heparin (porcine)  5,000 Units Subcutaneous Q8H    miconazole nitrate 2%   Topical (Top) BID    midodrine  10 mg Oral Every Mon, Wed, Fri    senna-docusate 8.6-50 mg  2 tablet Oral BID    sevelamer carbonate  1,600 mg Oral TID WM    sevelamer carbonate  800 mg Oral QHS     PRN Meds:sodium chloride, sodium chloride 0.9%, acetaminophen, albumin human 25%, albumin human 25%, albuterol-ipratropium 2.5mg-0.5mg/3mL, dextrose 50%, dextrose 50%, glucagon (human recombinant), glucose, glucose, ondansetron, oxyCODONE, sodium chloride 0.9%     Objective:     Vital Signs (Most Recent):  Temp: 97.8 °F (36.6 °C) (02/27/18 0742)  Pulse: (!) 51 (02/27/18 0742)  Resp: 16 (02/27/18 0742)  BP: (!) 113/58 (02/27/18 0742)  SpO2: (!) 90 % (02/27/18 0742) Vital Signs (24h Range):  Temp:  [97.1 °F (36.2 °C)-98.4 °F (36.9 °C)] 97.8 °F (36.6 °C)  Pulse:  [46-64] 51  Resp:  [14-20] 16  SpO2:  [90 %-100 %] 90 %  BP: (101-138)/(52-82) 113/58          Physical Exam   Constitutional: No distress.   Cardiovascular: Normal rate.    Femoral 2+ bilaterally  No palpable distal pulses  Right foot s/p 4-5 ray amp with exposed bone and open wound, dry gangrene, heel ulcer also dry gangrene   Pulmonary/Chest: Effort normal. No respiratory distress.   Abdominal: Soft. He exhibits no distension.   Musculoskeletal: Normal range of motion.   Venous stasis skin changes to thigh bilaterally   Skin: Skin is warm and dry.       Significant Labs:  CBC:     Recent Labs  Lab 02/27/18  0351   WBC 7.18   RBC 2.67*   HGB 7.5*   HCT 23.9*   *   MCV 90   MCH 28.1   MCHC 31.4*     CMP:     Recent Labs  Lab 02/25/18  0951  02/27/18  0351   GLU 87  < > 63*   CALCIUM  8.5*  < > 7.9*   ALBUMIN 2.0*  < > 1.8*   PROT 7.6  --   --    *  < > 134*   K 4.6  < > 4.3   CO2 23  < > 25     < > 101   BUN 30*  < > 25*   CREATININE 4.5*  < > 3.8*   ALKPHOS 101  --   --    ALT 10  --   --    AST 15  --   --    BILITOT 0.7  --   --    < > = values in this interval not displayed.  Coagulation:     Recent Labs  Lab 02/25/18  0951   LABPROT 13.3*   INR 1.3*   APTT 27.9       Significant Diagnostics:

## 2018-02-28 PROBLEM — S78.112A UNILATERAL AKA, LEFT: Status: ACTIVE | Noted: 2018-02-11

## 2018-02-28 PROBLEM — S78.111A UNILATERAL AKA, RIGHT: Status: ACTIVE | Noted: 2018-02-11

## 2018-02-28 LAB
ALBUMIN SERPL BCP-MCNC: 1.6 G/DL
ANION GAP SERPL CALC-SCNC: 7 MMOL/L
BASOPHILS # BLD AUTO: 0.05 K/UL
BASOPHILS NFR BLD: 0.6 %
BLD PROD TYP BPU: NORMAL
BLOOD UNIT EXPIRATION DATE: NORMAL
BLOOD UNIT TYPE CODE: 5100
BLOOD UNIT TYPE: NORMAL
BUN SERPL-MCNC: 32 MG/DL
CALCIUM SERPL-MCNC: 7.5 MG/DL
CHLORIDE SERPL-SCNC: 102 MMOL/L
CO2 SERPL-SCNC: 25 MMOL/L
CODING SYSTEM: NORMAL
CREAT SERPL-MCNC: 4.4 MG/DL
DIFFERENTIAL METHOD: ABNORMAL
DISPENSE STATUS: NORMAL
EOSINOPHIL # BLD AUTO: 0.2 K/UL
EOSINOPHIL NFR BLD: 2 %
ERYTHROCYTE [DISTWIDTH] IN BLOOD BY AUTOMATED COUNT: 16.7 %
EST. GFR  (AFRICAN AMERICAN): 14.8 ML/MIN/1.73 M^2
EST. GFR  (NON AFRICAN AMERICAN): 12.8 ML/MIN/1.73 M^2
GLUCOSE SERPL-MCNC: 58 MG/DL
HCT VFR BLD AUTO: 20.3 %
HGB BLD-MCNC: 6.4 G/DL
IMM GRANULOCYTES # BLD AUTO: 0.02 K/UL
IMM GRANULOCYTES NFR BLD AUTO: 0.2 %
LYMPHOCYTES # BLD AUTO: 1.4 K/UL
LYMPHOCYTES NFR BLD: 16.4 %
MAGNESIUM SERPL-MCNC: 2.1 MG/DL
MCH RBC QN AUTO: 28.8 PG
MCHC RBC AUTO-ENTMCNC: 31.5 G/DL
MCV RBC AUTO: 91 FL
MONOCYTES # BLD AUTO: 1.1 K/UL
MONOCYTES NFR BLD: 13.1 %
NEUTROPHILS # BLD AUTO: 5.8 K/UL
NEUTROPHILS NFR BLD: 67.7 %
NRBC BLD-RTO: 0 /100 WBC
NUM UNITS TRANS PACKED RBC: NORMAL
NUM UNITS TRANS PACKED RBC: NORMAL
PHOSPHATE SERPL-MCNC: 4.2 MG/DL
PLATELET # BLD AUTO: 118 K/UL
PMV BLD AUTO: 9.8 FL
POCT GLUCOSE: 108 MG/DL (ref 70–110)
POCT GLUCOSE: 65 MG/DL (ref 70–110)
POCT GLUCOSE: 65 MG/DL (ref 70–110)
POCT GLUCOSE: 83 MG/DL (ref 70–110)
POTASSIUM SERPL-SCNC: 4.7 MMOL/L
RBC # BLD AUTO: 2.22 M/UL
SODIUM SERPL-SCNC: 134 MMOL/L
TRANS ERYTHROCYTES VOL PATIENT: NORMAL ML
TRANS ERYTHROCYTES VOL PATIENT: NORMAL ML
WBC # BLD AUTO: 8.54 K/UL

## 2018-02-28 PROCEDURE — P9016 RBC LEUKOCYTES REDUCED: HCPCS

## 2018-02-28 PROCEDURE — 63600175 PHARM REV CODE 636 W HCPCS: Performed by: PHYSICIAN ASSISTANT

## 2018-02-28 PROCEDURE — 99223 1ST HOSP IP/OBS HIGH 75: CPT | Mod: 79,,, | Performed by: PHYSICIAN ASSISTANT

## 2018-02-28 PROCEDURE — P9021 RED BLOOD CELLS UNIT: HCPCS

## 2018-02-28 PROCEDURE — 25000003 PHARM REV CODE 250: Performed by: PHYSICIAN ASSISTANT

## 2018-02-28 PROCEDURE — 36415 COLL VENOUS BLD VENIPUNCTURE: CPT

## 2018-02-28 PROCEDURE — 80069 RENAL FUNCTION PANEL: CPT

## 2018-02-28 PROCEDURE — 83735 ASSAY OF MAGNESIUM: CPT

## 2018-02-28 PROCEDURE — 90935 HEMODIALYSIS ONE EVALUATION: CPT | Mod: ,,, | Performed by: INTERNAL MEDICINE

## 2018-02-28 PROCEDURE — 99231 SBSQ HOSP IP/OBS SF/LOW 25: CPT | Mod: ,,, | Performed by: ANESTHESIOLOGY

## 2018-02-28 PROCEDURE — 63600175 PHARM REV CODE 636 W HCPCS: Performed by: ANESTHESIOLOGY

## 2018-02-28 PROCEDURE — 90935 HEMODIALYSIS ONE EVALUATION: CPT

## 2018-02-28 PROCEDURE — 90670 PCV13 VACCINE IM: CPT | Performed by: PHYSICIAN ASSISTANT

## 2018-02-28 PROCEDURE — 11000001 HC ACUTE MED/SURG PRIVATE ROOM

## 2018-02-28 PROCEDURE — 90471 IMMUNIZATION ADMIN: CPT | Performed by: PHYSICIAN ASSISTANT

## 2018-02-28 PROCEDURE — 25000003 PHARM REV CODE 250: Performed by: NURSE PRACTITIONER

## 2018-02-28 PROCEDURE — 63600175 PHARM REV CODE 636 W HCPCS: Performed by: INTERNAL MEDICINE

## 2018-02-28 PROCEDURE — 85025 COMPLETE CBC W/AUTO DIFF WBC: CPT

## 2018-02-28 PROCEDURE — 25000003 PHARM REV CODE 250: Performed by: INTERNAL MEDICINE

## 2018-02-28 PROCEDURE — G0009 ADMIN PNEUMOCOCCAL VACCINE: HCPCS | Performed by: PHYSICIAN ASSISTANT

## 2018-02-28 PROCEDURE — 25000003 PHARM REV CODE 250: Performed by: STUDENT IN AN ORGANIZED HEALTH CARE EDUCATION/TRAINING PROGRAM

## 2018-02-28 PROCEDURE — 99232 SBSQ HOSP IP/OBS MODERATE 35: CPT | Mod: ,,, | Performed by: INTERNAL MEDICINE

## 2018-02-28 RX ORDER — HYDROCODONE BITARTRATE AND ACETAMINOPHEN 5; 325 MG/1; MG/1
1 TABLET ORAL EVERY 4 HOURS PRN
Status: DISCONTINUED | OUTPATIENT
Start: 2018-02-28 | End: 2018-03-02

## 2018-02-28 RX ADMIN — HYDROCODONE BITARTRATE AND ACETAMINOPHEN 1 TABLET: 5; 325 TABLET ORAL at 09:02

## 2018-02-28 RX ADMIN — SEVELAMER CARBONATE 1600 MG: 800 TABLET, FILM COATED ORAL at 11:02

## 2018-02-28 RX ADMIN — ROPIVACAINE HYDROCHLORIDE 8 ML/HR: 10 INJECTION, SOLUTION EPIDURAL at 05:02

## 2018-02-28 RX ADMIN — ACETAMINOPHEN 1000 MG: 500 TABLET ORAL at 04:02

## 2018-02-28 RX ADMIN — STANDARDIZED SENNA CONCENTRATE AND DOCUSATE SODIUM 2 TABLET: 8.6; 5 TABLET, FILM COATED ORAL at 09:02

## 2018-02-28 RX ADMIN — GABAPENTIN 100 MG: 100 CAPSULE ORAL at 09:02

## 2018-02-28 RX ADMIN — CINACALCET HYDROCHLORIDE 30 MG: 30 TABLET, COATED ORAL at 09:02

## 2018-02-28 RX ADMIN — ASPIRIN 81 MG: 81 TABLET, COATED ORAL at 08:02

## 2018-02-28 RX ADMIN — SODIUM CHLORIDE: 0.9 INJECTION, SOLUTION INTRAVENOUS at 12:02

## 2018-02-28 RX ADMIN — PNEUMOCOCCAL 13-VALENT CONJUGATE VACCINE 0.5 ML: 2.2; 2.2; 2.2; 2.2; 2.2; 4.4; 2.2; 2.2; 2.2; 2.2; 2.2; 2.2; 2.2 INJECTION, SUSPENSION INTRAMUSCULAR at 01:02

## 2018-02-28 RX ADMIN — Medication 16 G: at 08:02

## 2018-02-28 RX ADMIN — GABAPENTIN 100 MG: 100 CAPSULE ORAL at 04:02

## 2018-02-28 RX ADMIN — MICONAZOLE NITRATE: 20 OINTMENT TOPICAL at 01:02

## 2018-02-28 RX ADMIN — ACETAMINOPHEN 1000 MG: 500 TABLET ORAL at 11:02

## 2018-02-28 RX ADMIN — SODIUM CHLORIDE: 0.9 INJECTION, SOLUTION INTRAVENOUS at 01:02

## 2018-02-28 RX ADMIN — MIDODRINE HYDROCHLORIDE 10 MG: 5 TABLET ORAL at 09:02

## 2018-02-28 RX ADMIN — SEVELAMER CARBONATE 800 MG: 800 TABLET, FILM COATED ORAL at 08:02

## 2018-02-28 RX ADMIN — ROPIVACAINE HYDROCHLORIDE 8 ML/HR: 10 INJECTION, SOLUTION EPIDURAL at 08:02

## 2018-02-28 RX ADMIN — CITALOPRAM HYDROBROMIDE 10 MG: 10 TABLET ORAL at 08:02

## 2018-02-28 RX ADMIN — HEPARIN SODIUM 5000 UNITS: 5000 INJECTION, SOLUTION INTRAVENOUS; SUBCUTANEOUS at 09:02

## 2018-02-28 RX ADMIN — CIPROFLOXACIN HYDROCHLORIDE 500 MG: 250 TABLET, FILM COATED ORAL at 08:02

## 2018-02-28 RX ADMIN — Medication 1 TABLET: at 11:02

## 2018-02-28 RX ADMIN — ATORVASTATIN CALCIUM 20 MG: 20 TABLET, FILM COATED ORAL at 09:02

## 2018-02-28 RX ADMIN — STANDARDIZED SENNA CONCENTRATE AND DOCUSATE SODIUM 2 TABLET: 8.6; 5 TABLET, FILM COATED ORAL at 08:02

## 2018-02-28 NOTE — ASSESSMENT & PLAN NOTE
68 yo M with h/o ESRD on HD (TThS) via L RC AVF (with interposition graft), HTN, HLD, DM 2( Hgb A1c 4.7), HFrEF (45% 4/2017) s/p Right AKA 2/27/18 per Dr. Zhou    -ordered PT/OT  -Pain controlled with PCN, anesthesia following  -Trend daily labs  -Vascular surgery to follow

## 2018-02-28 NOTE — ADDENDUM NOTE
Addendum  created 02/28/18 1332 by Timi Coburn MD    Charge Capture section accepted, Sign clinical note

## 2018-02-28 NOTE — CONSULTS
[facility]  Infectious Disease  Consult Note    Patient Name: Wilder Carbajal  MRN: [id number]  Admission Date: 2/4/2018  Hospital Length of Stay: 24 days  Attending Physician: Laura Helms MD  Primary Care Provider: Dio Roberson MD     Isolation Status: Contact    Patient information was obtained from patient and past medical records.      Inpatient consult to Infectious Diseases  Consult performed by: LISANDRA TRAN  Consult ordered by: LAURA HELMS        Assessment/Plan:     Unilateral AKA, right    67 year old male with hx of ESRD on HD, DM2, CHF admitted with AMS, fluid overload and right necrotic heal ulcer with gangrene of 4th and 5th digits. He was started on empiric Vanc and Zosyn. Patient underwent washout and amputation of 4th and 5th digits on 2/6 and again 2/9. Bone culture grew Citrobacter and ESBL Klebsiella pneumoniae. Fevers and leukocytosis resolved. Patient had severe tissue loss to right foot with evidence of tissue necrosis and dusky appearance to digits  - vascular surgery following; he is now s/p right AKA 2/27 with Dr. Zhou  - ID re-consulted for further antibiotics    Plan:  1. Okay to discontinue antibiotics (cipro) at this time given AKA and adequate source control achieved  2. Will need close f/u with podiatry and vascular for right AKA stump as well as left foot heel dry eschar  3. Will also update pneumonia vaccine- [id number]-13 ordered  4. ID will sign off- f/u PRN            Thank you for your consult. I will sign off. Please contact us if you have any additional questions.  MARYJANE Yanes, pager: [phone]  Infectious Disease  [facility]    Subjective:     Principal Problem: Septic encephalopathy    HPI: Patient is a 68 y/o male with ESRD on dialysis TTS  who presented with leg swelling for two weeks. He states he has had associated blisters on his legs with fluid weeping out of them. Patient stated he missed dialysis yesterday (Saturday)  because [facility] transportation did not [facility]. He denies fevers, chills, URI symptoms, or diarrhea. His son called EMS. Patient was admitted 1/7-1/14 for volume overload due to HD non-compliance. He was still above dry weight and hypoxic when he left AMA. Patient maintaining that he has been complaint with dialysis since until yesterday.    Patient is a poor historian and falling asleep during my exam. Patient was found to have necrotic tissue to the right 4th and 5th digit. MRI was attempted but patient was unable to be still for the exam. He is now s/p amputation of 4th and 5th toes. OR cultures are pending. Wound cultures are positive for citrobacter koseri. He is currently on vancomycin and zosyn.    Interval history: ID signed off on 2/14; patient was on cipro 500 mg PO q 24 hours dosed after HD. Patient has not undergone left AKA with Dr. Zhou and source control has been achieved. ID re-consulted for further antibiotics. Patient feels okay other than pain to right AKA stump.    Past Medical History:   Diagnosis Date    Acute pain of left shoulder 1/7/2017    Arthritis     Asthma     Benign neoplasm of eyelid     RUL    Blood clotting tendency     Blood transfusion     Cataract     Chronic kidney disease requiring chronic dialysis     Diabetes mellitus     Diabetic retinopathy     Fever blister     Hyperlipidemia     Hypertension     Infertility     Joint pain     Retinal detachment     Thyroid disease     Weakness 1/7/2017       Past Surgical History:   Procedure Laterality Date    CATARACT EXTRACTION      COLONOSCOPY N/A 6/23/2017    Procedure: COLONOSCOPY;  Surgeon: Vic Clark MD;  Location: [address];  Service: Endoscopy;  Laterality: N/A;    RETINAL DETACHMENT SURGERY         Review of patient's allergies indicates:  No Known Allergies    Medications:  Prescriptions Prior to Admission   Medication Sig    ammonium lactate (LAC-HYDRIN) 12 % lotion Apply topically as needed  for Dry Skin.    apixaban 5 mg Tab Take 1 tablet (5 mg total) by mouth 2 (two) times daily.    aspirin (ECOTRIN) 81 MG EC tablet Take 81 mg by mouth once daily.     atorvastatin (LIPITOR) 20 MG tablet Take 20 mg by mouth once daily.    cetirizine (ZYRTEC) 10 MG tablet Take 10 mg by mouth once daily.    cinacalcet (SENSIPAR) 30 MG Tab Take 30 mg by mouth every evening.    citalopram (CELEXA) 10 MG tablet Take 10 mg by mouth once daily.    diclofenac sodium (VOLTAREN) 1 % Gel Apply 2 g topically 4 (four) times daily as needed (for pain).    ergocalciferol (ERGOCALCIFEROL) 50,000 unit Cap Take 50,000 Units by mouth every 14 (fourteen) days.     furosemide (LASIX) 80 MG tablet Take 80 mg by mouth once daily.    gabapentin (NEURONTIN) 100 MG capsule Take 100 mg by mouth 3 (three) times daily.    hydrALAZINE (APRESOLINE) 50 MG tablet Take 50 mg by mouth 3 (three) times daily.    hydrocodone-acetaminophen 5-325mg (NORCO) 5-325 mg per tablet Take 1 tablet by mouth every 6 (six) hours as needed for Pain.    ipratropium (ATROVENT HFA) 17 mcg/actuation inhaler Inhale 2 puffs into the lungs 4 (four) times daily. Do not exceed 12 puffs per day    losartan (COZAAR) 50 MG tablet Take 1 tablet (50 mg total) by mouth once daily.    mometasone (NASONEX) 50 mcg/actuation nasal spray 2 sprays by Nasal route once daily.    podofilox (CONDYLOX) 0.5 % external solution Apply a small amount to lesions twice daily for three days, then hold for four days and repeat regimen    sevelamer carbonate (RENVELA) 800 mg Tab Take 2 tablets (1,600 mg total) by mouth 3 (three) times daily with meals. Take two tablets by mouth four times daily with meals and snacks     Antibiotics     Start     Stop Route Frequency Ordered    02/13/18 0900  ciprofloxacin HCl tablet 500 mg      -- Oral Daily 02/12/18 1805    02/09/18 0835  vancomycin (VANCOCIN) 1,000 mg injection     Comments:  Created by cabinet override    02/09 2044 02/09/18 0824         Antifungals     Start     Stop Route Frequency Ordered    02/05/18 2100  miconazole nitrate 2% ointment      -- Top 2 times daily 02/05/18 1934        Antivirals     None           Immunization History   Administered Date(s) Administered    Influenza - Trivalent - PF (ADULT) 10/02/2015    PPD Test 02/05/2018    Pneumococcal Polysaccharide - 23 Valent 05/03/2012    Tdap 09/14/2017       Family History     Problem Relation (Age of Onset)    Depression Child    Heart attack Mother    No Known Problems Father, Sister, Brother, Maternal Aunt, Maternal Uncle, Paternal Aunt, Paternal Uncle, Maternal Grandmother, Maternal Grandfather, Paternal Grandmother, Paternal Grandfather        Social History     Social History    Marital status: Single     Spouse name: N/A    Number of children: 3    Years of education: N/A     Social History Main Topics    Smoking status: Never Smoker    Smokeless tobacco: Never Used    Alcohol use No    Drug use: No    Sexual activity: Not Asked     Other Topics Concern    None     Social History Narrative    Has lived with son for about 2 years now. Formerly worked as a [employer] but quit in 1992 to become member of a [employer].     Review of Systems   Constitutional: Negative for chills and fever.   Respiratory: Negative for shortness of breath.    Cardiovascular: Negative for chest pain.   Gastrointestinal: Negative for abdominal pain, diarrhea and nausea.   Musculoskeletal: Positive for arthralgias (pain to right stump).   Skin: Positive for wound (right AKA and left foot). Negative for rash.   Neurological: Negative for dizziness, weakness and numbness.     Objective:     Vital Signs (Most Recent):  Temp: 99.2 °F (37.3 °C) (02/28/18 0710)  Pulse: 60 (02/28/18 0710)  Resp: 18 (02/28/18 0710)  BP: 100/60 (02/28/18 0710)  SpO2: (!) 92 % (02/28/18 0710) Vital Signs (24h Range):  Temp:  [96.5 °F (35.8 °C)-100.3 °F (37.9 °C)] 99.2 °F (37.3 °C)  Pulse:  [47-65] 60  Resp:  [9-26] 18  SpO2:  [91  %-100 %] 92 %  BP: ()/(44-74) 100/60     Weight: 104 kg (229 lb 4.5 oz)  Body mass index is 34.86 kg/m².    Estimated Creatinine Clearance: 18.8 mL/min (A) (based on SCr of 4.4 mg/dL (H)).    Physical Exam   Constitutional: He is oriented to person, place, and time. He appears well-developed and well-nourished. No distress.   Cardiovascular: Normal rate and regular rhythm.    Murmur heard.  Pulmonary/Chest: Effort normal and breath sounds normal. No respiratory distress.   Abdominal: Soft. Bowel sounds are normal. He exhibits no distension.   Musculoskeletal: Normal range of motion. He exhibits no edema.   Right AKA; wound vac in place; no surrounding erythema around incision.    Left foot with dry eschar noted to heel; no surrounding erythema, drainage.    Neurological: He is alert and oriented to person, place, and time.   Skin: Skin is warm and dry.   Psychiatric: He has a normal mood and affect. His behavior is normal.       Significant Labs:   Blood Culture:   Recent Labs  Lab 02/04/18  1223 02/04/18  1423   LABBLOO No growth after 5 days. No growth after 5 days.     CBC:   Recent Labs  Lab 02/27/18  0351 02/27/18  1625 02/28/18  0537   WBC 7.18 8.28 8.54   HGB 7.5* 7.5* 6.4*   HCT 23.9* 24.1* 20.3*   * 135* 118*     CMP:   Recent Labs  Lab 02/27/18  0351 02/27/18  1625 02/28/18  0537   * 135* 134*   K 4.3 4.5 4.7    102 102   CO2 25 25 25   GLU 63* 95 58*   BUN 25* 27* 32*   CREATININE 3.8* 4.1* 4.4*   CALCIUM 7.9* 7.6* 7.5*   PROT  --  6.5  --    ALBUMIN 1.8* 1.7* 1.6*   BILITOT  --  1.0  --    ALKPHOS  --  97  --    AST  --  15  --    ALT  --  9*  --    ANIONGAP 8 8 7*   EGFRNONAA 15.3* 14.0* 12.8*     Wound Culture:   Recent Labs  Lab 02/05/18  0820 02/06/18  1329 02/09/18  0908   LABAERO CITROBACTER KOSERIMany CITROBACTER KOSERIModerate KLEBSIELLA PNEUMONIAE ESBLFew  CITROBACTER KOSERIFew       Significant Imaging: I have reviewed all pertinent imaging results/findings within the  past 24 hours.

## 2018-02-28 NOTE — PROGRESS NOTES
[facility]  Vascular Surgery  Progress Note    Patient Name: Wilder Carbajal  MRN: [id number]  Admission Date: 2/4/2018  Primary Care Provider: Dio Roberson MD    Subjective:     Interval History: Patient seen at bedside, wound vac intact, no leaks, patient lethargic.    Post-Op Info:  Procedure(s) (LRB):  AMPUTATION-ABOVE KNEE (Right)   1 Day Post-Op       Medications:  Continuous Infusions:   ropivacaine (PF) 2 mg/ml (0.2%) 8 mL/hr (02/27/18 1553)    ropivacaine (PF) 2 mg/ml (0.2%) 8 mL/hr (02/28/18 0503)     Scheduled Meds:   acetaminophen  1,000 mg Oral Q6H    ammonium lactate   Topical (Top) BID    aspirin  81 mg Oral Daily    atorvastatin  20 mg Oral Daily    cinacalcet  30 mg Oral QHS    ciprofloxacin HCl  500 mg Oral Daily    citalopram  10 mg Oral Daily    gabapentin  100 mg Oral TID    heparin (porcine)  5,000 Units Subcutaneous Q8H    miconazole nitrate 2%   Topical (Top) BID    midodrine  10 mg Oral Every Mon, Wed, Fri    senna-docusate 8.6-50 mg  2 tablet Oral BID    sevelamer carbonate  1,600 mg Oral TID WM    sevelamer carbonate  800 mg Oral QHS     PRN Meds:sodium chloride, sodium chloride 0.9%, albumin human 25%, albumin human 25%, albuterol-ipratropium 2.5mg-0.5mg/3mL, dextrose 50%, dextrose 50%, glucagon (human recombinant), glucose, glucose, ondansetron, ondansetron, oxyCODONE **AND** oxyCODONE **AND** oxyCODONE, promethazine (PHENERGAN) IVPB, sodium chloride 0.9%     Objective:     Vital Signs (Most Recent):  Temp: 100.3 °F (37.9 °C) (02/28/18 0424)  Pulse: 64 (02/28/18 0424)  Resp: 18 (02/28/18 0424)  BP: (!) 101/52 (02/28/18 0424)  SpO2: (!) 94 % (02/28/18 0424) Vital Signs (24h Range):  Temp:  [96.5 °F (35.8 °C)-100.3 °F (37.9 °C)] 100.3 °F (37.9 °C)  Pulse:  [47-65] 64  Resp:  [9-26] 18  SpO2:  [90 %-100 %] 94 %  BP: ()/(44-74) 101/52          Physical Exam   Constitutional: No distress.   Cardiovascular: Normal rate.    Femoral 2+ bilaterally  No  palpable distal pulses     Pulmonary/Chest: Effort normal. No respiratory distress.   Abdominal: Soft. He exhibits no distension.   Musculoskeletal: Normal range of motion.   Venous stasis skin changes to left thigh   Skin: Skin is warm and dry.     Wound vac intact, no leaks, draining serous-sanguinous fluid    Significant Labs:  CBC:     Recent Labs  Lab 02/28/18  0537   WBC 8.54   RBC 2.22*   HGB 6.4*   HCT 20.3*   *   MCV 91   MCH 28.8   MCHC 31.5*     CMP:     Recent Labs  Lab 02/27/18  1625 02/28/18  0537   GLU 95 58*   CALCIUM 7.6* 7.5*   ALBUMIN 1.7* 1.6*   PROT 6.5  --    * 134*   K 4.5 4.7   CO2 25 25    102   BUN 27* 32*   CREATININE 4.1* 4.4*   ALKPHOS 97  --    ALT 9*  --    AST 15  --    BILITOT 1.0  --      Coagulation:   No results for input(s): LABPROT, INR, APTT in the last 48 hours.    Significant Diagnostics:      Assessment/Plan:     Foot ulceration, right, with fat layer exposed    66 yo M with h/o ESRD on HD (TThS) via L RC AVF (with interposition graft), HTN, HLD, DM 2( Hgb A1c 4.7), HFrEF (45% 4/2017) s/p Right AKA 2/27/18 per Dr. Zhou    -ordered PT/OT  -Pain controlled with PCN, anesthesia following  -Trend daily labs  -Vascular surgery to follow                  George Escamilla MD  Vascular Surgery  [facility]

## 2018-02-28 NOTE — SUBJECTIVE & OBJECTIVE
Medications:  Continuous Infusions:   ropivacaine (PF) 2 mg/ml (0.2%) 8 mL/hr (02/27/18 1553)    ropivacaine (PF) 2 mg/ml (0.2%) 8 mL/hr (02/28/18 0503)     Scheduled Meds:   acetaminophen  1,000 mg Oral Q6H    ammonium lactate   Topical (Top) BID    aspirin  81 mg Oral Daily    atorvastatin  20 mg Oral Daily    cinacalcet  30 mg Oral QHS    ciprofloxacin HCl  500 mg Oral Daily    citalopram  10 mg Oral Daily    gabapentin  100 mg Oral TID    heparin (porcine)  5,000 Units Subcutaneous Q8H    miconazole nitrate 2%   Topical (Top) BID    midodrine  10 mg Oral Every Mon, Wed, Fri    senna-docusate 8.6-50 mg  2 tablet Oral BID    sevelamer carbonate  1,600 mg Oral TID WM    sevelamer carbonate  800 mg Oral QHS     PRN Meds:sodium chloride, sodium chloride 0.9%, albumin human 25%, albumin human 25%, albuterol-ipratropium 2.5mg-0.5mg/3mL, dextrose 50%, dextrose 50%, glucagon (human recombinant), glucose, glucose, ondansetron, ondansetron, oxyCODONE **AND** oxyCODONE **AND** oxyCODONE, promethazine (PHENERGAN) IVPB, sodium chloride 0.9%     Objective:     Vital Signs (Most Recent):  Temp: 100.3 °F (37.9 °C) (02/28/18 0424)  Pulse: 64 (02/28/18 0424)  Resp: 18 (02/28/18 0424)  BP: (!) 101/52 (02/28/18 0424)  SpO2: (!) 94 % (02/28/18 0424) Vital Signs (24h Range):  Temp:  [96.5 °F (35.8 °C)-100.3 °F (37.9 °C)] 100.3 °F (37.9 °C)  Pulse:  [47-65] 64  Resp:  [9-26] 18  SpO2:  [90 %-100 %] 94 %  BP: ()/(44-74) 101/52          Physical Exam   Constitutional: No distress.   Cardiovascular: Normal rate.    Femoral 2+ bilaterally  No palpable distal pulses     Pulmonary/Chest: Effort normal. No respiratory distress.   Abdominal: Soft. He exhibits no distension.   Musculoskeletal: Normal range of motion.   Venous stasis skin changes to left thigh   Skin: Skin is warm and dry.     Wound vac intact, no leaks, draining serous-sanguinous fluid    Significant Labs:  CBC:     Recent Labs  Lab 02/28/18  0537   WBC  8.54   RBC 2.22*   HGB 6.4*   HCT 20.3*   *   MCV 91   MCH 28.8   MCHC 31.5*     CMP:     Recent Labs  Lab 02/27/18  1625 02/28/18  0537   GLU 95 58*   CALCIUM 7.6* 7.5*   ALBUMIN 1.7* 1.6*   PROT 6.5  --    * 134*   K 4.5 4.7   CO2 25 25    102   BUN 27* 32*   CREATININE 4.1* 4.4*   ALKPHOS 97  --    ALT 9*  --    AST 15  --    BILITOT 1.0  --      Coagulation:   No results for input(s): LABPROT, INR, APTT in the last 48 hours.    Significant Diagnostics:

## 2018-02-28 NOTE — PLAN OF CARE
CM followed up with Yahaira at [facility], plan remains for pt to admit to [facility] (once accepted by facility).  Yahaira states she will also assist SW with changing HD [facility] [place] [facility].  Aware anticipated d/c 3/2 pending medical stability, NH acceptance.  PT/OT to see pt on tomorrow.    Kristy Mallory RN  Case Management  [phone]

## 2018-02-28 NOTE — PROGRESS NOTES
Patient arrived on unit in bed. Unable to obtain pateint weight. Dialysis initiated via left arm AVF with 15g needles x 2 without difficulty. Lines connected and secured. Good blood flows established.

## 2018-02-28 NOTE — PLAN OF CARE
Problem: Patient Care Overview  Goal: Plan of Care Review  Outcome: Ongoing (interventions implemented as appropriate)  Patient AAOx4. No complaints of pain. VS stable, afrebrile. Neurovascular intact. Skin intact, with exception of right leg incision w/ wound vac. Free from falls. Frequent rounds made for safety, pain and comfort. Bed at lowest position, call light within reach, side rails up x2. Son at bedside.

## 2018-02-28 NOTE — ANESTHESIA POST-OP PAIN MANAGEMENT
Acute Pain Service Progress Note    Wilder Carbajal is a 67 y.o., male, [id number].    Surgery:  AMPUTATION-ABOVE KNEE (Right)    Post Op Day #: 1    Catheter type: perineural  femoral and sciatic    Infusion type: Ropivacaine 0.2%  8 basal     Interval hx:  NAEON. Heme noted to femoral catheter. Flushed with sterile syring and test dose given which was negative. Denies any LAST sx.    Problem List:    Active Hospital Problems    Diagnosis  POA    *Septic encephalopathy [G93.41]  Yes    Gangrene of right foot [I96]  Yes    Palliative care encounter [Z51.5]  Not Applicable    Foot ulceration, left, with unspecified severity [L97.529]  Unknown    PVD (peripheral vascular disease) [I73.9]  Unknown    Pyogenic inflammation of bone [M86.9]  Yes    Diabetic foot ulcer [E11.621, L97.509]  Yes    Foot ulceration, right, with fat layer exposed [L97.512]  Yes    Acute delirium [R41.0]  Yes    Protein-calorie malnutrition, severe [E43]  Yes    DVT (deep venous thrombosis) [I82.409]  Yes    DM type 2 with diabetic mixed hyperlipidemia [E11.69, E78.2]  Yes    Diabetic polyneuropathy associated with type 2 diabetes mellitus [E11.42]  Yes    Chronic kidney disease-mineral and bone disorder [N18.9, E83.9, M89.9]  Yes    Depression [F32.9]  Yes    Alteration in skin integrity [R23.9]  Yes    Infected skin ulcer with fat layer exposed [L98.492, L08.9]  Yes    Hyperkalemia [E87.5]  Yes    Hypervolemia [E87.70]  Yes    Type 2 diabetes mellitus with stage 5 chronic kidney disease and hypertension [E11.22, I12.0, N18.5]  Yes    ESRD (end stage renal disease) [N18.6]  Yes    Essential hypertension [I10]  Yes      Resolved Hospital Problems    Diagnosis Date Resolved POA   No resolved problems to display.       Subjective:     General appearance of alert, oriented, no complaints   Pain with rest: 1    Numbers   Pain with movement: 2    Numbers   Side Effects    1. Pruritis No    2. Nausea No    3. Motor Blockade  No    4. Sedation No, 2=slightly drowsy, easily aroused    Objective:     Sciatic catheter clean, dry and intact   Femoral catheter with blood surrounding catheter.      Vitals   Vitals:    02/28/18 0710   BP: 100/60   Pulse: 60   Resp: 18   Temp: 37.3 °C (99.2 °F)        Labs    Admission on 02/04/2018   No results displayed because visit has over 200 results.           Meds   Current Facility-Administered Medications   Medication Dose Route Frequency Provider Last Rate Last Dose    0.9%  NaCl infusion (for blood administration)   Intravenous Q24H PRN Kanika Bradley MD        0.9%  NaCl infusion   Intravenous PRN Luther Dos Santos MD        acetaminophen tablet 1,000 mg  1,000 mg Oral Q6H Arie Rene MD   1,000 mg at 02/28/18 0431    albumin human 25% bottle 12.5 g  12.5 g Intravenous PRN Snehal Ballard MD        albumin human 25% bottle 25 g  25 g Intravenous PRN Shireen Valerio MD   25 g at 02/19/18 0845    albuterol-ipratropium 2.5mg-0.5mg/3mL nebulizer solution 3 mL  3 mL Nebulization Q4H PRN Chasidy Ray PA-C        ammonium lactate 12 % lotion   Topical (Top) BID Montserrat Lino MD        aspirin EC tablet 81 mg  81 mg Oral Daily Montserrat Lino MD   81 mg at 02/28/18 0858    atorvastatin tablet 20 mg  20 mg Oral Daily Montserrat Lino MD   20 mg at 02/26/18 0750    cinacalcet tablet 30 mg  30 mg Oral QHS Montserrat Lino MD   30 mg at 02/27/18 2217    ciprofloxacin HCl tablet 500 mg  500 mg Oral Daily Mary Moss PA-C   500 mg at 02/28/18 0857    citalopram tablet 10 mg  10 mg Oral Daily Montserrat Lino MD   10 mg at 02/28/18 0858    dextrose 50% injection 12.5 g  12.5 g Intravenous PRN Juan M Mcgovern NP        dextrose 50% injection 25 g  25 g Intravenous PRN Juan M Mcgovern NP        gabapentin capsule 100 mg  100 mg Oral TID Montserrat Lino MD   100 mg at 02/28/18 0432    glucagon (human recombinant) injection 1 mg  1 mg Intramuscular PRN Tian. Way,  NP        glucose chewable tablet 16 g  16 g Oral PRN Juan M Mcgovern NP   16 g at 02/28/18 0841    glucose chewable tablet 24 g  24 g Oral PRN Juan M Mcgovern NP        heparin (porcine) injection 5,000 Units  5,000 Units Subcutaneous Q8H Kanika Bradley MD   5,000 Units at 02/27/18 2217    hydrocodone-acetaminophen 5-325mg per tablet 1 tablet  1 tablet Oral Q4H PRN Montserrat Lino MD        miconazole nitrate 2% ointment   Topical (Top) BID Montserrat Lino MD        midodrine tablet 10 mg  10 mg Oral Every Mon, Wed, Fri Montserrat Lino MD   10 mg at 02/26/18 0749    ondansetron disintegrating tablet 8 mg  8 mg Oral Q8H PRN Montserrat Lino MD   8 mg at 02/25/18 0002    ondansetron injection 4 mg  4 mg Intravenous Q8H PRN Arie Gross MD        promethazine (PHENERGAN) 6.25 mg in dextrose 5 % 50 mL IVPB  6.25 mg Intravenous Q6H PRN Arie Gross MD        ropivacaine (PF) 2 mg/ml (0.2%) infusion  8 mL/hr Perineural Continuous Arie Gross MD 8 mL/hr at 02/27/18 1553 8 mL/hr at 02/27/18 1553    ropivacaine (PF) 2 mg/ml (0.2%) infusion  8 mL/hr Perineural Continuous Arie Gross MD 8 mL/hr at 02/28/18 0856 8 mL/hr at 02/28/18 0856    senna-docusate 8.6-50 mg per tablet 2 tablet  2 tablet Oral BID Montserrat Lino MD   2 tablet at 02/28/18 0858    sevelamer carbonate tablet 1,600 mg  1,600 mg Oral TID  Montserrat Lino MD   800 mg at 02/28/18 0859    sevelamer carbonate tablet 800 mg  800 mg Oral QHS Montserrat iLno MD   800 mg at 02/27/18 2216    sodium chloride 0.9% flush 5 mL  5 mL Intravenous PRN Montserrat Lino MD            Anticoagulant dose Aspirin 81 mg daily.    Assessment:     Pain control adequate    Plan:     Patient doing well, continue present treatment. Continue PNC's with multimodals. Heme noted to femoral catheter, catheter flushed and test dose given which was negative. Femoral catheter continued.    Evaluator Arie Rodriguez          I have seen  the patient, reviewed the Resident's assessment and plan. I have personally interviewed and examined the patient at bedside and: agree with the findings.     Pain well controlled with nerve catheters and minimal opioid use.  Continue current regimen    DAVID Coburn MD  Staff Anesthesiologist  [facility] Acute [facility] Service

## 2018-02-28 NOTE — ASSESSMENT & PLAN NOTE
67 year old male with hx of ESRD on HD, DM2, CHF admitted with AMS, fluid overload and right necrotic heal ulcer with gangrene of 4th and 5th digits. He was started on empiric Vanc and Zosyn. Patient underwent washout and amputation of 4th and 5th digits on 2/6 and again 2/9. Bone culture grew Citrobacter and ESBL Klebsiella pneumoniae. Fevers and leukocytosis resolved. Patient had severe tissue loss to right foot with evidence of tissue necrosis and dusky appearance to digits  - vascular surgery following; he is now s/p right AKA 2/27 with Dr. Zhou  - ID re-consulted for further antibiotics    Plan:  1. Okay to discontinue antibiotics (cipro) at this time given AKA and adequate source control achieved  2. Will need close f/u with podiatry and vascular for right AKA stump as well as left foot heel dry eschar  3. ID will sign off- f/u PRN

## 2018-02-28 NOTE — PROGRESS NOTES
Vascular team made aware of bloody drainage noted from wound vac dressing (located at center/posterior portion of incision) MD at bedside reinforcing dressing with Tegaderm. Pt denies pain, VSS. Will continue to monitor.

## 2018-02-28 NOTE — PLAN OF CARE
Sw upload updated to Mai with [facility], Gregoria to follow with other NH/SNF options in [facility] as Pt's HD is established and Sw would have to switch Pt's HD to the [facility]. Sw following with RICARDO BROWER for options. Pt to d/c to [facility] when stable, Sw in process of changing Pt's HD chair to [facility] that [facility] will transport Pt to. Channel in  informed both [facility] and [facility] facility they transport Pt's to. Sw fax request to [facility] in [facility] since Pt was current with [facility] facility, Sw to follow.

## 2018-02-28 NOTE — NURSING TRANSFER
Nursing Transfer Note      2/27/2018     Transfer To: 524    Transfer via bed    Transfer with 2L O2, cardiac monitoring    Transported by PCT x2    Medicines sent: [facility] x2    Chart send with patient: Yes    Notified: son    Patient reassessed at: 2/27/18, 6164

## 2018-02-28 NOTE — PROGRESS NOTES
Progress Note   Hospital Medicine       Team: [facility] D Montserrat Lino MD  Admit Date: 2/4/2018  Length of Stay:  LOS: 24 days   ESTUARDO 3/2/2018  Principal Problem:  Septic encephalopathy    Interval hx / overnight events:Patient underwent AKA.     Areas of concern/ handoff:  Early morning hypoglycemia    Review of Systems: Gen: no fever, no chills, Heart: no chest pain, palpitations; Resp: no SOB, no cough    Temp:  [96.5 °F (35.8 °C)-100.3 °F (37.9 °C)]   Pulse:  [47-65]   Resp:  [9-26]   BP: ()/(44-74)   SpO2:  [91 %-100 %]       Physical Exam:  General appearance: NAD, conversant  Neck: FROM, supple   Lungs: crackles at bases, no accessory muscle use  CV: RRR, no heave  Abdomen: Soft, non-tender; no masses or HSM  Extremities: 2+ pitting edema up to his mid thighs (left)  and 3+ edema to his buttocks,  no digital cyanosis  Skin: no rash, lesions or ulcers  Psych: Alert and oriented to person, place and time      Recent Labs  Lab 02/27/18  0351 02/27/18  1625 02/28/18  0537   WBC 7.18 8.28 8.54   HGB 7.5* 7.5* 6.4*   HCT 23.9* 24.1* 20.3*   * 135* 118*       Recent Labs  Lab 02/26/18  0433 02/27/18  0351 02/27/18  1625 02/28/18  0537   * 134* 135* 134*   K 5.1 4.3 4.5 4.7    101 102 102   CO2 21* 25 25 25   BUN 35* 25* 27* 32*   CREATININE 4.8* 3.8* 4.1* 4.4*   GLU 60* 63* 95 58*   CALCIUM 8.2* 7.9* 7.6* 7.5*   MG 2.2 2.3  --  2.1   PHOS 4.5 3.8  --  4.2       Recent Labs  Lab 02/25/18  0951  02/27/18  0351 02/27/18  1625 02/28/18  0537   ALKPHOS 101  --   --  97  --    ALT 10  --   --  9*  --    AST 15  --   --  15  --    ALBUMIN 2.0*  < > 1.8* 1.7* 1.6*   PROT 7.6  --   --  6.5  --    BILITOT 0.7  --   --  1.0  --    INR 1.3*  --   --   --   --    < > = values in this interval not displayed.    Recent Labs  Lab 02/27/18  0744 02/27/18  0932 02/27/18  1522 02/27/18  1617 02/28/18  0821 02/28/18  0827   POCTGLUCOSE 65* 88 65* 73 65* 65*         Assessment and Plan   Overview: 68 yo here  "for osteomyelitis of right foot. He underwent washout 2/6 and another washout 2/6 and amputation of 4th and 5th digits. Undergoing treatment course of antibiotics.     Problems Addressed today:     Septic encephalopathy, resolved  Patient initially lacked capacity to refuse surgery per psychiatry, consent obtained from his son as proxy. Mental status has improved significantly. He now agrees to to right AKA; son in agreement as well.     Infected right dorsal foot, ischemic ulceration  Podiatry consulted - s/p I&D and 4th and 5th amputation 2/9  Vascular surgery consulted - ultimately recommended AKA of right leg.   Continued vancomycin 1 g IV per random levels and Zosyn 2.25 g IV BID  Wound culture revealed pan-sensitive CITROBACTER KOSERI and KLEBSIELLA PNEUMONIAE ESBL.  To OR 02/09/18. Consulted ID.Recommended 6 weeks of ciprofloxacin. Will reconsult for need of antibiotics after AKA.   ABIs ordered to rule out need for BKA. Angiogram considered. Per vascular surgery "Unlikely for the patient to heal the wound despite aggressive limb salvage efforts. Due to significant lipodermatosclerosis, BKA is not good option. Patient likely needs AKA." Patient initially refused AKA stating he would rather die of infection then have his leg amputated. One Son and Brother in agreement with patient as this aligns with his preference when in his normal mental state. All aware of the risk of recurrence of sepsis syndrome and death. Continuation of wound care and antibiotics likely futile; consulted Palliative Care. After multiple discussions about hospice. Patient stated he wanted to live longer and agreed with AKA. Patient and his eldest Son now wish to proceed with AKA. AKA performed 2/29/2018. Awaiting ID final recs. Will likely discontinue ciprofloxacin.      Hypervolemia due to non-compliance with dialysis  Hyperkalemia, resolved  Shifted in ER with albuterol, insulin/destrose, bicarbonate. K 7.9 => 4  No respiratory " compromise. Emergent dialysis 2/5/18 early AM and on 2/9  Withhold anti-hypertensives for volume removal. He received 2 units PRBC prior to surgery today. Only received 250 mL of NS through surgery. More edematous. Will get nephrology to clear at max rate (he is getting two units of PRBCs).       DVT  BLE ultrasound  Apixaban 5 mg BID - ? Compliance at home  Apixaban held 2/12/2018 for probable BKA - plan to resume at discharge.  Remains on hold for AKA. Will need to resume when OK with Vascular Surgery post-op.     DM II with HTN and CKD V  HgbA1c 4.6%  Likely controlled with declining intake  No further intervention needed at this time    Protein calorie malnutrtion  Hypoglycemia with associated NSVT  Poor prognosis for wound healing  Nepro with meals    Essential HTN - held home hydralazine 50 mg TID and losartan 50 mg daily. Blood pressure fairly well-controlled during stay without antihypertensives.     DM II hyperlipidemia - atorvastatin 40 mg daily     DM II neuropathy - gabapentin 100 mg TID     ESRF HD TTS - Nephrology consulted for dialysis.  Withholding anti-hypertensives and added midodrine as needed prior to dialysis for volume removal and reduce symptomatic dialysis-associated hypotension.   BP improving.     CKD MBD - continue cinacalcet 30 mg qhs and sevelamer 1600 mg TID daily     Depression - continue citalopram 10 mg daily. Consulted psychiatry. Back to baseline.     acue blood loss anemia - Hgb at 6.4 after two units given pre-operatively and operatively. Will give 2 more units.     Anemia of CKD - will discuss need for epogen with nephrology    Anemia of folate deficiency - folbic once daily     Anticipated Disposition: Skilled Nursing Facility post-op   ESTUARDO: 3/2/2018     Goals of Care:  General Prognosis: fair   Goals: Curative  Comfort Only: No  Hospice: No  Code Status: Full Code    Montserrat Lino MD

## 2018-02-28 NOTE — SUBJECTIVE & OBJECTIVE
Past Medical History:   Diagnosis Date    Acute pain of left shoulder 1/7/2017    Arthritis     Asthma     Benign neoplasm of eyelid     RUL    Blood clotting tendency     Blood transfusion     Cataract     Chronic kidney disease requiring chronic dialysis     Diabetes mellitus     Diabetic retinopathy     Fever blister     Hyperlipidemia     Hypertension     Infertility     Joint pain     Retinal detachment     Thyroid disease     Weakness 1/7/2017       Past Surgical History:   Procedure Laterality Date    CATARACT EXTRACTION      COLONOSCOPY N/A 6/23/2017    Procedure: COLONOSCOPY;  Surgeon: Vic Clark MD;  Location: [address]);  Service: Endoscopy;  Laterality: N/A;    RETINAL DETACHMENT SURGERY         Review of patient's allergies indicates:  No Known Allergies    Medications:  Prescriptions Prior to Admission   Medication Sig    ammonium lactate (LAC-HYDRIN) 12 % lotion Apply topically as needed for Dry Skin.    apixaban 5 mg Tab Take 1 tablet (5 mg total) by mouth 2 (two) times daily.    aspirin (ECOTRIN) 81 MG EC tablet Take 81 mg by mouth once daily.     atorvastatin (LIPITOR) 20 MG tablet Take 20 mg by mouth once daily.    cetirizine (ZYRTEC) 10 MG tablet Take 10 mg by mouth once daily.    cinacalcet (SENSIPAR) 30 MG Tab Take 30 mg by mouth every evening.    citalopram (CELEXA) 10 MG tablet Take 10 mg by mouth once daily.    diclofenac sodium (VOLTAREN) 1 % Gel Apply 2 g topically 4 (four) times daily as needed (for pain).    ergocalciferol (ERGOCALCIFEROL) 50,000 unit Cap Take 50,000 Units by mouth every 14 (fourteen) days.     furosemide (LASIX) 80 MG tablet Take 80 mg by mouth once daily.    gabapentin (NEURONTIN) 100 MG capsule Take 100 mg by mouth 3 (three) times daily.    hydrALAZINE (APRESOLINE) 50 MG tablet Take 50 mg by mouth 3 (three) times daily.    hydrocodone-acetaminophen 5-325mg (NORCO) 5-325 mg per tablet Take 1 tablet by mouth every 6 (six) hours  as needed for Pain.    ipratropium (ATROVENT HFA) 17 mcg/actuation inhaler Inhale 2 puffs into the lungs 4 (four) times daily. Do not exceed 12 puffs per day    losartan (COZAAR) 50 MG tablet Take 1 tablet (50 mg total) by mouth once daily.    mometasone (NASONEX) 50 mcg/actuation nasal spray 2 sprays by Nasal route once daily.    podofilox (CONDYLOX) 0.5 % external solution Apply a small amount to lesions twice daily for three days, then hold for four days and repeat regimen    sevelamer carbonate (RENVELA) 800 mg Tab Take 2 tablets (1,600 mg total) by mouth 3 (three) times daily with meals. Take two tablets by mouth four times daily with meals and snacks     Antibiotics     Start     Stop Route Frequency Ordered    02/13/18 0900  ciprofloxacin HCl tablet 500 mg      -- Oral Daily 02/12/18 1805    02/09/18 0835  vancomycin (VANCOCIN) 1,000 mg injection     Comments:  Created by cabinet override    02/09 2044   02/09/18 0835        Antifungals     Start     Stop Route Frequency Ordered    02/05/18 2100  miconazole nitrate 2% ointment      -- Top 2 times daily 02/05/18 1934        Antivirals     None           Immunization History   Administered Date(s) Administered    Influenza - Trivalent - PF (ADULT) 10/02/2015    PPD Test 02/05/2018    Pneumococcal Polysaccharide - 23 Valent 05/03/2012    Tdap 09/14/2017       Family History     Problem Relation (Age of Onset)    Depression Child    Heart attack Mother    No Known Problems Father, Sister, Brother, Maternal Aunt, Maternal Uncle, Paternal Aunt, Paternal Uncle, Maternal Grandmother, Maternal Grandfather, Paternal Grandmother, Paternal Grandfather        Social History     Social History    Marital status: Single     Spouse name: N/A    Number of children: 3    Years of education: N/A     Social History Main Topics    Smoking status: Never Smoker    Smokeless tobacco: Never Used    Alcohol use No    Drug use: No    Sexual activity: Not Asked      Other Topics Concern    None     Social History Narrative    Has lived with son for about 2 years now. 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     Review of Systems   Constitutional: Negative for chills and fever.   Respiratory: Negative for shortness of breath.    Cardiovascular: Negative for chest pain.   Gastrointestinal: Negative for abdominal pain, diarrhea and nausea.   Musculoskeletal: Positive for arthralgias (pain to right stump).   Skin: Positive for wound (right AKA and left foot). Negative for rash.   Neurological: Negative for dizziness, weakness and numbness.     Objective:     Vital Signs (Most Recent):  Temp: 99.2 °F (37.3 °C) (02/28/18 0710)  Pulse: 60 (02/28/18 0710)  Resp: 18 (02/28/18 0710)  BP: 100/60 (02/28/18 0710)  SpO2: (!) 92 % (02/28/18 0710) Vital Signs (24h Range):  Temp:  [96.5 °F (35.8 °C)-100.3 °F (37.9 °C)] 99.2 °F (37.3 °C)  Pulse:  [47-65] 60  Resp:  [9-26] 18  SpO2:  [91 %-100 %] 92 %  BP: ()/(44-74) 100/60     Weight: 104 kg (229 lb 4.5 oz)  Body mass index is 34.86 kg/m².    Estimated Creatinine Clearance: 18.8 mL/min (A) (based on SCr of 4.4 mg/dL (H)).    Physical Exam   Constitutional: He is oriented to person, place, and time. He appears well-developed and well-nourished. No distress.   Cardiovascular: Normal rate and regular rhythm.    Murmur heard.  Pulmonary/Chest: Effort normal and breath sounds normal. No respiratory distress.   Abdominal: Soft. Bowel sounds are normal. He exhibits no distension.   Musculoskeletal: Normal range of motion. He exhibits no edema.   Right AKA; wound vac in place; no surrounding erythema around incision.    Left foot with dry eschar noted to heel; no surrounding erythema, drainage.    Neurological: He is alert and oriented to person, place, and time.   Skin: Skin is warm and dry.   Psychiatric: He has a normal mood and affect. His behavior is normal.       Significant Labs:   Blood Culture:   Recent  Labs  Lab 02/04/18  1223 02/04/18  1423   LABBLOO No growth after 5 days. No growth after 5 days.     CBC:   Recent Labs  Lab 02/27/18  0351 02/27/18  1625 02/28/18  0537   WBC 7.18 8.28 8.54   HGB 7.5* 7.5* 6.4*   HCT 23.9* 24.1* 20.3*   * 135* 118*     CMP:   Recent Labs  Lab 02/27/18  0351 02/27/18  1625 02/28/18  0537   * 135* 134*   K 4.3 4.5 4.7    102 102   CO2 25 25 25   GLU 63* 95 58*   BUN 25* 27* 32*   CREATININE 3.8* 4.1* 4.4*   CALCIUM 7.9* 7.6* 7.5*   PROT  --  6.5  --    ALBUMIN 1.8* 1.7* 1.6*   BILITOT  --  1.0  --    ALKPHOS  --  97  --    AST  --  15  --    ALT  --  9*  --    ANIONGAP 8 8 7*   EGFRNONAA 15.3* 14.0* 12.8*     Wound Culture:   Recent Labs  Lab 02/05/18  0820 02/06/18  1329 02/09/18  0908   LABAERO CITROBACTER KOSERIMany CITROBACTER KOSERIModerate KLEBSIELLA PNEUMONIAE ESBLFew  CITROBACTER KOSERIFew       Significant Imaging: I have reviewed all pertinent imaging results/findings within the past 24 hours.

## 2018-03-01 LAB
POCT GLUCOSE: 66 MG/DL (ref 70–110)
POCT GLUCOSE: 87 MG/DL (ref 70–110)
POCT GLUCOSE: 94 MG/DL (ref 70–110)

## 2018-03-01 PROCEDURE — 97535 SELF CARE MNGMENT TRAINING: CPT

## 2018-03-01 PROCEDURE — 97168 OT RE-EVAL EST PLAN CARE: CPT

## 2018-03-01 PROCEDURE — 63600175 PHARM REV CODE 636 W HCPCS: Performed by: INTERNAL MEDICINE

## 2018-03-01 PROCEDURE — 97530 THERAPEUTIC ACTIVITIES: CPT

## 2018-03-01 PROCEDURE — 63600175 PHARM REV CODE 636 W HCPCS: Performed by: ANESTHESIOLOGY

## 2018-03-01 PROCEDURE — 99231 SBSQ HOSP IP/OBS SF/LOW 25: CPT | Mod: ,,, | Performed by: ANESTHESIOLOGY

## 2018-03-01 PROCEDURE — 99232 SBSQ HOSP IP/OBS MODERATE 35: CPT | Mod: ,,, | Performed by: INTERNAL MEDICINE

## 2018-03-01 PROCEDURE — 25000003 PHARM REV CODE 250: Performed by: NURSE PRACTITIONER

## 2018-03-01 PROCEDURE — 11000001 HC ACUTE MED/SURG PRIVATE ROOM

## 2018-03-01 PROCEDURE — 25000003 PHARM REV CODE 250: Performed by: INTERNAL MEDICINE

## 2018-03-01 PROCEDURE — 97164 PT RE-EVAL EST PLAN CARE: CPT

## 2018-03-01 RX ORDER — SODIUM CHLORIDE 9 MG/ML
INJECTION, SOLUTION INTRAVENOUS
Status: DISCONTINUED | OUTPATIENT
Start: 2018-03-02 | End: 2018-03-06 | Stop reason: HOSPADM

## 2018-03-01 RX ORDER — SODIUM CHLORIDE 9 MG/ML
INJECTION, SOLUTION INTRAVENOUS ONCE
Status: DISCONTINUED | OUTPATIENT
Start: 2018-03-02 | End: 2018-03-02

## 2018-03-01 RX ADMIN — Medication 1 TABLET: at 09:03

## 2018-03-01 RX ADMIN — MICONAZOLE NITRATE: 20 OINTMENT TOPICAL at 09:03

## 2018-03-01 RX ADMIN — CINACALCET HYDROCHLORIDE 30 MG: 30 TABLET, COATED ORAL at 09:03

## 2018-03-01 RX ADMIN — SEVELAMER CARBONATE 800 MG: 800 TABLET, FILM COATED ORAL at 09:03

## 2018-03-01 RX ADMIN — HEPARIN SODIUM 5000 UNITS: 5000 INJECTION, SOLUTION INTRAVENOUS; SUBCUTANEOUS at 10:03

## 2018-03-01 RX ADMIN — SEVELAMER CARBONATE 1600 MG: 800 TABLET, FILM COATED ORAL at 12:03

## 2018-03-01 RX ADMIN — Medication 16 G: at 06:03

## 2018-03-01 RX ADMIN — MICONAZOLE NITRATE: 20 OINTMENT TOPICAL at 01:03

## 2018-03-01 RX ADMIN — ATORVASTATIN CALCIUM 20 MG: 20 TABLET, FILM COATED ORAL at 08:03

## 2018-03-01 RX ADMIN — GABAPENTIN 100 MG: 100 CAPSULE ORAL at 06:03

## 2018-03-01 RX ADMIN — ROPIVACAINE HYDROCHLORIDE 8 ML/HR: 10 INJECTION, SOLUTION EPIDURAL at 04:03

## 2018-03-01 RX ADMIN — Medication: at 01:03

## 2018-03-01 RX ADMIN — HEPARIN SODIUM 5000 UNITS: 5000 INJECTION, SOLUTION INTRAVENOUS; SUBCUTANEOUS at 06:03

## 2018-03-01 RX ADMIN — CITALOPRAM HYDROBROMIDE 10 MG: 10 TABLET ORAL at 08:03

## 2018-03-01 RX ADMIN — ASPIRIN 81 MG: 81 TABLET, COATED ORAL at 08:03

## 2018-03-01 RX ADMIN — GABAPENTIN 100 MG: 100 CAPSULE ORAL at 01:03

## 2018-03-01 RX ADMIN — GABAPENTIN 100 MG: 100 CAPSULE ORAL at 09:03

## 2018-03-01 RX ADMIN — Medication: at 09:03

## 2018-03-01 RX ADMIN — HYDROCODONE BITARTRATE AND ACETAMINOPHEN 1 TABLET: 5; 325 TABLET ORAL at 08:03

## 2018-03-01 RX ADMIN — SEVELAMER CARBONATE 1600 MG: 800 TABLET, FILM COATED ORAL at 05:03

## 2018-03-01 RX ADMIN — STANDARDIZED SENNA CONCENTRATE AND DOCUSATE SODIUM 2 TABLET: 8.6; 5 TABLET, FILM COATED ORAL at 09:03

## 2018-03-01 RX ADMIN — ROPIVACAINE HYDROCHLORIDE 8 ML/HR: 10 INJECTION, SOLUTION EPIDURAL at 06:03

## 2018-03-01 RX ADMIN — SEVELAMER CARBONATE 1600 MG: 800 TABLET, FILM COATED ORAL at 08:03

## 2018-03-01 RX ADMIN — ROPIVACAINE HYDROCHLORIDE 8 ML/HR: 10 INJECTION, SOLUTION EPIDURAL at 05:03

## 2018-03-01 RX ADMIN — HEPARIN SODIUM 5000 UNITS: 5000 INJECTION, SOLUTION INTRAVENOUS; SUBCUTANEOUS at 01:03

## 2018-03-01 RX ADMIN — STANDARDIZED SENNA CONCENTRATE AND DOCUSATE SODIUM 2 TABLET: 8.6; 5 TABLET, FILM COATED ORAL at 08:03

## 2018-03-01 NOTE — PLAN OF CARE
Gregoria updated Juan Daniel on POC for d/c possibly Monday when vac is discontinued, Sw to follow with [facility] chair change to [facility].

## 2018-03-01 NOTE — ASSESSMENT & PLAN NOTE
66 yo M with h/o ESRD on HD (TThS) via L RC AVF (with interposition graft), HTN, HLD, DM 2( Hgb A1c 4.7), HFrEF (45% 4/2017)     s/p Right AKA 2/27/18 per Dr. Zhou    -Continue PT/OT  -Pain controlled with perineural catheter, anesthesia following  -Will continue wound vac to RLE, dressing changed today.   -Vascular surgery to follow

## 2018-03-01 NOTE — PT/OT/SLP RE-EVAL
"Physical Therapy Re-evaluation    Patient Name:  Wilder Carbajal   MRN:  [id number]    Recommendations:     Discharge Recommendations:  nursing facility, skilled   Discharge Equipment Recommendations:  (TBD)   Barriers to discharge: None    Assessment:     Wilder Carbajal is a 67 y.o. male admitted with a medical diagnosis of Septic encephalopathy.  He presents with the following impairments/functional limitations:  weakness, impaired endurance, impaired self care skills, impaired functional mobilty, impaired balance, decreased lower extremity function, impaired skin, pain Pt. cooperative and tolerated treatment fairly well, but pt. fatigued quickly and required assistance with sitting balance.    Rehab Prognosis:  fair; patient would benefit from acute skilled PT services to address these deficits and reach maximum level of function.      Recent Surgery: Procedure(s) (LRB):  AMPUTATION-ABOVE KNEE (Right) 2 Days Post-Op    Plan:     During this hospitalization, patient to be seen 4 x/week to address the above listed problems via therapeutic activities, therapeutic exercises, wheelchair management/training  · Plan of Care Expires:  03/31/18   Plan of Care Reviewed with: patient    Subjective     Communicated with nursing prior to session.  Patient found supine upon PT entry to room, agreeable to evaluation.      Chief Complaint: generalized pain  Patient comments/goals: "thank you"  Pain/Comfort:  · Pain Rating 1: 10/10  · Location - Side 1: Right  · Location - Orientation 1: generalized  · Location 1: leg  · Pain Addressed 1: Pre-medicate for activity, Reposition, Cessation of Activity  · Pain Rating Post-Intervention 1:  (pt. did not rate)    Patients cultural, spiritual, Confucianist conflicts given the current situation: none stated      Objective:     Patient found with: wound vac, telemetry, peripheral IV, perineural catheter     General Precautions: Standard, fall   Orthopedic Precautions:RLE non weight " bearing, LLE non weight bearing (need to verify WB status (L) LE)   Braces:       Exams:  · RUE ROM: WFL  · RUE Strength: WFL  · LUE ROM: WFL  · LUE Strength: WFL  · RLE ROM: WFL  · RLE Strength: pain limited  · LLE ROM: WFL  · LLE Strength: pain limited    Functional Mobility:  · Bed Mobility:     · Rolling Left:  maximal assistance  · Rolling Right: maximal assistance  · Scooting: maximal assistance  · Supine to Sit: maximal assistance  · Sit to Supine: maximal assistance  · Balance: poor static sitting balance (Mod-Max A)    AM-PAC 6 CLICK MOBILITY  Total Score:8       Therapeutic Activities and Exercises:   Pt. Performed sitting balance/tolerance on EOB and rolling side-to-side to cleaned and linen changed. Discussed PT POC.    Patient left supine with all lines intact and call button in reach.    GOALS:    Physical Therapy Goals        Problem: Physical Therapy Goal    Goal Priority Disciplines Outcome Goal Variances Interventions   Physical Therapy Goal     PT/OT, PT Ongoing (interventions implemented as appropriate)     Description:  Goals to be met by: 3/2/18 (10 days), extended to 3/15/2018    Patient will increase functional independence with mobility by performin. Supine to sit with Contact Guard Assist  2. Sit to supine with Contact Guard Assist  3. Bed to chair transfer with sliding board with Contact Guard assistance.   4. Navigate  feet with supervision.   5. Lower extremity exercise program x 30 reps per handout, with independence                          History:     Past Medical History:   Diagnosis Date    Acute pain of left shoulder 2017    Arthritis     Asthma     Benign neoplasm of eyelid     RUL    Blood clotting tendency     Blood transfusion     Cataract     Chronic kidney disease requiring chronic dialysis     Diabetes mellitus     Diabetic retinopathy     Fever blister     Hyperlipidemia     Hypertension     Infertility     Joint pain     Retinal detachment      Thyroid disease     Weakness 1/7/2017       Past Surgical History:   Procedure Laterality Date    CATARACT EXTRACTION      COLONOSCOPY N/A 6/23/2017    Procedure: COLONOSCOPY;  Surgeon: Vic Clark MD;  Location: [address];  Service: Endoscopy;  Laterality: N/A;    RETINAL DETACHMENT SURGERY         Clinical Decision Making:     History  Co-morbidities and personal factors that may impact the plan of care Examination  Body Structures and Functions, activity limitations and participation restrictions that may impact the plan of care Clinical Presentation   Decision Making/ Complexity Score   Co-morbidities:   [] Time since onset of injury / illness / exacerbation  [] Status of current condition  []Patient's cognitive status and safety concerns    [] Multiple Medical Problems (see med hx)  Personal Factors:   [] Patient's age  [] Prior Level of function   [] Patient's home situation (environment and family support)  [] Patient's level of motivation  [] Expected progression of patient      HISTORY:(criteria)    [] 76311 - no personal factors/history    [] 64972 - has 1-2 personal factor/comorbidity     [] 95203 - has >3 personal factor/comorbidity     Body Regions:  [] Objective examination findings  [] Head     []  Neck  [] Trunk   [] Upper Extremity  [] Lower Extremity    Body Systems:  [] For communication ability, affect, cognition, language, and learning style: the assessment of the ability to make needs known, consciousness, orientation (person, place, and time), expected emotional /behavioral responses, and learning preferences (eg, learning barriers, education  needs)  [] For the neuromuscular system: a general assessment of gross coordinated movement (eg, balance, gait, locomotion, transfers, and transitions) and motor function  (motor control and motor learning)  [] For the musculoskeletal system: the assessment of gross symmetry, gross range of motion, gross strength, height, and weight  []  For the integumentary system: the assessment of pliability(texture), presence of scar formation, skin color, and skin integrity  [] For cardiovascular/pulmonary system: the assessment of heart rate, respiratory rate, blood pressure, and edema     Activity limitations:    [] Patient's cognitive status and saf ety concerns          [] Status of current condition      [] Weight bearing restriction  [] Cardiopulmunary Restriction    Participation Restrictions:   [] Goals and goal agreement with the patient     [] Rehab potential (prognosis) and probable outcome      Examination of Body System: (criteria)    [] 82597 - addressing 1-2 elements    [] 23745 - addressing a total of 3 or more elements     [] 71785 -  Addressing a total of 4 or more elements         Clinical Presentation: (criteria)  Choose one     On examination of body system using standardized tests and measures patient presents with (CHOOSE ONE) elements from any of the following: body structures and functions, activity limitations, and/or participation restrictions.  Leading to a clinical presentation that is considered (CHOOSE ONE)                              Clinical Decision Making  (Eval Complexity):  Choose One     Time Tracking:     PT Received On: 03/01/18  PT Start Time: 0946     PT Stop Time: 1011  PT Total Time (min): 25 min     Billable Minutes: Re-eval 15 and Therapeutic Activity 10      El Shafer, PT  03/01/2018

## 2018-03-01 NOTE — PLAN OF CARE
Problem: Occupational Therapy Goal  Goal: Occupational Therapy Goal  Goals to be met by: 3/15/2018     Patient will increase functional independence with ADLs by performing:    Feeding with [place].  UE Dressing with Moderate Assistance.  LE Dressing with Moderate Assistance.  Grooming while seated with Minimal Assistance.  Toileting from bedside commode with Maximum Assistance for hygiene and clothing management.   Sitting at edge of bed x10 minutes with Stand-by Assistance.  Supine to sit with Moderate Assistance.  Stand pivot transfers with Maximum Assistance.  Toilet transfer to bedside commode with Maximum Assistance.       Initiate OT POC     Comments: Linden Douglas OTR/L  3/1/2018

## 2018-03-01 NOTE — PLAN OF CARE
Call from [facility] regarding wound vac.  Facility states they cannot accept pt with wound vac, not a skill they provide.  Primary staff will discuss with [facility] possibly d/c'ing with wet to dry dressings only vs remaining inpatient until vac can be removed.  Will cont to follow.  Of note, pt has had multiple denials and has limited options due to insurance coverage.    Kristy Mallory RN  Case Management  [id number]

## 2018-03-01 NOTE — PROGRESS NOTES
Dialysis tx completed.3 hours. 700 ml removed. Needles pulled from left arm AVF. Manual pressure held. Hemostasis achieved. Gauze and tape to sites. Pt received 2 units PRBC's during tx. Tolerated tx well.

## 2018-03-01 NOTE — SUBJECTIVE & OBJECTIVE
Medications:  Continuous Infusions:   ropivacaine (PF) 2 mg/ml (0.2%) 8 mL/hr (03/01/18 0622)    ropivacaine (PF) 2 mg/ml (0.2%) 8 mL/hr (02/28/18 0856)     Scheduled Meds:   ammonium lactate   Topical (Top) BID    aspirin  81 mg Oral Daily    atorvastatin  20 mg Oral Daily    cinacalcet  30 mg Oral QHS    citalopram  10 mg Oral Daily    folic acid-vit B6-vit B12 2.5-25-2 mg  1 tablet Oral Daily    gabapentin  100 mg Oral TID    heparin (porcine)  5,000 Units Subcutaneous Q8H    miconazole nitrate 2%   Topical (Top) BID    midodrine  10 mg Oral Every Mon, Wed, Fri    senna-docusate 8.6-50 mg  2 tablet Oral BID    sevelamer carbonate  1,600 mg Oral TID WM    sevelamer carbonate  800 mg Oral QHS     PRN Meds:sodium chloride, sodium chloride 0.9%, albumin human 25%, albumin human 25%, albuterol-ipratropium 2.5mg-0.5mg/3mL, dextrose 50%, dextrose 50%, glucagon (human recombinant), glucose, glucose, hydrocodone-acetaminophen 5-325mg, ondansetron, ondansetron, promethazine (PHENERGAN) IVPB, sodium chloride 0.9%     Objective:     Vital Signs (Most Recent):  Temp: 99.3 °F (37.4 °C) (03/01/18 0745)  Pulse: (!) 54 (03/01/18 0745)  Resp: 16 (03/01/18 0745)  BP: (!) 108/56 (03/01/18 0745)  SpO2: 97 % (03/01/18 0745) Vital Signs (24h Range):  Temp:  [97.6 °F (36.4 °C)-99.3 °F (37.4 °C)] 99.3 °F (37.4 °C)  Pulse:  [50-69] 54  Resp:  [16-18] 16  SpO2:  [95 %-97 %] 97 %  BP: ()/(41-85) 108/56          Physical Exam   Constitutional: No distress.   Cardiovascular: Normal rate.    Femoral 2+ bilaterally  No palpable distal pulses     Pulmonary/Chest: Effort normal. No respiratory distress.   Abdominal: Soft. He exhibits no distension.   Musculoskeletal: Normal range of motion.   Venous stasis skin changes to left thigh   Skin: Skin is warm and dry.     Wound vac intact, no leaks, draining serous-sanguinous fluid    Significant Labs:  CBC:     Recent Labs  Lab 02/28/18  0537   WBC 8.54   RBC 2.22*   HGB 6.4*    HCT 20.3*   *   MCV 91   MCH 28.8   MCHC 31.5*     CMP:     Recent Labs  Lab 02/27/18  1625 02/28/18  0537   GLU 95 58*   CALCIUM 7.6* 7.5*   ALBUMIN 1.7* 1.6*   PROT 6.5  --    * 134*   K 4.5 4.7   CO2 25 25    102   BUN 27* 32*   CREATININE 4.1* 4.4*   ALKPHOS 97  --    ALT 9*  --    AST 15  --    BILITOT 1.0  --      Coagulation:   No results for input(s): LABPROT, INR, APTT in the last 48 hours.    Significant Diagnostics:

## 2018-03-01 NOTE — ADDENDUM NOTE
Addendum  created 03/01/18 1301 by Timi Coburn MD    Charge Capture section accepted, Sign clinical note

## 2018-03-01 NOTE — PLAN OF CARE
Problem: Physical Therapy Goal  Goal: Physical Therapy Goal  Goals to be met by: 3/2/18 (10 days), extended to 3/15/2018    Patient will increase functional independence with mobility by performin. Supine to sit with Contact Guard Assist  2. Sit to supine with Contact Guard Assist  3. Bed to chair transfer with sliding board with Contact Guard assistance.   4. Navigate  feet with supervision.   5. Lower extremity exercise program x 30 reps per handout, with independence        Outcome: Ongoing (interventions implemented as appropriate)  Goals re-established and extended

## 2018-03-01 NOTE — PROGRESS NOTES
[facility]  Vascular Surgery  Progress Note    Patient Name: Wilder Carbajal  MRN: [id number]  Admission Date: 2/4/2018  Primary Care Provider: Dio Roberson MD    Subjective:     Interval History: NAEON. Wound vac changed this morning    Post-Op Info:  Procedure(s) (LRB):  AMPUTATION-ABOVE KNEE (Right)   2 Days Post-Op       Medications:  Continuous Infusions:   ropivacaine (PF) 2 mg/ml (0.2%) 8 mL/hr (03/01/18 0622)    ropivacaine (PF) 2 mg/ml (0.2%) 8 mL/hr (02/28/18 0856)     Scheduled Meds:   ammonium lactate   Topical (Top) BID    aspirin  81 mg Oral Daily    atorvastatin  20 mg Oral Daily    cinacalcet  30 mg Oral QHS    citalopram  10 mg Oral Daily    folic acid-vit B6-vit B12 2.5-25-2 mg  1 tablet Oral Daily    gabapentin  100 mg Oral TID    heparin (porcine)  5,000 Units Subcutaneous Q8H    miconazole nitrate 2%   Topical (Top) BID    midodrine  10 mg Oral Every Mon, Wed, Fri    senna-docusate 8.6-50 mg  2 tablet Oral BID    sevelamer carbonate  1,600 mg Oral TID WM    sevelamer carbonate  800 mg Oral QHS     PRN Meds:sodium chloride, sodium chloride 0.9%, albumin human 25%, albumin human 25%, albuterol-ipratropium 2.5mg-0.5mg/3mL, dextrose 50%, dextrose 50%, glucagon (human recombinant), glucose, glucose, hydrocodone-acetaminophen 5-325mg, ondansetron, ondansetron, promethazine (PHENERGAN) IVPB, sodium chloride 0.9%     Objective:     Vital Signs (Most Recent):  Temp: 99.3 °F (37.4 °C) (03/01/18 0745)  Pulse: (!) 54 (03/01/18 0745)  Resp: 16 (03/01/18 0745)  BP: (!) 108/56 (03/01/18 0745)  SpO2: 97 % (03/01/18 0745) Vital Signs (24h Range):  Temp:  [97.6 °F (36.4 °C)-99.3 °F (37.4 °C)] 99.3 °F (37.4 °C)  Pulse:  [50-69] 54  Resp:  [16-18] 16  SpO2:  [95 %-97 %] 97 %  BP: ()/(41-85) 108/56          Physical Exam   Constitutional: No distress.   Cardiovascular: Normal rate.    Femoral 2+ bilaterally  No palpable distal pulses     Pulmonary/Chest: Effort normal. No  respiratory distress.   Abdominal: Soft. He exhibits no distension.   Musculoskeletal: Normal range of motion.   Venous stasis skin changes to left thigh   Skin: Skin is warm and dry.     Wound vac intact, no leaks, draining serous-sanguinous fluid    Significant Labs:  CBC:     Recent Labs  Lab 02/28/18  0537   WBC 8.54   RBC 2.22*   HGB 6.4*   HCT 20.3*   *   MCV 91   MCH 28.8   MCHC 31.5*     CMP:     Recent Labs  Lab 02/27/18  1625 02/28/18  0537   GLU 95 58*   CALCIUM 7.6* 7.5*   ALBUMIN 1.7* 1.6*   PROT 6.5  --    * 134*   K 4.5 4.7   CO2 25 25    102   BUN 27* 32*   CREATININE 4.1* 4.4*   ALKPHOS 97  --    ALT 9*  --    AST 15  --    BILITOT 1.0  --      Coagulation:   No results for input(s): LABPROT, INR, APTT in the last 48 hours.    Significant Diagnostics:      Assessment/Plan:     Foot ulceration, right, with fat layer exposed    68 yo M with h/o ESRD on HD (TThS) via L RC AVF (with interposition graft), HTN, HLD, DM 2( Hgb A1c 4.7), HFrEF (45% 4/2017)     s/p Right AKA 2/27/18 per Dr. Zhou    -Continue PT/OT  -Pain controlled with perineural catheter, anesthesia following  -Will continue wound vac to RLE, dressing changed today.   -Vascular surgery to follow                Zheng Colby, DO  Vascular Surgery  [facility]

## 2018-03-01 NOTE — PT/OT/SLP RE-EVAL
Occupational Therapy   Re-evaluation    Name: Wilder Carbajal  MRN: [id number]  Admitting Diagnosis:  Septic encephalopathy 2 Days Post-Op    Recommendations:     Discharge Recommendations: nursing facility, skilled  Discharge Equipment Recommendations:  bedside commode, walker, rolling  Barriers to discharge:  Inaccessible home environment, Decreased caregiver support    History:     Past Medical History:   Diagnosis Date    Acute pain of left shoulder 1/7/2017    Arthritis     Asthma     Benign neoplasm of eyelid     RUL    Blood clotting tendency     Blood transfusion     Cataract     Chronic kidney disease requiring chronic dialysis     Diabetes mellitus     Diabetic retinopathy     Fever blister     Hyperlipidemia     Hypertension     Infertility     Joint pain     Retinal detachment     Thyroid disease     Weakness 1/7/2017       Past Surgical History:   Procedure Laterality Date    CATARACT EXTRACTION      COLONOSCOPY N/A 6/23/2017    Procedure: COLONOSCOPY;  Surgeon: Vic Clark MD;  Location: [facility] ([address] [place] [address]);  Service: Endoscopy;  Laterality: N/A;    RETINAL DETACHMENT SURGERY         Subjective     Chief Complaint: R thigh pain  Patient/Family stated goals: Improve overall functioning.   Communicated with: RN prior to session.  Pain/Comfort:  · Pain Rating 1: 10/10  · Location - Side 1: Right  · Location - Orientation 1: generalized  · Location 1: leg  · Pain Addressed 1: Reposition, Distraction, Cessation of Activity  · Pain Rating Post-Intervention 1: 0/10    Objective:     Patient found with: wound vac    General Precautions: Standard, fall   Orthopedic Precautions: (RLE NWB)   Braces: N/A     Occupational Performance:    Bed Mobility:    · Patient completed Rolling/Turning to Left with  total assistance  · Patient completed Rolling/Turning to Right with maximal assistance  · Patient completed Scooting/Bridging with total assistance  · Patient completed Supine to Sit with maximal  assistance  · Patient completed Sit to Supine with total assistance      Activities of Daily Living:  · UB Dressing: maximal assistance donned robe as gown    Cognitive/Visual Perceptual:  Cognitive/Psychosocial Skills:     -       Oriented to: Person   -       Follows Commands/attention:Follows multistep  commands  -       Communication: clear/fluent  -       Memory: No Deficits noted  -       Safety awareness/insight to disability: impaired   -       Mood/Affect/Coping skills/emotional control: Appropriate to situation  Visual/Perceptual:      -Intact    Physical Exam:  Balance:    -       Pt displayed poor to fair sitting balance   Postural examination/scapula alignment:    -       Rounded shoulders  -       Forward head  Skin integrity: Visible skin intact  Upper Extremity Range of Motion:     -       Right Upper Extremity: WFL  -       Left Upper Extremity: WFL  Upper Extremity Strength:    -       Right Upper Extremity: WFL  -       Left Upper Extremity: WFL   Strength:    -       Right Upper Extremity: WFL  -       Left Upper Extremity: WFL  Fine Motor Coordination:    -       Intact  Gross motor coordination:   WFL    Patient left HOB elevated with all lines intact and call button in reach    AMPA 6 Click:  AMPA Total Score: 10    Treatment & Education:  Pt educated on POC.  Education:    Assessment:     Wilder Carbajal is a 67 y.o. male with a medical diagnosis of Septic encephalopathy.  He presents with impairments listed below. Pt displayed general overall weakness which impedes pt's ability to perform ADLs and functional tasks indep. Pt would benefit from skilled OT services to improve independence and overall occupational functioning.      Performance deficits affecting function are weakness, impaired endurance, impaired self care skills, impaired functional mobilty, impaired balance, decreased lower extremity function, decreased upper extremity function, pain, impaired cardiopulmonary response  "to activity.      Rehab Prognosis:  fair; patient would benefit from acute skilled OT services to address these deficits and reach maximum level of function.         Clinical Decision Making:     3.  OT High:  "Pt evaluation falls under high complexity for evaluation category due to 5+ performance deficits identified with comprehensive assessments and significant modifications/assistance required. An expanded review of history and occupational profile completed in addition to expanded review of physical, cognitive and psychosocial history. Several treatment options considered in care."     Plan:     Patient to be seen 4 x/week to address the above listed problems via self-care/home management, therapeutic activities, therapeutic exercises, neuromuscular re-education  · Plan of Care Expires: 04/01/18  · Plan of Care Reviewed with: patient    This Plan of care has been discussed with the patient who was involved in its development and understands and is in agreement with the identified goals and treatment plan    GOALS:    Occupational Therapy Goals        Problem: Occupational Therapy Goal    Goal Priority Disciplines Outcome Interventions   Occupational Therapy Goal     OT, PT/OT     Description:  Goals to be met by: 3/15/2018     Patient will increase functional independence with ADLs by performing:    Feeding with [place].  UE Dressing with Moderate Assistance.  LE Dressing with Moderate Assistance.  Grooming while seated with Minimal Assistance.  Toileting from bedside commode with Maximum Assistance for hygiene and clothing management.   Sitting at edge of bed x10 minutes with Stand-by Assistance.  Supine to sit with Moderate Assistance.  Stand pivot transfers with Maximum Assistance.  Toilet transfer to bedside commode with Maximum Assistance.                Problem: Occupational Therapy Goal    Goal Priority Disciplines Outcome Interventions   Occupational Therapy Goal     OT, PT/OT Ongoing (interventions " implemented as appropriate)    Description:  Goals to be met by:  3/2/2018    Patient will increase functional independence with ADLs by performing:    UE Dressing with Set-up Assistance.  Grooming while seated with Modified [place].  Toileting from bedside commode with Moderate Assistance for hygiene and clothing management.   Supine to sit with Modified [place].  Bed<>chair transfer with Minimal Assistance while maintaining precautions.  Toilet transfer to bedside commode with Minimal Assistance while maintaining precautions.                         Time Tracking:     OT Date of Treatment: 03/01/18  OT Start Time: 0944  OT Stop Time: 1011  OT Total Time (min): 27 min    Billable Minutes:Re-eval 15 minutes  Self Care/Home Management 12 minutes    Linden Douglas, OT  3/1/2018

## 2018-03-01 NOTE — ANESTHESIA POST-OP PAIN MANAGEMENT
Acute Pain Service Progress Note    Wilder Carbajal is a 67 y.o., male, [id number].    Surgery:  R AKA    Post Op Day #: 2    Catheter type: perineural  femoral and sciatic    Infusion type: Ropivacaine 0.2%  8 mL/hr basal    Problem List:    Active Hospital Problems    Diagnosis  POA    *Septic encephalopathy [G93.41]  Yes    Gangrene of right foot [I96]  Yes    Palliative care encounter [Z51.5]  Not Applicable    Foot ulceration, left, with unspecified severity [L97.529]  Unknown    PVD (peripheral vascular disease) [I73.9]  Unknown    Unilateral AKA, right [Z89.611]  Not Applicable    Diabetic foot ulcer [E11.621, L97.509]  Yes    Foot ulceration, right, with fat layer exposed [L97.512]  Yes    Acute delirium [R41.0]  Yes    Protein-calorie malnutrition, severe [E43]  Yes    DVT (deep venous thrombosis) [I82.409]  Yes    DM type 2 with diabetic mixed hyperlipidemia [E11.69, E78.2]  Yes    Diabetic polyneuropathy associated with type 2 diabetes mellitus [E11.42]  Yes    Chronic kidney disease-mineral and bone disorder [N18.9, E83.9, M89.9]  Yes    Depression [F32.9]  Yes    Alteration in skin integrity [R23.9]  Yes    Infected skin ulcer with fat layer exposed [L98.492, L08.9]  Yes    Hyperkalemia [E87.5]  Yes    Hypervolemia [E87.70]  Yes    Type 2 diabetes mellitus with stage 5 chronic kidney disease and hypertension [E11.22, I12.0, N18.5]  Yes    ESRD (end stage renal disease) [N18.6]  Yes    Essential hypertension [I10]  Yes      Resolved Hospital Problems    Diagnosis Date Resolved POA   No resolved problems to display.       Subjective:     General appearance of alert, oriented, no complaints   Pain with rest: 3    Numbers   Pain with movement: 6    Numbers   Side Effects    1. Pruritis No    2. Nausea No    3. Motor Blockade Yes    4. Sedation No, 1=awake and alert    Objective:     Sciatic catheter CDI   Femoral cathter with blood tinged dressings unchanged from previous      Vitals    Vitals:    03/01/18 1156   BP: (!) 104/54   Pulse: (!) 51   Resp: 16   Temp: 37.1 °C (98.8 °F)        Labs    Admission on 02/04/2018   No results displayed because visit has over 200 results.           Meds   Current Facility-Administered Medications   Medication Dose Route Frequency Provider Last Rate Last Dose    0.9%  NaCl infusion (for blood administration)   Intravenous Q24H PRN Kanika Bradley MD        0.9%  NaCl infusion   Intravenous PRN Luther Dos Santos  mL/hr at 02/28/18 1328      albumin human 25% bottle 12.5 g  12.5 g Intravenous PRN Snehal Ballard MD        albumin human 25% bottle 25 g  25 g Intravenous PRN Shireen Valerio MD   25 g at 02/19/18 0845    albuterol-ipratropium 2.5mg-0.5mg/3mL nebulizer solution 3 mL  3 mL Nebulization Q4H PRN Chasidy Ray PA-C        ammonium lactate 12 % lotion   Topical (Top) BID Montserrat Lino MD        aspirin EC tablet 81 mg  81 mg Oral Daily Montserrat Lino MD   81 mg at 03/01/18 0852    atorvastatin tablet 20 mg  20 mg Oral Daily Montserrat Lino MD   20 mg at 03/01/18 0852    cinacalcet tablet 30 mg  30 mg Oral QHS Montserrat Lino MD   30 mg at 02/28/18 2126    citalopram tablet 10 mg  10 mg Oral Daily Montserrat Lino MD   10 mg at 03/01/18 0852    dextrose 50% injection 12.5 g  12.5 g Intravenous PRN Juan M Mcgovern NP        dextrose 50% injection 25 g  25 g Intravenous PRN Juan M Mcgovern NP        folic acid-vit B6-vit B12 2.5-25-2 mg tablet 1 tablet  1 tablet Oral Daily Montserrat Lino MD   1 tablet at 02/28/18 1114    gabapentin capsule 100 mg  100 mg Oral TID Montserrat Lino MD   100 mg at 03/01/18 0628    glucagon (human recombinant) injection 1 mg  1 mg Intramuscular PRN Juan M Mcgovern NP        glucose chewable tablet 16 g  16 g Oral PRN Juan M Mcgovern NP   16 g at 03/01/18 0649    glucose chewable tablet 24 g  24 g Oral PRN Juan M Mcgovern NP        heparin (porcine) injection 5,000 Units  5,000 Units Subcutaneous Q8H  Kanika Bradley MD   5,000 Units at 03/01/18 0628    hydrocodone-acetaminophen 5-325mg per tablet 1 tablet  1 tablet Oral Q4H PRN Montserrat Lino MD   1 tablet at 03/01/18 0853    miconazole nitrate 2% ointment   Topical (Top) BID Montserrat Lino MD        midodrine tablet 10 mg  10 mg Oral Every Mon, Wed, Fri Montserrat Lino MD   10 mg at 02/28/18 0900    ondansetron disintegrating tablet 8 mg  8 mg Oral Q8H PRN Montserrat Lino MD   8 mg at 02/25/18 0002    ondansetron injection 4 mg  4 mg Intravenous Q8H PRN Arie Gross MD        promethazine (PHENERGAN) 6.25 mg in dextrose 5 % 50 mL IVPB  6.25 mg Intravenous Q6H PRN Arie Gross MD        ropivacaine (PF) 2 mg/ml (0.2%) infusion  8 mL/hr Perineural Continuous Arie Gross MD 8 mL/hr at 03/01/18 0622 8 mL/hr at 03/01/18 0622    ropivacaine (PF) 2 mg/ml (0.2%) infusion  8 mL/hr Perineural Continuous Arie Gross MD 8 mL/hr at 02/28/18 0856 8 mL/hr at 02/28/18 0856    senna-docusate 8.6-50 mg per tablet 2 tablet  2 tablet Oral BID Montserrat Lino MD   2 tablet at 03/01/18 0852    sevelamer carbonate tablet 1,600 mg  1,600 mg Oral TID  Montserrat Lino MD   1,600 mg at 03/01/18 0852    sevelamer carbonate tablet 800 mg  800 mg Oral QHS Montserrat Lino MD   800 mg at 02/27/18 2216    sodium chloride 0.9% flush 5 mL  5 mL Intravenous PRN Montserrat Lino MD            Anticoagulant dose Heparin 5k q8hrs    Assessment:    Pain control adequate    Plan:    Will continue perineural catheters until dispo is arranged. Bolused femoral this AM. Continue with multimodal approach to pain.     WALLACE Gross  APS  [id number]        I have seen the patient, reviewed the Resident's assessment and plan. I have personally interviewed and examined the patient at bedside and: agree with the findings.   Pain control adequate.  Continue current regimen    DAVID Coburn MD  Staff Anesthesiologist  [facility] Acute  Pain Service

## 2018-03-01 NOTE — NURSING
Patient states he feels weak. AAOx4. Orange juice and glucose tabs given for low BS, will continue to monitor.

## 2018-03-02 LAB
ALBUMIN SERPL BCP-MCNC: 1.7 G/DL
ANION GAP SERPL CALC-SCNC: 7 MMOL/L
ANISOCYTOSIS BLD QL SMEAR: SLIGHT
BASOPHILS # BLD AUTO: 0.03 K/UL
BASOPHILS NFR BLD: 0.3 %
BUN SERPL-MCNC: 29 MG/DL
CALCIUM SERPL-MCNC: 7.7 MG/DL
CHLORIDE SERPL-SCNC: 102 MMOL/L
CO2 SERPL-SCNC: 23 MMOL/L
CREAT SERPL-MCNC: 4.2 MG/DL
DIFFERENTIAL METHOD: ABNORMAL
EOSINOPHIL # BLD AUTO: 0.3 K/UL
EOSINOPHIL NFR BLD: 2.9 %
ERYTHROCYTE [DISTWIDTH] IN BLOOD BY AUTOMATED COUNT: 17.1 %
EST. GFR  (AFRICAN AMERICAN): 15.7 ML/MIN/1.73 M^2
EST. GFR  (NON AFRICAN AMERICAN): 13.6 ML/MIN/1.73 M^2
GLUCOSE SERPL-MCNC: 62 MG/DL
HCT VFR BLD AUTO: 23.5 %
HGB BLD-MCNC: 7.7 G/DL
IMM GRANULOCYTES # BLD AUTO: 0.04 K/UL
IMM GRANULOCYTES NFR BLD AUTO: 0.4 %
LYMPHOCYTES # BLD AUTO: 1.5 K/UL
LYMPHOCYTES NFR BLD: 15.3 %
MAGNESIUM SERPL-MCNC: 2.5 MG/DL
MCH RBC QN AUTO: 29.1 PG
MCHC RBC AUTO-ENTMCNC: 32.8 G/DL
MCV RBC AUTO: 89 FL
MONOCYTES # BLD AUTO: 1.1 K/UL
MONOCYTES NFR BLD: 11.6 %
NEUTROPHILS # BLD AUTO: 6.7 K/UL
NEUTROPHILS NFR BLD: 69.5 %
NRBC BLD-RTO: 0 /100 WBC
PHOSPHATE SERPL-MCNC: 4 MG/DL
PLATELET # BLD AUTO: 100 K/UL
PLATELET BLD QL SMEAR: ABNORMAL
PMV BLD AUTO: 12 FL
POCT GLUCOSE: 149 MG/DL (ref 70–110)
POCT GLUCOSE: 218 MG/DL (ref 70–110)
POCT GLUCOSE: 70 MG/DL (ref 70–110)
POTASSIUM SERPL-SCNC: 4.9 MMOL/L
RBC # BLD AUTO: 2.65 M/UL
SODIUM SERPL-SCNC: 132 MMOL/L
WBC # BLD AUTO: 9.68 K/UL

## 2018-03-02 PROCEDURE — 97803 MED NUTRITION INDIV SUBSEQ: CPT

## 2018-03-02 PROCEDURE — 11000001 HC ACUTE MED/SURG PRIVATE ROOM

## 2018-03-02 PROCEDURE — 83735 ASSAY OF MAGNESIUM: CPT

## 2018-03-02 PROCEDURE — 63600175 PHARM REV CODE 636 W HCPCS: Performed by: INTERNAL MEDICINE

## 2018-03-02 PROCEDURE — 90935 HEMODIALYSIS ONE EVALUATION: CPT | Mod: ,,, | Performed by: INTERNAL MEDICINE

## 2018-03-02 PROCEDURE — 99231 SBSQ HOSP IP/OBS SF/LOW 25: CPT | Mod: ,,, | Performed by: ANESTHESIOLOGY

## 2018-03-02 PROCEDURE — 99232 SBSQ HOSP IP/OBS MODERATE 35: CPT | Mod: ,,, | Performed by: INTERNAL MEDICINE

## 2018-03-02 PROCEDURE — 90935 HEMODIALYSIS ONE EVALUATION: CPT

## 2018-03-02 PROCEDURE — 25000003 PHARM REV CODE 250: Performed by: INTERNAL MEDICINE

## 2018-03-02 PROCEDURE — 36415 COLL VENOUS BLD VENIPUNCTURE: CPT

## 2018-03-02 PROCEDURE — 80069 RENAL FUNCTION PANEL: CPT

## 2018-03-02 PROCEDURE — 63600175 PHARM REV CODE 636 W HCPCS: Performed by: ANESTHESIOLOGY

## 2018-03-02 PROCEDURE — 85025 COMPLETE CBC W/AUTO DIFF WBC: CPT

## 2018-03-02 PROCEDURE — 96372 THER/PROPH/DIAG INJ SC/IM: CPT

## 2018-03-02 RX ORDER — ADHESIVE BANDAGE
30 BANDAGE TOPICAL ONCE
Status: COMPLETED | OUTPATIENT
Start: 2018-03-02 | End: 2018-03-02

## 2018-03-02 RX ORDER — HYDROCODONE BITARTRATE AND ACETAMINOPHEN 10; 325 MG/1; MG/1
1 TABLET ORAL EVERY 4 HOURS PRN
Status: DISCONTINUED | OUTPATIENT
Start: 2018-03-02 | End: 2018-03-05

## 2018-03-02 RX ADMIN — ATORVASTATIN CALCIUM 20 MG: 20 TABLET, FILM COATED ORAL at 08:03

## 2018-03-02 RX ADMIN — GABAPENTIN 100 MG: 100 CAPSULE ORAL at 01:03

## 2018-03-02 RX ADMIN — ROPIVACAINE HYDROCHLORIDE 10 ML/HR: 10 INJECTION, SOLUTION EPIDURAL at 01:03

## 2018-03-02 RX ADMIN — MAGNESIUM HYDROXIDE 2400 MG: 400 SUSPENSION ORAL at 05:03

## 2018-03-02 RX ADMIN — STANDARDIZED SENNA CONCENTRATE AND DOCUSATE SODIUM 2 TABLET: 8.6; 5 TABLET, FILM COATED ORAL at 08:03

## 2018-03-02 RX ADMIN — Medication: at 08:03

## 2018-03-02 RX ADMIN — ROPIVACAINE HYDROCHLORIDE 8 ML/HR: 10 INJECTION, SOLUTION EPIDURAL at 05:03

## 2018-03-02 RX ADMIN — SEVELAMER CARBONATE 1600 MG: 800 TABLET, FILM COATED ORAL at 08:03

## 2018-03-02 RX ADMIN — SODIUM CHLORIDE: 0.9 INJECTION, SOLUTION INTRAVENOUS at 02:03

## 2018-03-02 RX ADMIN — GABAPENTIN 100 MG: 100 CAPSULE ORAL at 05:03

## 2018-03-02 RX ADMIN — ROPIVACAINE HYDROCHLORIDE 8 ML/HR: 10 INJECTION, SOLUTION EPIDURAL at 08:03

## 2018-03-02 RX ADMIN — CINACALCET HYDROCHLORIDE 30 MG: 30 TABLET, COATED ORAL at 08:03

## 2018-03-02 RX ADMIN — HYDROCODONE BITARTRATE AND ACETAMINOPHEN 1 TABLET: 5; 325 TABLET ORAL at 01:03

## 2018-03-02 RX ADMIN — ASPIRIN 81 MG: 81 TABLET, COATED ORAL at 08:03

## 2018-03-02 RX ADMIN — CITALOPRAM HYDROBROMIDE 10 MG: 10 TABLET ORAL at 08:03

## 2018-03-02 RX ADMIN — Medication 1 TABLET: at 08:03

## 2018-03-02 RX ADMIN — SEVELAMER CARBONATE 1600 MG: 800 TABLET, FILM COATED ORAL at 06:03

## 2018-03-02 RX ADMIN — MICONAZOLE NITRATE: 20 OINTMENT TOPICAL at 08:03

## 2018-03-02 RX ADMIN — HEPARIN SODIUM 5000 UNITS: 5000 INJECTION, SOLUTION INTRAVENOUS; SUBCUTANEOUS at 06:03

## 2018-03-02 RX ADMIN — HEPARIN SODIUM 5000 UNITS: 5000 INJECTION, SOLUTION INTRAVENOUS; SUBCUTANEOUS at 10:03

## 2018-03-02 RX ADMIN — GABAPENTIN 100 MG: 100 CAPSULE ORAL at 10:03

## 2018-03-02 RX ADMIN — HYDROCODONE BITARTRATE AND ACETAMINOPHEN 1 TABLET: 10; 325 TABLET ORAL at 05:03

## 2018-03-02 RX ADMIN — HEPARIN SODIUM 5000 UNITS: 5000 INJECTION, SOLUTION INTRAVENOUS; SUBCUTANEOUS at 01:03

## 2018-03-02 RX ADMIN — HYDROCODONE BITARTRATE AND ACETAMINOPHEN 1 TABLET: 5; 325 TABLET ORAL at 04:03

## 2018-03-02 RX ADMIN — SODIUM PHOSPHATE, DIBASIC AND SODIUM PHOSPHATE, MONOBASIC 1 ENEMA: 7; 19 ENEMA RECTAL at 11:03

## 2018-03-02 RX ADMIN — HYDROCODONE BITARTRATE AND ACETAMINOPHEN 1 TABLET: 5; 325 TABLET ORAL at 08:03

## 2018-03-02 RX ADMIN — ERYTHROPOIETIN 10400 UNITS: 20000 INJECTION, SOLUTION INTRAVENOUS; SUBCUTANEOUS at 03:03

## 2018-03-02 RX ADMIN — MIDODRINE HYDROCHLORIDE 10 MG: 5 TABLET ORAL at 01:03

## 2018-03-02 NOTE — ANESTHESIA POST-OP PAIN MANAGEMENT
Acute Pain Service Progress Note    Wilder Carbajal is a 67 y.o., male, [id number].    Surgery:  R AKA    Post Op Day #: 3    Catheter type: perineural  femoral and sciatic    Infusion type: Ropivacaine 0.2%  8 mL/hr basal    Problem List:    Active Hospital Problems    Diagnosis  POA    *Septic encephalopathy [G93.41]  Yes    Gangrene of right foot [I96]  Yes    Palliative care encounter [Z51.5]  Not Applicable    Foot ulceration, left, with unspecified severity [L97.529]  Unknown    PVD (peripheral vascular disease) [I73.9]  Unknown    Unilateral AKA, right [Z89.611]  Not Applicable    Diabetic foot ulcer [E11.621, L97.509]  Yes    Foot ulceration, right, with fat layer exposed [L97.512]  Yes    Acute delirium [R41.0]  Yes    Protein-calorie malnutrition, severe [E43]  Yes    DVT (deep venous thrombosis) [I82.409]  Yes    DM type 2 with diabetic mixed hyperlipidemia [E11.69, E78.2]  Yes    Diabetic polyneuropathy associated with type 2 diabetes mellitus [E11.42]  Yes    Chronic kidney disease-mineral and bone disorder [N18.9, E83.9, M89.9]  Yes    Depression [F32.9]  Yes    Alteration in skin integrity [R23.9]  Yes    Infected skin ulcer with fat layer exposed [L98.492, L08.9]  Yes    Hyperkalemia [E87.5]  Yes    Hypervolemia [E87.70]  Yes    Type 2 diabetes mellitus with stage 5 chronic kidney disease and hypertension [E11.22, I12.0, N18.5]  Yes    ESRD (end stage renal disease) [N18.6]  Yes    Essential hypertension [I10]  Yes      Resolved Hospital Problems    Diagnosis Date Resolved POA   No resolved problems to display.       Subjective:     General appearance of alert, oriented, no complaints   Pain with rest: 4    Numbers   Pain with movement: 8    Numbers   Side Effects    1. Pruritis No    2. Nausea No    3. Motor Blockade Yes    4. Sedation No, 1=awake and alert    Objective:      Sciatic catheter CDI   Femoral catheter continued with blood tinged dressing, again unchanged from  previous encounters      Vitals   Vitals:    03/02/18 0745   BP: (!) 118/56   Pulse: (!) 53   Resp: 16   Temp: 36.3 °C (97.4 °F)        Labs    Admission on 02/04/2018   No results displayed because visit has over 200 results.           Meds   Current Facility-Administered Medications   Medication Dose Route Frequency Provider Last Rate Last Dose    0.9%  NaCl infusion (for blood administration)   Intravenous Q24H PRN Kanika Bradley MD        0.9%  NaCl infusion   Intravenous PRN Luther Dos Santos MD        albumin human 25% bottle 12.5 g  12.5 g Intravenous PRN Snehal Ballard MD        albumin human 25% bottle 25 g  25 g Intravenous PRN Shireen Valerio MD   25 g at 02/19/18 0845    albuterol-ipratropium 2.5mg-0.5mg/3mL nebulizer solution 3 mL  3 mL Nebulization Q4H PRN Chasidy Ray, BRENDEN        ammonium lactate 12 % lotion   Topical (Top) BID Montserrat Lino MD        aspirin EC tablet 81 mg  81 mg Oral Daily Montserrat Lino MD   81 mg at 03/02/18 0858    atorvastatin tablet 20 mg  20 mg Oral Daily Montserrat Lino MD   20 mg at 03/02/18 0858    cinacalcet tablet 30 mg  30 mg Oral QHS Montserrat Lino MD   30 mg at 03/01/18 2158    citalopram tablet 10 mg  10 mg Oral Daily Montserrat Lino MD   10 mg at 03/02/18 0858    dextrose 50% injection 12.5 g  12.5 g Intravenous PRN Juan M Mcgovern NP        dextrose 50% injection 25 g  25 g Intravenous PRN Juan M Mcgovern NP        folic acid-vit B6-vit B12 2.5-25-2 mg tablet 1 tablet  1 tablet Oral Daily Montserrat Lino MD   1 tablet at 03/02/18 0858    gabapentin capsule 100 mg  100 mg Oral TID Montserrat Lino MD   100 mg at 03/02/18 0550    glucagon (human recombinant) injection 1 mg  1 mg Intramuscular PRN Juan M Mcgovern NP        glucose chewable tablet 16 g  16 g Oral PRN Juan M Mcgovern NP   16 g at 03/01/18 0649    glucose chewable tablet 24 g  24 g Oral PRN Juan M Mcgovern NP        heparin (porcine) injection 5,000 Units  5,000 Units  Subcutaneous Q8H Kanika Bradley MD   5,000 Units at 03/02/18 0600    hydrocodone-acetaminophen 5-325mg per tablet 1 tablet  1 tablet Oral Q4H PRN Montserrat Lino MD   1 tablet at 03/02/18 0858    miconazole nitrate 2% ointment   Topical (Top) BID Montserrat Lino MD        midodrine tablet 10 mg  10 mg Oral Every Mon, Wed, Fri Montserrat Lino MD   10 mg at 02/28/18 0900    ondansetron disintegrating tablet 8 mg  8 mg Oral Q8H PRN Montserrat Lino MD   8 mg at 02/25/18 0002    ondansetron injection 4 mg  4 mg Intravenous Q8H PRN Arie Gross MD        promethazine (PHENERGAN) 6.25 mg in dextrose 5 % 50 mL IVPB  6.25 mg Intravenous Q6H PRN rAie Gross MD        ropivacaine (PF) 2 mg/ml (0.2%) infusion  8 mL/hr Perineural Continuous Arie Gross MD 8 mL/hr at 03/01/18 0622 8 mL/hr at 03/01/18 0622    ropivacaine (PF) 2 mg/ml (0.2%) infusion  8 mL/hr Perineural Continuous Arie Gross MD 8 mL/hr at 03/01/18 1744 8 mL/hr at 03/01/18 1744    senna-docusate 8.6-50 mg per tablet 2 tablet  2 tablet Oral BID Montserrat Lino MD   2 tablet at 03/02/18 0858    sevelamer carbonate tablet 1,600 mg  1,600 mg Oral TID  Montserrat Lino MD   1,600 mg at 03/02/18 0858    sevelamer carbonate tablet 800 mg  800 mg Oral QHS Montserrat Lino MD   800 mg at 03/01/18 2158    sodium chloride 0.9% flush 5 mL  5 mL Intravenous PRN Montserrat Lino MD        sodium phosphates 19-7 gram/118 mL enema 1 enema  1 enema Rectal PRN Montserrat Lino MD           Anticoagulant dose Heparin 5k q8hrs.    Assessment:    Pain control adequate    Plan:    Will continue perineural catheters until dispo arranged. Bolused femoral again this AM, had improvement in pain yesterday. Will consider increasing rate this morning on rounds. Continue multimodal approach to pain.     WALLACE Gross  APS  [id number]      I have seen the patient, reviewed the Resident's assessment and plan. I have personally interviewed and  examined the patient at bedside and: agree with the findings.   Pain control is adequate.  Agree with proposed changes    DAVID Coburn MD  Staff Anesthesiologist  [facility]

## 2018-03-02 NOTE — PROGRESS NOTES
Pt requesting milk of magnesia and stronger pain medication. Dr. Zoie still. Awaiting return page.

## 2018-03-02 NOTE — NURSING TRANSFER
Nursing Transfer Note      3/2/2018     Transfer to Dialysis    Transfer via bed    Transfer with tele, wound vac, 2 PNCs, IV pole    Transported by RN and patient escort    Medicines sent: Ropivicaine drips x2    Chart send with patient: yes    Notified: family and dialysis nurseMoncho    Patient reassessed at: upon transfer

## 2018-03-02 NOTE — SUBJECTIVE & OBJECTIVE
Interval Hx: S/p R AKA per vascular surgery. Pt seen bedside family present.      Scheduled Meds:   ammonium lactate   Topical (Top) BID    aspirin  81 mg Oral Daily    atorvastatin  20 mg Oral Daily    cinacalcet  30 mg Oral QHS    citalopram  10 mg Oral Daily    epoetin michael (PROCRIT) injection  100 Units/kg Intravenous Every Mon, Wed, Fri    folic acid-vit B6-vit B12 2.5-25-2 mg  1 tablet Oral Daily    gabapentin  100 mg Oral TID    heparin (porcine)  5,000 Units Subcutaneous Q8H    miconazole nitrate 2%   Topical (Top) BID    midodrine  10 mg Oral Every Mon, Wed, Fri    senna-docusate 8.6-50 mg  2 tablet Oral BID    sevelamer carbonate  1,600 mg Oral TID WM    sevelamer carbonate  800 mg Oral QHS     Continuous Infusions:   ropivacaine (PF) 2 mg/ml (0.2%) 10 mL/hr (03/02/18 1124)    ropivacaine (PF) 2 mg/ml (0.2%) 8 mL/hr (03/01/18 1744)     PRN Meds:sodium chloride, sodium chloride 0.9%, albumin human 25%, albumin human 25%, albuterol-ipratropium 2.5mg-0.5mg/3mL, dextrose 50%, dextrose 50%, glucagon (human recombinant), glucose, glucose, hydrocodone-acetaminophen 5-325mg, ondansetron, ondansetron, promethazine (PHENERGAN) IVPB, sodium chloride 0.9%, sodium phosphates    Review of patient's allergies indicates:  No Known Allergies     Past Medical History:   Diagnosis Date    Acute pain of left shoulder 1/7/2017    Arthritis     Asthma     Benign neoplasm of eyelid     RUL    Blood clotting tendency     Blood transfusion     Cataract     Chronic kidney disease requiring chronic dialysis     Diabetes mellitus     Diabetic retinopathy     Fever blister     Hyperlipidemia     Hypertension     Infertility     Joint pain     Retinal detachment     Thyroid disease     Weakness 1/7/2017     Past Surgical History:   Procedure Laterality Date    CATARACT EXTRACTION      COLONOSCOPY N/A 6/23/2017    Procedure: COLONOSCOPY;  Surgeon: Vic Clark MD;  Location: [facility] ([address] [place] [address]);   Service: Endoscopy;  Laterality: N/A;    RETINAL DETACHMENT SURGERY         Family History     Problem Relation (Age of Onset)    Heart attack Mother    No Known Problems Father, Sister, Brother, Maternal Aunt, Maternal Uncle, Paternal Aunt, Paternal Uncle, Maternal Grandmother, Maternal Grandfather, Paternal Grandmother, Paternal Grandfather        Social History Main Topics    Smoking status: Never Smoker    Smokeless tobacco: Never Used    Alcohol use No    Drug use: No    Sexual activity: Not on file     Review of Systems   Constitutional: Positive for activity change.   Respiratory: Negative for shortness of breath.    Cardiovascular: Negative for chest pain and leg swelling.   Gastrointestinal: Negative.  Negative for nausea and vomiting.   Genitourinary: Negative.    Musculoskeletal: Positive for arthralgias.   Skin: Positive for wound.   Neurological: Positive for numbness. Negative for weakness.     Objective:     Vital Signs (Most Recent):  Temp: 96.9 °F (36.1 °C) (03/02/18 1114)  Pulse: (!) 50 (03/02/18 1114)  Resp: 18 (03/02/18 1114)  BP: 118/69 (03/02/18 1114)  SpO2: 98 % (03/02/18 1114) Vital Signs (24h Range):  Temp:  [96.9 °F (36.1 °C)-98.6 °F (37 °C)] 96.9 °F (36.1 °C)  Pulse:  [48-56] 50  Resp:  [16-18] 18  SpO2:  [96 %-100 %] 98 %  BP: (110-135)/(54-69) 118/69     Weight: 104 kg (229 lb 4.5 oz)  Body mass index is 34.86 kg/m².    Foot Exam    General  Orientation: disoriented       Right Foot/Ankle     Inspection and Palpation  Tenderness: none   Swelling: metatarsals   Skin Exam: blister, maceration, cellulitis, skin changes and ulcer;     Neurovascular  Dorsalis pedis: 1+  Posterior tibial: 1+  Saphenous nerve sensation: diminished  Tibial nerve sensation: diminished  Superficial peroneal nerve sensation: diminished  Deep peroneal nerve sensation: diminished  Sural nerve sensation: diminished      Left Foot/Ankle      Inspection and Palpation  Tenderness: none   Swelling: metatarsals    Skin Exam: skin changes and ulcer;     Neurovascular  Dorsalis pedis: 1+  Posterior tibial: 1+  Saphenous nerve sensation: diminished  Tibial nerve sensation: diminished  Superficial peroneal nerve sensation: diminished  Deep peroneal nerve sensation: diminished  Sural nerve sensation: diminished          S/p Right AKA, wound VAC in place.           Wound 1: Left dorsal foot   Measurement: 1nki6qkx6.1cm.  Base: fibrous base  Periwound skin: mild erythema,   Drainage: none  Erythema: mild  Probe: none, no bone exposed    3/2/18        Wound 1: Left heel ulceration   Measurement: 9ioy1wlx4.1cm.  Periwound skin: callus  Drainage: none  Erythema:mild   Probe: none, no bone exposed      Laboratory:  Xray: Right: There is DJD, soft tissue swelling, a calcaneal spur, and diabetic vascular calcifications. No fracture dislocation bone destruction seen.    Left: There is DJD, diabetic vascular calcifications, and Spurs on the calcaneus. No fracture dislocation bone destruction seen.    MRI: 1. Limited, incomplete nondiagnostic examination secondary to early termination at the of the request of the patient.    2. Linear T1 hypointensity traversing the third metacarpal head, concerning for possible nondisplaced, likely subacute fracture.  Correlation with point tenderness recommended.    PHILLIP: Impression:   Right Leg:Segmental pressures and PVR waveforms suggest mild to moderate peripheral arterial occlusive disease.     Left Leg:Segmental pressures and PVR waveforms suggest mild to moderate peripheral arterial occlusive disease.        Diagnostic Results:  X-Ray: I have reviewed all pertinent results/findings within the past 24 hours.  reviewed    Clinical Findings:  Left heel ulceration stable.

## 2018-03-02 NOTE — PROGRESS NOTES
[facility]  Podiatry  Progress Note    Patient Name: Wilder Carbajal  MRN: [id number]  Admission Date: 2/4/2018  Hospital Length of Stay: 26 days  Attending Physician: Montserrat Lino MD  Primary Care Provider: Dio Roberson MD   Interval Hx: S/p R AKA per vascular surgery. Pt seen bedside family present.      Scheduled Meds:   ammonium lactate   Topical (Top) BID    aspirin  81 mg Oral Daily    atorvastatin  20 mg Oral Daily    cinacalcet  30 mg Oral QHS    citalopram  10 mg Oral Daily    epoetin michael (PROCRIT) injection  100 Units/kg Intravenous Every Mon, Wed, Fri    folic acid-vit B6-vit B12 2.5-25-2 mg  1 tablet Oral Daily    gabapentin  100 mg Oral TID    heparin (porcine)  5,000 Units Subcutaneous Q8H    miconazole nitrate 2%   Topical (Top) BID    midodrine  10 mg Oral Every Mon, Wed, Fri    senna-docusate 8.6-50 mg  2 tablet Oral BID    sevelamer carbonate  1,600 mg Oral TID WM    sevelamer carbonate  800 mg Oral QHS     Continuous Infusions:   ropivacaine (PF) 2 mg/ml (0.2%) 10 mL/hr (03/02/18 1124)    ropivacaine (PF) 2 mg/ml (0.2%) 8 mL/hr (03/01/18 1744)     PRN Meds:sodium chloride, sodium chloride 0.9%, albumin human 25%, albumin human 25%, albuterol-ipratropium 2.5mg-0.5mg/3mL, dextrose 50%, dextrose 50%, glucagon (human recombinant), glucose, glucose, hydrocodone-acetaminophen 5-325mg, ondansetron, ondansetron, promethazine (PHENERGAN) IVPB, sodium chloride 0.9%, sodium phosphates    Review of patient's allergies indicates:  No Known Allergies     Past Medical History:   Diagnosis Date    Acute pain of left shoulder 1/7/2017    Arthritis     Asthma     Benign neoplasm of eyelid     RUL    Blood clotting tendency     Blood transfusion     Cataract     Chronic kidney disease requiring chronic dialysis     Diabetes mellitus     Diabetic retinopathy     Fever blister     Hyperlipidemia     Hypertension     Infertility     Joint pain     Retinal detachment      Thyroid disease     Weakness 1/7/2017     Past Surgical History:   Procedure Laterality Date    CATARACT EXTRACTION      COLONOSCOPY N/A 6/23/2017    Procedure: COLONOSCOPY;  Surgeon: Vic Clark MD;  Location: [facility] ([address] [place] [address]);  Service: Endoscopy;  Laterality: N/A;    RETINAL DETACHMENT SURGERY         Family History     Problem Relation (Age of Onset)    Heart attack Mother    No Known Problems Father, Sister, Brother, Maternal Aunt, Maternal Uncle, Paternal Aunt, Paternal Uncle, Maternal Grandmother, Maternal Grandfather, Paternal Grandmother, Paternal Grandfather        Social History Main Topics    Smoking status: Never Smoker    Smokeless tobacco: Never Used    Alcohol use No    Drug use: No    Sexual activity: Not on file     Review of Systems   Constitutional: Positive for activity change.   Respiratory: Negative for shortness of breath.    Cardiovascular: Negative for chest pain and leg swelling.   Gastrointestinal: Negative.  Negative for nausea and vomiting.   Genitourinary: Negative.    Musculoskeletal: Positive for arthralgias.   Skin: Positive for wound.   Neurological: Positive for numbness. Negative for weakness.     Objective:     Vital Signs (Most Recent):  Temp: 96.9 °F (36.1 °C) (03/02/18 1114)  Pulse: (!) 50 (03/02/18 1114)  Resp: 18 (03/02/18 1114)  BP: 118/69 (03/02/18 1114)  SpO2: 98 % (03/02/18 1114) Vital Signs (24h Range):  Temp:  [96.9 °F (36.1 °C)-98.6 °F (37 °C)] 96.9 °F (36.1 °C)  Pulse:  [48-56] 50  Resp:  [16-18] 18  SpO2:  [96 %-100 %] 98 %  BP: (110-135)/(54-69) 118/69     Weight: 104 kg (229 lb 4.5 oz)  Body mass index is 34.86 kg/m².    Foot Exam    General  Orientation: disoriented       Right Foot/Ankle     Inspection and Palpation  Tenderness: none   Swelling: metatarsals   Skin Exam: blister, maceration, cellulitis, skin changes and ulcer;     Neurovascular  Dorsalis pedis: 1+  Posterior tibial: 1+  Saphenous nerve sensation: diminished  Tibial nerve  sensation: diminished  Superficial peroneal nerve sensation: diminished  Deep peroneal nerve sensation: diminished  Sural nerve sensation: diminished      Left Foot/Ankle      Inspection and Palpation  Tenderness: none   Swelling: metatarsals   Skin Exam: skin changes and ulcer;     Neurovascular  Dorsalis pedis: 1+  Posterior tibial: 1+  Saphenous nerve sensation: diminished  Tibial nerve sensation: diminished  Superficial peroneal nerve sensation: diminished  Deep peroneal nerve sensation: diminished  Sural nerve sensation: diminished          S/p Right AKA, wound VAC in place.           Wound 1: Left dorsal foot   Measurement: 3qoh6goe5.1cm.  Base: fibrous base  Periwound skin: mild erythema,   Drainage: none  Erythema: mild  Probe: none, no bone exposed    3/2/18        Wound 1: Left heel ulceration   Measurement: 1wrk2tqr1.1cm.  Periwound skin: callus  Drainage: none  Erythema:mild   Probe: none, no bone exposed      Laboratory:  Xray: Right: There is DJD, soft tissue swelling, a calcaneal spur, and diabetic vascular calcifications. No fracture dislocation bone destruction seen.    Left: There is DJD, diabetic vascular calcifications, and Spurs on the calcaneus. No fracture dislocation bone destruction seen.    MRI: 1. Limited, incomplete nondiagnostic examination secondary to early termination at the of the request of the patient.    2. Linear T1 hypointensity traversing the third metacarpal head, concerning for possible nondisplaced, likely subacute fracture.  Correlation with point tenderness recommended.    PHILLIP: Impression:   Right Leg:Segmental pressures and PVR waveforms suggest mild to moderate peripheral arterial occlusive disease.     Left Leg:Segmental pressures and PVR waveforms suggest mild to moderate peripheral arterial occlusive disease.        Diagnostic Results:  X-Ray: I have reviewed all pertinent results/findings within the past 24 hours.  reviewed    Clinical Findings:  Left heel ulceration  stable.    Assessment/Plan:     Gangrene of right foot    S/p R AKA per vascular surgery         Foot ulceration, left, with unspecified severity    Stable ulceration to left dorsal foot and heel. Painted with betadine wrapped with kerlix and ACE.   Nursing orders in for daily dressing changes.         Offloading Device: Heel protector boot.     Ángela Marks DPM PGY-3  Pager: [phone]          ESRD (end stage renal disease)    Per Primary           Type 2 diabetes mellitus with stage 5 chronic kidney disease and hypertension    Per Primary               Ángela Marks MD  Podiatry  [facility]      Resident Supervision:  I have personally taken the history and examined this patient and agree with the resident's note as stated above.   Yaritza Matamoros DPM

## 2018-03-02 NOTE — PLAN OF CARE
Problem: Patient Care Overview  Goal: Plan of Care Review  Outcome: Ongoing (interventions implemented as appropriate)  One hour dialysis complete.  Blood rinsed back.  Needles pulled from left upper arm fistula.  Pressure held x 5 minutes.  Hemostasis achieved.  Covered with gauze and paper tape.  +thrill +bruit.  Net UF 0.7L.  Tx ended early due to patient demanding to be taken off of machine.  Saige Souza and Raya notified and spoke with patient at bedside.  Tx ended upon MD order to end early.

## 2018-03-02 NOTE — PLAN OF CARE
Problem: Patient Care Overview  Goal: Plan of Care Review  Outcome: Ongoing (interventions implemented as appropriate)  Pt AO x3. VSS. POC reviewed with pt and son.  No skin break down noted. Pain controlled with prn medication. No falls noted this shift. Bed in low position. Call light in reach.

## 2018-03-02 NOTE — PLAN OF CARE
Problem: Patient Care Overview  Goal: Plan of Care Review  Outcome: Ongoing (interventions implemented as appropriate)  Pt is AAOx4, [place]. No falls/injury as pt calls for assistance when needed. Fall precautions remain in place and family at bedside. Wound vac in place to RLE stump. Wound care to left foot/heel done by MD- nurses to resume tomorrow. RLE stump and LLE elevated. Pain being monitored and controlled with PRN and scheduled meds. PNC's remain infusing. Femoral PNC assessed for leakage by anesthesia team- ok to continue running fem PNC per anesthesia. Accu checks ACHS. Tele monitor remains on- pt running SB. Pt currently in HD. Will resume care upon return to floor.

## 2018-03-02 NOTE — ASSESSMENT & PLAN NOTE
Stable ulceration to left dorsal foot and heel. Painted with betadine wrapped with kerlix and ACE.       Offloading Device: Heel protector boot.     Ángela Marks DPM PGY-3  Pager: [phone]

## 2018-03-02 NOTE — ASSESSMENT & PLAN NOTE
Nutrition Problem  Increased nutrient needs    Related to (etiology):   Physiological factors of wound healing    Signs and Symptoms (as evidenced by):   Leg amputation 2/20    Interventions/Recommendations (treatment strategy):  See recs from RD note 3/2    Nutrition Diagnosis Status:   Continues

## 2018-03-02 NOTE — PROGRESS NOTES
The PNC to patient's right femoral area is slightly leaking but not dislodged completely. Intern on call notifeid of situation. She noted that someone will be on floor to discontinue catheter. Will administer oral pain medication. Will continue to monitor.

## 2018-03-02 NOTE — PROGRESS NOTES
[facility]  Adult Nutrition  Consult Note    SUMMARY     Recommendations    1. Continue high protein, high calorie diet regimen.  2. Encourage consistent >=75% PO intake and ONS (Novasource Renal) to provide additional calories and protein to promote wound healing.   3. RD to monitor and follow-up.    Goals:   1. Adequate PO intake >=85% EEN and EPN  2. Promote nutrition related labs WNL    Nutrition Goal Status: progressing towards goal  Communication of RD Recs: reviewed with RN    Reason for Assessment    Reason for Assessment: RD follow-up  Diagnosis: infection/sepsis (Septic encephalopathy)  Relevent Medical History: Diabetic retinopathy, T2DM, HTN, stage 5 CKD (requiring chronic dialysis), hyperlipidemia, thyroid disease, ESRD, possible dementia, hypervolemia     General Information Comments: Pt recently underwent right AKA. PO intake improving with 75% at the time of visit and experiencing constipation. Educated pt on menu selection to help prevent pt's hypoglycemic episodes.     Nutrition Discharge Planning: Adequate PO intake >75%    Nutrition Prescription Ordered    Current Diet Order: High protein  Oral Nutrition Supplement: Novasource Renal     Evaluation of Received Nutrients/Fluid Intake    Energy Calories Required: not meeting needs  Protein Required: not meeting needs    Fluid Required: other (see comments) (Per MD)  Comments: LBM 3/1     % Intake of Estimated Energy Needs: 50 - 75 %  % Meal Intake: 75%     Nutrition Risk Screen     Nutrition Risk Screen: no indicators present    Nutrition/Diet History    Patient Reported Diet/Restrictions/Preferences: general  Food Preferences: No cultural or Temple preferences  Factors Affecting Nutritional Intake: decreased appetite     Labs/Tests/Procedures/Meds    Pertinent Labs Reviewed: reviewed  Pertinent Labs Comments: Na 132, BUN 29, GFR 15.7, Glu 62, Ca 7.7, Alb 1.7  Pertinent Medications Reviewed: reviewed  Pertinent Medications  "Comments: Statin, Folic acid, heparin, cinacalcet, senna-docusate, sevelamar     Physical Findings    Overall Physical Appearance: obese  Oral/Mouth Cavity: WDL  Skin: other (see comments) (AKA)    Anthropometrics    Height: 5' 8" (172.7 cm)  Weight Method: Bed Scale  Weight: 104 kg (229 lb 4.5 oz)     Ideal Body Weight (IBW), Male: 154 lb  % Ideal Body Weight, Male (lb): 148.88 lb     BMI (Calculated): 34.9  BMI Grade: 30 - 34.9- obesity - grade I    Total Amputation %: 16     Estimated/Assessed Needs    Weight Used For Calorie Calculations: 87.4 kg (192 lb 9.5 oz)   Energy Calorie Requirements (kcal): 2102  Energy Need Method: Kcal/kg (1.3x PAL)  RMR (Moclips-St. Jeor Equation): 1618.1     Weight Used For Protein Calculations: 87.4 kg (192 lb 9.5 oz)  Protein Requirements: 105-123g/d (1.2-1.4g/kg)    Fluid Requirements (mL): 1 mL/kcal or per MD  Fluid Need Method: RDA Method (1 ml/kcal or per MD)  RDA Method (mL): 2102    CHO Requirement: 50% total kcals     Assessment and Plan    * Septic encephalopathy    Nutrition Problem  Increased nutrient needs    Related to (etiology):   Physiological factors of wound healing    Signs and Symptoms (as evidenced by):   Leg amputation 2/20    Interventions/Recommendations (treatment strategy):  See recs from RD note 3/2    Nutrition Diagnosis Status:   Continues        Monitor and Evaluation    Food and Nutrient Intake: energy intake, food and beverage intake  Food and Nutrient Adminstration: diet order     Physical Activity and Function: nutrition-related ADLs and IADLs  Anthropometric Measurements: weight, weight change  Biochemical Data, Medical Tests and Procedures: electrolyte and renal panel, gastrointestinal profile, glucose/endocrine profile, inflammatory profile, lipid profile  Nutrition-Focused Physical Findings: overall appearance    Nutrition Risk    Level of Risk: other (see comments) (1x/week)    Nutrition Follow-Up    RD Follow-up?: Yes    Oj Martinezetic " Intern    I certify that I directed the dietetic intern in service delivery and guided them using my skilled judgment. As the cosigning dietitian, I have reviewed the dietetic interns documentation and am responsible for the treatment, assessment, and plan.

## 2018-03-02 NOTE — PROGRESS NOTES
Pt still complaining of constipation despite BM this morning and senna lax given. Informed pt of order for enema. Prior to enema administration, small amount of stool on blue pad. Enema administered with more stool returned. Pt made aware & visualized output.

## 2018-03-02 NOTE — ADDENDUM NOTE
Addendum  created 03/02/18 1150 by Timi Coburn MD    Charge Capture section accepted, Sign clinical note

## 2018-03-02 NOTE — PLAN OF CARE
Sw left message with admissions coordinator Oxana at [facility] intake at , Sw to follow.

## 2018-03-02 NOTE — PLAN OF CARE
Gregoria spoke with Oxana in admissions at [facility] intake, Pt should have a HD schedule by end of the day as Pt's insurance is pending chair schedule. Pt ready for d/c Monday.

## 2018-03-03 LAB
POCT GLUCOSE: 113 MG/DL (ref 70–110)
POCT GLUCOSE: 122 MG/DL (ref 70–110)
POCT GLUCOSE: 77 MG/DL (ref 70–110)

## 2018-03-03 PROCEDURE — G8987 SELF CARE CURRENT STATUS: HCPCS | Mod: CL

## 2018-03-03 PROCEDURE — 11000001 HC ACUTE MED/SURG PRIVATE ROOM

## 2018-03-03 PROCEDURE — 97110 THERAPEUTIC EXERCISES: CPT

## 2018-03-03 PROCEDURE — 99231 SBSQ HOSP IP/OBS SF/LOW 25: CPT | Mod: ,,, | Performed by: ANESTHESIOLOGY

## 2018-03-03 PROCEDURE — 25000003 PHARM REV CODE 250: Performed by: INTERNAL MEDICINE

## 2018-03-03 PROCEDURE — 63600175 PHARM REV CODE 636 W HCPCS: Performed by: ANESTHESIOLOGY

## 2018-03-03 PROCEDURE — 99232 SBSQ HOSP IP/OBS MODERATE 35: CPT | Mod: ,,, | Performed by: INTERNAL MEDICINE

## 2018-03-03 PROCEDURE — G8988 SELF CARE GOAL STATUS: HCPCS | Mod: CJ

## 2018-03-03 PROCEDURE — 63600175 PHARM REV CODE 636 W HCPCS: Performed by: INTERNAL MEDICINE

## 2018-03-03 RX ORDER — HYDROMORPHONE HYDROCHLORIDE 1 MG/ML
1 INJECTION, SOLUTION INTRAMUSCULAR; INTRAVENOUS; SUBCUTANEOUS DAILY PRN
Status: DISCONTINUED | OUTPATIENT
Start: 2018-03-03 | End: 2018-03-05

## 2018-03-03 RX ORDER — HYDROCODONE BITARTRATE AND ACETAMINOPHEN 10; 325 MG/1; MG/1
1 TABLET ORAL
Status: DISCONTINUED | OUTPATIENT
Start: 2018-03-05 | End: 2018-03-05

## 2018-03-03 RX ADMIN — Medication 1 TABLET: at 10:03

## 2018-03-03 RX ADMIN — SEVELAMER CARBONATE 1600 MG: 800 TABLET, FILM COATED ORAL at 04:03

## 2018-03-03 RX ADMIN — ATORVASTATIN CALCIUM 20 MG: 20 TABLET, FILM COATED ORAL at 10:03

## 2018-03-03 RX ADMIN — Medication: at 10:03

## 2018-03-03 RX ADMIN — GABAPENTIN 100 MG: 100 CAPSULE ORAL at 09:03

## 2018-03-03 RX ADMIN — ROPIVACAINE HYDROCHLORIDE 10 ML/HR: 10 INJECTION, SOLUTION EPIDURAL at 11:03

## 2018-03-03 RX ADMIN — GABAPENTIN 100 MG: 100 CAPSULE ORAL at 05:03

## 2018-03-03 RX ADMIN — ROPIVACAINE HYDROCHLORIDE 8 ML/HR: 10 INJECTION, SOLUTION EPIDURAL at 03:03

## 2018-03-03 RX ADMIN — CINACALCET HYDROCHLORIDE 30 MG: 30 TABLET, COATED ORAL at 09:03

## 2018-03-03 RX ADMIN — ROPIVACAINE HYDROCHLORIDE 10 ML/HR: 10 INJECTION, SOLUTION EPIDURAL at 02:03

## 2018-03-03 RX ADMIN — STANDARDIZED SENNA CONCENTRATE AND DOCUSATE SODIUM 2 TABLET: 8.6; 5 TABLET, FILM COATED ORAL at 10:03

## 2018-03-03 RX ADMIN — CITALOPRAM HYDROBROMIDE 10 MG: 10 TABLET ORAL at 10:03

## 2018-03-03 RX ADMIN — MICONAZOLE NITRATE: 20 OINTMENT TOPICAL at 09:03

## 2018-03-03 RX ADMIN — ASPIRIN 81 MG: 81 TABLET, COATED ORAL at 10:03

## 2018-03-03 RX ADMIN — ROPIVACAINE HYDROCHLORIDE 10 ML/HR: 10 INJECTION, SOLUTION EPIDURAL at 04:03

## 2018-03-03 RX ADMIN — Medication: at 09:03

## 2018-03-03 RX ADMIN — GABAPENTIN 100 MG: 100 CAPSULE ORAL at 02:03

## 2018-03-03 RX ADMIN — HEPARIN SODIUM 5000 UNITS: 5000 INJECTION, SOLUTION INTRAVENOUS; SUBCUTANEOUS at 02:03

## 2018-03-03 RX ADMIN — HEPARIN SODIUM 5000 UNITS: 5000 INJECTION, SOLUTION INTRAVENOUS; SUBCUTANEOUS at 09:03

## 2018-03-03 RX ADMIN — ROPIVACAINE HYDROCHLORIDE 8 ML/HR: 10 INJECTION, SOLUTION EPIDURAL at 06:03

## 2018-03-03 RX ADMIN — SEVELAMER CARBONATE 1600 MG: 800 TABLET, FILM COATED ORAL at 10:03

## 2018-03-03 RX ADMIN — HEPARIN SODIUM 5000 UNITS: 5000 INJECTION, SOLUTION INTRAVENOUS; SUBCUTANEOUS at 05:03

## 2018-03-03 RX ADMIN — SEVELAMER CARBONATE 1600 MG: 800 TABLET, FILM COATED ORAL at 12:03

## 2018-03-03 RX ADMIN — HYDROCODONE BITARTRATE AND ACETAMINOPHEN 1 TABLET: 10; 325 TABLET ORAL at 04:03

## 2018-03-03 RX ADMIN — MICONAZOLE NITRATE: 20 OINTMENT TOPICAL at 10:03

## 2018-03-03 NOTE — NURSING
Pt HS BCG tested late due to patient eating a plate of fruit at 9pm, pt. Bcg was 77, gave pt. 2 ounces of apple juice, will continue to monitor.

## 2018-03-03 NOTE — SUBJECTIVE & OBJECTIVE
Medications:  Continuous Infusions:   ropivacaine (PF) 2 mg/ml (0.2%) 10 mL/hr (03/03/18 0459)    ropivacaine (PF) 2 mg/ml (0.2%) 8 mL/hr (03/03/18 0606)     Scheduled Meds:   ammonium lactate   Topical (Top) BID    aspirin  81 mg Oral Daily    atorvastatin  20 mg Oral Daily    cinacalcet  30 mg Oral QHS    citalopram  10 mg Oral Daily    epoetin michael (PROCRIT) injection  100 Units/kg Intravenous Every Mon, Wed, Fri    folic acid-vit B6-vit B12 2.5-25-2 mg  1 tablet Oral Daily    gabapentin  100 mg Oral TID    heparin (porcine)  5,000 Units Subcutaneous Q8H    miconazole nitrate 2%   Topical (Top) BID    midodrine  10 mg Oral Every Mon, Wed, Fri    senna-docusate 8.6-50 mg  2 tablet Oral BID    sevelamer carbonate  1,600 mg Oral TID WM    sevelamer carbonate  800 mg Oral QHS     PRN Meds:sodium chloride, sodium chloride 0.9%, albumin human 25%, albumin human 25%, albuterol-ipratropium 2.5mg-0.5mg/3mL, dextrose 50%, dextrose 50%, glucagon (human recombinant), glucose, glucose, hydrocodone-acetaminophen 10-325mg, ondansetron, ondansetron, promethazine (PHENERGAN) IVPB, sodium chloride 0.9%, sodium phosphates     Objective:     Vital Signs (Most Recent):  Temp: 97.2 °F (36.2 °C) (03/03/18 0423)  Pulse: (!) 49 (03/03/18 0745)  Resp: 16 (03/03/18 0423)  BP: (!) 114/58 (03/03/18 0423)  SpO2: 97 % (03/03/18 0423) Vital Signs (24h Range):  Temp:  [96.9 °F (36.1 °C)-98.5 °F (36.9 °C)] 97.2 °F (36.2 °C)  Pulse:  [48-64] 49  Resp:  [16-18] 16  SpO2:  [97 %-100 %] 97 %  BP: (114-147)/(48-73) 114/58          Physical Exam   Constitutional: No distress.   Cardiovascular: Normal rate.    Femoral 2+ bilaterally  No palpable distal pulses     Pulmonary/Chest: Effort normal. No respiratory distress.   Abdominal: Soft. He exhibits no distension.   Musculoskeletal: Normal range of motion.   Venous stasis skin changes to left thigh   Skin: Skin is warm and dry.     Wound vac intact, no leaks, draining serous-sanguinous  fluid    Significant Labs:  CBC:     Recent Labs  Lab 03/02/18  0501   WBC 9.68   RBC 2.65*   HGB 7.7*   HCT 23.5*   *   MCV 89   MCH 29.1   MCHC 32.8     CMP:     Recent Labs  Lab 03/02/18  0645   GLU 62*   CALCIUM 7.7*   ALBUMIN 1.7*   *   K 4.9   CO2 23      BUN 29*   CREATININE 4.2*     Coagulation:   No results for input(s): LABPROT, INR, APTT in the last 48 hours.    Significant Diagnostics:

## 2018-03-03 NOTE — ANESTHESIA POST-OP PAIN MANAGEMENT
Acute Pain Service Progress Note    Wilder Carbajal is a 67 y.o., male, [id number].    Surgery:  R AKA    Post Op Day #: 4    Catheter type: perineural  femoral and sciatic    Infusion type: Ropivacaine 0.2%  8 mL/hr basal and 10 ml/hr basal    Problem List:    There are no hospital problems to display for this patient.      Subjective:     General appearance of alert, oriented, no complaints   Pain with rest: 2 Numbers   Pain with movement: 4 Numbers    1. Pruritis No    2. Nausea No    3. Motor Blockade Yes    4. Sedation No, 1=awake and alert    Objective:      Sciatic catheter CDI   Femoral catheter continued with blood tinged dressing, again unchanged from previous encounters      Vitals   There were no vitals filed for this visit.     Labs    Admission on 02/04/2018   No results displayed because visit has over 200 results.           Meds   No current facility-administered medications for this visit.      No current outpatient prescriptions on file.     Facility-Administered Medications Ordered in Other Visits   Medication Dose Route Frequency Provider Last Rate Last Dose    0.9%  NaCl infusion (for blood administration)   Intravenous Q24H PRN Kanika Bradley MD        0.9%  NaCl infusion   Intravenous PRN Luther Dos Santos  mL/hr at 03/02/18 1418      albumin human 25% bottle 12.5 g  12.5 g Intravenous PRN Snehal Ballard MD        albumin human 25% bottle 25 g  25 g Intravenous PRN Shireen Valerio MD   25 g at 02/19/18 0845    albuterol-ipratropium 2.5mg-0.5mg/3mL nebulizer solution 3 mL  3 mL Nebulization Q4H PRN Chasidy Ray PA-C        ammonium lactate 12 % lotion   Topical (Top) BID Montserrat Lino MD        aspirin EC tablet 81 mg  81 mg Oral Daily Montserrat Lino MD   81 mg at 03/02/18 0858    atorvastatin tablet 20 mg  20 mg Oral Daily Montserrat Lino MD   20 mg at 03/02/18 0858    cinacalcet tablet 30 mg  30 mg Oral QHS Montserrat Lino MD   30 mg at 03/02/18 2057     citalopram tablet 10 mg  10 mg Oral Daily Montserrat Lino MD   10 mg at 03/02/18 0858    dextrose 50% injection 12.5 g  12.5 g Intravenous PRN Juan M Mcgovern NP        dextrose 50% injection 25 g  25 g Intravenous PRN Juan M Mcgovern NP        epoetin michael injection 10,400 Units  100 Units/kg Intravenous Every Mon, Wed, Fri Montserrat Lino MD   10,400 Units at 03/02/18 1521    folic acid-vit B6-vit B12 2.5-25-2 mg tablet 1 tablet  1 tablet Oral Daily Montserrat Lino MD   1 tablet at 03/02/18 0858    gabapentin capsule 100 mg  100 mg Oral TID Montserrat Lino MD   100 mg at 03/03/18 0502    glucagon (human recombinant) injection 1 mg  1 mg Intramuscular PRN Juan M Mcgovern NP        glucose chewable tablet 16 g  16 g Oral PRN Juan M Mcgovern NP   16 g at 03/01/18 0649    glucose chewable tablet 24 g  24 g Oral PRN Juan M Mcgovern NP        heparin (porcine) injection 5,000 Units  5,000 Units Subcutaneous Q8H Kanika Bradley MD   5,000 Units at 03/03/18 0502    hydrocodone-acetaminophen 10-325mg per tablet 1 tablet  1 tablet Oral Q4H PRN Montserrat Lino MD   1 tablet at 03/02/18 1755    miconazole nitrate 2% ointment   Topical (Top) BID Montserrat Lino MD        midodrine tablet 10 mg  10 mg Oral Every Mon, Wed, Fri Montserrat Lino MD   10 mg at 03/02/18 1307    ondansetron disintegrating tablet 8 mg  8 mg Oral Q8H PRN Montserrat Lino MD   8 mg at 02/25/18 0002    ondansetron injection 4 mg  4 mg Intravenous Q8H PRN Arie Gross MD        promethazine (PHENERGAN) 6.25 mg in dextrose 5 % 50 mL IVPB  6.25 mg Intravenous Q6H PRN Arie Gross MD        ropivacaine (PF) 2 mg/ml (0.2%) infusion  10 mL/hr Perineural Continuous Arie Gross MD 10 mL/hr at 03/03/18 0459 10 mL/hr at 03/03/18 0459    ropivacaine (PF) 2 mg/ml (0.2%) infusion  8 mL/hr Perineural Continuous Arie Gross MD 8 mL/hr at 03/03/18 0606 8 mL/hr at 03/03/18 0606    senna-docusate 8.6-50 mg per tablet 2 tablet   2 tablet Oral BID Montserrat Lino MD   2 tablet at 03/02/18 0858    sevelamer carbonate tablet 1,600 mg  1,600 mg Oral TID WM Montserrat Lino MD   1,600 mg at 03/02/18 1841    sevelamer carbonate tablet 800 mg  800 mg Oral QHS Montserrat Lino MD   800 mg at 03/01/18 2158    sodium chloride 0.9% flush 5 mL  5 mL Intravenous PRN Montserrat Lino MD        sodium phosphates 19-7 gram/118 mL enema 1 enema  1 enema Rectal PRN Montserrat Lino MD   1 enema at 03/02/18 1113       Anticoagulant dose Heparin 5k q8hrs.    Assessment:    Pain control adequate    Plan:    Continue PNC catheters, tentatively plan to pause PNC tmrw AM. Continue multimodal approach to pain.       Min Rene MD CA-1  Anesthesiology  APS    Plan discussed with Dr. Rene.  Pt continues to do well with regimen.  BNo change today.

## 2018-03-03 NOTE — NURSING
At 1920, during shift change, pt. Wound vac was found alarming, it was noticed the tubing had become stuck between two parts of the foot of the bed, vac re-set, during rounds at 2030, wound vac was found alarming again, this time, the error read air leak.  RN pressed the perimeter of the tubing insertion site and alarm resolved.  Pt. Son at bedside stated he did not know he should call if it alarmed, but will in the future.  Pt. Has been checked hourly since then and no further alarms, will continue to monitor.

## 2018-03-03 NOTE — PLAN OF CARE
Problem: Diabetes, Type 2 (Adult)  Goal: Signs and Symptoms of Listed Potential Problems Will be Absent, Minimized or Managed (Diabetes, Type 2)  Signs and symptoms of listed potential problems will be absent, minimized or managed by discharge/transition of care (reference Diabetes, Type 2 (Adult) CPG).   Outcome: Ongoing (interventions implemented as appropriate)  Pt plan of care reviewed and updated. Pt frequently assessed for pain and safety issues. Pt receives prn pain meds when needed. Vs as charted. Will continue to monitor.

## 2018-03-03 NOTE — PLAN OF CARE
Problem: Patient Care Overview  Goal: Plan of Care Review  Outcome: Ongoing (interventions implemented as appropriate)  Pt VSS, pt. Very lethargic since receiving larger dose of  Pain medication.  Femoral PNC continues with some leakage at site, pt. Without complaints of pain overnight.  Wound Vac remains in place with no further alarms, will continue to monitor.

## 2018-03-03 NOTE — PROGRESS NOTES
[facility]  Vascular Surgery  Progress Note    Patient Name: Wilder Carbajal  MRN: [id number]  Admission Date: 2/4/2018  Primary Care Provider: Dio Roberson MD    Subjective:     Interval History: NAEON. LLE dressing changed this morning    Post-Op Info:  Procedure(s) (LRB):  AMPUTATION-ABOVE KNEE (Right)   4 Days Post-Op       Medications:  Continuous Infusions:   ropivacaine (PF) 2 mg/ml (0.2%) 10 mL/hr (03/03/18 0459)    ropivacaine (PF) 2 mg/ml (0.2%) 8 mL/hr (03/03/18 0606)     Scheduled Meds:   ammonium lactate   Topical (Top) BID    aspirin  81 mg Oral Daily    atorvastatin  20 mg Oral Daily    cinacalcet  30 mg Oral QHS    citalopram  10 mg Oral Daily    epoetin michael (PROCRIT) injection  100 Units/kg Intravenous Every Mon, Wed, Fri    folic acid-vit B6-vit B12 2.5-25-2 mg  1 tablet Oral Daily    gabapentin  100 mg Oral TID    heparin (porcine)  5,000 Units Subcutaneous Q8H    miconazole nitrate 2%   Topical (Top) BID    midodrine  10 mg Oral Every Mon, Wed, Fri    senna-docusate 8.6-50 mg  2 tablet Oral BID    sevelamer carbonate  1,600 mg Oral TID WM    sevelamer carbonate  800 mg Oral QHS     PRN Meds:sodium chloride, sodium chloride 0.9%, albumin human 25%, albumin human 25%, albuterol-ipratropium 2.5mg-0.5mg/3mL, dextrose 50%, dextrose 50%, glucagon (human recombinant), glucose, glucose, hydrocodone-acetaminophen 10-325mg, ondansetron, ondansetron, promethazine (PHENERGAN) IVPB, sodium chloride 0.9%, sodium phosphates     Objective:     Vital Signs (Most Recent):  Temp: 97.2 °F (36.2 °C) (03/03/18 0423)  Pulse: (!) 49 (03/03/18 0745)  Resp: 16 (03/03/18 0423)  BP: (!) 114/58 (03/03/18 0423)  SpO2: 97 % (03/03/18 0423) Vital Signs (24h Range):  Temp:  [96.9 °F (36.1 °C)-98.5 °F (36.9 °C)] 97.2 °F (36.2 °C)  Pulse:  [48-64] 49  Resp:  [16-18] 16  SpO2:  [97 %-100 %] 97 %  BP: (114-147)/(48-73) 114/58          Physical Exam   Constitutional: No distress.   Cardiovascular:  Normal rate.    Femoral 2+ bilaterally  No palpable distal pulses     Pulmonary/Chest: Effort normal. No respiratory distress.   Abdominal: Soft. He exhibits no distension.   Musculoskeletal: Normal range of motion.   Venous stasis skin changes to left thigh   Skin: Skin is warm and dry.     Wound vac intact, no leaks, draining serous-sanguinous fluid    Significant Labs:  CBC:     Recent Labs  Lab 03/02/18  0501   WBC 9.68   RBC 2.65*   HGB 7.7*   HCT 23.5*   *   MCV 89   MCH 29.1   MCHC 32.8     CMP:     Recent Labs  Lab 03/02/18  0645   GLU 62*   CALCIUM 7.7*   ALBUMIN 1.7*   *   K 4.9   CO2 23      BUN 29*   CREATININE 4.2*     Coagulation:   No results for input(s): LABPROT, INR, APTT in the last 48 hours.    Significant Diagnostics:      Assessment/Plan:     Foot ulceration, right, with fat layer exposed    66 yo M with h/o ESRD on HD (TThS) via L RC AVF (with interposition graft), HTN, HLD, DM 2( Hgb A1c 4.7), HFrEF (45% 4/2017)     s/p Right AKA 2/27/18 per Dr. Zhou    -Continue PT/OT  -Pain controlled with perineural catheter, anesthesia following  -Will continue wound vac to RLE, dressing changed today.   -Vascular surgery to follow                  Zheng campbell MD  Vascular Surgery  [facility]

## 2018-03-03 NOTE — PROGRESS NOTES
Progress Note   Hospital Medicine       Team: [facility] MED D Montserrat Lino MD  Admit Date: 2/4/2018  Length of Stay:  LOS: 27 days   ESTUARDO 3/5/2018  Principal Problem:  Septic encephalopathy    Interval hx / overnight events: no new complaints. Took himself off dialysis early yesterday due to significant back pain and unavailability of pain medication in dialysis unit.     Areas of concern/ handoff:  Early morning hypoglycemia. Incomplete dialysis treatment    Review of Systems: Gen: no fever, no chills, Heart: no chest pain, palpitations; Resp: no SOB, no cough    Temp:  [97.1 °F (36.2 °C)-98.5 °F (36.9 °C)]   Pulse:  [48-64]   Resp:  [16-18]   BP: (110-147)/(48-73)   SpO2:  [97 %-100 %]       Physical Exam:  General appearance: NAD, conversant  Neck: FROM, supple   Lungs: crackles at bases, no accessory muscle use  CV: RRR, no heave  Abdomen: Soft, non-tender; no masses or HSM  Extremities: 3+ pitting edema up to his mid thighs (left)  and 3+ edema to his buttocks,  no digital cyanosis; right AKA site clean and intact with wound vac  Skin: no rash, lesions or ulcers  Psych: Alert and oriented to person, place and time      Recent Labs  Lab 02/27/18  1625 02/28/18  0537 03/02/18  0501   WBC 8.28 8.54 9.68   HGB 7.5* 6.4* 7.7*   HCT 24.1* 20.3* 23.5*   * 118* 100*       Recent Labs  Lab 02/27/18  0351 02/27/18  1625 02/28/18  0537 03/02/18  0645   * 135* 134* 132*   K 4.3 4.5 4.7 4.9    102 102 102   CO2 25 25 25 23   BUN 25* 27* 32* 29*   CREATININE 3.8* 4.1* 4.4* 4.2*   GLU 63* 95 58* 62*   CALCIUM 7.9* 7.6* 7.5* 7.7*   MG 2.3  --  2.1 2.5   PHOS 3.8  --  4.2 4.0       Recent Labs  Lab 02/25/18  0951  02/27/18  1625 02/28/18  0537 03/02/18  0645   ALKPHOS 101  --  97  --   --    ALT 10  --  9*  --   --    AST 15  --  15  --   --    ALBUMIN 2.0*  < > 1.7* 1.6* 1.7*   PROT 7.6  --  6.5  --   --    BILITOT 0.7  --  1.0  --   --    INR 1.3*  --   --   --   --    < > = values in this interval not  "displayed.    Recent Labs  Lab 03/01/18  0742 03/01/18  1728 03/02/18  0845 03/02/18  1307 03/02/18  1804 03/03/18  0046   POCTGLUCOSE 94 87 70 149* 218* 77         Assessment and Plan   Overview: 66 yo here for osteomyelitis of right foot. He underwent washout 2/6 and another washout 2/6 and amputation of 4th and 5th digits. Undergoing treatment course of antibiotics.     Problems Addressed today:     Septic encephalopathy, resolved  Patient initially lacked capacity to refuse surgery per psychiatry, consent obtained from his son as proxy. Mental status has improved significantly.      Infected right dorsal foot, ischemic ulceration  Podiatry consulted - s/p I&D and 4th and 5th amputation 2/9  Vascular surgery consulted - ultimately recommended AKA of right leg.   Continued vancomycin 1 g IV per random levels and Zosyn 2.25 g IV BID  Wound culture revealed pan-sensitive CITROBACTER KOSERI and KLEBSIELLA PNEUMONIAE ESBL.  To OR 02/09/18. Consulted ID.Recommended 6 weeks of ciprofloxacin. Will reconsult for need of antibiotics after AKA.   ABIs ordered to rule out need for BKA. Angiogram considered. Per vascular surgery "Unlikely for the patient to heal the wound despite aggressive limb salvage efforts. Due to significant lipodermatosclerosis, BKA is not good option. Patient likely needs AKA." Patient initially refused AKA stating he would rather die of infection then have his leg amputated. One Son and Brother in agreement with patient as this aligns with his preference when in his normal mental state. All aware of the risk of recurrence of sepsis syndrome and death. Continuation of wound care and antibiotics likely futile; consulted Palliative Care. After multiple discussions about hospice, patient stated he wanted to live longer and agreed with AKA. Patient and his eldest Son now wish to proceed with AKA. AKA performed 2/29/2018. Id reconsulted and recommended discontinuing ciprofloxacin as source of infection " resolved. Patient with wound vac to incision. Cannister 250 at mL x 3 days.      Hypervolemia due to non-compliance with dialysis  Hyperkalemia, resolved  Anasarca due to protein malnutrition  Shifted in ER with albuterol, insulin/destrose, bicarbonate. K 7.9 => 4  No respiratory compromise. Emergent dialysis 2/5/18 early AM and on 2/9  Withhold anti-hypertensives for volume removal. Edema overall improving during stay. Son at bedside feeding patient. High protein high calorie diet. Patient noticeably more swollen after taking himself off dialysis early 3/2. He was also previously not on fluid restriction post-operatively. Fluid restriction 800 mL daily.      DVT  BLE ultrasound  Apixaban 5 mg BID - ? Compliance at home  Apixaban held 2/12/2018 for probable BKA - plan to resume at discharge.  Remains on hold for AKA. Will need to resume when perineural catheters are removed and okay per vascular surgery     DM II with HTN and CKD V  HgbA1c 4.6%  Likely controlled with declining intake  No further intervention for hyperglycemia needed at this time    Protein calorie malnutrtion  Hypoglycemia with associated NSVT  Poor prognosis for wound healing  Nepro with meals  High protein high calorie diet    Essential HTN - held home hydralazine 50 mg TID and losartan 50 mg daily. Blood pressure fairly well-controlled during stay without antihypertensives.     DM II hyperlipidemia - atorvastatin 40 mg daily     DM II neuropathy - gabapentin 100 mg TID     ESRF HD TTS - Nephrology consulted for dialysis.  Withholding anti-hypertensives and added midodrine as needed prior to dialysis for volume removal and reduce symptomatic dialysis-associated hypotension.   BP improving.     CKD MBD - continue cinacalcet 30 mg qhs and sevelamer 1600 mg TID daily     Depression - continue citalopram 10 mg daily. Consulted psychiatry. Back to baseline.     acue blood loss anemia - Hgb at 6.4 after two units given pre-operatively and operatively.  Given 2 more units post-operatively. Hgb at baseline 7.7.      Anemia of CKD - epogen 10,000 units subq MWF    Anemia of folate deficiency - folbic once daily     Anticipated Disposition: Skilled Nursing Facility post-op   ESTUARDO: 3/5/2018     Goals of Care:  General Prognosis: fair   Goals: Curative  Comfort Only: No  Hospice: No  Code Status: Full Code    Montserrat Lino MD

## 2018-03-03 NOTE — ASSESSMENT & PLAN NOTE
68 yo M with h/o ESRD on HD (TThS) via L RC AVF (with interposition graft), HTN, HLD, DM 2( Hgb A1c 4.7), HFrEF (45% 4/2017)     s/p Right AKA 2/27/18 per Dr. Zhou    -Continue PT/OT  -Pain controlled with perineural catheter, anesthesia following  -Will continue wound vac to RLE, dressing changed today.   -Vascular surgery to follow

## 2018-03-03 NOTE — PT/OT/SLP PROGRESS
Occupational Therapy   Treatment    Name: Wilder Carbajal  MRN: [id number]  Admitting Diagnosis:  Septic encephalopathy  4 Days Post-Op    Recommendations:     Discharge Recommendations: nursing facility, skilled  Discharge Equipment Recommendations:   (TBD )  Barriers to discharge:  Inaccessible home environment, Decreased caregiver support    Subjective     Communicated with: RN prior to session.  Pain/Comfort:  · Pain Rating 1: 1/10  · Pain Addressed 1: Reposition, Distraction  · Pain Rating Post-Intervention 1: 5/10    Patients cultural, spiritual, Roman Catholic conflicts given the current situation: none stated    Objective:     Patient found with: wound vac, telemetry, peripheral IV, perineural catheter    General Precautions: Standard, fall   Orthopedic Precautions:RLE non weight bearing (R AKA), LLE non weight bearing (need to verify LLE WB precautions)   Braces: N/A     Occupational Performance:    Bed Mobility:    · Patient completed Rolling/Turning to Right with maximal assistance and with side rail  · Patient completed Scooting/Bridging with maximal assistance  · Patient completed Supine to Sit with maximal assistance  · Patient completed Sit to Supine with maximal assistance   · Pt seated EOB for 4 min with CGA-Max A for balance. CGA needed when pt able to hold onto bedrail and EOB for support to maintain balance.     Functional Mobility/Transfers:  NT 2/2 pt LE WB precautions    Activities of Daily Living:  NT     Patient left supine with all lines intact, call button in reach and family present    [facility] 6 Click:  [facility] Total Score: 10    Treatment & Education:   *BUE AROM exercises 10 x 1 for shoulder flex, elbow flex/ext while supine with HOB elevated.   *Pt educated on role of OT/POC, encouraged to complete UE exercises while in bed   *White board/communication board updated  Education:    Assessment:     Wilder Carbajal is a 67 y.o. male with a medical diagnosis of Septic encephalopathy.  He presents  with impaired balance, weakness, and pain inhibiting safe functional mobility and self care.  Performance deficits affecting function are impaired self care skills, weakness, impaired endurance, impaired functional mobilty, gait instability, impaired balance, decreased lower extremity function, pain, orthopedic precautions.      Rehab Prognosis:  Good; patient would benefit from acute skilled OT services to address these deficits and reach maximum level of function.       Plan:     Patient to be seen 4 x/week to address the above listed problems via self-care/home management, therapeutic activities, therapeutic exercises  · Plan of Care Expires: 04/01/18  · Plan of Care Reviewed with: patient, family    This Plan of care has been discussed with the patient who was involved in its development and understands and is in agreement with the identified goals and treatment plan    GOALS:    Occupational Therapy Goals        Problem: Occupational Therapy Goal    Goal Priority Disciplines Outcome Interventions   Occupational Therapy Goal     OT, PT/OT     Description:  Goals to be met by: 3/15/2018     Patient will increase functional independence with ADLs by performing:    Feeding with [place].  UE Dressing with Moderate Assistance.  LE Dressing with Moderate Assistance.  Grooming while seated with Minimal Assistance.  Toileting from bedside commode with Maximum Assistance for hygiene and clothing management.   Sitting at edge of bed x10 minutes with Stand-by Assistance.  Supine to sit with Moderate Assistance.  Stand pivot transfers with Maximum Assistance.  Toilet transfer to bedside commode with Maximum Assistance.                Problem: Occupational Therapy Goal    Goal Priority Disciplines Outcome Interventions   Occupational Therapy Goal     OT, PT/OT Ongoing (interventions implemented as appropriate)    Description:  Goals to be met by:  3/2/2018    Patient will increase functional independence with ADLs by  performing:    UE Dressing with Set-up Assistance.  Grooming while seated with Modified [place].  Toileting from bedside commode with Moderate Assistance for hygiene and clothing management.   Supine to sit with Modified [place].  Bed<>chair transfer with Minimal Assistance while maintaining precautions.  Toilet transfer to bedside commode with Minimal Assistance while maintaining precautions.                         Time Tracking:     OT Date of Treatment: 03/03/18  OT Start Time: 1055  OT Stop Time: 1110  OT Total Time (min): 15 min    Billable Minutes:Therapeutic Exercise 15    Veronica Nassar OT  3/3/2018

## 2018-03-03 NOTE — PLAN OF CARE
Problem: Occupational Therapy Goal  Goal: Occupational Therapy Goal  Goals to be met by:  3/2/2018    Patient will increase functional independence with ADLs by performing:    UE Dressing with Set-up Assistance.  Grooming while seated with Modified [place].  Toileting from bedside commode with Moderate Assistance for hygiene and clothing management.   Supine to sit with Modified [place].  Bed<>chair transfer with Minimal Assistance while maintaining precautions.  Toilet transfer to bedside commode with Minimal Assistance while maintaining precautions.        Outcome: Ongoing (interventions implemented as appropriate)  Goals remain appropriate    Comments: Continue OT MAGGIE Nassar OT  3/3/2018

## 2018-03-04 LAB
POCT GLUCOSE: 101 MG/DL (ref 70–110)
POCT GLUCOSE: 112 MG/DL (ref 70–110)
POCT GLUCOSE: 132 MG/DL (ref 70–110)
POCT GLUCOSE: 137 MG/DL (ref 70–110)

## 2018-03-04 PROCEDURE — 11000001 HC ACUTE MED/SURG PRIVATE ROOM

## 2018-03-04 PROCEDURE — 94799 UNLISTED PULMONARY SVC/PX: CPT

## 2018-03-04 PROCEDURE — 25000003 PHARM REV CODE 250: Performed by: INTERNAL MEDICINE

## 2018-03-04 PROCEDURE — 63600175 PHARM REV CODE 636 W HCPCS: Performed by: ANESTHESIOLOGY

## 2018-03-04 PROCEDURE — 99232 SBSQ HOSP IP/OBS MODERATE 35: CPT | Mod: ,,, | Performed by: INTERNAL MEDICINE

## 2018-03-04 PROCEDURE — 63600175 PHARM REV CODE 636 W HCPCS: Performed by: INTERNAL MEDICINE

## 2018-03-04 RX ADMIN — HYDROCODONE BITARTRATE AND ACETAMINOPHEN 1 TABLET: 10; 325 TABLET ORAL at 08:03

## 2018-03-04 RX ADMIN — Medication: at 09:03

## 2018-03-04 RX ADMIN — ATORVASTATIN CALCIUM 20 MG: 20 TABLET, FILM COATED ORAL at 08:03

## 2018-03-04 RX ADMIN — SEVELAMER CARBONATE 1600 MG: 800 TABLET, FILM COATED ORAL at 05:03

## 2018-03-04 RX ADMIN — GABAPENTIN 100 MG: 100 CAPSULE ORAL at 01:03

## 2018-03-04 RX ADMIN — HYDROCODONE BITARTRATE AND ACETAMINOPHEN 1 TABLET: 10; 325 TABLET ORAL at 10:03

## 2018-03-04 RX ADMIN — GABAPENTIN 100 MG: 100 CAPSULE ORAL at 09:03

## 2018-03-04 RX ADMIN — Medication 1 TABLET: at 08:03

## 2018-03-04 RX ADMIN — MICONAZOLE NITRATE: 20 OINTMENT TOPICAL at 09:03

## 2018-03-04 RX ADMIN — HEPARIN SODIUM 5000 UNITS: 5000 INJECTION, SOLUTION INTRAVENOUS; SUBCUTANEOUS at 09:03

## 2018-03-04 RX ADMIN — SEVELAMER CARBONATE 1600 MG: 800 TABLET, FILM COATED ORAL at 11:03

## 2018-03-04 RX ADMIN — ASPIRIN 81 MG: 81 TABLET, COATED ORAL at 08:03

## 2018-03-04 RX ADMIN — HEPARIN SODIUM 5000 UNITS: 5000 INJECTION, SOLUTION INTRAVENOUS; SUBCUTANEOUS at 01:03

## 2018-03-04 RX ADMIN — GABAPENTIN 100 MG: 100 CAPSULE ORAL at 06:03

## 2018-03-04 RX ADMIN — ROPIVACAINE HYDROCHLORIDE 8 ML/HR: 10 INJECTION, SOLUTION EPIDURAL at 01:03

## 2018-03-04 RX ADMIN — CINACALCET HYDROCHLORIDE 30 MG: 30 TABLET, COATED ORAL at 09:03

## 2018-03-04 RX ADMIN — Medication: at 08:03

## 2018-03-04 RX ADMIN — MICONAZOLE NITRATE: 20 OINTMENT TOPICAL at 08:03

## 2018-03-04 RX ADMIN — CITALOPRAM HYDROBROMIDE 10 MG: 10 TABLET ORAL at 08:03

## 2018-03-04 RX ADMIN — STANDARDIZED SENNA CONCENTRATE AND DOCUSATE SODIUM 1 TABLET: 8.6; 5 TABLET, FILM COATED ORAL at 08:03

## 2018-03-04 RX ADMIN — HYDROCODONE BITARTRATE AND ACETAMINOPHEN 1 TABLET: 10; 325 TABLET ORAL at 01:03

## 2018-03-04 RX ADMIN — HEPARIN SODIUM 5000 UNITS: 5000 INJECTION, SOLUTION INTRAVENOUS; SUBCUTANEOUS at 06:03

## 2018-03-04 RX ADMIN — ROPIVACAINE HYDROCHLORIDE 10 ML/HR: 10 INJECTION, SOLUTION EPIDURAL at 01:03

## 2018-03-04 RX ADMIN — SEVELAMER CARBONATE 1600 MG: 800 TABLET, FILM COATED ORAL at 08:03

## 2018-03-04 NOTE — PT/OT/SLP PROGRESS
Physical Therapy      Patient Name:  Wilder Carbajal   MRN:  [id number]    Patient not seen today secondary to pt in care of nursing first attempt. Was unable to return for second attempt. Will follow-up at next scheduled visit per PT POC.    Laure Hassan, NOEMI

## 2018-03-04 NOTE — PROGRESS NOTES
Restarted sciatic and femoral PNC at prior rates as patient was complaining of 7/10 pain since the catheters were paused this morning. Nurse reported of leaking at the femoral PNC site; examined and redressed with tegaderm.     Rosalio Brower MD  PGY-4  Anesthesiology

## 2018-03-04 NOTE — ANESTHESIA POST-OP PAIN MANAGEMENT
Acute Pain Service Progress Note    Wilder Carbajal is a 67 y.o., male, [id number].    Surgery:  R AKA    Post Op Day #: 5    Catheter type: perineural  femoral and sciatic    Infusion type: Ropivacaine 0.2%  8 mL/hr basal and 10 ml/hr basal    Problem List:    Active Hospital Problems    Diagnosis  POA    *Septic encephalopathy [G93.41]  Yes    Gangrene of right foot [I96]  Yes    Palliative care encounter [Z51.5]  Not Applicable    Foot ulceration, left, with unspecified severity [L97.529]  Unknown    PVD (peripheral vascular disease) [I73.9]  Unknown    Unilateral AKA, right [Z89.611]  Not Applicable    Diabetic foot ulcer [E11.621, L97.509]  Yes    Foot ulceration, right, with fat layer exposed [L97.512]  Yes    Acute delirium [R41.0]  Yes    Protein-calorie malnutrition, severe [E43]  Yes    DVT (deep venous thrombosis) [I82.409]  Yes    DM type 2 with diabetic mixed hyperlipidemia [E11.69, E78.2]  Yes    Diabetic polyneuropathy associated with type 2 diabetes mellitus [E11.42]  Yes    Chronic kidney disease-mineral and bone disorder [N18.9, E83.9, M89.9]  Yes    Depression [F32.9]  Yes    Alteration in skin integrity [R23.9]  Yes    Infected skin ulcer with fat layer exposed [L98.492, L08.9]  Yes    Hyperkalemia [E87.5]  Yes    Hypervolemia [E87.70]  Yes    Type 2 diabetes mellitus with stage 5 chronic kidney disease and hypertension [E11.22, I12.0, N18.5]  Yes    ESRD (end stage renal disease) [N18.6]  Yes    Essential hypertension [I10]  Yes      Resolved Hospital Problems    Diagnosis Date Resolved POA   No resolved problems to display.       Subjective:     General appearance of alert, oriented, no complaints   Pain with rest: 3 Numbers   Pain with movement: 4 Numbers    1. Pruritis No    2. Nausea No    3. Motor Blockade Yes    4. Sedation No, 1=awake and alert    Objective:      Sciatic catheter CDI   Femoral catheter continued with blood tinged dressing, again unchanged from  previous encounters      Vitals   Vitals:    03/04/18 0327   BP: (!) 125/58   Pulse: (!) 52   Resp: 17   Temp: 36.5 °C (97.7 °F)        Labs    Admission on 02/04/2018   No results displayed because visit has over 200 results.           Meds   Current Facility-Administered Medications   Medication Dose Route Frequency Provider Last Rate Last Dose    0.9%  NaCl infusion (for blood administration)   Intravenous Q24H PRN Kanika Bradley MD        0.9%  NaCl infusion   Intravenous PRN Luther Dos Santos  mL/hr at 03/02/18 1418      albumin human 25% bottle 12.5 g  12.5 g Intravenous PRN Snehal Ballard MD        albumin human 25% bottle 25 g  25 g Intravenous PRN Shireen Valerio MD   25 g at 02/19/18 0845    albuterol-ipratropium 2.5mg-0.5mg/3mL nebulizer solution 3 mL  3 mL Nebulization Q4H PRN Chasidy Ray PA-C        ammonium lactate 12 % lotion   Topical (Top) BID Montserrat Lino MD        aspirin EC tablet 81 mg  81 mg Oral Daily Montserrat Lino MD   81 mg at 03/03/18 1014    atorvastatin tablet 20 mg  20 mg Oral Daily Montserrat Lino MD   20 mg at 03/03/18 1014    cinacalcet tablet 30 mg  30 mg Oral QHS Montserrat Lino MD   30 mg at 03/03/18 2101    citalopram tablet 10 mg  10 mg Oral Daily Montserrat Lino MD   10 mg at 03/03/18 1015    dextrose 50% injection 12.5 g  12.5 g Intravenous PRN Juan M Mcgovern NP        dextrose 50% injection 25 g  25 g Intravenous PRN Juan M Mcgovern NP        epoetin michael injection 10,400 Units  100 Units/kg Intravenous Every Mon, Wed, Fri Montserrat Lino MD   10,400 Units at 03/02/18 1521    folic acid-vit B6-vit B12 2.5-25-2 mg tablet 1 tablet  1 tablet Oral Daily Montserrat Lino MD   1 tablet at 03/03/18 1016    gabapentin capsule 100 mg  100 mg Oral TID Montserrat Lino MD   100 mg at 03/04/18 0609    glucagon (human recombinant) injection 1 mg  1 mg Intramuscular PRN Juan M Mcgovern NP        glucose chewable tablet 16 g  16 g Oral PRN Juan M Mcgovern, NP    16 g at 03/01/18 0649    glucose chewable tablet 24 g  24 g Oral PRN Juan M Mcgovern NP        heparin (porcine) injection 5,000 Units  5,000 Units Subcutaneous Q8H Kanika Bradley MD   5,000 Units at 03/04/18 0609    hydrocodone-acetaminophen 10-325mg per tablet 1 tablet  1 tablet Oral Q4H PRN Montserrat Lino MD   1 tablet at 03/03/18 1629    [START ON 3/5/2018] hydrocodone-acetaminophen 10-325mg per tablet 1 tablet  1 tablet Oral Every Mon, Fri Montserrat Lino MD        HYDROmorphone injection 1 mg  1 mg Intravenous Daily PRN Montserrat Lino MD        miconazole nitrate 2% ointment   Topical (Top) BID Montserrat Lino MD        midodrine tablet 10 mg  10 mg Oral Every Mon, Wed, Fri Montserrat Lino MD   10 mg at 03/02/18 1307    ondansetron disintegrating tablet 8 mg  8 mg Oral Q8H PRN Montserrat Lino MD   8 mg at 02/25/18 0002    ondansetron injection 4 mg  4 mg Intravenous Q8H PRN Arie Gross MD        promethazine (PHENERGAN) 6.25 mg in dextrose 5 % 50 mL IVPB  6.25 mg Intravenous Q6H PRN Arie Gross MD        ropivacaine (PF) 2 mg/ml (0.2%) infusion  10 mL/hr Perineural Continuous Arie Gross MD 10 mL/hr at 03/03/18 2355 10 mL/hr at 03/03/18 2355    ropivacaine (PF) 2 mg/ml (0.2%) infusion  8 mL/hr Perineural Continuous Arie Gross MD 8 mL/hr at 03/04/18 0136 8 mL/hr at 03/04/18 0136    senna-docusate 8.6-50 mg per tablet 2 tablet  2 tablet Oral BID Montserrat Lino MD   2 tablet at 03/03/18 1012    sevelamer carbonate tablet 1,600 mg  1,600 mg Oral TID  Montserrat Lino MD   1,600 mg at 03/03/18 1628    sevelamer carbonate tablet 800 mg  800 mg Oral QHS Montserrat Lino MD   800 mg at 03/01/18 8358    sodium chloride 0.9% flush 5 mL  5 mL Intravenous PRN Montserrat Lino MD        sodium phosphates 19-7 gram/118 mL enema 1 enema  1 enema Rectal PRN Montserrat Lino MD   1 enema at 03/02/18 1113       Anticoagulant dose Heparin 5,000 units q  8hrs.    Assessment:    Pain control adequate    Plan:    Plan to pause sciatic and femoral catheter today and possibly remove in the afternoon if pain is tolerable. Continue multimodal approach to pain.       Rosalio Brower MD   PGY-4  Anesthesiology  [facility]  Seen and examined with Dr. Brower.  Expecting to D/C catheters today.

## 2018-03-04 NOTE — NURSING
EJ pulled, cath tip grossly intact. Pressure held at site. Pt tolerated w/o any distress. Safety measures maintained. Will continue to monitor

## 2018-03-04 NOTE — ADDENDUM NOTE
Addendum  created 03/04/18 1340 by Joe Clark MD    Anesthesia Event edited, Sign clinical note

## 2018-03-04 NOTE — PROGRESS NOTES
"Progress Note   Hospital Medicine       Team: [facility] Montserrat Lino MD  Admit Date: 2/4/2018  Length of Stay:  LOS: 28 days   ESTUARDO 3/5/2018  Principal Problem:  Septic encephalopathy    Interval hx / overnight events: no new complaints. Pain continues to be "level."    Areas of concern/ handoff:  Early morning hypoglycemia. Incomplete dialysis treatment    Review of Systems: Gen: no fever, no chills, Heart: no chest pain, palpitations; Resp: no SOB, no cough    Temp:  [97.3 °F (36.3 °C)-98.6 °F (37 °C)]   Pulse:  [46-62]   Resp:  [16-17]   BP: (110-132)/(55-63)   SpO2:  [94 %-98 %]       Physical Exam:  General appearance: NAD, conversant  Neck: FROM, supple   Lungs: crackles at bases, no accessory muscle use  CV: RRR, no heave  Abdomen: Soft, non-tender; no masses or HSM  Extremities: 3+ pitting edema up to his mid thighs (left)  and 3+ edema to his buttocks,  no digital cyanosis; right AKA site clean and intact with wound vac; +scortal edema  Skin: no rash, lesions or ulcers  Psych: Alert and oriented to person, place and time      Recent Labs  Lab 02/27/18  1625 02/28/18  0537 03/02/18  0501   WBC 8.28 8.54 9.68   HGB 7.5* 6.4* 7.7*   HCT 24.1* 20.3* 23.5*   * 118* 100*       Recent Labs  Lab 02/27/18  0351 02/27/18  1625 02/28/18  0537 03/02/18  0645   * 135* 134* 132*   K 4.3 4.5 4.7 4.9    102 102 102   CO2 25 25 25 23   BUN 25* 27* 32* 29*   CREATININE 3.8* 4.1* 4.4* 4.2*   GLU 63* 95 58* 62*   CALCIUM 7.9* 7.6* 7.5* 7.7*   MG 2.3  --  2.1 2.5   PHOS 3.8  --  4.2 4.0       Recent Labs  Lab 02/27/18  1625 02/28/18  0537 03/02/18  0645   ALKPHOS 97  --   --    ALT 9*  --   --    AST 15  --   --    ALBUMIN 1.7* 1.6* 1.7*   PROT 6.5  --   --    BILITOT 1.0  --   --        Recent Labs  Lab 03/02/18  1804 03/03/18  0046 03/03/18  1216 03/03/18  2238 03/04/18  0755 03/04/18  1137   POCTGLUCOSE 218* 77 113* 122* 112* 101         Assessment and Plan   Overview: 68 yo here for osteomyelitis of " "right foot. He underwent washout 2/6 and another washout 2/6 and amputation of 4th and 5th digits. Undergoing treatment course of antibiotics.     Problems Addressed today:     Septic encephalopathy, resolved  Patient initially lacked capacity to refuse surgery per psychiatry, consent obtained from his son as proxy. Mental status has improved significantly.      Infected right dorsal foot, ischemic ulceration  Podiatry consulted - s/p I&D and 4th and 5th amputation 2/9  Vascular surgery consulted - ultimately recommended AKA of right leg.   Continued vancomycin 1 g IV per random levels and Zosyn 2.25 g IV BID  Wound culture revealed pan-sensitive CITROBACTER KOSERI and KLEBSIELLA PNEUMONIAE ESBL.  To OR 02/09/18. Consulted ID.Recommended 6 weeks of ciprofloxacin. Will reconsult for need of antibiotics after AKA.   ABIs ordered to rule out need for BKA. Angiogram considered. Per vascular surgery "Unlikely for the patient to heal the wound despite aggressive limb salvage efforts. Due to significant lipodermatosclerosis, BKA is not good option. Patient likely needs AKA." Patient initially refused AKA stating he would rather die of infection then have his leg amputated. One Son and Brother in agreement with patient as this aligns with his preference when in his normal mental state. All aware of the risk of recurrence of sepsis syndrome and death. Continuation of wound care and antibiotics likely futile; consulted Palliative Care. After multiple discussions about hospice, patient stated he wanted to live longer and agreed with AKA. Patient and his eldest Son now wish to proceed with AKA. AKA performed 2/29/2018. Id reconsulted and recommended discontinuing ciprofloxacin as source of infection resolved. Patient with wound vac to incision. Cannister 250 at mL x 3 days.      Hypervolemia due to non-compliance with dialysis  Hyperkalemia, resolved  Anasarca due to protein malnutrition  Shifted in ER with albuterol, " insulin/destrose, bicarbonate. K 7.9 => 4  No respiratory compromise. Emergent dialysis 2/5/18 early AM and on 2/9  Withhold anti-hypertensives for volume removal. Edema overall improving during stay. Son at bedside feeding patient. High protein high calorie diet. Patient noticeably more swollen after taking himself off dialysis early 3/2. He was also previously not on fluid restriction post-operatively. Fluid restriction 800 mL daily. Will discuss with nephrology about removal of fluid.       DVT  BLE ultrasound  Apixaban 5 mg BID - ? Compliance at home  Apixaban held 2/12/2018 for probable BKA - plan to resume at discharge.  Remains on hold for AKA. Will need to resume when perineural catheters are removed and okay per vascular surgery     DM II with HTN and CKD V  HgbA1c 4.6%  Likely controlled with declining intake  No further intervention for hyperglycemia needed at this time    Protein calorie malnutrtion  Hypoglycemia with associated NSVT  Poor prognosis for wound healing  Nepro with meals  High protein high calorie diet    Essential HTN - held home hydralazine 50 mg TID and losartan 50 mg daily. Blood pressure fairly well-controlled during stay without antihypertensives.     DM II hyperlipidemia - atorvastatin 40 mg daily     DM II neuropathy - gabapentin 100 mg TID     ESRF HD TTS - Nephrology consulted for dialysis.  Withholding anti-hypertensives and added midodrine as needed prior to dialysis for volume removal and reduce symptomatic dialysis-associated hypotension.   BP improving.     CKD MBD - continue cinacalcet 30 mg qhs and sevelamer 1600 mg TID daily     Depression - continue citalopram 10 mg daily. Consulted psychiatry. Back to baseline.     acue blood loss anemia - Hgb at 6.4 after two units given pre-operatively and operatively. Given 2 more units post-operatively. Hgb at baseline 7.7.      Anemia of CKD - epogen 10,000 units subq MWF    Anemia of folate deficiency - folbic once  daily     Anticipated Disposition: Skilled Nursing Facility post-op   ESTUARDO: 3/5/2018     Goals of Care:  General Prognosis: fair   Goals: Curative  Comfort Only: No  Hospice: No  Code Status: Full Code    Montserrat Lino MD

## 2018-03-04 NOTE — PLAN OF CARE
Problem: Patient Care Overview  Goal: Plan of Care Review  Outcome: Ongoing (interventions implemented as appropriate)  Pt. VSS, pt. Pain is moderately managed with current regimen, pt. Reports feeling fear when the PNC alarms that it is almost done because he is afraid the medication will stop. Pt. Wound vac remains in place, no alarms overnight, will continue to monitor.

## 2018-03-04 NOTE — NURSING
Wound care completed to Lt foot. Patient tolerated with no distress. Safety measures maintained. Will continue to monitor

## 2018-03-04 NOTE — NURSING
Pt lying in bed with eyes closed, easily aroused with verbal stimuli. Tele in use, pt bradycardic. No distress noted.  Rt leg elevated as well as Lt stump. PNC pump turned off by Md. Pt tolerating pain well. Son at bedside, safety measures maintained. Will continue to monitor

## 2018-03-05 PROBLEM — R60.1 ANASARCA: Status: ACTIVE | Noted: 2018-03-05

## 2018-03-05 PROBLEM — E16.1 OTHER HYPOGLYCEMIA: Status: ACTIVE | Noted: 2018-03-05

## 2018-03-05 PROBLEM — D52.0 DIETARY FOLATE DEFICIENCY ANEMIA: Status: ACTIVE | Noted: 2018-03-05

## 2018-03-05 PROBLEM — Z99.2 ANEMIA IN CHRONIC KIDNEY DISEASE, ON CHRONIC DIALYSIS: Status: ACTIVE | Noted: 2018-03-05

## 2018-03-05 PROBLEM — N18.6 ANEMIA IN CHRONIC KIDNEY DISEASE, ON CHRONIC DIALYSIS: Status: ACTIVE | Noted: 2018-03-05

## 2018-03-05 PROBLEM — D62 ACUTE BLOOD LOSS ANEMIA: Status: ACTIVE | Noted: 2018-03-05

## 2018-03-05 PROBLEM — D63.1 ANEMIA IN CHRONIC KIDNEY DISEASE, ON CHRONIC DIALYSIS: Status: ACTIVE | Noted: 2018-03-05

## 2018-03-05 LAB
ALBUMIN SERPL BCP-MCNC: 1.6 G/DL
ANION GAP SERPL CALC-SCNC: 7 MMOL/L
BASOPHILS # BLD AUTO: 0.05 K/UL
BASOPHILS NFR BLD: 0.7 %
BUN SERPL-MCNC: 38 MG/DL
CALCIUM SERPL-MCNC: 7.9 MG/DL
CHLORIDE SERPL-SCNC: 101 MMOL/L
CO2 SERPL-SCNC: 24 MMOL/L
CREAT SERPL-MCNC: 5.1 MG/DL
DIFFERENTIAL METHOD: ABNORMAL
EOSINOPHIL # BLD AUTO: 0.3 K/UL
EOSINOPHIL NFR BLD: 5.1 %
ERYTHROCYTE [DISTWIDTH] IN BLOOD BY AUTOMATED COUNT: 16.5 %
EST. GFR  (AFRICAN AMERICAN): 12.4 ML/MIN/1.73 M^2
EST. GFR  (NON AFRICAN AMERICAN): 10.7 ML/MIN/1.73 M^2
GLUCOSE SERPL-MCNC: 57 MG/DL
HCT VFR BLD AUTO: 25.1 %
HGB BLD-MCNC: 8 G/DL
IMM GRANULOCYTES # BLD AUTO: 0.03 K/UL
IMM GRANULOCYTES NFR BLD AUTO: 0.4 %
LYMPHOCYTES # BLD AUTO: 1.4 K/UL
LYMPHOCYTES NFR BLD: 20.4 %
MAGNESIUM SERPL-MCNC: 2.3 MG/DL
MCH RBC QN AUTO: 28.7 PG
MCHC RBC AUTO-ENTMCNC: 31.9 G/DL
MCV RBC AUTO: 90 FL
MONOCYTES # BLD AUTO: 0.8 K/UL
MONOCYTES NFR BLD: 12.2 %
NEUTROPHILS # BLD AUTO: 4.1 K/UL
NEUTROPHILS NFR BLD: 61.2 %
NRBC BLD-RTO: 0 /100 WBC
PHOSPHATE SERPL-MCNC: 4.1 MG/DL
PLATELET # BLD AUTO: 153 K/UL
PMV BLD AUTO: 9.9 FL
POCT GLUCOSE: 105 MG/DL (ref 70–110)
POCT GLUCOSE: 58 MG/DL (ref 70–110)
POCT GLUCOSE: 85 MG/DL (ref 70–110)
POCT GLUCOSE: 97 MG/DL (ref 70–110)
POTASSIUM SERPL-SCNC: 4.9 MMOL/L
RBC # BLD AUTO: 2.79 M/UL
SODIUM SERPL-SCNC: 132 MMOL/L
WBC # BLD AUTO: 6.7 K/UL

## 2018-03-05 PROCEDURE — 80069 RENAL FUNCTION PANEL: CPT

## 2018-03-05 PROCEDURE — 25000003 PHARM REV CODE 250: Performed by: INTERNAL MEDICINE

## 2018-03-05 PROCEDURE — 25000003 PHARM REV CODE 250: Performed by: NURSE PRACTITIONER

## 2018-03-05 PROCEDURE — 63600175 PHARM REV CODE 636 W HCPCS: Performed by: INTERNAL MEDICINE

## 2018-03-05 PROCEDURE — 85025 COMPLETE CBC W/AUTO DIFF WBC: CPT

## 2018-03-05 PROCEDURE — 36415 COLL VENOUS BLD VENIPUNCTURE: CPT

## 2018-03-05 PROCEDURE — 90935 HEMODIALYSIS ONE EVALUATION: CPT

## 2018-03-05 PROCEDURE — 25000003 PHARM REV CODE 250: Performed by: STUDENT IN AN ORGANIZED HEALTH CARE EDUCATION/TRAINING PROGRAM

## 2018-03-05 PROCEDURE — 63600175 PHARM REV CODE 636 W HCPCS: Mod: JG | Performed by: INTERNAL MEDICINE

## 2018-03-05 PROCEDURE — 11000001 HC ACUTE MED/SURG PRIVATE ROOM

## 2018-03-05 PROCEDURE — 99232 SBSQ HOSP IP/OBS MODERATE 35: CPT | Mod: ,,, | Performed by: INTERNAL MEDICINE

## 2018-03-05 PROCEDURE — 90935 HEMODIALYSIS ONE EVALUATION: CPT | Mod: ,,, | Performed by: INTERNAL MEDICINE

## 2018-03-05 PROCEDURE — 63600175 PHARM REV CODE 636 W HCPCS: Performed by: STUDENT IN AN ORGANIZED HEALTH CARE EDUCATION/TRAINING PROGRAM

## 2018-03-05 PROCEDURE — 63600175 PHARM REV CODE 636 W HCPCS: Performed by: ANESTHESIOLOGY

## 2018-03-05 PROCEDURE — 83735 ASSAY OF MAGNESIUM: CPT

## 2018-03-05 RX ORDER — ACETAMINOPHEN 10 MG/ML
1000 INJECTION, SOLUTION INTRAVENOUS ONCE
Status: CANCELLED | OUTPATIENT
Start: 2018-03-05 | End: 2018-03-05

## 2018-03-05 RX ORDER — OXYCODONE HYDROCHLORIDE 5 MG/1
15 TABLET ORAL
Status: DISCONTINUED | OUTPATIENT
Start: 2018-03-05 | End: 2018-03-05

## 2018-03-05 RX ORDER — AMOXICILLIN 250 MG
2 CAPSULE ORAL 2 TIMES DAILY
COMMUNITY
Start: 2018-03-05

## 2018-03-05 RX ORDER — HYDROMORPHONE HYDROCHLORIDE 2 MG/1
TABLET ORAL
Qty: 28 TABLET | Refills: 0 | Status: SHIPPED | OUTPATIENT
Start: 2018-03-05 | End: 2018-03-06 | Stop reason: SDUPTHER

## 2018-03-05 RX ORDER — HYDROMORPHONE HYDROCHLORIDE 2 MG/1
2 TABLET ORAL EVERY 6 HOURS
Status: DISCONTINUED | OUTPATIENT
Start: 2018-03-05 | End: 2018-03-05

## 2018-03-05 RX ORDER — HYDROMORPHONE HYDROCHLORIDE 2 MG/1
2 TABLET ORAL
Status: DISCONTINUED | OUTPATIENT
Start: 2018-03-05 | End: 2018-03-06 | Stop reason: HOSPADM

## 2018-03-05 RX ORDER — HYDROMORPHONE HYDROCHLORIDE 2 MG/1
2 TABLET ORAL 4 TIMES DAILY
Status: DISCONTINUED | OUTPATIENT
Start: 2018-03-05 | End: 2018-03-06 | Stop reason: HOSPADM

## 2018-03-05 RX ORDER — HYDROCODONE BITARTRATE AND ACETAMINOPHEN 10; 325 MG/1; MG/1
1 TABLET ORAL EVERY 4 HOURS PRN
Refills: 0
Start: 2018-03-05 | End: 2018-03-05

## 2018-03-05 RX ORDER — ACETAMINOPHEN 500 MG
1 TABLET ORAL DAILY
COMMUNITY
Start: 2018-03-05

## 2018-03-05 RX ORDER — HYDROMORPHONE HYDROCHLORIDE 1 MG/ML
0.5 INJECTION, SOLUTION INTRAMUSCULAR; INTRAVENOUS; SUBCUTANEOUS
Status: DISCONTINUED | OUTPATIENT
Start: 2018-03-05 | End: 2018-03-06 | Stop reason: HOSPADM

## 2018-03-05 RX ORDER — SEVELAMER CARBONATE 800 MG/1
1600 TABLET, FILM COATED ORAL
Qty: 180 TABLET | Refills: 1
Start: 2018-03-05 | End: 2018-04-04

## 2018-03-05 RX ORDER — HYDROMORPHONE HYDROCHLORIDE 5 MG/5ML
1 SOLUTION ORAL
Status: DISCONTINUED | OUTPATIENT
Start: 2018-03-05 | End: 2018-03-06 | Stop reason: HOSPADM

## 2018-03-05 RX ORDER — MIDODRINE HYDROCHLORIDE 10 MG/1
10 TABLET ORAL
Qty: 12 TABLET | Refills: 11
Start: 2018-03-05 | End: 2019-03-05

## 2018-03-05 RX ORDER — OXYCODONE HYDROCHLORIDE 5 MG/1
5 TABLET ORAL
Status: DISCONTINUED | OUTPATIENT
Start: 2018-03-05 | End: 2018-03-05

## 2018-03-05 RX ORDER — HYDROCODONE BITARTRATE AND ACETAMINOPHEN 10; 325 MG/1; MG/1
1 TABLET ORAL EVERY 4 HOURS PRN
Qty: 56 TABLET | Refills: 0 | Status: SHIPPED | OUTPATIENT
Start: 2018-03-05 | End: 2018-03-06 | Stop reason: SDUPTHER

## 2018-03-05 RX ORDER — SEVELAMER HYDROCHLORIDE 800 MG/1
800 TABLET, FILM COATED ORAL
Qty: 90 TABLET | Refills: 11
Start: 2018-03-05 | End: 2019-03-05

## 2018-03-05 RX ORDER — OXYCODONE HYDROCHLORIDE 5 MG/1
10 TABLET ORAL
Status: DISCONTINUED | OUTPATIENT
Start: 2018-03-05 | End: 2018-03-05

## 2018-03-05 RX ORDER — HYDROMORPHONE HYDROCHLORIDE 2 MG/1
4 TABLET ORAL
Status: DISCONTINUED | OUTPATIENT
Start: 2018-03-05 | End: 2018-03-06 | Stop reason: HOSPADM

## 2018-03-05 RX ORDER — ACETAMINOPHEN 500 MG
1000 TABLET ORAL EVERY 6 HOURS
Status: CANCELLED | OUTPATIENT
Start: 2018-03-05 | End: 2018-03-06

## 2018-03-05 RX ORDER — HYDROMORPHONE HYDROCHLORIDE 2 MG/1
TABLET ORAL
Qty: 28 TABLET | Refills: 0 | Status: SHIPPED | OUTPATIENT
Start: 2018-03-05 | End: 2018-03-05

## 2018-03-05 RX ADMIN — SEVELAMER CARBONATE 1600 MG: 800 TABLET, FILM COATED ORAL at 01:03

## 2018-03-05 RX ADMIN — GABAPENTIN 100 MG: 100 CAPSULE ORAL at 11:03

## 2018-03-05 RX ADMIN — HYDROMORPHONE HYDROCHLORIDE 2 MG: 2 TABLET ORAL at 11:03

## 2018-03-05 RX ADMIN — Medication 1 TABLET: at 02:03

## 2018-03-05 RX ADMIN — Medication 16 G: at 07:03

## 2018-03-05 RX ADMIN — HEPARIN SODIUM 5000 UNITS: 5000 INJECTION, SOLUTION INTRAVENOUS; SUBCUTANEOUS at 06:03

## 2018-03-05 RX ADMIN — HEPARIN SODIUM 5000 UNITS: 5000 INJECTION, SOLUTION INTRAVENOUS; SUBCUTANEOUS at 01:03

## 2018-03-05 RX ADMIN — ROPIVACAINE HYDROCHLORIDE 10 ML/HR: 10 INJECTION, SOLUTION EPIDURAL at 12:03

## 2018-03-05 RX ADMIN — OXYCODONE HYDROCHLORIDE 10 MG: 5 TABLET ORAL at 02:03

## 2018-03-05 RX ADMIN — Medication: at 11:03

## 2018-03-05 RX ADMIN — MICONAZOLE NITRATE: 20 OINTMENT TOPICAL at 11:03

## 2018-03-05 RX ADMIN — GABAPENTIN 100 MG: 100 CAPSULE ORAL at 06:03

## 2018-03-05 RX ADMIN — HEPARIN SODIUM 5000 UNITS: 5000 INJECTION, SOLUTION INTRAVENOUS; SUBCUTANEOUS at 11:03

## 2018-03-05 RX ADMIN — GABAPENTIN 100 MG: 100 CAPSULE ORAL at 01:03

## 2018-03-05 RX ADMIN — CITALOPRAM HYDROBROMIDE 10 MG: 10 TABLET ORAL at 09:03

## 2018-03-05 RX ADMIN — MICONAZOLE NITRATE: 20 OINTMENT TOPICAL at 09:03

## 2018-03-05 RX ADMIN — MIDODRINE HYDROCHLORIDE 10 MG: 5 TABLET ORAL at 02:03

## 2018-03-05 RX ADMIN — HYDROMORPHONE HYDROCHLORIDE 0.5 MG: 1 INJECTION, SOLUTION INTRAMUSCULAR; INTRAVENOUS; SUBCUTANEOUS at 01:03

## 2018-03-05 RX ADMIN — Medication: at 09:03

## 2018-03-05 RX ADMIN — ERYTHROPOIETIN 10400 UNITS: 20000 INJECTION, SOLUTION INTRAVENOUS; SUBCUTANEOUS at 12:03

## 2018-03-05 RX ADMIN — ASPIRIN 81 MG: 81 TABLET, COATED ORAL at 09:03

## 2018-03-05 RX ADMIN — ROPIVACAINE HYDROCHLORIDE 8 ML/HR: 10 INJECTION, SOLUTION EPIDURAL at 12:03

## 2018-03-05 RX ADMIN — STANDARDIZED SENNA CONCENTRATE AND DOCUSATE SODIUM 2 TABLET: 8.6; 5 TABLET, FILM COATED ORAL at 09:03

## 2018-03-05 RX ADMIN — SODIUM CHLORIDE: 0.9 INJECTION, SOLUTION INTRAVENOUS at 08:03

## 2018-03-05 NOTE — PROGRESS NOTES
HD treatment complete duration of treatment 3.5 hours and 3 L removed. Treatment was tolerated well and no complications with access to left upper arm. Needles removed and hemostasis achieved. Dressing intact and no drainage noted. Thrill and bruit present.

## 2018-03-05 NOTE — ASSESSMENT & PLAN NOTE
Wilder Carbajal is a 67 year old male with ESRD on Hd. iHD TTS at [facility], duration of 4h 15 min, , Left AVF.    Plan:  - Patient will have dialysis today for clearance and volume removal, duration of 3.5 hrs, UF 2-3 L as tolerated by patient, maintain MAP>65, bath will be adjusted to chem.  - Blood pressures stable today  - Mineral bone disease: continue with phosphate binders, continue with cinacalcet  - Diet: Renal (low sodium, low phosphate and low potassium)  - Continue with daily weight

## 2018-03-05 NOTE — PLAN OF CARE
Gregoria uploaded HD schedule, 142, pasrr, PPD, awaiting orders as Pt still has wound vac on, Sw to follow with stability and transfer. Son has completed paperwork with Mai at [facility]. Vac d/c'd, SNF orders and vascular note uploaded to [facility]. Sw to follow with transport in the pm.

## 2018-03-05 NOTE — PROGRESS NOTES
Progress Note   Hospital Medicine       Team: [facility]OSP MED D Montserrat Lino MD  Admit Date: 2/4/2018  Length of Stay:  LOS: 29 days   ESTUARDO 3/6/2018  Principal Problem:  Septic encephalopathy    Interval hx / overnight events: no new complaints. Pain controlled with marilyn-neural catheter. He is fearful of removal.     Areas of concern/ handoff:  Early morning hypoglycemia. Incomplete dialysis treatments    Review of Systems: Gen: no fever, no chills, Heart: no chest pain, palpitations; Resp: no SOB, no cough    Temp:  [97 °F (36.1 °C)-97.9 °F (36.6 °C)]   Pulse:  [47-60]   Resp:  [16-18]   BP: ()/(42-72)   SpO2:  [91 %-98 %]       Physical Exam:  General appearance: NAD, conversant  Neck: FROM, supple   Lungs: crackles at bases, no accessory muscle use  CV: RRR, no heave  Abdomen: Soft, non-tender; no masses or HSM  Extremities: 3+ pitting edema up to his mid thighs (left)  and 3+ edema to his buttocks,  no digital cyanosis; right AKA site clean and intact with wound vac; +scortal edema  Skin: no rash, lesions or ulcers  Psych: Alert and oriented to person, place and time      Recent Labs  Lab 02/28/18  0537 03/02/18  0501 03/05/18  0642   WBC 8.54 9.68 6.70   HGB 6.4* 7.7* 8.0*   HCT 20.3* 23.5* 25.1*   * 100* 153       Recent Labs  Lab 02/28/18  0537 03/02/18  0645 03/05/18  0642   * 132* 132*   K 4.7 4.9 4.9    102 101   CO2 25 23 24   BUN 32* 29* 38*   CREATININE 4.4* 4.2* 5.1*   GLU 58* 62* 57*   CALCIUM 7.5* 7.7* 7.9*   MG 2.1 2.5 2.3   PHOS 4.2 4.0 4.1       Recent Labs  Lab 02/27/18  1625 02/28/18  0537 03/02/18  0645 03/05/18  0642   ALKPHOS 97  --   --   --    ALT 9*  --   --   --    AST 15  --   --   --    ALBUMIN 1.7* 1.6* 1.7* 1.6*   PROT 6.5  --   --   --    BILITOT 1.0  --   --   --        Recent Labs  Lab 03/04/18  1722 03/04/18  2247 03/05/18  0717 03/05/18  0847 03/05/18  1205 03/05/18  1357   POCTGLUCOSE 137* 132* 58* 105 85 97         Assessment and Plan   Overview: 68 yo  "here for osteomyelitis of right foot. He underwent washout 2/6 and another washout 2/6 and amputation of 4th and 5th digits. Undergoing treatment course of antibiotics.     Problems Addressed today:     Septic encephalopathy, resolved  Patient initially lacked capacity to refuse surgery per psychiatry, consent obtained from his son as proxy. Mental status has improved significantly.      Infected right dorsal foot, ischemic ulceration  Right foot gangrene  S/p right AKA  Podiatry consulted - s/p I&D and 4th and 5th amputation 2/9  Vascular surgery consulted - ultimately recommended AKA of right leg.   Continued vancomycin 1 g IV per random levels and Zosyn 2.25 g IV BID  Wound culture revealed pan-sensitive CITROBACTER KOSERI and KLEBSIELLA PNEUMONIAE ESBL.  To OR 02/09/18. Consulted ID.Recommended 6 weeks of ciprofloxacin. Will reconsult for need of antibiotics after AKA.   ABIs ordered to rule out need for BKA. Angiogram considered. Per vascular surgery "Unlikely for the patient to heal the wound despite aggressive limb salvage efforts. Due to significant lipodermatosclerosis, BKA is not good option. Patient likely needs AKA." Patient initially refused AKA stating he would rather die of infection then have his leg amputated. One Son and Brother in agreement with patient as this aligns with his preference when in his normal mental state. All aware of the risk of recurrence of sepsis syndrome and death. Continuation of wound care and antibiotics likely futile; consulted Palliative Care. After multiple discussions about hospice, patient stated he wanted to live longer and agreed with AKA. Patient and his eldest Son now wish to proceed with AKA. AKA performed 2/29/2018. Id reconsulted and recommended discontinuing ciprofloxacin as source of infection resolved. Patient with wound vac to incision. Cannister 250 at mL x 3 days. Wound vac removed today. Daily dry dressing changes.    Acute post-operative pain - patient's " pain well-managed with perinuerual catheter and hydrocodone 10/325 mg q4h prn pain. Pain service removed catheter today and placed patient on oxycodone with sliding pain scale. Changed to hydromorphone as oxycodone is renal cleared and is associated with metabolite build-up in dialysis patients. Will give tapering scheduled hydromorphone with prn hydrocodone 10 mg/APAP 325 mg at discharge    Hypervolemia due to non-compliance with dialysis  Hyperkalemia, resolved  Anasarca due to protein malnutrition  Shifted in ER with albuterol, insulin/destrose, bicarbonate. K 7.9 => 4  No respiratory compromise. Emergent dialysis 2/5/18 early AM and on 2/9  Withhold anti-hypertensives for volume removal. Edema overall improving during stay. Son at bedside feeding patient. High protein high calorie diet. Patient noticeably more swollen after taking himself off dialysis early 3/2. He was also previously not on fluid restriction post-operatively. Fluid restriction 800 mL daily. Patient had 3L of fluid removed in 3.5 hours today 3/5.      DVT  BLE ultrasound  Apixaban 5 mg BID - ? Compliance at home  Apixaban held 2/12/2018 for BKA - plan to resume at discharge.  Prophylaxis with heparin 5000 units subq TID in meanwhile     DM II with HTN and CKD V  HgbA1c 4.6%  Likely controlled with declining intake  No further intervention for hyperglycemia needed at this time  accuchecks continued due to early morning hypoglycemia    Protein calorie malnutrtion  Hypoglycemia with associated NSVT  Poor prognosis for wound healing  Nepro with meals  High protein high calorie diet    Essential HTN - held home hydralazine 50 mg TID and losartan 50 mg daily. Blood pressure fairly well-controlled during stay without antihypertensives. Discontinued anti-hypertensive medications at discharge.     DM II hyperlipidemia - atorvastatin 40 mg daily     DM II neuropathy - gabapentin 100 mg TID     ESRF HD TTS - Nephrology consulted for dialysis.  Stopped  anti-hypertensives and added midodrine as needed prior to dialysis for volume removal and reduce symptomatic dialysis-associated hypotension.      CKD MBD - continue cinacalcet 30 mg qhs and sevelamer 1600 mg TID daily     Depression - continue citalopram 10 mg daily. Consulted psychiatry. Back to baseline.     acue blood loss anemia - Hgb at 6.4 after two units given pre-operatively and operatively. Given 2 more units post-operatively. 3/5 Hgb 8    Anemia of CKD - epogen 10,000 units IV with dialysis MWF    Anemia of folate deficiency - folbic once daily    Left foot ulceration - podiatry placed nursing daily wound care for nursing     Anticipated Disposition: Skilled Nursing Facility post-op   ESTUARDO: 3/5/2018     Goals of Care:  General Prognosis: fair   Goals: Curative  Comfort Only: No  Hospice: No  Code Status: Full Code    Montserrat Lino MD

## 2018-03-05 NOTE — PROGRESS NOTES
[facility]  Vascular Surgery  Progress Note    Patient Name: Wilder Carbajal  MRN: [id number]  Admission Date: 2/4/2018  Primary Care Provider: Dio Roberson MD    Subjective:     Interval History: NAEON    Post-Op Info:  Procedure(s) (LRB):  AMPUTATION-ABOVE KNEE (Right)   6 Days Post-Op       Medications:  Continuous Infusions:   ropivacaine (PF) 2 mg/ml (0.2%) 10 mL/hr (03/05/18 0053)    ropivacaine (PF) 2 mg/ml (0.2%) 8 mL/hr (03/05/18 0053)     Scheduled Meds:   ammonium lactate   Topical (Top) BID    aspirin  81 mg Oral Daily    atorvastatin  20 mg Oral Daily    cinacalcet  30 mg Oral QHS    citalopram  10 mg Oral Daily    epoetin michael (PROCRIT) injection  100 Units/kg Intravenous Every Mon, Wed, Fri    folic acid-vit B6-vit B12 2.5-25-2 mg  1 tablet Oral Daily    gabapentin  100 mg Oral TID    heparin (porcine)  5,000 Units Subcutaneous Q8H    HYDROmorphone  2 mg Oral Q6H    miconazole nitrate 2%   Topical (Top) BID    midodrine  10 mg Oral Every Mon, Wed, Fri    senna-docusate 8.6-50 mg  2 tablet Oral BID    sevelamer carbonate  1,600 mg Oral TID WM    sevelamer carbonate  800 mg Oral QHS     PRN Meds:sodium chloride, sodium chloride 0.9%, albumin human 25%, albumin human 25%, albuterol-ipratropium 2.5mg-0.5mg/3mL, dextrose 50%, dextrose 50%, glucagon (human recombinant), glucose, glucose, hydrocodone-acetaminophen 10-325mg, HYDROmorphone, ondansetron, ondansetron, promethazine (PHENERGAN) IVPB, sodium chloride 0.9%, sodium phosphates     Objective:     Vital Signs (Most Recent):  Temp: 97.8 °F (36.6 °C) (03/04/18 2252)  Pulse: (!) 56 (03/05/18 1030)  Resp: 18 (03/05/18 1030)  BP: (!) 118/53 (03/05/18 1030)  SpO2: 98 % (03/04/18 2252) Vital Signs (24h Range):  Temp:  [97.3 °F (36.3 °C)-97.9 °F (36.6 °C)] 97.8 °F (36.6 °C)  Pulse:  [47-57] 56  Resp:  [16-18] 18  SpO2:  [92 %-98 %] 98 %  BP: ()/(42-72) 118/53          Physical Exam   Constitutional: No distress.    Cardiovascular: Normal rate.    Femoral 2+ bilaterally  No palpable distal pulses     Pulmonary/Chest: Effort normal. No respiratory distress.   Abdominal: Soft. He exhibits no distension.   Musculoskeletal: Normal range of motion.   Venous stasis skin changes to left thigh   Skin: Skin is warm and dry.     Wound vac removed, staples in place to right AKA incision. Minimal drainage    Significant Labs:  CBC:     Recent Labs  Lab 03/05/18  0642   WBC 6.70   RBC 2.79*   HGB 8.0*   HCT 25.1*      MCV 90   MCH 28.7   MCHC 31.9*     CMP:     Recent Labs  Lab 03/05/18  0642   GLU 57*   CALCIUM 7.9*   ALBUMIN 1.6*   *   K 4.9   CO2 24      BUN 38*   CREATININE 5.1*     Coagulation:   No results for input(s): LABPROT, INR, APTT in the last 48 hours.    Significant Diagnostics:      Assessment/Plan:     Foot ulceration, right, with fat layer exposed    68 yo M with h/o ESRD on HD (TThS) via L RC AVF (with interposition graft), HTN, HLD, DM 2( Hgb A1c 4.7), HFrEF (45% 4/2017)     s/p Right AKA 2/27/18 per Dr. Zhou    -Continue PT/OT  -Wound vac off, dressing changed today.   -Daily dressing changes  -Anticipate discharge to SNF today  -Follow up with Dr. Zhou in vascular clinic in 4 weeks for wound check                  Zheng Colby, DO  Vascular Surgery  [facility]

## 2018-03-05 NOTE — SUBJECTIVE & OBJECTIVE
Medications:  Continuous Infusions:   ropivacaine (PF) 2 mg/ml (0.2%) 10 mL/hr (03/05/18 0053)    ropivacaine (PF) 2 mg/ml (0.2%) 8 mL/hr (03/05/18 0053)     Scheduled Meds:   ammonium lactate   Topical (Top) BID    aspirin  81 mg Oral Daily    atorvastatin  20 mg Oral Daily    cinacalcet  30 mg Oral QHS    citalopram  10 mg Oral Daily    epoetin michael (PROCRIT) injection  100 Units/kg Intravenous Every Mon, Wed, Fri    folic acid-vit B6-vit B12 2.5-25-2 mg  1 tablet Oral Daily    gabapentin  100 mg Oral TID    heparin (porcine)  5,000 Units Subcutaneous Q8H    HYDROmorphone  2 mg Oral Q6H    miconazole nitrate 2%   Topical (Top) BID    midodrine  10 mg Oral Every Mon, Wed, Fri    senna-docusate 8.6-50 mg  2 tablet Oral BID    sevelamer carbonate  1,600 mg Oral TID WM    sevelamer carbonate  800 mg Oral QHS     PRN Meds:sodium chloride, sodium chloride 0.9%, albumin human 25%, albumin human 25%, albuterol-ipratropium 2.5mg-0.5mg/3mL, dextrose 50%, dextrose 50%, glucagon (human recombinant), glucose, glucose, hydrocodone-acetaminophen 10-325mg, HYDROmorphone, ondansetron, ondansetron, promethazine (PHENERGAN) IVPB, sodium chloride 0.9%, sodium phosphates     Objective:     Vital Signs (Most Recent):  Temp: 97.8 °F (36.6 °C) (03/04/18 2252)  Pulse: (!) 56 (03/05/18 1030)  Resp: 18 (03/05/18 1030)  BP: (!) 118/53 (03/05/18 1030)  SpO2: 98 % (03/04/18 2252) Vital Signs (24h Range):  Temp:  [97.3 °F (36.3 °C)-97.9 °F (36.6 °C)] 97.8 °F (36.6 °C)  Pulse:  [47-57] 56  Resp:  [16-18] 18  SpO2:  [92 %-98 %] 98 %  BP: ()/(42-72) 118/53          Physical Exam   Constitutional: No distress.   Cardiovascular: Normal rate.    Femoral 2+ bilaterally  No palpable distal pulses     Pulmonary/Chest: Effort normal. No respiratory distress.   Abdominal: Soft. He exhibits no distension.   Musculoskeletal: Normal range of motion.   Venous stasis skin changes to left thigh   Skin: Skin is warm and dry.     Wound  vac removed, staples in place to right AKA incision. Minimal drainage    Significant Labs:  CBC:     Recent Labs  Lab 03/05/18  0642   WBC 6.70   RBC 2.79*   HGB 8.0*   HCT 25.1*      MCV 90   MCH 28.7   MCHC 31.9*     CMP:     Recent Labs  Lab 03/05/18  0642   GLU 57*   CALCIUM 7.9*   ALBUMIN 1.6*   *   K 4.9   CO2 24      BUN 38*   CREATININE 5.1*     Coagulation:   No results for input(s): LABPROT, INR, APTT in the last 48 hours.    Significant Diagnostics:

## 2018-03-05 NOTE — PROGRESS NOTES
Pt resting comfortably.  Right femoral and sciatic catheters bolused and pulled. Dressings CDI.  Catheters discontinued, tips intact.  Pt tolerated well.  Educated regarding continued pain management.  Understanding verbalized.

## 2018-03-05 NOTE — SUBJECTIVE & OBJECTIVE
Interval History:   Patient evaluated at bedside in the [facility], hemodynamically stable, no significant event overnight, no respiratory distress.    Review of patient's allergies indicates:  No Known Allergies  Current Facility-Administered Medications   Medication Frequency    0.9%  NaCl infusion (for blood administration) Q24H PRN    0.9%  NaCl infusion PRN    albumin human 25% bottle 12.5 g PRN    albumin human 25% bottle 25 g PRN    albuterol-ipratropium 2.5mg-0.5mg/3mL nebulizer solution 3 mL Q4H PRN    ammonium lactate 12 % lotion BID    aspirin EC tablet 81 mg Daily    atorvastatin tablet 20 mg Daily    cinacalcet tablet 30 mg QHS    citalopram tablet 10 mg Daily    dextrose 50% injection 12.5 g PRN    dextrose 50% injection 25 g PRN    epoetin michael injection 10,400 Units Every Mon, Wed, Fri    folic acid-vit B6-vit B12 2.5-25-2 mg tablet 1 tablet Daily    gabapentin capsule 100 mg TID    glucagon (human recombinant) injection 1 mg PRN    glucose chewable tablet 16 g PRN    glucose chewable tablet 24 g PRN    heparin (porcine) injection 5,000 Units Q8H    hydrocodone-acetaminophen 10-325mg per tablet 1 tablet Q4H PRN    hydrocodone-acetaminophen 10-325mg per tablet 1 tablet Every Mon, Fri    HYDROmorphone injection 1 mg Daily PRN    miconazole nitrate 2% ointment BID    midodrine tablet 10 mg Every Mon, Wed, Fri    ondansetron disintegrating tablet 8 mg Q8H PRN    ondansetron injection 4 mg Q8H PRN    promethazine (PHENERGAN) 6.25 mg in dextrose 5 % 50 mL IVPB Q6H PRN    ropivacaine (PF) 2 mg/ml (0.2%) infusion Continuous    ropivacaine (PF) 2 mg/ml (0.2%) infusion Continuous    senna-docusate 8.6-50 mg per tablet 2 tablet BID    sevelamer carbonate tablet 1,600 mg TID WM    sevelamer carbonate tablet 800 mg QHS    sodium chloride 0.9% flush 5 mL PRN    sodium phosphates 19-7 gram/118 mL enema 1 enema PRN       Objective:     Vital Signs (Most Recent):  Temp: 97.8 °F (36.6 °C)  (03/04/18 2252)  Pulse: (!) 56 (03/05/18 0900)  Resp: 18 (03/05/18 0900)  BP: (!) 112/44 (03/05/18 0900)  SpO2: 98 % (03/04/18 2252)  O2 Device (Oxygen Therapy): room air (03/04/18 2252) Vital Signs (24h Range):  Temp:  [97.3 °F (36.3 °C)-97.9 °F (36.6 °C)] 97.8 °F (36.6 °C)  Pulse:  [47-57] 56  Resp:  [16-18] 18  SpO2:  [92 %-98 %] 98 %  BP: ()/(44-72) 112/44     Weight: 104 kg (229 lb 4.5 oz) (03/02/18 1100)  Body mass index is 34.86 kg/m².  Body surface area is 2.23 meters squared.    I/O last 3 completed shifts:  In: 300 [P.O.:300]  Out: 0     Physical Exam   Constitutional: He is oriented to person, place, and time. He appears well-developed and well-nourished. No distress.   Cardiovascular: Normal rate and regular rhythm.    Murmur heard.  Sacral edema    Pulmonary/Chest: Effort normal and breath sounds normal. No respiratory distress.   Abdominal: Soft. Bowel sounds are normal. He exhibits no distension.   Musculoskeletal: He exhibits no edema.   Neurological: He is alert and oriented to person, place, and time.   Skin: Skin is warm and dry.   Psychiatric: He has a normal mood and affect. His behavior is normal.       Significant Labs:  ABGs: No results for input(s): PH, PCO2, HCO3, POCSATURATED, BE in the last 168 hours.  BMP:   Recent Labs  Lab 03/05/18  0642   GLU 57*      CO2 24   BUN 38*   CREATININE 5.1*   CALCIUM 7.9*   MG 2.3     CBC:   Recent Labs  Lab 03/05/18  0642   WBC 6.70   RBC 2.79*   HGB 8.0*   HCT 25.1*      MCV 90   MCH 28.7   MCHC 31.9*     CMP:   Recent Labs  Lab 02/27/18  1625  03/05/18  0642   GLU 95  < > 57*   CALCIUM 7.6*  < > 7.9*   ALBUMIN 1.7*  < > 1.6*   PROT 6.5  --   --    *  < > 132*   K 4.5  < > 4.9   CO2 25  < > 24     < > 101   BUN 27*  < > 38*   CREATININE 4.1*  < > 5.1*   ALKPHOS 97  --   --    ALT 9*  --   --    AST 15  --   --    BILITOT 1.0  --   --    < > = values in this interval not displayed.  All labs within the past 24 hours have  been reviewed.

## 2018-03-05 NOTE — PLAN OF CARE
Pt with wound vac d/c'd, perineural cath d/c'd.  Primary staff managing pain with oral pain meds.  Anticipate pain regimen established tomorrow, awaiting [place] allan for placement at [facility] for skilled care.  Message left via voicemail for Yahaira at [phone] for assistance with providing NH with auth number.  Informed pt has HD chair switched to [facility].  Will follow.    1450 per Yahaira at [place], auth number has been provided to Enoch at [facility].  Yahaira informed of HD chair time and date to arrange transport beginning Wednesday.  No additional needs.    Kristy Mallory, RN  Case Management  [phone]

## 2018-03-05 NOTE — ADDENDUM NOTE
Addendum  created 03/05/18 1308 by Sheron Giraldo RN    Anesthesia Event edited, LDA properties accepted, Order list changed, Sign clinical note

## 2018-03-05 NOTE — ASSESSMENT & PLAN NOTE
68 yo M with h/o ESRD on HD (TThS) via L RC AVF (with interposition graft), HTN, HLD, DM 2( Hgb A1c 4.7), HFrEF (45% 4/2017)     s/p Right AKA 2/27/18 per Dr. Zhou    -Continue PT/OT  -Wound vac off, dressing changed today.   -Daily dressing changes  -Anticipate discharge to SNF today

## 2018-03-05 NOTE — PLAN OF CARE
[facility]     Department of Hospital Medicine     [address] [place] [address]     [phone] after hours  [phone] fax       NURSING HOME ORDERS    03/05/2018    Admit to Nursing Home:      Skilled Bed      Diagnoses:  Active Hospital Problems    Diagnosis  POA    *Septic encephalopathy [G93.41]  Yes     Priority: 1 - High    Foot ulceration, right, with fat layer exposed [L97.512]  Yes     Priority: 2     Acute delirium [R41.0]  Yes     Priority: 2     Infected skin ulcer with fat layer exposed [L98.492, L08.9]  Yes     Priority: 2     Hyperkalemia [E87.5]  Yes     Priority: 3     Hypervolemia [E87.70]  Yes     Priority: 3     DVT (deep venous thrombosis) [I82.409]  Yes     Priority: 4     Type 2 diabetes mellitus with stage 5 chronic kidney disease and hypertension [E11.22, I12.0, N18.5]  Yes     Priority: 5     Protein-calorie malnutrition, severe [E43]  Yes     Priority: 6     Essential hypertension [I10]  Yes     Priority: 7     DM type 2 with diabetic mixed hyperlipidemia [E11.69, E78.2]  Yes     Priority: 8     Diabetic polyneuropathy associated with type 2 diabetes mellitus [E11.42]  Yes     Priority: 9     Chronic kidney disease-mineral and bone disorder [N18.9, E83.9, M89.9]  Yes     Priority: 10     ESRD (end stage renal disease) [N18.6]  Yes     Priority: 10     Depression [F32.9]  Yes     Priority: 11     Gangrene of right foot [I96]  Yes    Palliative care encounter [Z51.5]  Not Applicable    Foot ulceration, left, with unspecified severity [L97.529]  Unknown    PVD (peripheral vascular disease) [I73.9]  Unknown    Unilateral AKA, right [Z89.611]  Not Applicable    Diabetic foot ulcer [E11.621, L97.509]  Yes    Alteration in skin integrity [R23.9]  Yes      Resolved Hospital Problems    Diagnosis Date Resolved POA   No resolved problems to display.       Patient is homebound due to:  Septic  encephalopathy    Allergies:Review of patient's allergies indicates:  No Known Allergies    Vitals:     Every shift (Skilled Nursing patients)    Diet: high protein high calorie diet  nepro shakes TID with meals  Ice cream or nepro shake qhs with sevelamer 800 mg orally  FLUID RESTRICTION 800 mL    Acitivities:     - Up in a chair each morning as tolerated  - May use walker, cane, or self-propelled wheelchair    LABS:  Per facility protocol    Nursing Precautions:     - Aspiration precautions:             - Total assistance with meals            -  Upright 90 degrees before during and after meals             -  Suction at bedside   - Fall precautions per nursing home protocol  - Decubitus precautions:        -  for positioning   - Pressure reducing foam mattress   - Turn patient every two hours. Use wedge pillows to anchor patient  -keep BLE elevated at all times. Float left heel or place in heel protector boots whenever in bed.    CONSULTS:     Physical Therapy to evaluate and treat     Occupational Therapy to evaluate and treat     Nutrition to evaluate and recommend diet. Emphasize caloric and protein intake over restrictions.     Podiatry or wound care consult for Left foot ulcers      MISCELLANEOUS CARE:     Routine Skin for Bedridden Patients:  Apply moisture barrier cream to all skin folds and wet areas in perineal area daily and after baths and all bowel movements.     For [facility] Patients Only:   Alert [facility]  prior to discharge.  Phone # [phone]            # [phone] after hours    Fax discharge orders to [facility]: Fax # [phone]   Fax discharge orders to [facility]: Fax # [phone]     Wound care -    RLE surgical wound- Dry dressing to RLE daily for two weeks. Apply 4x4 gauze, then wrap with kerlix and then moderate compression with ACE. Keep leg elevated at all times until wound is completely healed    L foot - change left foot daily as follows: place betadine soaked gauze over  left heel, place 4x4 over dorsal foot wound, dress with kerlix and apply ace, secure with tape.    Left upper buttocks wound: every MWF, clean with normal saline, dress with mepilex foam dressing.          Hypoglycemia CARE:    Check blood sugar:   qAM  Give apple juice and mayte crackers for glucoses <60     Report CBG < 60 or > 400 to physician.      Medications: Discontinue all previous medication orders, if any. See new list below.     Raven Wilder L   Home Medication Instructions MICHI:[id number]    Printed on:03/05/18 8125   Medication Information                      ammonium lactate (LAC-HYDRIN) 12 % lotion  Apply topically as needed for Dry Skin apply to intact skin BLE BID   Dry skin   apixaban 5 mg Tab  Take 1 tablet (5 mg total) by mouth 2 (two) times daily.   DVT   aspirin (ECOTRIN) 81 MG EC tablet  Take 81 mg by mouth once daily.    PAD   atorvastatin (LIPITOR) 20 MG tablet  Take 20 mg by mouth once daily.   hyperlipidemia   cinacalcet (SENSIPAR) 30 MG Tab  Take 30 mg by mouth every evening.   CKD mineral bone disease   citalopram (CELEXA) 10 MG tablet  Take 10 mg by mouth once daily.   depression   folic acid-vit B6-vit B12 2.5-25-2 mg (FOLBIC OR EQUIV) 2.5-25-2 mg Tab  Take 1 tablet by mouth once daily.   Malnutrition  Folic acid deficiency   gabapentin (NEURONTIN) 100 MG capsule  Take 100 mg by mouth 3 (three) times daily.   neuropathy   hydrocodone-acetaminophen 10-325mg (NORCO)  mg Tab  Take 1 tablet by mouth every 4 (four) hours as needed for Pain.   Post-operative pain   HYDROmorphone (DILAUDID) 2 MG tablet  Orally take 1 tab QID x 7 days, the 1 tab TID x 7 days, the 1 tab BID x 7 days, then stop   Post-operative pain   midodrine (PROAMATINE) 10 MG tablet  Take 1 tablet (10 mg total) by mouth every Mon, Wed, Fri. Give at 0900 or just prior to transport to dialysis   Dialysis associated hypotension   senna-docusate 8.6-50 mg (PERICOLACE) 8.6-50 mg per tablet  Take 2 tablets by mouth 2  (two) times daily.   Opioid induced constipation   sevelamer carbonate (RENVELA) 800 mg Tab  Take 2 tablets (1,600 mg total) by mouth 3 (three) times daily with meals.   hyperphosphatemia   sevelamer HCl (RENAGEL) 800 MG Tab  Take 1 tablet (800 mg total) by mouth as needed (snack).   hyperphosphatemia   sodium phosphates (FLEET) 19-7 gram/118 mL Enem  Place 1 enema rectally as needed (constipation).        Vitamin D 2000 units daily by mouth - for vitamin d deficiency        _________________________________  Montserrat Lino MD  03/05/2018

## 2018-03-05 NOTE — ADDENDUM NOTE
Addendum  created 03/05/18 1147 by Florian Portillo MD    Anesthesia Event deleted, Order list changed, Order sets accessed

## 2018-03-05 NOTE — PROGRESS NOTES
[facility]  Nephrology  Progress Note    Patient Name: Wilder Carbajal  MRN: [id number]  Admission Date: 2/4/2018  Hospital Length of Stay: 29 days  Attending Provider: Montserrat Lino MD   Primary Care Physician: Dio Roberson MD  Principal Problem:Septic encephalopathy    Subjective:     HPI: Wilder Carbajal is a 67 year old male with ESRd on HD (TTS) presents with leg swelling for two weeks. He states he has had associated blisters on his legs with fluid weeping out of them. During hospital presented diagnosis of osteomyelitis of right foot. He underwent washout 2/6 and another washout 2/6 and amputation of 4th and 5th digits. Undergoing treatment course of antibiotics. Nephrology was consulted for inpatient dialysis treatment.iHD TTS at [facility], duration of 4h 15 min, , Left AVF.     Interval History:   Patient evaluated at bedside in the [facility], hemodynamically stable, no significant event overnight, no respiratory distress.    Review of patient's allergies indicates:  No Known Allergies  Current Facility-Administered Medications   Medication Frequency    0.9%  NaCl infusion (for blood administration) Q24H PRN    0.9%  NaCl infusion PRN    albumin human 25% bottle 12.5 g PRN    albumin human 25% bottle 25 g PRN    albuterol-ipratropium 2.5mg-0.5mg/3mL nebulizer solution 3 mL Q4H PRN    ammonium lactate 12 % lotion BID    aspirin EC tablet 81 mg Daily    atorvastatin tablet 20 mg Daily    cinacalcet tablet 30 mg QHS    citalopram tablet 10 mg Daily    dextrose 50% injection 12.5 g PRN    dextrose 50% injection 25 g PRN    epoetin michael injection 10,400 Units Every Mon, Wed, Fri    folic acid-vit B6-vit B12 2.5-25-2 mg tablet 1 tablet Daily    gabapentin capsule 100 mg TID    glucagon (human recombinant) injection 1 mg PRN    glucose chewable tablet 16 g PRN    glucose chewable tablet 24 g PRN    heparin (porcine) injection 5,000 Units Q8H     hydrocodone-acetaminophen 10-325mg per tablet 1 tablet Q4H PRN    hydrocodone-acetaminophen 10-325mg per tablet 1 tablet Every Mon, Fri    HYDROmorphone injection 1 mg Daily PRN    miconazole nitrate 2% ointment BID    midodrine tablet 10 mg Every Mon, Wed, Fri    ondansetron disintegrating tablet 8 mg Q8H PRN    ondansetron injection 4 mg Q8H PRN    promethazine (PHENERGAN) 6.25 mg in dextrose 5 % 50 mL IVPB Q6H PRN    ropivacaine (PF) 2 mg/ml (0.2%) infusion Continuous    ropivacaine (PF) 2 mg/ml (0.2%) infusion Continuous    senna-docusate 8.6-50 mg per tablet 2 tablet BID    sevelamer carbonate tablet 1,600 mg TID WM    sevelamer carbonate tablet 800 mg QHS    sodium chloride 0.9% flush 5 mL PRN    sodium phosphates 19-7 gram/118 mL enema 1 enema PRN       Objective:     Vital Signs (Most Recent):  Temp: 97.8 °F (36.6 °C) (03/04/18 2252)  Pulse: (!) 56 (03/05/18 0900)  Resp: 18 (03/05/18 0900)  BP: (!) 112/44 (03/05/18 0900)  SpO2: 98 % (03/04/18 2252)  O2 Device (Oxygen Therapy): room air (03/04/18 2252) Vital Signs (24h Range):  Temp:  [97.3 °F (36.3 °C)-97.9 °F (36.6 °C)] 97.8 °F (36.6 °C)  Pulse:  [47-57] 56  Resp:  [16-18] 18  SpO2:  [92 %-98 %] 98 %  BP: ()/(44-72) 112/44     Weight: 104 kg (229 lb 4.5 oz) (03/02/18 1100)  Body mass index is 34.86 kg/m².  Body surface area is 2.23 meters squared.    I/O last 3 completed shifts:  In: 300 [P.O.:300]  Out: 0     Physical Exam   Constitutional: He is oriented to person, place, and time. He appears well-developed and well-nourished. No distress.   Cardiovascular: Normal rate and regular rhythm.    Murmur heard.  Sacral edema    Pulmonary/Chest: Effort normal and breath sounds normal. No respiratory distress.   Abdominal: Soft. Bowel sounds are normal. He exhibits no distension.   Musculoskeletal: He exhibits no edema.   Neurological: He is alert and oriented to person, place, and time.   Skin: Skin is warm and dry.   Psychiatric: He has a  normal mood and affect. His behavior is normal.       Significant Labs:  ABGs: No results for input(s): PH, PCO2, HCO3, POCSATURATED, BE in the last 168 hours.  BMP:   Recent Labs  Lab 03/05/18  0642   GLU 57*      CO2 24   BUN 38*   CREATININE 5.1*   CALCIUM 7.9*   MG 2.3     CBC:   Recent Labs  Lab 03/05/18  0642   WBC 6.70   RBC 2.79*   HGB 8.0*   HCT 25.1*      MCV 90   MCH 28.7   MCHC 31.9*     CMP:   Recent Labs  Lab 02/27/18  1625  03/05/18  0642   GLU 95  < > 57*   CALCIUM 7.6*  < > 7.9*   ALBUMIN 1.7*  < > 1.6*   PROT 6.5  --   --    *  < > 132*   K 4.5  < > 4.9   CO2 25  < > 24     < > 101   BUN 27*  < > 38*   CREATININE 4.1*  < > 5.1*   ALKPHOS 97  --   --    ALT 9*  --   --    AST 15  --   --    BILITOT 1.0  --   --    < > = values in this interval not displayed.  All labs within the past 24 hours have been reviewed.     Assessment/Plan:     ESRD (end stage renal disease)    Wilder Carbajal is a 67 year old male with ESRD on Hd. iHD TTS at [facility], duration of 4h 15 min, , Left AVF.    Plan:  - Patient will have dialysis today for clearance and volume removal, duration of 3.5 hrs, UF 2-3 L as tolerated by patient, maintain MAP>65, bath will be adjusted to chem.  - Blood pressures stable today  - Mineral bone disease: continue with phosphate binders, continue with cinacalcet  - Diet: Renal (low sodium, low phosphate and low potassium)  - Continue with daily weight           Luther Martinez  Nephrology  Fellow  [facility]    Pager [phone]      I have reviewed and concur with the fellow's history, physical, assessment, and plan. I have personally interviewed and examined the patient at bedside.

## 2018-03-05 NOTE — PT/OT/SLP PROGRESS
Physical Therapy      Patient Name:  Wilder Carbajal   MRN:  [id number]    Patient not seen today secondary to  (pt. away at dialysis on first attempt and too fatigued on second attempt). Will follow-up tomorrow.    El Shafer, PT   3/5/2018

## 2018-03-06 ENCOUNTER — PATIENT OUTREACH (OUTPATIENT)
Dept: ADMINISTRATIVE | Facility: CLINIC | Age: 68
End: 2018-03-06

## 2018-03-06 VITALS
WEIGHT: 229.25 LBS | HEIGHT: 68 IN | DIASTOLIC BLOOD PRESSURE: 80 MMHG | RESPIRATION RATE: 16 BRPM | TEMPERATURE: 99 F | HEART RATE: 60 BPM | SYSTOLIC BLOOD PRESSURE: 142 MMHG | BODY MASS INDEX: 34.75 KG/M2 | OXYGEN SATURATION: 98 %

## 2018-03-06 LAB
POCT GLUCOSE: 101 MG/DL (ref 70–110)
POCT GLUCOSE: 127 MG/DL (ref 70–110)
POCT GLUCOSE: 78 MG/DL (ref 70–110)
POCT GLUCOSE: 79 MG/DL (ref 70–110)

## 2018-03-06 PROCEDURE — 63600175 PHARM REV CODE 636 W HCPCS: Performed by: INTERNAL MEDICINE

## 2018-03-06 PROCEDURE — 25000003 PHARM REV CODE 250: Performed by: INTERNAL MEDICINE

## 2018-03-06 PROCEDURE — G8988 SELF CARE GOAL STATUS: HCPCS | Mod: CL

## 2018-03-06 PROCEDURE — 99239 HOSP IP/OBS DSCHRG MGMT >30: CPT | Mod: ,,, | Performed by: INTERNAL MEDICINE

## 2018-03-06 PROCEDURE — G8987 SELF CARE CURRENT STATUS: HCPCS | Mod: CL

## 2018-03-06 PROCEDURE — G8989 SELF CARE D/C STATUS: HCPCS | Mod: CL

## 2018-03-06 RX ORDER — HYDROCODONE BITARTRATE AND ACETAMINOPHEN 10; 325 MG/1; MG/1
1 TABLET ORAL EVERY 4 HOURS PRN
Qty: 56 TABLET | Refills: 0 | Status: SHIPPED | OUTPATIENT
Start: 2018-03-06 | End: 2018-03-06 | Stop reason: SDUPTHER

## 2018-03-06 RX ORDER — HYDROMORPHONE HYDROCHLORIDE 2 MG/1
TABLET ORAL
Qty: 28 TABLET | Refills: 0 | Status: SHIPPED | OUTPATIENT
Start: 2018-03-06 | End: 2018-03-06 | Stop reason: SDUPTHER

## 2018-03-06 RX ORDER — HYDROCODONE BITARTRATE AND ACETAMINOPHEN 10; 325 MG/1; MG/1
1 TABLET ORAL EVERY 4 HOURS PRN
Qty: 56 TABLET | Refills: 0 | Status: ON HOLD | OUTPATIENT
Start: 2018-03-06 | End: 2018-03-23

## 2018-03-06 RX ORDER — HYDROMORPHONE HYDROCHLORIDE 2 MG/1
TABLET ORAL
Qty: 28 TABLET | Refills: 0 | Status: ON HOLD | OUTPATIENT
Start: 2018-03-06 | End: 2018-03-23 | Stop reason: HOSPADM

## 2018-03-06 RX ADMIN — STANDARDIZED SENNA CONCENTRATE AND DOCUSATE SODIUM 2 TABLET: 8.6; 5 TABLET, FILM COATED ORAL at 09:03

## 2018-03-06 RX ADMIN — ASPIRIN 81 MG: 81 TABLET, COATED ORAL at 09:03

## 2018-03-06 RX ADMIN — ATORVASTATIN CALCIUM 20 MG: 20 TABLET, FILM COATED ORAL at 09:03

## 2018-03-06 RX ADMIN — HYDROMORPHONE HYDROCHLORIDE 2 MG: 2 TABLET ORAL at 09:03

## 2018-03-06 RX ADMIN — HEPARIN SODIUM 5000 UNITS: 5000 INJECTION, SOLUTION INTRAVENOUS; SUBCUTANEOUS at 06:03

## 2018-03-06 RX ADMIN — CITALOPRAM HYDROBROMIDE 10 MG: 10 TABLET ORAL at 09:03

## 2018-03-06 RX ADMIN — Medication 1 TABLET: at 09:03

## 2018-03-06 RX ADMIN — SEVELAMER CARBONATE 1600 MG: 800 TABLET, FILM COATED ORAL at 08:03

## 2018-03-06 RX ADMIN — HYDROMORPHONE HYDROCHLORIDE 2 MG: 2 TABLET ORAL at 01:03

## 2018-03-06 RX ADMIN — Medication: at 09:03

## 2018-03-06 RX ADMIN — MICONAZOLE NITRATE: 20 OINTMENT TOPICAL at 09:03

## 2018-03-06 NOTE — PT/OT/SLP PROGRESS
Physical Therapy   Discharge    Wilder Carbajal   MRN: [id number]     Hospital discharge with PT re-eval only completed; therefore, no goals met. Pt. discharged to nursing home SNF.    El Shafer, PT  3/6/2018

## 2018-03-06 NOTE — PLAN OF CARE
Gregoria left message with Juan Daniel at [facility] informing Pt stable for d/c, progress note uploaded. Sw to follow with transport once approved to transfer. Nurse Shilpa given time for transport, provided packet and sticky note with number for report to  room 145, nurse taking report will be Ms CRYSTAL. Gregoria setup w/c van transport for 1230pm with [facility]'s as Pt is able to sit in chair at [id number].

## 2018-03-06 NOTE — PLAN OF CARE
Problem: Patient Care Overview  Goal: Plan of Care Review  Outcome: Ongoing (interventions implemented as appropriate)  Patient AAOx4. No complaints of pain. VS stable, afrebrile. Neurovascular intact. Skin intact, with exception of R-AKA incision and L-leg cracked skin. Free from falls. Frequent rounds made for safety, pain and comfort. Bed at lowest position, call light within reach, side rails up x2. Son at bedside.

## 2018-03-07 LAB — FUNGUS SPEC CULT: NORMAL

## 2018-03-07 NOTE — PLAN OF CARE
Problem: Occupational Therapy Goal  Goal: Occupational Therapy Goal  Goals to be met by:  3/2/2018    Patient will increase functional independence with ADLs by performing:    UE Dressing with Set-up Assistance.  Grooming while seated with Modified [place].  Toileting from bedside commode with Moderate Assistance for hygiene and clothing management.   Supine to sit with Modified [place].  Bed<>chair transfer with Minimal Assistance while maintaining precautions.  Toilet transfer to bedside commode with Minimal Assistance while maintaining precautions.        Outcome: Outcome(s) achieved Date Met: 03/07/18  This set of goals updated at re-eval

## 2018-03-07 NOTE — PT/OT/SLP DISCHARGE
Occupational Therapy Discharge Summary    Wilder Carbajal  MRN: [id number]   Principal Problem: Septic encephalopathy      Patient Discharged from acute Occupational Therapy on 3/7/18.  Please refer to prior OT note dated 3/3/18 for functional status.    Assessment:      Patient has not met goals.    Objective:     GOALS:    Occupational Therapy Goals     Not on file          Multidisciplinary Problems (Resolved)        Problem: Occupational Therapy Goal    Goal Priority Disciplines Outcome Interventions   Occupational Therapy Goal   (Resolved)     OT, PT/OT Outcome(s) achieved    Description:  Goals to be met by: 3/15/2018     Patient will increase functional independence with ADLs by performing:    Feeding with [place]. NOT MET   UE Dressing with Moderate Assistance. NOT MET   LE Dressing with Moderate Assistance. NOT MET   Grooming while seated with Minimal Assistance. NOT MET   Toileting from bedside commode with Maximum Assistance for hygiene and clothing management. NOT MET   Sitting at edge of bed x10 minutes with Stand-by Assistance. NOT MET   Supine to sit with Moderate Assistance. NOT MET   Stand pivot transfers with Maximum Assistance. NOT MET   Toilet transfer to bedside commode with Maximum Assistance. NOT MET                  Problem: Occupational Therapy Goal    Goal Priority Disciplines Outcome Interventions   Occupational Therapy Goal   (Resolved)     OT, PT/OT Outcome(s) achieved    Description:  Goals to be met by:  3/2/2018    Patient will increase functional independence with ADLs by performing:    UE Dressing with Set-up Assistance.  Grooming while seated with Modified [place].  Toileting from bedside commode with Moderate Assistance for hygiene and clothing management.   Supine to sit with Modified [place].  Bed<>chair transfer with Minimal Assistance while maintaining precautions.  Toilet transfer to bedside commode with Minimal Assistance while maintaining precautions.                          Reasons for Discontinuation of Therapy Services  Transfer to alternate level of care.      Plan:     Patient Discharged to: Skilled Nursing Facility    Veronica Nassar OT  3/7/2018

## 2018-03-07 NOTE — PLAN OF CARE
Problem: Occupational Therapy Goal  Goal: Occupational Therapy Goal  Goals to be met by: 3/15/2018     Patient will increase functional independence with ADLs by performing:    Feeding with [place]. NOT MET   UE Dressing with Moderate Assistance. NOT MET   LE Dressing with Moderate Assistance. NOT MET   Grooming while seated with Minimal Assistance. NOT MET   Toileting from bedside commode with Maximum Assistance for hygiene and clothing management. NOT MET   Sitting at edge of bed x10 minutes with Stand-by Assistance. NOT MET   Supine to sit with Moderate Assistance. NOT MET   Stand pivot transfers with Maximum Assistance. NOT MET   Toilet transfer to bedside commode with Maximum Assistance. NOT MET         Outcome: Outcome(s) achieved Date Met: 03/07/18  Goals not met; pt d/c from hospital

## 2018-03-08 NOTE — DISCHARGE SUMMARY
"  [facility]  Discharge Summary  [facility]      Admit Date: 2/4/2018    Discharge Date and Time: 3/6/2018  1:05 PM    Discharge Provider: Montserrat Lino    Reason for Admission: septic encephalopathy    Indwelling Lines/Drains at time of discharge:        Hemodialysis AV Fistula Left upper arm (Active)   Needle Size 15ga 3/5/2018  8:30 AM   Site Assessment Clean;Dry;Intact 3/5/2018  8:30 AM   Patency Present;Thrill;Bruit 3/6/2018  9:22 AM   Status Deaccessed 3/4/2018  8:54 PM   Flows Good 3/2/2018  2:08 PM   Dressing Intervention New dressing 2/28/2018  6:30 PM   Dressing Status Clean;Dry;Intact 3/2/2018  8:46 PM   Site Condition No complications 3/5/2018  8:30 AM   Dressing Gauze 3/6/2018  9:22 AM       Hospital Course (synopsis of major diagnoses, care, treatment, and services provided during the course of the hospital stay):     Septic encephalopathy, resolved  Patient initially lacked capacity to refuse surgery per psychiatry, consent obtained from his son as proxy. Mental status has improved significantly.      Infected right dorsal foot, ischemic ulceration  Right foot gangrene  S/p right AKA  Podiatry consulted - s/p I&D and 4th and 5th amputation 2/9  Vascular surgery consulted - ultimately recommended AKA of right leg.   Continued vancomycin 1 g IV per random levels and Zosyn 2.25 g IV BID  Wound culture revealed pan-sensitive CITROBACTER KOSERI and KLEBSIELLA PNEUMONIAE ESBL.  To OR 02/09/18. Consulted ID.Recommended 6 weeks of ciprofloxacin. Will reconsult for need of antibiotics after AKA.   ABIs ordered to rule out need for BKA. Angiogram considered. Per vascular surgery "Unlikely for the patient to heal the wound despite aggressive limb salvage efforts. Due to significant lipodermatosclerosis, BKA is not good option. Patient likely needs AKA." Patient initially refused AKA stating he would rather die of infection then have his leg amputated. One Son and Brother in agreement with " patient as this aligns with his preference when in his normal mental state. All aware of the risk of recurrence of sepsis syndrome and death. Continuation of wound care and antibiotics likely futile; consulted Palliative Care. After multiple discussions about hospice, patient stated he wanted to live longer and agreed with AKA. Patient and his eldest Son now wish to proceed with AKA. AKA performed 2/29/2018. Id reconsulted and recommended discontinuing ciprofloxacin as source of infection resolved. Patient with wound vac to incision. Cannister 250 at mL x 3 days. Wound vac removed today. Daily dry dressing changes recommended at kathie of discharge.     Acute post-operative pain - patient's pain well-managed with perinuerual catheter and hydrocodone 10/325 mg q4h prn pain. Pain service removed catheter today and placed patient on oxycodone with sliding pain scale. Changed to hydromorphone as oxycodone is renal cleared and is associated with metabolite build-up in dialysis patients. Will give tapering scheduled hydromorphone with prn hydrocodone 10 mg/APAP 325 mg at discharge. Patient tolerated regimen of scheduled hydromorphone and did not require any prns.     Hypervolemia due to non-compliance with dialysis  Hyperkalemia, resolved  Anasarca due to protein malnutrition  Shifted in ER with albuterol, insulin/destrose, bicarbonate. K 7.9 => 4  No respiratory compromise. Emergent dialysis 2/5/18 early AM and on 2/9  Withhold anti-hypertensives for volume removal. Edema overall improving during stay. Son at bedside feeding patient. High protein high calorie diet. Patient noticeably more swollen after taking himself off dialysis early 3/2. He was also previously not on fluid restriction post-operatively. Fluid restriction 800 mL daily. Patient had 3L of fluid removed in 3.5 hours  3/5.      DVT  BLE ultrasound  Apixaban 5 mg BID - ? Compliance at home  Apixaban held 2/12/2018 for BKA - plan to resume at  discharge.  Prophylaxis with heparin 5000 units subq TID in meanwhile     DM II with HTN and CKD V  HgbA1c 4.6%  Likely controlled with declining intake  No further intervention for hyperglycemia needed at this time  accuchecks continued due to early morning hypoglycemia     Protein calorie malnutrtion  Hypoglycemia with associated NSVT  Poor prognosis for wound healing  Nepro with meals  High protein high calorie diet     Essential HTN - held home hydralazine 50 mg TID and losartan 50 mg daily. Blood pressure fairly well-controlled during stay without antihypertensives. Discontinued anti-hypertensive medications at discharge.     DM II hyperlipidemia - atorvastatin 40 mg daily     DM II neuropathy - gabapentin 100 mg TID     ESRF HD TTS - Nephrology consulted for dialysis.  Stopped anti-hypertensives and added midodrine as needed prior to dialysis for volume removal and reduce symptomatic dialysis-associated hypotension.      CKD MBD - continue cinacalcet 30 mg qhs and sevelamer 1600 mg TID daily     Depression - continue citalopram 10 mg daily. Consulted psychiatry. Back to baseline.      acue blood loss anemia - Hgb at 6.4 after two units given pre-operatively and operatively. Given 2 more units post-operatively. 3/5 Hgb 8     Anemia of CKD - epogen 10,000 units IV with dialysis MWF     Anemia of folate deficiency - folbic once daily     Left foot ulceration - podiatry placed nursing daily wound care for nursing    Consults: Nephrology and podiatry and vascular neurology and pyschiatry    Final Diagnoses:    Principal Problem: Septic encephalopathy   Secondary Diagnoses:   Active Hospital Problems    Diagnosis  POA    *Septic encephalopathy [G93.41]  Yes     Priority: 1 - High    Gangrene of right foot [I96]  Yes     Priority: 2     Unilateral AKA, right [Z89.611]  Not Applicable     Priority: 2     Foot ulceration, right, with fat layer exposed [L97.512]  Yes     Priority: 2     Infected skin ulcer with fat  layer exposed [L98.492, L08.9]  Yes     Priority: 2     Anasarca [R60.1]  Yes     Priority: 3     Hyperkalemia [E87.5]  Yes     Priority: 3     Hypervolemia [E87.70]  Yes     Priority: 3     DVT (deep venous thrombosis) [I82.409]  Yes     Priority: 4     Type 2 diabetes mellitus with stage 5 chronic kidney disease and hypertension [E11.22, I12.0, N18.5]  Yes     Priority: 5     Other hypoglycemia [E16.1]  Yes     Priority: 6     Protein-calorie malnutrition, severe [E43]  Yes     Priority: 6     Essential hypertension [I10]  Yes     Priority: 7     DM type 2 with diabetic mixed hyperlipidemia [E11.69, E78.2]  Yes     Priority: 8     Diabetic polyneuropathy associated with type 2 diabetes mellitus [E11.42]  Yes     Priority: 9     Chronic kidney disease-mineral and bone disorder [N18.9, E83.9, M89.9]  Yes     Priority: 10     ESRD (end stage renal disease) [N18.6]  Yes     Priority: 10     Depression [F32.9]  Yes     Priority: 11     Acute blood loss anemia [D62]  Yes     Priority: 12     Anemia in chronic kidney disease, on chronic dialysis [N18.6, D63.1, Z99.2]  Not Applicable     Priority: 13     Dietary folate deficiency anemia [D52.0]  Yes     Priority: 14     Foot ulceration, left, with unspecified severity [L97.529]  Yes     Priority: 15     Palliative care encounter [Z51.5]  Not Applicable    PVD (peripheral vascular disease) [I73.9]  Yes    Alteration in skin integrity [R23.9]  Yes      Resolved Hospital Problems    Diagnosis Date Resolved POA   No resolved problems to display.       Discharged Condition: stable    Disposition: Skilled Nursing Facility    Follow Up/Patient Instructions:     Medications:  Reconciled Home Medications:   Discharge Medication List as of 3/6/2018  1:06 PM      START taking these medications    Details   cholecalciferol, vitamin D3, (VITAMIN D3) 2,000 unit Cap Take 1 capsule (2,000 Units total) by mouth once daily., Starting Mon 3/5/2018, OTC      folic  acid-vit B6-vit B12 2.5-25-2 mg (FOLBIC OR EQUIV) 2.5-25-2 mg Tab Take 1 tablet by mouth once daily., Starting Mon 3/5/2018, No Print      midodrine (PROAMATINE) 10 MG tablet Take 1 tablet (10 mg total) by mouth every Mon, Wed, Fri. Give a 0900 or just prior to transport to dialysis, Starting Mon 3/5/2018, Until Tue 3/5/2019, No Print      senna-docusate 8.6-50 mg (PERICOLACE) 8.6-50 mg per tablet Take 2 tablets by mouth 2 (two) times daily., Starting Mon 3/5/2018, OTC      sevelamer HCl (RENAGEL) 800 MG Tab Take 1 tablet (800 mg total) by mouth as needed (snack)., Starting Mon 3/5/2018, Until Tue 3/5/2019, No Print      sodium phosphates (FLEET) 19-7 gram/118 mL Enem Place 1 enema rectally as needed (constipation)., Starting Mon 3/5/2018, OTC         CONTINUE these medications which have CHANGED    Details   sevelamer carbonate (RENVELA) 800 mg Tab Take 2 tablets (1,600 mg total) by mouth 3 (three) times daily with meals., Starting Mon 3/5/2018, Until Wed 4/4/2018, No Print      hydrocodone-acetaminophen 10-325mg (NORCO)  mg Tab Take 1 tablet by mouth every 4 (four) hours as needed for Pain., Starting Mon 3/5/2018, Print      HYDROmorphone (DILAUDID) 2 MG tablet Orally take 1 tab QID x 7 days, the 1 tab TID x 7 days, the 1 tab BID x 7 days, then stop, Print         CONTINUE these medications which have NOT CHANGED    Details   ammonium lactate (LAC-HYDRIN) 12 % lotion Apply topically as needed for Dry Skin., Until Discontinued, Historical Med      apixaban 5 mg Tab Take 1 tablet (5 mg total) by mouth 2 (two) times daily., Starting 5/2/2017, Until Discontinued, Normal      aspirin (ECOTRIN) 81 MG EC tablet Take 81 mg by mouth once daily. , Until Discontinued, Historical Med      atorvastatin (LIPITOR) 20 MG tablet Take 20 mg by mouth once daily., Until Discontinued, Historical Med      cinacalcet (SENSIPAR) 30 MG Tab Take 30 mg by mouth every evening., Until Discontinued, Historical Med      citalopram  (CELEXA) 10 MG tablet Take 10 mg by mouth once daily., Historical Med      gabapentin (NEURONTIN) 100 MG capsule Take 100 mg by mouth 3 (three) times daily., Historical Med         STOP taking these medications       cetirizine (ZYRTEC) 10 MG tablet Comments:   Reason for Stopping:         diclofenac sodium (VOLTAREN) 1 % Gel Comments:   Reason for Stopping:         ergocalciferol (ERGOCALCIFEROL) 50,000 unit Cap Comments:   Reason for Stopping:         furosemide (LASIX) 80 MG tablet Comments:   Reason for Stopping:         hydrALAZINE (APRESOLINE) 50 MG tablet Comments:   Reason for Stopping:         hydrocodone-acetaminophen 5-325mg (NORCO) 5-325 mg per tablet Comments:   Reason for Stopping:         ipratropium (ATROVENT HFA) 17 mcg/actuation inhaler Comments:   Reason for Stopping:         ketotifen (ZADITOR) 0.025 % (0.035 %) ophthalmic solution Comments:   Reason for Stopping:         losartan (COZAAR) 50 MG tablet Comments:   Reason for Stopping:         mometasone (NASONEX) 50 mcg/actuation nasal spray Comments:   Reason for Stopping:         podofilox (CONDYLOX) 0.5 % external solution Comments:   Reason for Stopping:             No discharge procedures on file.  Follow-up Information     Follow up Today.           Liz Zhou MD In 4 weeks.    Specialties:  Vascular Surgery, General Surgery  Why:  for wound check; s/p AKA  Contact information:  [address]  [facility] [address]  [phone]                   I spent more than 30 minutes total on this discharge including seeing and examining patient, note writing, reconcilling medications, and discussion with other health providers on team.     normal...

## 2018-03-09 ENCOUNTER — TELEPHONE (OUTPATIENT)
Dept: PODIATRY | Facility: CLINIC | Age: 68
End: 2018-03-09

## 2018-03-09 NOTE — TELEPHONE ENCOUNTER
Message states patient has been dismissed from the deartment and the provider so appt was canceled

## 2018-03-15 LAB — FUNGUS SPEC CULT: NORMAL

## 2018-03-21 ENCOUNTER — HOSPITAL ENCOUNTER (INPATIENT)
Facility: HOSPITAL | Age: 68
LOS: 2 days | Discharge: HOME HEALTH | DRG: 393 | End: 2018-03-23
Attending: EMERGENCY MEDICINE | Admitting: EMERGENCY MEDICINE
Payer: COMMERCIAL

## 2018-03-21 DIAGNOSIS — K62.5 RECTAL BLEEDING: Primary | ICD-10-CM

## 2018-03-21 LAB
ALBUMIN SERPL BCP-MCNC: 2.1 G/DL
ALP SERPL-CCNC: 120 U/L
ALT SERPL W/O P-5'-P-CCNC: 6 U/L
ANION GAP SERPL CALC-SCNC: 7 MMOL/L
APTT BLDCRRT: 33.8 SEC
AST SERPL-CCNC: 18 U/L
BASOPHILS # BLD AUTO: 0.04 K/UL
BASOPHILS NFR BLD: 0.6 %
BILIRUB SERPL-MCNC: 0.7 MG/DL
BUN SERPL-MCNC: 12 MG/DL
CALCIUM SERPL-MCNC: 8 MG/DL
CHLORIDE SERPL-SCNC: 100 MMOL/L
CO2 SERPL-SCNC: 31 MMOL/L
CREAT SERPL-MCNC: 2.5 MG/DL
DIFFERENTIAL METHOD: ABNORMAL
EOSINOPHIL # BLD AUTO: 0.2 K/UL
EOSINOPHIL NFR BLD: 2.6 %
ERYTHROCYTE [DISTWIDTH] IN BLOOD BY AUTOMATED COUNT: 16.6 %
EST. GFR  (AFRICAN AMERICAN): 29 ML/MIN/1.73 M^2
EST. GFR  (NON AFRICAN AMERICAN): 25 ML/MIN/1.73 M^2
GLUCOSE SERPL-MCNC: 94 MG/DL
HCT VFR BLD AUTO: 26.9 %
HGB BLD-MCNC: 8.2 G/DL
INR PPP: 1.3
LYMPHOCYTES # BLD AUTO: 1.7 K/UL
LYMPHOCYTES NFR BLD: 27.2 %
MAGNESIUM SERPL-MCNC: 2.1 MG/DL
MCH RBC QN AUTO: 28.5 PG
MCHC RBC AUTO-ENTMCNC: 30.5 G/DL
MCV RBC AUTO: 93 FL
MONOCYTES # BLD AUTO: 0.8 K/UL
MONOCYTES NFR BLD: 12.4 %
NEUTROPHILS # BLD AUTO: 3.6 K/UL
NEUTROPHILS NFR BLD: 57 %
PLATELET # BLD AUTO: 116 K/UL
PMV BLD AUTO: 9.7 FL
POCT GLUCOSE: 78 MG/DL (ref 70–110)
POCT GLUCOSE: 96 MG/DL (ref 70–110)
POTASSIUM SERPL-SCNC: 3.5 MMOL/L
PROT SERPL-MCNC: 7.1 G/DL
PROTHROMBIN TIME: 13.9 SEC
RBC # BLD AUTO: 2.88 M/UL
SODIUM SERPL-SCNC: 138 MMOL/L
WBC # BLD AUTO: 6.22 K/UL

## 2018-03-21 PROCEDURE — 12000002 HC ACUTE/MED SURGE SEMI-PRIVATE ROOM

## 2018-03-21 PROCEDURE — 82962 GLUCOSE BLOOD TEST: CPT

## 2018-03-21 PROCEDURE — 99285 EMERGENCY DEPT VISIT HI MDM: CPT | Mod: 25

## 2018-03-21 PROCEDURE — 83735 ASSAY OF MAGNESIUM: CPT

## 2018-03-21 PROCEDURE — 96375 TX/PRO/DX INJ NEW DRUG ADDON: CPT

## 2018-03-21 PROCEDURE — 63600175 PHARM REV CODE 636 W HCPCS: Performed by: EMERGENCY MEDICINE

## 2018-03-21 PROCEDURE — 86850 RBC ANTIBODY SCREEN: CPT

## 2018-03-21 PROCEDURE — 25000003 PHARM REV CODE 250: Performed by: EMERGENCY MEDICINE

## 2018-03-21 PROCEDURE — 85730 THROMBOPLASTIN TIME PARTIAL: CPT

## 2018-03-21 PROCEDURE — 96361 HYDRATE IV INFUSION ADD-ON: CPT

## 2018-03-21 PROCEDURE — 85610 PROTHROMBIN TIME: CPT

## 2018-03-21 PROCEDURE — 93005 ELECTROCARDIOGRAM TRACING: CPT

## 2018-03-21 PROCEDURE — 93010 ELECTROCARDIOGRAM REPORT: CPT | Mod: ,,, | Performed by: INTERNAL MEDICINE

## 2018-03-21 PROCEDURE — 85025 COMPLETE CBC W/AUTO DIFF WBC: CPT

## 2018-03-21 PROCEDURE — 96374 THER/PROPH/DIAG INJ IV PUSH: CPT

## 2018-03-21 PROCEDURE — 80053 COMPREHEN METABOLIC PANEL: CPT

## 2018-03-21 RX ORDER — ONDANSETRON 2 MG/ML
4 INJECTION INTRAMUSCULAR; INTRAVENOUS EVERY 8 HOURS PRN
Status: DISCONTINUED | OUTPATIENT
Start: 2018-03-21 | End: 2018-03-22

## 2018-03-21 RX ORDER — MORPHINE SULFATE 4 MG/ML
4 INJECTION, SOLUTION INTRAMUSCULAR; INTRAVENOUS EVERY 4 HOURS PRN
Status: DISCONTINUED | OUTPATIENT
Start: 2018-03-21 | End: 2018-03-23 | Stop reason: HOSPADM

## 2018-03-21 RX ADMIN — SODIUM CHLORIDE 1000 ML: 9 INJECTION, SOLUTION INTRAVENOUS at 11:03

## 2018-03-21 RX ADMIN — MORPHINE SULFATE 4 MG: 4 INJECTION INTRAVENOUS at 08:03

## 2018-03-21 RX ADMIN — ONDANSETRON HYDROCHLORIDE 4 MG: 2 INJECTION INTRAMUSCULAR; INTRAVENOUS at 08:03

## 2018-03-21 NOTE — ED TRIAGE NOTES
Pt arrived via ems with c/o rectal bleeding since Monday; pt denies any other symptoms at this time

## 2018-03-22 ENCOUNTER — ANESTHESIA (OUTPATIENT)
Dept: ENDOSCOPY | Facility: HOSPITAL | Age: 68
DRG: 393 | End: 2018-03-22
Payer: COMMERCIAL

## 2018-03-22 ENCOUNTER — ANESTHESIA EVENT (OUTPATIENT)
Dept: ENDOSCOPY | Facility: HOSPITAL | Age: 68
DRG: 393 | End: 2018-03-22
Payer: COMMERCIAL

## 2018-03-22 LAB
ABO + RH BLD: NORMAL
ANION GAP SERPL CALC-SCNC: 9 MMOL/L
BASOPHILS # BLD AUTO: 0.04 K/UL
BASOPHILS NFR BLD: 0.7 %
BLD GP AB SCN CELLS X3 SERPL QL: NORMAL
BUN SERPL-MCNC: 13 MG/DL
CALCIUM SERPL-MCNC: 7.8 MG/DL
CHLORIDE SERPL-SCNC: 101 MMOL/L
CO2 SERPL-SCNC: 27 MMOL/L
CREAT SERPL-MCNC: 2.7 MG/DL
DIFFERENTIAL METHOD: ABNORMAL
EOSINOPHIL # BLD AUTO: 0.1 K/UL
EOSINOPHIL NFR BLD: 2.2 %
ERYTHROCYTE [DISTWIDTH] IN BLOOD BY AUTOMATED COUNT: 16.6 %
EST. GFR  (AFRICAN AMERICAN): 27 ML/MIN/1.73 M^2
EST. GFR  (NON AFRICAN AMERICAN): 23 ML/MIN/1.73 M^2
ESTIMATED AVG GLUCOSE: 309 MG/DL
ESTIMATED AVG GLUCOSE: 309 MG/DL
GLUCOSE SERPL-MCNC: 51 MG/DL
HBA1C MFR BLD HPLC: 12.4 %
HBA1C MFR BLD HPLC: 12.4 %
HCT VFR BLD AUTO: 24.1 %
HGB BLD-MCNC: 7.3 G/DL
LYMPHOCYTES # BLD AUTO: 1.9 K/UL
LYMPHOCYTES NFR BLD: 34.2 %
MCH RBC QN AUTO: 28 PG
MCHC RBC AUTO-ENTMCNC: 30.3 G/DL
MCV RBC AUTO: 92 FL
MONOCYTES # BLD AUTO: 0.7 K/UL
MONOCYTES NFR BLD: 12.8 %
NEUTROPHILS # BLD AUTO: 2.8 K/UL
NEUTROPHILS NFR BLD: 50.1 %
PLATELET # BLD AUTO: ABNORMAL K/UL
PMV BLD AUTO: ABNORMAL FL
POCT GLUCOSE: 60 MG/DL (ref 70–110)
POCT GLUCOSE: 63 MG/DL (ref 70–110)
POCT GLUCOSE: 70 MG/DL (ref 70–110)
POCT GLUCOSE: 70 MG/DL (ref 70–110)
POTASSIUM SERPL-SCNC: 3.9 MMOL/L
RBC # BLD AUTO: 2.61 M/UL
SODIUM SERPL-SCNC: 137 MMOL/L
WBC # BLD AUTO: 5.49 K/UL

## 2018-03-22 PROCEDURE — 63600175 PHARM REV CODE 636 W HCPCS: Performed by: NURSE ANESTHETIST, CERTIFIED REGISTERED

## 2018-03-22 PROCEDURE — 37000009 HC ANESTHESIA EA ADD 15 MINS: Performed by: INTERNAL MEDICINE

## 2018-03-22 PROCEDURE — 85025 COMPLETE CBC W/AUTO DIFF WBC: CPT

## 2018-03-22 PROCEDURE — 37000008 HC ANESTHESIA 1ST 15 MINUTES: Performed by: INTERNAL MEDICINE

## 2018-03-22 PROCEDURE — D9220A PRA ANESTHESIA: Mod: ANES,,, | Performed by: ANESTHESIOLOGY

## 2018-03-22 PROCEDURE — 0DJD8ZZ INSPECTION OF LOWER INTESTINAL TRACT, VIA NATURAL OR ARTIFICIAL OPENING ENDOSCOPIC: ICD-10-PCS | Performed by: INTERNAL MEDICINE

## 2018-03-22 PROCEDURE — C9113 INJ PANTOPRAZOLE SODIUM, VIA: HCPCS | Performed by: EMERGENCY MEDICINE

## 2018-03-22 PROCEDURE — 25000003 PHARM REV CODE 250: Performed by: NURSE ANESTHETIST, CERTIFIED REGISTERED

## 2018-03-22 PROCEDURE — 21400001 HC TELEMETRY ROOM

## 2018-03-22 PROCEDURE — 80048 BASIC METABOLIC PNL TOTAL CA: CPT

## 2018-03-22 PROCEDURE — 25000003 PHARM REV CODE 250: Performed by: INTERNAL MEDICINE

## 2018-03-22 PROCEDURE — 45330 DIAGNOSTIC SIGMOIDOSCOPY: CPT | Performed by: INTERNAL MEDICINE

## 2018-03-22 PROCEDURE — D9220A PRA ANESTHESIA: Mod: CRNA,,, | Performed by: NURSE ANESTHETIST, CERTIFIED REGISTERED

## 2018-03-22 PROCEDURE — 25000003 PHARM REV CODE 250: Performed by: HOSPITALIST

## 2018-03-22 PROCEDURE — 25000003 PHARM REV CODE 250: Performed by: EMERGENCY MEDICINE

## 2018-03-22 PROCEDURE — 36415 COLL VENOUS BLD VENIPUNCTURE: CPT

## 2018-03-22 PROCEDURE — 63600175 PHARM REV CODE 636 W HCPCS: Performed by: EMERGENCY MEDICINE

## 2018-03-22 PROCEDURE — 83036 HEMOGLOBIN GLYCOSYLATED A1C: CPT

## 2018-03-22 RX ORDER — OXYCODONE HYDROCHLORIDE 5 MG/1
5 TABLET ORAL EVERY 4 HOURS PRN
Status: DISCONTINUED | OUTPATIENT
Start: 2018-03-22 | End: 2018-03-23 | Stop reason: HOSPADM

## 2018-03-22 RX ORDER — ETOMIDATE 2 MG/ML
INJECTION INTRAVENOUS
Status: DISCONTINUED | OUTPATIENT
Start: 2018-03-22 | End: 2018-03-22

## 2018-03-22 RX ORDER — HYDRALAZINE HYDROCHLORIDE 20 MG/ML
10 INJECTION INTRAMUSCULAR; INTRAVENOUS EVERY 6 HOURS PRN
Status: DISCONTINUED | OUTPATIENT
Start: 2018-03-22 | End: 2018-03-23 | Stop reason: HOSPADM

## 2018-03-22 RX ORDER — SODIUM CHLORIDE 0.9 % (FLUSH) 0.9 %
3 SYRINGE (ML) INJECTION
Status: DISCONTINUED | OUTPATIENT
Start: 2018-03-22 | End: 2018-03-23 | Stop reason: HOSPADM

## 2018-03-22 RX ORDER — MIDODRINE HYDROCHLORIDE 5 MG/1
10 TABLET ORAL
Status: DISCONTINUED | OUTPATIENT
Start: 2018-03-23 | End: 2018-03-23 | Stop reason: HOSPADM

## 2018-03-22 RX ORDER — ONDANSETRON 2 MG/ML
8 INJECTION INTRAMUSCULAR; INTRAVENOUS EVERY 8 HOURS PRN
Status: DISCONTINUED | OUTPATIENT
Start: 2018-03-22 | End: 2018-03-23 | Stop reason: HOSPADM

## 2018-03-22 RX ORDER — PROPOFOL 10 MG/ML
VIAL (ML) INTRAVENOUS
Status: DISCONTINUED | OUTPATIENT
Start: 2018-03-22 | End: 2018-03-22

## 2018-03-22 RX ORDER — RAMELTEON 8 MG/1
8 TABLET ORAL NIGHTLY PRN
Status: DISCONTINUED | OUTPATIENT
Start: 2018-03-22 | End: 2018-03-23 | Stop reason: HOSPADM

## 2018-03-22 RX ORDER — INSULIN ASPART 100 [IU]/ML
1-10 INJECTION, SOLUTION INTRAVENOUS; SUBCUTANEOUS EVERY 6 HOURS PRN
Status: DISCONTINUED | OUTPATIENT
Start: 2018-03-22 | End: 2018-03-23 | Stop reason: HOSPADM

## 2018-03-22 RX ORDER — CITALOPRAM 10 MG/1
10 TABLET ORAL DAILY
Status: DISCONTINUED | OUTPATIENT
Start: 2018-03-22 | End: 2018-03-23 | Stop reason: HOSPADM

## 2018-03-22 RX ORDER — POLYETHYLENE GLYCOL 3350 17 G/17G
17 POWDER, FOR SOLUTION ORAL 2 TIMES DAILY
Status: DISCONTINUED | OUTPATIENT
Start: 2018-03-22 | End: 2018-03-23 | Stop reason: HOSPADM

## 2018-03-22 RX ORDER — LIDOCAINE HYDROCHLORIDE 20 MG/ML
INJECTION, SOLUTION EPIDURAL; INFILTRATION; INTRACAUDAL; PERINEURAL
Status: DISPENSED
Start: 2018-03-22 | End: 2018-03-22

## 2018-03-22 RX ORDER — ATORVASTATIN CALCIUM 10 MG/1
20 TABLET, FILM COATED ORAL DAILY
Status: DISCONTINUED | OUTPATIENT
Start: 2018-03-22 | End: 2018-03-23 | Stop reason: HOSPADM

## 2018-03-22 RX ORDER — PROPOFOL 10 MG/ML
VIAL (ML) INTRAVENOUS
Status: COMPLETED
Start: 2018-03-22 | End: 2018-03-22

## 2018-03-22 RX ORDER — SODIUM CHLORIDE 9 MG/ML
INJECTION, SOLUTION INTRAVENOUS CONTINUOUS
Status: ACTIVE | OUTPATIENT
Start: 2018-03-22 | End: 2018-03-22

## 2018-03-22 RX ORDER — GLUCAGON 1 MG
1 KIT INJECTION
Status: DISCONTINUED | OUTPATIENT
Start: 2018-03-22 | End: 2018-03-23 | Stop reason: HOSPADM

## 2018-03-22 RX ORDER — ONDANSETRON 2 MG/ML
4 INJECTION INTRAMUSCULAR; INTRAVENOUS EVERY 8 HOURS PRN
Status: DISCONTINUED | OUTPATIENT
Start: 2018-03-22 | End: 2018-03-22

## 2018-03-22 RX ORDER — ETOMIDATE 2 MG/ML
INJECTION INTRAVENOUS
Status: COMPLETED
Start: 2018-03-22 | End: 2018-03-22

## 2018-03-22 RX ORDER — ACETAMINOPHEN 325 MG/1
650 TABLET ORAL EVERY 4 HOURS PRN
Status: DISCONTINUED | OUTPATIENT
Start: 2018-03-22 | End: 2018-03-23 | Stop reason: HOSPADM

## 2018-03-22 RX ORDER — LIDOCAINE HCL/PF 100 MG/5ML
SYRINGE (ML) INTRAVENOUS
Status: DISCONTINUED | OUTPATIENT
Start: 2018-03-22 | End: 2018-03-22

## 2018-03-22 RX ORDER — SODIUM CHLORIDE 9 MG/ML
INJECTION, SOLUTION INTRAVENOUS CONTINUOUS
Status: DISCONTINUED | OUTPATIENT
Start: 2018-03-22 | End: 2018-03-22

## 2018-03-22 RX ORDER — PANTOPRAZOLE SODIUM 40 MG/10ML
40 INJECTION, POWDER, LYOPHILIZED, FOR SOLUTION INTRAVENOUS 2 TIMES DAILY
Status: DISCONTINUED | OUTPATIENT
Start: 2018-03-22 | End: 2018-03-22

## 2018-03-22 RX ADMIN — PROPOFOL 20 MG: 10 INJECTION, EMULSION INTRAVENOUS at 11:03

## 2018-03-22 RX ADMIN — DEXTROSE 40 MG: 50 INJECTION, SOLUTION INTRAVENOUS at 09:03

## 2018-03-22 RX ADMIN — DEXTROSE MONOHYDRATE 12.5 G: 500 INJECTION PARENTERAL at 07:03

## 2018-03-22 RX ADMIN — POLYETHYLENE GLYCOL 3350 17 G: 17 POWDER, FOR SOLUTION ORAL at 12:03

## 2018-03-22 RX ADMIN — SODIUM CHLORIDE: 0.9 INJECTION, SOLUTION INTRAVENOUS at 04:03

## 2018-03-22 RX ADMIN — ETOMIDATE 2 MG: 2 INJECTION, SOLUTION INTRAVENOUS at 11:03

## 2018-03-22 RX ADMIN — LIDOCAINE HYDROCHLORIDE 20 MG: 20 INJECTION, SOLUTION INTRAVENOUS at 11:03

## 2018-03-22 RX ADMIN — OXYCODONE HYDROCHLORIDE 5 MG: 5 TABLET ORAL at 11:03

## 2018-03-22 RX ADMIN — POLYETHYLENE GLYCOL 3350 17 G: 17 POWDER, FOR SOLUTION ORAL at 09:03

## 2018-03-22 RX ADMIN — OXYCODONE HYDROCHLORIDE 5 MG: 5 TABLET ORAL at 04:03

## 2018-03-22 RX ADMIN — ETOMIDATE 6 MG: 2 INJECTION, SOLUTION INTRAVENOUS at 11:03

## 2018-03-22 RX ADMIN — CITALOPRAM HYDROBROMIDE 10 MG: 10 TABLET ORAL at 12:03

## 2018-03-22 RX ADMIN — ATORVASTATIN CALCIUM 20 MG: 10 TABLET, FILM COATED ORAL at 12:03

## 2018-03-22 NOTE — NURSING
Patient to  Endo as ordered. Patient awake, alert, oriented without c/o discomfort at this time. No apparent distress noted.

## 2018-03-22 NOTE — HPI
Wilder Carbajal is a 67 y.o. male that (in part)  has a past medical history of Acute pain of left shoulder; Arthritis; Asthma; Benign neoplasm of eyelid; Blood clotting tendency; Blood transfusion; Cataract; Chronic kidney disease requiring chronic dialysis; Diabetes mellitus; Diabetic retinopathy; Fever blister; Hyperlipidemia; Hypertension; Infertility; Joint pain; Retinal detachment; Thyroid disease; and Weakness. Presents to [facility] complaining of rectal bleeding.  He had rectal pain 2 weeks ago but developed acute onset of the bleeding 2 days ago associated with pain.  The blood was first noted by his nurse.  Characterized as moderate rectal bleeding.  Patient is taking chronic anticoagulation for history of chronic right femoral DVT as well as paroxysmal atrial fibrillation.  Located in his anus.  No radiation.  Likely exacerbated by medications.  No relieving factors.  Frequency of one episode.  The patient had a colonoscopy July of 2017 which revealed multiple polyps, which were removed, as well a diverticulosis without evidence of diverticulitis.    In the emergency department initial rectal exam was grossly positive for blood in the rectal vault.  Anticoagulant medications were held.  Routine laboratory studies including CBC was obtained.  Hemoglobin was stable compared to the last 3 values this month.  Underwent dialysis yesterday.     Hospital medicine has been asked to admit for further evaluation and treatment including consultation to gastroenterology.

## 2018-03-22 NOTE — PLAN OF CARE
"TN met with patient at bedside to complete discharge needs assessment. TN explained duties of case management to patient.  TN reviewed and provided  "[facility] Packet", "Discharge Planning Begins on Admission" brochure and discussed "Help at Home". TN also discussed his responsibilities to manage his health at home. Patient was informed to leave folder at bedside during hospital stay. Contact information added to white board.    [facility]      [facility]   [facility] [address]  [address]  [address]  [place] [address]  Phone: [phone] Fax: [phone]    Appointment Time Preference: morning        03/22/18 1425   Discharge Assessment   Assessment Type Discharge Planning Assessment   Confirmed/corrected address and phone number on facesheet? Yes   Assessment information obtained from? Patient   Prior to hospitilization cognitive status: Alert/Oriented   Prior to hospitalization functional status: Assistive Equipment   Current cognitive status: Alert/Oriented   Current Functional Status: Assistive Equipment   Lives With alone  (Patient states he lives at [facility]- unaware of facility)   Able to Return to Prior Arrangements yes   Is patient able to care for self after discharge? (Patient needs assistance)   Who are your caregiver(s) and their phone number(s)? Zulma Hdz @ [phone]   Patient's perception of discharge disposition assisted living   Readmission Within The Last 30 Days no previous admission in last 30 days   Patient currently being followed by outpatient case management? No   Patient currently receives any other outside agency services? No   Equipment Currently Used at Home bedside commode;wheelchair;rollator;walker, rolling   Do you have any problems affording any of your prescribed medications? No   Is the patient taking medications as prescribed? yes   Does the patient have transportation home? " Yes  (facility transportation )   Transportation Available agency transportation   Dialysis Name and Scheduled days Dialysis- MWF @ [facility]    Does the patient receive services at the [facility]? No   Discharge Plan A Assisted Living   Discharge Plan B Assisted Living   Patient/Family In Agreement With Plan yes   Does the patient have transportation to healthcare appointments? Yes   Readmission Questionnaire   Have you felt down, depressed, or hopeless? 2

## 2018-03-22 NOTE — PT/OT/SLP PROGRESS
Occupational Therapy      Patient Name:  Wilder Carbajal   MRN:  [id number]    Patient not seen today secondary to Unavailable (Comment) secondary too being off the floor in endo. Will follow-up at another time.    Sunshine Garcia OT  3/22/2018

## 2018-03-22 NOTE — NURSING
Report received from Kelly. Pt. resting quietly easily aroused per verbal stimuli. Evaluated pt. general condition. IVF infusing to R  AC. O2 @ 2L NC. Respirations even  and unlabored. No apparent distress noted.  No verbalization of pain or discomfort. Safety measures maintained.

## 2018-03-22 NOTE — H&P
[facility] Medicine  History & Physical    Patient Name: Wilder Carbajal  MRN: [id number]  Admission Date: 03/22/2018  Attending Physician: Arie Gaming MD, MPH      PCP:     Dio Roberson MD    CC:     Chief Complaint   Patient presents with    Rectal Bleeding     Rectal Bleeding x 2 days w/ pain.         HISTORY OF PRESENT ILLNESS:     Wilder Carbajal is a 67 y.o. male that (in part)  has a past medical history of Acute pain of left shoulder; Arthritis; Asthma; Benign neoplasm of eyelid; Blood clotting tendency; Blood transfusion; Cataract; Chronic kidney disease requiring chronic dialysis; Diabetes mellitus; Diabetic retinopathy; Fever blister; Hyperlipidemia; Hypertension; Infertility; Joint pain; Retinal detachment; Thyroid disease; and Weakness. Presents to [facility] Emergency Department complaining of rectal bleeding.  He had rectal pain 2 weeks ago but developed acute onset of the bleeding 2 days ago associated with pain.  The blood was first noted by his nurse.  Characterized as moderate rectal bleeding.  Patient is taking chronic anticoagulation for history of chronic right femoral DVT as well as paroxysmal atrial fibrillation.  Located in his anus.  No radiation.  Likely exacerbated by medications.  No relieving factors.  Frequency of one episode.  The patient had a colonoscopy July of 2017 which revealed multiple polyps, which were removed, as well a diverticulosis without evidence of diverticulitis.    In the emergency department initial rectal exam was grossly positive for blood in the rectal vault.  Anticoagulant medications were held.  Routine laboratory studies including CBC was obtained.  Hemoglobin was stable compared to the last 3 values this month.  Underwent dialysis yesterday.     Hospital medicine has been asked to admit for further evaluation and treatment including consultation to gastroenterology.       REVIEW OF SYSTEMS:     --  Constitutional: Generalized weakness and fatigue.  No fever or chills.  -- Eyes: No visual changes, diplopia, pain, tearing, blind spots, or discharge.   -- Ears, nose, mouth, throat, and face: No congestion, sore throat, epistaxis, d/c, bleeding gums, neck stiffness masses, or dental issues.  -- Respiratory: No cough, shortness of breath, hemoptysis, stridor, wheezing, or night sweats.  -- Cardiovascular: No chest pain, CHAMBERLAIN, syncope, PND, edema, cyanosis, or palpitations.   -- Gastrointestinal: As above in history of present illness.  -- Genitourinary: No hematuria, dysuria, frequency, urgency, nocturia, polyuria, stones, or incontinence.  -- Integument/breast: No rash, pruritis, pigmentation changes, dryness, or changes in hair  -- Hematologic/lymphatic: No easy bruising or lymphadenopathy.   -- Musculoskeletal: Chronic arthralgias.  History of right above-knee amputation.  Chronic muscle weakness.  -- Neurological: No seizures, headaches, incoordination, paraesthesias, ataxia, vertigo, or tremors.  -- Behavioral/Psych: No auditory or visual hallucinations, depression, or suicidal/homicidal ideations.  -- Endocrine: No heat or cold intolerance, polydipsia, or unintentional weight gain / loss.  -- Allergy/Immunologic: No recurrent infections or adverse reaction to food, insects, or difficulty breathing.      PAST MEDICAL / SURGICAL HISTORY:     Past Medical History:   Diagnosis Date    Acute pain of left shoulder 1/7/2017    Arthritis     pt states he does not have hx of Arthritis    Asthma     pt states he does not have hx of Asthma    Benign neoplasm of eyelid     RUL    Blood clotting tendency     Blood transfusion     Cataract     Chronic kidney disease requiring chronic dialysis     Diabetes mellitus     Diabetic retinopathy     Fever blister     Hyperlipidemia     Hypertension     Infertility     Joint pain     Retinal detachment     Thyroid disease     Weakness 1/7/2017     Past Surgical  History:   Procedure Laterality Date    CATARACT EXTRACTION      COLONOSCOPY N/A 6/23/2017    Procedure: COLONOSCOPY;  Surgeon: Vic Clark MD;  Location: [facility] ([address] [place] [address]);  Service: Endoscopy;  Laterality: N/A;    RETINAL DETACHMENT SURGERY           FAMILY HISTORY:     Family History   Problem Relation Age of Onset    Heart attack Mother     No Known Problems Father     No Known Problems Sister     No Known Problems Brother     No Known Problems Maternal Aunt     No Known Problems Maternal Uncle     No Known Problems Paternal Aunt     No Known Problems Paternal Uncle     No Known Problems Maternal Grandmother     No Known Problems Maternal Grandfather     No Known Problems Paternal Grandmother     No Known Problems Paternal Grandfather     Depression Child     Cancer Neg Hx     Amblyopia Neg Hx     Blindness Neg Hx     Cataracts Neg Hx     Diabetes Neg Hx     Glaucoma Neg Hx     Hypertension Neg Hx     Macular degeneration Neg Hx     Retinal detachment Neg Hx     Strabismus Neg Hx     Stroke Neg Hx     Thyroid disease Neg Hx          SOCIAL HISTORY:     Social History   Substance Use Topics    Smoking status: Never Smoker    Smokeless tobacco: Never Used    Alcohol use No     Social History     Social History    Marital status: Single     Spouse name: N/A    Number of children: 3    Years of education: N/A     Social History Main Topics    Smoking status: Never Smoker    Smokeless tobacco: Never Used    Alcohol use No    Drug use: No    Sexual activity: No     Other Topics Concern    None     Social History Narrative    Has lived with son for about 2 years now. 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         ALLERGIES:       Review of patient's allergies indicates:  No Known Allergies      HEALTH SCREENING:     Influenza vaccine not up-to-date for this season.  Prevnar 13 pneumonia vaccine =  evidence of previous vaccination found in the  medical record      HOME MEDICATIONS:     Prior to Admission medications    Medication Sig Start Date End Date Taking? Authorizing Provider   ammonium lactate (LAC-HYDRIN) 12 % lotion Apply topically as needed for Dry Skin.    Historical Provider, MD   apixaban 5 mg Tab Take 1 tablet (5 mg total) by mouth 2 (two) times daily. 5/2/17   Jose Enrique Paiz MD   aspirin (ECOTRIN) 81 MG EC tablet Take 81 mg by mouth once daily.     [facility]   atorvastatin (LIPITOR) 20 MG tablet Take 20 mg by mouth once daily.    Historical Provider, MD   cholecalciferol, vitamin D3, (VITAMIN D3) 2,000 unit Cap Take 1 capsule (2,000 Units total) by mouth once daily. 3/5/18   Montserrat Lino MD   cinacalcet (SENSIPAR) 30 MG Tab Take 30 mg by mouth every evening.    Historical Provider, MD   citalopram (CELEXA) 10 MG tablet Take 10 mg by mouth once daily.    Historical Provider, MD   folic acid-vit B6-vit B12 2.5-25-2 mg (FOLBIC OR EQUIV) 2.5-25-2 mg Tab Take 1 tablet by mouth once daily. 3/5/18   Montserrat Lino MD   gabapentin (NEURONTIN) 100 MG capsule Take 100 mg by mouth 3 (three) times daily.    Historical Provider, MD   hydrocodone-acetaminophen 10-325mg (NORCO)  mg Tab Take 1 tablet by mouth every 4 (four) hours as needed for Pain. 3/6/18   Waqas Aviles PA-C   HYDROmorphone (DILAUDID) 2 MG tablet Orally take 1 tab QID x 7 days, the 1 tab TID x 7 days, the 1 tab BID x 7 days, then stop 3/6/18   Waqas Aviles PA-C   midodrine (PROAMATINE) 10 MG tablet Take 1 tablet (10 mg total) by mouth every Mon, Wed, Fri. Give a 0900 or just prior to transport to dialysis 3/5/18 3/5/19  Montserrat Lino MD   senna-docusate 8.6-50 mg (PERICOLACE) 8.6-50 mg per tablet Take 2 tablets by mouth 2 (two) times daily. 3/5/18   Montserrat W. Cheuk, MD   sevelamer carbonate (RENVELA) 800 mg Tab Take 2 tablets (1,600 mg total) by mouth 3 (three) times daily with meals. 3/5/18 4/4/18  Montserrat Lino MD   sevelamer HCl (RENAGEL) 800 MG Tab  Take 1 tablet (800 mg total) by mouth as needed (snack). 3/5/18 3/5/19  Montserrat Lino MD   sodium phosphates (FLEET) 19-7 gram/118 mL Enem Place 1 enema rectally as needed (constipation). 3/5/18   Montserrat Lino MD          [facility] MEDICATIONS:     Scheduled Meds:    atorvastatin  20 mg Oral Daily    citalopram  10 mg Oral Daily    [START ON 3/23/2018] epoetin michael (PROCRIT) injection  50 Units/kg Intravenous Every Mon, Wed, Fri    [START ON 3/23/2018] midodrine  10 mg Oral Every Mon, Wed, Fri    pantoprazole 40 mg in dextrose 5 % 100 mL infusion (ready to mix system)  40 mg Intravenous BID    sodium chloride 0.9%  1,000 mL Intravenous ED 1 Time     Continuous Infusions:    sodium chloride 0.9%       PRN Meds: acetaminophen, dextrose 50%, glucagon (human recombinant), hydrALAZINE, influenza, insulin aspart U-100, morphine, ondansetron, oxyCODONE, ramelteon, sodium chloride 0.9%      PHYSICAL EXAM:     Wt Readings from Last 1 Encounters:   03/22/18 0025 99.7 kg (219 lb 12.8 oz)   03/21/18 1739 104.3 kg (230 lb)     Body mass index is 33.42 kg/m².  Vitals:    03/22/18 0100 03/22/18 0122 03/22/18 0130 03/22/18 0424   BP: (!) 94/51   135/68   BP Location: Right arm   Right arm   Patient Position: Lying   Lying   Pulse: 64   (!) 48   Resp: 16   17   Temp:    98 °F (36.7 °C)   TempSrc:    Axillary   SpO2: (!) 94% 100% 100% 100%   Weight:       Height:              -- General appearance: Chronically ill-appearing elderly male.  well developed. appears stated age   -- Head: normocephalic, atraumatic   -- Eyes:  Pale conjunctivae . Extraocular muscles intact  -- Nose: Nares normal. Septum midline.   -- Mouth/Throat: lips, mucosa, and tongue normal. no throat erythema.   -- Neck: supple, symmetrical, trachea midline, no JVD and thyroid not grossly enlarged, appears symmetric  -- Lungs: clear to auscultation bilaterally. normal respiratory effort. No use of accessory muscles.   -- Chest wall: no tenderness. equal  bilateral chest rise   -- Heart: regular rate and regular rhythm. S1, S2 normal.  no click, rub or gallop   -- Abdomen: Overweight.  soft, non-tender, non-distended, non-tympanic; bowel sounds normal; no masses  -- Extremities: Right sided above-the-knee amputation.  no cyanosis, clubbing or edema.   -- Pulses: 2+ and symmetric   -- Skin: Healing surgical wound on right lower extremity amputation site.  color normal, texture normal, turgor normal.   -- Neurologic: Normal strength and tone. No focal numbness or weakness. CNII-XII intact. [place] coma scale: eyes open spontaneously-4, oriented & converses-5, obeys commands-6.      LABORATORY STUDIES:     Recent Results (from the past 36 hour(s))   CBC auto differential    Collection Time: 03/21/18  5:55 PM   Result Value Ref Range    WBC 6.22 3.90 - 12.70 K/uL    RBC 2.88 (L) 4.60 - 6.20 M/uL    Hemoglobin 8.2 (L) 14.0 - 18.0 g/dL    Hematocrit 26.9 (L) 40.0 - 54.0 %    MCV 93 82 - 98 fL    MCH 28.5 27.0 - 31.0 pg    MCHC 30.5 (L) 32.0 - 36.0 g/dL    RDW 16.6 (H) 11.5 - 14.5 %    Platelets 116 (L) 150 - 350 K/uL    MPV 9.7 9.2 - 12.9 fL    Gran # (ANC) 3.6 1.8 - 7.7 K/uL    Lymph # 1.7 1.0 - 4.8 K/uL    Mono # 0.8 0.3 - 1.0 K/uL    Eos # 0.2 0.0 - 0.5 K/uL    Baso # 0.04 0.00 - 0.20 K/uL    Gran% 57.0 38.0 - 73.0 %    Lymph% 27.2 18.0 - 48.0 %    Mono% 12.4 4.0 - 15.0 %    Eosinophil% 2.6 0.0 - 8.0 %    Basophil% 0.6 0.0 - 1.9 %    Differential Method Automated    Protime-INR    Collection Time: 03/21/18  5:55 PM   Result Value Ref Range    Prothrombin Time 13.9 (H) 9.0 - 12.5 sec    INR 1.3 (H) 0.8 - 1.2   APTT    Collection Time: 03/21/18  5:55 PM   Result Value Ref Range    aPTT 33.8 (H) 21.0 - 32.0 sec   Comprehensive metabolic panel    Collection Time: 03/21/18  5:55 PM   Result Value Ref Range    Sodium 138 136 - 145 mmol/L    Potassium 3.5 3.5 - 5.1 mmol/L    Chloride 100 95 - 110 mmol/L    CO2 31 (H) 23 - 29 mmol/L    Glucose 94 70 - 110 mg/dL    BUN, Bld 12 8  - 23 mg/dL    Creatinine 2.5 (H) 0.5 - 1.4 mg/dL    Calcium 8.0 (L) 8.7 - 10.5 mg/dL    Total Protein 7.1 6.0 - 8.4 g/dL    Albumin 2.1 (L) 3.5 - 5.2 g/dL    Total Bilirubin 0.7 0.1 - 1.0 mg/dL    Alkaline Phosphatase 120 55 - 135 U/L    AST 18 10 - 40 U/L    ALT 6 (L) 10 - 44 U/L    Anion Gap 7 (L) 8 - 16 mmol/L    eGFR if African American 29 (A) >60 mL/min/1.73 m^2    eGFR if non African American 25 (A) >60 mL/min/1.73 m^2   Magnesium    Collection Time: 03/21/18  5:55 PM   Result Value Ref Range    Magnesium 2.1 1.6 - 2.6 mg/dL   POCT glucose    Collection Time: 03/21/18  5:55 PM   Result Value Ref Range    POCT Glucose 96 70 - 110 mg/dL   POCT glucose    Collection Time: 03/21/18  8:17 PM   Result Value Ref Range    POCT Glucose 78 70 - 110 mg/dL       Lab Results   Component Value Date    INR 1.3 (H) 03/21/2018    INR 1.3 (H) 02/25/2018    INR 1.2 06/21/2017     Lab Results   Component Value Date    HGBA1C 4.6 01/07/2018     Recent Labs      03/21/18   1755  03/21/18 2017   POCTGLUCOSE  96  78         CONSULTS:     IP CONSULT TO GI  IP CONSULT TO NEPHROLOGY       ASSESSMENT & PLAN:     Primary Diagnosis:  Rectal bleeding    Active Hospital Problems    Diagnosis  POA    *Rectal bleeding [K62.5]  Yes     Priority: 1 - High    Right femoral vein DVT [I82.411]  Yes     Priority: 2     Unilateral AKA, right [Z89.611]  Not Applicable    Paroxysmal atrial fibrillation [I48.0]  Yes    Type 2 diabetes mellitus with stage 5 chronic kidney disease and hypertension [E11.22, I12.0, N18.5]  Yes    ESRD (end stage renal disease) [N18.6]  Yes    HLD (hyperlipidemia) [E78.5]  Yes    DM II (diabetes mellitus, type II), controlled [E11.9]  Yes    Essential hypertension [I10]  Yes    Hypothyroidism [E03.9]  Yes    GERD (gastroesophageal reflux disease) [K21.9]  Yes      Resolved Hospital Problems    Diagnosis Date Resolved POA   No resolved problems to display.     Acute Lower GI bleeding  · As evidence by physical  exam and positive occult blood testing  · Possible etiology includes:  colitis, colon cancer, diverticulitis, internal hemorrhoids  · Monitor with serial H&H; hemoglobin actually appears improved compared to the beginning of the month.  The MCV is also normal.  Both indicators that this is likely an acute event.  · Unlikely to be upper GI bleed.  Continue give Protonix 40 mg  twice a day as previously prescribed for GERD  · Hold all anticoagulation medications  · N.p.o. except  Medications  · Provide IV fluids  · Obtain blood type and screen  · Maintain two large-bore IVs at all times  · Consult gastroenterology    Chronic partially occlusive clot in the right femoral vein.   Initial DVT was noted in April 2017.  Repeat ultrasound from last month demonstrates  Chronic partially occlusive clot in the right femoral vein. No new DVT.  Will hold anticoagulation secondary to acute GI bleeding as discussed above.      Chronic anticoagulation  · For treatment of paroxysmal atrial fibrillation and chronic DVT of the right femoral vein  · Holding apixaban  · Monitor INR closely    End-stage renal disease on dialysis  · No acute issues with significant volume overload, electrolyte abnormality, or acid-base abnormality at this time  · Will need routine dialysis while inpatient  · Nephrology consulted    Hyperlipidemia   · Lipid panel - as an outpatient  · Cardiac diet  · Continue statin    Diabetes mellitus type 2  · BG in acceptable range at this time  · Maintain w/ subcutaneous insulin management order set  · Hold oral diabetic meds  · ADA 1800 kcal diet  · BG goal while in patient is <180mg/dL  · HgA1c = PendingGastroesophageal reflux disease   · No acute issues  · Continue current home regimen    Thyroid dysfunction   · Clinically, patient is euthyroid   · Chemically, undetermined  · Obtain TSH, free T3, and free T4  · Continue current regimen    Paroxysmal atrial fibrillation  · Currently rate controlled  · Maintain with  beta-blocker with hold parameters and blood pressure allows.  Investigate source of GI bleeding first.  · Monitor on telemetry  · Maintain magnesium around 2.0  · Maintain potassium around 4.0  · Holding on in the setting of GI bleeding noted above       VTE Risk Mitigation         Ordered     Place RODRI hose  Until discontinued      03/22/18 0427      Teds and SCDs ordered.      Adult PRN medications available   DVT prophylaxis given       DISPOSITION:     Will admit to the Hospital Medicine service for further evaluation and treatment.    Chart reviewed and updated where applicable.    High Risk Conditions:  Patient has a condition that poses threat to life and bodily function: Acute rectal bleeding      ===============================================================    Arie Gaming MD, MPH  Department of Hospital Medicine   [facility]  [phone] pg  (7pm - 6am)          This note is dictated using Dragon Medical 360 voice recognition software.  There are word recognition mistakes that are occasionally missed on review.

## 2018-03-22 NOTE — CONSULTS
Renal Consult    Date of Admission:  3/21/2018  5:44 PM    HPI:   67 y.o. male admitted 3/21 with new onset rectal bleeding associated with pain in anal area, which is exacerbated with passage of stool.   No N/V or abd pain reported. Consulted for ESRD management    Past Medical History:   Diagnosis Date    Acute pain of left shoulder 1/7/2017    Arthritis     pt states he does not have hx of Arthritis    Asthma     pt states he does not have hx of Asthma    Benign neoplasm of eyelid     RUL    Blood clotting tendency     Blood transfusion     Cataract     Chronic kidney disease requiring chronic dialysis     Diabetes mellitus     Diabetic retinopathy     Fever blister     Hyperlipidemia     Hypertension     Infertility     Joint pain     Retinal detachment     Thyroid disease     Weakness 1/7/2017     Past Surgical History:   Procedure Laterality Date    CATARACT EXTRACTION      COLONOSCOPY N/A 6/23/2017    Procedure: COLONOSCOPY;  Surgeon: Vic Clark MD;  Location: [address]);  Service: Endoscopy;  Laterality: N/A;    RETINAL DETACHMENT SURGERY       Review of patient's allergies indicates:  No Known Allergies  No current facility-administered medications on file prior to encounter.      Current Outpatient Prescriptions on File Prior to Encounter   Medication Sig Dispense Refill    ammonium lactate (LAC-HYDRIN) 12 % lotion Apply topically as needed for Dry Skin.      apixaban 5 mg Tab Take 1 tablet (5 mg total) by mouth 2 (two) times daily. 60 tablet 1    aspirin (ECOTRIN) 81 MG EC tablet Take 81 mg by mouth once daily.       atorvastatin (LIPITOR) 20 MG tablet Take 20 mg by mouth once daily.      cholecalciferol, vitamin D3, (VITAMIN D3) 2,000 unit Cap Take 1 capsule (2,000 Units total) by mouth once daily.      cinacalcet (SENSIPAR) 30 MG Tab Take 30 mg by mouth every evening.      citalopram (CELEXA) 10 MG tablet Take 10 mg by  mouth once daily.      folic acid-vit B6-vit B12 2.5-25-2 mg (FOLBIC OR EQUIV) 2.5-25-2 mg Tab Take 1 tablet by mouth once daily.      gabapentin (NEURONTIN) 100 MG capsule Take 100 mg by mouth 3 (three) times daily.      hydrocodone-acetaminophen 10-325mg (NORCO)  mg Tab Take 1 tablet by mouth every 4 (four) hours as needed for Pain. 56 tablet 0    HYDROmorphone (DILAUDID) 2 MG tablet Orally take 1 tab QID x 7 days, the 1 tab TID x 7 days, the 1 tab BID x 7 days, then stop 28 tablet 0    midodrine (PROAMATINE) 10 MG tablet Take 1 tablet (10 mg total) by mouth every Mon, Wed, Fri. Give a 0900 or just prior to transport to dialysis 12 tablet 11    senna-docusate 8.6-50 mg (PERICOLACE) 8.6-50 mg per tablet Take 2 tablets by mouth 2 (two) times daily.      sevelamer carbonate (RENVELA) 800 mg Tab Take 2 tablets (1,600 mg total) by mouth 3 (three) times daily with meals. 180 tablet 1    sevelamer HCl (RENAGEL) 800 MG Tab Take 1 tablet (800 mg total) by mouth as needed (snack). 90 tablet 11    sodium phosphates (FLEET) 19-7 gram/118 mL Enem Place 1 enema rectally as needed (constipation).  0     Social History     Social History    Marital status: Single     Spouse name: N/A    Number of children: 3    Years of education: N/A     Occupational History    Not on file.     Social History Main Topics    Smoking status: Never Smoker    Smokeless tobacco: Never Used    Alcohol use No    Drug use: No    Sexual activity: No     Other Topics Concern    Not on file     Social History Narrative    Has lived with son for about 2 years now. 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     Family History   Problem Relation Age of Onset    Heart attack Mother     No Known Problems Father     No Known Problems Sister     No Known Problems Brother     No Known Problems Maternal Aunt     No Known Problems Maternal Uncle     No Known Problems Paternal Aunt     No Known Problems  Paternal Uncle     No Known Problems Maternal Grandmother     No Known Problems Maternal Grandfather     No Known Problems Paternal Grandmother     No Known Problems Paternal Grandfather     Depression Child     Cancer Neg Hx     Amblyopia Neg Hx     Blindness Neg Hx     Cataracts Neg Hx     Diabetes Neg Hx     Glaucoma Neg Hx     Hypertension Neg Hx     Macular degeneration Neg Hx     Retinal detachment Neg Hx     Strabismus Neg Hx     Stroke Neg Hx     Thyroid disease Neg Hx      Review of Systems  General ROS: negative for chills, fever or weight loss  Respiratory ROS: no cough, shortness of breath, or wheezing  Cardiovascular ROS: no chest pain or dyspnea on exertion    Physical Exam:    Vitals:    03/22/18 0816 03/22/18 1129 03/22/18 1144 03/22/18 1159   BP: (!) 120/56 133/68 (!) 150/66 (!) 157/79   BP Location: Right arm      Patient Position:       Pulse: (!) 49 (!) 54 (!) 53 (!) 54   Resp: 16 16 18 18   Temp: 98.7 °F (37.1 °C) 97.5 °F (36.4 °C)     TempSrc: Oral      SpO2: 100% (!) 94% 100% 100%   Weight:       Height:         I/O last 3 completed shifts:  In: 1120 [P.O.:120; IV Piggyback:1000]  Out: -   I/O this shift:  In: 550 [I.V.:550]  Out: -     General: no distress  HENT: n/a  Lungs: clear to auscultation bilaterally  Heart: regular rate and rhythm without rub  Abdomen: soft, non-tender  Extremities: no edema  Neurologic: awake, poor historian    Laboratories:  WBC                6.22 5.49  WBC      RBC                2.88 2.61  RBC     Hemoglobin                8.2 7.3  Hemoglobin      Sodium                138 137  Sodium      Potassium                3.5 3.9  Potassium     Chloride                100 101  Chloride     CO2                31 27  CO2     Anion Gap                7 9  Anion Gap     BUN, Bld                12 13  BUN, Bld     Creatinine                2.5 2.7  Creatinine        Assessment:    66 y/o male with ESRD on HD Q MWF admitted with:    - Rectal bleed  - Anemia  w/ GIB  - DM-2  - HTN    Plan:    - Resume Dialysis in a.m. And continue MWF schedule  - Epogen w/ HD  - f/u H&H  - Transfuse PRN Hgb < 7  - GI following, Flex Sig done earlier, report pending    Will f/u for Dialysis

## 2018-03-22 NOTE — ED NOTES
MD notified of pt's blood pressure. Pt denies dizziness, weakness, or other hypotensive symptoms. Pt appears in no acute distress, alert, and oriented x4. Fluids placed on pressure bag. BP cycle q10min. No further needs identified at this time. Will continue to monitor.

## 2018-03-22 NOTE — PROVATION PATIENT INSTRUCTIONS
Discharge Summary/Instructions after an Endoscopic Procedure  Patient Name: Wilder Carbajal  Patient MRN: [id number]  Patient YOB: 1950 Thursday, March 22, 2018  Mehdi Anthony MD  RESTRICTIONS:  During your procedure today, you received medications for sedation.  These   medications may affect your judgment, balance and coordination.  Therefore,   for 24 hours, you have the following restrictions:   - DO NOT drive a car, operate machinery, make legal/financial decisions,   sign important papers or drink alcohol.    ACTIVITY:  The following day: return to full activity including work, except no heavy   lifting, straining or running for 3 days if polyps were removed.  DIET:  Eat and drink normally unless instructed otherwise.     TREATMENT FOR COMMON SIDE EFFECTS:  - Mild abdominal pain, nausea, belching, bloating or excessive gas:  rest,   eat lightly and use a heating pad.  - Sore Throat: treat with throat lozenges and/or gargle with warm salt   water.  - Because air was used during the procedure, expelling large amounts of air   from your rectum or belching is normal.  - If a bowel prep was taken, you may not have a bowel movement for 1-3 days.    This is normal.  SYMPTOMS TO WATCH FOR AND REPORT TO YOUR PHYSICIAN:  1. Abdominal pain or bloating, other than gas cramps.  2. Chest pain.  3. Back pain.  4. Signs of infection such as: chills or fever occurring within 24 hours   after the procedure.  5. Rectal bleeding, which would show as bright red, maroon, or black stools.   (A tablespoon of blood from the rectum is not serious, especially if   hemorrhoids are present.)  6. Vomiting.  7. Weakness or dizziness.  GO DIRECTLY TO THE NEAREST EMERGENCY ROOM IF YOU HAVE ANY OF THE FOLLOWING:      Difficulty breathing              Chills and/or fever over 101 F   Persistent vomiting and/or vomiting blood   Severe abdominal pain   Severe chest pain   Black, tarry stools   Bleeding- more than one tablespoon   Any  other symptom or condition that you feel may need urgent attention  Your doctor recommends these additional instructions:  If any biopsies were taken, your doctors clinic will contact you in 1 to 2   weeks with any results.  None  For questions, problems or results please call your physician - Mehdi Anthony MD at Work:  [phone].  [facility] can be reached at [phone]     IF A COMPLICATION OR EMERGENCY SITUATION ARISES AND YOU ARE UNABLE TO REACH   YOUR PHYSICIAN - GO DIRECTLY TO THE EMERGENCY ROOM.  MD Mehdi Ha MD  3/22/2018 11:28:30 AM  This report has been verified and signed electronically.

## 2018-03-22 NOTE — TRANSFER OF CARE
"Anesthesia Transfer of Care Note    Patient: Wilder Carbajal    Procedure(s) Performed: Procedure(s) (LRB):  SIGMOIDOSCOPY-FLEXIBLE (N/A)    Patient location: GI    Anesthesia Type: general    Transport from OR: Transported from OR on room air with adequate spontaneous ventilation    Post pain: adequate analgesia    Post assessment: no apparent anesthetic complications    Post vital signs: stable    Level of consciousness: awake    Nausea/Vomiting: no nausea/vomiting    Complications: none    Transfer of care protocol was followed      Last vitals:   Visit Vitals  /68   Pulse (!) 54   Temp 36.4 °C (97.5 °F)   Resp 16   Ht 5' 8" (1.727 m)   Wt 99.7 kg (219 lb 12.8 oz)   SpO2 (!) 94%   BMI 33.42 kg/m²     "

## 2018-03-22 NOTE — CONSULTS
Gastroenterology    CC: Rectal bleeding    HPI 67 y.o. male with new onset, recurrent, mild-moderate severity, red, rectal bleeding associated with pain in anal area, which is exacerbated with passage of stool.  No melena.  No heavy NSAID use.  No passage of large, hard stool recently.  No N/V, abd pain.       Past Medical History:   Diagnosis Date    Acute pain of left shoulder 1/7/2017    Arthritis     pt states he does not have hx of Arthritis    Asthma     pt states he does not have hx of Asthma    Benign neoplasm of eyelid     RUL    Blood clotting tendency     Blood transfusion     Cataract     Chronic kidney disease requiring chronic dialysis     Diabetes mellitus     Diabetic retinopathy     Fever blister     Hyperlipidemia     Hypertension     Infertility     Joint pain     Retinal detachment     Thyroid disease     Weakness 1/7/2017         Past Surgical History:   Procedure Laterality Date    CATARACT EXTRACTION      COLONOSCOPY N/A 6/23/2017    Procedure: COLONOSCOPY;  Surgeon: Vic Clark MD;  Location: [facility] ([address] [place] [address]);  Service: Endoscopy;  Laterality: N/A;    RETINAL DETACHMENT SURGERY         Social History  Social History   Substance Use Topics    Smoking status: Never Smoker    Smokeless tobacco: Never Used    Alcohol use No   No illicits      Family History   Problem Relation Age of Onset    Heart attack Mother     No Known Problems Father     No Known Problems Sister     No Known Problems Brother     No Known Problems Maternal Aunt     No Known Problems Maternal Uncle     No Known Problems Paternal Aunt     No Known Problems Paternal Uncle     No Known Problems Maternal Grandmother     No Known Problems Maternal Grandfather     No Known Problems Paternal Grandmother     No Known Problems Paternal Grandfather     Depression Child     Cancer Neg Hx     Amblyopia Neg Hx     Blindness Neg Hx     Cataracts Neg Hx     Diabetes Neg Hx     Glaucoma Neg Hx   "   Hypertension Neg Hx     Macular degeneration Neg Hx     Retinal detachment Neg Hx     Strabismus Neg Hx     Stroke Neg Hx     Thyroid disease Neg Hx        Review of Systems  General ROS: negative for chills, fever or weight loss  Respiratory ROS: no cough, shortness of breath, or wheezing  Cardiovascular ROS: no chest pain or dyspnea on exertion  Neurological ROS: no syncope or seizures  Dermatological ROS: negative for pruritis, rash and jaundice    Physical Examination  BP (!) 120/56 (BP Location: Right arm)   Pulse (!) 49   Temp 98.7 °F (37.1 °C) (Oral)   Resp 16   Ht 5' 8" (1.727 m)   Wt 99.7 kg (219 lb 12.8 oz)   SpO2 100%   BMI 33.42 kg/m²   General appearance: alert, cooperative, no distress  HENT: Normocephalic, atraumatic, neck symmetrical, no nasal discharge   Eyes: conjunctivae/corneas clear, PERRL, EOM's intact  Lungs: clear to auscultation bilaterally, no dullness to percussion bilaterally  Heart: regular rate and rhythm without rub; no displacement of the PMI   Abdomen: soft, non-tender; bowel sounds normoactive; no organomegaly  Extremities: extremities symmetric; no clubbing, cyanosis  Integument: Skin color, texture, turgor normal; no rashes; hair distrubution normal    Labs:  Hb low, with chronic component    Assessment:     Rectal bleeding with some anal discomfort    Plan:   - Flex sig today        "

## 2018-03-22 NOTE — PLAN OF CARE
Problem: Diabetes, Type 2 (Adult)  Intervention: Support/Optimize Psychosocial Response to Condition   03/22/18 1343   Coping/Psychosocial Interventions   Supportive Measures active listening utilized;counseling provided;verbalization of feelings encouraged   Environmental Support calm environment promoted;rest periods encouraged     Intervention: Optimize Glycemic Control   03/22/18 1343   Nutrition Interventions   Glycemic Management blood glucose monitoring;carbohydrate replacement provided       Goal: Signs and Symptoms of Listed Potential Problems Will be Absent, Minimized or Managed (Diabetes, Type 2)  Signs and symptoms of listed potential problems will be absent, minimized or managed by discharge/transition of care (reference Diabetes, Type 2 (Adult) CPG).   Outcome: Ongoing (interventions implemented as appropriate)   03/22/18 1343   Diabetes, Type 2   Problems Assessed (Type 2 Diabetes) all   Problems Present (Type 2 Diabetes) hypoglycemia       Problem: Fall Risk (Adult)  Intervention: Reduce Risk/Promote Restraint Free Environment   03/22/18 1343   Safety Interventions   Environmental Safety Modification assistive device/personal items within reach;clutter free environment maintained;lighting adjusted;room near unit station;room organization consistent       Goal: Identify Related Risk Factors and Signs and Symptoms  Related risk factors and signs and symptoms are identified upon initiation of Human Response Clinical Practice Guideline (CPG)   Outcome: Ongoing (interventions implemented as appropriate)   03/22/18 1343   Fall Risk   Related Risk Factors (Fall Risk) environment unfamiliar;history of falls;inadequate lighting;polypharmacy;sensory deficits   Signs and Symptoms (Fall Risk) presence of risk factors     Goal: Absence of Falls  Patient will demonstrate the desired outcomes by discharge/transition of care.   Outcome: Ongoing (interventions implemented as appropriate)   03/22/18 1343   Fall Risk  (Adult)   Absence of Falls making progress toward outcome

## 2018-03-22 NOTE — ANESTHESIA PREPROCEDURE EVALUATION
[facility]  Anesthesia Pre-Operative Evaluation         Patient Name: Wilder Carbajal  YOB: 1950  MRN: [id number]    SUBJECTIVE:     Pre-operative evaluation for Procedure(s) (LRB):  AMPUTATION-ABOVE KNEE (Right)  Scheduled for 2/26/2018    HPI 03/22/2018:  Wilder Carbajal is a 67 y.o. male with hx of ESRD on HD (TThS) via L RC AVF (with interposition graft), HTN, HLD, DM2 ( Hgb A1c 4.7), afib, GERD, depression, HFrEF (45% 4/2017)  presenting with fluid overload after missing dialysis. Found to have evidence of right diabetic foot ulceration with wet gangrene. S/p amputation of 4th and 5th toes on 2/9/18.    2/25/2018 2345: Consent signed via phone consent with son, Wilder Carbajal III.    Patient presents for the above procedure(s).    Prev airway:   No prior records in Epic or Legacy Documents.    Oxygen/Ventilation Requirements:  On NC 1.5L/min       Current LDA:   PIV R forearm 20G       Peripheral IV - Single Lumen 01/07/18 0009 Right Forearm (Active)   Number of days: 49            Peripheral IV - Single Lumen 02/24/18 1115 Right Forearm (Active)   Site Assessment Dry;Intact 2/25/2018  8:04 PM   Line Status Saline locked 2/25/2018  8:04 PM   Dressing Status Clean;Dry;Intact 2/25/2018  8:00 AM   Dressing Intervention New dressing 2/24/2018 12:00 PM   Dressing Change Due 02/28/18 2/25/2018  8:04 PM   Site Change Due 02/28/18 2/25/2018  8:04 PM   Number of days: 1            Hemodialysis AV Fistula Left upper arm (Active)   Needle Size 15ga 2/23/2018 11:45 AM   Site Assessment Intact;Dry 2/25/2018  8:04 PM   Patency Present;Thrill;Bruit 2/25/2018  8:04 PM   Status Deaccessed 2/25/2018  8:00 AM   Flows Good 2/19/2018 12:30 PM   Dressing Intervention New dressing 2/19/2018 12:30 PM   Dressing Status Clean;Dry;Intact 2/24/2018  9:08 AM   Site Condition No complications 2/25/2018  8:00 AM   Dressing Gauze 2/19/2018  9:19 PM   Number of days:        Current Drips:  None  documented.      Patient Active Problem List   Diagnosis    Traction detachment of both retinas    Diabetic macular edema of both eyes    NS (nuclear sclerosis)    Hypertensive retinopathy of both eyes    History of excision of epidermal inclusion cyst    Proliferative diabetic retinopathy of both eyes without macular edema associated with type 2 diabetes mellitus    Proliferative diabetic retinopathy, both eyes    Elevated PSA    Malignant hypertension    HLD (hyperlipidemia)    DM II (diabetes mellitus, type II), controlled    Essential hypertension    Hypothyroidism    GERD (gastroesophageal reflux disease)    Pseudoaneurysm of arteriovenous graft    Lumbar facet arthropathy    Renovascular hypertension, malignant    Lipoma    Type 2 diabetes mellitus with stage 5 chronic kidney disease and hypertension    ESRD (end stage renal disease)    Diabetes mellitus with ESRD (end-stage renal disease)    Lumbar spondylosis    Spinal stenosis of lumbar region    Bilateral low back pain    Complete heart block    Bicuspid aortic valve    AV block, complete    Typical angina    Fever    Coccygeal pain, acute    Sacrococcygeal pilonidal cyst    Second degree heart block    Vein stenosis    Weakness    Acute pain of left shoulder    Dependence on hemodialysis    Paroxysmal atrial fibrillation    Acute on chronic diastolic heart failure    Right lower quadrant abdominal pain    Hypoxia    Right femoral vein DVT    Anemia, chronic disease    Metabolic bone disease    Right leg pain    Hypervolemia    Pulmonary hypertension    Hyperkalemia    Protein-calorie malnutrition, severe    DVT (deep venous thrombosis)    DM type 2 with diabetic mixed hyperlipidemia    Diabetic polyneuropathy associated with type 2 diabetes mellitus    Chronic kidney disease-mineral and bone disorder    Depression    Alteration in skin integrity    Foot ulceration, right, with fat layer exposed     Septic encephalopathy    Infected skin ulcer with fat layer exposed    Unilateral AKA, right    PVD (peripheral vascular disease)    Foot ulceration, left, with unspecified severity    Gangrene of right foot    Palliative care encounter    Anasarca    Other hypoglycemia    Acute blood loss anemia    Anemia in chronic kidney disease, on chronic dialysis    Dietary folate deficiency anemia    Rectal bleeding       Review of patient's allergies indicates:  No Known Allergies    Outpatient Medications:  Current Facility-Administered Medications on File Prior to Visit   Medication Dose Route Frequency Provider Last Rate Last Dose    0.9%  NaCl infusion   Intravenous Continuous Arie Gaming  mL/hr at 03/22/18 0400      acetaminophen tablet 650 mg  650 mg Oral Q4H PRN Horacio Griffin MD        atorvastatin tablet 20 mg  20 mg Oral Daily Horacio Griffin MD        citalopram tablet 10 mg  10 mg Oral Daily Arie Gaming MD        dextrose 50% injection 12.5 g  12.5 g Intravenous PRN Arie Gaming MD   12.5 g at 03/22/18 0728    [START ON 3/23/2018] epoetin michael injection 5,000 Units  50 Units/kg Intravenous Every Mon, Wed, Fri Luther Dos Santos MD        glucagon (human recombinant) injection 1 mg  1 mg Intramuscular PRN Arie Gaming MD        hydrALAZINE injection 10 mg  10 mg Intravenous Q6H PRN Arie Gaming MD        influenza (FLUZONE HIGH-DOSE) vaccine 0.5 mL  0.5 mL Intramuscular vaccine x 1 dose Arie Gaming MD        insulin aspart U-100 pen 1-10 Units  1-10 Units Subcutaneous Q6H PRN Arie Gaming MD        [START ON 3/23/2018] midodrine tablet 10 mg  10 mg Oral Every Mon, Wed, Fri Horacio Griffin MD        morphine injection 4 mg  4 mg Intravenous Q4H PRN Horacio Griffin MD   4 mg at 03/21/18 2015    ondansetron injection 8 mg  8 mg Intravenous Q8H PRN Arie Gaming MD        oxyCODONE immediate release tablet 5  mg  5 mg Oral Q4H PRN Horacio Griffin MD        pantoprazole 40 mg in dextrose 5 % 100 mL infusion (ready to mix system)  40 mg Intravenous BID Horacio Griffin MD   40 mg at 03/22/18 0953    ramelteon tablet 8 mg  8 mg Oral Nightly PRN Horacio Griffin MD        sodium chloride 0.9% bolus 1,000 mL  1,000 mL Intravenous ED 1 Time Horacio Griffin MD        sodium chloride 0.9% flush 3 mL  3 mL Intravenous PRN Horacio Griffin MD         Current Outpatient Prescriptions on File Prior to Visit   Medication Sig Dispense Refill    ammonium lactate (LAC-HYDRIN) 12 % lotion Apply topically as needed for Dry Skin.      apixaban 5 mg Tab Take 1 tablet (5 mg total) by mouth 2 (two) times daily. 60 tablet 1    aspirin (ECOTRIN) 81 MG EC tablet Take 81 mg by mouth once daily.       atorvastatin (LIPITOR) 20 MG tablet Take 20 mg by mouth once daily.      cholecalciferol, vitamin D3, (VITAMIN D3) 2,000 unit Cap Take 1 capsule (2,000 Units total) by mouth once daily.      cinacalcet (SENSIPAR) 30 MG Tab Take 30 mg by mouth every evening.      citalopram (CELEXA) 10 MG tablet Take 10 mg by mouth once daily.      folic acid-vit B6-vit B12 2.5-25-2 mg (FOLBIC OR EQUIV) 2.5-25-2 mg Tab Take 1 tablet by mouth once daily.      gabapentin (NEURONTIN) 100 MG capsule Take 100 mg by mouth 3 (three) times daily.      hydrocodone-acetaminophen 10-325mg (NORCO)  mg Tab Take 1 tablet by mouth every 4 (four) hours as needed for Pain. 56 tablet 0    HYDROmorphone (DILAUDID) 2 MG tablet Orally take 1 tab QID x 7 days, the 1 tab TID x 7 days, the 1 tab BID x 7 days, then stop 28 tablet 0    midodrine (PROAMATINE) 10 MG tablet Take 1 tablet (10 mg total) by mouth every Mon, Wed, Fri. Give a 0900 or just prior to transport to dialysis 12 tablet 11    senna-docusate 8.6-50 mg (PERICOLACE) 8.6-50 mg per tablet Take 2 tablets by mouth 2 (two) times daily.      sevelamer carbonate (RENVELA) 800 mg Tab Take 2  tablets (1,600 mg total) by mouth 3 (three) times daily with meals. 180 tablet 1    sevelamer HCl (RENAGEL) 800 MG Tab Take 1 tablet (800 mg total) by mouth as needed (snack). 90 tablet 11    sodium phosphates (FLEET) 19-7 gram/118 mL Enem Place 1 enema rectally as needed (constipation).  0        Current Inpatient Medications:      Past Surgical History:   Procedure Laterality Date    CATARACT EXTRACTION      COLONOSCOPY N/A 6/23/2017    Procedure: COLONOSCOPY;  Surgeon: Vic Clark MD;  Location: [address]);  Service: Endoscopy;  Laterality: N/A;    RETINAL DETACHMENT SURGERY         Social History     Social History    Marital status: Single     Spouse name: N/A    Number of children: 3    Years of education: N/A     Occupational History    Not on file.     Social History Main Topics    Smoking status: Never Smoker    Smokeless tobacco: Never Used    Alcohol use No    Drug use: No    Sexual activity: No     Other Topics Concern    Not on file     Social History Narrative    Has lived with son for about 2 years now. 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       OBJECTIVE:   Weight:  Wt Readings from Last 4 Encounters:   03/22/18 99.7 kg (219 lb 12.8 oz)   03/02/18 104 kg (229 lb 4.5 oz)   01/12/18 106.2 kg (234 lb 2.1 oz)   09/14/17 108 kg (238 lb)       Vital Signs Range (Last 24H):  Temp:  [36.5 °C (97.7 °F)-37.1 °C (98.7 °F)]   Pulse:  [48-65]   Resp:  [14-18]   BP: ()/(48-73)   SpO2:  [85 %-100 %]       CBC:   Recent Labs      03/21/18   1755  03/22/18   0600   WBC  6.22  5.49   RBC  2.88*  2.61*   HGB  8.2*  7.3*   HCT  26.9*  24.1*   PLT  116*  SEE COMMENT   MCV  93  92   MCH  28.5  28.0   MCHC  30.5*  30.3*       CMP:   Recent Labs      03/21/18   1755  03/22/18   0600   NA  138  137   K  3.5  3.9   CL  100  101   CO2  31*  27   BUN  12  13   CREATININE  2.5*  2.7*   GLU  94  51*   MG  2.1   --    CALCIUM  8.0*  7.8*   ALBUMIN  2.1*   --    PROT   7.1   --    ALKPHOS  120   --    ALT  6*   --    AST  18   --    BILITOT  0.7   --        INR:  Recent Labs      18   1755   INR  1.3*   APTT  33.8*       Diagnostic Studies:  VAS US Arterial Leg 2018  Color flow duplex reveals trickle flow at the level of the right DPA, which is suggestive of a possible occlusion.  Dampened monophasic waveforms are noted throughout the PTA and MADELINE.  The remaining arteries appear to be patent with no evidence of a   hemodynamically significant stenosis and/or occlusion.    EK2018  Vent. Rate : 053 BPM     Atrial Rate : 044 BPM     P-R Int : 000 ms          QRS Dur : 102 ms      QT Int : 464 ms       P-R-T Axes : 000 -63 104 degrees     QTc Int : 435 ms    Poor data quality  Atrial fibrillation  Left axis deviation  Low voltage QRS  Cannot rule out Anterior infarct (cited on or before 2018)  Nonspecific ST-t wave abnormality  Abnormal ECG     2D Echo:  2017    1 - Eccentric hypertrophy.     2 - Mildly depressed left ventricular systolic function (EF 45-50%).     3 - Right ventricular enlargement with mildly depressed systolic function.     4 - Biatrial enlargement.     5 - Mild tricuspid regurgitation.     6 - Mild aortic stenosis, MARANDA = 1.4 cm2, peak velocity = 2.7 m/s, mean gradient = 15 mmHg.     7 - Pulmonary hypertension. The estimated PA systolic pressure is 55 mmHg.     8 - Increased central venous pressure.     Results for orders placed or performed during the hospital encounter of 17   2D echo with color flow doppler   Result Value Ref Range    EF 45 55 - 65    Aortic Valve Regurgitation TRIVIAL     Aortic Valve Stenosis MILD (A)     Est. PA Systolic Pressure 54.69 (A)     Mitral Valve Mobility NORMAL     Tricuspid Valve Regurgitation MILD          ASSESSMENT/PLAN:         Pre-op Assessment    I have reviewed the Patient Summary Reports.     I have reviewed the Nursing Notes.   I have reviewed the Medications.     Review of  Systems  Anesthesia Hx:  No problems with previous Anesthesia  History of prior surgery of interest to airway management or planning: Denies Family Hx of Anesthesia complications.   Denies Personal Hx of Anesthesia complications.   Social:  Non-Smoker, No Alcohol Use    Hematology/Oncology:     Oncology Normal    -- Anemia: Denies Current/Recent Cancer   EENT/Dental:  EENT/Dental Normal denies chronic allergic rhinitis    Cardiovascular:   Hypertension, poorly controlled Denies MI.  Denies CAD.    Denies CABG/stent. Dysrhythmias atrial fibrillation Angina    Pulmonary:   Denies Pneumonia Asthma Denies Shortness of breath.  Denies Recent URI.    Renal/:   Chronic Renal Disease, ESRD    Hepatic/GI:   Denies PUD. Denies Hiatal Hernia. GERD    Musculoskeletal:   Arthritis     Neurological:   Denies TIA. Denies CVA. Neuromuscular Disease,  Denies Seizures.   Peripheral Neuropathy    Endocrine:   Diabetes, poorly controlled, type 2 Hypothyroidism    Psych:   Psychiatric History depression          Physical Exam  General:  Obesity, Well nourished    Airway/Jaw/Neck:  Airway Findings: Mouth Opening: Normal Tongue: Normal  General Airway Assessment: Adult  Mallampati: III  Improves to III with phonation.  TM Distance: Normal, at least 6 cm  Jaw/Neck Findings:  Neck ROM: Normal ROM  Neck Findings: Normal    Eyes/Ears/Nose:  EYES/EARS/NOSE FINDINGS: Normal   Dental:  Dental Findings: Periodontal disease, Severe    Chest/Lungs:  Chest/Lungs Findings: Normal Respiratory Rate, Clear to auscultation     Heart/Vascular:  Heart Findings: Rate: Bradycardia  Rhythm: Regular Rhythm  Heart Murmur  Systolic  Systolic Heart Murmur Description: R Upper Sternal Border  Systolic Heart Murmur Grade: Grade II  Vascular Findings:  Dialysis Access: Left Forearm Graft     Abdomen:  Abdomen Findings: Normal    Musculoskeletal:  Musculoskeletal Findings:    Skin:  Skin Findings: Normal    Mental Status:  Mental Status Findings:  Cooperative,  Alert and Oriented         Anesthesia Plan  Type of Anesthesia, risks & benefits discussed:  Anesthesia Type:  general, MAC  Patient's Preference:   Intra-op Monitoring Plan: standard ASA monitors  Intra-op Monitoring Plan Comments:   Post Op Pain Control Plan: multimodal analgesia, per primary service following discharge from PACU and IV/PO Opioids PRN  Post Op Pain Control Plan Comments:   Induction:   IV  Beta Blocker:  Patient is not currently on a Beta-Blocker (No further documentation required).       Informed Consent: Patient representative understands risks and agrees with Anesthesia plan.  Questions answered. Anesthesia consent signed with patient representative.  ASA Score: 4     Day of Surgery Review of History & Physical: I have interviewed and examined the patient. I have reviewed the patient's H&P dated:  There are no significant changes.  H&P update referred to the surgeon.         Ready For Surgery From Anesthesia Perspective.

## 2018-03-22 NOTE — ED NOTES
Pt incontinent of bowel moderate amount. Silvino melena noted in bowel. MD aware. Pt hygiene performed, brief and linens changed. Pt tolerated well. No further needs identified at this time. Call light in reach.

## 2018-03-22 NOTE — ED PROVIDER NOTES
Encounter Date: 3/21/2018    SCRIBE #1 NOTE: I, Waqas Shannan, am scribing for, and in the presence of, Horacio Griffin MD. Other sections scribed: HPI, ROS, PE.       History     Chief Complaint   Patient presents with    Rectal Bleeding     Rectal Bleeding x 2 days w/ pain.       CC: Rectal Bleeding  HPI: This 67 y.o. male with Hx of DM type II w/ polyneuropathy and ESRD on HD, anemia, HTN, HLD, chronic back pain, CHF, PVD, chronic A-Fib, pulmonary HTN, anticoagulation on Eliquis for treatment of DVT, AKA of RLE presents to the ED via EMS severe sharp stabbing rectal pain that developed 2 weeks ago. He states that his group home nurses have been applying a cream to the area, but has not seemed to help. He giovanna pain with specifically with his BMs, rather he feels pain with build up what feels like is gas in his bowels. He states that the nurses noticed blood in his stool 2 days ago, but he does not know what color it has been. Pt reports Hx of rectal bleeding I a few years ago, but he never sought medical evaluation to investigate it. Pt also c/o intermittent SOB for the past 3 weeks. He denies fever, chills, N/V/D, abdominal pain, chest pain, weakness, dizziness.    Per medical records, pt had colonoscopy done July of 2017 which revealed multiple polyps, which were removed, as well a diverticulosis w/o evidence of diverticulitis.      The history is provided by the patient and medical records.     Review of patient's allergies indicates:  No Known Allergies  Past Medical History:   Diagnosis Date    Acute pain of left shoulder 1/7/2017    Arthritis     pt states he does not have hx of Arthritis    Asthma     pt states he does not have hx of Asthma    Benign neoplasm of eyelid     RUL    Blood clotting tendency     Blood transfusion     Cataract     Chronic kidney disease requiring chronic dialysis     Diabetes mellitus     Diabetic retinopathy     Fever blister     Hyperlipidemia     Hypertension      Infertility     Joint pain     Retinal detachment     Thyroid disease     Weakness 1/7/2017     Past Surgical History:   Procedure Laterality Date    CATARACT EXTRACTION      COLONOSCOPY N/A 6/23/2017    Procedure: COLONOSCOPY;  Surgeon: Vic Clark MD;  Location: [address];  Service: Endoscopy;  Laterality: N/A;    RETINAL DETACHMENT SURGERY       Family History   Problem Relation Age of Onset    Heart attack Mother     No Known Problems Father     No Known Problems Sister     No Known Problems Brother     No Known Problems Maternal Aunt     No Known Problems Maternal Uncle     No Known Problems Paternal Aunt     No Known Problems Paternal Uncle     No Known Problems Maternal Grandmother     No Known Problems Maternal Grandfather     No Known Problems Paternal Grandmother     No Known Problems Paternal Grandfather     Depression Child     Cancer Neg Hx     Amblyopia Neg Hx     Blindness Neg Hx     Cataracts Neg Hx     Diabetes Neg Hx     Glaucoma Neg Hx     Hypertension Neg Hx     Macular degeneration Neg Hx     Retinal detachment Neg Hx     Strabismus Neg Hx     Stroke Neg Hx     Thyroid disease Neg Hx      Social History   Substance Use Topics    Smoking status: Never Smoker    Smokeless tobacco: Never Used    Alcohol use No     Review of Systems   Constitutional: Negative for chills and fever.   HENT: Negative for rhinorrhea and sore throat.    Eyes: Negative for pain and visual disturbance.   Respiratory: Positive for shortness of breath. Negative for cough.    Cardiovascular: Negative for chest pain.   Gastrointestinal: Positive for blood in stool. Negative for abdominal pain, diarrhea, nausea and vomiting.   Genitourinary: Negative for dysuria and flank pain.   Musculoskeletal: Negative for arthralgias and myalgias.   Skin: Positive for wound (LLE, sacral area). Negative for rash.   Neurological: Negative for dizziness and headaches.   Hematological:  Bruises/bleeds easily (Eliquis).       Physical Exam     Initial Vitals [03/21/18 1739]   BP Pulse Resp Temp SpO2   (!) 148/73 64 14 98.2 °F (36.8 °C) 99 %      MAP       98         Physical Exam    Nursing note and vitals reviewed.  Constitutional: He is not diaphoretic. No distress.   HENT:   Head: Normocephalic and atraumatic.   Mouth/Throat: Oropharynx is clear and moist and mucous membranes are normal.   Eyes: Conjunctivae are normal.   Neck: Normal range of motion.   Cardiovascular: Normal rate, regular rhythm and intact distal pulses.   Pulmonary/Chest: Breath sounds normal. No respiratory distress.   Abdominal: Soft. Bowel sounds are normal. He exhibits no distension. There is no tenderness.   Genitourinary: Rectal exam shows guaiac positive stool. Guaiac positive stool. : Acceptable.  Genitourinary Comments: Silvino blood per rectum. No external hemorrhoids.   Musculoskeletal:   RLE AKA.   Neurological: He is alert.   Skin: Skin is warm and dry. Capillary refill takes less than 2 seconds.   0.5 x 0.3 cm Stage II decubitus ulcer w/ surrounding area of healing scar tissue. Bandaged chronic wound to medial LLE.   Psychiatric: Thought content normal.         ED Course   Procedures  Labs Reviewed   CBC W/ AUTO DIFFERENTIAL - Abnormal; Notable for the following:        Result Value    RBC 2.88 (*)     Hemoglobin 8.2 (*)     Hematocrit 26.9 (*)     MCHC 30.5 (*)     RDW 16.6 (*)     Platelets 116 (*)     All other components within normal limits   PROTIME-INR - Abnormal; Notable for the following:     Prothrombin Time 13.9 (*)     INR 1.3 (*)     All other components within normal limits   APTT - Abnormal; Notable for the following:     aPTT 33.8 (*)     All other components within normal limits   COMPREHENSIVE METABOLIC PANEL - Abnormal; Notable for the following:     CO2 31 (*)     Creatinine 2.5 (*)     Calcium 8.0 (*)     Albumin 2.1 (*)     ALT 6 (*)     Anion Gap 7 (*)     eGFR if   American 29 (*)     eGFR if non  25 (*)     All other components within normal limits   MAGNESIUM   POCT GLUCOSE   POCT GLUCOSE             Medical Decision Making:   Initial Assessment:   67-year-old male with history of chronic renal disease presents with bright red blood per rectum.  Hemoglobin of 8 which is near his baseline.  On exam, the patient has a large bloody stool.  Renal function somewhat improved recently, poor but at baseline.  Differential includes lower GI bleed, colon cancer, less likely mesenteric ischemia or bowel obstruction.  Bleeding felt most likely to be lower GI in nature though peptic ulcer disease not excluded entirely.  Vitals stable.  Patient to be be admitted for further evaluation and management including GI assessment.  Type and screen sent, fluid bolus initiated.            Scribe Attestation:   Scribe #1: I performed the above scribed service and the documentation accurately describes the services I performed. I attest to the accuracy of the note.    Attending Attestation:           Physician Attestation for Scribe:  Physician Attestation Statement for Scribe #1: I, Horacio Griffin MD, reviewed documentation, as scribed by Waqas Campbell in my presence, and it is both accurate and complete.                    Clinical Impression:   The encounter diagnosis was Rectal bleeding.    Disposition:   Disposition: Admitted  Condition: Stable                        Horacio Griffin MD  04/03/18 1940

## 2018-03-23 VITALS
HEIGHT: 68 IN | WEIGHT: 219.81 LBS | SYSTOLIC BLOOD PRESSURE: 138 MMHG | OXYGEN SATURATION: 97 % | BODY MASS INDEX: 33.31 KG/M2 | DIASTOLIC BLOOD PRESSURE: 90 MMHG | HEART RATE: 73 BPM | TEMPERATURE: 98 F | RESPIRATION RATE: 16 BRPM

## 2018-03-23 LAB
BASOPHILS # BLD AUTO: 0.06 K/UL
BASOPHILS NFR BLD: 1 %
DIFFERENTIAL METHOD: ABNORMAL
EOSINOPHIL # BLD AUTO: 0.2 K/UL
EOSINOPHIL NFR BLD: 3.4 %
ERYTHROCYTE [DISTWIDTH] IN BLOOD BY AUTOMATED COUNT: 16.3 %
HCT VFR BLD AUTO: 24.7 %
HGB BLD-MCNC: 7.4 G/DL
LYMPHOCYTES # BLD AUTO: 2.2 K/UL
LYMPHOCYTES NFR BLD: 36.1 %
MAGNESIUM SERPL-MCNC: 2.2 MG/DL
MCH RBC QN AUTO: 28.7 PG
MCHC RBC AUTO-ENTMCNC: 30 G/DL
MCV RBC AUTO: 96 FL
MONOCYTES # BLD AUTO: 1 K/UL
MONOCYTES NFR BLD: 16.3 %
NEUTROPHILS # BLD AUTO: 2.7 K/UL
NEUTROPHILS NFR BLD: 43 %
PHOSPHATE SERPL-MCNC: 3.7 MG/DL
PLATELET # BLD AUTO: 114 K/UL
PMV BLD AUTO: 9.6 FL
POCT GLUCOSE: 56 MG/DL (ref 70–110)
POCT GLUCOSE: 63 MG/DL (ref 70–110)
POCT GLUCOSE: 75 MG/DL (ref 70–110)
POCT GLUCOSE: 85 MG/DL (ref 70–110)
RBC # BLD AUTO: 2.58 M/UL
WBC # BLD AUTO: 6.15 K/UL

## 2018-03-23 PROCEDURE — 97161 PT EVAL LOW COMPLEX 20 MIN: CPT

## 2018-03-23 PROCEDURE — 25000003 PHARM REV CODE 250: Performed by: HOSPITALIST

## 2018-03-23 PROCEDURE — G8989 SELF CARE D/C STATUS: HCPCS | Mod: CK

## 2018-03-23 PROCEDURE — 27000221 HC OXYGEN, UP TO 24 HOURS

## 2018-03-23 PROCEDURE — G8978 MOBILITY CURRENT STATUS: HCPCS | Mod: CM

## 2018-03-23 PROCEDURE — 83735 ASSAY OF MAGNESIUM: CPT

## 2018-03-23 PROCEDURE — 94761 N-INVAS EAR/PLS OXIMETRY MLT: CPT

## 2018-03-23 PROCEDURE — 80100016 HC MAINTENANCE HEMODIALYSIS

## 2018-03-23 PROCEDURE — C9113 INJ PANTOPRAZOLE SODIUM, VIA: HCPCS | Performed by: EMERGENCY MEDICINE

## 2018-03-23 PROCEDURE — G8988 SELF CARE GOAL STATUS: HCPCS | Mod: CJ

## 2018-03-23 PROCEDURE — 25000003 PHARM REV CODE 250: Performed by: INTERNAL MEDICINE

## 2018-03-23 PROCEDURE — 85025 COMPLETE CBC W/AUTO DIFF WBC: CPT

## 2018-03-23 PROCEDURE — G8987 SELF CARE CURRENT STATUS: HCPCS | Mod: CK

## 2018-03-23 PROCEDURE — G8979 MOBILITY GOAL STATUS: HCPCS | Mod: CL

## 2018-03-23 PROCEDURE — 84100 ASSAY OF PHOSPHORUS: CPT

## 2018-03-23 PROCEDURE — 63600175 PHARM REV CODE 636 W HCPCS: Performed by: EMERGENCY MEDICINE

## 2018-03-23 PROCEDURE — 97166 OT EVAL MOD COMPLEX 45 MIN: CPT

## 2018-03-23 PROCEDURE — 36415 COLL VENOUS BLD VENIPUNCTURE: CPT

## 2018-03-23 PROCEDURE — 97110 THERAPEUTIC EXERCISES: CPT

## 2018-03-23 PROCEDURE — G8980 MOBILITY D/C STATUS: HCPCS | Mod: CM

## 2018-03-23 PROCEDURE — 25000003 PHARM REV CODE 250: Performed by: EMERGENCY MEDICINE

## 2018-03-23 RX ORDER — SODIUM CHLORIDE 9 MG/ML
INJECTION, SOLUTION INTRAVENOUS
Status: DISCONTINUED | OUTPATIENT
Start: 2018-03-23 | End: 2018-03-23 | Stop reason: HOSPADM

## 2018-03-23 RX ORDER — HYDROCODONE BITARTRATE AND ACETAMINOPHEN 10; 325 MG/1; MG/1
1 TABLET ORAL EVERY 8 HOURS PRN
Qty: 15 TABLET | Refills: 0 | Status: SHIPPED | OUTPATIENT
Start: 2018-03-23 | End: 2018-04-04 | Stop reason: SDUPTHER

## 2018-03-23 RX ADMIN — POLYETHYLENE GLYCOL 3350 17 G: 17 POWDER, FOR SOLUTION ORAL at 08:03

## 2018-03-23 RX ADMIN — MIDODRINE HYDROCHLORIDE 10 MG: 5 TABLET ORAL at 05:03

## 2018-03-23 RX ADMIN — ATORVASTATIN CALCIUM 20 MG: 10 TABLET, FILM COATED ORAL at 08:03

## 2018-03-23 RX ADMIN — OXYCODONE HYDROCHLORIDE 5 MG: 5 TABLET ORAL at 10:03

## 2018-03-23 RX ADMIN — CITALOPRAM HYDROBROMIDE 10 MG: 10 TABLET ORAL at 08:03

## 2018-03-23 RX ADMIN — DEXTROSE 40 MG: 50 INJECTION, SOLUTION INTRAVENOUS at 08:03

## 2018-03-23 RX ADMIN — OXYCODONE HYDROCHLORIDE 5 MG: 5 TABLET ORAL at 05:03

## 2018-03-23 NOTE — NURSING
Report called Vivian at [facility] . Pt going to [facility]. Saline lock removed, gauze and tape at site. No apparent distress noted. Pt. awaiting transportation to nursing home via [facility].

## 2018-03-23 NOTE — ANESTHESIA POSTPROCEDURE EVALUATION
"Anesthesia Post Evaluation    Patient: Wilder Carbajal    Procedure(s) Performed: Procedure(s) (LRB):  SIGMOIDOSCOPY-FLEXIBLE (N/A)    Final Anesthesia Type: general  Patient location during evaluation: GI PACU  Patient participation: Yes- Able to Participate  Level of consciousness: awake and alert, oriented and awake  Post-procedure vital signs: reviewed and stable  Airway patency: patent  PONV status at discharge: No PONV  Anesthetic complications: no      Cardiovascular status: blood pressure returned to baseline  Respiratory status: unassisted, spontaneous ventilation and room air  Hydration status: euvolemic  Follow-up not needed.        Visit Vitals  /66   Pulse (!) 55   Temp 36.9 °C (98.4 °F) (Oral)   Resp 16   Ht 5' 8" (1.727 m)   Wt 99.7 kg (219 lb 12.8 oz)   SpO2 97%   BMI 33.42 kg/m²       Pain/Ritesh Score: Pain Assessment Performed: Yes (3/23/2018 10:45 AM)  Presence of Pain: denies (3/23/2018 10:45 AM)  Pain Rating Prior to Med Admin: 6 (3/23/2018 10:18 AM)  Ritesh Score: 10 (3/22/2018 11:59 AM)      "

## 2018-03-23 NOTE — CONSULTS
Renal Progress Note    Date of Admission:  3/21/2018  5:44 PM    Review of Systems: asleep      Physical Exam:    Vitals:    03/23/18 0745 03/23/18 1045 03/23/18 1100 03/23/18 1130   BP: 129/70 (!) 112/53 (!) 111/53 121/61   BP Location: Left arm Right arm     Patient Position: Lying Lying     Pulse: (!) 114 (!) 53 (!) 51 (!) 51   Resp: 16 16     Temp: 98.6 °F (37 °C) 98.4 °F (36.9 °C)     TempSrc: Oral Oral     SpO2: 97%      Weight:       Height:         I/O last 3 completed shifts:  In: 2476.3 [P.O.:435; I.V.:1041.3; IV Piggyback:1000]  Out: -   No intake/output data recorded.    General: no distress  HENT: n/a  Lungs: clear to auscultation bilaterally  Heart: regular rate and rhythm without rub  Abdomen: soft, non-tender  Extremities: no edema  Neurologic: asleep  Laboratories:  WBC                6.22 5.49  WBC      RBC                2.88 2.61  RBC     Hemoglobin                8.2 7.3  Hemoglobin      Sodium                138 137  Sodium      Potassium                3.5 3.9  Potassium     Chloride                100 101  Chloride     CO2                31 27  CO2     Anion Gap                7 9  Anion Gap     BUN, Bld                12 13  BUN, Bld     Creatinine                2.5 2.7  Creatinine        Assessment:    66 y/o male with ESRD on HD Q MWF admitted with:    - Rectal bleed  - Anemia w/ GIB  - DM-2  - HTN    Plan:    - Dialysis in a.m.   - Epogen w/ HD  - f/u H&H  - Discharge in progress as per admitting

## 2018-03-23 NOTE — NURSING
AVS and transportation packet given to [facility] paramedics. Patient escorted by stretcher to ambulance for discharge to [facility]. Patient accompanied by [facility] staff.  No apparent distress noted.

## 2018-03-23 NOTE — CONSULTS
DERIK contacted [facility] @ [phone] to schedule GI follow-up, DERIK spoke to Kamini, patient will see Dr. Anthony on 4/5/18 @ 2:30pm. DERIK provided appointment information to [facility], DERIK reported patient need to go on scheduled appointment.

## 2018-03-23 NOTE — NURSING
Patient to Dialysis as ordered. Patient awake, alert, oriented without c/o discomfort at this time. No apparent distress noted.

## 2018-03-23 NOTE — PLAN OF CARE
Problem: Pressure Ulcer Risk (Jasmeet Scale) (Adult,Obstetrics,Pediatric)  Intervention: Prevent/Manage Excess Moisture   03/23/18 0438 03/23/18 0500   Hygiene Care   Perineal Care absorbent pad changed;protective cream applied --    Bathing/Skin Care --  bath, complete;linen changed       Goal: Skin Integrity  Patient will demonstrate the desired outcomes by discharge/transition of care.    03/23/18 0767   Pressure Ulcer Risk (Jasmeet Scale) (Adult,Obstetrics,Pediatric)   Skin Integrity making progress toward outcome       Comments: No new breaks to skin. Encouraged to eat snacks in early morning hours. BM was soft without blood. Rectal pain better, able to rest comfortably during night. Call light at side. Leg and stump elevated throughout night.

## 2018-03-23 NOTE — PLAN OF CARE
[facility]     Department of Hospital Medicine     [address] [place] [address]     [phone] after hours  [phone] fax       NURSING HOME ORDERS    03/23/2018    Admit to Nursing Home:  Regular Bed                                                Diagnoses:  Active Hospital Problems    Diagnosis  POA    *Rectal bleeding [K62.5]  Yes    Acute blood loss anemia [D62]  Yes    Unilateral AKA, right [Z89.611]  Not Applicable    Right femoral vein DVT [I82.411]  Yes    Paroxysmal atrial fibrillation [I48.0]  Yes    Type 2 diabetes mellitus with stage 5 chronic kidney disease and hypertension [E11.22, I12.0, N18.5]  Yes    ESRD (end stage renal disease) [N18.6]  Yes    HLD (hyperlipidemia) [E78.5]  Yes    DM II (diabetes mellitus, type II), controlled [E11.9]  Yes    Essential hypertension [I10]  Yes    Hypothyroidism [E03.9]  Yes    GERD (gastroesophageal reflux disease) [K21.9]  Yes      Resolved Hospital Problems    Diagnosis Date Resolved POA   No resolved problems to display.       Patient is homebound due to:  Rectal bleeding    Allergies:Review of patient's allergies indicates:  No Known Allergies    Vitals:       Routine, once monthly     Once weekly     Every shift (Skilled Nursing patients)    Diet: ADA 2000 anoop     Acitivities:    - Up in a chair each morning as tolerated       - Weight bearing: as tolerated.    LABS:  Per facility protocol    Nursing Precautions:    - Aspiration precautions:                       -  Upright 90 degrees befor during and after meals         - Fall precautions per nursing home protocol     - Decubitus precautions:        -  for positioning   - Pressure reducing foam mattress   - Turn patient every two hours. Use wedge pillows to anchor patient        MISCELLANEOUS CARE:                 Routine Skin for Bedridden Patients:  Apply moisture barrier cream to all    skin folds and wet areas in perineal area  daily and after baths and                           all bowel movements.                              DIABETES CARE:       Check blood sugar:      Fingerstick blood sugar a.m and p.m.    Fingerstick blood sugar AC and HS   Fingerstick blood sugar every 6 hours if unable to eat      Report CBG < 60 or > 400 to physician.                                          Insulin Sliding Scale          Glucose  Novolog Insulin Subcutaneous        0 - 60   Orange juice or glucose tablet, hold insulin      No insulin   201-250  2 units   251-300  4 units   301-350  6 units   351-400  8 units   >400   10 units then call physician      Medications: Discontinue all previous medication orders, if any. See new list below.     Wilder Carbajal   Home Medication Instructions MICHI:[id number]    Printed on:03/23/18 1056   Medication Information                      ammonium lactate (LAC-HYDRIN) 12 % lotion  Apply topically as needed for Dry Skin.             aspirin (ECOTRIN) 81 MG EC tablet  Take 81 mg by mouth once daily.              atorvastatin (LIPITOR) 20 MG tablet  Take 20 mg by mouth once daily.             cholecalciferol, vitamin D3, (VITAMIN D3) 2,000 unit Cap  Take 1 capsule (2,000 Units total) by mouth once daily.             cinacalcet (SENSIPAR) 30 MG Tab  Take 30 mg by mouth every evening.             citalopram (CELEXA) 10 MG tablet  Take 10 mg by mouth once daily.             folic acid-vit B6-vit B12 2.5-25-2 mg (FOLBIC OR EQUIV) 2.5-25-2 mg Tab  Take 1 tablet by mouth once daily.             gabapentin (NEURONTIN) 100 MG capsule  Take 100 mg by mouth 3 (three) times daily.             hydrocodone-acetaminophen 10-325mg (NORCO)  mg Tab  Take 1 tablet by mouth every 8 (four) hours as needed for Pain.             midodrine (PROAMATINE) 10 MG tablet  Take 1 tablet (10 mg total) by mouth every Mon, Wed, Fri. Give a 0900 or just prior to transport to dialysis             senna-docusate 8.6-50 mg (PERICOLACE)  8.6-50 mg per tablet  Take 2 tablets by mouth 2 (two) times daily.             sevelamer carbonate (RENVELA) 800 mg Tab  Take 2 tablets (1,600 mg total) by mouth 3 (three) times daily with meals.             sevelamer HCl (RENAGEL) 800 MG Tab  Take 1 tablet (800 mg total) by mouth as needed (snack).             sodium phosphates (FLEET) 19-7 gram/118 mL Enem  Place 1 enema rectally as needed (constipation).                       _________________________________  Niranjan Hess MD  03/23/2018

## 2018-03-23 NOTE — NURSING
Patient returned to room from scheduled procedure. Patient awake, alert, oriented without c/o discomfort. No apparent distress noted at this time.

## 2018-03-23 NOTE — PROGRESS NOTES
9:38- TN contacted patient's son Raymond to inquire of patient's place of residence prior to his hospitalization. Raymond stated that the patient resides with him. He stated that they are one family. Raymond seemed to be unaware that the patient was in the hospital. Conversation was very bizarre.     9:45- TN met with patient. Patient stated that he lives at [facility]. Patient informed of the conversation with his son Raymond. Patient instructed TN to speak with someone sensible. Patient provided TN with his brother Dillan's contact number.    9:50- TN attempted to contact [facility] to inquire of patient's residence there. Nurse not available, therefore; TN contacted patient's brother Dillan. Dillan stated that the patient does reside at [facility] and will transition to [facility] after therapy.

## 2018-03-23 NOTE — PROGRESS NOTES
DERIK uploaded clinicals via [facility] to [facility] to process discharge enclosed: face sheet, H&P, MD notes, NH orders, labs, and MARS.

## 2018-03-23 NOTE — PLAN OF CARE
Juan Daniel (admissions coordinator) contacted DERIK with call report information (room# 31B). DERIK provided nurse Cori with call report information, nurse reported patient is still in dialysis. DERIK reported she will schedule ambulance for 3:30pm. DERIK contacted [facility] @ [phone] and spoke to Tom to request for stretcher and portable oxygen, ETA will be 3:30pm.          03/23/18 5486   Final Note   Assessment Type Final Discharge Note   Discharge Disposition Nurse  ([facility] Jang)   Hospital Follow Up  Appt(s) scheduled? Yes   Right Care Referral Info   Post Acute Recommendation Other

## 2018-03-23 NOTE — HOSPITAL COURSE
Wilder Carbajal is a 67 y.o. male that (in part)  has a past medical history of Acute pain of left shoulder; Arthritis; Asthma; Benign neoplasm of eyelid; Blood clotting tendency; Blood transfusion; Cataract; Chronic kidney disease requiring chronic dialysis; Diabetes mellitus; Diabetic retinopathy; Fever blister; Hyperlipidemia; Hypertension; Infertility; Joint pain; Retinal detachment; Thyroid disease; and Weakness. Presents to [facility] complaining of rectal bleeding.  He had rectal pain 2 weeks ago but developed acute onset of the bleeding 2 days ago associated with pain.  The blood was first noted by his nurse.  Characterized as moderate rectal bleeding.  Patient is taking chronic anticoagulation for history of chronic right femoral DVT as well as paroxysmal atrial fibrillation.  Located in his anus.  No radiation.  Likely exacerbated by medications.  No relieving factors.  Frequency of one episode.  The patient had a colonoscopy July of 2017 which revealed multiple polyps, which were removed, as well a diverticulosis without evidence of diverticulitis.    In the emergency department initial rectal exam was grossly positive for blood in the rectal vault.  Anticoagulant medications were held.  Routine laboratory studies including CBC was obtained.  Hemoglobin was stable compared to the last 3 values this month.he was monitored closely with daily CBC ,GI was consulted and patient had sigmoidoscopy,which was poor preparation,but no major bleeding has been seen,he had initially some drop in H/H,but next day had slight improvement,did not require PRBC,no more hematochezia has been seen,he want discontinue taking OAC,he understand associated risk with CVA,he will follow with GI as out patient.  He had his routine HD by nephrology while was hosptilaized.

## 2018-03-23 NOTE — PT/OT/SLP EVAL
Occupational Therapy   Evaluation    Name: Wilder Carbajal  MRN: [id number]  Admitting Diagnosis:  Rectal bleeding 1 Day Post-Op    Recommendations:     Discharge Recommendations:  (return to SNF)  Discharge Equipment Recommendations:  none  Barriers to discharge:  None    History:     Occupational Profile:  Living Environment: The patient was admitted from [facility] with s/p right AKA.  Previous level of function: Per patient, he was transferring to a W/C via a lift.  Roles and Routines: The patient was receiving PT/OT at [facility]  Equipment Owned:   (Patient was adm from [facility])  Assistance upon Discharge: SNF, has a son and a brother    Past Medical History:   Diagnosis Date    Acute pain of left shoulder 1/7/2017    Arthritis     pt states he does not have hx of Arthritis    Asthma     pt states he does not have hx of Asthma    Benign neoplasm of eyelid     RUL    Blood clotting tendency     Blood transfusion     Cataract     Chronic kidney disease requiring chronic dialysis     Diabetes mellitus     Diabetic retinopathy     Fever blister     Hyperlipidemia     Hypertension     Infertility     Joint pain     Retinal detachment     Thyroid disease     Weakness 1/7/2017       Past Surgical History:   Procedure Laterality Date    CATARACT EXTRACTION      COLONOSCOPY N/A 6/23/2017    Procedure: COLONOSCOPY;  Surgeon: Vic Clark MD;  Location: [facility] ([address] [place] [address]);  Service: Endoscopy;  Laterality: N/A;    RETINAL DETACHMENT SURGERY         Subjective     Chief Complaint: his back is itchy  Patient/Family stated goals: wants to be able to transfer to a W/C without a lift  Communicated with: Lori cabrales prior to session.  Pain/Comfort:  · Pain Rating 1:  (yes-did not rate)  · Location - Side 1: Left  · Location 1: leg  · Pain Addressed 1: Cessation of Activity, Reposition    Patients cultural, spiritual, L[employer] day conflicts given the current situation: none    Objective:     Patient found with:  peripheral IV, telemetry    General Precautions: Standard, fall, diabetic   Orthopedic Precautions:RLE non weight bearing (R AKA)   Braces: N/A     Occupational Performance:    Bed Mobility:    · Patient completed Rolling/Turning to Left with  moderate assistance  · Patient completed Rolling/Turning to Right with moderate assistance  · Patient completed Scooting with maximal assistance, Bridging with min asssist  · Patient completed Supine to Sit with maximal assistance  · Patient completed Sit to Supine with moderate assistance    Fun ctional Mobility/Transfers:    · Functional Mobility: Patient was unable to stand    Activities of Daily Living:  · UB Dressing: maximal assistance    · LB Dressing: dependence to don the left sock    Cognitive/Visual Perceptual:  Cognitive/Psychosocial Skills:     -       Oriented to: Person, Place and Situation   -       Follows Commands/attention:Follows two-step commands  -       Communication: clear/fluent  -       Memory: No Deficits noted  -       Safety awareness/insight to disability: impaired   -       Mood/Affect/Coping skills/emotional control: Appropriate to situation  Visual/Perceptual:      -Intact    Physical Exam:  Balance:    Postural examination/scapula alignment:    -       Rounded shoulders  -       Forward head  Skin integrity: staples present in the right AKA, left lower leg wrapped with dressing, sacral decub covered by dressing  Edema:  present in trunk  Sensation:    -       Intact  Upper Extremity Range of Motion:     -       Right Upper Extremity: WFL  -       Left Upper Extremity: WFL  Upper Extremity Strength:    -       Right Upper Extremity: WFL  -       Left Upper Extremity: WFL   Strength:    -       Right Upper Extremity: WFL  -       Left Upper Extremity: WFL    Patient left right sidelying with use of wedge with all lines intact and call button in reach    AMPA 6 Click:  [facility] Total Score: 16    Treatment & Education:  The patient tolerated OT  "eval. The patient was educated re: OT role and POC. The patient tolerated sitting on the EOB ~20 min with SBA.  Education:    Assessment:     Wilder Carbajal is a 67 y.o. male with a medical diagnosis of Rectal bleeding.    Performance deficits affecting function are weakness, impaired endurance, impaired self care skills, impaired sensation, impaired balance, gait instability, impaired functional mobilty, decreased upper extremity function, decreased lower extremity function, decreased ROM, edema, orthopedic precautions, impaired skin, pain, decreased safety awareness, impaired cardiopulmonary response to activity.      Rehab Prognosis:  fair; patient would benefit from acute skilled OT services to address these deficits and reach maximum level of function.         Clinical Decision Makin.  OT Mod:  "Pt evaluation falls under moderate complexity for evaluation coding due to identification of 3-5 performance deficits noted as stated above. Eval required Min/Mod assistance to complete on this date and detailed assessment(s) were utilized. Moreover, an expanded review of history and occupational profile obtained with additional review of cognitive, physical and psychosocial hx."     Plan:     Patient to be seen 5 x/week to address the above listed problems via self-care/home management, therapeutic activities, therapeutic exercises  · Plan of Care Expires: 18  · Plan of Care Reviewed with: patient    This Plan of care has been discussed with the patient who was involved in its development and understands and is in agreement with the identified goals and treatment plan    GOALS:    Occupational Therapy Goals        Problem: Occupational Therapy Goal    Goal Priority Disciplines Outcome Interventions   Occupational Therapy Goal     OT, PT/OT Ongoing (interventions implemented as appropriate)    Description:  Goals to be met by: 18    Patient will increase functional independence with ADLs by " performing:    UE Dressing with Contact Guard Assistance.  Grooming while seated with Stand-by Assistance.  Rolling to Bilateral with Contact Guard Assistance.   Supine to sit with Contact Guard Assistance.  Stand pivot transfers with Moderate Assistance.  Upper extremity exercise program x10 reps per handout, with assistance as needed.                      Time Tracking:     OT Date of Treatment: 03/23/18  OT Start Time: 0939  OT Stop Time: 1010  OT Total Time (min): 31 min    Billable Minutes:Evaluation 20 (co-eval with PT)  Total Time 31    Emilygrant Hendrix OT  3/23/2018

## 2018-03-23 NOTE — PT/OT/SLP EVAL
Physical Therapy Evaluation    Patient Name:  Wilder Carbajal   MRN:  [id number]    Recommendations:     Discharge Recommendations:  nursing facility, skilled   Discharge Equipment Recommendations:  (defer to SNF)   Barriers to discharge: None    Assessment:     Wilder Carbajal is a 67 y.o. male admitted with a medical diagnosis of Rectal bleeding.  He presents with the following impairments/functional limitations:  weakness, impaired endurance, impaired self care skills, impaired functional mobilty, impaired sensation, impaired balance, decreased coordination, decreased upper extremity function, decreased lower extremity function, decreased safety awareness, pain, decreased ROM, impaired coordination, impaired fine motor, impaired skin, edema, orthopedic precautions, impaired muscle length, impaired joint extensibility .    Rehab Prognosis:  Good; patient would benefit from acute skilled PT services to address these deficits and reach maximum level of function.      Recent Surgery: Procedure(s) (LRB):  SIGMOIDOSCOPY-FLEXIBLE (N/A) 1 Day Post-Op    Plan:     During this hospitalization, patient to be seen 5 x/week to address the above listed problems via gait training, therapeutic activities, therapeutic exercises  · Plan of Care Expires:  04/06/18   Plan of Care Reviewed with: patient    Subjective     Communicated with nsg prior to session.  Patient found L SL upon PT entry to room, agreeable to evaluation.      Chief Complaint: pain  Patient comments/goals: to be able to t/f to a W/C.  Pt states that he is afraid to lean forward because he will topple over.  Pain/Comfort:  · Pain Rating 1: 8/10  · Location 1:  (B LE's)  · Pain Addressed 1: Reposition, Distraction, Cessation of Activity, Nurse notified  · Pain Rating Post-Intervention 1: 8/10    Patients cultural, spiritual, [employer] conflicts given the current situation: none    Living Environment:  Pt admitted from SNF.  Lived with his son prior to that.     Prior to admission, patients level of function was ambulating with rollator prior to amputations.  Patient has the following equipment:  (admitted from SNF, pt states that he was using rollator, RW, TTB, W/C at home).  DME owned (not currently used): none.  Upon discharge, patient will have assistance from SNF staff.    Objective:     Patient found with: telemetry, peripheral IV     General Precautions: Standard, fall, diabetic   Orthopedic Precautions:RLE non weight bearing   Braces: N/A     Exams:  · Cognitive Exam:  Patient is oriented to Person and follows 100% of simple commands   · Gross Motor Coordination:  weakness, deconditioning, and obesity  · Postural Exam:  Patient presented with the following abnormalities:    · -       Rounded shoulders  · -       Forward head  · -       Kyphosis  · -       pendulous abdomen  · Sensation:    · -       Impaired  light/touch B LE's  · Skin Integrity/Edema:      · -       B LE edema, R residual limb with staples intact, L LE with dressing intact  · RLE ROM: WFL  · RLE Strength: WFL  · LLE ROM: dflx 0*, hip in ER, knee flex 80*   · LLE Strength: dflx 0/5, knee ext 5/5    Functional Mobility:  · Bed Mobility:     · Rolling Left:  moderate assistance with VC for reaching for BR  · Rolling Right: moderate assistance with VC for reaching for BR  · Scooting: maximal assistance and of 2 persons with VC for weight shift forward and use of drawsheet along EOB.  Min A for stabilization of L LE to bridge with use of HB and bed in trendelenberg to HOB in supine  · Bridging: minimum assistance for L LE  · Supine to Sit: maximal assistance  · Sit to Supine: moderate assistance  · Transfers:   See scooting along EOB  · Gait: N/A  · Balance: Fair+ sit    AM-PAC 6 CLICK MOBILITY  Total Score:9       Therapeutic Activities and Exercises:  Dangle protocol, pt sat at EOB with SBA a Total of approx 10m educated pt on importance of forward weight shift for t/f's and instructed pt on  performing L TKE's while in supine.  Pt verbalized understanding of TKE's, forward weight shift needs reinforcement    Patient left HOB elevated with all lines intact, call button in reach and nsg notified.    GOALS:    Physical Therapy Goals        Problem: Physical Therapy Goal    Goal Priority Disciplines Outcome Goal Variances Interventions   Physical Therapy Goal     PT/OT, PT Ongoing (interventions implemented as appropriate)     Description:  Goals to be met by: 2018     Patient will increase functional independence with mobility by performin. Supine to sit with Moderate Assistance  2. Rolling to Left and Right with Minimal Assistance.  3. Bed to chair transfer with Moderate Assistance using Slideboard  4. Sitting at edge of bed x15 minutes with Modified [place]  5. B LE there ex with assist as needed                      History:     Past Medical History:   Diagnosis Date    Acute pain of left shoulder 2017    Arthritis     pt states he does not have hx of Arthritis    Asthma     pt states he does not have hx of Asthma    Benign neoplasm of eyelid     RUL    Blood clotting tendency     Blood transfusion     Cataract     Chronic kidney disease requiring chronic dialysis     Diabetes mellitus     Diabetic retinopathy     Fever blister     Hyperlipidemia     Hypertension     Infertility     Joint pain     Retinal detachment     Thyroid disease     Weakness 2017       Past Surgical History:   Procedure Laterality Date    CATARACT EXTRACTION      COLONOSCOPY N/A 2017    Procedure: COLONOSCOPY;  Surgeon: Vic Clark MD;  Location: [address];  Service: Endoscopy;  Laterality: N/A;    RETINAL DETACHMENT SURGERY         Clinical Decision Making:     History  Co-morbidities and personal factors that may impact the plan of care Examination  Body Structures and Functions, activity limitations and participation restrictions that may impact the plan of care  Clinical Presentation   Decision Making/ Complexity Score   Co-morbidities:   [] Time since onset of injury / illness / exacerbation  [] Status of current condition  []Patient's cognitive status and safety concerns    [] Multiple Medical Problems (see med hx)  Personal Factors:   [] Patient's age  [] Prior Level of function   [] Patient's home situation (environment and family support)  [] Patient's level of motivation  [] Expected progression of patient      HISTORY:(criteria)    [] 11625 - no personal factors/history    [] 90380 - has 1-2 personal factor/comorbidity     [] 22700 - has >3 personal factor/comorbidity     Body Regions:  [] Objective examination findings  [] Head     []  Neck  [] Trunk   [] Upper Extremity  [] Lower Extremity    Body Systems:  [] For communication ability, affect, cognition, language, and learning style: the assessment of the ability to make needs known, consciousness, orientation (person, place, and time), expected emotional /behavioral responses, and learning preferences (eg, learning barriers, education  needs)  [] For the neuromuscular system: a general assessment of gross coordinated movement (eg, balance, gait, locomotion, transfers, and transitions) and motor function  (motor control and motor learning)  [] For the musculoskeletal system: the assessment of gross symmetry, gross range of motion, gross strength, height, and weight  [] For the integumentary system: the assessment of pliability(texture), presence of scar formation, skin color, and skin integrity  [] For cardiovascular/pulmonary system: the assessment of heart rate, respiratory rate, blood pressure, and edema     Activity limitations:    [] Patient's cognitive status and saf ety concerns          [] Status of current condition      [] Weight bearing restriction  [] Cardiopulmunary Restriction    Participation Restrictions:   [] Goals and goal agreement with the patient     [] Rehab potential (prognosis) and probable  outcome      Examination of Body System: (criteria)    [] 79899 - addressing 1-2 elements    [] 43449 - addressing a total of 3 or more elements     [] 57977 -  Addressing a total of 4 or more elements         Clinical Presentation: (criteria)  Choose one     On examination of body system using standardized tests and measures patient presents with (CHOOSE ONE) elements from any of the following: body structures and functions, activity limitations, and/or participation restrictions.  Leading to a clinical presentation that is considered (CHOOSE ONE)                              Clinical Decision Making  (Eval Complexity):  Choose One     Time Tracking:     PT Received On: 03/23/18  PT Start Time: 0939     PT Stop Time: 1010  PT Total Time (min): 31 min     Billable Minutes: Evaluation 15 and Therapeutic Exercise 16      Nallely Smith, PT  03/23/2018

## 2018-03-23 NOTE — PLAN OF CARE
Problem: Physical Therapy Goal  Goal: Physical Therapy Goal  Goals to be met by: 2018     Patient will increase functional independence with mobility by performin. Supine to sit with Moderate Assistance  2. Rolling to Left and Right with Minimal Assistance.  3. Bed to chair transfer with Moderate Assistance using Slideboard  4. Sitting at edge of bed x15 minutes with Modified [place]  5. B LE there ex with assist as needed    Outcome: Ongoing (interventions implemented as appropriate)  Initial PT evaluation performed.  Pt could benefit from skilled PT services 5x/wk in order to maximize function prior to D/C.  Return to SNF recommended

## 2018-03-23 NOTE — DISCHARGE SUMMARY
[facility] Hospital Medicine  Discharge Summary      Patient Name: Wilder Carbajal  MRN: [id number]  Admission Date: 3/21/2018  Hospital Length of Stay: 2 days  Discharge Date and Time:  03/23/2018 11:44 AM  Attending Physician: Niranjan Hess MD   Discharging Provider: Niranjan Hess MD  Primary Care Provider: iDo Roberson MD      HPI:     Wilder Carbajal is a 67 y.o. male that (in part)  has a past medical history of Acute pain of left shoulder; Arthritis; Asthma; Benign neoplasm of eyelid; Blood clotting tendency; Blood transfusion; Cataract; Chronic kidney disease requiring chronic dialysis; Diabetes mellitus; Diabetic retinopathy; Fever blister; Hyperlipidemia; Hypertension; Infertility; Joint pain; Retinal detachment; Thyroid disease; and Weakness. Presents to [facility] Emergency Department complaining of rectal bleeding.  He had rectal pain 2 weeks ago but developed acute onset of the bleeding 2 days ago associated with pain.  The blood was first noted by his nurse.  Characterized as moderate rectal bleeding.  Patient is taking chronic anticoagulation for history of chronic right femoral DVT as well as paroxysmal atrial fibrillation.  Located in his anus.  No radiation.  Likely exacerbated by medications.  No relieving factors.  Frequency of one episode.  The patient had a colonoscopy July of 2017 which revealed multiple polyps, which were removed, as well a diverticulosis without evidence of diverticulitis.    In the emergency department initial rectal exam was grossly positive for blood in the rectal vault.  Anticoagulant medications were held.  Routine laboratory studies including CBC was obtained.  Hemoglobin was stable compared to the last 3 values this month.  Underwent dialysis yesterday.     Hospital medicine has been asked to admit for further evaluation and treatment including consultation to gastroenterology.         Procedure(s)  (LRB):  SIGMOIDOSCOPY-FLEXIBLE (N/A)      Hospital Course:   Wilder Carbajal is a 67 y.o. male that (in part)  has a past medical history of Acute pain of left shoulder; Arthritis; Asthma; Benign neoplasm of eyelid; Blood clotting tendency; Blood transfusion; Cataract; Chronic kidney disease requiring chronic dialysis; Diabetes mellitus; Diabetic retinopathy; Fever blister; Hyperlipidemia; Hypertension; Infertility; Joint pain; Retinal detachment; Thyroid disease; and Weakness. Presents to [facility] Emergency Department complaining of rectal bleeding.  He had rectal pain 2 weeks ago but developed acute onset of the bleeding 2 days ago associated with pain.  The blood was first noted by his nurse.  Characterized as moderate rectal bleeding.  Patient is taking chronic anticoagulation for history of chronic right femoral DVT as well as paroxysmal atrial fibrillation.  Located in his anus.  No radiation.  Likely exacerbated by medications.  No relieving factors.  Frequency of one episode.  The patient had a colonoscopy July of 2017 which revealed multiple polyps, which were removed, as well a diverticulosis without evidence of diverticulitis.    In the emergency department initial rectal exam was grossly positive for blood in the rectal vault.  Anticoagulant medications were held.  Routine laboratory studies including CBC was obtained.  Hemoglobin was stable compared to the last 3 values this month.he was monitored closely with daily CBC ,GI was consulted and patient had sigmoidoscopy,which was poor preparation,but no major bleeding has been seen,he had initially some drop in H/H,but next day had slight improvement,did not require PRBC,no more hematochezia has been seen,he want discontinue taking OAC,he understand associated risk with CVA,he will follow with GI as out patient.  He had his routine HD by nephrology while was hosptilaized.     Consults:   Consults         Status Ordering Provider      Inpatient consult to Gastroenterology  Once     Provider:  (Not yet assigned)    Completed ROSALEE VALLADARES     Inpatient consult to Nephrology  Once     Provider:  Elsa Shaikh MD    Acknowledged VALENTIN RAIN     Inpatient consult to Social Work  Once     Provider:  (Not yet assigned)    Acknowledged NANI BIRMINGHAM          No new Assessment & Plan notes have been filed under this hospital service since the last note was generated.  Service: Hospital Medicine    Final Active Diagnoses:    Diagnosis Date Noted POA    PRINCIPAL PROBLEM:  Rectal bleeding [K62.5] 03/21/2018 Yes    Acute blood loss anemia [D62] 03/05/2018 Yes    Unilateral AKA, right [Z89.611] 02/11/2018 Not Applicable    Right femoral vein DVT [I82.411] 04/24/2017 Yes    Paroxysmal atrial fibrillation [I48.0] 04/11/2017 Yes    Type 2 diabetes mellitus with stage 5 chronic kidney disease and hypertension [E11.22, I12.0, N18.5] 08/15/2015 Yes    ESRD (end stage renal disease) [N18.6] 08/15/2015 Yes    HLD (hyperlipidemia) [E78.5] 12/21/2013 Yes    DM II (diabetes mellitus, type II), controlled [E11.9] 12/21/2013 Yes    Essential hypertension [I10] 12/21/2013 Yes    Hypothyroidism [E03.9] 12/21/2013 Yes    GERD (gastroesophageal reflux disease) [K21.9] 12/21/2013 Yes      Problems Resolved During this Admission:    Diagnosis Date Noted Date Resolved POA       Discharged Condition: stable    [place] [facility]    Follow Up:  Follow-up Information     Dio Roberson MD In 1 week.    Specialties:  Internal Medicine, Geriatric Medicine  Contact information:  [address] [facility] [id number] [phone]                 Patient Instructions:     Activity as tolerated         Significant Diagnostic Studies: Labs:   BMP:   Recent Labs  Lab 03/21/18  1755 03/22/18  0600 03/23/18  0541   GLU 94 51*  --     137  --    K 3.5 3.9  --     101  --    CO2 31* 27  --    BUN 12 13  --    CREATININE 2.5* 2.7*   --    CALCIUM 8.0* 7.8*  --    MG 2.1  --  2.2    and CMP   Recent Labs  Lab 03/21/18  1755 03/22/18  0600    137   K 3.5 3.9    101   CO2 31* 27   GLU 94 51*   BUN 12 13   CREATININE 2.5* 2.7*   CALCIUM 8.0* 7.8*   PROT 7.1  --    ALBUMIN 2.1*  --    BILITOT 0.7  --    ALKPHOS 120  --    AST 18  --    ALT 6*  --    ANIONGAP 7* 9   ESTGFRAFRICA 29* 27*   EGFRNONAA 25* 23*     Endoscopy: sigmoidoscopy     Pending Diagnostic Studies:     None         Medications:  Reconciled Home Medications:   Current Discharge Medication List      CONTINUE these medications which have CHANGED    Details   hydrocodone-acetaminophen 10-325mg (NORCO)  mg Tab Take 1 tablet by mouth every 8 (eight) hours as needed for Pain.  Qty: 15 tablet, Refills: 0         CONTINUE these medications which have NOT CHANGED    Details   ammonium lactate (LAC-HYDRIN) 12 % lotion Apply topically as needed for Dry Skin.      aspirin (ECOTRIN) 81 MG EC tablet Take 81 mg by mouth once daily.       atorvastatin (LIPITOR) 20 MG tablet Take 20 mg by mouth once daily.      cholecalciferol, vitamin D3, (VITAMIN D3) 2,000 unit Cap Take 1 capsule (2,000 Units total) by mouth once daily.      cinacalcet (SENSIPAR) 30 MG Tab Take 30 mg by mouth every evening.      citalopram (CELEXA) 10 MG tablet Take 10 mg by mouth once daily.      folic acid-vit B6-vit B12 2.5-25-2 mg (FOLBIC OR EQUIV) 2.5-25-2 mg Tab Take 1 tablet by mouth once daily.      gabapentin (NEURONTIN) 100 MG capsule Take 100 mg by mouth 3 (three) times daily.      midodrine (PROAMATINE) 10 MG tablet Take 1 tablet (10 mg total) by mouth every Mon, Wed, Fri. Give a 0900 or just prior to transport to dialysis  Qty: 12 tablet, Refills: 11      senna-docusate 8.6-50 mg (PERICOLACE) 8.6-50 mg per tablet Take 2 tablets by mouth 2 (two) times daily.      sevelamer carbonate (RENVELA) 800 mg Tab Take 2 tablets (1,600 mg total) by mouth 3 (three) times daily with meals.  Qty: 180 tablet,  Refills: 1      sevelamer HCl (RENAGEL) 800 MG Tab Take 1 tablet (800 mg total) by mouth as needed (snack).  Qty: 90 tablet, Refills: 11      sodium phosphates (FLEET) 19-7 gram/118 mL Enem Place 1 enema rectally as needed (constipation).  Refills: 0         STOP taking these medications       apixaban 5 mg Tab Comments:   Reason for Stopping:         HYDROmorphone (DILAUDID) 2 MG tablet Comments:   Reason for Stopping:               Indwelling Lines/Drains at time of discharge:   Lines/Drains/Airways     Drain                 Hemodialysis AV Fistula Left upper arm -- days          Airway                 Airway - Non-Surgical 03/22/18 1110 Nasal Cannula 1 day                Time spent on the discharge of patient: 30  minutes  Patient was seen and examined on the date of discharge and determined to be suitable for discharge.         Niranjan Hess MD  Department of Hospital Medicine  [facility]

## 2018-03-23 NOTE — NURSING
Report given to Kelly. Pt. resting quietly on room air. Respirations even and unlabored.  Saline lock to R AC intact. No apparent distress noted at this time.Side rails up x 2. Bed alarm set. Call light within patient's reach. Safety measures maintained.

## 2018-03-23 NOTE — PROGRESS NOTES
Follow-up Information     Dio Roberson MD In 1 week.    Specialties:  Internal Medicine, Geriatric Medicine  Contact information:  [address]  [facility] [address]  [phone]             Mehdi Anthony MD. Go on 4/5/2018.    Specialty:  Gastroenterology  Why:  Outpatient Services, GI Follow-up Appointment, Please arrive to clinic for 2:30PM  Contact information:  [address] [place] [address]  [address]  [facility]  [place] [address]  [phone]             [facility]. Go on 3/23/2018.    Specialties:  Nursing Home Agency, SNF Agency  Why:  Nursing Home Facility  Contact information:  [place] [address]  [phone]                   [facility]    WRITTEN HEALTHCARE AND DISCHARGE INFORMATION                        Help at Home           [phone]  After discharge for assistance [facility] On Call Nurse Care Line 24/7  Assistance    Things You are responsible For To Manage Your Care At Home:  1.    Getting your prescriptions filled   2.    Taking your medications as directed, DO NOT MISS ANY DOSES!  3.    Going to your follow-up doctor appointment. This is important because it  allow the doctor to monitor your progress and determine if  any changes need to made to your treatment plan.     Thank you for choosing [facility] for your care.  Please answer any calls you may receive from [facility] we want to continue to support you as you manage your healthcare needs. [facility] is happy to have the opportunity to serve you.     Sincerely,  Your [facility] Team,  Carley Foster LMSW   II  [phone]

## 2018-03-23 NOTE — PLAN OF CARE
[facility]    HOME HEALTH ORDERS  FACE TO FACE ENCOUNTER    Patient Name: Wilder Carbajal  YOB: 1950    PCP: Dio Roberson MD   PCP Address: [address] [facility] [address]  PCP Phone Number: [phone]  PCP Fax: [phone]    Encounter Date: 03/23/2018    Admit to Home Health    Diagnoses:  Active Hospital Problems    Diagnosis  POA    *Rectal bleeding [K62.5]  Yes    Acute blood loss anemia [D62]  Yes    Unilateral AKA, right [Z89.611]  Not Applicable    Right femoral vein DVT [I82.411]  Yes    Paroxysmal atrial fibrillation [I48.0]  Yes    Type 2 diabetes mellitus with stage 5 chronic kidney disease and hypertension [E11.22, I12.0, N18.5]  Yes    ESRD (end stage renal disease) [N18.6]  Yes    HLD (hyperlipidemia) [E78.5]  Yes    DM II (diabetes mellitus, type II), controlled [E11.9]  Yes    Essential hypertension [I10]  Yes    Hypothyroidism [E03.9]  Yes    GERD (gastroesophageal reflux disease) [K21.9]  Yes      Resolved Hospital Problems    Diagnosis Date Resolved POA   No resolved problems to display.       Future Appointments  Date Time Provider Department Center   4/4/2018 1:00 PM VASCULAR, LAB [facility] KOSTAS Siddiqui   4/4/2018 1:30 PM Liz Zhou MD [facility] VESNA Siddiqui     Follow-up Information     Dio Roberson MD In 1 week.    Specialties:  Internal Medicine, Geriatric Medicine  Contact information:  [address] [id number] [phone]                     I have seen and examined this patient face to face today. My clinical findings that support the need for the home health skilled services and home bound status are the following:  Weakness/numbness causing balance and gait disturbance due to Weakness/Debility and Anemia making it taxing to leave home.    Allergies:Review of patient's allergies indicates:  No Known Allergies    Diet: diabetic diet: 2000 calorie    Activities: activity as tolerated    Nursing:   SN to complete  comprehensive assessment including routine vital signs. Instruct on disease process and s/s of complications to report to MD. Review/verify medication list sent home with the patient at time of discharge  and instruct patient/caregiver as needed. Frequency may be adjusted depending on start of care date.    Notify MD if SBP > 160 or < 90; DBP > 90 or < 50; HR > 120 or < 50; Temp > 101; Other:         CONSULTS:    Physical Therapy to evaluate and treat. Evaluate for home safety and equipment needs; Establish/upgrade home exercise program. Perform / instruct on therapeutic exercises, gait training, transfer training, and Range of Motion.  Occupational Therapy to evaluate and treat. Evaluate home environment for safety and equipment needs. Perform/Instruct on transfers, ADL training, ROM, and therapeutic exercises.    MISCELLANEOUS CARE:  Routine Skin for Bedridden Patients: Instruct patient/caregiver to apply moisture barrier cream to all skin folds and wet areas in perineal area daily and after baths and all bowel movements.    WOUND CARE ORDERS  n/a      Medications: Review discharge medications with patient and family and provide education.      Current Discharge Medication List      CONTINUE these medications which have NOT CHANGED    Details   ammonium lactate (LAC-HYDRIN) 12 % lotion Apply topically as needed for Dry Skin.      aspirin (ECOTRIN) 81 MG EC tablet Take 81 mg by mouth once daily.       atorvastatin (LIPITOR) 20 MG tablet Take 20 mg by mouth once daily.      cholecalciferol, vitamin D3, (VITAMIN D3) 2,000 unit Cap Take 1 capsule (2,000 Units total) by mouth once daily.      cinacalcet (SENSIPAR) 30 MG Tab Take 30 mg by mouth every evening.      citalopram (CELEXA) 10 MG tablet Take 10 mg by mouth once daily.      folic acid-vit B6-vit B12 2.5-25-2 mg (FOLBIC OR EQUIV) 2.5-25-2 mg Tab Take 1 tablet by mouth once daily.      gabapentin (NEURONTIN) 100 MG capsule Take 100 mg by mouth 3 (three) times  daily.      hydrocodone-acetaminophen 10-325mg (NORCO)  mg Tab Take 1 tablet by mouth every 4 (four) hours as needed for Pain.  Qty: 56 tablet, Refills: 0      midodrine (PROAMATINE) 10 MG tablet Take 1 tablet (10 mg total) by mouth every Mon, Wed, Fri. Give a 0900 or just prior to transport to dialysis  Qty: 12 tablet, Refills: 11      senna-docusate 8.6-50 mg (PERICOLACE) 8.6-50 mg per tablet Take 2 tablets by mouth 2 (two) times daily.      sevelamer carbonate (RENVELA) 800 mg Tab Take 2 tablets (1,600 mg total) by mouth 3 (three) times daily with meals.  Qty: 180 tablet, Refills: 1      sevelamer HCl (RENAGEL) 800 MG Tab Take 1 tablet (800 mg total) by mouth as needed (snack).  Qty: 90 tablet, Refills: 11      sodium phosphates (FLEET) 19-7 gram/118 mL Enem Place 1 enema rectally as needed (constipation).  Refills: 0         STOP taking these medications       apixaban 5 mg Tab Comments:   Reason for Stopping:         HYDROmorphone (DILAUDID) 2 MG tablet Comments:   Reason for Stopping:               I certify that this patient is confined to his home and needs intermittent skilled nursing care, physical therapy and occupational therapy.

## 2018-03-26 NOTE — PT/OT/SLP DISCHARGE
Physical Therapy Discharge Summary    Name: Wilder Carbajal  MRN: [id number]   Principal Problem: Rectal bleeding     Patient Discharged from acute Physical Therapy on 3/23/2018.  Please refer to prior PT noted date on 3/23/2018 for functional status.     Assessment:     Patient appropriate for care in another setting.    Objective:     GOALS:    Physical Therapy Goals     Not on file          Multidisciplinary Problems (Resolved)        Problem: Physical Therapy Goal    Goal Priority Disciplines Outcome Goal Variances Interventions   Physical Therapy Goal   (Resolved)     PT/OT, PT Outcome(s) achieved     Description:  Goals to be met by: 2018     Patient will increase functional independence with mobility by performin. Supine to sit with Moderate Assistance  2. Rolling to Left and Right with Minimal Assistance.  3. Bed to chair transfer with Moderate Assistance using Slideboard  4. Sitting at edge of bed x15 minutes with Modified Deuel  5. B LE there ex with assist as needed                      Reasons for Discontinuation of Therapy Services  Transfer to alternate level of care.      Plan:     Patient Discharged to: Skilled Nursing Facility.    Nallely Smith, PT  3/26/2018

## 2018-03-26 NOTE — PHYSICIAN QUERY
PT Name: Wilder Carbajal  MR #: [id number]     Physician Query Form - Documentation Clarification      Yahaira Rodarte RN, CCDS  Contact Info: [phone] [email or web address]      This form is a permanent document in the medical record.     Query Date: March 26, 2018    By submitting this query, we are merely seeking further clarification of documentation. Please utilize your independent clinical judgment when addressing the question(s) below.    The Medical record reflects the following:    Supporting Clinical Findings Location in Medical Record   -- Extremities: Right sided above-the-knee amputation.   -- Skin: Healing surgical wound on right lower extremity amputation site.     0.5 x 0.3 cm Stage II decubitus ulcer w/ surrounding area of healing scar tissue.     Bandaged chronic wound to medial LLE.   Positive for wound (LLE and Sacral Area) H&P        ED MD Note                                                                                Doctor, Please specify diagnosis or diagnoses associated with above clinical findings.         Please specify location & Laterality of Stage 2 Decubitus Ulcer:     Provider Use Only    Decubitus ulcer ,no laterality                                                                                                                           [  ] Clinically undetermined

## 2018-03-26 NOTE — PHYSICIAN QUERY
"PT Name: Wilder Carbajal  MR #: [id number]     Physician Query Form - Etiology of Condition Clarification      Yahaira Rodarte RN, CCDS  Contact Info: [phone] [email or web address]      This form is a permanent document in the medical record.     Query Date: March 26, 2018    By submitting this query, we are merely seeking further clarification of documentation.  Please utilize your independent clinical judgment when addressing the question(s) below.     The medical record contains the following:    Findings Supporting Clinical Information Location in Medical Record   initial rectal exam was grossly positive for blood in the rectal vault.     Anticoagulant medications were held    --per DC Summary Characterized as moderate rectal bleeding.      Patient is taking chronic anticoagulation for history of chronic right femoral DVT as well as paroxysmal atrial fibrillation.       Likely exacerbated by medications.      he patient had a colonoscopy July of 2017 which revealed multiple polyps, which were removed, as well a diverticulosis without evidence of diverticulitis.      Flexible Sigmoidoscopy Mehdi Anthony MD  03/22  Impression:            - Preparation of the colon was inadequate.  - Stool in the rectum.  - Erythematous mucosa/erosion in the rectum in the   area of hard stool.  - No significant blood appreciated.  Recommendation:        - Return patient to [facility] for ongoing care.  - Cleansing enemas  - Miralax BID  - Consider transfusion  - Will arrange GI follow up - call with questions or       if bleeding recurs.     DC Summary                          Procedure Report:   Flexible Sigmoidoscopy Mehdi Anthony MD  03/22     Please document your best medical opinion regarding the etiology of "Rectal Bleeding" for which the primary focus of treatment is/was directed.       Possible hemorrhoid                  [    ]  Clinically Undetermined    Please document in your progress notes daily for the duration " of treatment, until resolved, and include in your discharge summary.

## 2018-03-26 NOTE — PHYSICIAN QUERY
"PT Name: Wilder Carbajal  MR #: [id number]    Physician Query Form - Hematology Clarification      Yahaira Rodarte RN, CCDS  Contact Info: [phone] [email or web address]      This form is a permanent document in the medical record.      Query Date: March 26, 2018    By submitting this query, we are merely seeking further clarification of documentation. Please utilize your independent clinical judgment when addressing the question(s) below.    The Medical record contains the following:   Indicators  Supporting Clinical Findings Location in Medical Record   x "Anemia" documented Acute Blood Loss Anemia  Anemia of CKD    DC Summary  Anesthesia Note, problem list    x H & H =    3/21/2018 17:55 3/22/2018 06:00 3/23/2018 05:41   Hemoglobin 8.2 (L) 7.3 (L) 7.4 (L)   Hematocrit 26.9 (L) 24.1 (L) 24.7 (L)          Lab    BP =                     HR=     x "GI bleeding" documented  rectal exam was grossly positive for blood in the rectal vault H&P   x Acute bleeding (Non GI site) Acute Lower GI bleeding H&P    Transfusion(s)     x Treatment: Chronic anticoagulation  · For treatment of paroxysmal atrial fibrillation and chronic DVT of the right femoral vein  · Holding apixaban  · Monitor INR closely    Acute Lower GI Bleeding  · As evidence by physical exam and positive occult blood testing  · Possible etiology includes:  colitis, colon cancer, diverticulitis, internal hemorrhoids  · Monitor with serial H&H; hemoglobin actually appears improved compared to the beginning of the month.  The MCV is also normal.  Both indicators that this is likely an acute event.  · Unlikely to be upper GI bleed.  Continue give Protonix 40 mg  twice a day as previously prescribed for GERD  · Hold all anticoagulation medications  · N.p.o. except  Medications  · Provide IV fluids  · Obtain blood type and screen  · Maintain two large-bore IVs at all times  · Consult gastroenterology    Continue taking these medications:   folic acid-vit B6-vit " B12 2.5-25-2 mg (FOLBIC OR EQUIV) 2.5-25-2 mg Tab   Take 1 tablet by mouth     - Epogen w/ HD   H&P                                                  DC Summary        Renal CN   x Other:  ESRD on HD  DMT2  PAF  R femoral Vein DVT  Rectal Bleeding  HTN  GERD     chronic anticoagulation for history of chronic right femoral DVT as well as paroxysmal atrial fibrillation    · Unlikely to be upper GI bleed.  Continue give Protonix 40 mg  twice a day as previously prescribed for GERD    The patient had a colonoscopy July of 2017 which revealed multiple polyps, which were removed, as well a diverticulosis without evidence of diverticulitis.    Hemoglobin was stable compared to the last 3 values this month.  Underwent dialysis yesterday.  DC Summary                H&P     Provider, please specify diagnosis or diagnoses associated with above clinical findings.    [  ] Iron deficiency anemia    [x  ] Chronic blood loss anemia    [  ] Anemia of chronic disease ( Specify chronic disease)      [  ] CKD (specify stage) ___________________________     [  ] Other (Specify) _______________________________     [  ] Clinically Undetermined     [  ] Other Hematological Diagnosis (please specify): _________________________________    [  ] Clinically Undetermined       Please document in your progress notes daily for the duration of treatment, until resolved, and include in your discharge summary.

## 2018-03-27 NOTE — PHYSICIAN QUERY
PT Name: Wilder Carbajal  MR #: [id number]     Physician Query Form - Documentation Clarification      Yahaira Rodarte RN, CCDS  Contact Info: [phone] [email or web address]      This form is a permanent document in the medical record.     Query Date: March 27, 2018    By submitting this query, we are merely seeking further clarification of documentation. Please utilize your independent clinical judgment when addressing the question(s) below.    The Medical record reflects the following:    Supporting Clinical Findings Location in Medical Record   -- Extremities: Right sided above-the-knee amputation.   -- Skin: Healing surgical wound on right lower extremity amputation site.     0.5 x 0.3 cm Stage II decubitus ulcer w/ surrounding area of healing scar tissue.     Bandaged chronic wound to medial LLE.   Positive for wound (LLE and Sacral Area)   H&P        ED MD Note                                                                              Doctor, Please specify diagnosis or diagnoses associated with above clinical findings.         Please specify Location of Stage 2 Decubitus Ulcer:     Provider Use Only    (  x) Sacral  (  ) Buttock, bilateral  (  ) Other location - specify: _____________                                                                                                               [  ] Clinically undetermined

## 2018-04-04 ENCOUNTER — OFFICE VISIT (OUTPATIENT)
Dept: VASCULAR SURGERY | Facility: CLINIC | Age: 68
End: 2018-04-04
Payer: COMMERCIAL

## 2018-04-04 ENCOUNTER — HOSPITAL ENCOUNTER (OUTPATIENT)
Dept: VASCULAR SURGERY | Facility: CLINIC | Age: 68
Discharge: HOME | End: 2018-04-04
Payer: COMMERCIAL

## 2018-04-04 VITALS — HEART RATE: 57 BPM | DIASTOLIC BLOOD PRESSURE: 72 MMHG | TEMPERATURE: 98 F | SYSTOLIC BLOOD PRESSURE: 150 MMHG

## 2018-04-04 DIAGNOSIS — I73.9 PERIPHERAL ARTERIAL DISEASE: ICD-10-CM

## 2018-04-04 DIAGNOSIS — S78.111A UNILATERAL AKA, RIGHT: Primary | ICD-10-CM

## 2018-04-04 DIAGNOSIS — N18.6 ESRD (END STAGE RENAL DISEASE): ICD-10-CM

## 2018-04-04 DIAGNOSIS — I96 GANGRENE OF RIGHT FOOT: ICD-10-CM

## 2018-04-04 PROCEDURE — 99024 POSTOP FOLLOW-UP VISIT: CPT | Mod: S$GLB,,, | Performed by: SURGERY

## 2018-04-04 PROCEDURE — 99999 PR PBB SHADOW E&M-EST. PATIENT-LVL III: CPT | Mod: PBBFAC,,, | Performed by: SURGERY

## 2018-04-04 PROCEDURE — 93923 UPR/LXTR ART STDY 3+ LVLS: CPT | Mod: S$GLB,,, | Performed by: SURGERY

## 2018-04-04 RX ORDER — HYDROCODONE BITARTRATE AND ACETAMINOPHEN 10; 325 MG/1; MG/1
1 TABLET ORAL EVERY 8 HOURS PRN
Qty: 40 TABLET | Refills: 0 | Status: SHIPPED | OUTPATIENT
Start: 2018-04-04 | End: 2018-04-04 | Stop reason: SDUPTHER

## 2018-04-04 RX ORDER — HYDROCODONE BITARTRATE AND ACETAMINOPHEN 10; 325 MG/1; MG/1
1 TABLET ORAL EVERY 8 HOURS PRN
Qty: 40 TABLET | Refills: 0 | Status: SHIPPED | OUTPATIENT
Start: 2018-04-04

## 2018-04-04 NOTE — PROGRESS NOTES
4 weeks s/p right AKA  Incisdion healing well.  Most staples out today - refused to have last 8 - 10 removed    Refill pain Rx    Follow up next week for remaining johana     Liz Zhou MD FACS [facility]  Vascular & Endovascular Surgery

## 2018-04-10 LAB
ACID FAST MOD KINY STN SPEC: NORMAL
MYCOBACTERIUM SPEC QL CULT: NORMAL

## 2018-04-12 ENCOUNTER — OFFICE VISIT (OUTPATIENT)
Dept: VASCULAR SURGERY | Facility: CLINIC | Age: 68
End: 2018-04-12
Payer: COMMERCIAL

## 2018-04-12 VITALS — TEMPERATURE: 98 F | HEART RATE: 59 BPM | DIASTOLIC BLOOD PRESSURE: 100 MMHG | SYSTOLIC BLOOD PRESSURE: 190 MMHG

## 2018-04-12 DIAGNOSIS — S78.111A UNILATERAL AKA, RIGHT: ICD-10-CM

## 2018-04-12 DIAGNOSIS — N18.6 ESRD (END STAGE RENAL DISEASE): Primary | ICD-10-CM

## 2018-04-12 DIAGNOSIS — I87.1 VEIN STENOSIS: ICD-10-CM

## 2018-04-12 PROCEDURE — 99024 POSTOP FOLLOW-UP VISIT: CPT | Mod: S$GLB,,, | Performed by: SURGERY

## 2018-04-12 PROCEDURE — 99999 PR PBB SHADOW E&M-EST. PATIENT-LVL III: CPT | Mod: PBBFAC,,, | Performed by: SURGERY

## 2018-04-13 LAB
ACID FAST MOD KINY STN SPEC: NORMAL
MYCOBACTERIUM SPEC QL CULT: NORMAL

## 2018-04-13 NOTE — PROGRESS NOTES
Remaining staples removed from right AK  Follow up in 6 months for HD exam and duplex as indicated.    Liz Zhou MD FACS [facility]  Vascular & Endovascular Surgery

## 2018-04-26 ENCOUNTER — HOSPITAL ENCOUNTER (EMERGENCY)
Facility: HOSPITAL | Age: 68
End: 2018-04-26
Attending: EMERGENCY MEDICINE
Payer: COMMERCIAL

## 2018-04-26 VITALS
RESPIRATION RATE: 20 BRPM | WEIGHT: 250 LBS | BODY MASS INDEX: 37.89 KG/M2 | SYSTOLIC BLOOD PRESSURE: 135 MMHG | HEIGHT: 68 IN | HEART RATE: 63 BPM | TEMPERATURE: 98 F | OXYGEN SATURATION: 94 % | DIASTOLIC BLOOD PRESSURE: 78 MMHG

## 2018-04-26 DIAGNOSIS — K62.89 RECTAL PAIN: ICD-10-CM

## 2018-04-26 LAB
ABO GROUP BLD: NORMAL
ALBUMIN SERPL BCP-MCNC: 2.3 G/DL
ALP SERPL-CCNC: 110 U/L
ALT SERPL W/O P-5'-P-CCNC: 13 U/L
ANION GAP SERPL CALC-SCNC: 7 MMOL/L
APTT BLDCRRT: 26.6 SEC
AST SERPL-CCNC: 18 U/L
BASOPHILS # BLD AUTO: 0.01 K/UL
BASOPHILS NFR BLD: 0.2 %
BILIRUB SERPL-MCNC: 0.5 MG/DL
BLD GP AB SCN CELLS X3 SERPL QL: NORMAL
BUN SERPL-MCNC: 11 MG/DL
CALCIUM SERPL-MCNC: 8.3 MG/DL
CHLORIDE SERPL-SCNC: 102 MMOL/L
CO2 SERPL-SCNC: 29 MMOL/L
CREAT SERPL-MCNC: 3 MG/DL
DIFFERENTIAL METHOD: ABNORMAL
EOSINOPHIL # BLD AUTO: 0.1 K/UL
EOSINOPHIL NFR BLD: 2.1 %
ERYTHROCYTE [DISTWIDTH] IN BLOOD BY AUTOMATED COUNT: 17 %
EST. GFR  (AFRICAN AMERICAN): 24 ML/MIN/1.73 M^2
EST. GFR  (NON AFRICAN AMERICAN): 20 ML/MIN/1.73 M^2
GLUCOSE SERPL-MCNC: 65 MG/DL
HCT VFR BLD AUTO: 31 %
HGB BLD-MCNC: 9.6 G/DL
LIPASE SERPL-CCNC: 15 U/L
LYMPHOCYTES # BLD AUTO: 2.1 K/UL
LYMPHOCYTES NFR BLD: 32.6 %
MCH RBC QN AUTO: 28.2 PG
MCHC RBC AUTO-ENTMCNC: 31 G/DL
MCV RBC AUTO: 91 FL
MONOCYTES # BLD AUTO: 0.7 K/UL
MONOCYTES NFR BLD: 11.6 %
NEUTROPHILS # BLD AUTO: 3.4 K/UL
NEUTROPHILS NFR BLD: 53.5 %
PLATELET # BLD AUTO: 93 K/UL
PMV BLD AUTO: 10.7 FL
POTASSIUM SERPL-SCNC: 4 MMOL/L
PROT SERPL-MCNC: 7.3 G/DL
RBC # BLD AUTO: 3.4 M/UL
RH BLD: NORMAL
SODIUM SERPL-SCNC: 138 MMOL/L
WBC # BLD AUTO: 6.29 K/UL

## 2018-04-26 PROCEDURE — 85025 COMPLETE CBC W/AUTO DIFF WBC: CPT

## 2018-04-26 PROCEDURE — 85730 THROMBOPLASTIN TIME PARTIAL: CPT

## 2018-04-26 PROCEDURE — 86900 BLOOD TYPING SEROLOGIC ABO: CPT

## 2018-04-26 PROCEDURE — 86850 RBC ANTIBODY SCREEN: CPT

## 2018-04-26 PROCEDURE — 80053 COMPREHEN METABOLIC PANEL: CPT

## 2018-04-26 PROCEDURE — 83690 ASSAY OF LIPASE: CPT

## 2018-04-26 PROCEDURE — 25000003 PHARM REV CODE 250: Performed by: EMERGENCY MEDICINE

## 2018-04-26 PROCEDURE — 86901 BLOOD TYPING SEROLOGIC RH(D): CPT

## 2018-04-26 PROCEDURE — 99284 EMERGENCY DEPT VISIT MOD MDM: CPT | Mod: 25

## 2018-04-26 RX ORDER — HYDROCODONE BITARTRATE AND ACETAMINOPHEN 5; 325 MG/1; MG/1
2 TABLET ORAL
Status: COMPLETED | OUTPATIENT
Start: 2018-04-26 | End: 2018-04-26

## 2018-04-26 RX ADMIN — HYDROCODONE BITARTRATE AND ACETAMINOPHEN 2 TABLET: 5; 325 TABLET ORAL at 05:04

## 2018-04-26 NOTE — ED TRIAGE NOTES
Pt arrived from [facility] reports of rectal pain x1 mos with rectal bleeding on and off. Pt unsure of color of stools. Pt denies abdominal pain or vomitting. Pt also c/o nausea. Reports last dialysis yesterday. Pt reports being constipated for a month. Had first bowel movment yesterday

## 2018-04-26 NOTE — DISCHARGE INSTRUCTIONS
Your blood count is improved compared to prior.  You should continue your medications as prescribed.  It is important that you follow-up with the gastroenterologist to further evaluate you and possibly repeat a colonoscopy.  Return to emergency room for fever, chest pain, lightheadedness, dizziness, weakness or any new or worsening symptoms.

## 2018-05-04 NOTE — ED PROVIDER NOTES
Encounter Date: 4/26/2018       History     Chief Complaint   Patient presents with    Rectal pain     Pt from NH for rectal pain times 1 month.      A 67-year-old male with history of end-stage renal disease on dialysis, hypertension, diabetes transferred from nursing home for evaluation of complaints of rectal pain for over 1 month.  Patient denies fever, chills, chest pain, breathing difficulty, lightheadedness, dizziness but just reports pain when having a bowel movement and occasionally noting blood.          Review of patient's allergies indicates:  No Known Allergies  Past Medical History:   Diagnosis Date    Acute pain of left shoulder 1/7/2017    Arthritis     pt states he does not have hx of Arthritis    Asthma     pt states he does not have hx of Asthma    Benign neoplasm of eyelid     RUL    Blood clotting tendency     Blood transfusion     Cataract     Chronic kidney disease requiring chronic dialysis     Diabetes mellitus     Diabetic retinopathy     Fever blister     Hyperlipidemia     Hypertension     Infertility     Joint pain     Retinal detachment     Thyroid disease     Weakness 1/7/2017     Past Surgical History:   Procedure Laterality Date    CATARACT EXTRACTION      COLONOSCOPY N/A 6/23/2017    Procedure: COLONOSCOPY;  Surgeon: Vic Clark MD;  Location: [facility] ([address] [place] [address]);  Service: Endoscopy;  Laterality: N/A;    RETINAL DETACHMENT SURGERY       Family History   Problem Relation Age of Onset    Heart attack Mother     No Known Problems Father     No Known Problems Sister     No Known Problems Brother     No Known Problems Maternal Aunt     No Known Problems Maternal Uncle     No Known Problems Paternal Aunt     No Known Problems Paternal Uncle     No Known Problems Maternal Grandmother     No Known Problems Maternal Grandfather     No Known Problems Paternal Grandmother     No Known Problems Paternal Grandfather     Depression Child     Cancer Neg Hx      Amblyopia Neg Hx     Blindness Neg Hx     Cataracts Neg Hx     Diabetes Neg Hx     Glaucoma Neg Hx     Hypertension Neg Hx     Macular degeneration Neg Hx     Retinal detachment Neg Hx     Strabismus Neg Hx     Stroke Neg Hx     Thyroid disease Neg Hx      Social History   Substance Use Topics    Smoking status: Never Smoker    Smokeless tobacco: Never Used    Alcohol use No     Review of Systems   Constitutional: Negative for chills, fatigue and fever.   HENT: Negative for nosebleeds.    Eyes: Negative for visual disturbance.   Respiratory: Negative for cough and shortness of breath.    Cardiovascular: Negative for chest pain and palpitations.   Gastrointestinal: Positive for anal bleeding and rectal pain. Negative for abdominal pain.   Genitourinary: Negative for difficulty urinating and dysuria.   Musculoskeletal: Positive for gait problem.   Neurological: Negative for dizziness and light-headedness.       Physical Exam     Initial Vitals [04/26/18 0128]   BP Pulse Resp Temp SpO2   133/77 (!) 55 18 98.1 °F (36.7 °C) 95 %      MAP       95.67         Physical Exam    Nursing note and vitals reviewed.  Constitutional: He is not diaphoretic. No distress.   HENT:   Head: Normocephalic and atraumatic.   Mouth/Throat: Oropharynx is clear and moist.   Eyes: EOM are normal. No scleral icterus.   Neck: Normal range of motion. Neck supple.   Cardiovascular: Normal rate and regular rhythm.   Pulmonary/Chest: Breath sounds normal. No respiratory distress.   Abdominal: Soft. He exhibits no distension. There is no tenderness.   Genitourinary: Rectal exam shows guaiac positive stool. Guaiac positive stool. : Acceptable.  Genitourinary Comments: Soft brown stool in vault. +maceration of perianal skin   Musculoskeletal: He exhibits no tenderness.   S/p rt AKA   Neurological: He is alert and oriented to person, place, and time. No sensory deficit.   Skin: Skin is warm and dry.   Psychiatric: He has a  normal mood and affect.         ED Course   Procedures  Labs Reviewed   CBC W/ AUTO DIFFERENTIAL - Abnormal; Notable for the following:        Result Value    RBC 3.40 (*)     Hemoglobin 9.6 (*)     Hematocrit 31.0 (*)     MCHC 31.0 (*)     RDW 17.0 (*)     Platelets 93 (*)     All other components within normal limits   COMPREHENSIVE METABOLIC PANEL - Abnormal; Notable for the following:     Glucose 65 (*)     Creatinine 3.0 (*)     Calcium 8.3 (*)     Albumin 2.3 (*)     Anion Gap 7 (*)     eGFR if  24 (*)     eGFR if non  20 (*)     All other components within normal limits   LIPASE   APTT   TYPE & SCREEN   GROUP & RH          X-Rays:   Independently Interpreted Readings:   Abdomen: No free air under diaphragm.  No air fluid levels or signs of obstruction.  Nonspecific bowel gas.     Medical Decision Making:   Differential Diagnosis:   Constipation  GI bleed  Diaper dermatitis  Independently Interpreted Test(s):   I have ordered and independently interpreted X-rays - see prior notes.  Clinical Tests:   Lab Tests: Ordered and Reviewed  Radiological Study: Reviewed and Ordered  ED Management:  Patient afebrile, no acute distress, no hypotension, no tachycardia.  Patient is guaiac positive but he has soft brown stool.  He has no melena or hematochezia.  He has perianal or lotions and tenderness with rectal exam.  Labs reviewed and within acceptable limits.  Patient's hemoglobin is improved.  Potassium is within acceptable limits.  He has no leukocytosis.  Patient's recent sigmoidoscopy reviewed and demonstrated no significant bleeding as well as some erosions in the area of copious stool.  Patient given analgesia with resolution of symptoms. He is hemodynamically stable and fit for discharge to follow up with GI as outpatient for repeat sigmoidoscopy to further evaluate his rectal pain.  Patient is not requiring a blood transfusion or antibiotics at this time.  Patient referred to  gastroenterology and primary physician for followup.  This chart dictated using Dragon as a result there may be some typographical errors.                       Clinical Impression:   The encounter diagnosis was Rectal pain.    Disposition:   Disposition: Discharged  Condition: Stable                        Morales Noriega MD  05/03/18 205

## 2018-05-07 DIAGNOSIS — Z99.2 ESRD (END STAGE RENAL DISEASE) ON DIALYSIS: ICD-10-CM

## 2018-05-07 DIAGNOSIS — N18.6 ESRD (END STAGE RENAL DISEASE) ON DIALYSIS: ICD-10-CM

## 2018-05-07 DIAGNOSIS — M79.605 LEFT LEG PAIN: Primary | ICD-10-CM

## 2018-05-10 ENCOUNTER — OFFICE VISIT (OUTPATIENT)
Dept: VASCULAR SURGERY | Facility: CLINIC | Age: 68
End: 2018-05-10
Payer: COMMERCIAL

## 2018-05-10 ENCOUNTER — HOSPITAL ENCOUNTER (OUTPATIENT)
Dept: VASCULAR SURGERY | Facility: CLINIC | Age: 68
Discharge: HOME | End: 2018-05-10
Attending: SURGERY
Payer: COMMERCIAL

## 2018-05-10 VITALS — SYSTOLIC BLOOD PRESSURE: 171 MMHG | HEART RATE: 61 BPM | TEMPERATURE: 98 F | DIASTOLIC BLOOD PRESSURE: 89 MMHG

## 2018-05-10 DIAGNOSIS — Z99.2 ESRD (END STAGE RENAL DISEASE) ON DIALYSIS: ICD-10-CM

## 2018-05-10 DIAGNOSIS — N18.6 ESRD (END STAGE RENAL DISEASE) ON DIALYSIS: ICD-10-CM

## 2018-05-10 DIAGNOSIS — L97.529 FOOT ULCERATION, LEFT, WITH UNSPECIFIED SEVERITY: Primary | ICD-10-CM

## 2018-05-10 DIAGNOSIS — D63.8 ANEMIA, CHRONIC DISEASE: ICD-10-CM

## 2018-05-10 DIAGNOSIS — M79.605 LEFT LEG PAIN: ICD-10-CM

## 2018-05-10 DIAGNOSIS — I44.2 AV BLOCK, COMPLETE: ICD-10-CM

## 2018-05-10 PROBLEM — D62 ACUTE BLOOD LOSS ANEMIA: Status: RESOLVED | Noted: 2018-03-05 | Resolved: 2018-05-10

## 2018-05-10 PROBLEM — M25.512 ACUTE PAIN OF LEFT SHOULDER: Status: RESOLVED | Noted: 2017-01-07 | Resolved: 2018-05-10

## 2018-05-10 PROBLEM — I50.33 ACUTE ON CHRONIC DIASTOLIC HEART FAILURE: Status: RESOLVED | Noted: 2017-04-11 | Resolved: 2018-05-10

## 2018-05-10 PROCEDURE — 93923 UPR/LXTR ART STDY 3+ LVLS: CPT | Mod: S$GLB,,, | Performed by: SURGERY

## 2018-05-10 PROCEDURE — 99024 POSTOP FOLLOW-UP VISIT: CPT | Mod: S$GLB,,, | Performed by: SURGERY

## 2018-05-10 PROCEDURE — 99999 PR PBB SHADOW E&M-EST. PATIENT-LVL III: CPT | Mod: PBBFAC,,, | Performed by: SURGERY

## 2018-05-10 PROCEDURE — 93990 DOPPLER FLOW TESTING: CPT | Mod: 59,S$GLB,, | Performed by: SURGERY

## 2018-05-10 RX ORDER — LIDOCAINE HYDROCHLORIDE 10 MG/ML
1 INJECTION, SOLUTION EPIDURAL; INFILTRATION; INTRACAUDAL; PERINEURAL ONCE
Status: CANCELLED | OUTPATIENT
Start: 2018-05-10 | End: 2018-05-10

## 2018-05-10 RX ORDER — SODIUM CHLORIDE 9 MG/ML
INJECTION, SOLUTION INTRAVENOUS CONTINUOUS
Status: CANCELLED | OUTPATIENT
Start: 2018-05-10

## 2018-05-11 NOTE — PROGRESS NOTES
Patient ID: Wilder Carbajal is a 67 y.o. male.    I. HISTORY     Chief Complaint: Follow-up      HPI Wilder Carbajal is a 67 y.o. male  with h/o left RC AVF placed in [place], [place] for ESRD 2/2 DM and HTN.  Pt has been on HD since  and dialyzes onn M/W/F schedule.  He states that he continues to have significant bleeding after dialysis requiring pressure dressing to be in place for 24 hours. He is getting accessed without difficulty and has no other dialysis related complications (high venous pressures, low flow, hypotension etc).  Of note, pt does endorse some bilateral hand numbness and weakness, but nothing of major concern and no progression from baseline.  He also has complaints of b/l LE swelling with skin discoloration of the R>L lower leg.    Underwent an interposition graft of his left RC AVF for 2 large pseudoaneurysms in 2014.  Bleeding from fistula has resolved since moving needle access to upper arm cephalic vein.   No problems with access.    Right AKA 2 months ago due to gangrene.  Has now developed a large left heel decubitus     Review of Systems   Constitution: Negative.   HENT: Negative.    Eyes: Negative.    Cardiovascular: Negative.    Respiratory: Negative.    Endocrine: Negative.    Hematologic/Lymphatic: Negative.    Skin: Negative.    Musculoskeletal: Negative.    Gastrointestinal: Negative.    Genitourinary: Negative.    Neurological: Positive for numbness.        Bilateral hands   Psychiatric/Behavioral: Negative.    Allergic/Immunologic: Negative.        II. PHYSICAL EXAM   Right Arm BP - Sittin/89 (05/10/18 1021)  Pulse: 61  Temp: 98 °F (36.7 °C)  There is no height or weight on file to calculate BMI.    Physical Exam   Constitutional: He is oriented to person, place, and time. He appears well-developed and well-nourished. No distress.   HENT:   Head: Normocephalic and atraumatic.   Eyes: Conjunctivae and EOM are normal.   Neck: Neck supple. No JVD present.    Cardiovascular: Normal rate.    Pulmonary/Chest: Effort normal. No respiratory distress.   Abdominal: Soft.   Musculoskeletal: Normal range of motion. He exhibits no tenderness.   Left LE 2+pitting edema and hyperpigmentation   Right AKA   Neurological: He is alert and oriented to person, place, and time.   Skin: Skin is warm and dry. No erythema.   Psychiatric: He has a normal mood and affect.      Pulsatile thrill in left FA AVG.  +2 radial pulses bilateral   4-5 cm left heel decubitus with overlying eschar      III. ASSESSMENT & PLAN (MEDICAL DECISION MAKING)     1. Foot ulceration, left, with unspecified severity    2. ESRD (end stage renal disease) on dialysis    3. AV block, complete    4. Anemia, chronic disease        Imaging Results:   HD Duplex:   Flow 1383, no stenosis     Assessment/Diagnosis and Plan:  Wilder Carbajal is a 67 y.o. male with a left RC AVF with PTFE interposition.   Right AKA for gangrnee and now new tissue loss of the left foot. Nonpalpable pedal pulses - difficulty finding DP and PT signals despite PVRs    Plan angio next week to eval for wound healing.     Liz Zhou MD FACS [facility]  Vascular & Endovascular Surgery

## 2018-05-11 NOTE — PRE-PROCEDURE INSTRUCTIONS
Preop instructions: NPO after midnight, shower instructions, directions, leave all valuables at home,  explained. Patient stated an understanding.     Patient denies any side effects or issues with anesthesia or sedation.    Will call [facility] Monday for medication instruction and to check on transportation for patient. Talked with Sasha at [facility]- will call back to get med instruction  Talked with Sasha again at [facility]- they were not aware of his prior MD appointment or his scheduled surgery so his meds nor his transportation has not been set up. Will discuss at team meeting tomorrow, she will let us know tomorrow. Call to vascular; Tata states she talked to Kyree at [facility] who said patient will be here for 10am tomorrow. When called Sasha at [facility] to go over med instruction, she said they did not have a Kyree working there but took med instructions anyway

## 2018-05-14 ENCOUNTER — TELEPHONE (OUTPATIENT)
Dept: VASCULAR SURGERY | Facility: CLINIC | Age: 68
End: 2018-05-14

## 2018-05-14 NOTE — TELEPHONE ENCOUNTER
"Spoke with Kyree with [facility] transportation. Time of arrival 1000am for surgery on 5/15/2018 confirmed.Kyree states" I will have him there."  "

## 2018-05-14 NOTE — TELEPHONE ENCOUNTER
Called Arianna) no answer left message with time of arrival 10:55am for Mr Raven surgery on 5/15/2018 ,2nd floor [facility] with a callback number [phone].

## 2018-05-15 ENCOUNTER — HOSPITAL ENCOUNTER (OUTPATIENT)
Facility: HOSPITAL | Age: 68
Discharge: STILL ADMITTED OR ADMINISTRATIVE | End: 2018-05-16
Attending: SURGERY | Admitting: SURGERY
Payer: COMMERCIAL

## 2018-05-15 ENCOUNTER — ANESTHESIA (OUTPATIENT)
Dept: SURGERY | Facility: HOSPITAL | Age: 68
End: 2018-05-15
Payer: COMMERCIAL

## 2018-05-15 ENCOUNTER — ANESTHESIA EVENT (OUTPATIENT)
Dept: SURGERY | Facility: HOSPITAL | Age: 68
End: 2018-05-15
Payer: COMMERCIAL

## 2018-05-15 ENCOUNTER — SURGERY (OUTPATIENT)
Age: 68
End: 2018-05-15

## 2018-05-15 DIAGNOSIS — I44.2 AV BLOCK, COMPLETE: ICD-10-CM

## 2018-05-15 DIAGNOSIS — D63.8 ANEMIA, CHRONIC DISEASE: ICD-10-CM

## 2018-05-15 DIAGNOSIS — L97.529 FOOT ULCERATION, LEFT, WITH UNSPECIFIED SEVERITY: ICD-10-CM

## 2018-05-15 DIAGNOSIS — Z99.2 ESRD (END STAGE RENAL DISEASE) ON DIALYSIS: ICD-10-CM

## 2018-05-15 DIAGNOSIS — N18.6 ESRD (END STAGE RENAL DISEASE): ICD-10-CM

## 2018-05-15 DIAGNOSIS — N18.6 ESRD (END STAGE RENAL DISEASE) ON DIALYSIS: ICD-10-CM

## 2018-05-15 LAB
ALBUMIN SERPL BCP-MCNC: 2.1 G/DL
ALP SERPL-CCNC: 86 U/L
ALT SERPL W/O P-5'-P-CCNC: 5 U/L
ANION GAP SERPL CALC-SCNC: 8 MMOL/L
APTT BLDCRRT: 28 SEC
AST SERPL-CCNC: 8 U/L
BASOPHILS # BLD AUTO: 0.03 K/UL
BASOPHILS NFR BLD: 0.5 %
BILIRUB SERPL-MCNC: 0.5 MG/DL
BUN SERPL-MCNC: 15 MG/DL
CALCIUM SERPL-MCNC: 8.1 MG/DL
CHLORIDE SERPL-SCNC: 107 MMOL/L
CO2 SERPL-SCNC: 23 MMOL/L
CREAT SERPL-MCNC: 3.9 MG/DL
DIFFERENTIAL METHOD: ABNORMAL
EOSINOPHIL # BLD AUTO: 0 K/UL
EOSINOPHIL NFR BLD: 0.7 %
ERYTHROCYTE [DISTWIDTH] IN BLOOD BY AUTOMATED COUNT: 19.1 %
EST. GFR  (AFRICAN AMERICAN): 17.2 ML/MIN/1.73 M^2
EST. GFR  (NON AFRICAN AMERICAN): 14.9 ML/MIN/1.73 M^2
FIBRINOGEN PPP-MCNC: 209 MG/DL
GLUCOSE SERPL-MCNC: 455 MG/DL
HCT VFR BLD AUTO: 35.2 %
HGB BLD-MCNC: 10.4 G/DL
IMM GRANULOCYTES # BLD AUTO: 0.02 K/UL
IMM GRANULOCYTES NFR BLD AUTO: 0.4 %
INR PPP: 1.4
LYMPHOCYTES # BLD AUTO: 1.6 K/UL
LYMPHOCYTES NFR BLD: 28.3 %
MCH RBC QN AUTO: 27.6 PG
MCHC RBC AUTO-ENTMCNC: 29.5 G/DL
MCV RBC AUTO: 93 FL
MONOCYTES # BLD AUTO: 0.6 K/UL
MONOCYTES NFR BLD: 10.2 %
NEUTROPHILS # BLD AUTO: 3.3 K/UL
NEUTROPHILS NFR BLD: 59.9 %
NRBC BLD-RTO: 1 /100 WBC
PLATELET # BLD AUTO: 115 K/UL
PMV BLD AUTO: 9.6 FL
POCT GLUCOSE: 101 MG/DL (ref 70–110)
POCT GLUCOSE: 52 MG/DL (ref 70–110)
POCT GLUCOSE: 57 MG/DL (ref 70–110)
POCT GLUCOSE: 68 MG/DL (ref 70–110)
POCT GLUCOSE: 84 MG/DL (ref 70–110)
POTASSIUM SERPL-SCNC: 3.7 MMOL/L
PROT SERPL-MCNC: 6.6 G/DL
PROTHROMBIN TIME: 13.6 SEC
RBC # BLD AUTO: 3.77 M/UL
SODIUM SERPL-SCNC: 138 MMOL/L
WBC # BLD AUTO: 5.51 K/UL

## 2018-05-15 PROCEDURE — 25000003 PHARM REV CODE 250: Performed by: ANESTHESIOLOGY

## 2018-05-15 PROCEDURE — C1874 STENT, COATED/COV W/DEL SYS: HCPCS | Performed by: SURGERY

## 2018-05-15 PROCEDURE — 36000707: Performed by: SURGERY

## 2018-05-15 PROCEDURE — 71000015 HC POSTOP RECOV 1ST HR: Performed by: SURGERY

## 2018-05-15 PROCEDURE — 82962 GLUCOSE BLOOD TEST: CPT | Performed by: SURGERY

## 2018-05-15 PROCEDURE — C1769 GUIDE WIRE: HCPCS | Performed by: SURGERY

## 2018-05-15 PROCEDURE — 85384 FIBRINOGEN ACTIVITY: CPT

## 2018-05-15 PROCEDURE — 37000008 HC ANESTHESIA 1ST 15 MINUTES: Performed by: SURGERY

## 2018-05-15 PROCEDURE — 37230 PR TIB/PER REVASC W/STENT: CPT | Mod: 79,LT,, | Performed by: SURGERY

## 2018-05-15 PROCEDURE — C1887 CATHETER, GUIDING: HCPCS | Performed by: SURGERY

## 2018-05-15 PROCEDURE — 36000706: Performed by: SURGERY

## 2018-05-15 PROCEDURE — 71000016 HC POSTOP RECOV ADDL HR: Performed by: SURGERY

## 2018-05-15 PROCEDURE — 25000003 PHARM REV CODE 250: Performed by: SURGERY

## 2018-05-15 PROCEDURE — 75710 ARTERY X-RAYS ARM/LEG: CPT | Mod: 26,59,, | Performed by: SURGERY

## 2018-05-15 PROCEDURE — 85025 COMPLETE CBC W/AUTO DIFF WBC: CPT

## 2018-05-15 PROCEDURE — G0378 HOSPITAL OBSERVATION PER HR: HCPCS

## 2018-05-15 PROCEDURE — C1894 INTRO/SHEATH, NON-LASER: HCPCS | Performed by: SURGERY

## 2018-05-15 PROCEDURE — 85610 PROTHROMBIN TIME: CPT

## 2018-05-15 PROCEDURE — D9220A PRA ANESTHESIA: Mod: CRNA,,, | Performed by: NURSE ANESTHETIST, CERTIFIED REGISTERED

## 2018-05-15 PROCEDURE — 85730 THROMBOPLASTIN TIME PARTIAL: CPT

## 2018-05-15 PROCEDURE — 80053 COMPREHEN METABOLIC PANEL: CPT

## 2018-05-15 PROCEDURE — 63600175 PHARM REV CODE 636 W HCPCS: Performed by: SURGERY

## 2018-05-15 PROCEDURE — C1726 CATH, BAL DIL, NON-VASCULAR: HCPCS | Performed by: SURGERY

## 2018-05-15 PROCEDURE — 37000009 HC ANESTHESIA EA ADD 15 MINS: Performed by: SURGERY

## 2018-05-15 PROCEDURE — C1760 CLOSURE DEV, VASC: HCPCS | Performed by: SURGERY

## 2018-05-15 PROCEDURE — C1725 CATH, TRANSLUMIN NON-LASER: HCPCS | Performed by: SURGERY

## 2018-05-15 PROCEDURE — 25500020 PHARM REV CODE 255: Performed by: SURGERY

## 2018-05-15 PROCEDURE — D9220A PRA ANESTHESIA: Mod: ANES,,, | Performed by: ANESTHESIOLOGY

## 2018-05-15 PROCEDURE — 27201423 OPTIME MED/SURG SUP & DEVICES STERILE SUPPLY: Performed by: SURGERY

## 2018-05-15 PROCEDURE — 63600175 PHARM REV CODE 636 W HCPCS: Performed by: NURSE ANESTHETIST, CERTIFIED REGISTERED

## 2018-05-15 DEVICE — IMPLANTABLE DEVICE: Type: IMPLANTABLE DEVICE | Site: ARTERIAL | Status: FUNCTIONAL

## 2018-05-15 RX ORDER — MIDODRINE HYDROCHLORIDE 5 MG/1
10 TABLET ORAL
Status: DISCONTINUED | OUTPATIENT
Start: 2018-05-16 | End: 2018-05-16 | Stop reason: HOSPADM

## 2018-05-15 RX ORDER — CLOPIDOGREL BISULFATE 75 MG/1
75 TABLET ORAL DAILY
Qty: 30 TABLET | Refills: 11 | Status: SHIPPED | OUTPATIENT
Start: 2018-05-15 | End: 2019-05-15

## 2018-05-15 RX ORDER — GABAPENTIN 100 MG/1
100 CAPSULE ORAL 3 TIMES DAILY
Status: DISCONTINUED | OUTPATIENT
Start: 2018-05-15 | End: 2018-05-16 | Stop reason: HOSPADM

## 2018-05-15 RX ORDER — IODIXANOL 320 MG/ML
INJECTION, SOLUTION INTRAVASCULAR
Status: DISCONTINUED | OUTPATIENT
Start: 2018-05-15 | End: 2018-05-15 | Stop reason: HOSPADM

## 2018-05-15 RX ORDER — CLOPIDOGREL BISULFATE 75 MG/1
300 TABLET ORAL ONCE
Status: COMPLETED | OUTPATIENT
Start: 2018-05-15 | End: 2018-05-15

## 2018-05-15 RX ORDER — LIDOCAINE HYDROCHLORIDE 10 MG/ML
INJECTION, SOLUTION EPIDURAL; INFILTRATION; INTRACAUDAL; PERINEURAL
Status: DISCONTINUED | OUTPATIENT
Start: 2018-05-15 | End: 2018-05-15 | Stop reason: HOSPADM

## 2018-05-15 RX ORDER — ATORVASTATIN CALCIUM 20 MG/1
20 TABLET, FILM COATED ORAL DAILY
Status: DISCONTINUED | OUTPATIENT
Start: 2018-05-16 | End: 2018-05-16 | Stop reason: HOSPADM

## 2018-05-15 RX ORDER — AMOXICILLIN 250 MG
2 CAPSULE ORAL 2 TIMES DAILY
Status: DISCONTINUED | OUTPATIENT
Start: 2018-05-15 | End: 2018-05-16 | Stop reason: HOSPADM

## 2018-05-15 RX ORDER — SODIUM CHLORIDE 0.9 % (FLUSH) 0.9 %
3 SYRINGE (ML) INJECTION
Status: DISCONTINUED | OUTPATIENT
Start: 2018-05-15 | End: 2018-05-16 | Stop reason: HOSPADM

## 2018-05-15 RX ORDER — HYDROCODONE BITARTRATE AND ACETAMINOPHEN 10; 325 MG/1; MG/1
1 TABLET ORAL EVERY 8 HOURS PRN
Status: DISCONTINUED | OUTPATIENT
Start: 2018-05-15 | End: 2018-05-16 | Stop reason: HOSPADM

## 2018-05-15 RX ORDER — CHOLECALCIFEROL (VITAMIN D3) 25 MCG
2000 TABLET ORAL DAILY
Status: DISCONTINUED | OUTPATIENT
Start: 2018-05-16 | End: 2018-05-16 | Stop reason: HOSPADM

## 2018-05-15 RX ORDER — LIDOCAINE HYDROCHLORIDE 10 MG/ML
1 INJECTION, SOLUTION EPIDURAL; INFILTRATION; INTRACAUDAL; PERINEURAL ONCE
Status: DISCONTINUED | OUTPATIENT
Start: 2018-05-15 | End: 2018-05-15

## 2018-05-15 RX ORDER — ASPIRIN 81 MG/1
81 TABLET ORAL DAILY
Status: DISCONTINUED | OUTPATIENT
Start: 2018-05-16 | End: 2018-05-16 | Stop reason: HOSPADM

## 2018-05-15 RX ORDER — CITALOPRAM 10 MG/1
10 TABLET ORAL DAILY
Status: DISCONTINUED | OUTPATIENT
Start: 2018-05-16 | End: 2018-05-16 | Stop reason: HOSPADM

## 2018-05-15 RX ORDER — MIDAZOLAM HYDROCHLORIDE 1 MG/ML
INJECTION, SOLUTION INTRAMUSCULAR; INTRAVENOUS
Status: DISCONTINUED | OUTPATIENT
Start: 2018-05-15 | End: 2018-05-15

## 2018-05-15 RX ORDER — CEFAZOLIN SODIUM 1 G/3ML
2 INJECTION, POWDER, FOR SOLUTION INTRAMUSCULAR; INTRAVENOUS
Status: DISCONTINUED | OUTPATIENT
Start: 2018-05-15 | End: 2018-05-15

## 2018-05-15 RX ORDER — CINACALCET 30 MG/1
30 TABLET, FILM COATED ORAL NIGHTLY
Status: DISCONTINUED | OUTPATIENT
Start: 2018-05-15 | End: 2018-05-16 | Stop reason: HOSPADM

## 2018-05-15 RX ORDER — SODIUM CHLORIDE 0.9 % (FLUSH) 0.9 %
3 SYRINGE (ML) INJECTION
Status: DISCONTINUED | OUTPATIENT
Start: 2018-05-15 | End: 2018-05-15 | Stop reason: HOSPADM

## 2018-05-15 RX ORDER — HEPARIN SODIUM 1000 [USP'U]/ML
INJECTION, SOLUTION INTRAVENOUS; SUBCUTANEOUS
Status: DISCONTINUED | OUTPATIENT
Start: 2018-05-15 | End: 2018-05-15

## 2018-05-15 RX ORDER — HYDROCODONE BITARTRATE AND ACETAMINOPHEN 5; 325 MG/1; MG/1
1 TABLET ORAL EVERY 4 HOURS PRN
Status: DISCONTINUED | OUTPATIENT
Start: 2018-05-15 | End: 2018-05-16 | Stop reason: HOSPADM

## 2018-05-15 RX ORDER — FENTANYL CITRATE 50 UG/ML
25 INJECTION, SOLUTION INTRAMUSCULAR; INTRAVENOUS EVERY 5 MIN PRN
Status: DISCONTINUED | OUTPATIENT
Start: 2018-05-15 | End: 2018-05-15 | Stop reason: HOSPADM

## 2018-05-15 RX ORDER — HEPARIN SODIUM 1000 [USP'U]/ML
INJECTION, SOLUTION INTRAVENOUS; SUBCUTANEOUS
Status: DISCONTINUED | OUTPATIENT
Start: 2018-05-15 | End: 2018-05-15 | Stop reason: HOSPADM

## 2018-05-15 RX ORDER — LIDOCAINE HYDROCHLORIDE 10 MG/ML
1 INJECTION, SOLUTION EPIDURAL; INFILTRATION; INTRACAUDAL; PERINEURAL ONCE
Status: COMPLETED | OUTPATIENT
Start: 2018-05-15 | End: 2018-05-15

## 2018-05-15 RX ORDER — FENTANYL CITRATE 50 UG/ML
INJECTION, SOLUTION INTRAMUSCULAR; INTRAVENOUS
Status: DISCONTINUED | OUTPATIENT
Start: 2018-05-15 | End: 2018-05-15

## 2018-05-15 RX ORDER — SEVELAMER CARBONATE 800 MG/1
800 TABLET, FILM COATED ORAL
Status: DISCONTINUED | OUTPATIENT
Start: 2018-05-16 | End: 2018-05-16 | Stop reason: HOSPADM

## 2018-05-15 RX ORDER — SODIUM CHLORIDE 9 MG/ML
INJECTION, SOLUTION INTRAVENOUS CONTINUOUS
Status: DISCONTINUED | OUTPATIENT
Start: 2018-05-15 | End: 2018-05-15

## 2018-05-15 RX ORDER — CLOPIDOGREL BISULFATE 75 MG/1
75 TABLET ORAL DAILY
Status: DISCONTINUED | OUTPATIENT
Start: 2018-05-16 | End: 2018-05-16 | Stop reason: HOSPADM

## 2018-05-15 RX ADMIN — DEXTROSE MONOHYDRATE 25 G: 25 INJECTION, SOLUTION INTRAVENOUS at 04:05

## 2018-05-15 RX ADMIN — CLOPIDOGREL 300 MG: 75 TABLET, FILM COATED ORAL at 06:05

## 2018-05-15 RX ADMIN — STANDARDIZED SENNA CONCENTRATE AND DOCUSATE SODIUM 2 TABLET: 8.6; 5 TABLET, FILM COATED ORAL at 09:05

## 2018-05-15 RX ADMIN — DEXTROSE MONOHYDRATE 12.5 G: 25 INJECTION, SOLUTION INTRAVENOUS at 01:05

## 2018-05-15 RX ADMIN — HEPARIN SODIUM 10000 UNITS: 1000 INJECTION, SOLUTION INTRAVENOUS; SUBCUTANEOUS at 03:05

## 2018-05-15 RX ADMIN — MIDAZOLAM HYDROCHLORIDE 2 MG: 1 INJECTION, SOLUTION INTRAMUSCULAR; INTRAVENOUS at 02:05

## 2018-05-15 RX ADMIN — GABAPENTIN 100 MG: 100 CAPSULE ORAL at 09:05

## 2018-05-15 RX ADMIN — LIDOCAINE HYDROCHLORIDE 0.1 MG: 10 INJECTION, SOLUTION EPIDURAL; INFILTRATION; INTRACAUDAL; PERINEURAL at 01:05

## 2018-05-15 RX ADMIN — DEXTROSE MONOHYDRATE 12.5 G: 25 INJECTION, SOLUTION INTRAVENOUS at 03:05

## 2018-05-15 RX ADMIN — CINACALCET HYDROCHLORIDE 30 MG: 30 TABLET, COATED ORAL at 09:05

## 2018-05-15 RX ADMIN — FENTANYL CITRATE 25 MCG: 50 INJECTION, SOLUTION INTRAMUSCULAR; INTRAVENOUS at 03:05

## 2018-05-15 RX ADMIN — HYDROCODONE BITARTRATE AND ACETAMINOPHEN 1 TABLET: 10; 325 TABLET ORAL at 10:05

## 2018-05-15 RX ADMIN — SODIUM CHLORIDE: 9 INJECTION, SOLUTION INTRAVENOUS at 01:05

## 2018-05-15 RX ADMIN — HEPARIN SODIUM 5000 UNITS: 1000 INJECTION, SOLUTION INTRAVENOUS; SUBCUTANEOUS at 03:05

## 2018-05-15 RX ADMIN — IODIXANOL 47 ML: 320 INJECTION, SOLUTION INTRAVASCULAR at 04:05

## 2018-05-15 RX ADMIN — CEFAZOLIN 2 G: 330 INJECTION, POWDER, FOR SOLUTION INTRAMUSCULAR; INTRAVENOUS at 03:05

## 2018-05-15 RX ADMIN — LIDOCAINE HYDROCHLORIDE 5 ML: 10 INJECTION, SOLUTION EPIDURAL; INFILTRATION; INTRACAUDAL; PERINEURAL at 03:05

## 2018-05-15 NOTE — PROGRESS NOTES
Unable to obtain ride for patient back to NH. No answers on all numbers provided. Dr. Johnston notified. Will admit overnight to observation.

## 2018-05-15 NOTE — TRANSFER OF CARE
"Anesthesia Transfer of Care Note    Patient: Wilder Carbajal    Procedure(s) Performed: Procedure(s) (LRB):  Aortogram with Left Leg Angiogram, possible intervention (Left)  ANGIOPLASTY-PERCUTANEOUS TRANSLUMINAL (PTA)  STENT-PERIPHERAL (Left)    Patient location: [facility]    Anesthesia Type: MAC    Transport from OR: Transported from OR on 6-10 L/min O2 by face mask with adequate spontaneous ventilation    Post pain: adequate analgesia    Post assessment: no apparent anesthetic complications and tolerated procedure well    Post vital signs: stable    Level of consciousness: awake, alert and oriented    Nausea/Vomiting: no nausea/vomiting    Complications: none    Transfer of care protocol was followed      Last vitals:   Visit Vitals  BP (!) 118/54 (BP Location: Right arm, Patient Position: Sitting)   Pulse (!) 55   Temp 37 °C (98.6 °F) (Oral)   Resp 18   Ht 5' 8" (1.727 m)   Wt 111.1 kg (245 lb)   SpO2 96%   BMI 37.25 kg/m²     "

## 2018-05-15 NOTE — PROGRESS NOTES
Reported blood sugar of 52 and 57 done at 1245 to Dr Tripathi. Patient alert oriented responding appropriately. Orders received to give D50 once IV is in or glucose tablet if there is a change in mental status.

## 2018-05-15 NOTE — OP NOTE
[facility]  Vascular Surgery  Operative Note    SUMMARY     Date of Procedure: 5/15/2018     Procedure: Aortogram + Left lower extremity angiogram    PTA and Stent Left Posterior Tibial artery (3x18mm Alpine NEDA)    US guided R CFA access    Surgeon(s) and Role:     * Liz Zhou MD - Primary     * Alexandrea Johnston MD - Fellow    Assisting Surgeon: None    Pre-Operative Diagnosis: ESRD (end stage renal disease) on dialysis [N18.6, Z99.2]  Peripheral arterial disease with tissue loss  Foot ulceration, left, with unspecified severity [L97.529]    Post-Operative Diagnosis: same    Anesthesia: Local MAC    Indication for operation: 67 y.o. male  with h/o left RC AVF placed in [place], [place] for ESRD 2/2 DM and HTN, R AKA for gangrene and tissue loss of left heel decubitus ulcer.    Description of the Findings of the Procedure: extensive calcification of SFA, pop; high grade prox PT stenosis with high grade occlusion  Brisk antegrade flow in PT after stent    Complications: No    Estimated Blood Loss (EBL):5 mL         Implants:   Implant Name Type Inv. Item Serial No.  Lot No. LRB No. Used   STENT XIENCE ALPINE 3.00X18 RX - [id number] Stent STENT XIENCE ALPINE 3.00X18 RX   [employer] [id number] Left 1       Specimens:   Specimen (12h ago through future)    None                  Condition: Good    Disposition: PACU - hemodynamically stable.      DATE OF PROCEDURE:  05/15/2018    OPERATION IN DETAIL:  Informed consent was obtained and the patient was brought   into the Operating Room and placed in supine position.  He was given IV   antibiotics.  His bilateral groins were prepped and draped in sterile fashion.    The ultrasound was used to visualize the patient's right common femoral artery,   which was noted to be patent.  This was accessed with a micropuncture needle,   followed by wire, followed by micropuncture sheath, followed by placement of   5-Polish sheath.  The patient  was given 6000 units of IV heparin.  A floppy   angled Glidewire was advanced into the distal aorta with the Omniflush catheter.    Aortogram was performed demonstrating patent bilateral common iliac arteries   and external iliac arteries.  Omniflush catheter was used to select the left   common iliac system with the catheter and wire advanced into the distal external   iliac artery.  Left lower extremity angiogram was performed.  This demonstrated   a patent left common femoral artery with patent proximal profunda femoris with   early branching of the profunda femoris artery.  The left SFA was patent but   heavily calcified throughout the entirety of the left SFA with patency of the   popliteal artery.  Runoff demonstrated a proximal AT, but occluded in the   proximal calf with PT as the main runoff to the foot.  Of note, secondary to the   patient's heart failure, an angled glide catheter was advanced over the wire   with wire used and catheter used to select the left SFA and popliteal with   improved distal angiogram images performed, again demonstrating the PT artery as   the main vessel to the level of the foot.  The PT artery did have a significant   amount of calcification in the proximal aspect of the short segment occlusion.    Decision was made to intervene.  A J-wire was advanced in the popliteal artery   with sheath exchanged for a 5 x 90 sheath, which was advanced to the below-knee   popliteal artery.  Angiogram was performed delineating the anatomy of the PT.  A   0.014 Glidewire Advantage and seeker catheter were attempted to cannulate the   PT and cross the shorter segment occlusion.  The Glidewire Advantage was unable   to cross the occlusion.  We did use a 0.014 ____ wire and seeker catheter to   advance wire into the distal PT artery, but we were unable to track the seeker   catheter past the short segment occlusion.  A 1.5 x 20 [place] balloon was then   advanced and was able to cross the PT  proximal occlusion.  We inflated this to   12 mmHg under visualization.  We followed this by a 3 x 20 [place] balloon, which   appeared to be good size for the PT artery.  There was a proximal large branch   of the posterior tibial artery, which we were very careful to preserve.  Next,   we advanced an Alpine drug-eluting stent over a wire across the area of   high-grade stenosis as well as occlusion.  This was deployed under visualization   with the balloon inflated to 10 mmHg.  Followup angiogram demonstrated   exclusion of the high-grade stenosis and occlusion of the proximal PT with brisk   antegrade flow and no evidence of extravasation or dissection.  Distal   angiogram images demonstrated PT artery patent to the level of the ankle and   with flow into the foot.  Decision was made to conclude the procedure.  The wire   was withdrawn with exchange for a J-wire with sheath removal and deployment of   the Angioseal device.  The patient was noted to have good hemostasis after   deployment of the Angioseal with sterile dressing applied.  He was taken to PACU   in stable condition.    DICTATED BY:  Alexandrea Johnston D.O. (RES)      TRENT/NICOLE  dd: 05/15/2018 16:26:03 (CDT)  td: 05/15/2018 19:15:56 (CDT)  Doc ID   #[id number]  Job ID #[id number]    CC:     [id number]  5x90 sheath  014 glidewire advantage  014 daja wire  L[place] Wolf 1.5x 20  Lone Wolf 3x20  Alpine abdirahman 3x18

## 2018-05-15 NOTE — H&P (VIEW-ONLY)
Patient ID: Wilder Carbajal is a 67 y.o. male.    I. HISTORY     Chief Complaint: Follow-up      HPI Wilder Carbajal is a 67 y.o. male  with h/o left RC AVF placed in [place], [place] for ESRD 2/2 DM and HTN.  Pt has been on HD since  and dialyzes onn M/W/F schedule.  He states that he continues to have significant bleeding after dialysis requiring pressure dressing to be in place for 24 hours. He is getting accessed without difficulty and has no other dialysis related complications (high venous pressures, low flow, hypotension etc).  Of note, pt does endorse some bilateral hand numbness and weakness, but nothing of major concern and no progression from baseline.  He also has complaints of b/l LE swelling with skin discoloration of the R>L lower leg.    Underwent an interposition graft of his left RC AVF for 2 large pseudoaneurysms in 2014.  Bleeding from fistula has resolved since moving needle access to upper arm cephalic vein.   No problems with access.    Right AKA 2 months ago due to gangrene.  Has now developed a large left heel decubitus     Review of Systems   Constitution: Negative.   HENT: Negative.    Eyes: Negative.    Cardiovascular: Negative.    Respiratory: Negative.    Endocrine: Negative.    Hematologic/Lymphatic: Negative.    Skin: Negative.    Musculoskeletal: Negative.    Gastrointestinal: Negative.    Genitourinary: Negative.    Neurological: Positive for numbness.        Bilateral hands   Psychiatric/Behavioral: Negative.    Allergic/Immunologic: Negative.        II. PHYSICAL EXAM   Right Arm BP - Sittin/89 (05/10/18 1021)  Pulse: 61  Temp: 98 °F (36.7 °C)  There is no height or weight on file to calculate BMI.    Physical Exam   Constitutional: He is oriented to person, place, and time. He appears well-developed and well-nourished. No distress.   HENT:   Head: Normocephalic and atraumatic.   Eyes: Conjunctivae and EOM are normal.   Neck: Neck supple. No JVD present.    Cardiovascular: Normal rate.    Pulmonary/Chest: Effort normal. No respiratory distress.   Abdominal: Soft.   Musculoskeletal: Normal range of motion. He exhibits no tenderness.   Left LE 2+pitting edema and hyperpigmentation   Right AKA   Neurological: He is alert and oriented to person, place, and time.   Skin: Skin is warm and dry. No erythema.   Psychiatric: He has a normal mood and affect.      Pulsatile thrill in left FA AVG.  +2 radial pulses bilateral   4-5 cm left heel decubitus with overlying eschar      III. ASSESSMENT & PLAN (MEDICAL DECISION MAKING)     1. Foot ulceration, left, with unspecified severity    2. ESRD (end stage renal disease) on dialysis    3. AV block, complete    4. Anemia, chronic disease        Imaging Results:   HD Duplex:   Flow 1383, no stenosis     Assessment/Diagnosis and Plan:  Wilder Carbajal is a 67 y.o. male with a left RC AVF with PTFE interposition.   Right AKA for gangrnee and now new tissue loss of the left foot. Nonpalpable pedal pulses - difficulty finding DP and PT signals despite PVRs    Plan angio next week to eval for wound healing.     Liz Zhou MD FACS [facility]  Vascular & Endovascular Surgery

## 2018-05-15 NOTE — ANESTHESIA PREPROCEDURE EVALUATION
[facility]  Anesthesia Pre-Operative Evaluation         Patient Name: Wilder Carbajal  YOB: 1950  MRN: [id number]    SUBJECTIVE:       HPI 05/15/2018:  Wilder Carbajal is a 67 y.o. male with hx of ESRD on HD (TThS) via L RC AVF (with interposition graft), HTN, HLD, DM2 ( Hgb A1c 4.7), afib, GERD, depression, HFrEF (45% 4/2017)  presenting with fluid overload after missing dialysis. Found to have evidence of right diabetic foot ulceration with wet gangrene. S/p amputation of 4th and 5th toes on 2/9/18. Underwent R AKA, now with ulcer of left foot.    Prev airway:   No prior records in Epic or LegPerfect[employer] Documents.         Current LDA:   PIV R forearm 20G       Peripheral IV - Single Lumen 01/07/18 0009 Right Forearm (Active)   Number of days: 49            Peripheral IV - Single Lumen 02/24/18 1115 Right Forearm (Active)   Site Assessment Dry;Intact 2/25/2018  8:04 PM   Line Status Saline locked 2/25/2018  8:04 PM   Dressing Status Clean;Dry;Intact 2/25/2018  8:00 AM   Dressing Intervention New dressing 2/24/2018 12:00 PM   Dressing Change Due 02/28/18 2/25/2018  8:04 PM   Site Change Due 02/28/18 2/25/2018  8:04 PM   Number of days: 1            Hemodialysis AV Fistula Left upper arm (Active)   Needle Size 15ga 2/23/2018 11:45 AM   Site Assessment Intact;Dry 2/25/2018  8:04 PM   Patency Present;Thrill;Bruit 2/25/2018  8:04 PM   Status Deaccessed 2/25/2018  8:00 AM   Flows Good 2/19/2018 12:30 PM   Dressing Intervention New dressing 2/19/2018 12:30 PM   Dressing Status Clean;Dry;Intact 2/24/2018  9:08 AM   Site Condition No complications 2/25/2018  8:00 AM   Dressing Gauze 2/19/2018  9:19 PM   Number of days:        Current Drips:  None documented.      Patient Active Problem List   Diagnosis    Traction detachment of both retinas    Diabetic macular edema of both eyes    NS (nuclear sclerosis)    Hypertensive retinopathy of both eyes    History of excision of epidermal  inclusion cyst    Proliferative diabetic retinopathy of both eyes without macular edema associated with type 2 diabetes mellitus    Proliferative diabetic retinopathy, both eyes    Elevated PSA    Malignant hypertension    HLD (hyperlipidemia)    DM II (diabetes mellitus, type II), controlled    Essential hypertension    Hypothyroidism    GERD (gastroesophageal reflux disease)    Lumbar facet arthropathy    Renovascular hypertension, malignant    Lipoma    Type 2 diabetes mellitus with stage 5 chronic kidney disease and hypertension    ESRD (end stage renal disease) on dialysis    Diabetes mellitus with ESRD (end-stage renal disease)    Lumbar spondylosis    Spinal stenosis of lumbar region    Bilateral low back pain    Complete heart block    Bicuspid aortic valve    AV block, complete    Typical angina    Fever    Coccygeal pain, acute    Sacrococcygeal pilonidal cyst    Second degree heart block    Vein stenosis    Weakness    Dependence on hemodialysis    Paroxysmal atrial fibrillation    Right lower quadrant abdominal pain    Hypoxia    Right femoral vein DVT    Anemia, chronic disease    Metabolic bone disease    Right leg pain    Hypervolemia    Pulmonary hypertension    Hyperkalemia    Protein-calorie malnutrition, severe    DVT (deep venous thrombosis)    DM type 2 with diabetic mixed hyperlipidemia    Diabetic polyneuropathy associated with type 2 diabetes mellitus    Chronic kidney disease-mineral and bone disorder    Depression    Alteration in skin integrity    Foot ulceration, right, with fat layer exposed    Septic encephalopathy    Infected skin ulcer with fat layer exposed    Unilateral AKA, right    Peripheral arterial disease    Foot ulceration, left, with unspecified severity    Gangrene of right foot    Palliative care encounter    Anasarca    Other hypoglycemia    Anemia in chronic kidney disease, on chronic dialysis    Dietary folate  deficiency anemia    Rectal bleeding    Left leg pain       Review of patient's allergies indicates:  No Known Allergies    Outpatient Medications:  No current facility-administered medications on file prior to encounter.      Current Outpatient Prescriptions on File Prior to Encounter   Medication Sig Dispense Refill    aspirin (ECOTRIN) 81 MG EC tablet Take 81 mg by mouth once daily.       atorvastatin (LIPITOR) 20 MG tablet Take 20 mg by mouth once daily.      cholecalciferol, vitamin D3, (VITAMIN D3) 2,000 unit Cap Take 1 capsule (2,000 Units total) by mouth once daily.      citalopram (CELEXA) 10 MG tablet Take 10 mg by mouth once daily.      folic acid-vit B6-vit B12 2.5-25-2 mg (FOLBIC OR EQUIV) 2.5-25-2 mg Tab Take 1 tablet by mouth once daily.      gabapentin (NEURONTIN) 100 MG capsule Take 100 mg by mouth 3 (three) times daily.      ammonium lactate (LAC-HYDRIN) 12 % lotion Apply topically as needed for Dry Skin.      cinacalcet (SENSIPAR) 30 MG Tab Take 30 mg by mouth every evening.      hydrocodone-acetaminophen 10-325mg (NORCO)  mg Tab Take 1 tablet by mouth every 8 (eight) hours as needed for Pain. 40 tablet 0    midodrine (PROAMATINE) 10 MG tablet Take 1 tablet (10 mg total) by mouth every Mon, Wed, Fri. Give a 0900 or just prior to transport to dialysis 12 tablet 11    senna-docusate 8.6-50 mg (PERICOLACE) 8.6-50 mg per tablet Take 2 tablets by mouth 2 (two) times daily.      sevelamer carbonate (RENVELA) 800 mg Tab Take 2 tablets (1,600 mg total) by mouth 3 (three) times daily with meals. 180 tablet 1    sevelamer HCl (RENAGEL) 800 MG Tab Take 1 tablet (800 mg total) by mouth as needed (snack). 90 tablet 11    sodium phosphates (FLEET) 19-7 gram/118 mL Enem Place 1 enema rectally as needed (constipation).  0        Current Inpatient Medications:      Past Surgical History:   Procedure Laterality Date    CATARACT EXTRACTION      COLONOSCOPY N/A 6/23/2017    Procedure:  COLONOSCOPY;  Surgeon: Vic Clark MD;  Location: [facility] ([address] [place] [address]);  Service: Endoscopy;  Laterality: N/A;    RETINAL DETACHMENT SURGERY         Social History     Social History    Marital status: Single     Spouse name: N/A    Number of children: 3    Years of education: N/A     Occupational History    Not on file.     Social History Main Topics    Smoking status: Never Smoker    Smokeless tobacco: Never Used    Alcohol use No    Drug use: No    Sexual activity: No     Other Topics Concern    Not on file     Social History Narrative    Has lived with son for about 2 years now. Formerly worked as a [employer] but quit in 1992 to become member of a [employer].       OBJECTIVE:   Weight:  Wt Readings from Last 4 Encounters:   05/15/18 111.1 kg (245 lb)   04/26/18 113.4 kg (250 lb)   03/22/18 99.7 kg (219 lb 12.8 oz)   03/02/18 104 kg (229 lb 4.5 oz)       Vital Signs Range (Last 24H):  Temp:  [37 °C (98.6 °F)]   Pulse:  [55]   Resp:  [18]   BP: (118)/(54)   SpO2:  [96 %]       CBC:   No results for input(s): WBC, RBC, HGB, HCT, PLT, MCV, MCH, MCHC in the last 72 hours.    CMP:   No results for input(s): NA, K, CL, CO2, BUN, CREATININE, GLU, MG, PHOS, CALCIUM, ALBUMIN, PROT, ALKPHOS, ALT, AST, BILITOT in the last 72 hours.    INR:  No results for input(s): INR, PROTIME, APTT in the last 72 hours.        2D Echo:  4/22/2017    1 - Eccentric hypertrophy.     2 - Mildly depressed left ventricular systolic function (EF 45-50%).     3 - Right ventricular enlargement with mildly depressed systolic function.     4 - Biatrial enlargement.     5 - Mild tricuspid regurgitation.     6 - Mild aortic stenosis, MARANDA = 1.4 cm2, peak velocity = 2.7 m/s, mean gradient = 15 mmHg.     7 - Pulmonary hypertension. The estimated PA systolic pressure is 55 mmHg.     8 - Increased central venous pressure.     Results for orders placed or performed during the hospital encounter of 04/21/17   2D echo with color flow doppler   Result Value Ref  Range    EF 45 55 - 65    Aortic Valve Regurgitation TRIVIAL     Aortic Valve Stenosis MILD (A)     Est. PA Systolic Pressure 54.69 (A)     Mitral Valve Mobility NORMAL     Tricuspid Valve Regurgitation MILD          ASSESSMENT/PLAN:         Pre-op Assessment    I have reviewed the Patient Summary Reports.     I have reviewed the Nursing Notes.   I have reviewed the Medications.     Review of Systems  Anesthesia Hx:  No problems with previous Anesthesia  History of prior surgery of interest to airway management or planning: Denies Family Hx of Anesthesia complications.   Denies Personal Hx of Anesthesia complications.   Social:  Non-Smoker, No Alcohol Use    Hematology/Oncology:     Oncology Normal    -- Anemia: Denies Current/Recent Cancer   EENT/Dental:  EENT/Dental Normal denies chronic allergic rhinitis    Cardiovascular:   Hypertension, poorly controlled Denies MI.  Denies CAD.    Denies CABG/stent. Dysrhythmias atrial fibrillation Angina    Pulmonary:   Denies Pneumonia Asthma Denies Shortness of breath.  Denies Recent URI.    Renal/:   Chronic Renal Disease, ESRD    Hepatic/GI:   Denies PUD. Denies Hiatal Hernia. GERD    Musculoskeletal:   Arthritis     Neurological:   Denies TIA. Denies CVA. Neuromuscular Disease,  Denies Seizures.   Peripheral Neuropathy    Endocrine:   Diabetes, poorly controlled, type 2 Hypothyroidism    Psych:   Psychiatric History depression          Physical Exam  General:  Obesity, Well nourished    Airway/Jaw/Neck:  Airway Findings: Mouth Opening: Normal Tongue: Normal  General Airway Assessment: Adult  Mallampati: III  Improves to III with phonation.  TM Distance: Normal, at least 6 cm  Jaw/Neck Findings:  Neck ROM: Normal ROM  Neck Findings: Normal    Eyes/Ears/Nose:  EYES/EARS/NOSE FINDINGS: Normal   Dental:  Dental Findings: Periodontal disease, Severe    Chest/Lungs:  Chest/Lungs Findings: Normal Respiratory Rate, Clear to auscultation     Heart/Vascular:  Heart Findings:  Rate: Bradycardia  Rhythm: Regular Rhythm  Heart Murmur  Systolic  Systolic Heart Murmur Description: R Upper Sternal Border  Systolic Heart Murmur Grade: Grade II  Vascular Findings:  Dialysis Access: Left Forearm Graft     Abdomen:  Abdomen Findings: Normal    Musculoskeletal:  Musculoskeletal Findings:    Skin:  Skin Findings: Normal    Mental Status:  Mental Status Findings:  Cooperative, Alert and Oriented         Anesthesia Plan  Type of Anesthesia, risks & benefits discussed:  Anesthesia Type:  MAC  Patient's Preference:   Intra-op Monitoring Plan: standard ASA monitors  Intra-op Monitoring Plan Comments:   Post Op Pain Control Plan: per primary service following discharge from PACU and IV/PO Opioids PRN  Post Op Pain Control Plan Comments:   Induction:   IV  Beta Blocker:  Patient is not currently on a Beta-Blocker (No further documentation required).       Informed Consent: Patient representative understands risks and agrees with Anesthesia plan.  Questions answered. Anesthesia consent signed with patient representative.  ASA Score: 3     Day of Surgery Review of History & Physical:    H&P update referred to the surgeon.         Ready For Surgery From Anesthesia Perspective.

## 2018-05-15 NOTE — INTERVAL H&P NOTE
The patient has been examined and the H&P has been reviewed:    I concur with the findings and no changes have occurred since H&P was written.    Anesthesia/Surgery risks, benefits and alternative options discussed and understood by patient/family.          Active Hospital Problems    Diagnosis  POA    Foot ulceration, left, with unspecified severity [L97.529]  Yes      Resolved Hospital Problems    Diagnosis Date Resolved POA   No resolved problems to display.

## 2018-05-16 VITALS
OXYGEN SATURATION: 94 % | DIASTOLIC BLOOD PRESSURE: 73 MMHG | BODY MASS INDEX: 34.86 KG/M2 | RESPIRATION RATE: 14 BRPM | TEMPERATURE: 98 F | HEART RATE: 54 BPM | WEIGHT: 230 LBS | HEIGHT: 68 IN | SYSTOLIC BLOOD PRESSURE: 149 MMHG

## 2018-05-16 LAB
ALBUMIN SERPL BCP-MCNC: 2 G/DL
ALBUMIN SERPL BCP-MCNC: 2 G/DL
ALP SERPL-CCNC: 86 U/L
ALP SERPL-CCNC: 87 U/L
ALT SERPL W/O P-5'-P-CCNC: 5 U/L
ALT SERPL W/O P-5'-P-CCNC: <5 U/L
ANION GAP SERPL CALC-SCNC: 10 MMOL/L
ANION GAP SERPL CALC-SCNC: 10 MMOL/L
AST SERPL-CCNC: 12 U/L
AST SERPL-CCNC: 9 U/L
BILIRUB SERPL-MCNC: 0.5 MG/DL
BILIRUB SERPL-MCNC: 0.5 MG/DL
BUN SERPL-MCNC: 17 MG/DL
BUN SERPL-MCNC: 17 MG/DL
CALCIUM SERPL-MCNC: 7.9 MG/DL
CALCIUM SERPL-MCNC: 8 MG/DL
CHLORIDE SERPL-SCNC: 108 MMOL/L
CHLORIDE SERPL-SCNC: 108 MMOL/L
CO2 SERPL-SCNC: 21 MMOL/L
CO2 SERPL-SCNC: 21 MMOL/L
CREAT SERPL-MCNC: 4 MG/DL
CREAT SERPL-MCNC: 4 MG/DL
EST. GFR  (AFRICAN AMERICAN): 16.7 ML/MIN/1.73 M^2
EST. GFR  (AFRICAN AMERICAN): 16.7 ML/MIN/1.73 M^2
EST. GFR  (NON AFRICAN AMERICAN): 14.4 ML/MIN/1.73 M^2
EST. GFR  (NON AFRICAN AMERICAN): 14.4 ML/MIN/1.73 M^2
GLUCOSE SERPL-MCNC: 75 MG/DL
GLUCOSE SERPL-MCNC: 79 MG/DL
MAGNESIUM SERPL-MCNC: 2.8 MG/DL
MAGNESIUM SERPL-MCNC: 2.9 MG/DL
PHOSPHATE SERPL-MCNC: 2.8 MG/DL
PHOSPHATE SERPL-MCNC: 2.9 MG/DL
POCT GLUCOSE: 58 MG/DL (ref 70–110)
POCT GLUCOSE: 96 MG/DL (ref 70–110)
POTASSIUM SERPL-SCNC: 3.5 MMOL/L
POTASSIUM SERPL-SCNC: 3.8 MMOL/L
PROT SERPL-MCNC: 6.6 G/DL
PROT SERPL-MCNC: 6.6 G/DL
SODIUM SERPL-SCNC: 139 MMOL/L
SODIUM SERPL-SCNC: 139 MMOL/L

## 2018-05-16 PROCEDURE — G0378 HOSPITAL OBSERVATION PER HR: HCPCS

## 2018-05-16 PROCEDURE — 84100 ASSAY OF PHOSPHORUS: CPT

## 2018-05-16 PROCEDURE — 90935 HEMODIALYSIS ONE EVALUATION: CPT | Mod: ,,, | Performed by: NURSE PRACTITIONER

## 2018-05-16 PROCEDURE — 83735 ASSAY OF MAGNESIUM: CPT | Mod: 91

## 2018-05-16 PROCEDURE — 80053 COMPREHEN METABOLIC PANEL: CPT

## 2018-05-16 PROCEDURE — 36415 COLL VENOUS BLD VENIPUNCTURE: CPT

## 2018-05-16 PROCEDURE — 90935 HEMODIALYSIS ONE EVALUATION: CPT

## 2018-05-16 PROCEDURE — 25000003 PHARM REV CODE 250: Performed by: SURGERY

## 2018-05-16 RX ORDER — SODIUM CHLORIDE 9 MG/ML
INJECTION, SOLUTION INTRAVENOUS ONCE
Status: COMPLETED | OUTPATIENT
Start: 2018-05-16 | End: 2018-05-16

## 2018-05-16 RX ADMIN — GABAPENTIN 100 MG: 100 CAPSULE ORAL at 09:05

## 2018-05-16 RX ADMIN — STANDARDIZED SENNA CONCENTRATE AND DOCUSATE SODIUM 2 TABLET: 8.6; 5 TABLET, FILM COATED ORAL at 09:05

## 2018-05-16 RX ADMIN — SEVELAMER CARBONATE 800 MG: 800 TABLET, FILM COATED ORAL at 09:05

## 2018-05-16 RX ADMIN — ASPIRIN 81 MG: 81 TABLET, COATED ORAL at 09:05

## 2018-05-16 RX ADMIN — CLOPIDOGREL 75 MG: 75 TABLET, FILM COATED ORAL at 09:05

## 2018-05-16 RX ADMIN — HYDROCODONE BITARTRATE AND ACETAMINOPHEN 1 TABLET: 5; 325 TABLET ORAL at 06:05

## 2018-05-16 RX ADMIN — CITALOPRAM HYDROBROMIDE 10 MG: 10 TABLET ORAL at 09:05

## 2018-05-16 RX ADMIN — VITAMIN D, TAB 1000IU (100/BT) 2000 UNITS: 25 TAB at 09:05

## 2018-05-16 RX ADMIN — ATORVASTATIN CALCIUM 20 MG: 20 TABLET, FILM COATED ORAL at 09:05

## 2018-05-16 RX ADMIN — MIDODRINE HYDROCHLORIDE 10 MG: 5 TABLET ORAL at 09:05

## 2018-05-16 RX ADMIN — SEVELAMER CARBONATE 800 MG: 800 TABLET, FILM COATED ORAL at 05:05

## 2018-05-16 NOTE — ANESTHESIA POSTPROCEDURE EVALUATION
"Anesthesia Post Evaluation    Patient: Wilder Carbajal    Procedure(s) Performed: Procedure(s) (LRB):  Aortogram with Left Leg Angiogram, possible intervention (Left)  ANGIOPLASTY-PERCUTANEOUS TRANSLUMINAL (PTA)  STENT-PERIPHERAL (Left)    Final Anesthesia Type: MAC  Patient location during evaluation: PACU  Patient participation: Yes- Able to Participate  Level of consciousness: awake and alert  Post-procedure vital signs: reviewed and stable  Pain management: adequate  Airway patency: patent  PONV status at discharge: No PONV  Anesthetic complications: no      Cardiovascular status: blood pressure returned to baseline and hemodynamically stable  Respiratory status: unassisted, spontaneous ventilation and room air  Hydration status: euvolemic  Follow-up not needed.        Visit Vitals  /64 (BP Location: Right arm, Patient Position: Lying)   Pulse (!) 48   Temp 36.2 °C (97.1 °F) (Oral)   Resp 14   Ht 5' 8" (1.727 m)   Wt 104.3 kg (230 lb)   SpO2 (!) 94%   BMI 34.97 kg/m²       Pain/Ritesh Score: Pain Assessment Performed: Yes (5/16/2018 10:00 AM)  Presence of Pain: denies (5/16/2018 10:00 AM)  Pain Rating Prior to Med Admin: 5 (5/16/2018  6:16 AM)  Pain Rating Post Med Admin: 0 (5/16/2018 12:23 AM)      "

## 2018-05-16 NOTE — ASSESSMENT & PLAN NOTE
67 y.o. male  with h/o left RC AVF placed in [place], [place] for ESRD 2/2 DM and HTN, R AKA for gangrene and tissue loss of left heel decubitus ulcer 1 Day Post-Op s/p PTAS PT with NEDA    Renal diet  Dressing changes  Dialyzes MWF, need to coordinate dialysis today  ASA/Plavix  Norco for pain, gabapentin for neuropathy  Dispo d/c to return to SNF possibly later today

## 2018-05-16 NOTE — HPI
Mr. Carbajal is a 66 yo AAM with HTN, DM2, A-Fib, PAD, HLD, hx of R AKA, and ESRD on iHD MWF who presents to the hospital on 05/15 for scheduled angiogram of LLE.  Found to have extensive calcification of SFA with high grade proximal PT stenosis with high grade occlusion, now s/p PTA with NEDA.  Nephrology was consulted for ESRD management.    He dialyzes at [facility] under the care of Dr. Castillo on a MWF schedule.  He has an AVF to [facility] and dialyzes for 4:15 with EDW of 98.6 kg.

## 2018-05-16 NOTE — ASSESSMENT & PLAN NOTE
67 y.o. male  with h/o left RC AVF placed in [place], [place] for ESRD 2/2 DM and HTN, R AKA for gangrene and tissue loss of left heel decubitus ulcer 1 Day Post-Op s/p PTAS PT with NEDA    Renal diet  Dressing changes  Dialyzes MWF, need to coordinate dialysis today  ASA/Plavix  Norco for pain, gabapentin for neuropathy  Dispo d/c to return to SNF possibly later today

## 2018-05-16 NOTE — ASSESSMENT & PLAN NOTE
[facility]  [facility]  DIM[facility] AVF  EDW:  98.6 kg.    66 yo admitted for elective outpatient procedure, but unable to obtain transportation back to his NH for dialysis today.  Nephrology was consulted to provide HD prior to discharge.    Plan:  4 hour HD treatment today for metabolic clearance/volume management.  UF 2-3L as tolerated.  Will adjust based on his pre-treatment weight today.  Electrolytes to be adjusted using current labs.

## 2018-05-16 NOTE — CONSULTS
[facility]  Nephrology  Consult Note    Patient Name: Wilder Carbajal  MRN: [id number]  Admission Date: 5/15/2018  Hospital Length of Stay: 0 days  Attending Provider: Liz Zhou MD   Primary Care Physician: Dio Roberson MD  Principal Problem:<principal problem not specified>    Inpatient consult to Nephrology  Consult performed by: MARA SOLO  Consult ordered by: KADE BURGOS  Reason for consult: ESRD        Subjective:     HPI: Mr. Carbajal is a 66 yo AAM with HTN, DM2, A-Fib, PAD, HLD, hx of R AKA, and ESRD on iHD MWF who presents to the hospital on 05/15 for scheduled angiogram of LLE.  Found to have extensive calcification of SFA with high grade proximal PT stenosis with high grade occlusion, now s/p PTA with NEDA.  Nephrology was consulted for ESRD management.    He dialyzes at [facility] under the care of Dr. Castillo on a MWF schedule.  He has an AVF to [facility] and dialyzes for 4:15 with EDW of 98.6 kg.    Past Medical History:   Diagnosis Date    Acute pain of left shoulder 1/7/2017    Arthritis     pt states he does not have hx of Arthritis    Asthma     pt states he does not have hx of Asthma    Benign neoplasm of eyelid     RUL    Blood clotting tendency     Blood transfusion     Cataract     Chronic kidney disease requiring chronic dialysis     Diabetes mellitus     Diabetic retinopathy     Fever blister     Hyperlipidemia     Hypertension     Infertility     Joint pain     Retinal detachment     Thyroid disease     Weakness 1/7/2017       Past Surgical History:   Procedure Laterality Date    CATARACT EXTRACTION      COLONOSCOPY N/A 6/23/2017    Procedure: COLONOSCOPY;  Surgeon: Vic Clark MD;  Location: [address];  Service: Endoscopy;  Laterality: N/A;    RETINAL DETACHMENT SURGERY         Review of patient's allergies indicates:  No Known Allergies  Current Facility-Administered Medications   Medication Frequency    0.9%  NaCl infusion  Once    aspirin EC tablet 81 mg Daily    atorvastatin tablet 20 mg Daily    cinacalcet tablet 30 mg QHS    citalopram tablet 10 mg Daily    clopidogrel tablet 75 mg Daily    gabapentin capsule 100 mg TID    hydrocodone-acetaminophen 10-325mg per tablet 1 tablet Q8H PRN    hydrocodone-acetaminophen 5-325 mg per tablet 1 tablet Q4H PRN    midodrine tablet 10 mg Every Mon, Wed, Fri    senna-docusate 8.6-50 mg per tablet 2 tablet BID    sevelamer carbonate tablet 800 mg TID WM    sodium chloride 0.9% flush 3 mL PRN    vitamin D 1000 units tablet 2,000 Units Daily     Family History     Problem Relation (Age of Onset)    Depression Child    Heart attack Mother    No Known Problems Father, Sister, Brother, Maternal Aunt, Maternal Uncle, Paternal Aunt, Paternal Uncle, Maternal Grandmother, Maternal Grandfather, Paternal Grandmother, Paternal Grandfather        Social History Main Topics    Smoking status: Never Smoker    Smokeless tobacco: Never Used    Alcohol use No    Drug use: No    Sexual activity: No     Review of Systems   Constitutional: Positive for activity change. Negative for chills, fatigue and fever.   HENT: Negative for congestion, facial swelling, sinus pain and sinus pressure.    Respiratory: Negative for cough, shortness of breath and wheezing.    Cardiovascular: Positive for leg swelling. Negative for chest pain and palpitations.   Gastrointestinal: Negative for abdominal distention, constipation, diarrhea and nausea.   Musculoskeletal: Positive for arthralgias and gait problem. Negative for myalgias.   Skin: Positive for color change and wound. Negative for rash.   Neurological: Negative for dizziness, facial asymmetry and headaches.   Psychiatric/Behavioral: Negative for agitation, behavioral problems and confusion.     Objective:     Vital Signs (Most Recent):  Temp: 96.4 °F (35.8 °C) (05/16/18 0800)  Pulse: (!) 49 (05/16/18 0800)  Resp: 16 (05/16/18 0800)  BP: 139/62 (05/16/18  0800)  SpO2: (!) 94 % (05/16/18 0800)  O2 Device (Oxygen Therapy): room air (05/16/18 0800) Vital Signs (24h Range):  Temp:  [96.1 °F (35.6 °C)-98.7 °F (37.1 °C)] 96.4 °F (35.8 °C)  Pulse:  [49-57] 49  Resp:  [13-20] 16  SpO2:  [87 %-100 %] 94 %  BP: (106-140)/(54-75) 139/62     Weight: 104.3 kg (230 lb) (05/15/18 1921)  Body mass index is 34.97 kg/m².  Body surface area is 2.24 meters squared.    I/O last 3 completed shifts:  In: 1796 [P.O.:1686; I.V.:110]  Out: -     Physical Exam   Constitutional: He is cooperative. No distress.   HENT:   Head: Normocephalic and atraumatic.   Right Ear: External ear normal.   Left Ear: External ear normal.   Eyes: Conjunctivae and EOM are normal. Right eye exhibits no discharge. Left eye exhibits no discharge.   Neck: Normal range of motion. Neck supple.   Cardiovascular: An irregularly irregular rhythm present. Bradycardia present.  Exam reveals no gallop and no friction rub.    Murmur heard.  Pulmonary/Chest: Effort normal. No respiratory distress. He has no wheezes. He has no rales.   Abdominal: Soft. Bowel sounds are normal. He exhibits no distension. There is no tenderness.   Musculoskeletal: He exhibits edema (generalized).   R AKA   Neurological: He is alert.   Skin: Skin is warm. He is not diaphoretic.   Bandage intact to L foot   Psychiatric: He has a normal mood and affect. His behavior is normal.       Significant Labs:  CBC:   Recent Labs  Lab 05/15/18  1357   WBC 5.51   RBC 3.77*   HGB 10.4*   HCT 35.2*   *   MCV 93   MCH 27.6   MCHC 29.5*     CMP:   Recent Labs  Lab 05/16/18  0403   GLU 79  75   CALCIUM 8.0*  7.9*   ALBUMIN 2.0*  2.0*   PROT 6.6  6.6     139   K 3.8  3.5   CO2 21*  21*     108   BUN 17  17   CREATININE 4.0*  4.0*   ALKPHOS 87  86   ALT 5*  <5*   AST 12  9*   BILITOT 0.5  0.5         Assessment/Plan:     ESRD (end stage renal disease)    [facility]  [facility]  EDW:  98.6 kg.    66 yo  admitted for elective outpatient procedure, but unable to obtain transportation back to his NH for dialysis today.  Nephrology was consulted to provide HD prior to discharge.    Plan:  4 hour HD treatment today for metabolic clearance/volume management.  UF 2-3L as tolerated.  Will adjust based on his pre-treatment weight today.  Electrolytes to be adjusted using current labs.          Jean-Paul Humphrey NP  Nephrology  [facility]  Pager:  [phone]      I have reviewed and concur with the NP's history, physical, assessment, and plan. I have personally interviewed and examined the patient at bedside.

## 2018-05-16 NOTE — PROGRESS NOTES
Dialysis tx completed. 3 hours. 2 liters removed. Needles pulled from left arm AVF. Hemostasis achieved. Gauze and tape to sites. Tolerated tx well. Report called to Jl YARBROUGH.

## 2018-05-16 NOTE — SUBJECTIVE & OBJECTIVE
Medications:  Continuous Infusions:  Scheduled Meds:   aspirin  81 mg Oral Daily    atorvastatin  20 mg Oral Daily    cinacalcet  30 mg Oral QHS    citalopram  10 mg Oral Daily    clopidogrel  75 mg Oral Daily    gabapentin  100 mg Oral TID    midodrine  10 mg Oral Every Mon, Wed, Fri    senna-docusate 8.6-50 mg  2 tablet Oral BID    sevelamer carbonate  800 mg Oral TID WM    vitamin D  2,000 Units Oral Daily     PRN Meds:hydrocodone-acetaminophen 10-325mg, hydrocodone-acetaminophen, sodium chloride 0.9%     Objective:     Vital Signs (Most Recent):  Temp: 98.7 °F (37.1 °C) (05/16/18 0444)  Pulse: (!) 49 (05/16/18 0714)  Resp: 18 (05/16/18 0444)  BP: (!) 111/59 (05/16/18 0444)  SpO2: (!) 91 % (05/16/18 0444) Vital Signs (24h Range):  Temp:  [96.1 °F (35.6 °C)-98.7 °F (37.1 °C)] 98.7 °F (37.1 °C)  Pulse:  [49-57] 49  Resp:  [13-20] 18  SpO2:  [87 %-100 %] 91 %  BP: (106-140)/(54-75) 111/59          Physical Exam   Constitutional: No distress.   Cardiovascular: Normal rate.    No palpable distal pulses, doppler biphasic left PT  R AKA  L Foot bandage changed, left heel pressure ulcer.     Pulmonary/Chest: Effort normal. No respiratory distress.   Abdominal: Soft. He exhibits no distension.   Musculoskeletal: Normal range of motion.   Venous stasis skin changes to left thigh   Skin: Skin is warm and dry.       Significant Labs:  CBC:   Recent Labs  Lab 05/15/18  1357   WBC 5.51   RBC 3.77*   HGB 10.4*   HCT 35.2*   *   MCV 93   MCH 27.6   MCHC 29.5*     CMP:   Recent Labs  Lab 05/16/18  0403   GLU 79  75   CALCIUM 8.0*  7.9*   ALBUMIN 2.0*  2.0*   PROT 6.6  6.6     139   K 3.8  3.5   CO2 21*  21*     108   BUN 17  17   CREATININE 4.0*  4.0*   ALKPHOS 87  86   ALT 5*  <5*   AST 12  9*   BILITOT 0.5  0.5       Significant Diagnostics:  I have reviewed and interpreted all pertinent imaging results/findings within the past 24 hours.

## 2018-05-16 NOTE — NURSING
Patient was picked up by [employer] and brought tho the van. Patient was removed from the system. [employer] transporter brought patient back to floor and stated that the patient's wheelchair did not fit in the van and his supervisor instructed him to bring the patient back to the floor. I suggested that we transfer patient to [facility]'s wheelchair, transporter informed me that the patient will not fit in his wheelchair because it is too small. I asked if it was a pediatric wheelchair and he shook his head yes. I contacted [employer] supervisor and she informed me that the reason they cannot transport the patient is because they have a weight limit of 300 lbs. I informed them that I had never been informed of said weight limit and that according to the patient's chart he weighs 230 lbs. Supervisor placed me on hold and never returned to the phone. I spoke with house supervisor and informed them of the situation, patient was placed back in his room and currently working on a different transportation company for patient. Sabi Rivera RN

## 2018-05-16 NOTE — SUBJECTIVE & OBJECTIVE
Past Medical History:   Diagnosis Date    Acute pain of left shoulder 1/7/2017    Arthritis     pt states he does not have hx of Arthritis    Asthma     pt states he does not have hx of Asthma    Benign neoplasm of eyelid     RUL    Blood clotting tendency     Blood transfusion     Cataract     Chronic kidney disease requiring chronic dialysis     Diabetes mellitus     Diabetic retinopathy     Fever blister     Hyperlipidemia     Hypertension     Infertility     Joint pain     Retinal detachment     Thyroid disease     Weakness 1/7/2017       Past Surgical History:   Procedure Laterality Date    CATARACT EXTRACTION      COLONOSCOPY N/A 6/23/2017    Procedure: COLONOSCOPY;  Surgeon: Vic Clark MD;  Location: [address]);  Service: Endoscopy;  Laterality: N/A;    RETINAL DETACHMENT SURGERY         Review of patient's allergies indicates:  No Known Allergies  Current Facility-Administered Medications   Medication Frequency    0.9%  NaCl infusion Once    aspirin EC tablet 81 mg Daily    atorvastatin tablet 20 mg Daily    cinacalcet tablet 30 mg QHS    citalopram tablet 10 mg Daily    clopidogrel tablet 75 mg Daily    gabapentin capsule 100 mg TID    hydrocodone-acetaminophen 10-325mg per tablet 1 tablet Q8H PRN    hydrocodone-acetaminophen 5-325 mg per tablet 1 tablet Q4H PRN    midodrine tablet 10 mg Every Mon, Wed, Fri    senna-docusate 8.6-50 mg per tablet 2 tablet BID    sevelamer carbonate tablet 800 mg TID WM    sodium chloride 0.9% flush 3 mL PRN    vitamin D 1000 units tablet 2,000 Units Daily     Family History     Problem Relation (Age of Onset)    Depression Child    Heart attack Mother    No Known Problems Father, Sister, Brother, Maternal Aunt, Maternal Uncle, Paternal Aunt, Paternal Uncle, Maternal Grandmother, Maternal Grandfather, Paternal Grandmother, Paternal Grandfather        Social History Main Topics    Smoking status: Never Smoker    Smokeless tobacco:  Never Used    Alcohol use No    Drug use: No    Sexual activity: No     Review of Systems   Constitutional: Positive for activity change. Negative for chills, fatigue and fever.   HENT: Negative for congestion, facial swelling, sinus pain and sinus pressure.    Respiratory: Negative for cough, shortness of breath and wheezing.    Cardiovascular: Positive for leg swelling. Negative for chest pain and palpitations.   Gastrointestinal: Negative for abdominal distention, constipation, diarrhea and nausea.   Musculoskeletal: Positive for arthralgias and gait problem. Negative for myalgias.   Skin: Positive for color change and wound. Negative for rash.   Neurological: Negative for dizziness, facial asymmetry and headaches.   Psychiatric/Behavioral: Negative for agitation, behavioral problems and confusion.     Objective:     Vital Signs (Most Recent):  Temp: 96.4 °F (35.8 °C) (05/16/18 0800)  Pulse: (!) 49 (05/16/18 0800)  Resp: 16 (05/16/18 0800)  BP: 139/62 (05/16/18 0800)  SpO2: (!) 94 % (05/16/18 0800)  O2 Device (Oxygen Therapy): room air (05/16/18 0800) Vital Signs (24h Range):  Temp:  [96.1 °F (35.6 °C)-98.7 °F (37.1 °C)] 96.4 °F (35.8 °C)  Pulse:  [49-57] 49  Resp:  [13-20] 16  SpO2:  [87 %-100 %] 94 %  BP: (106-140)/(54-75) 139/62     Weight: 104.3 kg (230 lb) (05/15/18 1921)  Body mass index is 34.97 kg/m².  Body surface area is 2.24 meters squared.    I/O last 3 completed shifts:  In: 1796 [P.O.:1686; I.V.:110]  Out: -     Physical Exam   Constitutional: He is cooperative. No distress.   HENT:   Head: Normocephalic and atraumatic.   Right Ear: External ear normal.   Left Ear: External ear normal.   Eyes: Conjunctivae and EOM are normal. Right eye exhibits no discharge. Left eye exhibits no discharge.   Neck: Normal range of motion. Neck supple.   Cardiovascular: An irregularly irregular rhythm present. Bradycardia present.  Exam reveals no gallop and no friction rub.    Murmur heard.  Pulmonary/Chest:  Effort normal. No respiratory distress. He has no wheezes. He has no rales.   Abdominal: Soft. Bowel sounds are normal. He exhibits no distension. There is no tenderness.   Musculoskeletal: He exhibits edema (generalized).   R AKA   Neurological: He is alert.   Skin: Skin is warm. He is not diaphoretic.   Bandage intact to L foot   Psychiatric: He has a normal mood and affect. His behavior is normal.       Significant Labs:  CBC:   Recent Labs  Lab 05/15/18  1357   WBC 5.51   RBC 3.77*   HGB 10.4*   HCT 35.2*   *   MCV 93   MCH 27.6   MCHC 29.5*     CMP:   Recent Labs  Lab 05/16/18  0403   GLU 79  75   CALCIUM 8.0*  7.9*   ALBUMIN 2.0*  2.0*   PROT 6.6  6.6     139   K 3.8  3.5   CO2 21*  21*     108   BUN 17  17   CREATININE 4.0*  4.0*   ALKPHOS 87  86   ALT 5*  <5*   AST 12  9*   BILITOT 0.5  0.5

## 2018-05-16 NOTE — NURSING
Pt sustained an assisted fall from stretcher to w/c. MD on call for vascular sx notified at time of incident. Pt denied c/o pain or hitting his head. Will continue to monitor.

## 2018-05-16 NOTE — PLAN OF CARE
Future Appointments  Date Time Provider Department Center   6/19/2018 10:30 AM Yuriy Alvares MD [facility]   notified son  prudence harp jr that pt will be returning back to [facility] after dialysis      What's next     What's next          Follow up with Liz Zhou MD in 2 week(s)   with kvng and US  [address]   [facility] [id number]   [phone]      CM left msg for Nurse Tata [id number] re need for appt above w kvng and u/s     05/16/18 1358   Final Note   Assessment Type Final Discharge Note   Discharge Disposition longterm Nu   Discharge plans and expectations educations in teach back method with documentation complete? Yes   Right Care Referral Info   Post Acute Recommendation Other

## 2018-05-16 NOTE — PLAN OF CARE
Update 2:16 PM  DERIK informed by [facility] that pt needs to transfer at 6pm.  SW contacted [facility]'s to request a later transport time.  Sw informed SN to call report to [facility] at [phone].  Pt room number is 42A.        DERIK spoke with MD about pt transfer.  MD informed SW that pt couls transfer after dialysis at about 5pm today.  SW scheduled will call for transport with [facility] transportation.      DERIK spoke with Yahaira Zhou ([phone]) with [facility].  Ms. Zhou informed the SW that the pt could transfer back to [facility] as a assisted pt.                Marycarmen Velez, LMSW [facility]  [id number]

## 2018-05-16 NOTE — PLAN OF CARE
[facility]     Department of Hospital Medicine     [address] [place] [address]     [phone] after hours  [phone] fax       NURSING HOME ORDERS    05/16/2018    Admit to Nursing Home:  Regular Bed                                                     Diagnoses:  Active Hospital Problems    Diagnosis  POA    *Foot ulceration, left, with unspecified severity [L97.529]  Yes    ESRD (end stage renal disease) [N18.6]  Yes      Resolved Hospital Problems    Diagnosis Date Resolved POA   No resolved problems to display.       Patient is homebound due to:  Foot ulceration, left, with unspecified severity    Allergies:Review of patient's allergies indicates:  No Known Allergies    Vitals:       Routine, Once weekly      Diet: Diabetic Diet    Supplement:  1 can every three times a day with meals                         Type:     Glucerna         Acitivities:     - Up in a chair each morning as tolerated   - May use walker, cane, or self-propelled wheelchair   - Weight bearing: as tolerated     LABS:  Per facility protocol   CMP, CBC each month for 3 months     Nursing Precautions:      - Aspiration precautions:             - Total assistance with meals            -  Upright 90 degrees befor during and after meals             -  Suction at bedside          - Fall precautions per nursing home protocol   - Seizure precaution per snf protocol   - Decubitus precautions:        -  for positioning   - Pressure reducing foam mattress   - Turn patient every two hours. Use wedge pillows to anchor patient    CONSULTS:     Physical Therapy to evaluate and treat     Occupational Therapy to evaluate and treat     Speech Therapy  to evaluate and treat     Nutrition to evaluate and recommend diet       MISCELLANEOUS CARE:       Routine Skin for Bedridden Patients:  Apply moisture barrier cream to all    skin folds and wet areas in perineal area daily and after  baths and                           all bowel movements.    WOUND CARE:  Wound spray or saline for wound cleaning with all dressing changes.    All wounds to be measured with first dressing changes and every week.    Foot Ulcer   Location: left foot / heel              Dry Eschar:              Pain with betadine twice daily.         Partial Eschar, dissolving tissue or with pink tissue:               Collagenase (Santyl) daily, cover with telfa, wrap with with kerlix dressing              Consult ET nurse                    DIABETES CARE:     Check blood sugar:     Fingerstick blood sugar a.m and p.m.    Fingerstick blood sugar AC and HS   Fingerstick blood sugar every 6 hours if unable to eat      Report CBG < 60 or > 400 to physician.                                          Insulin Sliding Scale          Glucose  Novolog Insulin Subcutaneous        0 - 60   Orange juice or glucose tablet, hold insulin      No insulin   201-250  2 units   251-300  4 units   301-350  6 units   351-400  8 units   >400   10 units then call physician      Medications: Discontinue all previous medication orders, if any. See new list below.     Wilder Carbajal   Home Medication Instructions MICHI:[id number]    Printed on:05/16/18 1413   Medication Information                      ammonium lactate (LAC-HYDRIN) 12 % lotion  Apply topically as needed for Dry Skin.             aspirin (ECOTRIN) 81 MG EC tablet  Take 81 mg by mouth once daily.              atorvastatin (LIPITOR) 20 MG tablet  Take 20 mg by mouth once daily.             cholecalciferol, vitamin D3, (VITAMIN D3) 2,000 unit Cap  Take 1 capsule (2,000 Units total) by mouth once daily.             cinacalcet (SENSIPAR) 30 MG Tab  Take 30 mg by mouth every evening.             citalopram (CELEXA) 10 MG tablet  Take 10 mg by mouth once daily.             clopidogrel (PLAVIX) 75 mg tablet  Take 1 tablet (75 mg total) by mouth once daily.             folic acid-vit B6-vit B12  2.5-25-2 mg (FOLBIC OR EQUIV) 2.5-25-2 mg Tab  Take 1 tablet by mouth once daily.             gabapentin (NEURONTIN) 100 MG capsule  Take 100 mg by mouth 3 (three) times daily.             hydrocodone-acetaminophen 10-325mg (NORCO)  mg Tab  Take 1 tablet by mouth every 8 (eight) hours as needed for Pain.             midodrine (PROAMATINE) 10 MG tablet  Take 1 tablet (10 mg total) by mouth every Mon, Wed, Fri. Give a 0900 or just prior to transport to dialysis             senna-docusate 8.6-50 mg (PERICOLACE) 8.6-50 mg per tablet  Take 2 tablets by mouth 2 (two) times daily.             sevelamer carbonate (RENVELA) 800 mg Tab  Take 2 tablets (1,600 mg total) by mouth 3 (three) times daily with meals.             sevelamer HCl (RENAGEL) 800 MG Tab  Take 1 tablet (800 mg total) by mouth as needed (snack).             sodium phosphates (FLEET) 19-7 gram/118 mL Enem  Place 1 enema rectally as needed (constipation).                       _________________________________  Danyel Blake MD  05/16/2018

## 2018-05-16 NOTE — NURSING
Pt in bed resting with family at bedside. Pt denies c/o pain or n/v. Pt states understanding of orders. D/c PIV and tolerated well by pt. Pt transport to vehicle via w/c with [facility].

## 2018-05-16 NOTE — PROGRESS NOTES
[facility]  Vascular Surgery  Progress Note    Patient Name: Wilder Carbajal  MRN: [id number]  Admission Date: 5/15/2018  Primary Care Provider: Dio Roberson MD    Subjective:     Interval History:   POD1. No acute events. Pain controlled.    Post-Op Info:  Procedure(s) (LRB):  Aortogram with Left Leg Angiogram, possible intervention (Left)  ANGIOPLASTY-PERCUTANEOUS TRANSLUMINAL (PTA)  STENT-PERIPHERAL (Left)   1 Day Post-Op       Medications:  Continuous Infusions:  Scheduled Meds:   aspirin  81 mg Oral Daily    atorvastatin  20 mg Oral Daily    cinacalcet  30 mg Oral QHS    citalopram  10 mg Oral Daily    clopidogrel  75 mg Oral Daily    gabapentin  100 mg Oral TID    midodrine  10 mg Oral Every Mon, Wed, Fri    senna-docusate 8.6-50 mg  2 tablet Oral BID    sevelamer carbonate  800 mg Oral TID WM    vitamin D  2,000 Units Oral Daily     PRN Meds:hydrocodone-acetaminophen 10-325mg, hydrocodone-acetaminophen, sodium chloride 0.9%     Objective:     Vital Signs (Most Recent):  Temp: 98.7 °F (37.1 °C) (05/16/18 0444)  Pulse: (!) 49 (05/16/18 0714)  Resp: 18 (05/16/18 0444)  BP: (!) 111/59 (05/16/18 0444)  SpO2: (!) 91 % (05/16/18 0444) Vital Signs (24h Range):  Temp:  [96.1 °F (35.6 °C)-98.7 °F (37.1 °C)] 98.7 °F (37.1 °C)  Pulse:  [49-57] 49  Resp:  [13-20] 18  SpO2:  [87 %-100 %] 91 %  BP: (106-140)/(54-75) 111/59          Physical Exam   Constitutional: No distress.   Cardiovascular: Normal rate.    No palpable distal pulses, doppler biphasic left PT  R AKA  L Foot bandage changed, left heel pressure ulcer.     Pulmonary/Chest: Effort normal. No respiratory distress.   Abdominal: Soft. He exhibits no distension.   Musculoskeletal: Normal range of motion.   Venous stasis skin changes to left thigh   Skin: Skin is warm and dry.       Significant Labs:  CBC:   Recent Labs  Lab 05/15/18  1357   WBC 5.51   RBC 3.77*   HGB 10.4*   HCT 35.2*   *   MCV 93   MCH 27.6   MCHC 29.5*      CMP:   Recent Labs  Lab 05/16/18  0403   GLU 79  75   CALCIUM 8.0*  7.9*   ALBUMIN 2.0*  2.0*   PROT 6.6  6.6     139   K 3.8  3.5   CO2 21*  21*     108   BUN 17  17   CREATININE 4.0*  4.0*   ALKPHOS 87  86   ALT 5*  <5*   AST 12  9*   BILITOT 0.5  0.5       Significant Diagnostics:  I have reviewed and interpreted all pertinent imaging results/findings within the past 24 hours.    Assessment/Plan:     Foot ulceration, left, with unspecified severity    67 y.o. male  with h/o left RC AVF placed in [place], [place] for ESRD 2/2 DM and HTN, R AKA for gangrene and tissue loss of left heel decubitus ulcer 1 Day Post-Op s/p PTAS PT with NEDA    Renal diet  Dressing changes  Dialyzes MWF, need to coordinate dialysis today  ASA/Plavix  Norco for pain, gabapentin for neuropathy  Dispo d/c to return to SNF possibly later today              Stoney Saucedo MD  Vascular Surgery  [facility]

## 2018-05-16 NOTE — PROGRESS NOTES
Patient arrived on unit via stretcher. Weight obtained in bed @ 100.5kg. Dialysis inititaed via left arm AVF with 15g needles X 2 without difficulty. Lines connected and secured. Orders and machine settings verified. Tx started. Good blood flows established.

## 2018-05-16 NOTE — PLAN OF CARE
Dr Blake paged to write Plan of Care: n[facility] home cust reg bed orders and MD would like transport set up for 5:30-6pm elizabeth so pt can complete HD. HOLLY Kelly notified.

## 2018-05-16 NOTE — DISCHARGE SUMMARY
[facility]  General Surgery  Discharge Summary      Patient Name: Wilder Carbajal  MRN: [id number]  Admission Date: 5/15/2018  Hospital Length of Stay: 0 days  Discharge Date and Time: 5/16/2018  9:02 PM  Attending Physician: Liz Zhou MD   Discharging Provider: Danyel Blake MD  Primary Care Provider: Dio Roberson MD     HPI: See H & P from admission for details.   In brief, Wilder Carbajal is a 67 y.o. with h/o left RC AVF placed in [place], [place] for ESRD 2/2 DM and HTN, R AKA for gangrene and tissue loss of left heel decubitus ulcer.     Aortogram with Left Leg Angiogram, possible intervention (Left), ANGIOPLASTY-PERCUTANEOUS TRANSLUMINAL (PTA), STENT-PERIPHERAL (Left)    Hospital Course: On 5/15/2018 they underwent Aortogram with Left Leg Angiogram, possible intervention (Left), ANGIOPLASTY-PERCUTANEOUS TRANSLUMINAL (PTA), STENT-PERIPHERAL (Left). Their post op course was uncomplicated. He underwent HD without issues and transportation was arranged for transfer back to nursing home.      Consults:   Consults         Status Ordering Provider     Inpatient consult to Nephrology  Once     Provider:  (Not yet assigned)    Completed KADE BURGOS          Significant Diagnostic Studies: Labs:   BMP:   Recent Labs  Lab 05/15/18  1357 05/16/18  0403   * 79  75    139  139   K 3.7 3.8  3.5    108  108   CO2 23 21*  21*   BUN 15 17  17   CREATININE 3.9* 4.0*  4.0*   CALCIUM 8.1* 8.0*  7.9*   MG  --  2.9*  2.8*   , CMP   Recent Labs  Lab 05/15/18  1357 05/16/18  0403    139  139   K 3.7 3.8  3.5    108  108   CO2 23 21*  21*   * 79  75   BUN 15 17  17   CREATININE 3.9* 4.0*  4.0*   CALCIUM 8.1* 8.0*  7.9*   PROT 6.6 6.6  6.6   ALBUMIN 2.1* 2.0*  2.0*   BILITOT 0.5 0.5  0.5   ALKPHOS 86 87  86   AST 8* 12  9*   ALT 5* 5*  <5*   ANIONGAP 8 10  10   ESTGFRAFRICA 17.2* 16.7*  16.7*   EGFRNONAA 14.9* 14.4*  14.4*    and CBC    Recent Labs  Lab 05/15/18  1357   WBC 5.51   HGB 10.4*   HCT 35.2*   *       Pending Diagnostic Studies:     None        Final Active Diagnoses:    Diagnosis Date Noted POA    PRINCIPAL PROBLEM:  Foot ulceration, left, with unspecified severity [L97.529] 02/15/2018 Yes    ESRD (end stage renal disease) on dialysis [N18.6, Z99.2] 08/15/2015 Yes      Problems Resolved During this Admission:    Diagnosis Date Noted Date Resolved POA      Discharged Condition: stable    Disposition: Skilled Nursing Facility    Follow Up:  Follow-up Information     Liz Zhou MD In 2 weeks.    Specialties:  Vascular Surgery, General Surgery  Why:  with kvng and US  Contact information:  [address]  [facility] [address]  [phone]                 Patient Instructions:     VAS US Ankle Brachial Indices Resting   Standing Status: Future  Standing Exp. Date: 05/15/19     VAS US Arterial Leg Left   Standing Status: Future  Standing Exp. Date: 05/15/19     Diet general     Activity as tolerated     Call MD for:  temperature >100.4     Call MD for:  difficulty breathing, headache or visual disturbances     Call MD for:  redness, tenderness, or signs of infection (pain, swelling, redness, odor or green/yellow discharge around incision site)     Remove dressing in 48 hours   Scheduling Instructions: Right groin dressing can be removed on Thursday, after dressing removed can shower with soap and water, or sponge bath.  Continue local wound care to left ulcer.     REASON FOR NOT PRESCRIBING STATIN MEDICATION AT DISCHARGE       Medications:  Reconciled Home Medications:      Medication List      START taking these medications    clopidogrel 75 mg tablet  Commonly known as:  PLAVIX  Take 1 tablet (75 mg total) by mouth once daily.        CONTINUE taking these medications    ammonium lactate 12 % lotion  Commonly known as:  LAC-HYDRIN  Apply topically as needed for Dry Skin.     aspirin 81 MG EC tablet  Commonly known as:   ECOTRIN  Take 81 mg by mouth once daily.     atorvastatin 20 MG tablet  Commonly known as:  LIPITOR  Take 20 mg by mouth once daily.     cholecalciferol (vitamin D3) 2,000 unit Cap  Commonly known as:  VITAMIN D3  Take 1 capsule (2,000 Units total) by mouth once daily.     cinacalcet 30 MG Tab  Commonly known as:  SENSIPAR  Take 30 mg by mouth every evening.     citalopram 10 MG tablet  Commonly known as:  CELEXA  Take 10 mg by mouth once daily.     folic acid-vit B6-vit B12 2.5-25-2 mg 2.5-25-2 mg Tab  Commonly known as:  FOLBIC or Equiv  Take 1 tablet by mouth once daily.     gabapentin 100 MG capsule  Commonly known as:  NEURONTIN  Take 100 mg by mouth 3 (three) times daily.     hydrocodone-acetaminophen 10-325mg  mg Tab  Commonly known as:  NORCO  Take 1 tablet by mouth every 8 (eight) hours as needed for Pain.     midodrine 10 MG tablet  Commonly known as:  PROAMATINE  Take 1 tablet (10 mg total) by mouth every Mon, Wed, Fri. Give a 0900 or just prior to transport to dialysis     senna-docusate 8.6-50 mg 8.6-50 mg per tablet  Commonly known as:  PERICOLACE  Take 2 tablets by mouth 2 (two) times daily.     sevelamer carbonate 800 mg Tab  Commonly known as:  RENVELA  Take 2 tablets (1,600 mg total) by mouth 3 (three) times daily with meals.     sevelamer HCl 800 MG Tab  Commonly known as:  RENAGEL  Take 1 tablet (800 mg total) by mouth as needed (snack).     sodium phosphates 19-7 gram/118 mL Enem  Commonly known as:  FLEET  Place 1 enema rectally as needed (constipation).            Danyel Blake MD  General Surgery  [facility]

## 2018-05-17 NOTE — NURSING
Patient refused to go back in room, patient sat at nurses station until transport arrived. Patient now leaving via [facility] ambulance. Sabi Rivera RN

## 2018-07-02 NOTE — DISCHARGE SUMMARY
"[facility] Hospital Medicine  Discharge Summary      Patient Name: Wilder Carbajal  MRN: [id number]  Admission Date: 1/6/2018  Hospital Length of Stay: 4 days  Discharge Date and Time: 1/14/2018 10:56 AM  Attending Physician: No att. providers found   Discharging Provider: Melnaie Zepeda PA-C  Primary Care Provider: Dio Roberson MD  [facility] Medicine Team: [facility] HOSP MED F Melanie Zepeda PA-C    HPI:   Patient is a 67-year-old male with a past medical history of end-stage renal disease on Tuesday, Thursday, and Saturday, HLD, HTN, DM 2, paroxysmal A. fib, right femoral DVT presents the ED via EMS for weakness, fatigue, and back pain.  Patient states that he was "sliding out of bed" and so he called his neighbor who helped him back into bed. He denies falling or any recent falls. Patient states that he has been feeling "lazy".  He endorses generalized weakness and fatigue onset today. He has had a productive cough for the past few days. He endorses chills, but denies any fever or chills. He has had back pain chronically, but denies any back pain at this time.  He admits to taking his pain medication today, but reports noncompliance with his other medication.     In ED, CXR with signs of increased attenutation within the lung bases with depedent pleural fluid. Mild pulm vasc congestion. EKG shows afib, rate controlled.      * No surgery found *      Hospital Course:   Pt admitted to observation for hypervolemia 2/2 noncompliance with outpatient HD. Nephrology consulted and appreciate assistance. HD 1/8 stopped prematurely per patient request. HD session again 1/9 and duonebs ordered for wheezing. 1/10: Repeated CXR (no change). HD 1/11 (-4.5L); remains above dry weight and still requiring oxygen. HD 1/13 (-4L). 6 min walk shows patient requiring oxygen. Will need insurance approval for home O2; [facility] not open until 1/16 due to holiday. PT/OT rec SNF; pt declined. 1/14: Pt left AMA despite education of " Patient would like a call back.   She needs a new perscription for her Spiriva.    "risks associated with hypoxia. Encouraged patient to seek medical attention and follow up with PCP.      Consults:   Consults         Status Ordering Provider     Inpatient consult to Nephrology  Once     Provider:  (Not yet assigned)    Completed CHUNG VANESSA          * Hypervolemia    Missed outpatient HD and endorses noncompliance with home medications  Hypoxia  - Presents with dry cough, fatigue, malaise x3 days  - CXR shows hypoventilatory lung changes with abnormal increased attenuation within the lung bases which suggest atelectasis or airspace consolidation with or without dependent pleural fluid.  Mild pulmonary vasculature congestion  - Cannot r/o PNA, but patient afebrile, VSS, denies SOB  - Flu swab negative  - Oxygenation levels decrease to upper 80s with exertion- currently on 1 L O2 NC.    - Continue PRN duonebs; Uses albuterol and atrovent at home   1/8: HD stopped prematurely per patient request (pt will not elaborate on why). Pt requiring O2 (de-satted on RA).   1/9: HD session again. Duonebs ordered for wheezing. Weaning oxygen as tolerated.   1/10: Repeated CXR (no change) due to increased wheezing. Stable on 2L. Continue duonebs q4 and weaning oxygen as tolerated. Remains afebrile without leukocytosis. Plan for HD 1/11.  1/11: HD (-4.5L); remains above dry weight and still requiring oxygen.   1/13: HD (-4L). 6 min walk shows patient requiring oxygen. Will need insurance approval for home O2; PACE not open until 1/16 due to holiday. Continue duonebs q4 and weaning oxygen as tolerated.   1/14: Pt left AMA        ESRD (end stage renal disease)    HD TThSa   - Continue Sevelamer 800 mg TID, lasix 80 mg daily  - Missed dialysis today d/t "had to pay rent"  - Nephrology consulted for HD  - Renal diet; fluid restrict  1/14: Left AMA. Encouraged compliance with dialysis        DM (diabetes mellitus) type II controlled with renal manifestation    Hg A1c 4.6. Not currently on any meds.  - Daily glucose " checks, SSI   - Diabetic diet 1800        HTN (hypertension)    - Continue Losartan 50 mg daily, hydralazine 50 mg TID  - BP stable with home regimen        Anemia, chronic disease    H/H 11.1/34.6 on admit  - BL Hg 6-9  - HDS, no s/s bleeding        Paroxysmal atrial fibrillation    - Continue apixaban 50 mg BID  - HR stable        HLD (hyperlipidemia)    - Continue atorvastatin 20 mg daily  - Noncompliant with medications at home        Pulmonary hypertension    2D ECHO 4/22/2017    1 - Eccentric hypertrophy.     2 - Mildly depressed left ventricular systolic function (EF 45-50%).     3 - Right ventricular enlargement with mildly depressed systolic function.     4 - Biatrial enlargement.     5 - Mild tricuspid regurgitation.     6 - Mild aortic stenosis, MARANDA = 1.4 cm2, peak velocity = 2.7 m/s, mean gradient = 15 mmHg.     7 - Pulmonary hypertension. The estimated PA systolic pressure is 55 mmHg.     8 - Increased central venous pressure.     1/13: 6 min walk shows patient requiring oxygen. Will need insurance approval for home O2; PACE not open until 1/16 due to holiday. Continue duonebs q4 and weaning oxygen as tolerated.   1/14: Pt left AMA         Diabetic macular edema of both eyes    - Partial blindness to both eyes  - No changes          Final Active Diagnoses:    Diagnosis Date Noted POA    PRINCIPAL PROBLEM:  Hypervolemia [E87.70] 01/07/2018 Yes    ESRD (end stage renal disease) [N18.6] 08/15/2015 Not Applicable    DM (diabetes mellitus) type II controlled with renal manifestation [E11.29] 08/15/2015 Yes    HTN (hypertension) [I10] 12/21/2013 Yes    Anemia, chronic disease [D63.8] 06/21/2017 Yes    Paroxysmal atrial fibrillation [I48.0] 04/11/2017 Yes    HLD (hyperlipidemia) [E78.5] 12/21/2013 Yes    Pulmonary hypertension [I27.20] 01/07/2018 Yes    Diabetic macular edema of both eyes [E11.311] 08/17/2012 Yes    Hypoxia [R09.02] 04/23/2017 Yes      Problems Resolved During this Admission:     Diagnosis Date Noted Date Resolved POA       Discharged Condition: against medical advice    Disposition: Left Against Medical Adv*    Follow Up:  Follow-up Information     Dio Roberson MD On 1/12/2018.    Specialties:  Internal Medicine, Geriatric Medicine  Why:  at 1:00pm  Contact information:  [address]  [facility] [address]  [phone]             Dio Roberson MD In 1 day.    Specialties:  Internal Medicine, Geriatric Medicine  Contact information:  [address]  [facility] [address]  [phone]                 Patient Instructions:   No discharge procedures on file.    Significant Diagnostic Studies: Radiology: X-Ray: CXR: X-Ray Chest 1 View (CXR):   Results for orders placed or performed during the hospital encounter of 01/06/18   X-Ray Chest 1 View    Narrative    Technique: AP chest radiograph.    Comparison: Radiograph 06/21/2017.    Findings:    Mediastinal structures are midline.  There is atherosclerotic calcification of the aortic arch.  Cardiac silhouette is magnified by AP technique but appears unchanged in size when compared to the most recent prior exam.  Lung volumes are low but symmetric.  Abnormal increased parenchymal attenuation noted within the lung bases concerning for atelectasis or air space consolidation.  No pneumothorax blunting of the right costophrenic angle may relate to adjacent parenchymal opacities or dependent pleural fluid.  No free air beneath the diaphragm.  Degenerative changes of the spine and shoulders noted.    Impression    Hypoventilatory lung changes with abnormal increased attenuation within the lung bases which suggest atelectasis or airspace consolidation with or without dependent pleural fluid.  Mild pulmonary vasculature congestion.      Electronically signed by: CHUCKIE AREVALO MD  Date:     01/07/18  Time:    00:28     and X-Ray Chest PA and Lateral (CXR):   Results for orders placed or performed during the hospital encounter of 01/06/18   X-Ray Chest PA  And Lateral    Narrative    2 views: There is cardiomegaly, mild edema, aortic plaque, and no change.      Electronically signed by: NATHAN HUNT MD  Date:     01/10/18  Time:    15:55        Pending Diagnostic Studies:     Procedure Component Value Units Date/Time    Hepatitis panel, acute [[id number]] Collected:  01/14/18 0947    Order Status:  Sent Lab Status:  In process Updated:  01/14/18 0952    Specimen:  Blood from Blood     Narrative:       Collection has been rescheduled by SCARLETT at 1/14/2018 03:26 Reason: pt   refused spoke with Mehdi         Medications:  Reconciled Home Medications:   Discharge Medication List as of 1/14/2018 10:57 AM      CONTINUE these medications which have NOT CHANGED    Details   ammonium lactate (LAC-HYDRIN) 12 % lotion Apply topically as needed for Dry Skin., Until Discontinued, Historical Med      apixaban 5 mg Tab Take 1 tablet (5 mg total) by mouth 2 (two) times daily., Starting 5/2/2017, Until Discontinued, Normal      aspirin (ECOTRIN) 81 MG EC tablet Take 81 mg by mouth once daily. , Until Discontinued, Historical Med      atorvastatin (LIPITOR) 20 MG tablet Take 20 mg by mouth once daily., Until Discontinued, Historical Med      cetirizine (ZYRTEC) 10 MG tablet Take 10 mg by mouth once daily., Until Discontinued, Historical Med      cinacalcet (SENSIPAR) 30 MG Tab Take 30 mg by mouth every evening., Until Discontinued, Historical Med      citalopram (CELEXA) 10 MG tablet Take 10 mg by mouth once daily., Historical Med      diclofenac sodium (VOLTAREN) 1 % Gel Apply 2 g topically 4 (four) times daily as needed (for pain)., Until Discontinued, Historical Med      ergocalciferol (ERGOCALCIFEROL) 50,000 unit Cap Take 50,000 Units by mouth every 14 (fourteen) days. , Until Discontinued, Historical Med      furosemide (LASIX) 80 MG tablet Take 80 mg by mouth once daily., Until Discontinued, Historical Med      gabapentin (NEURONTIN) 100 MG capsule Take 100 mg by mouth 3  (three) times daily., Historical Med      hydrALAZINE (APRESOLINE) 50 MG tablet Take 50 mg by mouth 3 (three) times daily., Until Discontinued, Historical Med      hydrocodone-acetaminophen 5-325mg (NORCO) 5-325 mg per tablet Take 1 tablet by mouth every 6 (six) hours as needed for Pain., Historical Med      ipratropium (ATROVENT HFA) 17 mcg/actuation inhaler Inhale 2 puffs into the lungs 4 (four) times daily. Do not exceed 12 puffs per day, Until Discontinued, Historical Med      ketotifen (ZADITOR) 0.025 % (0.035 %) ophthalmic solution Apply 1 drop to eye every 8 (eight) hours., Until Discontinued, Historical Med      losartan (COZAAR) 50 MG tablet Take 1 tablet (50 mg total) by mouth once daily., Starting 4/12/2017, Until Thu 4/12/18, Normal      mometasone (NASONEX) 50 mcg/actuation nasal spray 2 sprays by Nasal route once daily., Historical Med      podofilox (CONDYLOX) 0.5 % external solution Apply a small amount to lesions twice daily for three days, then hold for four days and repeat regimen, Until Discontinued, Historical Med      sevelamer carbonate (RENVELA) 800 mg Tab Take two tablets by mouth four times daily with meals and snacks, Until Discontinued, Historical Med             Indwelling Lines/Drains at time of discharge:   Lines/Drains/Airways     Drain                 Hemodialysis AV Fistula Left upper arm -- days                Time spent on the discharge of patient: 30 minutes  Patient was seen and examined on the date of discharge and determined to be suitable for discharge.         Melanie Zepeda PA-C  Department of Hospital Medicine  [facility]

## 2018-12-10 NOTE — PLAN OF CARE
Problem: Patient Care Overview  Goal: Plan of Care Review  Outcome: Ongoing (interventions implemented as appropriate)   02/26/18 0102   Coping/Psychosocial   Plan Of Care Reviewed With patient;son     Patient NPO after MN for AKA on 2/26/18 at 0600.   Surgery consent given via phone with son, Mr. Wilder Carbajal .  Dr. Eugenie Mcneal spoke to son and answered all questions.  Patient is scheduled to have dialysis after surgery, dialysis days are MWF.  PICC consult ordered and patient updated on current POC and wound care completed to R foot.  Patient remains in contact  isolation for citrobacter koseri and klebsiella pneumoniae ESBL  In  foot wound.   Patient discharge to SNF after surgery for rehab services.  Discharge date is TBD.        Patient's first and last name, , procedure, and correct site confirmed prior to the start of procedure. Patient's first and last name, , procedure, and correct site confirmed prior to the start of procedure.

## 2019-01-16 NOTE — ASSESSMENT & PLAN NOTE
Wilder Carbajal is a 67 y.o. 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 Matamoros), concern for dusky appearance of 2nd and 3rd digits  -applied wound vac 75mmHg continuous therapy.  Will change every 2-3 days with adaptic over exposed tendon's and bone.  -ID on board, appreciate recs  -Vascular on board, appreciate recs  -Podiatry will follow.    Weightbearing Status: NWB  Offloading Device: heel protector boot     Abdomen soft, non-tender, no guarding. No CVA tenderness

## 2019-10-03 NOTE — ASSESSMENT & PLAN NOTE
- Continue apixaban 50 mg BID  - Not currently on rate control- HR 60s-70s  - Telemetry   09/25/2019  lb. The overall AHI was 1  and overall RDI was 12. The oxygen rochelle was 86.9 % and % time < 90% SpO2 was 0.8 %.

## 2021-02-01 NOTE — PROGRESS NOTES
3-hr dialysis tx completed with net UF of 2L. Tolerated well. Pulled out needles. Pressure held and hemostasis achieved. Gauzed and taped. Positive thrill and bruit. No acute events.   No

## 2021-02-17 NOTE — PLAN OF CARE
Problem: Diabetes, Type 2 (Adult)  Goal: Signs and Symptoms of Listed Potential Problems Will be Absent, Minimized or Managed (Diabetes, Type 2)  Signs and symptoms of listed potential problems will be absent, minimized or managed by discharge/transition of care (reference Diabetes, Type 2 (Adult) CPG).   Outcome: Ongoing (interventions implemented as appropriate)  Blood sugar monitored as ordered, no insulin required.    Problem: Patient Care Overview  Goal: Plan of Care Review  Outcome: Ongoing (interventions implemented as appropriate)  Discussed plan of care with the patient and his son including medications given.  Patient remained stable throughout the shift.       Metronidazole Counseling:  I discussed with the patient the risks of metronidazole including but not limited to seizures, nausea/vomiting, a metallic taste in the mouth, nausea/vomiting and severe allergy.

## 2022-08-11 NOTE — SUBJECTIVE & OBJECTIVE
Past Medical History:   Diagnosis Date    Acute pain of left shoulder 1/7/2017    Arthritis     Asthma     Benign neoplasm of eyelid     RUL    Blood clotting tendency     Blood transfusion     Cataract     Chronic kidney disease requiring chronic dialysis     Diabetes mellitus     Diabetic retinopathy     Fever blister     Hyperlipidemia     Hypertension     Infertility     Joint pain     Retinal detachment     Thyroid disease     Weakness 1/7/2017       Past Surgical History:   Procedure Laterality Date    CATARACT EXTRACTION      RETINAL DETACHMENT SURGERY         Review of patient's allergies indicates:  No Known Allergies    No current facility-administered medications on file prior to encounter.      Current Outpatient Prescriptions on File Prior to Encounter   Medication Sig    aspirin (ECOTRIN) 81 MG EC tablet Take 81 mg by mouth once daily.     atorvastatin (LIPITOR) 20 MG tablet Take 20 mg by mouth once daily.    cetirizine (ZYRTEC) 10 MG tablet Take 10 mg by mouth once daily.    cinacalcet (SENSIPAR) 30 MG Tab Take 30 mg by mouth every evening.    diclofenac sodium (VOLTAREN) 1 % Gel Apply 2 g topically 4 (four) times daily as needed (for pain).    ergocalciferol (ERGOCALCIFEROL) 50,000 unit Cap Take 50,000 Units by mouth every 14 (fourteen) days.     hydrocodone-acetaminophen 5-325mg (NORCO) 5-325 mg per tablet Take 1-2 tablets PO q4-6hours PRN pain (Patient taking differently: Take 1 tablet by mouth every 6 to 8 hours as needed for Pain. )    ipratropium (ATROVENT HFA) 17 mcg/actuation inhaler Inhale 2 puffs into the lungs 4 (four) times daily. Do not exceed 12 puffs per day    ketotifen (ZADITOR) 0.025 % (0.035 %) ophthalmic solution Apply 1 drop to eye every 8 (eight) hours.    loperamide (IMODIUM) 2 mg capsule Take 2 mg by mouth every 4 (four) hours as needed for Diarrhea.     mometasone (NASONEX) 50 mcg/actuation nasal spray 2 sprays by Nasal route once daily.     "sevelamer carbonate (RENVELA) 800 mg Tab Take two tablets by mouth four times daily with meals and snacks    losartan (COZAAR) 50 MG tablet Take 1 tablet (50 mg total) by mouth once daily.    podofilox (CONDYLOX) 0.5 % external solution Apply a small amount to lesions twice daily for three days, then hold for four days and repeat regimen    [DISCONTINUED] GUAIFENESIN/DM/PSEUDOEPHEDRINE (TUSSIN CF ORAL) Take 10 mLs by mouth every 6 (six) hours as needed (for cough).     Family History     Problem Relation (Age of Onset)    Heart attack Mother    No Known Problems Father, Sister, Brother, Maternal Aunt, Maternal Uncle, Paternal Aunt, Paternal Uncle, Maternal Grandmother, Maternal Grandfather, Paternal Grandmother, Paternal Grandfather        Social History Main Topics    Smoking status: Never Smoker    Smokeless tobacco: Never Used    Alcohol use Yes      Comment: 1 pint bourbon    Drug use: No    Sexual activity: Not on file     Review of Systems   Constitutional: Positive for appetite change (decreased), chills and fever.        Drenching night sweats "for many years"   HENT: Positive for dental problem (gingival bleeding), hearing loss (chronic), sore throat (for many months) and trouble swallowing (chronic).    Eyes: Positive for visual disturbance (blind in right eye, "flashing lights, chronic"). Negative for photophobia.   Respiratory: Positive for cough and shortness of breath (assoc with anxiety).    Cardiovascular: Positive for chest pain (assoc with anxiety), palpitations and leg swelling.   Gastrointestinal: Positive for abdominal pain (for many years). Negative for blood in stool, constipation (last BM on 4/20), diarrhea, nausea and vomiting.   Genitourinary: Positive for difficulty urinating (occassional incontinence, chronic) and dysuria (for many years). Negative for hematuria.   Musculoskeletal: Positive for neck pain.        Shooting leg pains, chronic     Skin: Positive for rash (lower legs " and abdomen) and wound (blisters on lower legs).   Neurological: Positive for dizziness (assoc with anxiety), weakness, light-headedness (assoc with anxiety), numbness (legs, chronic) and headaches (chronic).   Hematological: Negative for adenopathy. Does not bruise/bleed easily.   Psychiatric/Behavioral: Negative for confusion. The patient is nervous/anxious.      Objective:     Vital Signs (Most Recent):  Temp: 98.3 °F (36.8 °C) (04/21/17 1636)  Pulse: 65 (04/21/17 1636)  Resp: 18 (04/21/17 1636)  BP: (!) 165/77 (04/21/17 1636)  SpO2: (!) 90 % (04/21/17 1636) Vital Signs (24h Range):  Temp:  [97.5 °F (36.4 °C)-101.9 °F (38.8 °C)] 98.3 °F (36.8 °C)  Pulse:  [62-90] 65  Resp:  [18-19] 18  SpO2:  [90 %-99 %] 90 %  BP: (155-180)/(72-83) 165/77     Weight: 112.9 kg (249 lb)  Body mass index is 36.77 kg/(m^2).    Physical Exam   Constitutional: He is oriented to person, place, and time. He appears well-developed and well-nourished.   Restless, morbidly obese male   HENT:   Head: Normocephalic and atraumatic.   Eyes: Conjunctivae and EOM are normal.   Neck: Normal range of motion. Neck supple.   Cardiovascular: Normal rate and regular rhythm.    Murmur (systolic) heard.  Pulmonary/Chest: Effort normal. No respiratory distress. He has rales.   Abdominal: Soft. Bowel sounds are normal. There is tenderness.   Peau d'orange appearance of RLQ with TTP   Musculoskeletal: He exhibits edema (tense edema to lower extremities bilat).   Neurological: He is alert and oriented to person, place, and time.   Skin: Skin is warm. Rash (blistering stasis dermatitis) noted. He is not diaphoretic.   Psychiatric: His speech is normal. His mood appears anxious. He is agitated. Cognition and memory are normal.   Very restless, requesting frequent position changes in bed from lying to sitting to lying   Nursing note and vitals reviewed.       Significant Labs:   CBC:   Recent Labs  Lab 04/21/17  0549   WBC 9.99   HGB 10.9*   HCT 34.0*   PLT 93*      CMP:   Recent Labs  Lab 04/21/17  0549      K 4.7   CL 98   CO2 30*   GLU 77   BUN 19   CREATININE 4.0*   CALCIUM 9.2   PROT 8.2   ALBUMIN 3.2*   BILITOT 1.2*   ALKPHOS 138*   AST 21   ALT 12   ANIONGAP 14   EGFRNONAA 14.5*       Significant Imaging: I have reviewed all pertinent imaging results/findings within the past 24 hours.   [Time Spent: ___ minutes] : I have spent [unfilled] minutes of time on the encounter.

## 2023-01-17 NOTE — MEDICAL/APP STUDENT
"2/7/2018 8:47 AM    Wilder Carbajal   1950   [id number]        Psychiatry Progress Note     SUBJECTIVE:   Patient seen and examined in patient's room by Veronica Drew.     Patient was sitting up in bed eating breakfast. He stated he was "feeling good, but discouraged" because he just received news about the amputation of his right 4th and 5th toes.    Patient states that he has 6 living children, one son was killed in 2006 by "a family member, who shot him in the back." He states that he gets very lonely about every other day that pushes him into a depressed mood and so he prays.     The patient is a retired  for the [employer]. 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 He states that his stays in the hospital allows him to "relax a little bit" and does not bother him. He states that he rests better in hospital. For the past year he has had trouble going to sleep, usually only getting 2-3 hours a sleep per night.     When interviewed with the Resident, Dr. Osbaldo Sauceda, later that morning, the patient stated that he has had visual hallucinations for the past year - he would see people of all shapes and colors (black and white). The hallucinations do not bother him. They are triggered when he thinks about them, and then the people would appear.     He stated that he has hope for his future, and that he does feel guilty for how he raised his children.        Current Medications:   Scheduled Meds:    ammonium lactate   Topical (Top) BID    apixaban  5 mg Oral BID    aspirin  81 mg Oral Daily    atorvastatin  20 mg Oral Daily    cinacalcet  30 mg Oral QHS    citalopram  10 mg Oral Daily    furosemide  80 mg Oral Daily    gabapentin  100 mg Oral TID    miconazole nitrate 2%   Topical (Top) BID    piperacillin-tazobactam (ZOSYN) IVPB  2.25 g Intravenous " Q12H    senna-docusate 8.6-50 mg  2 tablet Oral BID    sevelamer carbonate  1,600 mg Oral TID WM      PRN Meds: sodium chloride 0.9%, acetaminophen, hydrocodone-acetaminophen 5-325mg, midodrine, ondansetron, sodium chloride 0.9%   Psychotherapeutics     Start     Stop Route Frequency Ordered    02/05/18 0900  citalopram tablet 10 mg      -- Oral Daily 02/04/18 1724          Allergies:   Review of patient's allergies indicates:  No Known Allergies     OBJECTIVE:   Vitals   Vitals:    02/07/18 1145   BP:    Pulse: 62   Resp:    Temp:         Labs/Imaging/Studies:   Recent Results (from the past 36 hour(s))   Renal function panel    Collection Time: 02/06/18  4:24 AM   Result Value Ref Range    Glucose 68 (L) 70 - 110 mg/dL    Sodium 138 136 - 145 mmol/L    Potassium 3.7 3.5 - 5.1 mmol/L    Chloride 99 95 - 110 mmol/L    CO2 23 23 - 29 mmol/L    BUN, Bld 45 (H) 8 - 23 mg/dL    Calcium 9.2 8.7 - 10.5 mg/dL    Creatinine 6.9 (H) 0.5 - 1.4 mg/dL    Albumin 1.8 (L) 3.5 - 5.2 g/dL    Phosphorus 4.3 2.7 - 4.5 mg/dL    eGFR if  8.6 (A) >60 mL/min/1.73 m^2    eGFR if non African American 7.5 (A) >60 mL/min/1.73 m^2    Anion Gap 16 8 - 16 mmol/L   Magnesium    Collection Time: 02/06/18  4:24 AM   Result Value Ref Range    Magnesium 2.0 1.6 - 2.6 mg/dL   CBC auto differential    Collection Time: 02/06/18  4:24 AM   Result Value Ref Range    WBC 18.25 (H) 3.90 - 12.70 K/uL    RBC 2.90 (L) 4.60 - 6.20 M/uL    Hemoglobin 8.7 (L) 14.0 - 18.0 g/dL    Hematocrit 26.1 (L) 40.0 - 54.0 %    MCV 90 82 - 98 fL    MCH 30.0 27.0 - 31.0 pg    MCHC 33.3 32.0 - 36.0 g/dL    RDW 17.2 (H) 11.5 - 14.5 %    Platelets 207 150 - 350 K/uL    MPV 9.9 9.2 - 12.9 fL    Immature Granulocytes 1.0 (H) 0.0 - 0.5 %    Gran # (ANC) 15.8 (H) 1.8 - 7.7 K/uL    Immature Grans (Abs) 0.18 (H) 0.00 - 0.04 K/uL    Lymph # 1.2 1.0 - 4.8 K/uL    Mono # 1.1 (H) 0.3 - 1.0 K/uL    Eos # 0.1 0.0 - 0.5 K/uL    Baso # 0.04 0.00 - 0.20 K/uL    nRBC 0 0 /100  WBC    Gran% 86.3 (H) 38.0 - 73.0 %    Lymph% 6.3 (L) 18.0 - 48.0 %    Mono% 5.9 4.0 - 15.0 %    Eosinophil% 0.3 0.0 - 8.0 %    Basophil% 0.2 0.0 - 1.9 %    Differential Method Automated    Vancomycin, random    Collection Time: 02/06/18  4:24 AM   Result Value Ref Range    Vancomycin, Random 23.8 Not established ug/mL   Culture, Anaerobe    Collection Time: 02/06/18  1:29 PM   Result Value Ref Range    Anaerobic Culture Culture in progress    Aerobic culture    Collection Time: 02/06/18  1:29 PM   Result Value Ref Range    Aerobic Bacterial Culture       GRAM NEGATIVE PREETI, NON-LACTOSE   Moderate  Identification and susceptibility pending     Gram stain    Collection Time: 02/06/18  1:29 PM   Result Value Ref Range    Gram Stain Result Rare WBC's     Gram Stain Result Rare Gram negative rods    POCT glucose    Collection Time: 02/06/18  1:48 PM   Result Value Ref Range    POCT Glucose 70 70 - 110 mg/dL   POCT glucose    Collection Time: 02/06/18  2:24 PM   Result Value Ref Range    POCT Glucose 99 70 - 110 mg/dL   Renal function panel    Collection Time: 02/07/18  4:12 AM   Result Value Ref Range    Glucose 87 70 - 110 mg/dL    Sodium 137 136 - 145 mmol/L    Potassium 3.7 3.5 - 5.1 mmol/L    Chloride 99 95 - 110 mmol/L    CO2 26 23 - 29 mmol/L    BUN, Bld 45 (H) 8 - 23 mg/dL    Calcium 9.1 8.7 - 10.5 mg/dL    Creatinine 7.2 (H) 0.5 - 1.4 mg/dL    Albumin 1.6 (L) 3.5 - 5.2 g/dL    Phosphorus 4.9 (H) 2.7 - 4.5 mg/dL    eGFR if  8.2 (A) >60 mL/min/1.73 m^2    eGFR if non  7.1 (A) >60 mL/min/1.73 m^2    Anion Gap 12 8 - 16 mmol/L   Magnesium    Collection Time: 02/07/18  4:12 AM   Result Value Ref Range    Magnesium 2.2 1.6 - 2.6 mg/dL   CBC auto differential    Collection Time: 02/07/18  4:12 AM   Result Value Ref Range    WBC 15.46 (H) 3.90 - 12.70 K/uL    RBC 2.62 (L) 4.60 - 6.20 M/uL    Hemoglobin 7.8 (L) 14.0 - 18.0 g/dL    Hematocrit 23.4 (L) 40.0 - 54.0 %    MCV 89 82 - 98  "fL    MCH 29.8 27.0 - 31.0 pg    MCHC 33.3 32.0 - 36.0 g/dL    RDW 17.1 (H) 11.5 - 14.5 %    Platelets 189 150 - 350 K/uL    MPV 9.9 9.2 - 12.9 fL    Immature Granulocytes 1.0 (H) 0.0 - 0.5 %    Gran # (ANC) 13.2 (H) 1.8 - 7.7 K/uL    Immature Grans (Abs) 0.16 (H) 0.00 - 0.04 K/uL    Lymph # 1.1 1.0 - 4.8 K/uL    Mono # 0.9 0.3 - 1.0 K/uL    Eos # 0.1 0.0 - 0.5 K/uL    Baso # 0.02 0.00 - 0.20 K/uL    nRBC 0 0 /100 WBC    Gran% 85.2 (H) 38.0 - 73.0 %    Lymph% 7.3 (L) 18.0 - 48.0 %    Mono% 5.6 4.0 - 15.0 %    Eosinophil% 0.8 0.0 - 8.0 %    Basophil% 0.1 0.0 - 1.9 %    Differential Method Automated    Vancomycin, random    Collection Time: 02/07/18  4:12 AM   Result Value Ref Range    Vancomycin, Random 28.1 Not established ug/mL        Mental Status Exam:  Appearance: 67 y.o.  male, looks older than stated age, dressed in hospital gown, obese, unshaven and unkempt, in distress by pain in his foot   Behavior/Cooperation: psychomotor retardation, fasciculations on along upper arms, intention tremor, cooperative    Speech: decreased rate, low tone, paucity of speech   Mood: "feeling good, but discouraged" due to news about his toes   Affect: blunted, congruent with mood   Thought Process: linear, coherent,    Thought Content: visual hallucinations, no delusions, no suicidal or homicidal ideations, no auditory hallucinations    Orientation: orientated to person, place and time  Memory: short term memory intact,   Attention Span/Concentration: poor,   Cognition: poor   Insight: poor   Judgment: poor       ASSESSMENT/PLAN:   Delirium  67 y.o.  male, consulted for septic encephalopathy from infected right foot. PMHx: T2DM, ESRD on dialysis, Hyperlipidemia, and undiagnosed depression. Patient had requested for time to think and pray overnight before consenting to surgical and anesthetic procedures.     Patient is bradycardic (62 bpm) and Hypotensive (101/55), and SpO2 93%; end stage renal " disease (normocytic anemia, increased BUN, creatinine; low albumin and eGFR (8.2), no findings on blood cultures, positive cultures on infected foot for gram negative refugio, non-lactose fermenting aerobic organisms, awaiting identification and susceptibility results and other culture results; neutrophil and lymphocytes shows active infection and inflammation.     Psychiatry was later consulted to assess capacity, as the patient had rejected the planned surgical procedure.     Plan:  1. Assess capacity to consent to surgical and anesthetic procedures   2. Treat infected right foot - amputation of 4th and 5th digits and awaiting identification and susceptibility results for appropriate antibiotic regimen     Veronica Drew, Medical Student  2/7/2018   36.7

## (undated) DEVICE — DRESSING XEROFORM GAUZE 5X9

## (undated) DEVICE — SYS LABEL CORRECT MED

## (undated) DEVICE — BLADE SURG #15 CARBON STEEL

## (undated) DEVICE — SEE MEDLINE ITEM 154981

## (undated) DEVICE — ADHESIVE DERMABOND ADVANCED

## (undated) DEVICE — GUIDEWIRE ADVANTAGE .014X300CM

## (undated) DEVICE — KIT INTRO MICRO NIT VSI 4FR

## (undated) DEVICE — SEE MEDLINE ITEM 152515

## (undated) DEVICE — FORCEP STRAIGHT DISP

## (undated) DEVICE — Device

## (undated) DEVICE — VISE RADIFOCUS MULTI TORQUE

## (undated) DEVICE — GAUZE FLUFF XXLG 36X36 2 PLY

## (undated) DEVICE — SYR 10CC LUER LOCK

## (undated) DEVICE — NDL HYPO REG 25G X 1 1/2

## (undated) DEVICE — TRAY MINOR GEN SURG

## (undated) DEVICE — DRAPE STERI-DRAPE 1000 17X11IN

## (undated) DEVICE — SPONGE DERMACEA GAUZE 4X4

## (undated) DEVICE — SPONGE GAUZE 16PLY 4X4

## (undated) DEVICE — GAUZE SPONGE BULKEE 6X6.75IN

## (undated) DEVICE — SEE MEDLINE ITEM 146298

## (undated) DEVICE — MANIFOLD 4 PORT

## (undated) DEVICE — SOL NS 1000CC

## (undated) DEVICE — COVERS PROBE NR-48 STERILE

## (undated) DEVICE — STOCKINET 4INX48

## (undated) DEVICE — CATH ANGLED GLIDE CATH 5FR

## (undated) DEVICE — INTRODUCER VASC RADPQ 5FRX10CM

## (undated) DEVICE — DRESSING TELFA STRL 4X3 LF

## (undated) DEVICE — BANDAGE DERMACEA STRETCH 4X1IN

## (undated) DEVICE — DRAPE PLASTIC U 60X72

## (undated) DEVICE — CLOSURE SKIN STERI STRIP 1/2X4

## (undated) DEVICE — GLOVE BIOGEL ECLIPSE SZ 6.5

## (undated) DEVICE — APPLICATOR CHLORAPREP ORN 26ML

## (undated) DEVICE — SUT 3/0 18IN COATED VICRYLP

## (undated) DEVICE — SHEET THYROID W/ISO-BAC

## (undated) DEVICE — SEE MEDLINE ITEM 152622

## (undated) DEVICE — UNDERGLOVES BIOGEL PI SZ 7 LF

## (undated) DEVICE — GUIDEWIRE WINN 300CM X .014

## (undated) DEVICE — ELECTRODE REM PLYHSV RETURN 9

## (undated) DEVICE — TOURNIQUET SB QC DP 18X4IN

## (undated) DEVICE — SEE MEDLINE ITEM 152529

## (undated) DEVICE — SEE MEDLINE ITEM 157131

## (undated) DEVICE — SEE MEDLINE ITEM 157116

## (undated) DEVICE — NDL 18GA X1 1/2 REG BEVEL

## (undated) DEVICE — SET BASIN 48X48IN 6000ML RING

## (undated) DEVICE — SEE L#120831

## (undated) DEVICE — SUT MONOCYRL 4-0 PS2 UND

## (undated) DEVICE — GAUZE SPONGE 4X4 12PLY

## (undated) DEVICE — SYR MED RAD 150ML

## (undated) DEVICE — SEE MEDLINE ITEM 157216

## (undated) DEVICE — ELECTRODE BLADE INSULATED 1 IN

## (undated) DEVICE — GUIDEWIRE STD .035X180CM ANG

## (undated) DEVICE — COVER INSTR ELASTIC BAND 40X20

## (undated) DEVICE — PAD CAST SPECIALIST STRL 4

## (undated) DEVICE — COVER OVERHEAD SURG LT BLUE

## (undated) DEVICE — TUBING CNTRST INJ ADPT 72IN

## (undated) DEVICE — KIT DRESSING PREVENA PLUS

## (undated) DEVICE — SWABSTICK BENZOIN 4 IN

## (undated) DEVICE — SUT VICRYL 3-0 27 SH

## (undated) DEVICE — SYR ORAL ENTERAL PUR 12ML

## (undated) DEVICE — KIT COLLECTION E SWAB REGULAR

## (undated) DEVICE — SUT 3-0 VICRYL / SH (J416)

## (undated) DEVICE — CATH SEEKER .014IN 135CM

## (undated) DEVICE — SUT ETHILON 4-0 PS2 18 BLK

## (undated) DEVICE — SUT PROLENE 2-0 30 SH

## (undated) DEVICE — SEE MEDLINE ITEM 156955

## (undated) DEVICE — SEE MEDLINE ITEM 157117

## (undated) DEVICE — BLADE MEDIUM 9MM X 25MM

## (undated) DEVICE — DRESSING TRANS 4X4 TEGADERM

## (undated) DEVICE — SHEATH FLEXOR ANSEL 5FR 90M

## (undated) DEVICE — INFLATION DEVICE ENCORE 26

## (undated) DEVICE — DRESSING N ADH OIL EMUL 3X3

## (undated) DEVICE — GOWN ISOLATION IMPERVIOUS

## (undated) DEVICE — CORD BIPOLAR 12 FOOT

## (undated) DEVICE — BANDAGE ACE ELASTIC 6"

## (undated) DEVICE — SOL 9P NACL IRR PIC IL

## (undated) DEVICE — TRAY MINOR ORTHO

## (undated) DEVICE — SET DECANTER MEDICHOICE

## (undated) DEVICE — SET MICROPUNCTURE

## (undated) DEVICE — PACK BASIC

## (undated) DEVICE — STAPLER SKIN PROXIMATE WIDE

## (undated) DEVICE — HEMOSTAT SURGICEL 2X14IN

## (undated) DEVICE — CATH ANGIO SOFT VU 5FR 65CM

## (undated) DEVICE — DRESSING XEROFORM FOIL PK 1X8